# Patient Record
Sex: FEMALE | Race: BLACK OR AFRICAN AMERICAN | NOT HISPANIC OR LATINO | Employment: OTHER | URBAN - METROPOLITAN AREA
[De-identification: names, ages, dates, MRNs, and addresses within clinical notes are randomized per-mention and may not be internally consistent; named-entity substitution may affect disease eponyms.]

---

## 2017-01-18 ENCOUNTER — HOSPITAL ENCOUNTER (OUTPATIENT)
Dept: RADIOLOGY | Facility: HOSPITAL | Age: 67
Discharge: HOME | End: 2017-01-18
Attending: NURSE PRACTITIONER
Payer: MEDICARE

## 2017-01-18 ENCOUNTER — OFFICE VISIT (OUTPATIENT)
Dept: ORTHOPEDICS | Facility: CLINIC | Age: 67
End: 2017-01-18
Payer: MEDICARE

## 2017-01-18 VITALS
WEIGHT: 200 LBS | DIASTOLIC BLOOD PRESSURE: 76 MMHG | BODY MASS INDEX: 36.8 KG/M2 | HEART RATE: 58 BPM | HEIGHT: 62 IN | SYSTOLIC BLOOD PRESSURE: 141 MMHG

## 2017-01-18 DIAGNOSIS — M18.12 PRIMARY OSTEOARTHRITIS OF FIRST CARPOMETACARPAL JOINT OF LEFT HAND: ICD-10-CM

## 2017-01-18 DIAGNOSIS — Z98.890 POST-OPERATIVE STATE: Primary | ICD-10-CM

## 2017-01-18 DIAGNOSIS — Z98.890 POST-OPERATIVE STATE: ICD-10-CM

## 2017-01-18 PROCEDURE — 99999 PR PBB SHADOW E&M-EST. PATIENT-LVL III: CPT | Mod: PBBFAC,,, | Performed by: NURSE PRACTITIONER

## 2017-01-18 PROCEDURE — 73130 X-RAY EXAM OF HAND: CPT | Mod: 26,LT,, | Performed by: RADIOLOGY

## 2017-01-18 PROCEDURE — 73130 X-RAY EXAM OF HAND: CPT | Mod: TC,LT

## 2017-01-18 PROCEDURE — 99024 POSTOP FOLLOW-UP VISIT: CPT | Mod: ,,, | Performed by: NURSE PRACTITIONER

## 2017-01-18 NOTE — PROGRESS NOTES
"Ms. Sears is here today for a post-operative visit.she is 6 weeks status post Left thumb CMC arthroscopy with synovectomy and thermal shrinkage of ligaments  by Dr. Carney on 12/7/16. she reports that she is doing very well with no significant pain. she denies fever, chills, and sweats since the time of the surgery.     Physical exam:    Vitals:    01/18/17 0919   BP: (!) 141/76   Pulse: (!) 58   Weight: 90.7 kg (200 lb)   Height: 5' 2" (1.575 m)   PainSc: 0-No pain   PainLoc: Hand     Incision is clean, dry and intact. There is no erythema or exudate. Scar is soft. There is no sign of any infection. she is NVI. She has excellent  strength and normal ROM at wrist and digits. She is almost able to oppose thumb to little finger MCP.     Assessment: 14 days status post Left thumb CMC arthroscopy with synovectomy and thermal shrinkage of ligaments    Plan:  Diagnoses and all orders for this visit:    Post-operative state  -     X-Ray Hand Complete Left; Future    Primary osteoarthritis of first carpometacarpal joint of left hand  -     X-Ray Hand Complete Left; Future    - Xray taken today showing good alignment is within normal limits.  No fractures identified.  No evidence of lytic or blastic lesions. Soft tissues are unremarkable. Joint spaces are unremarkable.  - Pt is progressing very well in therapy- she [facility]  - Pt is happy with surgery, minimal pain- only when she moves "a certain way"- has not needed any medication  - Continue OT  - RTC 6 wk for 12 wk post op                "

## 2017-01-24 ENCOUNTER — HOSPITAL ENCOUNTER (OUTPATIENT)
Dept: RADIOLOGY | Facility: CLINIC | Age: 67
Discharge: HOME | End: 2017-01-24
Payer: MEDICARE

## 2017-01-24 PROCEDURE — 77080 DXA BONE DENSITY AXIAL: CPT | Mod: GA,TC

## 2017-01-24 PROCEDURE — 77080 DXA BONE DENSITY AXIAL: CPT | Mod: 26,GA,S$GLB, | Performed by: INTERNAL MEDICINE

## 2017-02-20 RX ORDER — FUROSEMIDE 40 MG/1
TABLET ORAL
Qty: 90 TABLET | Refills: 1 | Status: SHIPPED | OUTPATIENT
Start: 2017-02-20 | End: 2017-05-12

## 2017-02-27 ENCOUNTER — TELEPHONE (OUTPATIENT)
Dept: ORTHOPEDICS | Facility: CLINIC | Age: 67
End: 2017-02-27

## 2017-03-01 ENCOUNTER — OFFICE VISIT (OUTPATIENT)
Dept: ORTHOPEDICS | Facility: CLINIC | Age: 67
End: 2017-03-01
Payer: MEDICARE

## 2017-03-01 VITALS
HEART RATE: 66 BPM | WEIGHT: 199.94 LBS | DIASTOLIC BLOOD PRESSURE: 88 MMHG | SYSTOLIC BLOOD PRESSURE: 170 MMHG | HEIGHT: 62 IN | BODY MASS INDEX: 36.79 KG/M2

## 2017-03-01 DIAGNOSIS — Z47.89 UNSPECIFIED ORTHOPEDIC AFTERCARE: Primary | ICD-10-CM

## 2017-03-01 PROCEDURE — 99213 OFFICE O/P EST LOW 20 MIN: CPT | Mod: PBBFAC | Performed by: PHYSICIAN ASSISTANT

## 2017-03-01 PROCEDURE — 99999 PR PBB SHADOW E&M-EST. PATIENT-LVL III: CPT | Mod: PBBFAC,,, | Performed by: PHYSICIAN ASSISTANT

## 2017-03-01 PROCEDURE — 99024 POSTOP FOLLOW-UP VISIT: CPT | Mod: ,,, | Performed by: PHYSICIAN ASSISTANT

## 2017-03-01 NOTE — PROGRESS NOTES
"Ms. Sears is here today for a post-operative visit.she is 12 weeks status post Left thumb CMC arthroscopy with synovectomy and thermal shrinkage of ligaments  by Dr. Carney on 12/7/16. she reports that she is doing very well with no significant pain. Mild pain only, but greatly improved from preop. Having knee replacement surgery soon. she denies fever, chills, and sweats since the time of the surgery.     Physical exam:    Vitals:    03/01/17 0903   BP: (!) 170/88   Pulse: 66   Weight: 90.7 kg (199 lb 15.3 oz)   Height: 5' 2" (1.575 m)   PainSc:   2   PainLoc: Hand     Incision is clean, dry and intact. There is no erythema or exudate. Scar is firm and raised There is no sign of any infection. she is NVI. She has excellent  strength and normal ROM at wrist and digits. She is almost able to oppose thumb to little finger MCP.     Assessment: 12 weeks status post Left thumb CMC arthroscopy with synovectomy and thermal shrinkage of ligaments    Plan:  Stephanie was seen today for pain.    Diagnoses and all orders for this visit:    Unspecified orthopedic aftercare    - Pt completed [facility] at [facility]  -may need injection vs arthroplasty in future if pain returns - discussed with pt. She is unable to be casted while she has her walker  - f/u PRN              "

## 2017-05-10 ENCOUNTER — OFFICE VISIT (OUTPATIENT)
Dept: FAMILY MEDICINE | Facility: CLINIC | Age: 67
End: 2017-05-10
Payer: MEDICARE

## 2017-05-10 VITALS
HEIGHT: 62 IN | DIASTOLIC BLOOD PRESSURE: 100 MMHG | RESPIRATION RATE: 17 BRPM | WEIGHT: 191.81 LBS | BODY MASS INDEX: 35.3 KG/M2 | TEMPERATURE: 98 F | HEART RATE: 70 BPM | SYSTOLIC BLOOD PRESSURE: 184 MMHG | OXYGEN SATURATION: 98 %

## 2017-05-10 DIAGNOSIS — Z72.0 TOBACCO USE: ICD-10-CM

## 2017-05-10 DIAGNOSIS — Z00.00 ENCOUNTER FOR PREVENTIVE HEALTH EXAMINATION: Primary | ICD-10-CM

## 2017-05-10 DIAGNOSIS — Z23 NEED FOR PNEUMOCOCCAL VACCINATION: ICD-10-CM

## 2017-05-10 DIAGNOSIS — Z72.3 LACK OF PHYSICAL ACTIVITY: ICD-10-CM

## 2017-05-10 DIAGNOSIS — I10 ESSENTIAL HYPERTENSION: ICD-10-CM

## 2017-05-10 DIAGNOSIS — E66.3 OVERWEIGHT: ICD-10-CM

## 2017-05-10 DIAGNOSIS — R52 SEVERE PAIN: ICD-10-CM

## 2017-05-10 PROCEDURE — 99999 PR PBB SHADOW E&M-EST. PATIENT-LVL V: CPT | Mod: PBBFAC,,, | Performed by: PHYSICIAN ASSISTANT

## 2017-05-10 PROCEDURE — 99215 OFFICE O/P EST HI 40 MIN: CPT | Mod: PBBFAC,PO | Performed by: PHYSICIAN ASSISTANT

## 2017-05-10 PROCEDURE — G0438 PPPS, INITIAL VISIT: HCPCS | Mod: ,,, | Performed by: PHYSICIAN ASSISTANT

## 2017-05-10 PROCEDURE — 90732 PPSV23 VACC 2 YRS+ SUBQ/IM: CPT | Mod: PBBFAC,PO | Performed by: PHYSICIAN ASSISTANT

## 2017-05-10 NOTE — MR AVS SNAPSHOT
[facility]  [address]  [place] [address]  Phone: [phone]  Fax: [phone]                  Stephanie Sears   5/10/2017 9:00 AM   Office Visit    Description:  Female : 1950   Provider:  Debbie Wheeler PA-C   Department:  [facility]           Reason for Visit     Medicare AWV           Diagnoses this Visit        Comments    Encounter for preventive health examination    -  Primary     Need for pneumococcal vaccination         Tobacco use         Lack of physical activity         Severe pain         Overweight                To Do List           Future Appointments        Provider Department Dept Phone    2017 9:00 AM Luis Angel Schafer MD [facility] [phone]    2017 9:00 AM Sandi Menon MD [facility] [phone]      Goals (5 Years of Data)     None      Follow-Up and Disposition     Return in about 2 days (around 2017).      [facility] On Call     [facility] On Call Nurse Care Line -  Assistance  Unless otherwise directed by your provider, please contact [facility] On-Call, our nurse care line that is available for  assistance.     Registered nurses in the [facility] On Call Center provide: appointment scheduling, clinical advisement, health education, and other advisory services.  Call: [phone] (toll free)               Medications           Message regarding Medications     Verify the changes and/or additions to your medication regime listed below are the same as discussed with your clinician today.  If any of these changes or additions are incorrect, please notify your healthcare provider.             Verify that the below list of medications is an accurate representation of the medications you are currently taking.  If none reported, the list may be blank. If incorrect, please contact your healthcare provider. Carry this list with you in case of emergency.           Current Medications      "albuterol 90 mcg/actuation inhaler Inhale 2 puffs into the lungs every 6 (six) hours as needed. 2 Aerosol Inhalation Every 4-6 hours    amlodipine-benazepril (LOTREL) 10-40 mg per capsule Take 1 capsule by mouth once daily.    blood sugar diagnostic (ACCU-CHEK JOSE G PLUS TEST STRP) Strp Inject 1 each into the skin 2 (two) times daily.    carvedilol (COREG) 6.25 MG tablet Take 1 tablet (6.25 mg total) by mouth 2 (two) times daily with meals.    furosemide (LASIX) 40 MG tablet TAKE 1 TABLET BY MOUTH DAILY    gabapentin (NEURONTIN) 600 MG tablet Take 1 tablet (600 mg total) by mouth 3 (three) times daily.    INULIN (FIBER GUMMIES ORAL) Take by mouth every morning.     lancets (ACCU-CHEK MULTICLIX LANCET) Misc Test blood sugar twice daily    meloxicam (MOBIC) 7.5 MG tablet Take 1 tablet (7.5 mg total) by mouth once daily.    acetaminophen (TYLENOL) 325 MG tablet Take 325 mg by mouth every 6 (six) hours as needed for Pain.    CALCIUM CARBONATE (TUMS ORAL) Take by mouth as needed (acid reflux, indigestion).    diphenhydrAMINE (BENADRYL) 25 mg capsule Take 25 mg by mouth every 6 (six) hours as needed for Itching or Allergies.    fexofenadine (ALLEGRA) 180 MG tablet Take 1 tablet by mouth daily as needed.     furosemide (LASIX) 40 MG tablet Take 1 tablet (40 mg total) by mouth once daily.    tramadol (ULTRAM) 50 mg tablet Take 1 tablet (50 mg total) by mouth every 6 (six) hours as needed for Pain.           Clinical Reference Information           Your Vitals Were     BP Pulse Temp Resp Height Weight    184/100 70 98.1 °F (36.7 °C) (Oral) 17 5' 1.81" (1.57 m) 87 kg (191 lb 12.8 oz)    SpO2 BMI             98% 35.3 kg/m2         Blood Pressure          Most Recent Value    BP  (!)  184/100      Allergies as of 5/10/2017     Hydrocodone    Iodinated Contrast Media - Oral And Iv Dye    Sulfa (Sulfonamide Antibiotics)      Immunizations Administered on Date of Encounter - 5/10/2017     Name Date Dose VIS Date Route    " Pneumococcal Polysaccharide - 23 Valent  Incomplete 0.5 mL 4/24/2015 Intramuscular      Orders Placed During Today's Visit      Normal Orders This Visit    Ambulatory referral to Smoking Cessation Program     Pneumococcal Polysaccharide Vaccine (23 Valent) (SQ/IM)       Instructions      Counseling and Referral of Other Preventative  (Italic type indicates deductible and co-insurance are waived)    Patient Name: Stephanie Sears  Today's Date: 5/10/2017      SERVICE LIMITATIONS RECOMMENDATION    Vaccines    · Pneumococcal (once after 65)    · Influenza (annually)    · Hepatitis B (if medium/high risk)    · Prevnar 13      Hepatitis B medium/high risk factors:       - End-stage renal disease       - Hemophiliacs who received Factor VII or         IX concentrates       - Clients of institutions for the mentally             retarded       - Persons who live in the same house as          a HepB carrier       - Homosexual men       - Illicit injectable drug abusers     Pneumococcal: Scheduled - see appointments     Influenza: Done, repeat in one year     Hepatitis B: N/A     Prevnar 13: Done, no repeat necessary    Mammogram (biennial age 50-74)  Annually (age 40 or over)  Last done 6/10/16, recommend to repeat every 1  years    Pap (up to age 70 and after 70 if unknown history or abnormal study last 10 years)    N/A     The USPSTF recommends against screening for cervical cancer in women who have had a hysterectomy with removal of the cervix and who do not have a history of a high-grade precancerous lesion (cervical intraepithelial neoplasia [MEGHANA] grade 2 or 3) or cervical cancer.     Colorectal cancer screening (to age 75)    · Fecal occult blood test (annual)  · Flexible sigmoidoscopy (5y)  · Screening colonoscopy (10y)  · Barium enema   Last done 8/13/12, recommend to repeat every 5  years    Diabetes self-management training (no USPSTF recommendations)  Requires referral by treating physician for patient with  diabetes or renal disease. 10 hours of initial DSMT sessions of no less than 30 minutes each in a continuous 12-month period. 2 hours of follow-up DSMT in subsequent years.  N/A    Bone mass measurements (age 65 & older, biennial)  Requires diagnosis related to osteoporosis or estrogen deficiency. Biennial benefit unless patient has history of long-term glucocorticoid  Last done 1/24/17, recommend to repeat every 3  years    Glaucoma screening (no USPSTF recommendation)  Diabetes mellitus, family history   , age 50 or over    American, age 65 or over  Done this year, repeat every year    Medical nutrition therapy for diabetes or renal disease (no recommended schedule)  Requires referral by treating physician for patient with diabetes or renal disease or kidney transplant within the past 3 years.  Can be provided in same year as diabetes self-management training (DSMT), and CMS recommends medical nutrition therapy take place after DSMT. Up to 3 hours for initial year and 2 hours in subsequent years.  N/A    Cardiovascular screening blood tests (every 5 years)  · Fasting lipid panel  Order as a panel if possible  Done this year, repeat every year    Diabetes screening tests (at least every 3 years, Medicare covers annually or at 6-month intervals for prediabetic patients)  · Fasting blood sugar (FBS) or glucose tolerance test (GTT)  Patient must be diagnosed with one of the following:       - Hypertension       - Dyslipidemia       - Obesity (BMI 30kg/m2)       - Previous elevated impaired FBS or GTT       ... or any two of the following:       - Overweight (BMI 25 but <30)       - Family history of diabetes       - Age 65 or older       - History of gestational diabetes or birth of baby weighing more than 9 pounds  Done this year, repeat every year    Abdominal aortic aneurysm screening (once)  · Sonogram   Limited to patients who meet one of the following criteria:       - Men who are 65-75  years old and have smoked more than 100 cigarette in their lifetime       - Anyone with a family history of abdominal aortic aneurysm       - Anyone recommended for screening by the USPSTF  N/A    HIV screening (annually for increased risk patients)  · HIV-1 and HIV-2 by EIA, or DYLAN, rapid antibody test or oral mucosa transudate  Patients must be at increased risk for HIV infection per USPSTF guidelines or pregnant. Tests covered annually for patient at increased risk or as requested by the patient. Pregnant patients may receive up to 3 tests during pregnancy.  Risks discussed, screening is not recommended    Smoking cessation counseling (up to 8 sessions per year)  Patients must be asymptomatic of tobacco-related conditions to receive as a preventative service.  Referred to Tobacco Cessation Program.    Subsequent annual wellness visit  At least 12 months since last AWV  Return in one year     The following information is provided to all patients.  This information is to help you find resources for any of the problems found today that may be affecting your health:                Living healthy guide: www.[facility].[place].[facility]      Understanding Diabetes: www.diabetes.org      Eating healthy: www.cdc.gov/healthyweight      [facility] home safety checklist: www.cdc.gov/steadi/patient.html      Agency on Aging: www.goea.[place].[facility]          Physical Activity: www.kirill.nih.gov/gy4thzn      Tobacco use: www.quitwithusla.org          Smoking Cessation     If you would like to quit smoking:   You may be eligible for free services if you are a [place] resident and started smoking cigarettes before September 1, 1988.  Call the Smoking Cessation Trust (SCT) toll free at (599) 794-2110 or (366) 006-8114.   Call 8-800-QUIT-NOW if you do not meet the above criteria.   Contact us via email: [email or web address]   View our website for more information: [email or web address]        Language Assistance Services      ATTENTION: Language assistance services are available, free of charge. Please call [phone].      ATENCIÓN: Si habla lexi, tiene a moore disposición servicios gratuitos de asistencia lingüística. Llame al [phone].     CHÚ Ý: N?u b?n nói Ti?ng Vi?t, có các d?ch v? h? tr? ngôn ng? mi?n phí dành cho b?n. G?i s? [phone].         [facility] complies with applicable Federal civil rights laws and does not discriminate on the basis of race, color, national origin, age, disability, or sex.

## 2017-05-10 NOTE — PROGRESS NOTES
"Stephanie Sears presented for an initial Medicare AWV today. The following components were reviewed and updated:    · Medical history  · Family History  · Social history  · Allergies and Current Medications  · Health Risk Assessment  · Health Maintenance  · Care Team    **See Completed Assessments for Annual Wellness visit with in the encounter summary    The following assessments were completed:  · Depression Screening  · Cognitive function Screening  · Timed Get Up Test  · Whisper Test    Vitals:    05/10/17 0911 05/10/17 1006   BP: (!) 190/94 (!) 184/100   BP Location: Left arm    Patient Position: Sitting    BP Method: Manual    Pulse: 70    Resp: 17    Temp: 98.1 °F (36.7 °C)    TempSrc: Oral    SpO2: 98%    Weight: 87 kg (191 lb 12.8 oz)    Height: 5' 1.81" (1.57 m)      Body mass index is 35.3 kg/(m^2).  Waist Measurements: 41.75 in]        Diagnoses and health risks identified today and associated recommendations/orders:  1. Encounter for preventive health examination  Provided Stephanie with a 5-10 year written screening schedule and personal prevention plan. Recommendations were developed using the USPSTF age appropriate recommendations. Education, counseling, and referrals were provided as needed.  After Visit Summary printed and given to patient which includes a list of additional screenings\tests needed.    2. Need for pneumococcal vaccination  - Pneumococcal Polysaccharide Vaccine (23 Valent) (SQ/IM)    3. Tobacco use  - Ambulatory referral to Smoking Cessation Program    4. Lack of physical activity  -  Discussed increasing activity     5. Severe pain  Pt to have right knee replacement in June    6. Overweight  Diet and exercise     7. Hypertension  -  F/u in 2 days to est care with new PCP and reassess    Return in about 2 days (around 5/12/2017).      Debbie Wheeler PA-C  "

## 2017-05-10 NOTE — PATIENT INSTRUCTIONS
Counseling and Referral of Other Preventative  (Italic type indicates deductible and co-insurance are waived)    Patient Name: Stephanie Sears  Today's Date: 5/10/2017      SERVICE LIMITATIONS RECOMMENDATION    Vaccines    · Pneumococcal (once after 65)    · Influenza (annually)    · Hepatitis B (if medium/high risk)    · Prevnar 13      Hepatitis B medium/high risk factors:       - End-stage renal disease       - Hemophiliacs who received Factor VII or         IX concentrates       - Clients of institutions for the mentally             retarded       - Persons who live in the same house as          a HepB carrier       - Homosexual men       - Illicit injectable drug abusers     Pneumococcal: Scheduled - see appointments     Influenza: Done, repeat in one year     Hepatitis B: N/A     Prevnar 13: Done, no repeat necessary    Mammogram (biennial age 50-74)  Annually (age 40 or over)  Last done 6/10/16, recommend to repeat every 1  years    Pap (up to age 70 and after 70 if unknown history or abnormal study last 10 years)    N/A     The USPSTF recommends against screening for cervical cancer in women who have had a hysterectomy with removal of the cervix and who do not have a history of a high-grade precancerous lesion (cervical intraepithelial neoplasia [MEGHANA] grade 2 or 3) or cervical cancer.     Colorectal cancer screening (to age 75)    · Fecal occult blood test (annual)  · Flexible sigmoidoscopy (5y)  · Screening colonoscopy (10y)  · Barium enema   Last done 8/13/12, recommend to repeat every 5  years    Diabetes self-management training (no USPSTF recommendations)  Requires referral by treating physician for patient with diabetes or renal disease. 10 hours of initial DSMT sessions of no less than 30 minutes each in a continuous 12-month period. 2 hours of follow-up DSMT in subsequent years.  N/A    Bone mass measurements (age 65 & older, biennial)  Requires diagnosis related to osteoporosis or estrogen  deficiency. Biennial benefit unless patient has history of long-term glucocorticoid  Last done 1/24/17, recommend to repeat every 3  years    Glaucoma screening (no USPSTF recommendation)  Diabetes mellitus, family history   , age 50 or over    American, age 65 or over  Done this year, repeat every year    Medical nutrition therapy for diabetes or renal disease (no recommended schedule)  Requires referral by treating physician for patient with diabetes or renal disease or kidney transplant within the past 3 years.  Can be provided in same year as diabetes self-management training (DSMT), and CMS recommends medical nutrition therapy take place after DSMT. Up to 3 hours for initial year and 2 hours in subsequent years.  N/A    Cardiovascular screening blood tests (every 5 years)  · Fasting lipid panel  Order as a panel if possible  Done this year, repeat every year    Diabetes screening tests (at least every 3 years, Medicare covers annually or at 6-month intervals for prediabetic patients)  · Fasting blood sugar (FBS) or glucose tolerance test (GTT)  Patient must be diagnosed with one of the following:       - Hypertension       - Dyslipidemia       - Obesity (BMI 30kg/m2)       - Previous elevated impaired FBS or GTT       ... or any two of the following:       - Overweight (BMI 25 but <30)       - Family history of diabetes       - Age 65 or older       - History of gestational diabetes or birth of baby weighing more than 9 pounds  Done this year, repeat every year    Abdominal aortic aneurysm screening (once)  · Sonogram   Limited to patients who meet one of the following criteria:       - Men who are 65-75 years old and have smoked more than 100 cigarette in their lifetime       - Anyone with a family history of abdominal aortic aneurysm       - Anyone recommended for screening by the USPSTF  N/A    HIV screening (annually for increased risk patients)  · HIV-1 and HIV-2 by EIA, or DYLAN,  rapid antibody test or oral mucosa transudate  Patients must be at increased risk for HIV infection per USPSTF guidelines or pregnant. Tests covered annually for patient at increased risk or as requested by the patient. Pregnant patients may receive up to 3 tests during pregnancy.  Risks discussed, screening is not recommended    Smoking cessation counseling (up to 8 sessions per year)  Patients must be asymptomatic of tobacco-related conditions to receive as a preventative service.  Referred to Tobacco Cessation Program.    Subsequent annual wellness visit  At least 12 months since last AWV  Return in one year     The following information is provided to all patients.  This information is to help you find resources for any of the problems found today that may be affecting your health:                Living healthy guide: www.[facility].[place].[facility]      Understanding Diabetes: www.diabetes.org      Eating healthy: www.cdc.gov/healthyweight      CDC home safety checklist: www.cdc.gov/steadi/patient.html      Agency on Aging: www.goea.[place].[facility]          Physical Activity: www.kirill.nih.gov/wt9mmat      Tobacco use: www.quitwithusla.org

## 2017-05-10 NOTE — PROGRESS NOTES
Administered Pneumococcal 23 vaccine SC to left deltoid.  Patient tolerated injection well, no adverse reactions noted.

## 2017-05-12 ENCOUNTER — LAB VISIT (OUTPATIENT)
Dept: LAB | Facility: HOSPITAL | Age: 67
End: 2017-05-12
Attending: INTERNAL MEDICINE
Payer: MEDICARE

## 2017-05-12 ENCOUNTER — OFFICE VISIT (OUTPATIENT)
Dept: FAMILY MEDICINE | Facility: CLINIC | Age: 67
End: 2017-05-12
Payer: MEDICARE

## 2017-05-12 VITALS
DIASTOLIC BLOOD PRESSURE: 98 MMHG | SYSTOLIC BLOOD PRESSURE: 172 MMHG | TEMPERATURE: 98 F | HEART RATE: 80 BPM | RESPIRATION RATE: 17 BRPM | HEIGHT: 62 IN | WEIGHT: 192.44 LBS | OXYGEN SATURATION: 99 % | BODY MASS INDEX: 35.41 KG/M2

## 2017-05-12 DIAGNOSIS — R73.03 PRE-DIABETES: ICD-10-CM

## 2017-05-12 DIAGNOSIS — I10 ESSENTIAL HYPERTENSION: ICD-10-CM

## 2017-05-12 DIAGNOSIS — I10 HYPERTENSION, ESSENTIAL: Primary | ICD-10-CM

## 2017-05-12 DIAGNOSIS — J30.89 NON-SEASONAL ALLERGIC RHINITIS, UNSPECIFIED ALLERGIC RHINITIS TRIGGER: ICD-10-CM

## 2017-05-12 DIAGNOSIS — I10 HYPERTENSION, ESSENTIAL: ICD-10-CM

## 2017-05-12 LAB
ALBUMIN SERPL BCP-MCNC: 3.2 G/DL
ALP SERPL-CCNC: 87 U/L
ALT SERPL W/O P-5'-P-CCNC: 13 U/L
ANION GAP SERPL CALC-SCNC: 6 MMOL/L
AST SERPL-CCNC: 13 U/L
BACTERIA #/AREA URNS HPF: ABNORMAL /HPF
BASOPHILS # BLD AUTO: 0.01 K/UL
BASOPHILS NFR BLD: 0.1 %
BILIRUB SERPL-MCNC: 0.3 MG/DL
BILIRUB UR QL STRIP: NEGATIVE
BUN SERPL-MCNC: 17 MG/DL
CALCIUM SERPL-MCNC: 9.7 MG/DL
CHLORIDE SERPL-SCNC: 107 MMOL/L
CLARITY UR: ABNORMAL
CO2 SERPL-SCNC: 29 MMOL/L
COLOR UR: YELLOW
CREAT SERPL-MCNC: 0.8 MG/DL
DIFFERENTIAL METHOD: ABNORMAL
EOSINOPHIL # BLD AUTO: 0.3 K/UL
EOSINOPHIL NFR BLD: 3.8 %
ERYTHROCYTE [DISTWIDTH] IN BLOOD BY AUTOMATED COUNT: 14.7 %
EST. GFR  (AFRICAN AMERICAN): >60 ML/MIN/1.73 M^2
EST. GFR  (NON AFRICAN AMERICAN): >60 ML/MIN/1.73 M^2
GLUCOSE SERPL-MCNC: 103 MG/DL
GLUCOSE UR QL STRIP: NEGATIVE
HCT VFR BLD AUTO: 43.2 %
HGB BLD-MCNC: 14.4 G/DL
HGB UR QL STRIP: NEGATIVE
HYALINE CASTS #/AREA URNS LPF: 0 /LPF
KETONES UR QL STRIP: NEGATIVE
LEUKOCYTE ESTERASE UR QL STRIP: NEGATIVE
LYMPHOCYTES # BLD AUTO: 3.3 K/UL
LYMPHOCYTES NFR BLD: 42.1 %
MCH RBC QN AUTO: 27.7 PG
MCHC RBC AUTO-ENTMCNC: 33.3 %
MCV RBC AUTO: 83 FL
MICROSCOPIC COMMENT: ABNORMAL
MONOCYTES # BLD AUTO: 0.6 K/UL
MONOCYTES NFR BLD: 7.5 %
NEUTROPHILS # BLD AUTO: 3.7 K/UL
NEUTROPHILS NFR BLD: 46.5 %
NITRITE UR QL STRIP: NEGATIVE
PH UR STRIP: 5 [PH] (ref 5–8)
PLATELET # BLD AUTO: 263 K/UL
PMV BLD AUTO: 11.3 FL
POTASSIUM SERPL-SCNC: 4.4 MMOL/L
PROT SERPL-MCNC: 6.9 G/DL
PROT UR QL STRIP: ABNORMAL
RBC # BLD AUTO: 5.2 M/UL
RBC #/AREA URNS HPF: 1 /HPF (ref 0–4)
SODIUM SERPL-SCNC: 142 MMOL/L
SP GR UR STRIP: 1.02 (ref 1–1.03)
SQUAMOUS #/AREA URNS HPF: 3 /HPF
URN SPEC COLLECT METH UR: ABNORMAL
UROBILINOGEN UR STRIP-ACNC: NEGATIVE EU/DL
WBC # BLD AUTO: 7.87 K/UL
WBC #/AREA URNS HPF: 1 /HPF (ref 0–5)

## 2017-05-12 PROCEDURE — 99999 PR PBB SHADOW E&M-EST. PATIENT-LVL III: CPT | Mod: PBBFAC,,, | Performed by: INTERNAL MEDICINE

## 2017-05-12 PROCEDURE — 36415 COLL VENOUS BLD VENIPUNCTURE: CPT | Mod: PN

## 2017-05-12 PROCEDURE — 80053 COMPREHEN METABOLIC PANEL: CPT

## 2017-05-12 PROCEDURE — 83036 HEMOGLOBIN GLYCOSYLATED A1C: CPT

## 2017-05-12 PROCEDURE — 99214 OFFICE O/P EST MOD 30 MIN: CPT | Mod: S$PBB,,, | Performed by: INTERNAL MEDICINE

## 2017-05-12 PROCEDURE — 85025 COMPLETE CBC W/AUTO DIFF WBC: CPT

## 2017-05-12 RX ORDER — FLUTICASONE PROPIONATE 50 MCG
1 SPRAY, SUSPENSION (ML) NASAL 2 TIMES DAILY
Qty: 1 BOTTLE | Refills: 1 | Status: SHIPPED | OUTPATIENT
Start: 2017-05-12 | End: 2020-12-07

## 2017-05-12 RX ORDER — CARVEDILOL 12.5 MG/1
12.5 TABLET ORAL 2 TIMES DAILY WITH MEALS
Qty: 180 TABLET | Refills: 1 | Status: SHIPPED | OUTPATIENT
Start: 2017-05-12 | End: 2017-11-02 | Stop reason: SDUPTHER

## 2017-05-12 NOTE — PROGRESS NOTES
Subjective:       Patient ID: Stephanie Sears is a 67 y.o. female.    Chief Complaint: Establish Care; Hypertension; and Follow-up    Hypertension   This is a chronic problem. The current episode started more than 1 year ago. The problem has been gradually worsening since onset. The problem is uncontrolled. Pertinent negatives include no blurred vision, chest pain, headaches, orthopnea, palpitations, peripheral edema or shortness of breath. Agents associated with hypertension include NSAIDs. Risk factors for coronary artery disease include diabetes mellitus, obesity, post-menopausal state and sedentary lifestyle. Past treatments include ACE inhibitors, calcium channel blockers, diuretics and beta blockers. The current treatment provides mild improvement. Compliance problems include medication side effects (takes lasix intermittently due to having to urinate more frequently).  There is no history of kidney disease or CAD/MI.   osteoarthritis bilateral knees s/p left TKA daily NSAID use for this with tramadol for breakthrough  Review of Systems   Constitutional: Negative for activity change, appetite change, fatigue, fever and unexpected weight change.   HENT: Negative for ear pain, rhinorrhea and sore throat.    Eyes: Negative for blurred vision, discharge and visual disturbance.   Respiratory: Negative for chest tightness, shortness of breath and wheezing.    Cardiovascular: Negative for chest pain, palpitations, orthopnea and leg swelling.   Gastrointestinal: Negative for abdominal pain, constipation and diarrhea.   Endocrine: Negative for cold intolerance and heat intolerance.   Genitourinary: Negative for dysuria and hematuria.   Musculoskeletal: Positive for arthralgias and back pain. Negative for joint swelling and neck stiffness.   Skin: Negative for rash.   Neurological: Negative for dizziness, syncope, weakness and headaches.   Psychiatric/Behavioral: Negative for suicidal ideas.       Objective:     Vitals:  "   05/12/17 0847 05/12/17 0925 05/12/17 0926   BP: (!) 158/92 (!) 164/96 (!) 172/98   BP Location: Left arm Right arm Left arm   Patient Position: Sitting     BP Method: Manual     Pulse: 72 80 80   Resp: 17     Temp: 97.9 °F (36.6 °C)     TempSrc: Oral     SpO2: 99%     Weight: 87.3 kg (192 lb 7.4 oz)     Height: 5' 1.5" (1.562 m)            Physical Exam   Constitutional: She is oriented to person, place, and time. She appears well-developed and well-nourished.   HENT:   Head: Normocephalic and atraumatic.   Eyes: Conjunctivae are normal. Pupils are equal, round, and reactive to light.   Neck: Normal range of motion.   Cardiovascular: Normal rate and regular rhythm.  Exam reveals no gallop and no friction rub.    No murmur heard.  No LE edema   Pulmonary/Chest: Effort normal and breath sounds normal. She has no wheezes. She has no rales.   Abdominal: Soft. Bowel sounds are normal. There is no tenderness. There is no rebound and no guarding.   Musculoskeletal: Normal range of motion. She exhibits no edema or tenderness.   Neurological: She is alert and oriented to person, place, and time. No cranial nerve deficit.   Skin: Skin is warm and dry.   Psychiatric: She has a normal mood and affect.       Assessment:       1. Hypertension, essential    2. Pre-diabetes    3. Non-seasonal allergic rhinitis, unspecified allergic rhinitis trigger    4. Essential hypertension        Plan:    1/4. Above goal check urine for proteinuria blood for creatinine/bun and electrolytes cbc for blood count increase carvediolo to 12.5mg twice per day.  Return two weeks to monitor pressure to bring meds on return    2. No medication last a1c 6.1% repeat today check microalbumin on acei    3. Nasal steroid bid      "

## 2017-05-13 LAB
CREAT UR-MCNC: 119 MG/DL
MICROALBUMIN UR DL<=1MG/L-MCNC: 2054 UG/ML
MICROALBUMIN/CREATININE RATIO: 1726.1 UG/MG

## 2017-05-15 LAB
ESTIMATED AVG GLUCOSE: 131 MG/DL
HBA1C MFR BLD HPLC: 6.2 %

## 2017-05-26 ENCOUNTER — LAB VISIT (OUTPATIENT)
Dept: LAB | Facility: HOSPITAL | Age: 67
End: 2017-05-26
Attending: INTERNAL MEDICINE
Payer: MEDICARE

## 2017-05-26 ENCOUNTER — OFFICE VISIT (OUTPATIENT)
Dept: FAMILY MEDICINE | Facility: CLINIC | Age: 67
End: 2017-05-26
Payer: MEDICARE

## 2017-05-26 VITALS
BODY MASS INDEX: 35.7 KG/M2 | SYSTOLIC BLOOD PRESSURE: 160 MMHG | RESPIRATION RATE: 17 BRPM | TEMPERATURE: 98 F | DIASTOLIC BLOOD PRESSURE: 70 MMHG | HEART RATE: 64 BPM | WEIGHT: 194 LBS | HEIGHT: 62 IN | OXYGEN SATURATION: 98 %

## 2017-05-26 DIAGNOSIS — Z11.4 ENCOUNTER FOR SCREENING FOR HIV: ICD-10-CM

## 2017-05-26 DIAGNOSIS — R80.9 PROTEINURIA, UNSPECIFIED TYPE: ICD-10-CM

## 2017-05-26 DIAGNOSIS — I1A.0 RESISTANT HYPERTENSION: Primary | ICD-10-CM

## 2017-05-26 LAB
AMORPH CRY URNS QL MICRO: ABNORMAL
BACTERIA #/AREA URNS HPF: ABNORMAL /HPF
BILIRUB UR QL STRIP: NEGATIVE
CLARITY UR: ABNORMAL
COLOR UR: ABNORMAL
GLUCOSE UR QL STRIP: NEGATIVE
HGB UR QL STRIP: NEGATIVE
HYALINE CASTS #/AREA URNS LPF: 0 /LPF
KETONES UR QL STRIP: NEGATIVE
LEUKOCYTE ESTERASE UR QL STRIP: ABNORMAL
MICROSCOPIC COMMENT: ABNORMAL
NITRITE UR QL STRIP: NEGATIVE
NON-SQ EPI CELLS #/AREA URNS HPF: 1 /HPF
PH UR STRIP: 5 [PH] (ref 5–8)
PROT UR QL STRIP: ABNORMAL
RBC #/AREA URNS HPF: 3 /HPF (ref 0–4)
SP GR UR STRIP: 1.02 (ref 1–1.03)
SQUAMOUS #/AREA URNS HPF: 5 /HPF
URN SPEC COLLECT METH UR: ABNORMAL
UROBILINOGEN UR STRIP-ACNC: NEGATIVE EU/DL
WBC #/AREA URNS HPF: >100 /HPF (ref 0–5)
WBC CLUMPS URNS QL MICRO: ABNORMAL

## 2017-05-26 PROCEDURE — 99999 PR PBB SHADOW E&M-EST. PATIENT-LVL III: CPT | Mod: PBBFAC,,, | Performed by: INTERNAL MEDICINE

## 2017-05-26 PROCEDURE — 84165 PROTEIN E-PHORESIS SERUM: CPT | Mod: 26,,, | Performed by: PATHOLOGY

## 2017-05-26 PROCEDURE — 36415 COLL VENOUS BLD VENIPUNCTURE: CPT | Mod: PN

## 2017-05-26 PROCEDURE — 86703 HIV-1/HIV-2 1 RESULT ANTBDY: CPT

## 2017-05-26 PROCEDURE — 84165 PROTEIN E-PHORESIS SERUM: CPT

## 2017-05-26 PROCEDURE — 86038 ANTINUCLEAR ANTIBODIES: CPT

## 2017-05-26 PROCEDURE — 83970 ASSAY OF PARATHORMONE: CPT

## 2017-05-26 PROCEDURE — 99214 OFFICE O/P EST MOD 30 MIN: CPT | Mod: S$PBB,,, | Performed by: INTERNAL MEDICINE

## 2017-05-26 RX ORDER — SPIRONOLACTONE 25 MG/1
25 TABLET ORAL DAILY
Qty: 30 TABLET | Refills: 5 | Status: SHIPPED | OUTPATIENT
Start: 2017-05-26 | End: 2017-07-14

## 2017-05-26 NOTE — PROGRESS NOTES
"Subjective:       Patient ID: Stephanie Sears is a 67 y.o. female.    Chief Complaint: Hypertension and Follow-up    F/u tests and blood pressure    HPI: 68 y/o w/ HTN, OA (s/p left TKA) on daily NSAID presents for follow up of blood pressure. Last visit carvediolol was increased she did have some improvement in  BP but still above goal. No LE swelling no CP orthopnea or PND. Using mobic daily for chronic right knee pain. She has history of CKD III in past, creatinine on most recent testing was in normal range. Did have elevated spot protein (1.7g) on maximum dose of acei, ccb, lasix (she does admit to skipping doses) and beta blocker (limited by lower heart rate).       Review of Systems   Constitutional: Negative for activity change, appetite change, fatigue, fever and unexpected weight change.   HENT: Negative for ear pain, rhinorrhea and sore throat.    Eyes: Negative for discharge and visual disturbance.   Respiratory: Negative for chest tightness, shortness of breath and wheezing.    Cardiovascular: Negative for chest pain, palpitations and leg swelling.   Gastrointestinal: Negative for abdominal pain, constipation and diarrhea.   Endocrine: Negative for cold intolerance and heat intolerance.   Genitourinary: Negative for dysuria and hematuria.   Musculoskeletal: Negative for joint swelling and neck stiffness.   Skin: Negative for rash.   Neurological: Negative for dizziness, syncope, weakness and headaches.   Psychiatric/Behavioral: Negative for suicidal ideas.       Objective:     Vitals:    05/26/17 0919   BP: (!) 160/70   BP Location: Right arm   Patient Position: Sitting   BP Method: Manual   Pulse: 64   Resp: 17   Temp: 98.2 °F (36.8 °C)   TempSrc: Oral   SpO2: 98%   Weight: 88 kg (194 lb 0.1 oz)   Height: 5' 1.5" (1.562 m)          Physical Exam   Constitutional: She is oriented to person, place, and time. She appears well-developed and well-nourished.   HENT:   Head: Normocephalic and atraumatic. "   Eyes: Conjunctivae are normal. Pupils are equal, round, and reactive to light.   Neck: Normal range of motion.   Cardiovascular: Normal rate and regular rhythm.  Exam reveals no gallop and no friction rub.    No murmur heard.  No periorbital or lower extremity edema   Pulmonary/Chest: Effort normal and breath sounds normal. She has no wheezes. She has no rales.   Abdominal: Soft. Bowel sounds are normal. There is no tenderness. There is no rebound and no guarding.   Musculoskeletal: Normal range of motion. She exhibits no edema or tenderness.   Neurological: She is alert and oriented to person, place, and time. No cranial nerve deficit.   Skin: Skin is warm and dry.   Psychiatric: She has a normal mood and affect.       Assessment:       1. Resistant hypertension    2. Proteinuria, unspecified type    3. Encounter for screening for HIV         Plan:    1. Add aldactone     2/3. Check ben, spep, hiv (had negative hepatitis in 2013). Renal ultrasound for kidney size   referal to nephrology for any other treatment recommendations

## 2017-05-27 LAB
CREAT UR-MCNC: 161 MG/DL
MICROALBUMIN UR DL<=1MG/L-MCNC: 2250 UG/ML
MICROALBUMIN/CREATININE RATIO: 1397.5 UG/MG

## 2017-05-29 LAB
ALBUMIN SERPL ELPH-MCNC: 3.67 G/DL
ALPHA1 GLOB SERPL ELPH-MCNC: 0.36 G/DL
ALPHA2 GLOB SERPL ELPH-MCNC: 1.04 G/DL
ANA SER QL IF: NORMAL
B-GLOBULIN SERPL ELPH-MCNC: 0.91 G/DL
GAMMA GLOB SERPL ELPH-MCNC: 0.92 G/DL
HIV 1+2 AB+HIV1 P24 AG SERPL QL IA: NEGATIVE
PATHOLOGIST INTERPRETATION SPE: NORMAL
PROT SERPL-MCNC: 6.9 G/DL
PTH-INTACT SERPL-MCNC: 88.9 PG/ML

## 2017-05-31 ENCOUNTER — TELEPHONE (OUTPATIENT)
Dept: FAMILY MEDICINE | Facility: CLINIC | Age: 67
End: 2017-05-31

## 2017-06-02 ENCOUNTER — HOSPITAL ENCOUNTER (OUTPATIENT)
Dept: RADIOLOGY | Facility: HOSPITAL | Age: 67
Discharge: HOME | End: 2017-06-02
Attending: INTERNAL MEDICINE
Payer: MEDICARE

## 2017-06-02 DIAGNOSIS — R80.9 PROTEINURIA, UNSPECIFIED TYPE: ICD-10-CM

## 2017-06-02 PROCEDURE — 93975 VASCULAR STUDY: CPT | Mod: 26,GC,, | Performed by: RADIOLOGY

## 2017-06-02 PROCEDURE — 76770 US EXAM ABDO BACK WALL COMP: CPT | Mod: 26,59,GC, | Performed by: RADIOLOGY

## 2017-06-02 PROCEDURE — 76770 US EXAM ABDO BACK WALL COMP: CPT | Mod: TC

## 2017-06-05 ENCOUNTER — OFFICE VISIT (OUTPATIENT)
Dept: UROLOGY | Facility: CLINIC | Age: 67
End: 2017-06-05
Payer: MEDICARE

## 2017-06-05 VITALS
DIASTOLIC BLOOD PRESSURE: 70 MMHG | HEIGHT: 62 IN | RESPIRATION RATE: 14 BRPM | SYSTOLIC BLOOD PRESSURE: 138 MMHG | BODY MASS INDEX: 36.31 KG/M2 | HEART RATE: 68 BPM | WEIGHT: 197.31 LBS

## 2017-06-05 DIAGNOSIS — R31.0 HEMATURIA, GROSS: Primary | ICD-10-CM

## 2017-06-05 DIAGNOSIS — R80.9 ASYMPTOMATIC PROTEINURIA: ICD-10-CM

## 2017-06-05 PROCEDURE — 99213 OFFICE O/P EST LOW 20 MIN: CPT | Mod: PBBFAC | Performed by: UROLOGY

## 2017-06-05 PROCEDURE — 99999 PR PBB SHADOW E&M-EST. PATIENT-LVL III: CPT | Mod: PBBFAC,,, | Performed by: UROLOGY

## 2017-06-05 PROCEDURE — 99214 OFFICE O/P EST MOD 30 MIN: CPT | Mod: S$PBB,,, | Performed by: UROLOGY

## 2017-06-05 NOTE — PROGRESS NOTES
"  Subjective:       Stephanie Sears is a 67 y.o. female who is an established patient who was referred by Dr Diaz for evaluation of hematuria.      Hematuria  Patient complains of gross hematuria. Onset of hematuria was 1 month ago and was sudden in onset. There is a history of nephrolithiasis - though not sure. There is not a history of urologic trauma. Other urologic symptoms include urgency, occasional dysuria, bladder spasms. Denies UI. Patient admits to history of tobacco use. Patient denies history of  surgeries, occupational exposure and urolithiasis. Prior workup has been UAs. She thinks she saw a urologist several years ago. Cystoscopy with maybe urethral diverticulum in the past.     She is now s/p hematuria workup - UCx - mild positive, Cyto - negative. MRU - stable urethral diverticulum. Cystoscopy 12/16 - urethral diverticulum at 5:00 position.    She has been having issues with high BP and proteinuria. Denies gross hematuria. Seeing nephrology for proteinuria.       The following portions of the patient's history were reviewed and updated as appropriate: allergies, current medications, past family history, past medical history, past social history, past surgical history and problem list.    Review of Systems  Constitutional: no fever or chills  ENT: no nasal congestion or sore throat  Respiratory: no cough or shortness of breath  Cardiovascular: no chest pain or palpitations  Gastrointestinal: no nausea or vomiting, tolerating diet  Genitourinary: as per HPI  Hematologic/Lymphatic: no easy bruising or lymphadenopathy  Musculoskeletal: no arthralgias or myalgias  Skin: no rashes or lesions  Neurological: no seizures or tremors  Behavioral/Psych: no auditory or visual hallucinations       Objective:    Vitals:   /70   Pulse 68   Resp 14   Ht 5' 1.5" (1.562 m)   Wt 89.5 kg (197 lb 5 oz)   BMI 36.68 kg/m²     Physical Exam   General: well developed, well nourished in no acute distress  Head: " normocephalic, atraumatic  Neck: supple, trachea midline, no obvious enlargement of thyroid  HEENT: EOMI, mucus membranes moist, sclera anicteric, no hearing impairment  Lungs: symmetric expansion, non-labored breathing  Cardiovascular: regular rate and rhythm, normal pulses  Abdomen: soft, non tender, non distended, no palpable masses, no hepatosplenomegaly, no hernias, no CVA tenderness  Musculoskeletal: no peripheral edema, normal ROM in bilateral upper and lower extremities  Lymphatics: no cervical or inguinal lymphadenopathy  Skin: no rashes or lesions  Neuro: alert and oriented x 3, no gross deficits  Psych: normal judgment and insight, normal mood/affect and non-anxious  Genitourinary:   patient declined exam      Lab Review   Urine analysis today in clinic shows positive - 500 protein, 5-10 RBC    Lab Results   Component Value Date    WBC 7.87 05/12/2017    HGB 14.4 05/12/2017    HCT 43.2 05/12/2017    MCV 83 05/12/2017     05/12/2017     Lab Results   Component Value Date    CREATININE 0.8 05/12/2017    BUN 17 05/12/2017       Imaging  Images and reports were personally reviewed by me and discussed with patient   MRU reviewed       Assessment/Plan:      1. Hematuria, gross    - Discussed etiology and workup of hematuria   - UCx - mild positive   - Cytology - negative   - CT urogram - unable to do 2/2 contrast allergy   - MR urogram - urethral diverticulum   - Cystoscopy - urethral diverticulum   - +smoker      2. Proteinuria   - Seeing nephrology on Thursday      Follow up in 12 months

## 2017-06-08 ENCOUNTER — OFFICE VISIT (OUTPATIENT)
Dept: NEPHROLOGY | Facility: CLINIC | Age: 67
End: 2017-06-08
Payer: MEDICARE

## 2017-06-08 VITALS
HEART RATE: 72 BPM | WEIGHT: 196.19 LBS | BODY MASS INDEX: 37.04 KG/M2 | SYSTOLIC BLOOD PRESSURE: 150 MMHG | OXYGEN SATURATION: 98 % | DIASTOLIC BLOOD PRESSURE: 80 MMHG | HEIGHT: 61 IN

## 2017-06-08 DIAGNOSIS — I10 ESSENTIAL HYPERTENSION: ICD-10-CM

## 2017-06-08 DIAGNOSIS — M19.90 OSTEOARTHRITIS, UNSPECIFIED OSTEOARTHRITIS TYPE, UNSPECIFIED SITE: ICD-10-CM

## 2017-06-08 DIAGNOSIS — N18.1 CKD (CHRONIC KIDNEY DISEASE) STAGE 1, GFR 90 ML/MIN OR GREATER: Primary | ICD-10-CM

## 2017-06-08 DIAGNOSIS — R80.9 PROTEINURIA, UNSPECIFIED TYPE: ICD-10-CM

## 2017-06-08 DIAGNOSIS — E11.21 DIABETIC NEPHROPATHY ASSOCIATED WITH TYPE 2 DIABETES MELLITUS: ICD-10-CM

## 2017-06-08 PROCEDURE — 99499 UNLISTED E&M SERVICE: CPT | Mod: S$PBB,,, | Performed by: INTERNAL MEDICINE

## 2017-06-08 PROCEDURE — 99214 OFFICE O/P EST MOD 30 MIN: CPT | Mod: PBBFAC | Performed by: INTERNAL MEDICINE

## 2017-06-08 PROCEDURE — 99999 PR PBB SHADOW E&M-EST. PATIENT-LVL IV: CPT | Mod: PBBFAC,,, | Performed by: INTERNAL MEDICINE

## 2017-06-08 NOTE — LETTER
June 8, 2017      Luis Angel Schafer MD  [address]  [place] [address]           [facility] - Nephrology  [address]  [place] [address]  Phone: [phone]  Fax: [phone]          Patient: Stephanie Sears   MR Number: [id number]   YOB: 1950   Date of Visit: 6/8/2017       Dear Dr. Luis Angel Schafer:    Thank you for referring Stephanie Sears to me for evaluation. Attached you will find relevant portions of my assessment and plan of care.    If you have questions, please do not hesitate to call me. I look forward to following Stephanie Sears along with you.    Sincerely,    Baldomero Rachel MD    Enclosure  CC:  No Recipients    If you would like to receive this communication electronically, please contact [email or web address] or [phone] to request more information on [facility] access.    For providers and/or their staff who would like to refer a patient to [facility], please contact us through our one-stop-shop provider referral line, [facility], at [phone].    If you feel you have received this communication in error or would no longer like to receive these types of communications, please e-mail [email or web address]

## 2017-06-08 NOTE — PROGRESS NOTES
Patient is here today for follow up evaluation of CKD. Last seen in renal office 10/26/15. Baseline Cr 0.8mg/dl Her most recent lab ( 5/12/17) Cr 0.8 mg/dl; eGFR; > 60 ml/min; potassium 4.4 mmol/lL Today she has no new complaints    ROS;   Mood and Affect; Aoppropriate  Otherwise non-contributory    Exam  Pleasant woman; no acue distress; oriented x 3  HEENT; WNL  CHEST; Clear P&A  HEART; RR; S1&S2 no murmur rub gallop  ABD; Benign  EXT; (-)Edema    Impression  CKD Stable    Hypertension Borderline control; recent adjustment in meds carvedilol increased to 12.5 mg twice daily; addition of spironolactone; not toelrated well;   Monitor home BP';   Return Visit; 2 weeks for BP check      Plan  Return Visit; 3 mo; pending above

## 2017-06-20 ENCOUNTER — TELEPHONE (OUTPATIENT)
Dept: NEPHROLOGY | Facility: CLINIC | Age: 67
End: 2017-06-20

## 2017-06-20 NOTE — TELEPHONE ENCOUNTER
----- Message from Aimee Latisha sent at 6/20/2017  3:27 PM CDT -----  Contact: Stephanie  tel:   [phone]    Faxing you the BP readings.    Pls call to discuss this.     Call pt to informed her we receive the records of her blood pressure she been monitoring

## 2017-06-26 ENCOUNTER — TELEPHONE (OUTPATIENT)
Dept: NEPHROLOGY | Facility: CLINIC | Age: 67
End: 2017-06-26

## 2017-06-26 NOTE — TELEPHONE ENCOUNTER
----- Message from Cris Lowry sent at 6/26/2017 12:23 PM CDT -----  Contact: self  [phone]  luis enrique  -   Pt calling to speak with nurse in regards to medication   Thanks,    Contacted pt regarding the question about her meds, pt stated dr dudley was suppose called her back to see if he will add another BP meds due to pt pressure being high, I let pt know ill give dr dudley a buzz to see what to do next with her pressure being high

## 2017-06-27 DIAGNOSIS — I10 ESSENTIAL HYPERTENSION: Primary | ICD-10-CM

## 2017-06-27 RX ORDER — CLONIDINE 0.2 MG/24H
1 PATCH, EXTENDED RELEASE TRANSDERMAL
Qty: 4 PATCH | Refills: 11 | Status: SHIPPED | OUTPATIENT
Start: 2017-06-27 | End: 2017-07-14

## 2017-06-27 NOTE — PROGRESS NOTES
Patient has labile hypertension; rcving max dose of amlodipine; benazepril; pulse 70s on carvedilol  Attempt trial of clonidine patch  0.2 mg  Spoke with patient who voiced understanding

## 2017-07-05 ENCOUNTER — TELEPHONE (OUTPATIENT)
Dept: NEPHROLOGY | Facility: CLINIC | Age: 67
End: 2017-07-05

## 2017-07-05 NOTE — TELEPHONE ENCOUNTER
----- Message from Cj Solares sent at 7/5/2017  1:38 PM CDT -----  Contact: Patient  Patient is requesting a call back in regards to Rx cloNIDine 0.2 mg/24 hr td ptwk (CATAPRES) 0.2 mg/24 hr    Patient can not afford medication.    Please call [phone].

## 2017-07-13 ENCOUNTER — TELEPHONE (OUTPATIENT)
Dept: PHYSICAL MEDICINE AND REHAB | Facility: CLINIC | Age: 67
End: 2017-07-13

## 2017-07-13 NOTE — TELEPHONE ENCOUNTER
----- Message from Rosa Isela Robledo sent at 7/13/2017 11:00 AM CDT -----  Contact: Patient [phone]  Patient is calling to get a EP appt for knee pain, patient states that her knee hurts today. Please call

## 2017-07-14 ENCOUNTER — OFFICE VISIT (OUTPATIENT)
Dept: FAMILY MEDICINE | Facility: CLINIC | Age: 67
End: 2017-07-14
Payer: MEDICARE

## 2017-07-14 VITALS
HEIGHT: 62 IN | TEMPERATURE: 98 F | BODY MASS INDEX: 36.63 KG/M2 | RESPIRATION RATE: 17 BRPM | SYSTOLIC BLOOD PRESSURE: 164 MMHG | OXYGEN SATURATION: 98 % | HEART RATE: 68 BPM | WEIGHT: 199.06 LBS | DIASTOLIC BLOOD PRESSURE: 80 MMHG

## 2017-07-14 DIAGNOSIS — I10 HYPERTENSION, ESSENTIAL: Primary | ICD-10-CM

## 2017-07-14 DIAGNOSIS — M17.11 PRIMARY OSTEOARTHRITIS OF RIGHT KNEE: ICD-10-CM

## 2017-07-14 DIAGNOSIS — Z12.31 ENCOUNTER FOR SCREENING MAMMOGRAM FOR MALIGNANT NEOPLASM OF BREAST: ICD-10-CM

## 2017-07-14 PROCEDURE — 99214 OFFICE O/P EST MOD 30 MIN: CPT | Mod: S$PBB,,, | Performed by: INTERNAL MEDICINE

## 2017-07-14 PROCEDURE — 1159F MED LIST DOCD IN RCRD: CPT | Mod: ,,, | Performed by: INTERNAL MEDICINE

## 2017-07-14 PROCEDURE — 99213 OFFICE O/P EST LOW 20 MIN: CPT | Mod: PBBFAC,PN | Performed by: INTERNAL MEDICINE

## 2017-07-14 PROCEDURE — 1125F AMNT PAIN NOTED PAIN PRSNT: CPT | Mod: ,,, | Performed by: INTERNAL MEDICINE

## 2017-07-14 PROCEDURE — 99999 PR PBB SHADOW E&M-EST. PATIENT-LVL III: CPT | Mod: PBBFAC,,, | Performed by: INTERNAL MEDICINE

## 2017-07-14 RX ORDER — CLONIDINE HYDROCHLORIDE 0.1 MG/1
0.1 TABLET ORAL 2 TIMES DAILY
Qty: 60 TABLET | Refills: 2 | Status: SHIPPED | OUTPATIENT
Start: 2017-07-14 | End: 2017-07-24

## 2017-07-14 NOTE — PROGRESS NOTES
"Subjective:       Patient ID: Stephanie Sears is a 67 y.o. female.    Chief Complaint: Hypertension and Follow-up    F/u HTN    HPI: 66 y/o w/ HTN CKD III right knee OA presents for follow up of blood pressure. At last visit two months ago added aldactone 25mg once daily after three days patient felt very fatigued and reports blood pressure was "too low" does not recall number. So she therefore discontinued this medication. She was subsquently seen by nephrology and prescribed clonidine patch she was unable to afford this. Her only complaint today is chronic right knee pain. She denies dyspnea orthopnea or lower extremity swelling.       Review of Systems   Constitutional: Negative for activity change, appetite change, fatigue, fever and unexpected weight change.   HENT: Negative for ear pain, rhinorrhea and sore throat.    Eyes: Negative for discharge and visual disturbance.   Respiratory: Negative for chest tightness, shortness of breath and wheezing.    Cardiovascular: Negative for chest pain, palpitations and leg swelling.   Gastrointestinal: Negative for abdominal pain, constipation and diarrhea.   Endocrine: Negative for cold intolerance and heat intolerance.   Genitourinary: Negative for dysuria and hematuria.   Musculoskeletal: Positive for arthralgias. Negative for joint swelling and neck stiffness.   Skin: Negative for rash.   Neurological: Negative for dizziness, syncope, weakness and headaches.   Psychiatric/Behavioral: Negative for suicidal ideas.       Objective:     Vitals:    07/14/17 1443 07/14/17 1513   BP: (!) 160/70 (!) 164/80   BP Location: Left arm    Patient Position: Sitting    BP Method: Manual    Pulse: 70 68   Resp: 17    Temp: 98.4 °F (36.9 °C)    TempSrc: Oral    SpO2: 98%    Weight: 90.3 kg (199 lb 1.2 oz)    Height: 5' 1.5" (1.562 m)           Physical Exam   Constitutional: She is oriented to person, place, and time. She appears well-developed and well-nourished.   HENT:   Head: " Normocephalic and atraumatic.   Eyes: Conjunctivae are normal. Pupils are equal, round, and reactive to light.   Neck: Normal range of motion.   Cardiovascular: Normal rate and regular rhythm.  Exam reveals no gallop and no friction rub.    No murmur heard.  No pitting edema of lower extremity   Pulmonary/Chest: Effort normal and breath sounds normal. She has no wheezes. She has no rales.   Abdominal: Soft. Bowel sounds are normal. There is no tenderness. There is no rebound and no guarding.   Musculoskeletal: Normal range of motion. She exhibits no edema or tenderness.   Neurological: She is alert and oriented to person, place, and time. No cranial nerve deficit.   Skin: Skin is warm and dry.   Psychiatric: She has a normal mood and affect.       Assessment:       1. Hypertension, essential    2. Primary osteoarthritis of right knee    3. Encounter for screening mammogram for malignant neoplasm of breast         Plan:    1. Resistant but intoleratnt of aldactone trial oral clonidine 0.1mg bid discussed possible sedating side effect of this medicaiton. bp check in one week    2. Compound blood pressure with pain and concomittment nsaid use, encourage topcial heating cream for muscle soreness    3. mammogram

## 2017-07-18 ENCOUNTER — HOSPITAL ENCOUNTER (OUTPATIENT)
Dept: RADIOLOGY | Facility: HOSPITAL | Age: 67
Discharge: HOME | End: 2017-07-18
Attending: INTERNAL MEDICINE
Payer: MEDICARE

## 2017-07-18 VITALS — BODY MASS INDEX: 36.8 KG/M2 | WEIGHT: 200 LBS | HEIGHT: 62 IN

## 2017-07-18 DIAGNOSIS — Z12.31 ENCOUNTER FOR SCREENING MAMMOGRAM FOR MALIGNANT NEOPLASM OF BREAST: ICD-10-CM

## 2017-07-18 PROCEDURE — 77067 SCR MAMMO BI INCL CAD: CPT | Mod: TC

## 2017-07-18 PROCEDURE — 77067 SCR MAMMO BI INCL CAD: CPT | Mod: 26,,, | Performed by: RADIOLOGY

## 2017-07-21 ENCOUNTER — TELEPHONE (OUTPATIENT)
Dept: NEPHROLOGY | Facility: CLINIC | Age: 67
End: 2017-07-21

## 2017-07-21 ENCOUNTER — TELEPHONE (OUTPATIENT)
Dept: FAMILY MEDICINE | Facility: CLINIC | Age: 67
End: 2017-07-21

## 2017-07-21 ENCOUNTER — CLINICAL SUPPORT (OUTPATIENT)
Dept: FAMILY MEDICINE | Facility: CLINIC | Age: 67
End: 2017-07-21
Payer: MEDICARE

## 2017-07-21 VITALS
SYSTOLIC BLOOD PRESSURE: 154 MMHG | RESPIRATION RATE: 17 BRPM | HEIGHT: 60 IN | WEIGHT: 199.06 LBS | HEART RATE: 60 BPM | OXYGEN SATURATION: 98 % | TEMPERATURE: 98 F | DIASTOLIC BLOOD PRESSURE: 86 MMHG | BODY MASS INDEX: 39.08 KG/M2

## 2017-07-21 PROCEDURE — 99214 OFFICE O/P EST MOD 30 MIN: CPT | Mod: PBBFAC,PN

## 2017-07-21 PROCEDURE — 99999 PR PBB SHADOW E&M-EST. PATIENT-LVL IV: CPT | Mod: PBBFAC,,,

## 2017-07-21 NOTE — PROGRESS NOTES
Pt. Came in today for a nurse visit bp check. Her pressures have been very elevated despite being compliment with all medications.Pt. Complained of sob along with not wanting to take so many medications.  First bp today was 162/90, o2 sat. 98%.   Dr. Schafer came in and discussed a new medication with the pt. The pt.was resistant but after talking it over she agreed. The pt. And I also discussed to discontinue smoking. The pt. States that she is ready and Mrs. Garber will be contacted.   The pts. Last bp after sitting for 15 min was 154/86. The pt. Was discharged home with the approval of the md. The plan is to try a low dose of spironolactone along with discontinuing smoking cigarettes.

## 2017-07-21 NOTE — TELEPHONE ENCOUNTER
Pt. Came in today for bp check.  Reading elevated. 1st reading 162/90  2nd 154/86.    Pt. Agreed to start spirolactone and to discontinue smoking.

## 2017-07-21 NOTE — TELEPHONE ENCOUNTER
----- Message from Liberty Astorga sent at 7/21/2017 10:21 AM CDT -----  Contact: Pt  Pt would like to be called back regarding her pressure.        Please call pt at [phone].        Thanks!    Contacted pt she stated her pressure still high and she getting worry and that dr dudley wants to put her on a 7th pill and she dont want be on all them pressure pills like that. I schedule a BP check per dr dudley so we can see where her BP stands

## 2017-07-24 DIAGNOSIS — N18.1 CKD (CHRONIC KIDNEY DISEASE) STAGE 1, GFR 90 ML/MIN OR GREATER: Primary | ICD-10-CM

## 2017-07-24 DIAGNOSIS — I10 ESSENTIAL HYPERTENSION: ICD-10-CM

## 2017-07-24 DIAGNOSIS — E11.21 DIABETIC NEPHROPATHY ASSOCIATED WITH TYPE 2 DIABETES MELLITUS: ICD-10-CM

## 2017-07-24 RX ORDER — HYDRALAZINE HYDROCHLORIDE 50 MG/1
50 TABLET, FILM COATED ORAL 3 TIMES DAILY
Qty: 21 TABLET | Refills: 3 | Status: SHIPPED | OUTPATIENT
Start: 2017-07-24 | End: 2017-08-02

## 2017-07-25 NOTE — PROGRESS NOTES
Patient returns for BP check;   Log BP recording; -170 mmHgl DBP 80-90 mmHg  Did not tolerate clonidine patch    Impression  D/C clonidine  Will replace with losartan starting at 25 mg daily    Plan  She will call with home BP IN 72 HR  Return Viist; 1 mo; pending above

## 2017-07-31 ENCOUNTER — CLINICAL SUPPORT (OUTPATIENT)
Dept: SMOKING CESSATION | Facility: CLINIC | Age: 67
End: 2017-07-31
Payer: COMMERCIAL

## 2017-07-31 DIAGNOSIS — F17.200 TOBACCO USE DISORDER: Primary | ICD-10-CM

## 2017-07-31 PROCEDURE — 99404 PREV MED CNSL INDIV APPRX 60: CPT | Mod: S$GLB,,,

## 2017-07-31 PROCEDURE — 99999 PR PBB SHADOW E&M-EST. PATIENT-LVL I: CPT | Mod: PBBFAC,,,

## 2017-07-31 RX ORDER — IBUPROFEN 200 MG
1 TABLET ORAL DAILY
Qty: 14 PATCH | Refills: 0 | Status: SHIPPED | OUTPATIENT
Start: 2017-07-31 | End: 2017-08-15 | Stop reason: SDUPTHER

## 2017-07-31 NOTE — Clinical Note
Patient will be participating in biweekly tobacco cessation meetings and will begin the prescribed tobacco cessation medication regime of 21 mg nicotine patch QD She currently [id number] cigarettes per day.  Pt started on rate reduction and wait time of 15 min prior to smoking. Pt's exhaled carbon monoxide level was 20  ppm as per Smokerlyzer. (non- smoker = 0-5 ppm.) Will see pt back in office in 2 weeks.

## 2017-08-01 NOTE — PROGRESS NOTES
Patient will be participating in biweekly tobacco cessation meetings and will begin the prescribed tobacco cessation medication regime of 21 mg nicotine patch QD She currently [id number] cigarettes per day.  Pt started on rate reduction and wait time of 15 min prior to smoking. Pt's exhaled carbon monoxide level was 20  ppm as per Smokerlyzer. (non- smoker = 0-5 ppm.) Will see pt back in office in 2 weeks.

## 2017-08-02 ENCOUNTER — OFFICE VISIT (OUTPATIENT)
Dept: SLEEP MEDICINE | Facility: CLINIC | Age: 67
End: 2017-08-02
Payer: MEDICARE

## 2017-08-02 VITALS
SYSTOLIC BLOOD PRESSURE: 148 MMHG | BODY MASS INDEX: 39.05 KG/M2 | HEART RATE: 68 BPM | HEIGHT: 60 IN | WEIGHT: 198.88 LBS | DIASTOLIC BLOOD PRESSURE: 70 MMHG

## 2017-08-02 DIAGNOSIS — G47.30 UNSPECIFIED SLEEP APNEA: Primary | ICD-10-CM

## 2017-08-02 PROCEDURE — 99214 OFFICE O/P EST MOD 30 MIN: CPT | Mod: PBBFAC | Performed by: NURSE PRACTITIONER

## 2017-08-02 PROCEDURE — 1159F MED LIST DOCD IN RCRD: CPT | Mod: ,,, | Performed by: NURSE PRACTITIONER

## 2017-08-02 PROCEDURE — 99999 PR PBB SHADOW E&M-EST. PATIENT-LVL IV: CPT | Mod: PBBFAC,,, | Performed by: NURSE PRACTITIONER

## 2017-08-02 PROCEDURE — 99204 OFFICE O/P NEW MOD 45 MIN: CPT | Mod: S$PBB,,, | Performed by: NURSE PRACTITIONER

## 2017-08-02 PROCEDURE — 1126F AMNT PAIN NOTED NONE PRSNT: CPT | Mod: ,,, | Performed by: NURSE PRACTITIONER

## 2017-08-02 NOTE — PROGRESS NOTES
Stephanie Sears was seen as a new patient, self-referred, for the evaluation of obstructive sleep apnea.     CHIEF COMPLAINT: Air gasping    HISTORY OF PRESENT ILLNESS:Stephanie Sears a 67 y.o. female presents for the evaluation of obstructive sleep apnea. She has never had a sleep study. +air gasping. Sleeps on 3 pillows (yrs doing for reflux). Nocturia 3-6x. +snoring. Feels short of breath upon awakening. +oral drying in am. Keeps waking up all night long, not getting good sleep. Her BP is still high despite medications.  Denies am headaches. Keeps tv on all night long. Does not drive long distances anymore. Teeth chopping 20yrs ago and wore mouth guard.     Denies symptoms of restless legs or kicking during sleep.   Hx TMJ, broke jaw age 17    On todays [place] Sleepiness Scale the patient scores a 16/24.     BT:10:30-11:30p  SL: 30-60min  WT: 7a , more recently 8:30-9a  Sleep quality: unrefreshing     FAMILY HISTORY: Mom had JAM, sister and nephew +JAM    SOCIAL HISTORY: Significant other. no ETOH, + tobacco ~ 1/2 ppd (just began nicoderm)    REVIEW OF SYSTEMS:  Sleep related symptoms as per HPI; Positive overweight; occasional sinus congestion; arthritic pain/knee pain/low back pain (hx surgery) (gabapentin tid)(tramadol prn)+ dyspnea; Denies palpitations; + acid reflux; Denies polyuria; Denies headaches; + mood disturbance.  Otherwise, a balance of 10 systems reviewed is negative    Jan 2016  Normal left ventricular systolic function (EF 60-65%).     2 - Left ventricular diastolic dysfunction.     3 - Normal right ventricular systolic function .     4 - Mild left atrial enlargement.     5 - Mild tricuspid regurgitation.     6 - The estimated PA systolic pressure is 39 mmHg    PHYSICAL EXAM:   BP (!) 148/70   Pulse 68   Ht 5' (1.524 m)   Wt 90.2 kg (198 lb 13.7 oz)   BMI 38.84 kg/m²   GENERAL: Obese body habitus, well groomed   HEENT: Conjunctivae are non-erythematous; Pupils equal, round, and reactive to  light; Nose is symmetrical; Nasal mucosa is normal; Septum is midline; Inferior turbinates are normal; Nasal airflow is normal; Posterior pharynx is pink; Modified Mallampati: IV; Posterior palate is low; Tonsils not visualized due to gag; Uvula is long/thick and pink;Tongue is high, broad, coated/brown; Dentition is fair; No TMJ tenderness; Jaw opening and protrusion without click and without discomfort.   NECK: Supple. Neck circumference is 15 inches. No thyromegaly. No palpable nodes.   SKIN: On face and neck: No abrasions, no rashes, no lesions. No subcutaneous nodules are palpable.   RESPIRATORY: Chest is clear to auscultation. Normal chest expansion and non-labored breathing at rest.   CARDIOVASCULAR: Normal S1, S2. No murmurs, gallops or rubs. No carotid bruits bilaterally.   EXTREMITIES: No edema. No clubbing. No cyanosis. Station normal. Gait normal.   NEURO/PSYCH: Oriented to time, place and person. Normal attention span and concentration. Affect is full. Mood is normal.       ASSESSMENT:     Unspecified Sleep Apnea, with symptoms of snoring, apneic pauses, un-refreshing disrupted sleep and excessive daytime sleepiness, with exam findings of a crowded oral airway, with medical comorbidities of obesity, hypertension, pre-DM, tobacco abuse, pulmonary hypertension. Warrants further investigation for untreated sleep apnea.     PLAN:   1. Polysomnogram, discussed plan of care  2. Discussed etiology of JAM and potential ramifications of untreated JAM, including stroke, heart disease, HTN.  We discussed potential treatment options, which could include weight loss (10-15%), body positioning, continuous positive airway pressure (CPAP-definitive), mandibular advancement splint by dentist, or referral for surgical consideration.   3. The patient was advised to abstain from driving should she feel sleepy or drowsy. Encouraged weight loss efforts for potential improvement of JAM and overall health benefits  4. I will  see the patient back after the study has been completed to review test results and plan of care.     Thank you for allowing me the opportunity to participate in the care of your patient

## 2017-08-02 NOTE — PATIENT INSTRUCTIONS
Obstructive Sleep Apnea  Obstructive sleep apnea is a condition that causes your air passages to become narrowed or blocked during sleep. As a result, breathing stops for short periods. Your body wakes up enough for breathing to begin again, though you don't remember it. The cycle of stopped breathing and brief awakenings can repeat dozens of times a night. This prevents the body from getting to the deeper stages of sleep that are needed for good rest.  Signs of sleep apnea include loud snoring, noisy breathing, and gasping sounds during sleep. Daytime symptoms include waking up tired after a full night's sleep, waking up with headaches, feeling very sleepy or falling asleep during the day, and having problems with memory or concentration.  Risk factors for sleep apnea include:  · Being overweight  · Being a man, or a woman in menopause  · Smoking  · Using alcohol or sedating medications or herbs  · Having enlarged structures in the nose or throat  Home care  Lifestyle changes that can help treat snoring and sleep apnea include the following:  · If you are overweight, lose weight. Talk to your healthcare provider about a weight-loss plan for you.  · Avoid alcohol for 3 to 4 hours before bedtime. Avoid sedating medications. Ask your healthcare provider about the medications you take.  · If you smoke, talk to your healthcare provider about ways to quit.  · Sleep on your side. This can help prevent gravity from pulling relaxed throat tissues into your breathing passages.  · If you have allergies or sinus problems that block your nose, ask your healthcare provider for help.  Follow up  Follow up with your healthcare provider as advised. A diagnosis of sleep apnea is made with a sleep study. Your healthcare provider can tell you more about this test.  When to seek medical care  Sleep apnea can make you more likely to have certain health problems. These include high blood pressure, heart attack, stroke, and sexual  dysfunction. If you have sleep apnea, talk to your healthcare provider about the best treatments for you.  Date Last Reviewed: 5/3/2015  © 5047-6592 [employer]. [address] [place] [address], [place] [address]. All rights reserved. This information is not intended as a substitute for professional medical care. Always follow your healthcare professional's instructions.      Izabella or Juan will contact you to schedule your sleep study. Their number is [phone] (ext 2). The [facility] Lab is located on 7th floor of the [facility].    We will call you when the sleep study results are ready - if you have not heard from us by 2 weeks from the date of the study, please call [phone] (ext 1).    You are advised to abstain from driving should you feel sleepy or drowsy.

## 2017-08-03 RX ORDER — LOSARTAN POTASSIUM 25 MG/1
25 TABLET ORAL DAILY
Qty: 30 TABLET | Refills: 3 | Status: SHIPPED | OUTPATIENT
Start: 2017-08-03 | End: 2017-11-16 | Stop reason: SDUPTHER

## 2017-08-03 NOTE — TELEPHONE ENCOUNTER
----- Message from Noni Villatoro sent at 8/3/2017 10:21 AM CDT -----  Contact: self  Pt would like to get a refill on her medication.  She stated that she has been out since Monday.  Pt has put a refill request in on Monday and has not had a response.      Pt can be reached at [phone]    Pt does not know the name of the medication but stated that its the last medication that she was given for her pressure.  Please call pt to discuss.  She is very frustrated at this time.         Contacted pt to inform her that her pressure that dr dudley called in was ready, pt stated that the message was taking wrong by Noni Villatoro , she tired of taking medication and its not doing nothing to her pressure. Pt stated she very frustrated and wants dr dudley to call and do something about it and want him to stop prescripting her meds . She dont mind taking meds but she want them to work

## 2017-08-04 ENCOUNTER — TELEPHONE (OUTPATIENT)
Dept: SLEEP MEDICINE | Facility: OTHER | Age: 67
End: 2017-08-04

## 2017-08-09 ENCOUNTER — CLINICAL SUPPORT (OUTPATIENT)
Dept: NEPHROLOGY | Facility: CLINIC | Age: 67
End: 2017-08-09
Payer: MEDICARE

## 2017-08-09 NOTE — PROGRESS NOTES
Pt came for a BP check   Took pt pressure on left on sitting position  Pt pressure was 150/80  Dr dudley said pt will cont taking meds and see what the sleep dr say and they will go from there

## 2017-08-11 ENCOUNTER — HOSPITAL ENCOUNTER (OUTPATIENT)
Dept: SLEEP MEDICINE | Facility: HOSPITAL | Age: 67
Discharge: HOME | End: 2017-08-11
Attending: INTERNAL MEDICINE
Payer: MEDICARE

## 2017-08-11 DIAGNOSIS — G47.30 UNSPECIFIED SLEEP APNEA: ICD-10-CM

## 2017-08-11 PROCEDURE — 95810 POLYSOM 6/> YRS 4/> PARAM: CPT

## 2017-08-11 PROCEDURE — 95811 PR POLYSOMNOGRAPHY W/CPAP: ICD-10-PCS | Mod: 26,,, | Performed by: INTERNAL MEDICINE

## 2017-08-11 PROCEDURE — 95811 POLYSOM 6/>YRS CPAP 4/> PARM: CPT | Mod: 26,,, | Performed by: INTERNAL MEDICINE

## 2017-08-12 NOTE — PROGRESS NOTES
Procedures explained and questions answered prior to the the start of the study. Pt did not meet the criteria for cpap titration. Supine sleep was encouraged. Patient stated that her joints hurt in the supine position.      EKG appears to be NSR      Pulse ox probe changed for poor signal. Ekg electrode changed for poor signal

## 2017-08-14 ENCOUNTER — CLINICAL SUPPORT (OUTPATIENT)
Dept: SMOKING CESSATION | Facility: CLINIC | Age: 67
End: 2017-08-14
Payer: COMMERCIAL

## 2017-08-14 DIAGNOSIS — F17.200 TOBACCO USE DISORDER: Primary | ICD-10-CM

## 2017-08-14 PROCEDURE — 99407 BEHAV CHNG SMOKING > 10 MIN: CPT | Mod: S$GLB,,,

## 2017-08-15 RX ORDER — IBUPROFEN 200 MG
1 TABLET ORAL DAILY
Qty: 14 PATCH | Refills: 0 | Status: SHIPPED | OUTPATIENT
Start: 2017-08-15 | End: 2017-08-30 | Stop reason: SDUPTHER

## 2017-08-23 ENCOUNTER — TELEPHONE (OUTPATIENT)
Dept: SLEEP MEDICINE | Facility: CLINIC | Age: 67
End: 2017-08-23

## 2017-08-23 DIAGNOSIS — G47.33 OBSTRUCTIVE SLEEP APNEA: Primary | ICD-10-CM

## 2017-08-28 ENCOUNTER — CLINICAL SUPPORT (OUTPATIENT)
Dept: SMOKING CESSATION | Facility: CLINIC | Age: 67
End: 2017-08-28
Payer: COMMERCIAL

## 2017-08-28 DIAGNOSIS — G89.29 CHRONIC NECK PAIN: ICD-10-CM

## 2017-08-28 DIAGNOSIS — M54.2 CHRONIC NECK PAIN: ICD-10-CM

## 2017-08-28 DIAGNOSIS — F17.200 TOBACCO USE DISORDER: Primary | ICD-10-CM

## 2017-08-28 PROCEDURE — 99407 BEHAV CHNG SMOKING > 10 MIN: CPT | Mod: S$GLB,,,

## 2017-08-28 RX ORDER — GABAPENTIN 600 MG/1
600 TABLET ORAL 3 TIMES DAILY
Qty: 90 TABLET | Refills: 11 | Status: SHIPPED | OUTPATIENT
Start: 2017-08-28 | End: 2018-02-02 | Stop reason: SDUPTHER

## 2017-08-28 NOTE — TELEPHONE ENCOUNTER
----- Message from Jossy Courtney sent at 8/28/2017 11:09 AM CDT -----  Contact: SELF  REFILL: gabapentin (NEURONTIN) 600 MG tablet    PT REQUESTING 90 DAY SUPPLY, PLEASE SEND TO [facility]

## 2017-08-29 ENCOUNTER — TELEPHONE (OUTPATIENT)
Dept: SLEEP MEDICINE | Facility: OTHER | Age: 67
End: 2017-08-29

## 2017-08-30 ENCOUNTER — CLINICAL SUPPORT (OUTPATIENT)
Dept: SMOKING CESSATION | Facility: CLINIC | Age: 67
End: 2017-08-30
Payer: COMMERCIAL

## 2017-08-30 DIAGNOSIS — F17.200 TOBACCO USE DISORDER: Primary | ICD-10-CM

## 2017-08-30 PROCEDURE — 99999 PR PBB SHADOW E&M-EST. PATIENT-LVL I: CPT | Mod: PBBFAC,,,

## 2017-08-30 PROCEDURE — 99403 PREV MED CNSL INDIV APPRX 45: CPT | Mod: S$GLB,,,

## 2017-08-30 RX ORDER — DM/P-EPHED/ACETAMINOPH/DOXYLAM 30-7.5/3
2 LIQUID (ML) ORAL
Qty: 108 LOZENGE | Refills: 0 | OUTPATIENT
Start: 2017-08-30 | End: 2017-08-30 | Stop reason: SDUPTHER

## 2017-08-30 RX ORDER — IBUPROFEN 200 MG
1 TABLET ORAL DAILY
Qty: 14 PATCH | Refills: 0 | Status: SHIPPED | OUTPATIENT
Start: 2017-08-30 | End: 2017-09-20 | Stop reason: SDUPTHER

## 2017-08-30 RX ORDER — DM/P-EPHED/ACETAMINOPH/DOXYLAM 30-7.5/3
2 LIQUID (ML) ORAL
Qty: 108 LOZENGE | Refills: 0 | Status: SHIPPED | OUTPATIENT
Start: 2017-08-30 | End: 2019-02-03

## 2017-08-30 RX ORDER — DM/P-EPHED/ACETAMINOPH/DOXYLAM 30-7.5/3
2 LIQUID (ML) ORAL
Qty: 108 LOZENGE | Refills: 0 | Status: SHIPPED | OUTPATIENT
Start: 2017-08-30 | End: 2017-08-30 | Stop reason: CLARIF

## 2017-08-30 NOTE — PROGRESS NOTES
Individual Follow-Up Form    8/30/2017    Quit Date:     Clinical Status of Patient: Outpatient    Length of Service: 45 minutes    Continuing Medication: yes  Patches    Other Medications: none     Target Symptoms: Withdrawal and medication side effects. The following were  rated moderate (3) to severe (4) on TCRS:  · Moderate (3): crave  · Severe (4): none    Comments: Pt continues to smoke 6 cigs/day. Pt remains on tobacco cessation medication of  21 mg nicotine patch QD today 2 mg nicotine lozenge PRN (1-2 per hour in place of cigarettes) added.   No adverse effects noted at this time. Pt doing well with rate reduction and wait times prior to smoking.  Pt asked to reduce current smoking rate by 2 cigs/day. Reviewed coping strategies/habitual behavior/relapse prevention with patient. Exhaled carbon monoxide level was 12 ppm per Smokerlyzer  ( non- smoker = 0-5 ppm .)  Will see pt back in office in 2 weeks      Diagnosis: F17.200    Next Visit: 2 weeks

## 2017-08-31 ENCOUNTER — TELEPHONE (OUTPATIENT)
Dept: SLEEP MEDICINE | Facility: OTHER | Age: 67
End: 2017-08-31

## 2017-09-06 ENCOUNTER — HOSPITAL ENCOUNTER (OUTPATIENT)
Dept: SLEEP MEDICINE | Facility: HOSPITAL | Age: 67
Discharge: HOME | End: 2017-09-06
Attending: INTERNAL MEDICINE
Payer: MEDICARE

## 2017-09-06 DIAGNOSIS — G47.33 OBSTRUCTIVE SLEEP APNEA: ICD-10-CM

## 2017-09-06 PROCEDURE — 95811 POLYSOM 6/>YRS CPAP 4/> PARM: CPT

## 2017-09-06 PROCEDURE — 95811 POLYSOM 6/>YRS CPAP 4/> PARM: CPT | Mod: 26,,, | Performed by: INTERNAL MEDICINE

## 2017-09-06 PROCEDURE — 95811 PR POLYSOMNOGRAPHY W/CPAP: ICD-10-PCS | Mod: 26,,, | Performed by: INTERNAL MEDICINE

## 2017-09-07 NOTE — PROGRESS NOTES
End of The night summary    Type of Study Performed on ([facility]) CPAP    Patient education/cpap information prior to Study/Setup     EKG: Appears to be- NSR    Low Spo2  92%    Any Difficulties recording: pt reports she does not sleep supine, pt back pain, and some knee pain    Optimal pressure#  12     MASK: [employer]    Pt reaction to CPAP: pt said she needs a full face mask that will allow her to be comfortable with glasses on at night, (pt would be interested in a hybrid mask)    Tech summary comments:    pt observed titrated on CPAP,  pt reports that she cannot sleep supine due to back pain, pt observed sleeping mostly on her sides, optimal pressure observed, pt slept well with no reports of discomfort

## 2017-09-13 ENCOUNTER — CLINICAL SUPPORT (OUTPATIENT)
Dept: SMOKING CESSATION | Facility: CLINIC | Age: 67
End: 2017-09-13
Payer: COMMERCIAL

## 2017-09-13 DIAGNOSIS — F17.200 TOBACCO USE DISORDER: Primary | ICD-10-CM

## 2017-09-13 PROCEDURE — 99407 BEHAV CHNG SMOKING > 10 MIN: CPT | Mod: S$GLB,,,

## 2017-09-15 ENCOUNTER — HOSPITAL ENCOUNTER (OUTPATIENT)
Dept: RADIOLOGY | Facility: HOSPITAL | Age: 67
Discharge: HOME | End: 2017-09-15
Attending: PHYSICAL MEDICINE & REHABILITATION
Payer: MEDICARE

## 2017-09-15 ENCOUNTER — OFFICE VISIT (OUTPATIENT)
Dept: PHYSICAL MEDICINE AND REHAB | Facility: CLINIC | Age: 67
End: 2017-09-15
Payer: MEDICARE

## 2017-09-15 VITALS
BODY MASS INDEX: 36.9 KG/M2 | DIASTOLIC BLOOD PRESSURE: 74 MMHG | SYSTOLIC BLOOD PRESSURE: 163 MMHG | HEART RATE: 62 BPM | HEIGHT: 62 IN | WEIGHT: 200.5 LBS

## 2017-09-15 DIAGNOSIS — G89.29 CHRONIC MIDLINE THORACIC BACK PAIN: ICD-10-CM

## 2017-09-15 DIAGNOSIS — M79.642 HAND PAIN, LEFT: ICD-10-CM

## 2017-09-15 DIAGNOSIS — M47.22 CERVICAL SPONDYLOSIS WITH RADICULOPATHY: ICD-10-CM

## 2017-09-15 DIAGNOSIS — M19.049 CMC ARTHRITIS: ICD-10-CM

## 2017-09-15 DIAGNOSIS — M17.11 PRIMARY OSTEOARTHRITIS OF RIGHT KNEE: ICD-10-CM

## 2017-09-15 DIAGNOSIS — M54.6 CHRONIC MIDLINE THORACIC BACK PAIN: ICD-10-CM

## 2017-09-15 DIAGNOSIS — M47.22 CERVICAL SPONDYLOSIS WITH RADICULOPATHY: Primary | ICD-10-CM

## 2017-09-15 DIAGNOSIS — M47.22 OSTEOARTHRITIS OF SPINE WITH RADICULOPATHY, CERVICAL REGION: ICD-10-CM

## 2017-09-15 PROCEDURE — 3078F DIAST BP <80 MM HG: CPT | Mod: ,,, | Performed by: PHYSICAL MEDICINE & REHABILITATION

## 2017-09-15 PROCEDURE — 1125F AMNT PAIN NOTED PAIN PRSNT: CPT | Mod: ,,, | Performed by: PHYSICAL MEDICINE & REHABILITATION

## 2017-09-15 PROCEDURE — 72050 X-RAY EXAM NECK SPINE 4/5VWS: CPT | Mod: TC

## 2017-09-15 PROCEDURE — 99999 PR PBB SHADOW E&M-EST. PATIENT-LVL IV: CPT | Mod: PBBFAC,,, | Performed by: PHYSICAL MEDICINE & REHABILITATION

## 2017-09-15 PROCEDURE — 3077F SYST BP >= 140 MM HG: CPT | Mod: ,,, | Performed by: PHYSICAL MEDICINE & REHABILITATION

## 2017-09-15 PROCEDURE — 1159F MED LIST DOCD IN RCRD: CPT | Mod: ,,, | Performed by: PHYSICAL MEDICINE & REHABILITATION

## 2017-09-15 PROCEDURE — 72050 X-RAY EXAM NECK SPINE 4/5VWS: CPT | Mod: 26,,, | Performed by: RADIOLOGY

## 2017-09-15 PROCEDURE — 99215 OFFICE O/P EST HI 40 MIN: CPT | Mod: S$PBB,,, | Performed by: PHYSICAL MEDICINE & REHABILITATION

## 2017-09-15 PROCEDURE — 99214 OFFICE O/P EST MOD 30 MIN: CPT | Mod: PBBFAC,25 | Performed by: PHYSICAL MEDICINE & REHABILITATION

## 2017-09-15 RX ORDER — TIZANIDINE 2 MG/1
4 TABLET ORAL EVERY 6 HOURS PRN
Qty: 60 TABLET | Refills: 1 | Status: SHIPPED | OUTPATIENT
Start: 2017-09-15 | End: 2017-12-11

## 2017-09-15 RX ORDER — DICLOFENAC SODIUM 10 MG/G
2 GEL TOPICAL 4 TIMES DAILY
Qty: 5 TUBE | Refills: 2 | Status: ON HOLD | OUTPATIENT
Start: 2017-09-15 | End: 2018-02-26 | Stop reason: HOSPADM

## 2017-09-15 NOTE — PROGRESS NOTES
Subjective:       Patient ID: Stephanie Sears is a 67 y.o. female.    Chief Complaint: upper back pain    HPI   Mrs. Sears is 66 y/o female, who is coming first time to clinic for :  CC: upper back pain.  Referred by  PCP, dr. Schafer.     Upper back / neck Pain Description:  She reports more of upper back / thoracic spine pain, than a neck pain.   She is s/p Garcia surgery, in 2006 by Dr.Thomas Rose.  Lenght: Upper back/ thoracic  pain is an acute pain for 2 weeks,, but she has a long standing neck pain that is off/om.   Neck pain is a chronic pain. Length > 10 yrs.   No past, recent injury, falls.  Intensity:  CURRENT  7/10,  AVERAGE  Pain  3/10. at BEST 3/10 , At WORST  10/10 on the WORST day.   Location: upper back pain is localized in mid part of spine,begining of thoracic spine,  away down bellow the neck, on protruded part of spine.   Base of neck hurts, as well.  Radiation: to both  upper shoulders, and to both arms, RT >> Lt.   It radiates to half of humerus, and on left side it would radiate to left index finger.   Timing : it is a constant day/ hurts her more at night, cannot lay down, it hurts more,    QUALITY: pain is a dull ache/ soreness , radiation to arms,/ shoulders feels as soreness.   NO Sharp/shooting pain, She reports numbness in both fingers, thumb, and index in b/l hands, describes as numbness   (in RT hand fingers tips that is a new, while Lt index finger is numb a long time).   No arm weakness, just a weakness in Lt thumb after the recent hand surgery in 12/16.   Worsening factors:  Laying down.   Alleviating factors: TENS  Symptoms interfere with daily activity, sleeping and work.   Current medications: Gabapentin, bid, Tramadol prn, Mobic daily.   Failed medication: none  PT/OT:  Yes in past and helped.   Patient denies night fever/night sweats, bowel incontinence, significant weight loss and significant motor weakness ( red flags).  Patient denies any suicidal or homicidal  ideations.  She is here for evaluation and treatment.     Past Medical History:   Diagnosis Date    Bronchitis     Cataract     Cervical spondylosis with radiculopathy 7/10/2012    Chest pain     Chronic neck pain 2012    GERD (gastroesophageal reflux disease)     Glaucoma     Hyperlipidemia     Hypertension     Neck pain 7/10/2012    Primary osteoarthritis of both knees     Psoriasis     Subacromial or subdeltoid bursitis 7/10/2012    Thyroid disease     Type 2 diabetes mellitus 12/10/2013       Past Surgical History:   Procedure Laterality Date    BREAST LUMPECTOMY Left     BREAST MASS EXCISION Right     CHOLECYSTECTOMY      HYSTERECTOMY      JOINT REPLACEMENT      KNEE SURGERY Left 16    TKR    L4-L5 fusion  2006    SPINE SURGERY         Family History   Problem Relation Age of Onset    Diabetes Mother     Hypertension Mother      CHF, angina    Stroke Mother      tia, glaucoma    Hypertension Father      glaucoma, Alzhiemer's , supra pubic    Kidney disease Brother      kidney transplant, HTN,DM    Diabetes Brother     Amblyopia Neg Hx     Blindness Neg Hx        Social History     Social History    Marital status:      Spouse name: N/A    Number of children: N/A    Years of education: N/A     Social History Main Topics    Smoking status: Current Every Day Smoker     Packs/day: 1.00     Years: 40.00     Types: Cigarettes    Smokeless tobacco: Never Used      Comment: .  .  Retired from [employer].      Alcohol use No    Drug use: No    Sexual activity: Yes     Other Topics Concern    None     Social History Narrative    None       Current Outpatient Prescriptions   Medication Sig Dispense Refill    albuterol 90 mcg/actuation inhaler Inhale 2 puffs into the lungs every 6 (six) hours as needed. 2 Aerosol Inhalation Every 4-6 hours 3 Inhaler 3    amlodipine-benazepril (LOTREL) 10-40 mg per capsule Take 1 capsule by  mouth once daily. 90 capsule 3    blood sugar diagnostic (ACCU-CHEK JOSE G PLUS TEST STRP) Strp Inject 1 each into the skin 2 (two) times daily. 200 each 3    CALCIUM CARBONATE (TUMS ORAL) Take by mouth as needed (acid reflux, indigestion).      carvedilol (COREG) 12.5 MG tablet Take 1 tablet (12.5 mg total) by mouth 2 (two) times daily with meals. 180 tablet 1    diclofenac sodium (VOLTAREN) 1 % Gel Apply 2 g topically 4 (four) times daily. 5 Tube 2    diphenhydrAMINE (BENADRYL) 25 mg capsule Take 25 mg by mouth every 6 (six) hours as needed for Itching or Allergies.      fluticasone (FLONASE) 50 mcg/actuation nasal spray 1 spray by Each Nare route 2 (two) times daily. 1 Bottle 1    furosemide (LASIX) 40 MG tablet Take 1 tablet (40 mg total) by mouth once daily. 90 tablet 1    gabapentin (NEURONTIN) 600 MG tablet Take 1 tablet (600 mg total) by mouth 3 (three) times daily. 90 tablet 11    INULIN (FIBER GUMMIES ORAL) Take by mouth every morning.       lancets (ACCU-CHEK MULTICLIX LANCET) Misc Test blood sugar twice daily 300 each 3    losartan (COZAAR) 25 MG tablet Take 1 tablet (25 mg total) by mouth once daily. 30 tablet 3    meloxicam (MOBIC) 7.5 MG tablet Take 1 tablet (7.5 mg total) by mouth once daily. 90 tablet 1    nicotine (NICODERM CQ) 21 mg/24 hr Place 1 patch onto the skin once daily. Please dispense  clear patches. Thanks 14 patch 0    nicotine polacrilex 2 MG Lozg Take 1 lozenge (2 mg total) by mouth as needed. 1-2 per hour in place of cigarettes maximum of 15 per day. 108 lozenge 0    tizanidine (ZANAFLEX) 2 MG tablet Take 2 tablets (4 mg total) by mouth every 6 (six) hours as needed. 60 tablet 1    tramadol (ULTRAM) 50 mg tablet Take 1 tablet (50 mg total) by mouth every 6 (six) hours as needed for Pain. 120 tablet 1     Current Facility-Administered Medications   Medication Dose Route Frequency Provider Last Rate Last Dose    albuterol-ipratropium 0.5-3mg(2.5mg base)/3ml nebulizer  solution 3 mL  3 mL Nebulization Q4H PRN Daljit Perla MD           Review of patient's allergies indicates:   Allergen Reactions    Hydrocodone Shortness Of Breath     Pt states can take oxycodone    Iodinated contrast- oral and iv dye Shortness Of Breath     Difficulty breathing    Adhesive Itching    Sulfa (sulfonamide antibiotics) Hives and Itching           Review of Systems   Constitutional: Negative for appetite change, chills, fatigue, fever and unexpected weight change.   HENT: Negative for drooling, trouble swallowing and voice change.    Eyes: Negative for pain and visual disturbance.   Respiratory: Negative for shortness of breath and wheezing.    Cardiovascular: Negative for chest pain and palpitations.   Gastrointestinal: Negative for abdominal distention, abdominal pain, constipation and diarrhea.   Genitourinary: Negative for difficulty urinating.   Musculoskeletal: Negative for gait problem, joint swelling, myalgias and neck stiffness. Arthralgias: Rt knee pain, s/p Lt TKA,  Lt thumb pain, she is  Back pain: upper back, thoracic spine pain.               Skin: Negative for color change and rash.   Neurological: Negative for dizziness, facial asymmetry, speech difficulty, weakness, light-headedness and numbness. Headaches:     Hematological: Negative for adenopathy.   Psychiatric/Behavioral: Negative for behavioral problems, confusion and sleep disturbance. The patient is not nervous/anxious.        Objective:      Physical Exam      GENERAL: The patient is alert, oriented, pleasant.   HEENT; PERRLA  NECK: supple,  No masses  MUSCULOSKELETAL:   Gait is normal .  Cervical spine :  Minimally decreased AROM in cervical spine, flexion to 60, extension to 0,  side bending and rotation  to 30-35 degrees b/l, without pain.  +moderate paravertebral cervical musculature tenderness b/l.  + moderate  tenderness in upper trapezius muscles b/l.   + moderate tenderness in mid line at T2-3 level.   Lumbar  spine, full range of motion in all planes, flexion to 90 degrees , ext. 0.  Side bending and rotation to 35-40 degrees.   Straight leg raising Negative bilaterally.   Full range of motion in all joints x4 extremities, except in Lt thumb CMC,MCP, that is limited secondary to pain (points on lateral aspect)  Positive Finkelstein's sign.  Muscle strength 5/5 throughout x4 extremities.   No  joint laxity throughout x4 extremities.   NEUROLOGIC: Cranial nerves II through XII intact.   Deep tendon reflexes is normal, +2 in the upper and lower extremities bilaterally.   Muscle tone is normal.   Sensory is intact to light touch and pinprick throughout x4 extremities.     MRI of Cervical spine ( 2013) showed:  Slight anterior cord deviation at T1-T2, without neoplastic process identified.    Findings could relate to small underlying arachnoid cyst or idiopathic transdural herniation.  No abnormal cord signal.  Multilevel degenerative changes, contributing to moderate bilateral foraminal narrowing at C6-7 and moderate right-sided neural foraminal narrowing at C5-6, similar when compared to the prior exam.  No significant central canal stenosis.      Assessment:       1. Cervical spondylosis with radiculopathy    2. Osteoarthritis of spine with radiculopathy, cervical region    3. Chronic midline thoracic back pain    4. Primary osteoarthritis of right knee    5. CMC arthritis    6. Hand pain, left        Plan:       Cervical spondylosis with radiculopathy  -     X-Ray Cervical Spine Complete 5 view; Future  -     MRI Cervical Spine Without Contrast; Future  -     Ambulatory Referral to Physical/Occupational Therapy  -     diclofenac sodium (VOLTAREN) 1 % Gel; Apply 2 g topically 4 (four) times daily.  Dispense: 5 Tube; Refill: 2  -     tizanidine (ZANAFLEX) 2 MG tablet; Take 2 tablets (4 mg total) by mouth every 6 (six) hours as needed.  Dispense: 60 tablet; Refill: 1    Osteoarthritis of spine with radiculopathy, cervical  region  -     X-Ray Cervical Spine Complete 5 view; Future  -     MRI Cervical Spine Without Contrast; Future  -     Ambulatory Referral to Physical/Occupational Therapy  -     diclofenac sodium (VOLTAREN) 1 % Gel; Apply 2 g topically 4 (four) times daily.  Dispense: 5 Tube; Refill: 2  -     tizanidine (ZANAFLEX) 2 MG tablet; Take 2 tablets (4 mg total) by mouth every 6 (six) hours as needed.  Dispense: 60 tablet; Refill: 1    Chronic midline thoracic back pain  -     X-Ray Cervical Spine Complete 5 view; Future  -     MRI Cervical Spine Without Contrast; Future  -     Ambulatory Referral to Physical/Occupational Therapy  -     diclofenac sodium (VOLTAREN) 1 % Gel; Apply 2 g topically 4 (four) times daily.  Dispense: 5 Tube; Refill: 2  -     tizanidine (ZANAFLEX) 2 MG tablet; Take 2 tablets (4 mg total) by mouth every 6 (six) hours as needed.  Dispense: 60 tablet; Refill: 1    Primary osteoarthritis of right knee  -     X-Ray Cervical Spine Complete 5 view; Future  -     MRI Cervical Spine Without Contrast; Future  -     Ambulatory Referral to Physical/Occupational Therapy  -     diclofenac sodium (VOLTAREN) 1 % Gel; Apply 2 g topically 4 (four) times daily.  Dispense: 5 Tube; Refill: 2  -     tizanidine (ZANAFLEX) 2 MG tablet; Take 2 tablets (4 mg total) by mouth every 6 (six) hours as needed.  Dispense: 60 tablet; Refill: 1    CMC arthritis  -     X-Ray Cervical Spine Complete 5 view; Future  -     MRI Cervical Spine Without Contrast; Future  -     Ambulatory Referral to Physical/Occupational Therapy  -     diclofenac sodium (VOLTAREN) 1 % Gel; Apply 2 g topically 4 (four) times daily.  Dispense: 5 Tube; Refill: 2  -     tizanidine (ZANAFLEX) 2 MG tablet; Take 2 tablets (4 mg total) by mouth every 6 (six) hours as needed.  Dispense: 60 tablet; Refill: 1    Hand pain, left  -     X-Ray Cervical Spine Complete 5 view; Future  -     MRI Cervical Spine Without Contrast; Future  -     Ambulatory Referral to  Physical/Occupational Therapy  -     diclofenac sodium (VOLTAREN) 1 % Gel; Apply 2 g topically 4 (four) times daily.  Dispense: 5 Tube; Refill: 2  -     tizanidine (ZANAFLEX) 2 MG tablet; Take 2 tablets (4 mg total) by mouth every 6 (six) hours as needed.  Dispense: 60 tablet; Refill: 1    Patient with worsening of chronic neck and upper back pain, with radiation across upper back.   -- Old MRI of C spine results discussed in details.  will repeat MRI of C spine w/wo contrast  to follow up on cervical spondylosis, and anterior cord deviation at T1-T2, seen in 2013.  -- refer to PT for ROM/stretching and strengthening.   -- pain management   Will resume Gabapentin bid, Tramadol prn, Mobic daily.   Will add Tizanadine prn, and   Volteren gel for Left CMC/CMP joint arthritis, and possible tenosynovitis.   Recommend to place thumb in brace, thumb spica if pain increases,a d to f/u with dr. Fabio Vieira,as needed.    RTC in 2-3 months.    Total time spent face to face with patient was 45 minutes ( patient is a new pt to me, but has been seen by Dr. Mathur one year ago, came today   For a new problem).   More than 50% of that time was spent in counseling on diagnosis of her neck and upper back pain, prognosis and treatment options.   I also caunsel patient  on common and most usual side effect of prescribed medications.   I reviewed Primary care, and other specialty's notes to better coordinate patient's  care.   All questions were answered, and patient voiced understanding.

## 2017-09-18 ENCOUNTER — TELEPHONE (OUTPATIENT)
Dept: SLEEP MEDICINE | Facility: CLINIC | Age: 67
End: 2017-09-18

## 2017-09-18 DIAGNOSIS — G47.33 OBSTRUCTIVE SLEEP APNEA: Primary | ICD-10-CM

## 2017-09-19 ENCOUNTER — TELEPHONE (OUTPATIENT)
Dept: SLEEP MEDICINE | Facility: CLINIC | Age: 67
End: 2017-09-19

## 2017-09-19 NOTE — TELEPHONE ENCOUNTER
----- Message from Alicia Doherty sent at 9/19/2017 12:40 PM CDT -----  Contact: BRIANNA KOCH [[id number]]  x_  1st Request  _  2nd Request  _  3rd Request        Who: BRIANNA KOCH [[id number]]    Why: patient states that she called [facility] this morning and they informed her that they do not have a Rx for her equipment and would like a call back to discuss.     What Number to Call Back: [phone]    When to Expect a call back: (Before the end of the day)   -- if call after 3:00 call back will be tomorrow.

## 2017-09-20 ENCOUNTER — CLINICAL SUPPORT (OUTPATIENT)
Dept: SMOKING CESSATION | Facility: CLINIC | Age: 67
End: 2017-09-20
Payer: COMMERCIAL

## 2017-09-20 DIAGNOSIS — F17.200 NICOTINE DEPENDENCE: Primary | ICD-10-CM

## 2017-09-20 PROCEDURE — 99999 PR PBB SHADOW E&M-EST. PATIENT-LVL I: CPT | Mod: PBBFAC,,,

## 2017-09-20 PROCEDURE — 99403 PREV MED CNSL INDIV APPRX 45: CPT | Mod: S$GLB,,,

## 2017-09-20 RX ORDER — IBUPROFEN 200 MG
1 TABLET ORAL DAILY
Qty: 14 PATCH | Refills: 0 | Status: SHIPPED | OUTPATIENT
Start: 2017-09-20 | End: 2017-10-04 | Stop reason: SDUPTHER

## 2017-09-20 NOTE — PROGRESS NOTES
Individual Follow-Up Form    9/20/2017    Quit Date:     Clinical Status of Patient: Outpatient    Length of Service: 45 minutes    Continuing Medication: yes  Patches    Other Medications: lozenge     Target Symptoms: Withdrawal and medication side effects. The following were  rated moderate (3) to severe (4) on TCRS:  · Moderate (3): none  · Severe (4): none    Comments: Pt continues to smoke 6-7cigs/day. Pt remains on tobacco cessation medication of  21 mg nicotine patch QD and 2 mg nicotine lozenge PRN (1-2 per hour in place of cigarettes.) No adverse effects noted at this time. Pt doing well with rate reduction and wait times prior to smoking.  Pt asked to pick a quit day.  Reviewed coping strategies/habitual behavior/relapse prevention with patient. Exhaled carbon monoxide level was 12 ppm per Smokerlyzer  ( non- smoker = 0-5 ppm .)  Will see pt back in office in 2 weeks      Diagnosis: F17.200    Next Visit: 2 weeks

## 2017-09-22 ENCOUNTER — HOSPITAL ENCOUNTER (OUTPATIENT)
Dept: RADIOLOGY | Facility: HOSPITAL | Age: 67
Discharge: HOME | End: 2017-09-22
Attending: PHYSICAL MEDICINE & REHABILITATION
Payer: MEDICARE

## 2017-09-22 DIAGNOSIS — M47.22 CERVICAL SPONDYLOSIS WITH RADICULOPATHY: ICD-10-CM

## 2017-09-22 DIAGNOSIS — M54.6 CHRONIC MIDLINE THORACIC BACK PAIN: ICD-10-CM

## 2017-09-22 DIAGNOSIS — M17.11 PRIMARY OSTEOARTHRITIS OF RIGHT KNEE: ICD-10-CM

## 2017-09-22 DIAGNOSIS — M47.22 OSTEOARTHRITIS OF SPINE WITH RADICULOPATHY, CERVICAL REGION: ICD-10-CM

## 2017-09-22 DIAGNOSIS — M79.642 HAND PAIN, LEFT: ICD-10-CM

## 2017-09-22 DIAGNOSIS — M19.049 CMC ARTHRITIS: ICD-10-CM

## 2017-09-22 DIAGNOSIS — G89.29 CHRONIC MIDLINE THORACIC BACK PAIN: ICD-10-CM

## 2017-09-22 PROCEDURE — 72141 MRI NECK SPINE W/O DYE: CPT | Mod: TC

## 2017-09-22 PROCEDURE — 72141 MRI NECK SPINE W/O DYE: CPT | Mod: 26,GC,, | Performed by: RADIOLOGY

## 2017-10-04 ENCOUNTER — CLINICAL SUPPORT (OUTPATIENT)
Dept: SMOKING CESSATION | Facility: CLINIC | Age: 67
End: 2017-10-04
Payer: COMMERCIAL

## 2017-10-04 DIAGNOSIS — F17.200 NICOTINE DEPENDENCE: Primary | ICD-10-CM

## 2017-10-04 PROCEDURE — 99406 BEHAV CHNG SMOKING 3-10 MIN: CPT | Mod: S$GLB,,,

## 2017-10-04 RX ORDER — IBUPROFEN 200 MG
1 TABLET ORAL DAILY
Qty: 14 PATCH | Refills: 0 | Status: SHIPPED | OUTPATIENT
Start: 2017-10-04 | End: 2018-11-07 | Stop reason: SDUPTHER

## 2017-10-10 ENCOUNTER — TELEPHONE (OUTPATIENT)
Dept: SLEEP MEDICINE | Facility: CLINIC | Age: 67
End: 2017-10-10

## 2017-10-10 DIAGNOSIS — G47.33 OBSTRUCTIVE SLEEP APNEA SYNDROME: Primary | ICD-10-CM

## 2017-10-10 NOTE — TELEPHONE ENCOUNTER
----- Message from Kian Richardson sent at 10/10/2017 10:52 AM CDT -----  Contact: Pt   x_  1st Request  _  2nd Request  _  3rd Request      Who: BRIANNA KOCH [[id number]]    Why: Requesting a call back in regards to discussing status of CPAP mask. Pt states that she has called and was informed that it would take 2 weeks, she still has yet to receive anything 2 weeks later. Please return the call at earliest convenience to discuss further. Thanks!    What Number to Call Back:[phone] (M)    When to Expect a call back: (Within 24 hours)

## 2017-10-12 ENCOUNTER — TELEPHONE (OUTPATIENT)
Dept: SLEEP MEDICINE | Facility: CLINIC | Age: 67
End: 2017-10-12

## 2017-10-12 NOTE — TELEPHONE ENCOUNTER
----- Message from Hortencia Chan sent at 10/12/2017  1:29 PM CDT -----  Contact: pt  _ x 1st Request  _  2nd Request  _  3rd Request      Who:pt    Why: pt calling in regards to her Mask, please advise!!!    What Number to Call Back: [phone] or [phone]    When to Expect a call back: (Before the end of the day)   -- if call after 3:00 call back will be tomorrow.

## 2017-10-14 DIAGNOSIS — M54.50 CHRONIC LOW BACK PAIN: ICD-10-CM

## 2017-10-14 DIAGNOSIS — M16.12 PRIMARY OSTEOARTHRITIS OF LEFT HIP: ICD-10-CM

## 2017-10-14 DIAGNOSIS — G89.29 CHRONIC NECK PAIN: ICD-10-CM

## 2017-10-14 DIAGNOSIS — G89.29 CHRONIC LOW BACK PAIN: ICD-10-CM

## 2017-10-14 DIAGNOSIS — M54.2 CHRONIC NECK PAIN: ICD-10-CM

## 2017-10-14 RX ORDER — TRAMADOL HYDROCHLORIDE 50 MG/1
TABLET ORAL
Qty: 120 TABLET | Refills: 0 | Status: SHIPPED | OUTPATIENT
Start: 2017-10-14 | End: 2017-12-15 | Stop reason: SDUPTHER

## 2017-10-16 ENCOUNTER — TELEPHONE (OUTPATIENT)
Dept: SLEEP MEDICINE | Facility: CLINIC | Age: 67
End: 2017-10-16

## 2017-10-16 NOTE — TELEPHONE ENCOUNTER
----- Message from Balbina Lainez sent at 10/16/2017 12:32 PM CDT -----  x 1st Request  _ 2nd Request  _ 3rd Request    Who: pt     Why: pt is calling to follow up on her sleep mask. Please call and advise.     What Number to Call Back: [phone]     When to Expect a call back: (Before the end of the day)  -- if call after 3:00 call back will be tomorrow.

## 2017-10-16 NOTE — TELEPHONE ENCOUNTER
Spoke with Jose M at [facility] to check the status of patient cpap supplies. Jose M with [facility] stated they are waiting for insurance to authorized. Patient was advised.

## 2017-10-30 ENCOUNTER — OFFICE VISIT (OUTPATIENT)
Dept: OPHTHALMOLOGY | Facility: CLINIC | Age: 67
End: 2017-10-30
Payer: MEDICARE

## 2017-10-30 ENCOUNTER — CLINICAL SUPPORT (OUTPATIENT)
Dept: OPHTHALMOLOGY | Facility: CLINIC | Age: 67
End: 2017-10-30
Payer: MEDICARE

## 2017-10-30 DIAGNOSIS — H52.203 MYOPIA WITH ASTIGMATISM AND PRESBYOPIA, BILATERAL: ICD-10-CM

## 2017-10-30 DIAGNOSIS — H40.053 BORDERLINE GLAUCOMA WITH OCULAR HYPERTENSION, BILATERAL: Primary | ICD-10-CM

## 2017-10-30 DIAGNOSIS — H43.811 POSTERIOR VITREOUS DETACHMENT, RIGHT: ICD-10-CM

## 2017-10-30 DIAGNOSIS — H25.13 NUCLEAR SCLEROSIS, BILATERAL: ICD-10-CM

## 2017-10-30 DIAGNOSIS — H52.13 MYOPIA WITH ASTIGMATISM AND PRESBYOPIA, BILATERAL: ICD-10-CM

## 2017-10-30 DIAGNOSIS — H40.013 OPEN ANGLE WITH BORDERLINE FINDINGS, LOW RISK, BILATERAL: ICD-10-CM

## 2017-10-30 DIAGNOSIS — H52.4 MYOPIA WITH ASTIGMATISM AND PRESBYOPIA, BILATERAL: ICD-10-CM

## 2017-10-30 PROCEDURE — 92134 CPTRZ OPH DX IMG PST SGM RTA: CPT | Mod: PBBFAC | Performed by: OPHTHALMOLOGY

## 2017-10-30 PROCEDURE — 99999 PR PBB SHADOW E&M-EST. PATIENT-LVL II: CPT | Mod: PBBFAC,,, | Performed by: OPHTHALMOLOGY

## 2017-10-30 PROCEDURE — 99212 OFFICE O/P EST SF 10 MIN: CPT | Mod: PBBFAC | Performed by: OPHTHALMOLOGY

## 2017-10-30 PROCEDURE — 92012 INTRM OPH EXAM EST PATIENT: CPT | Mod: S$PBB,,, | Performed by: OPHTHALMOLOGY

## 2017-10-30 NOTE — PROGRESS NOTES
"HPI     DLS: 10/28/16    Pt here for HRT review;    Meds: No GTTS    1. Borderline glaucoma with ocular hypertension, bilateral  2. Open angle with borderline findings, low risk, bilateral  3. Nuclear sclerosis, bilateral  4. Posterior vitreous detachment, right   5. Myopia with astigmatism and presbyopia, bilateral     Last edited by Angelic Saravia on 10/30/2017  1:39 PM. (History)            Assessment /Plan     For exam results, see Encounter Report.    Borderline glaucoma with ocular hypertension, bilateral    Open angle with borderline findings, low risk, bilateral    Nuclear sclerosis, bilateral    Posterior vitreous detachment, right    Myopia with astigmatism and presbyopia, bilateral      1. Pre-perimetric glaucoma /OHT/ borderline glaucoma   -Followed at [facility] since 1991   -First HVF 1999   -First photos 1991   - Intolerant to all gtts - they aggravate his blepharitis-? RAGHU allergy   -IOP "OK" off gtts and s/p ALT ou and SLT od - 2008    Family history neg   Glaucoma meds none   H/O adverse rxn to glaucoma drops Intolerant to all gtts - 2/2 aggravates blepharitis- RAGHU allergy   LASERS ALT ou -? Date / SLT OD 7/17/08 - good response   GLAUCOMA SURGERIES none   OTHER EYE SURGERIES none   CDR 0.4/0.2   Tbase 19-26 / 16-21   Tmax 26/21   Ttarget ?   HVF 12 test - 1999 to 2016 - Full ou   Gonio +3 ou   /588   OCT 3 test 2005 to 2014 -  RNFL - OD:NL // OS:NL  HRT 7 test 2004 to 2017 -MR -  nl od // nl os /// CDR 0.542 od // 0.434 os  Disc photos 1991, 1996, 2003 - slides // 2012 , 2016  - OIS     - Ttoday 18/18   - Test done today HRT/GONIO/IOP    2. Nuclear Sclerosis    NVS yet - monitor    3. Posterior Vitreous Detachment OD - 11/2008   - RD PRECAUTIONS    4. Eyelid inflammation / Blepharitis / MGD    5. Allergic Conjunctivitis - RAGHU allergy     6. Myopia/Astigmatism/presbyopia     Plan   OHT-pre-perimetric glaucoma - intolerant to all gtts   IOP ok s/p ALT ou - years ago and s/p SLT OD 2008 ( no SLT " os)  Continue to monitor HVF/DFE/OCT/HRT/photos/IOP   If increase IOP or progression repeat SLT     Consider phaco/IOL in future - but only minimal vis sign at this time    F/U 6 months with  HRT / gonio // AR/MR/BAT ou - IOP up today - if goes high again -  consider repeat slt od and primary slt os

## 2017-10-31 ENCOUNTER — TELEPHONE (OUTPATIENT)
Dept: SLEEP MEDICINE | Facility: OTHER | Age: 67
End: 2017-10-31

## 2017-11-02 DIAGNOSIS — I10 ESSENTIAL HYPERTENSION: ICD-10-CM

## 2017-11-02 DIAGNOSIS — I10 HYPERTENSION, ESSENTIAL: Primary | ICD-10-CM

## 2017-11-02 DIAGNOSIS — R73.02 IMPAIRED GLUCOSE TOLERANCE IN OBESE: ICD-10-CM

## 2017-11-02 RX ORDER — CARVEDILOL 12.5 MG/1
TABLET ORAL
Qty: 180 TABLET | Refills: 0 | Status: SHIPPED | OUTPATIENT
Start: 2017-11-02 | End: 2018-02-01 | Stop reason: SDUPTHER

## 2017-11-08 ENCOUNTER — TELEPHONE (OUTPATIENT)
Dept: SLEEP MEDICINE | Facility: OTHER | Age: 67
End: 2017-11-08

## 2017-11-09 ENCOUNTER — HOSPITAL ENCOUNTER (OUTPATIENT)
Dept: SLEEP MEDICINE | Facility: OTHER | Age: 67
Discharge: HOME | End: 2017-11-09
Attending: NURSE PRACTITIONER
Payer: MEDICARE

## 2017-11-09 DIAGNOSIS — G47.33 OBSTRUCTIVE SLEEP APNEA SYNDROME: ICD-10-CM

## 2017-11-09 PROCEDURE — G0463 HOSPITAL OUTPT CLINIC VISIT: HCPCS

## 2017-11-09 NOTE — PROGRESS NOTES
Ms. Sears was seen today at the request of Florence Olson NP.    Ms. Sears's baseline sleep study was reviewed- results showed a diagnosis of JAM, which was discussed in detail.  CPAP titration was also reviewed. Patient does have comorbidities of obesity, hypertension, pre-DM,  tobacco abuse, pulmonary hypertension.  Benefits  of treating JAM were outlined as well as the ramifications of non compliance.    Reviewed  use and patient's understanding of cpap machine/mask/equipment.    Patient's concerns were that  the mask leaks around her nose and that her mouth is dry in the am. First week of cpap went well, but this week, she is waking several times during the night, does not know why. I did adjust her mask straps, seemed to fit somewhat  better around her nose and then instructed her on the humidifier setting.  This was set on 1  and increased to 3 to see if that will help with her dry mouth. She is using a medium  Kita view, may need a small.    Patient demonstrated applying the mask and equipment use.  Cpap machine is a Dream Station.  Patient  is on cpap of 10.  Using ramp( 20/4cm) and humidity.  Filter  was changed.    Machine interrogation;  Start day 10/16/17                                        Days > 4    21                                         Therapy hours per day  5.9                                        Mask fit  99%                                        AHI  2.2                                        Periodic breathing  1%                                        Total therapy hours  140.9                                        Blower hours  141.0      At the conclusion of this visit, Ms. Sears understood the importance of treating her JAM and she is using her cpap nightly. A follow-up with  KRZYSZTOF Eric is scheduled.

## 2017-11-12 RX ORDER — LOSARTAN POTASSIUM 25 MG/1
TABLET ORAL
Qty: 30 TABLET | Refills: 0 | Status: CANCELLED | OUTPATIENT
Start: 2017-11-12

## 2017-11-16 DIAGNOSIS — K21.9 GASTROESOPHAGEAL REFLUX DISEASE, ESOPHAGITIS PRESENCE NOT SPECIFIED: Primary | ICD-10-CM

## 2017-11-16 RX ORDER — LOSARTAN POTASSIUM 25 MG/1
25 TABLET ORAL DAILY
Qty: 30 TABLET | Refills: 3 | Status: SHIPPED | OUTPATIENT
Start: 2017-11-16 | End: 2017-12-11 | Stop reason: ALTCHOICE

## 2017-12-05 DIAGNOSIS — F17.200 NICOTINE DEPENDENCE: ICD-10-CM

## 2017-12-06 ENCOUNTER — OFFICE VISIT (OUTPATIENT)
Dept: SLEEP MEDICINE | Facility: CLINIC | Age: 67
End: 2017-12-06
Payer: MEDICARE

## 2017-12-06 VITALS
HEART RATE: 72 BPM | BODY MASS INDEX: 38.09 KG/M2 | HEIGHT: 62 IN | WEIGHT: 207 LBS | SYSTOLIC BLOOD PRESSURE: 110 MMHG | DIASTOLIC BLOOD PRESSURE: 64 MMHG

## 2017-12-06 DIAGNOSIS — E11.9 DIET-CONTROLLED DIABETES MELLITUS: ICD-10-CM

## 2017-12-06 DIAGNOSIS — I10 HYPERTENSION, ESSENTIAL: Primary | ICD-10-CM

## 2017-12-06 DIAGNOSIS — G47.33 OBSTRUCTIVE SLEEP APNEA: ICD-10-CM

## 2017-12-06 PROCEDURE — 99213 OFFICE O/P EST LOW 20 MIN: CPT | Mod: PBBFAC | Performed by: NURSE PRACTITIONER

## 2017-12-06 PROCEDURE — 99999 PR PBB SHADOW E&M-EST. PATIENT-LVL III: CPT | Mod: PBBFAC,,, | Performed by: NURSE PRACTITIONER

## 2017-12-06 PROCEDURE — 99214 OFFICE O/P EST MOD 30 MIN: CPT | Mod: S$PBB,,, | Performed by: NURSE PRACTITIONER

## 2017-12-06 RX ORDER — IBUPROFEN 200 MG
TABLET ORAL
Qty: 14 PATCH | Refills: 0 | Status: SHIPPED | OUTPATIENT
Start: 2017-12-06 | End: 2017-12-11 | Stop reason: SDUPTHER

## 2017-12-06 NOTE — PROGRESS NOTES
Stephanie Sears was seen as a f/u today for the mgt of obstructive sleep apnea.     She has since undergone a sleep study which we reviewed together today in detail. She has finally been setup by [facility] with her CPAP machine set 10cm./ Doing great. Finally sleeping!! She is very happy. Uses nightly. Denies pressure intolerance. Having oral drying despite high humidity setting. Denies nasal drying. Exhalation port can go in eyes, but she adjusts her mask which helps. Denies mask leaks. Daytime sleepiness improved. ESS=8. Using ramp to help sleep onset. Snoring resolved. Nocturia less, maybe once now. Wakes up refreshed.     Interrogation- new machine condition, AHI 1.8, avag 5.1-6.8h/n. Kita view. 0% periodic. 100% mask fit. Heat at 5. 23/30d>4h.    HgbA1c 6.0  FMP HTN/pulmonary HTN mgt.   Gained 9#        HISTORY:  8/2/17  CHIEF COMPLAINT: Air gasping    HISTORY OF PRESENT ILLNESS:Stephanie Sears a 67 y.o. female presents for the evaluation of obstructive sleep apnea. She has never had a sleep study. +air gasping. Sleeps on 3 pillows (yrs doing for reflux). Nocturia 3-6x. +snoring. Feels short of breath upon awakening. +oral drying in am. Keeps waking up all night long, not getting good sleep. Her BP is still high despite medications.  Denies am headaches. Keeps tv on all night long. Does not drive long distances anymore. Teeth chopping 20yrs ago and wore mouth guard.     Denies symptoms of restless legs or kicking during sleep.   Hx TMJ, broke jaw age 17    On todays [place] Sleepiness Scale the patient scores a 16/24.     BT:10:30-11:30p  SL: 30-60min  WT: 7a , more recently 8:30-9a  Sleep quality: unrefreshing     FAMILY HISTORY: Mom had JAM, sister and nephew +JAM  SOCIAL HISTORY: Significant other. no ETOH, + tobacco ~ 1/2 ppd (just began nicoderm)    REVIEW OF SYSTEMS:  Sleep related symptoms as per HPI;occasional sinus congestion; arthritic pain/knee pain/low back pain (hx surgery) (gabapentin  "tid)(tramadol prn).   Otherwise, a balance of 10 systems reviewed is negative    Jan 2016  Normal left ventricular systolic function (EF 60-65%).     2 - Left ventricular diastolic dysfunction.     3 - Normal right ventricular systolic function .     4 - Mild left atrial enlargement.     5 - Mild tricuspid regurgitation.     6 - The estimated PA systolic pressure is 39 mmHg    PSG 8/11/17: AHI was 6.0 with an oxygen aye of 86.0%. The RERA index was 4.8 and RDI was 10.8 events per hour.   Ttiration study, effective supine REM cpap 10cm      PHYSICAL EXAM:   /64   Pulse 72   Ht 5' 2" (1.575 m)   Wt 93.9 kg (207 lb)   BMI 37.86 kg/m²   GENERAL: Obese body habitus, well groomed       ASSESSMENT:   JAM, mild. Excellent adherence, symptoms improved. AHI<5  She has medical comorbidities of obesity class 2, hypertension (optimal today), diet controlled-DM, tobacco abuse, pulmonary hypertension. Warrants continued definitive treatment.     PLAN:   1 Continue CPAP 10cm. [facility]  2. Discussed etiology of JAM, effectiveness of her therapy,  and potential ramifications of untreated JAM, including stroke, heart disease, HTN.   3. The patient was advised to abstain from driving should she feel sleepy or drowsy. Encouraged continued weight loss efforts for potential improvement of JAM and overall health benefits, see FMP for HTN mgt/pre DM.   4. Climate control hose tohelp oral drying. Continue nightly use.     "

## 2017-12-06 NOTE — PROGRESS NOTES
CPAP set up Date:10/16/17  Overall CPAP use:nightly  Is CPAP helping?Yes - . If Yes, How? Blood pressure is better and feels better  Mask Style, Comfort, fit, chin strap:   Medium maykel view ffm  Pressure Tolerance: ok  Humidification:her humidity  setting is 5. She uses humidification but still has a dry mouth     CPAP Device interrogation last 30 days:   Machine type: dream station  Machine condition: Good,  Filter is dirty and she will change  Pressure setting and range: cpap 10  Ramp 20/4  Flex 2  Total usage: 280.5 hours  Average daily usage 30 days: 5.1  Days > 4 Hours: 23 days    Large leak 100 %    Monometer reading 10 cm H20

## 2017-12-06 NOTE — PROGRESS NOTES
"CPAP set up Date:10-16-17  Overall CPAP use:  nightly  Is CPAP helping?Yes - ***. If Yes, How? Her blood pdressure is better and she feels better   Mask Style, Comfort, fit, chin strap: ***  Pressure Tolerance: ***  Humidification:{Blank multiple:19196::"Oral","Nasal Drying"}     CPAP Device interrogation last 30 days:   Machine type: ***  Machine condition: {Blank single:19197::"Good","Poor","Dirty","Old"}  Pressure setting and range: ***  Total usage: *** hours  Average daily usage 30 days: ***  Days > 4 Hours: *** days  Predicted AHI: ***  Large leak *** %  90% tile pressure: ***  Periodic breathing: ***  Monometer reading *** cm H20      "

## 2017-12-08 DIAGNOSIS — J44.9 CHRONIC OBSTRUCTIVE PULMONARY DISEASE, UNSPECIFIED COPD TYPE: ICD-10-CM

## 2017-12-08 RX ORDER — ALBUTEROL SULFATE 90 UG/1
2 AEROSOL, METERED RESPIRATORY (INHALATION) EVERY 6 HOURS PRN
Qty: 3 INHALER | Refills: 3 | Status: SHIPPED | OUTPATIENT
Start: 2017-12-08 | End: 2018-02-02 | Stop reason: SDUPTHER

## 2017-12-08 NOTE — TELEPHONE ENCOUNTER
----- Message from Oneyda Villatoro sent at 12/8/2017  9:26 AM CST -----  Contact: Self   Patient  need a refill. Please call patient at [phone].    albuterol 90 mcg/actuation inhaler          [facility] [address] [address] [place] [address] AT [facility] [address] [place]

## 2017-12-11 ENCOUNTER — OFFICE VISIT (OUTPATIENT)
Dept: NEPHROLOGY | Facility: CLINIC | Age: 67
End: 2017-12-11
Payer: MEDICARE

## 2017-12-11 VITALS
WEIGHT: 206.13 LBS | HEART RATE: 76 BPM | SYSTOLIC BLOOD PRESSURE: 100 MMHG | HEIGHT: 62 IN | OXYGEN SATURATION: 98 % | DIASTOLIC BLOOD PRESSURE: 58 MMHG | BODY MASS INDEX: 37.93 KG/M2

## 2017-12-11 DIAGNOSIS — M19.90 OSTEOARTHRITIS, UNSPECIFIED OSTEOARTHRITIS TYPE, UNSPECIFIED SITE: ICD-10-CM

## 2017-12-11 DIAGNOSIS — I10 ESSENTIAL HYPERTENSION: ICD-10-CM

## 2017-12-11 DIAGNOSIS — N18.1 CKD (CHRONIC KIDNEY DISEASE) STAGE 1, GFR 90 ML/MIN OR GREATER: Primary | ICD-10-CM

## 2017-12-11 DIAGNOSIS — G47.33 OSA (OBSTRUCTIVE SLEEP APNEA): ICD-10-CM

## 2017-12-11 DIAGNOSIS — E11.21 DIABETIC NEPHROPATHY ASSOCIATED WITH TYPE 2 DIABETES MELLITUS: ICD-10-CM

## 2017-12-11 PROCEDURE — 99213 OFFICE O/P EST LOW 20 MIN: CPT | Mod: PBBFAC | Performed by: INTERNAL MEDICINE

## 2017-12-11 PROCEDURE — 99999 PR PBB SHADOW E&M-EST. PATIENT-LVL III: CPT | Mod: PBBFAC,,, | Performed by: INTERNAL MEDICINE

## 2017-12-11 PROCEDURE — 99499 UNLISTED E&M SERVICE: CPT | Mod: S$PBB,,, | Performed by: INTERNAL MEDICINE

## 2017-12-11 NOTE — PROGRESS NOTES
Patient is here today for follow up evaluation of hypertension and diabetes. Last seen in renal office 7/24/17. Baseline Cr 0.8-0.9 mg/dl Her most recent lab (9/22/17)  Cr 0.8 mg/dl; eGFR; > 60 ml/min;  Successful management JAM; bettter control of hypertension. Today she has no new complaints    Plan  RFP   Return Visit; 6 mo; pending above

## 2017-12-14 ENCOUNTER — OFFICE VISIT (OUTPATIENT)
Dept: FAMILY MEDICINE | Facility: CLINIC | Age: 67
End: 2017-12-14
Payer: MEDICARE

## 2017-12-14 ENCOUNTER — LAB VISIT (OUTPATIENT)
Dept: LAB | Facility: HOSPITAL | Age: 67
End: 2017-12-14
Attending: FAMILY MEDICINE
Payer: MEDICARE

## 2017-12-14 VITALS
HEART RATE: 73 BPM | WEIGHT: 207.69 LBS | BODY MASS INDEX: 38.22 KG/M2 | SYSTOLIC BLOOD PRESSURE: 136 MMHG | RESPIRATION RATE: 16 BRPM | HEIGHT: 62 IN | DIASTOLIC BLOOD PRESSURE: 78 MMHG | OXYGEN SATURATION: 98 % | TEMPERATURE: 98 F

## 2017-12-14 DIAGNOSIS — I35.0 AORTIC VALVE STENOSIS, ETIOLOGY OF CARDIAC VALVE DISEASE UNSPECIFIED: ICD-10-CM

## 2017-12-14 DIAGNOSIS — Z00.00 ENCOUNTER FOR ROUTINE HISTORY AND PHYSICAL EXAM IN FEMALE PATIENT: Primary | ICD-10-CM

## 2017-12-14 DIAGNOSIS — E11.9 DIET-CONTROLLED DIABETES MELLITUS: ICD-10-CM

## 2017-12-14 DIAGNOSIS — Z23 FLU VACCINE NEED: ICD-10-CM

## 2017-12-14 DIAGNOSIS — G47.33 OBSTRUCTIVE SLEEP APNEA: ICD-10-CM

## 2017-12-14 DIAGNOSIS — Z12.11 COLON CANCER SCREENING: ICD-10-CM

## 2017-12-14 DIAGNOSIS — M17.11 PRIMARY OSTEOARTHRITIS OF RIGHT KNEE: ICD-10-CM

## 2017-12-14 DIAGNOSIS — I10 ESSENTIAL HYPERTENSION: ICD-10-CM

## 2017-12-14 DIAGNOSIS — Z23 NEED FOR INFLUENZA VACCINATION: ICD-10-CM

## 2017-12-14 LAB
CHOLEST SERPL-MCNC: 183 MG/DL
CHOLEST/HDLC SERPL: 5.1 {RATIO}
HDLC SERPL-MCNC: 36 MG/DL
HDLC SERPL: 19.7 %
LDLC SERPL CALC-MCNC: 128 MG/DL
NONHDLC SERPL-MCNC: 147 MG/DL
TRIGL SERPL-MCNC: 95 MG/DL

## 2017-12-14 PROCEDURE — 99213 OFFICE O/P EST LOW 20 MIN: CPT | Mod: PBBFAC,PN,25 | Performed by: FAMILY MEDICINE

## 2017-12-14 PROCEDURE — G0008 ADMIN INFLUENZA VIRUS VAC: HCPCS | Mod: PBBFAC,PN

## 2017-12-14 PROCEDURE — 80061 LIPID PANEL: CPT

## 2017-12-14 PROCEDURE — 99999 PR PBB SHADOW E&M-EST. PATIENT-LVL III: CPT | Mod: PBBFAC,,, | Performed by: FAMILY MEDICINE

## 2017-12-14 PROCEDURE — 99214 OFFICE O/P EST MOD 30 MIN: CPT | Mod: S$PBB,,, | Performed by: FAMILY MEDICINE

## 2017-12-14 NOTE — PROGRESS NOTES
Influenza high dose was given to the pt as ordered by the MD. The patient tolerated well, I advised the patient to wait 15 minuets to observe for any vaccine reactions. The pt. Expressed an understanding.

## 2017-12-14 NOTE — PROGRESS NOTES
"Chief Complaint   Patient presents with    Annual Exam    Flu Vaccine       HPI    Stephanie Sears is 67 y.o. female. The primary encounter diagnosis was Encounter for routine history and physical exam in female patient. Diagnoses of Essential hypertension, Obstructive sleep apnea, Aortic valve stenosis, etiology of cardiac valve disease unspecified, Diet-controlled diabetes mellitus, Primary osteoarthritis of right knee, Flu vaccine need, Colon cancer screening, and Need for influenza vaccination were also pertinent to this visit.    67 year old female comes to clinic to establish care with new provider.  Patient reports control of all her medical conditions, except right knee pain.  Patient reports that she previously planned to have the knee replaced but was delayed due to elevated blood pressure.  Patient has since been evaluated and diagnosed with JAM.  Her blood pressure is well controlled.  She plans to pursue replacement with [facility]. [facility] specifically.    She denies issues regarding Aortic valve stenosis.  She sees Cardiology once per year.  She has had no syncopal episodes, palpitations, or chest pain.  Her blood pressure is controlled.  She wears her CPAP machine regularly and her sleep issues have resolved as well.    She reports checking her blood glucose levels 2-3 times per week. She cannot recall her levels but reports that they are "low."      Review of Systems   Constitutional: Negative for activity change.   Respiratory: Negative for shortness of breath.    Cardiovascular: Negative for chest pain.   Musculoskeletal: Positive for arthralgias, back pain, gait problem, joint swelling and myalgias.   Psychiatric/Behavioral: Negative for suicidal ideas.           Current Outpatient Prescriptions:     albuterol 90 mcg/actuation inhaler, Inhale 2 puffs into the lungs every 6 (six) hours as needed. 2 Aerosol Inhalation Every 4-6 hours, Disp: 3 Inhaler, Rfl: 3    amlodipine-benazepril (LOTREL) 10-40 mg " "per capsule, Take 1 capsule by mouth once daily., Disp: 90 capsule, Rfl: 3    blood sugar diagnostic (ACCU-CHEK JOSE G PLUS TEST STRP) Strp, Inject 1 each into the skin 2 (two) times daily., Disp: 200 each, Rfl: 3    CALCIUM CARBONATE (TUMS ORAL), Take by mouth as needed (acid reflux, indigestion)., Disp: , Rfl:     carvedilol (COREG) 12.5 MG tablet, TAKE 1 TABLET(12.5 MG) BY MOUTH TWICE DAILY WITH MEALS, Disp: 180 tablet, Rfl: 0    furosemide (LASIX) 40 MG tablet, Take 1 tablet (40 mg total) by mouth once daily., Disp: 90 tablet, Rfl: 1    lancets (ACCU-CHEK MULTICLIX LANCET) Misc, Test blood sugar twice daily, Disp: 300 each, Rfl: 3    diclofenac sodium (VOLTAREN) 1 % Gel, Apply 2 g topically 4 (four) times daily., Disp: 5 Tube, Rfl: 2    diphenhydrAMINE (BENADRYL) 25 mg capsule, Take 25 mg by mouth every 6 (six) hours as needed for Itching or Allergies., Disp: , Rfl:     fluticasone (FLONASE) 50 mcg/actuation nasal spray, 1 spray by Each Nare route 2 (two) times daily., Disp: 1 Bottle, Rfl: 1    gabapentin (NEURONTIN) 600 MG tablet, Take 1 tablet (600 mg total) by mouth 3 (three) times daily., Disp: 90 tablet, Rfl: 11    INULIN (FIBER GUMMIES ORAL), Take by mouth every morning. , Disp: , Rfl:     meloxicam (MOBIC) 7.5 MG tablet, Take 1 tablet (7.5 mg total) by mouth once daily., Disp: 90 tablet, Rfl: 1    nicotine (NICODERM CQ) 21 mg/24 hr, Place 1 patch onto the skin once daily. Please dispense rugby clear patches. Thanks, Disp: 14 patch, Rfl: 0    nicotine polacrilex 2 MG Lozg, Take 1 lozenge (2 mg total) by mouth as needed. 1-2 per hour in place of cigarettes maximum of 15 per day., Disp: 108 lozenge, Rfl: 0    tramadol (ULTRAM) 50 mg tablet, TAKE 1 TABLET BY MOUTH EVERY 6 HOURS AS NEEDED FOR PAIN, Disp: 120 tablet, Rfl: 0      Blood pressure 136/78, pulse 73, temperature 98.2 °F (36.8 °C), resp. rate 16, height 5' 2" (1.575 m), weight 94.2 kg (207 lb 10.8 oz), SpO2 98 %.    Physical Exam "   Constitutional: Vital signs are normal. She appears well-developed.   HENT:   Mouth/Throat: Normal dentition.   Neck: Trachea normal. No thyromegaly present.   Cardiovascular: Normal rate, regular rhythm and intact distal pulses.    Murmur heard.   Systolic murmur is present with a grade of 2/6   Pulmonary/Chest: Effort normal. She has no decreased breath sounds. She has no wheezes. She exhibits no deformity.   Musculoskeletal:        Right knee: Normal.        Left knee: She exhibits swelling. She exhibits normal range of motion, no effusion and no deformity. No tenderness found.   Normal gait. No decreased range of motion of major joints.   Neurological: She is not disoriented.   Skin: Skin is intact. Capillary refill takes less than 2 seconds.   Psychiatric: Her speech is normal and behavior is normal. Her mood appears not anxious. She does not exhibit a depressed mood.       Lab Visit on 12/11/2017   Component Date Value Ref Range Status    Hemoglobin A1C 12/11/2017 6.1* 4.0 - 5.6 % Final    Estimated Avg Glucose 12/11/2017 128  68 - 131 mg/dL Final    Glucose 12/11/2017 127* 70 - 110 mg/dL Final    Sodium 12/11/2017 142  136 - 145 mmol/L Final    Potassium 12/11/2017 3.9  3.5 - 5.1 mmol/L Final    Chloride 12/11/2017 107  95 - 110 mmol/L Final    CO2 12/11/2017 28  23 - 29 mmol/L Final    BUN, Bld 12/11/2017 22  8 - 23 mg/dL Final    Calcium 12/11/2017 9.2  8.7 - 10.5 mg/dL Final    Creatinine 12/11/2017 1.1  0.5 - 1.4 mg/dL Final    Albumin 12/11/2017 3.0* 3.5 - 5.2 g/dL Final    Phosphorus 12/11/2017 3.0  2.7 - 4.5 mg/dL Final    eGFR if African American 12/11/2017 >60.0  >60 mL/min/1.73 m^2 Final    eGFR if non  12/11/2017 52.1* >60 mL/min/1.73 m^2 Final    Anion Gap 12/11/2017 7* 8 - 16 mmol/L Final   Lab Visit on 09/22/2017   Component Date Value Ref Range Status    Creatinine 09/22/2017 0.8  0.5 - 1.4 mg/dL Final    eGFR if African American 09/22/2017 >60.0  >60  mL/min/1.73 m^2 Final    eGFR if non African American 09/22/2017 >60.0  >60 mL/min/1.73 m^2 Final   ]    Assessment:    1. Encounter for routine history and physical exam in female patient    2. Essential hypertension    3. Obstructive sleep apnea    4. Aortic valve stenosis, etiology of cardiac valve disease unspecified    5. Diet-controlled diabetes mellitus    6. Primary osteoarthritis of right knee    7. Flu vaccine need    8. Colon cancer screening    9. Need for influenza vaccination          Stephanie was seen today for annual exam and flu vaccine.    Diagnoses and all orders for this visit:    Encounter for routine history and physical exam in female patient   -Annual wellness visit completed today.  Cancer screening, vaccinations, and labs reviewed.   -Due for mammogram in 6-7 months    Essential hypertension   -Improved.  Patient blood pressure now controlled with diagnosis and treatment of JAM.   -Continue current regimen. Losartan discontinued by Renal.    Obstructive sleep apnea   -New problem.  Diagnosed since last PCP visit. Using CPAP and maintains regular follow up.   -Patient will benefit from continued use of equipment.    Aortic valve stenosis, etiology of cardiac valve disease unspecified  -     CBC auto differential; Future  - Stable. Continue follow up with Cardiology.  Signs/symptoms of worsening valve function reviewed with patient.    Diet-controlled diabetes mellitus  -     Lipid panel; Future  - Stable.  A1c 6.1%.  Continue Diabetic diet.  - Repeat at next follow up. Obtain lipids.    Primary osteoarthritis of right knee   -Unstable. Continue follow up with PM&R. Patient will request referral to [facility] for knee replacement.    Flu vaccine need  -     Influenza - High Dose (65+) (PF) (IM)    Colon cancer screening  -     Case request GI: COLONOSCOPY    Lipid, repeat A1c, BP check, pre-op          FOLLOW UP: Return in about 6 months (around 6/14/2018) for Blood pressure follow up.

## 2017-12-14 NOTE — PATIENT INSTRUCTIONS
Prevention Guidelines, Women Ages 65 and Older  Screening tests and vaccines are an important part of managing your health. Health counseling is essential, too. Below are guidelines for these, for women ages 65 and older. Talk with your healthcare provider to make sure youre up to date on what you need.  Screening Who needs it How often   Type 2 diabetes or prediabetes All adults beginning at age 45 and adults without symptoms at any age who are overweight or obese and have 1 or more additional risk factors for diabetes At least every 3 years   Alcohol misuse All women in this age group At routine exams   Blood pressure All women in this age group Every 2 years if your blood pressure is less than 120/80 mm Hg; yearly if your systolic blood pressure is 120 to 139 mm Hg, or your diastolic blood pressure reading is 80 to 89 mm Hg   Breast cancer All women in this age group Yearly mammogram and clinical breast exam1   Cervical cancer Only women who had abnormal screening results before age 65 Talk with your healthcare provider   Chlamydia Women at increased risk for infection At routine exams   Colorectal cancer All women in this age group1 Flexible sigmoidoscopy every 5 years, or colonoscopy every 10 years, or double-contrast barium enema every 5 years; yearly fecal occult blood test or fecal immunochemical test; or a stool DNA test as often as your healthcare provider advises; talk with your healthcare provider about which tests are best for you   Depression All women in this age group At routine exams   Gonorrhea Sexually active women at increased risk for infection At routine exams   Hepatitis C Anyone at increased risk; 1 time for those born between 1945 and 1965 At routine exams   High cholesterol or triglycerides All women in this age group who are at risk for coronary artery disease At least every 5 years   HIV Women at increased risk for infection - talk with your healthcare provider At routine exams   Lung  cancer Adults age 55 to 80 who have smoked Yearly screening in smokers with 30 pack-year history of smoking or who quit within 15 years   Obesity All women in this age group At routine exams   Osteoporosis All women in this age group Bone density test at age 65, then follow-up as advised by your healthcare provider   Syphilis Women at increased risk for infection - talk with your healthcare provider At routine exams   Thyroid-Stimulating Hormone (TSH) All women in this age group Every 5 years   Tuberculosis Women at increased risk for infection - talk with your healthcare provider Ask your healthcare provider   Vision All women in this age group Every 1 to 2 years; if you have a chronic health condition, ask your healthcare provider if you need exams more often   Vaccine Who needs it How often   Chickenpox (varicella) All women in this age group who have no record of this infection or vaccine 2 doses; second dose should be given at least 4 weeks after the first dose   Hepatitis A Women at increased risk for infection - talk with your healthcare provider 2 doses given 6 months apart   Hepatitis B Women at increased risk for infection - talk with your healthcare provider 3 doses over 6 months; second dose should be given 1 month after the first dose; the third dose should be given at least 2 months after the second dose and at least 4 months after the first dose   Haemophilus influenza Type B (HIB) Women at increased risk for infection - talk with your healthcare provider 1 to 3 doses   Influenza (flu) All women in this age group Once a year   Pneumococcal conjugate vaccine (PCV13) and pneumococcal polysaccharide vaccine (PPSV23) All women in this age group 1 dose of each vaccine   Tetanus/diphtheria/pertussis (Td/Tdap) booster All women in this age group Td every 10 years, or a one-time dose of Tdap instead of a Td booster after age 18, then Td every 10 years   Zoster All women in this age group 1 dose   Counseling  Who needs it How often   Diet and exercise Women who are overweight or obese When diagnosed, and then at routine exams   Fall prevention (exercise and vitamin D supplements) All women in this age group At routine exams   Sexually transmitted infection prevention Women at increased risk for infection - talk with your healthcare provider At routine exams   Use of daily aspirin Women ages 55 and up in this age group who are at risk for cardiovascular health problems such as stroke When your risk is known   Use of tobacco and the health effects it can cause All women in this age group Every exam   1American [employer]  Date Last Reviewed: 8/9/2015  © 6263-7453 [employer]. [address] [place] [address], [place] [address]. All rights reserved. This information is not intended as a substitute for professional medical care. Always follow your healthcare professional's instructions.

## 2017-12-15 ENCOUNTER — OFFICE VISIT (OUTPATIENT)
Dept: PHYSICAL MEDICINE AND REHAB | Facility: CLINIC | Age: 67
End: 2017-12-15
Payer: MEDICARE

## 2017-12-15 VITALS
WEIGHT: 207 LBS | BODY MASS INDEX: 38.09 KG/M2 | HEART RATE: 72 BPM | HEIGHT: 62 IN | SYSTOLIC BLOOD PRESSURE: 137 MMHG | DIASTOLIC BLOOD PRESSURE: 71 MMHG

## 2017-12-15 DIAGNOSIS — M54.6 CHRONIC MIDLINE THORACIC BACK PAIN: ICD-10-CM

## 2017-12-15 DIAGNOSIS — G89.29 CHRONIC NECK PAIN: ICD-10-CM

## 2017-12-15 DIAGNOSIS — M54.40 CHRONIC BILATERAL LOW BACK PAIN WITH SCIATICA, SCIATICA LATERALITY UNSPECIFIED: ICD-10-CM

## 2017-12-15 DIAGNOSIS — Z98.890 HISTORY OF BACK SURGERY: ICD-10-CM

## 2017-12-15 DIAGNOSIS — M54.2 CHRONIC NECK PAIN: ICD-10-CM

## 2017-12-15 DIAGNOSIS — G89.29 CHRONIC BILATERAL LOW BACK PAIN WITH SCIATICA, SCIATICA LATERALITY UNSPECIFIED: ICD-10-CM

## 2017-12-15 DIAGNOSIS — G89.29 CHRONIC MIDLINE THORACIC BACK PAIN: ICD-10-CM

## 2017-12-15 DIAGNOSIS — M16.12 PRIMARY OSTEOARTHRITIS OF LEFT HIP: ICD-10-CM

## 2017-12-15 DIAGNOSIS — M47.22 OSTEOARTHRITIS OF SPINE WITH RADICULOPATHY, CERVICAL REGION: Primary | ICD-10-CM

## 2017-12-15 DIAGNOSIS — M47.22 CERVICAL SPONDYLOSIS WITH RADICULOPATHY: ICD-10-CM

## 2017-12-15 PROCEDURE — 99999 PR PBB SHADOW E&M-EST. PATIENT-LVL III: CPT | Mod: PBBFAC,,, | Performed by: PHYSICAL MEDICINE & REHABILITATION

## 2017-12-15 PROCEDURE — 99213 OFFICE O/P EST LOW 20 MIN: CPT | Mod: PBBFAC | Performed by: PHYSICAL MEDICINE & REHABILITATION

## 2017-12-15 PROCEDURE — 99214 OFFICE O/P EST MOD 30 MIN: CPT | Mod: S$PBB,,, | Performed by: PHYSICAL MEDICINE & REHABILITATION

## 2017-12-15 RX ORDER — TRAMADOL HYDROCHLORIDE 50 MG/1
50 TABLET ORAL EVERY 8 HOURS PRN
Qty: 90 TABLET | Refills: 3 | Status: SHIPPED | OUTPATIENT
Start: 2017-12-15 | End: 2018-02-02 | Stop reason: SDUPTHER

## 2017-12-15 NOTE — PROGRESS NOTES
Subjective:       Patient ID: Stephanie Sears is a 67 y.o. female.    Chief Complaint: Neck Pain and Arm Pain    Neck Pain    Pertinent negatives include no chest pain, fever, numbness, trouble swallowing or weakness. Headaches:     Arm Pain    Pertinent negatives include no chest pain or numbness.      Mrs. Sears is 66 y/o female, who returns to clinic for upper back pain.  LCV 09/15/17.   Today, she reports some improvement of her upper back.   She did not go to Physical therapy.  Pain medications are helping. She takes Gabapentin 600 mg BID, and Tramadol 50 mg 3-4 x/day.  She had MRI of Cervical spine and here to discuss the results.    Upper back / neck Pain Description:  She reports more of upper back / thoracic spine pain, than a neck pain.   She is s/p Garcia surgery, in 2006 by Dr.Thomas Rose.  Lenght: Upper back/ thoracic  pain is an acute pain for 2 weeks,, but she has a long standing neck pain that is off/om.   Neck pain is a chronic pain. Length > 10 yrs.   No past, recent injury, falls.  Intensity:  CURRENT  7/10,  AVERAGE  Pain  3/10. at BEST 3/10 , At WORST  10/10 on the WORST day.   Location: upper back pain is localized in mid part of spine,begining of thoracic spine,  away down bellow the neck, on protruded part of spine.   Base of neck hurts, as well.  Radiation: to both  upper shoulders, and to both arms, RT >> Lt.   It radiates to half of humerus, and on left side it would radiate to left index finger.   Timing : it is a constant day/ hurts her more at night, cannot lay down, it hurts more,    QUALITY: pain is a dull ache/ soreness , radiation to arms,/ shoulders feels as soreness.   NO Sharp/shooting pain, She reports numbness in both fingers, thumb, and index in b/l hands, describes as numbness   (in RT hand fingers tips that is a new, while Lt index finger is numb a long time).   No arm weakness, just a weakness in Lt thumb after the recent hand surgery in 12/16.   Worsening factors:   Laying down.   Alleviating factors: TENS  Symptoms interfere with daily activity, sleeping and work.   Current medications: Gabapentin, bid, Tramadol prn, Mobic daily.   Failed medication: none  PT/OT:  Yes in past and helped.   Patient denies night fever/night sweats, bowel incontinence, significant weight loss and significant motor weakness ( red flags).  Patient denies any suicidal or homicidal ideations.  She had MRI of Cervical spine and here to discuss results.  She is here for follow up,re  evaluation and treatment.     Past Medical History:   Diagnosis Date    Bronchitis     Cataract     Cervical spondylosis with radiculopathy 7/10/2012    Chest pain     Chronic neck pain 2012    GERD (gastroesophageal reflux disease)     Glaucoma     Hyperlipidemia     Hypertension     Neck pain 7/10/2012    Primary osteoarthritis of both knees     Psoriasis     Subacromial or subdeltoid bursitis 7/10/2012    Thyroid disease     Type 2 diabetes mellitus 12/10/2013       Past Surgical History:   Procedure Laterality Date    BREAST LUMPECTOMY Left     BREAST MASS EXCISION Right     CHOLECYSTECTOMY      HYSTERECTOMY  's    JOINT REPLACEMENT      KNEE SURGERY Left 16    TKR    L4-L5 fusion      SPINE SURGERY         Family History   Problem Relation Age of Onset    Diabetes Mother     Hypertension Mother      CHF, angina    Stroke Mother      tia, glaucoma    Hypertension Father      glaucoma, Alzhiemer's , supra pubic    Kidney disease Brother      kidney transplant, HTN,DM    Diabetes Brother     Amblyopia Neg Hx     Blindness Neg Hx        Social History     Social History    Marital status:      Spouse name: N/A    Number of children: N/A    Years of education: N/A     Social History Main Topics    Smoking status: Current Every Day Smoker     Packs/day: 1.00     Years: 40.00     Types: Cigarettes    Smokeless tobacco: Never Used      Comment: .  .   Retired from [employer]      Alcohol use No    Drug use: No    Sexual activity: Yes     Other Topics Concern    Not on file     Social History Narrative    No narrative on file       Current Outpatient Prescriptions   Medication Sig Dispense Refill    albuterol 90 mcg/actuation inhaler Inhale 2 puffs into the lungs every 6 (six) hours as needed. 2 Aerosol Inhalation Every 4-6 hours 3 Inhaler 3    amlodipine-benazepril (LOTREL) 10-40 mg per capsule Take 1 capsule by mouth once daily. 90 capsule 3    blood sugar diagnostic (ACCU-CHEK JOSE G PLUS TEST STRP) Strp Inject 1 each into the skin 2 (two) times daily. 200 each 3    CALCIUM CARBONATE (TUMS ORAL) Take by mouth as needed (acid reflux, indigestion).      carvedilol (COREG) 12.5 MG tablet TAKE 1 TABLET(12.5 MG) BY MOUTH TWICE DAILY WITH MEALS 180 tablet 0    diclofenac sodium (VOLTAREN) 1 % Gel Apply 2 g topically 4 (four) times daily. 5 Tube 2    diphenhydrAMINE (BENADRYL) 25 mg capsule Take 25 mg by mouth every 6 (six) hours as needed for Itching or Allergies.      fluticasone (FLONASE) 50 mcg/actuation nasal spray 1 spray by Each Nare route 2 (two) times daily. 1 Bottle 1    furosemide (LASIX) 40 MG tablet Take 1 tablet (40 mg total) by mouth once daily. 90 tablet 1    gabapentin (NEURONTIN) 600 MG tablet Take 1 tablet (600 mg total) by mouth 3 (three) times daily. 90 tablet 11    INULIN (FIBER GUMMIES ORAL) Take by mouth every morning.       lancets (ACCU-CHEK MULTICLIX LANCET) Misc Test blood sugar twice daily 300 each 3    meloxicam (MOBIC) 7.5 MG tablet Take 1 tablet (7.5 mg total) by mouth once daily. 90 tablet 1    nicotine (NICODERM CQ) 21 mg/24 hr Place 1 patch onto the skin once daily. Please dispense rugby clear patches. Thanks 14 patch 0    nicotine polacrilex 2 MG Lozg Take 1 lozenge (2 mg total) by mouth as needed. 1-2 per hour in place of cigarettes maximum of 15 per day. 108 lozenge 0    traMADol (ULTRAM) 50 mg  tablet Take 1 tablet (50 mg total) by mouth every 8 (eight) hours as needed for Pain. 90 tablet 3     No current facility-administered medications for this visit.        Review of patient's allergies indicates:   Allergen Reactions    Hydrocodone Shortness Of Breath     Pt states can take oxycodone    Iodinated contrast- oral and iv dye Shortness Of Breath     Difficulty breathing    Adhesive Itching    Sulfa (sulfonamide antibiotics) Hives and Itching           Review of Systems   Constitutional: Negative for appetite change, chills, fatigue, fever and unexpected weight change.   HENT: Negative for drooling, trouble swallowing and voice change.    Eyes: Negative for pain and visual disturbance.   Respiratory: Negative for shortness of breath and wheezing.    Cardiovascular: Negative for chest pain and palpitations.   Gastrointestinal: Negative for abdominal distention, abdominal pain, constipation and diarrhea.   Genitourinary: Negative for difficulty urinating.   Musculoskeletal: Positive for neck pain. Negative for gait problem, joint swelling, myalgias and neck stiffness. Arthralgias: Rt knee pain, s/p Lt TKA,  Lt thumb pain, she is  Back pain: upper back, thoracic spine pain.               Skin: Negative for color change and rash.   Neurological: Negative for dizziness, facial asymmetry, speech difficulty, weakness, light-headedness and numbness. Headaches:     Hematological: Negative for adenopathy.   Psychiatric/Behavioral: Negative for behavioral problems, confusion and sleep disturbance. The patient is not nervous/anxious.        Objective:      Physical Exam      GENERAL: The patient is alert, oriented, pleasant.   HEENT; PERRLA  NECK: supple,  No masses  MUSCULOSKELETAL:   Gait is normal .  Cervical spine :  Minimally decreased AROM in cervical spine, flexion to 60, extension to 0,  side bending and rotation  to 30-35 degrees b/l, without pain.  +moderate paravertebral cervical musculature tenderness  b/l.  + moderate  tenderness in upper trapezius muscles b/l.   + moderate tenderness in mid line at T2-3 level.   Lumbar spine, full range of motion in all planes, flexion to 90 degrees , ext. 0.  Side bending and rotation to 35-40 degrees.   Straight leg raising Negative bilaterally.   Full range of motion in all joints x4 extremities, except in Lt thumb CMC,MCP, that is limited secondary to pain (points on lateral aspect)  Positive Finkelstein's sign.  Muscle strength 5/5 throughout x4 extremities.   No  joint laxity throughout x4 extremities.   NEUROLOGIC: Cranial nerves II through XII intact.   Deep tendon reflexes is normal, +2 in the upper and lower extremities bilaterally.   Muscle tone is normal.   Sensory is intact to light touch and pinprick throughout x4 extremities.      MRI of Cervical ( 09/22/17) spine    C2-3:  No spinal canal stenosis. No neural foraminal narrowing.  C3-4:  There is a small posterior disc osteophyte complex, fluid within the left facet and left greater than right facet osseous hypertrophy consistent with active degenerative changes.  No spinal canal stenosis or neuroforaminal narrowing.  C4-5: Small posterior disc osteophyte complex and facet arthropathy. No significant spinal canal stenosis.  Mild left neuroforaminal narrowing. No right neuroforaminal narrowing.  C5-6:  Small posterior disc osteophyte complex and mild facet arthropathy.   Mild spinal canal stenosis.  Moderate right neuroforaminal narrowing.  C6-7:  Small posterior disc osteophyte complex which partially effaces the anterior site of CSF. No significant spinal canal stenosis.  Severe bilateral neuroforaminal narrowing.  C7-T1:  No significant spinal canal stenosis.  No significant neural foraminal narrowing.  T1-T2: Anterior deviation of the posterior aspect of the spinal cord at the T2 vertebral body level, stable since 2013, and of uncertain significance.  Impression:  1. Severe bilateral neuroforaminal narrowing at  C6-7.  No more than mild spinal canal stenosis.  2. Active degenerative change of the left facet at C3-4.  3. Stable anterior deviation of the posterior aspect of the spinal cord at the T2 vertebral body level, unchanged since 2013.  4. The edema in the interspinous ligament at T2 with extension subcutaneous fat, a finding of uncertain significance.  5. Thyroid nodule previously characterized on thyroid ultrasound 7/8/2013.      MRI of Cervical spine ( 2013) showed:  Slight anterior cord deviation at T1-T2, without neoplastic process identified.    Findings could relate to small underlying arachnoid cyst or idiopathic transdural herniation.  No abnormal cord signal.  Multilevel degenerative changes, contributing to moderate bilateral foraminal narrowing at C6-7 and moderate right-sided neural foraminal narrowing at C5-6, similar when compared to the prior exam.    No significant central canal stenosis.      Assessment:       1. Osteoarthritis of spine with radiculopathy, cervical region    2. Chronic midline thoracic back pain    3. Chronic neck pain    4. Cervical spondylosis with radiculopathy    5. History of back surgery    6. Chronic bilateral low back pain with sciatica, sciatica laterality unspecified    7. Primary osteoarthritis of left hip        Plan:       Osteoarthritis of spine with radiculopathy, cervical region  -     traMADol (ULTRAM) 50 mg tablet; Take 1 tablet (50 mg total) by mouth every 8 (eight) hours as needed for Pain.  Dispense: 90 tablet; Refill: 3    Chronic midline thoracic back pain  -     traMADol (ULTRAM) 50 mg tablet; Take 1 tablet (50 mg total) by mouth every 8 (eight) hours as needed for Pain.  Dispense: 90 tablet; Refill: 3    Chronic neck pain  -     traMADol (ULTRAM) 50 mg tablet; Take 1 tablet (50 mg total) by mouth every 8 (eight) hours as needed for Pain.  Dispense: 90 tablet; Refill: 3    Cervical spondylosis with radiculopathy  -     traMADol (ULTRAM) 50 mg tablet; Take 1  tablet (50 mg total) by mouth every 8 (eight) hours as needed for Pain.  Dispense: 90 tablet; Refill: 3    History of back surgery  -     traMADol (ULTRAM) 50 mg tablet; Take 1 tablet (50 mg total) by mouth every 8 (eight) hours as needed for Pain.  Dispense: 90 tablet; Refill: 3    Chronic bilateral low back pain with sciatica, sciatica laterality unspecified  -     traMADol (ULTRAM) 50 mg tablet; Take 1 tablet (50 mg total) by mouth every 8 (eight) hours as needed for Pain.  Dispense: 90 tablet; Refill: 3    Primary osteoarthritis of left hip  -     traMADol (ULTRAM) 50 mg tablet; Take 1 tablet (50 mg total) by mouth every 8 (eight) hours as needed for Pain.  Dispense: 90 tablet; Refill: 3    Patient with worsening of chronic neck and upper back pain, with radiation across upper back.   --  MRI of C spine results discussed in details, on spine model.   -- pain management   Will resume Gabapentin bid, Tramadol prn, Mobic daily.   Tizanadine prn, and   Volteren gel for Left CMC/CMP joint arthritis, and possible tenosynovitis.   Recommend to place thumb in brace, thumb spica if pain increases,a d to f/u with dr. Fabio Vieira,as needed.    RTC in 3-4 months.    Total time spent face to face with patient was 25 minutes .  More than 50% of that time was spent in counseling on diagnosis of her neck and upper back pain, prognosis and treatment options.   I also caunsel patient  on common and most usual side effect of prescribed medications.   I reviewed Primary care, and other specialty's notes to better coordinate patient's  care.   All questions were answered, and patient voiced understanding.

## 2017-12-21 DIAGNOSIS — I10 ESSENTIAL HYPERTENSION: ICD-10-CM

## 2017-12-21 RX ORDER — AMLODIPINE AND BENAZEPRIL HYDROCHLORIDE 10; 40 MG/1; MG/1
CAPSULE ORAL
Qty: 90 CAPSULE | Refills: 1 | Status: SHIPPED | OUTPATIENT
Start: 2017-12-21 | End: 2018-02-02 | Stop reason: SDUPTHER

## 2018-01-04 ENCOUNTER — TELEPHONE (OUTPATIENT)
Dept: FAMILY MEDICINE | Facility: CLINIC | Age: 68
End: 2018-01-04

## 2018-01-04 RX ORDER — GUAIFENESIN/DEXTROMETHORPHAN 100-10MG/5
10 SYRUP ORAL 2 TIMES DAILY PRN
Qty: 236 ML | Refills: 0 | Status: SHIPPED | OUTPATIENT
Start: 2018-01-04 | End: 2018-01-14

## 2018-01-04 NOTE — TELEPHONE ENCOUNTER
Please contact patient and inform that medication has been sent to pharmacy.  She has a reported allergy to hydrocodone so no medications containing codeine can be prescribed.

## 2018-01-04 NOTE — TELEPHONE ENCOUNTER
----- Message from Carlotta dulceclement sent at 1/4/2018 10:20 AM CST -----  Contact: self  Pt is asking for a medication for a cough.  [facility] [address].  Pt call back  [phone].

## 2018-01-18 ENCOUNTER — TELEPHONE (OUTPATIENT)
Dept: SLEEP MEDICINE | Facility: CLINIC | Age: 68
End: 2018-01-18

## 2018-01-18 NOTE — TELEPHONE ENCOUNTER
----- Message from Paige Angelika sent at 1/18/2018 11:15 AM CST -----  _  1st Request  XXXXX_  2nd Request  _  3rd Request        Who: patient     Why: Requesting a call back in regards to her missing folder that has all her information about her cpap supplies in it. The folder was in her cpap case when she came for her visit in December. Please call pt     What Number to Call Back:[phone]    When to Expect a call back: (Within 24 hours)    Please return the call at earliest convenience. Thanks!

## 2018-01-30 ENCOUNTER — HOSPITAL ENCOUNTER (OUTPATIENT)
Dept: RADIOLOGY | Facility: HOSPITAL | Age: 68
Discharge: HOME | End: 2018-01-30
Attending: ORTHOPAEDIC SURGERY
Payer: MEDICARE

## 2018-01-30 ENCOUNTER — OFFICE VISIT (OUTPATIENT)
Dept: ORTHOPEDICS | Facility: CLINIC | Age: 68
End: 2018-01-30
Payer: MEDICARE

## 2018-01-30 VITALS — BODY MASS INDEX: 37.3 KG/M2 | HEIGHT: 62 IN | WEIGHT: 202.69 LBS

## 2018-01-30 DIAGNOSIS — G89.29 CHRONIC PAIN OF RIGHT KNEE: ICD-10-CM

## 2018-01-30 DIAGNOSIS — M17.11 PRIMARY OSTEOARTHRITIS OF RIGHT KNEE: ICD-10-CM

## 2018-01-30 DIAGNOSIS — M25.561 CHRONIC PAIN OF RIGHT KNEE: Primary | ICD-10-CM

## 2018-01-30 DIAGNOSIS — M17.9 OSTEOARTHRITIS OF KNEE, UNSPECIFIED (CODE): Primary | ICD-10-CM

## 2018-01-30 DIAGNOSIS — M25.561 CHRONIC PAIN OF RIGHT KNEE: ICD-10-CM

## 2018-01-30 DIAGNOSIS — G89.29 CHRONIC PAIN OF RIGHT KNEE: Primary | ICD-10-CM

## 2018-01-30 PROCEDURE — 99999 PR PBB SHADOW E&M-EST. PATIENT-LVL III: CPT | Mod: PBBFAC,,, | Performed by: ORTHOPAEDIC SURGERY

## 2018-01-30 PROCEDURE — 1125F AMNT PAIN NOTED PAIN PRSNT: CPT | Mod: ,,, | Performed by: ORTHOPAEDIC SURGERY

## 2018-01-30 PROCEDURE — 73560 X-RAY EXAM OF KNEE 1 OR 2: CPT | Mod: 26,59,LT, | Performed by: RADIOLOGY

## 2018-01-30 PROCEDURE — 99214 OFFICE O/P EST MOD 30 MIN: CPT | Mod: S$PBB,,, | Performed by: ORTHOPAEDIC SURGERY

## 2018-01-30 PROCEDURE — 1159F MED LIST DOCD IN RCRD: CPT | Mod: ,,, | Performed by: ORTHOPAEDIC SURGERY

## 2018-01-30 PROCEDURE — 99213 OFFICE O/P EST LOW 20 MIN: CPT | Mod: PBBFAC,25 | Performed by: ORTHOPAEDIC SURGERY

## 2018-01-30 PROCEDURE — 73560 X-RAY EXAM OF KNEE 1 OR 2: CPT | Mod: 26,59,RT, | Performed by: RADIOLOGY

## 2018-01-30 PROCEDURE — 73560 X-RAY EXAM OF KNEE 1 OR 2: CPT | Mod: TC,RT,59

## 2018-01-30 PROCEDURE — 73560 X-RAY EXAM OF KNEE 1 OR 2: CPT | Mod: TC,LT,59

## 2018-01-30 PROCEDURE — 73562 X-RAY EXAM OF KNEE 3: CPT | Mod: 26,RT,, | Performed by: RADIOLOGY

## 2018-01-30 NOTE — PROGRESS NOTES
HPI:    Stephanie Sears is a 68 y.o. female who is here today for   Chief Complaint   Patient presents with    Right Knee - Pain    left knee     TKR 1-20-16   .     Duration: 12 months  Intensity: severe  Character of pain: sharp  Location: She reports that the pain is predominately  medial  Patient's pain increases with activity.  Pain is increased with weightbearing and interferes with activities of daily living.    She has tried conservative management including NSAIDS, injections, and activity modification without relief.    She has discussed options with her family and wishes to schedule TKA.     PMSF[facility] reviewed per clinic record       Past Medical History:   Diagnosis Date    Bronchitis     Cataract     Cervical spondylosis with radiculopathy 7/10/2012    Chest pain     Chronic neck pain 7/26/2012    GERD (gastroesophageal reflux disease)     Glaucoma     Hyperlipidemia     Hypertension     Neck pain 7/10/2012    Primary osteoarthritis of both knees     Psoriasis     Subacromial or subdeltoid bursitis 7/10/2012    Thyroid disease     Type 2 diabetes mellitus 12/10/2013          Current Outpatient Prescriptions:     albuterol 90 mcg/actuation inhaler, Inhale 2 puffs into the lungs every 6 (six) hours as needed. 2 Aerosol Inhalation Every 4-6 hours, Disp: 3 Inhaler, Rfl: 3    amlodipine-benazepril (LOTREL) 10-40 mg per capsule, TAKE 1 CAPSULE BY MOUTH EVERY DAY, Disp: 90 capsule, Rfl: 1    blood sugar diagnostic (ACCU-CHEK JOSE G PLUS TEST STRP) Strp, Inject 1 each into the skin 2 (two) times daily., Disp: 200 each, Rfl: 3    CALCIUM CARBONATE (TUMS ORAL), Take by mouth as needed (acid reflux, indigestion)., Disp: , Rfl:     carvedilol (COREG) 12.5 MG tablet, TAKE 1 TABLET(12.5 MG) BY MOUTH TWICE DAILY WITH MEALS, Disp: 180 tablet, Rfl: 0    diphenhydrAMINE (BENADRYL) 25 mg capsule, Take 25 mg by mouth every 6 (six) hours as needed for Itching or Allergies., Disp: , Rfl:     fluticasone  (FLONASE) 50 mcg/actuation nasal spray, 1 spray by Each Nare route 2 (two) times daily., Disp: 1 Bottle, Rfl: 1    furosemide (LASIX) 40 MG tablet, Take 1 tablet (40 mg total) by mouth once daily., Disp: 90 tablet, Rfl: 1    gabapentin (NEURONTIN) 600 MG tablet, Take 1 tablet (600 mg total) by mouth 3 (three) times daily., Disp: 90 tablet, Rfl: 11    INULIN (FIBER GUMMIES ORAL), Take by mouth every morning. , Disp: , Rfl:     lancets (ACCU-CHEK MULTICLIX LANCET) Misc, Test blood sugar twice daily, Disp: 300 each, Rfl: 3    meloxicam (MOBIC) 7.5 MG tablet, Take 1 tablet (7.5 mg total) by mouth once daily., Disp: 90 tablet, Rfl: 1    nicotine (NICODERM CQ) 21 mg/24 hr, Place 1 patch onto the skin once daily. Please dispense rugby clear patches. Thanks, Disp: 14 patch, Rfl: 0    nicotine polacrilex 2 MG Lozg, Take 1 lozenge (2 mg total) by mouth as needed. 1-2 per hour in place of cigarettes maximum of 15 per day., Disp: 108 lozenge, Rfl: 0    traMADol (ULTRAM) 50 mg tablet, Take 1 tablet (50 mg total) by mouth every 8 (eight) hours as needed for Pain., Disp: 90 tablet, Rfl: 3    diclofenac sodium (VOLTAREN) 1 % Gel, Apply 2 g topically 4 (four) times daily., Disp: 5 Tube, Rfl: 2     Review of patient's allergies indicates:   Allergen Reactions    Hydrocodone Shortness Of Breath     Pt states can take oxycodone, pt states cannot take any of the codone's    Iodinated contrast- oral and iv dye Shortness Of Breath     Difficulty breathing    Adhesive Itching    Sulfa (sulfonamide antibiotics) Hives and Itching        ROS  Constitutional: Negative for fever, Negative for weight loss  HENT Negative for congestion  Cardiovascular: Negative chest pain  Respiratory: Negative Shortness of breath  Heme: Negative excessive bleeding  Skin:NegativeItching, Negative breakdown  Musculoskeletal:Positive for back pain, Positive for joint pain, Negative muscle pain, Negative muscle weakness  Neurological: Negative for  "numbness and paresthesias   Psychiatric/Behavioral: Negative altered mental status, Negative for depression    Physical Exam:   Ht 5' 2" (1.575 m)   Wt 92 kg (202 lb 11.4 oz)   BMI 37.08 kg/m²   General appearance: This is a well-developed, Well nourished female No obvious acute distress.  Psychiatric: normal mood and affect;  pleasant and conversant; judgment and thought content normal  Gait is coordinated. Patient has antalgic gait to the right  Cardiovascular: There are no swelling or varicosities present.   Respiratory: normal respiratory effort   Lymphatic: no adenopathy   Neurologic: alert and oriented to person, place, and time   Deep Tendon Reflexes are normal;  Coordination and Balance: Normal   Musculoskeletal   Neck    ROM shows normal flexion and extension and lateral rotation    Palpation: Non-tender    Stability is normal    Strength is normal    Skin is normal without masses and lesions    Sensation is intact to light touch   Back    ROM showsabnormal flexion, extension and     rotation    Palpation shows no masses    Stability is normal    Strength to flexion and extension well maintained    Core strength is diminished    Skin shows no rashes or cafe au lait spots;     Sensation is intact to light touch  Right hip   Range of motion normal    Left Hip  Range of motionnormal    Right Knee  Swelling Mild  TendernessMedial joint line  Range of Motion:   Motion is painfulYes  Crepitance presentYes    Right Leg  Neurologic Intact  Pulses Intact    Left Knee: Swelling None  TendernessNone  Range of Motion: 120   Motion is painful No    Left Leg   Neurologic Intact  Pulses Intact    Radiograph: Show severe degenerative arthritis with subchondral sclerosis, periarticular osteophytes and narrowing of joint space.  Angle: mild varus    Physical therapy is contraindicated due to potential bone loss on this severe arthritic joint.    Assessment:  Knee arthritis right      She will need to be cleared in " our PreOp center.         .    She  has a past medical history of Bronchitis; Cataract; Cervical spondylosis with radiculopathy (7/10/2012); Chest pain; Chronic neck pain (7/26/2012); GERD (gastroesophageal reflux disease); Glaucoma; Hyperlipidemia; Hypertension; Neck pain (7/10/2012); Primary osteoarthritis of both knees; Psoriasis; Subacromial or subdeltoid bursitis (7/10/2012); Thyroid disease; and Type 2 diabetes mellitus (12/10/2013). . We will have to take this into account. She  will be followed by the hospitalist service while in the hospital.       We have gone over the hospitalization and recovery with her as well.  This is typically around 2 weeks on a walker and transition to a cane after that.  She will have home health likely for 3-4 weeks and then transition to outpatient if necessary.  She is agreement with this plan of care and is ready to proceed.  I will see her back for clinical recheck at the 2-week postop tj.  I will see her back for clinical recheck for any other questions or problems as needed and certainly for any other issues in the interim.    We have discussed risks of total knee replacement which include but are not limited to blood clots in the legs that can travel to the lungs (pulmonary embolism). Pulmonary embolism can cause shortness of breath, chest pain, and even shock. Other risks include urinary tract infection, nausea and vomiting (usually related to pain medication), chronic knee pain and stiffness, bleeding into the knee joint, nerve damage, blood vessel injury, and infection of the knee which can require re-operation. Furthermore, the risks of anesthesia include potential heart, lung, kidney, and liver damage.

## 2018-02-01 DIAGNOSIS — M16.12 PRIMARY OSTEOARTHRITIS OF LEFT HIP: ICD-10-CM

## 2018-02-01 DIAGNOSIS — M54.40 CHRONIC BILATERAL LOW BACK PAIN WITH SCIATICA, SCIATICA LATERALITY UNSPECIFIED: ICD-10-CM

## 2018-02-01 DIAGNOSIS — Z98.890 HISTORY OF BACK SURGERY: ICD-10-CM

## 2018-02-01 DIAGNOSIS — I10 ESSENTIAL HYPERTENSION: ICD-10-CM

## 2018-02-01 DIAGNOSIS — M54.6 CHRONIC MIDLINE THORACIC BACK PAIN: ICD-10-CM

## 2018-02-01 DIAGNOSIS — G89.29 CHRONIC BILATERAL LOW BACK PAIN WITH SCIATICA, SCIATICA LATERALITY UNSPECIFIED: ICD-10-CM

## 2018-02-01 DIAGNOSIS — M47.22 CERVICAL SPONDYLOSIS WITH RADICULOPATHY: ICD-10-CM

## 2018-02-01 DIAGNOSIS — J44.9 CHRONIC OBSTRUCTIVE PULMONARY DISEASE, UNSPECIFIED COPD TYPE: ICD-10-CM

## 2018-02-01 DIAGNOSIS — M54.2 CHRONIC NECK PAIN: ICD-10-CM

## 2018-02-01 DIAGNOSIS — M47.22 OSTEOARTHRITIS OF SPINE WITH RADICULOPATHY, CERVICAL REGION: ICD-10-CM

## 2018-02-01 DIAGNOSIS — G89.29 CHRONIC MIDLINE THORACIC BACK PAIN: ICD-10-CM

## 2018-02-01 DIAGNOSIS — G89.29 CHRONIC NECK PAIN: ICD-10-CM

## 2018-02-01 RX ORDER — FUROSEMIDE 40 MG/1
40 TABLET ORAL DAILY
Qty: 90 TABLET | Refills: 1 | Status: SHIPPED | OUTPATIENT
Start: 2018-02-01 | End: 2018-02-02 | Stop reason: SDUPTHER

## 2018-02-01 RX ORDER — CARVEDILOL 12.5 MG/1
TABLET ORAL
Qty: 180 TABLET | Refills: 1 | Status: SHIPPED | OUTPATIENT
Start: 2018-02-01 | End: 2018-02-02 | Stop reason: SDUPTHER

## 2018-02-02 RX ORDER — TRAMADOL HYDROCHLORIDE 50 MG/1
50 TABLET ORAL EVERY 8 HOURS PRN
Qty: 90 TABLET | Refills: 0 | Status: ON HOLD | OUTPATIENT
Start: 2018-02-02 | End: 2018-02-27

## 2018-02-02 RX ORDER — MELOXICAM 7.5 MG/1
7.5 TABLET ORAL DAILY
Qty: 90 TABLET | Refills: 1 | Status: SHIPPED | OUTPATIENT
Start: 2018-02-02 | End: 2018-11-29 | Stop reason: SDUPTHER

## 2018-02-02 RX ORDER — FUROSEMIDE 40 MG/1
40 TABLET ORAL DAILY
Qty: 90 TABLET | Refills: 1 | Status: SHIPPED | OUTPATIENT
Start: 2018-02-02 | End: 2018-02-07 | Stop reason: SDUPTHER

## 2018-02-02 RX ORDER — ALBUTEROL SULFATE 90 UG/1
2 AEROSOL, METERED RESPIRATORY (INHALATION) EVERY 6 HOURS PRN
Qty: 3 INHALER | Refills: 1 | Status: SHIPPED | OUTPATIENT
Start: 2018-02-02 | End: 2018-02-07 | Stop reason: SDUPTHER

## 2018-02-02 RX ORDER — GABAPENTIN 600 MG/1
600 TABLET ORAL 3 TIMES DAILY
Qty: 90 TABLET | Refills: 1 | Status: SHIPPED | OUTPATIENT
Start: 2018-02-02 | End: 2018-02-07 | Stop reason: SDUPTHER

## 2018-02-02 RX ORDER — CARVEDILOL 12.5 MG/1
TABLET ORAL
Qty: 180 TABLET | Refills: 1 | Status: SHIPPED | OUTPATIENT
Start: 2018-02-02 | End: 2018-10-08 | Stop reason: SDUPTHER

## 2018-02-02 RX ORDER — AMLODIPINE AND BENAZEPRIL HYDROCHLORIDE 10; 40 MG/1; MG/1
1 CAPSULE ORAL DAILY
Qty: 90 CAPSULE | Refills: 1 | Status: SHIPPED | OUTPATIENT
Start: 2018-02-02 | End: 2018-07-22 | Stop reason: SDUPTHER

## 2018-02-02 NOTE — TELEPHONE ENCOUNTER
----- Message from Patience Hernandez sent at 2/2/2018 10:25 AM CST -----  Contact: Self/[phone]  Patient called stating that all of her prescriptions have to go through [facility]. Thank you.

## 2018-02-02 NOTE — TELEPHONE ENCOUNTER
LOV 12/14/17    Need refills on Lotrel, Coreg, Lasix, Proair, Tramadol, Gabapentin, and Meloxicam - 90 day supply to go to [facility].

## 2018-02-06 ENCOUNTER — ANESTHESIA EVENT (OUTPATIENT)
Dept: SURGERY | Facility: HOSPITAL | Age: 68
DRG: 470 | End: 2018-02-06
Payer: MEDICARE

## 2018-02-06 NOTE — PRE ADMISSION SCREENING
Anesthesia Assessment: Preoperative EQUATION    Planned Procedure: Procedure(s) (LRB):  REPLACEMENT-KNEE-TOTAL (Right)  Requested Anesthesia Type:Femoral Block  Surgeon: John L. Ochsner Jr., MD  Service: Orthopedics  Known or anticipated Date of Surgery:2/26/2018          Plan:    Testing:  A1C, PT/INR and UA   Pre-anesthesia  visit       Visit focus: possible regional anesthesia and/or nerve block      Consultation:[facility] Perioperative Hospitalist     Flor Vidales RN 02/06/2018

## 2018-02-06 NOTE — ANESTHESIA PREPROCEDURE EVALUATION
Anesthesia Assessment: Preoperative EQUATION    Planned Procedure: Procedure(s) (LRB):  REPLACEMENT-KNEE-TOTAL (Right)  Requested Anesthesia Type:Femoral Block  Surgeon: [facility] Jr., MD  Service: Orthopedics  Known or anticipated Date of Surgery:2/26/2018     Plan:    Testing:  A1C, PT/INR and UA   Pre-anesthesia  visit       Visit focus: possible regional anesthesia and/or nerve block      Consultation:IM Perioperative Hospitalist     Flor Vidales RN 02/06/2018 02/06/2018  Stephanie Sears is a 68 y.o., female.    Anesthesia Evaluation    I have reviewed the Patient Summary Reports.    I have reviewed the Nursing Notes.   I have reviewed the Medications.     Review of Systems  Anesthesia Hx:  No problems with previous Anesthesia S/p L-TKR 1/2016 History of prior surgery of interest to airway management or planning: Previous anesthesia: General, Spinal Denies Family Hx of Anesthesia complications.   Denies Personal Hx of Anesthesia complications.   Social:  Smoker Smokes < 1ppd.  Smoking cessation discussed   Hematology/Oncology:  Hematology Normal   Oncology Normal     EENT/Dental:  EENT/Dental Normal H/o scarlet fever   Cardiovascular:   Exercise tolerance: good Hypertension (patient had not taken medication b/c she did not eat and b/p was 134/64) Valvular problems/Murmurs hyperlipidemia Housework, grocery shopping, cooking. Can climb stairs slowly. Denies chestpain or sob      Hx of scarlet fever at age of 8 years old.  Patient with known murmur.  Cardiovascular Symptoms: Syncope (2011 ) Valvular Heart Disease: Aortic Stenosis (AS), mild , JOHN(cm2) = 1.46. Tricuspid Regurgitation (TR), mild   Diastolic dysfunction Carotoid Artery Disease, bilateral , Left stenosis is 20-39% , Right stenosis is  20-39%   Pulmonary:   Sleep Apnea  Possible Obstructive Sleep Apnea , (STOP/BANG) Symptoms S -  "Snoring (loud), P - Pressure being treated for high BP, A - Age > 50, B - BMI > 35 and T - Tired / daytime fatigue / somnolence  Pulmonary Hypertension Echocardiographic Estimated Pulmonary Artery Systolic Pressure >=35 - 45    Renal/:  Renal/ Normal    Patient reports mild back pain. She thought it was due strenuous house cleaning.  Current UTI   Hepatic/GI:  Hepatic/GI Normal GERD Denies Liver Disease.    Musculoskeletal:   Arthritis  Hx of jaw fracture at age of 18 due to passing out due to a hypoglycemic episode.  Musculoskeletal General/Symptoms: Functional capacity is ambulatory with walker.  Joint Disease:  Arthritis  Spine Disorders: cervical and lumbar  Cervical Spine Disorder, Cervical Disc Disease  Lumbar Spine Disorders, S/P Lumbar Fusion (L4-L5)   Neurological:  Neurology Normal  Pain , onset is chronic , location of knee , quality of aching/dull , severity is a 8 , precipitating factors are walking too much , alleviating factors are tramadol and sleeping.   Endocrine:   Diabetes (last A1c 6.5), type 2  Thyroid Disease, Goiter, Multinodular Goiter  Metabolic Disorders, Hypoalbuminemia  Dermatological:  Skin Normal  Skin Symptoms/Problems: Psoriasis   Psych:  Psychiatric Normal  Crowd phobia-"passes out"         Physical Exam  General:  Morbid Obesity, Well nourished    Airway/Jaw/Neck:  Airway Findings: Mouth Opening: Normal Tongue: Normal  General Airway Assessment: Adult  Mallampati: III  Improves to II with phonation.  TM Distance: 4 - 6 cm  Jaw/Neck Findings:  Neck ROM: Normal ROM  Neck Findings:  Girth Increased     Eyes/Ears/Nose:  EYES/EARS/NOSE FINDINGS: Normal   Dental:  Dental Findings: In tact   Chest/Lungs:  Chest/Lungs Findings: Clear to auscultation, Normal Respiratory Rate     Heart/Vascular:  Heart Findings: Rate: Normal  Rhythm: Regular Rhythm  Sounds: Normal  Heart Murmur  Vascular Findings: Normal    Abdomen:  Abdomen Findings: Normal    Musculoskeletal:  Musculoskeletal Findings: " Normal   Skin:  Skin Findings: Normal    Mental Status:  Mental Status Findings:  Cooperative, Alert and Oriented         Labs reviewed.  UA abnormal. Cx done >100,00 E.coli. Started on Cipro 2/13    Discharge disposition: home with sister Marleny [phone] and friend Young [phone]    Please refer to , Internal Medicine, perioperative risk assessment and recommendations.     Patient states she cannot take Coreg on an empty stomach and may not take it the am of surgery but patient will take her pm dose.    Flor Vidales RN 02/09/2018            Anesthesia Plan  Type of Anesthesia, risks & benefits discussed:  Anesthesia Type:  spinal  Patient's Preference:   Intra-op Monitoring Plan:   Intra-op Monitoring Plan Comments:   Post Op Pain Control Plan: peripheral nerve block  Post Op Pain Control Plan Comments:   Induction:   IV  Beta Blocker:  Patient is on a Beta-Blocker and has received one dose within the past 24 hours (No further documentation required).       Informed Consent: Patient understands risks and agrees with Anesthesia plan.  Questions answered. Anesthesia consent signed with patient.  ASA Score: 3     Day of Surgery Review of History & Physical:            Ready For Surgery From Anesthesia Perspective.

## 2018-02-07 DIAGNOSIS — G89.29 CHRONIC NECK PAIN: ICD-10-CM

## 2018-02-07 DIAGNOSIS — M54.2 CHRONIC NECK PAIN: ICD-10-CM

## 2018-02-07 DIAGNOSIS — J44.9 CHRONIC OBSTRUCTIVE PULMONARY DISEASE, UNSPECIFIED COPD TYPE: ICD-10-CM

## 2018-02-07 RX ORDER — ALBUTEROL SULFATE 90 UG/1
2 AEROSOL, METERED RESPIRATORY (INHALATION) EVERY 6 HOURS PRN
Qty: 1 INHALER | Refills: 3 | Status: SHIPPED | OUTPATIENT
Start: 2018-02-07 | End: 2019-09-06 | Stop reason: SDUPTHER

## 2018-02-07 RX ORDER — FUROSEMIDE 40 MG/1
40 TABLET ORAL DAILY
Qty: 90 TABLET | Refills: 1 | Status: SHIPPED | OUTPATIENT
Start: 2018-02-07 | End: 2018-07-23 | Stop reason: SDUPTHER

## 2018-02-07 RX ORDER — GABAPENTIN 600 MG/1
600 TABLET ORAL 3 TIMES DAILY
Qty: 90 TABLET | Refills: 1 | Status: SHIPPED | OUTPATIENT
Start: 2018-02-07 | End: 2018-08-07 | Stop reason: SDUPTHER

## 2018-02-09 ENCOUNTER — HOSPITAL ENCOUNTER (OUTPATIENT)
Dept: PREADMISSION TESTING | Facility: HOSPITAL | Age: 68
Discharge: HOME | End: 2018-02-09
Attending: ANESTHESIOLOGY
Payer: MEDICARE

## 2018-02-09 ENCOUNTER — INITIAL CONSULT (OUTPATIENT)
Dept: INTERNAL MEDICINE | Facility: CLINIC | Age: 68
End: 2018-02-09
Payer: MEDICARE

## 2018-02-09 VITALS
SYSTOLIC BLOOD PRESSURE: 134 MMHG | OXYGEN SATURATION: 98 % | RESPIRATION RATE: 20 BRPM | HEART RATE: 61 BPM | WEIGHT: 201.19 LBS | BODY MASS INDEX: 37.02 KG/M2 | TEMPERATURE: 99 F | HEIGHT: 62 IN | DIASTOLIC BLOOD PRESSURE: 64 MMHG

## 2018-02-09 DIAGNOSIS — G47.33 OBSTRUCTIVE SLEEP APNEA SYNDROME: ICD-10-CM

## 2018-02-09 DIAGNOSIS — E11.9 DIET-CONTROLLED DIABETES MELLITUS: ICD-10-CM

## 2018-02-09 DIAGNOSIS — I51.89 DIASTOLIC DYSFUNCTION: ICD-10-CM

## 2018-02-09 DIAGNOSIS — M79.606 PAIN OF LOWER EXTREMITY, UNSPECIFIED LATERALITY: ICD-10-CM

## 2018-02-09 DIAGNOSIS — Z98.890 PERSONAL HISTORY OF SPINE SURGERY: ICD-10-CM

## 2018-02-09 DIAGNOSIS — K76.0 FATTY LIVER: ICD-10-CM

## 2018-02-09 DIAGNOSIS — I27.20 PULMONARY HYPERTENSION: ICD-10-CM

## 2018-02-09 DIAGNOSIS — Z91.89 AT RISK OF UTI: Primary | ICD-10-CM

## 2018-02-09 DIAGNOSIS — Z72.0 TOBACCO USE: ICD-10-CM

## 2018-02-09 DIAGNOSIS — I10 ESSENTIAL HYPERTENSION: ICD-10-CM

## 2018-02-09 DIAGNOSIS — K21.9 GASTROESOPHAGEAL REFLUX DISEASE, ESOPHAGITIS PRESENCE NOT SPECIFIED: ICD-10-CM

## 2018-02-09 DIAGNOSIS — Z01.818 PREOP EXAMINATION: Primary | ICD-10-CM

## 2018-02-09 DIAGNOSIS — N39.0 URINARY TRACT INFECTION WITH HEMATURIA, SITE UNSPECIFIED: ICD-10-CM

## 2018-02-09 DIAGNOSIS — I35.8 AORTIC VALVE SCLEROSIS: ICD-10-CM

## 2018-02-09 DIAGNOSIS — R31.9 URINARY TRACT INFECTION WITH HEMATURIA, SITE UNSPECIFIED: ICD-10-CM

## 2018-02-09 PROCEDURE — 1159F MED LIST DOCD IN RCRD: CPT | Mod: ,,, | Performed by: HOSPITALIST

## 2018-02-09 PROCEDURE — 99211 OFF/OP EST MAY X REQ PHY/QHP: CPT | Mod: PBBFAC | Performed by: HOSPITALIST

## 2018-02-09 PROCEDURE — 99214 OFFICE O/P EST MOD 30 MIN: CPT | Mod: S$PBB,,, | Performed by: HOSPITALIST

## 2018-02-09 PROCEDURE — 99999 PR PBB SHADOW E&M-EST. PATIENT-LVL I: CPT | Mod: PBBFAC,,, | Performed by: HOSPITALIST

## 2018-02-09 NOTE — ASSESSMENT & PLAN NOTE
2016     The aortic valve is moderately sclerotic with mildly restricted leaflet mobility. The peak velocity obtained across the aortic valve is 2.7 m/s, which translates to a peak gradient of 29.0 mmHg. The mean gradient is 17.0 mmHg.

## 2018-02-09 NOTE — ASSESSMENT & PLAN NOTE
Tobacco use-I  Informed about risk of wound healing problem ,infection,lung complications,thrombosis with tobacco use    I suggested to consider stopping  smoking tobacco for its benefits in the kings operative period and in the long term

## 2018-02-09 NOTE — ASSESSMENT & PLAN NOTE
I suggest  caution with usage of medication that can cause respiratory suppression in the perioperative period  potential ramifications of untreated sleep apnea, which could include daytime sleepiness, hypertension, heart disease and stroke were discussed

## 2018-02-09 NOTE — ASSESSMENT & PLAN NOTE
Hypertension-  Blood pressure is acceptable . I suggest continuation of Coreg, Amlodipine - during the entire perioperative period. I suggest holding Benazepril, Lasix-- on the morning of the surgery and can continue that  post operatively under blood pressure, electrolyte and renal function monitoring as long as they are acceptable.I suggest addressing pain control as uncontrolled pain can increased blood pressure

## 2018-02-09 NOTE — OUTPATIENT SUBJECTIVE & OBJECTIVE
Outpatient Subjective & Objective     Chief complaint-Preoperative evaluation, Perioperative Medical management, complication reduction plan     Active cardiac conditions- none    Revised cardiac risk index predictors- none    Functional capacity -Examples of physical activity, walks with walker , does therapy ,   house work and can take a flight of stairs holding on to the railing----- She can undertake all the above activities without  chest pain,chest tightness, ,dizziness,lightheadedness making her exercise tolerance more  than 4 Mets.   Winded on exertion, not new ,stable over time     Review of Systems   Constitutional: Negative for chills and fever.   HENT:        Sleep  Apnea    Eyes:        No new visual changes   Respiratory:        No cough , phlegm  No hemoptysis    Cardiovascular:        As noted   Gastrointestinal:        No overt GI/ blood losses  Bowel movements- Regular    Endocrine:        Prednisone use > 20 mg daily for 3 weeks- NONE   Genitourinary: Negative for dysuria.        No urinary hesitancy    Musculoskeletal:        As above      Skin: Positive for rash (with Nicotine patches).   Neurological: Negative for syncope.        No unilateral weakness   Hematological:        Current use of Anticoagulants  Current use of Antiplatelet agents    None    Psychiatric/Behavioral:        Depression  Anxiety     None    No dysphagia    No vascular stenting     Past Surgical History:   Procedure Laterality Date    BREAST LUMPECTOMY Left 1988    BREAST MASS EXCISION Right     CHOLECYSTECTOMY      HYSTERECTOMY  1980's    JOINT REPLACEMENT      KNEE SURGERY Left 1-20-16    TKR    L4-L5 fusion  2006    SPINE SURGERY         No  bleeding, cardiac problems, PONV with previous surgeries/procedures.  Medications and Allergies reviewed in epic.   FH- No anesthesia, thrombosis/ bleeding  ,in family   Mother - heart disease in 40's    Help available post op     Physical Exam   HENT:   Head:  Normocephalic.     Physical Exam   HENT:   Head: Normocephalic.     Constitutional- Vitals - There is no height or weight on file to calculate BMI., There were no vitals filed for this visit.  General appearance-Conscious,Coherent  Eyes- No conjunctival icterus,pupils  round  and reactive to light   ENT-Oral cavity- moist  , Hearing grossly normal   Neck- No thyromegaly ,Trachea -central, No jugular venous distension,   No Carotid Bruit   Cardiovascular -Heart Sounds-no murmur under Left clavicle ,  Normal  and  no murmur   , No gallop rhythm   Respiratory - Normal Respiratory Effort, Normal breath sounds,  no wheeze  and  no forced expiratory wheeze    Peripheral pitting pedal edema-- none , no calf pain   Gastrointestinal -Soft abdomen, No palpable masses, Non Tender,Liver,Spleen not palpable. No-- free fluid and shifting dullness  Musculoskeletal- No finger Clubbing. Strength grossly normal   Lymphatic-No Palpable cervical, axillary,Inguinal lymphadenopathy   Psychiatric - normal effect,Orientation  Rt Dorsalis pedis pulses-palpable    Lt Dorsalis pedis pulses- palpable   Rt Posterior tibial pulses -palpable   Left posterior tibial pulses -palpable   Miscellaneous -  no asterixis,  no dupuytren's contracture,  Surgical scarLeft knee  ,  no suggestion of bacterial feet infection and  no renal bruit  There were no vitals taken for this visit.      Investigations  Lab and Imaging have been reviewed in epic.    Review of Medicine tests    EKG- I had independently reviewed the EKG from--1/21/2016   It was reported to be showing     Normal sinus rhythm  Normal ECG    When compared with ECG of 12-JAN-2016 09:53,  No significant change was found    Jan 2016     1 - Normal left ventricular systolic function (EF 60-65%).     2 - Left ventricular diastolic dysfunction.     3 - Normal right ventricular systolic function .     4 - Mild left atrial enlargement.     5 - Mild tricuspid regurgitation.     6 - The estimated PA  systolic pressure is 39 mmHg.     7 - Mild aortic stenosis, JOHN = 1.46 cm2, peak velocity = 2.7 m/s, mean gradient = 17.0 mmHg.     Review of clinical lab tests-Date---12/14/2017  Creatinine-0.8   Date-12/14/2018 -Hemoglobin--N  Platelet count--N    Oct 2011    Impression: Less than 40% internal carotid artery stenosis bilaterally.      Review of old records- Was done and information gathered regards to events leading to surgery and health conditions of significance in the perioperative period.    Outpatient Subjective & Objective

## 2018-02-09 NOTE — LETTER
February 9, 2018      Noni Mosley MD  [address] [facility] [address]           [facility]  [address] [facility] [address]  Phone: [phone]          Patient: Stephanie Sears   MR Number: [id number]   YOB: 1950   Date of Visit: 2/9/2018       Dear Dr. Noni Mosley:    Thank you for referring Stephanie Sears to me for evaluation. Attached you will find relevant portions of my assessment and plan of care.    If you have questions, please do not hesitate to call me. I look forward to following Stephanie Sears along with you.    Sincerely,    Linda Fabian MD    Enclosure  CC:  Aishwarya Ariza MD    If you would like to receive this communication electronically, please contact [email or web address] or [phone] to request more information on [employer] access.    For providers and/or their staff who would like to refer a patient to [facility], please contact us through our one-stop-shop provider referral line, [facility] [place], operated [facility], at [phone].    If you feel you have received this communication in error or would no longer like to receive these types of communications, please e-mail [email or web address]

## 2018-02-09 NOTE — ASSESSMENT & PLAN NOTE
Diabetes Mellitus-I suggest monitoring the glucose in the perioperative period ( Before meals and bed time,if the patient is on oral feeds or every 6 hourly ,if the patient is NPO )  Blood glucose target in hospitalized patients is 140-180. Oral Hypoglycemic agents are generally avoided during the hospital stay . If glucose is consistently elevated ,I suggest using basal ,prandial Insulin regimen to control the glucose , as elevated glucose can be associated with adverse surgical out comes. Please consider involving Hospital Medicine or Endocrinology ,if any help is needed with Glucose control. Patient will be instructed based on the pre op clinic guidelines  about adjustment of diabetic treatment  considering the NPO status for Surgery

## 2018-02-09 NOTE — HPI
History of present illness- I had the pleasure of meeting this pleasant 68 y.o. lady in the pre op clinic prior to her elective Orthopedic surgery. The patient is known to me.Saw me in  jan 2016     I have obtained the history by speaking to the patient and by reviewing the electronic health records.    Events leading up to surgery / History of presenting illness -    She has been troubled with severe  Rt knee  pain for 2 year . Pain  with activity and decreases with resting.    Relevant health conditions of significance for the perioperative period/ History of presenting illness -    Had elevated BP in 2017 and was found to have sleep apnea and since wearing CPAP , Ppressure is better     Sleep apnea .Uses CPAP .Suggested bringing for hospital use . Informed the risk of worsening sleep apnea in the perioperative period and suggest using CPAP use any time in 24 hrs ( day or night )for planned sleep  Avoidance of  supine sleep, weight gain and alcoholic beverages discussed since all of these can worsen JAM     Obesity   Trying to loose weight     HTN   Usual BP- 120-130's 70s  Lately BP better     Proteinuria- getting better per patient   Following Nephrologist    Meloxicam use for 5 years - renal effects, Ulcer effects explained     Type 2 Diabetes Mellitus   Not on Medication   Capillary glucose check-Yes   Cannot remember   Hemoglobin A1c- 6.2 - Dec 2017   Had cut back on sodas , soft drinks and drinking more water     Tobacco   10 cigarettes a day   Using Nicotine patches -  Under tobacco cessation   Chantix did not help and caused hallucinations     Chronic obstructive pulmonary disease-  Bronchitis   Takes albuterol as needed with weather changes , cold weather   No recent problem  No chronic phlegm       GERD   Sleeps on pillows   Taking Prilosec helps as needed with certain foods   GERD- symptoms- Acid taste to throat   No night time coughing , choking       2009 stress test - SPECT- no infarct / ischemia    2005- no evidence of stress induced myocardial ischemia         Scralet fever at age 8 years   Heart murmur since age 8 years       Pulmonary hypertension - PA pressure 39     Diastolic dysfunction   No heart failure     L4-L5 fusion

## 2018-02-09 NOTE — PROGRESS NOTES
[facility] - Pre Op Consult  Progress Note    Patient Name: Stephanie Sears  MRN: [id number]  Date of Evaluation- 02/09/2018  PCP- Aishwarya Ariza MD    Future cases for Stephanie Sears [[id number]]     Case ID Status Date Time Jey Procedure Provider Location    [id number] [facility] 2/26/2018  9:50  REPLACEMENT-KNEE-TOTAL [facility] [[id number]] [facility] OR 2ND FLR      Rt     HPI:  History of present illness- I had the pleasure of meeting this pleasant 68 y.o. lady in the pre op clinic prior to her elective Orthopedic surgery. The patient is known to me.Saw me in  jan 2016     I have obtained the history by speaking to the patient and by reviewing the electronic health records.    Events leading up to surgery / History of presenting illness -    She has been troubled with severe  Rt knee  pain for 2 year . Pain  with activity and decreases with resting.    Relevant health conditions of significance for the perioperative period/ History of presenting illness -    Had elevated BP in 2017 and was found to have sleep apnea and since wearing CPAP , Ppressure is better     Sleep apnea .Uses CPAP .Suggested bringing for hospital use . Informed the risk of worsening sleep apnea in the perioperative period and suggest using CPAP use any time in 24 hrs ( day or night )for planned sleep  Avoidance of  supine sleep, weight gain and alcoholic beverages discussed since all of these can worsen JAM     Obesity   Trying to loose weight     HTN   Usual BP- 120-130's 70s  Lately BP better     Proteinuria- getting better per patient   Following Nephrologist    Meloxicam use for 5 years - renal effects, Ulcer effects explained     Type 2 Diabetes Mellitus   Not on Medication   Capillary glucose check-Yes   Cannot remember   Hemoglobin A1c- 6.2 - Dec 2017   Had cut back on sodas , soft drinks and drinking more water     Tobacco   10 cigarettes a day   Using Nicotine patches -  Under tobacco cessation   Chantix did not help and caused  hallucinations     Chronic obstructive pulmonary disease-  Bronchitis   Takes albuterol as needed with weather changes , cold weather   No recent problem  No chronic phlegm       GERD   Sleeps on pillows   Taking Prilosec helps as needed with certain foods   GERD- symptoms- Acid taste to throat   No night time coughing , choking       2009 stress test - SPECT- no infarct / ischemia   2005- no evidence of stress induced myocardial ischemia         Scralet fever at age 8 years   Heart murmur since age 8 years       Pulmonary hypertension - PA pressure 39     Diastolic dysfunction   No heart failure     L4-L5 fusion       Subjective/ Objective:          Chief complaint-Preoperative evaluation, Perioperative Medical management, complication reduction plan     Active cardiac conditions- none    Revised cardiac risk index predictors- none    Functional capacity -Examples of physical activity, walks with walker , does therapy ,   house work and can take a flight of stairs holding on to the railing----- She can undertake all the above activities without  chest pain,chest tightness, ,dizziness,lightheadedness making her exercise tolerance more  than 4 Mets.   Winded on exertion, not new ,stable over time     Review of Systems   Constitutional: Negative for chills and fever.   HENT:        Sleep  Apnea    Eyes:        No new visual changes   Respiratory:        No cough , phlegm  No hemoptysis    Cardiovascular:        As noted   Gastrointestinal:        No overt GI/ blood losses  Bowel movements- Regular    Endocrine:        Prednisone use > 20 mg daily for 3 weeks- NONE   Genitourinary: Negative for dysuria.        No urinary hesitancy    Musculoskeletal:        As above      Skin: Positive for rash (with Nicotine patches).   Neurological: Negative for syncope.        No unilateral weakness   Hematological:        Current use of Anticoagulants  Current use of Antiplatelet agents    None    Psychiatric/Behavioral:         Depression  Anxiety     None    No dysphagia    No vascular stenting     Past Surgical History:   Procedure Laterality Date    BREAST LUMPECTOMY Left 1988    BREAST MASS EXCISION Right     CHOLECYSTECTOMY      HYSTERECTOMY  1980's    JOINT REPLACEMENT      KNEE SURGERY Left 1-20-16    TKR    L4-L5 fusion  2006    SPINE SURGERY         No  bleeding, cardiac problems, PONV with previous surgeries/procedures.  Medications and Allergies reviewed in epic.   FH- No anesthesia, thrombosis/ bleeding  ,in family   Mother - heart disease in 40's    Help available post op     Physical Exam  There were no vitals taken for this visit.      Investigations  Lab and Imaging have been reviewed in [facility].    Review of Medicine tests    EKG- I had independently reviewed the EKG from--1/21/2016   It was reported to be showing     Normal sinus rhythm  Normal ECG    When compared with ECG of 12-JAN-2016 09:53,  No significant change was found    Jan 2016     1 - Normal left ventricular systolic function (EF 60-65%).     2 - Left ventricular diastolic dysfunction.     3 - Normal right ventricular systolic function .     4 - Mild left atrial enlargement.     5 - Mild tricuspid regurgitation.     6 - The estimated PA systolic pressure is 39 mmHg.     7 - Mild aortic stenosis, JOHN = 1.46 cm2, peak velocity = 2.7 m/s, mean gradient = 17.0 mmHg.     Review of clinical lab tests-Date---12/14/2017  Creatinine-0.8   Date-12/14/2018 -Hemoglobin--N  Platelet count--N    Oct 2011    Impression: Less than 40% internal carotid artery stenosis bilaterally.      Review of old records- Was done and information gathered regards to events leading to surgery and health conditions of significance in the perioperative period.        Preoperative cardiac risk assessment-  The patient does not have any active cardiac conditions . Revised cardiac risk index predictors-0 ---.Functional capacity is more than 4 Mets. She will be undergoing a Orthopedic  procedure that carries a intermediate risk     The estimated risk of the rate of adverse cardiac outcomes  0.4%    No further cardiac work up is indicated prior to proceeding with the surgery          American Society of Anesthesiologists Physical status classification ( ASA ) class: 3     Postoperative pulmonary complication risk assessment:      ARISCAT ( [place]) risk index- risk class -  Low, if duration of surgery is under 3 hours, intermediate, if duration of surgery is over 3 hours      [place] Respiratory failure index- percentage risk of respiratory failure: 0.5 %     American college of Surgeons, NSQUIP risk calculation     Risk of serous complication --5.5 % ( Average risk -3.7  %), Risk of any Complication-6.4  % ( Average risk--4.3  %)    Assessment/Plan:     Pulmonary hypertension   Pulmonary hypertension - PA pressure 39       Diastolic dysfunction  Diastolic Dysfunction:  I  suggest watching her fluid status perioperatively and to watch out for fluid overload in view of her Diastolic Dysfunction.  I suggest avoidance of the ordering of continuous IV fluids and if IV fluids are required ,to order for a specific duration of time and consider ordering lower IV fluid rate     Essential hypertension  Hypertension-  Blood pressure is acceptable . I suggest continuation of Coreg, Amlodipine - during the entire perioperative period. I suggest holding Benazepril, Lasix-- on the morning of the surgery and can continue that  post operatively under blood pressure, electrolyte and renal function monitoring as long as they are acceptable.I suggest addressing pain control as uncontrolled pain can increased blood pressure     Diet-controlled diabetes mellitus    Diabetes Mellitus-I suggest monitoring the glucose in the perioperative period ( Before meals and bed time,if the patient is on oral feeds or every 6 hourly ,if the patient is NPO )  Blood glucose target in hospitalized patients is 140-180. Oral Hypoglycemic  agents are generally avoided during the hospital stay . If glucose is consistently elevated ,I suggest using basal ,prandial Insulin regimen to control the glucose , as elevated glucose can be associated with adverse surgical out comes. Please consider involving Hospital Medicine or Endocrinology ,if any help is needed with Glucose control. Patient will be instructed based on the pre op clinic guidelines  about adjustment of diabetic treatment  considering the NPO status for Surgery       GERD (gastroesophageal reflux disease)  GERD- I suggest aspiration precautions  Controlled on diet and avoiding late night eating     Obstructive sleep apnea syndrome     I suggest  caution with usage of medication that can cause respiratory suppression in the perioperative period  potential ramifications of untreated sleep apnea, which could include daytime sleepiness, hypertension, heart disease and stroke were discussed          Tobacco use  Tobacco use-I  Informed about risk of wound healing problem ,infection,lung complications,thrombosis with tobacco use    I suggested to consider stopping  smoking tobacco for its benefits in the kings operative period and in the long term    Personal history of spine surgery    L4-L5 fusion   I suggest that the perioperative team be aware of this      Aortic valve sclerosis  2016     The aortic valve is moderately sclerotic with mildly restricted leaflet mobility. The peak velocity obtained across the aortic valve is 2.7 m/s, which translates to a peak gradient of 29.0 mmHg. The mean gradient is 17.0 mmHg.     Fatty liver  Suggested weight loss  No suggestion of liver decompensation         Preventive perioperative care    Thromboembolic prophylaxis:  Her risk factors for thrombosis include tobacco use, obesity, surgical procedure and age.I suggest  thromboembolic prophylaxis ( mechanical/pharmacological, weighing the risk benefits of pharmacological agent use considering kings procedural  bleeding )  during the perioperative period.I suggested being active in the post operative period.The patient is a candidate for extended DVT prophylaxis      Postoperative pulmonary complication prophylaxis-Risk factors for post operative pulmonary complications include pulm HTN age over 65 years, ASA class >2 and tobacco use- I suggest tobacco smoking cessation, incentive spirometry use, early ambulation and end tidal carbon dioxide monitoring  , oral care , head end of bed elevation     Renal complication prophylaxis-Risk factors for renal complications include diabetes mellitus and hypertension . I suggest keeping her well hydrated .I suggested drinking 2 litre's of water a day      Surgical site Infection Prophylaxis-I  suggest appropriate antibiotic for Prophylaxis against Surgical site infections      In view of Regional anesthesia, joint replacement the patient  is at risk of postoperative urinary retention.  I suggest avoidance / minimizing the of  Benzodiazepines,Anticholinergic medication,antihistamines ( Benadryl) , if possible in the perioperative period. I suggest using the minimum possible use of opioids for the minimum period of time in the perioperative period. Benadryl avoidance suggested      This visit was focused on Preoperative evaluation, Perioperative Medical management, complication reduction plans. I suggest that the patient follows up with primary care or relevant sub specialists for ongoing health care.    I appreciate the opportunity to be involved in this patients care. Please feel free to contact me if there were any questions about this consultation.    Patient is optimized    Linda Fabian MD  Perioperative Medicine  [facility]   Pager [phone]  -----    2/9- 19 31     Urinalysis suggestive of UTI   A1c 6.0   ----    2/10--2218     Urine culture in process  ------    2/11- 18 28     Urine culture- gram negative nancy   Called to discuss UTI  To treat for 7 days    No urinary discomfort   No fever , feels fine  No recent UTI  Hydration   No changes to medication, health   ------    2/12- 20 39     Urine culture showed E coli  Cr cl using adjusted body weight - about 70  Ciprofloxacin 500 mg twice daily for 7 days  Called and spoke to patient   Tolerated Ciprofloxacin in the past   Feels fine, no rigors  Hydration   No changes to medication, health   ----------------------    2/14 - 9 09 CST   vitals from 2/9- /64 pulse 61, temp 98.6,sat 98 %  Correspondence from pre op nurse clinician   Patient states she cannot take Coreg on an empty stomach and may not take it the am of surgery  She perhaps can take it closer to bed  time for a few days as she gets closer to surgery , that might be acceptable  -------    2/23- 15 17    Called to follow up   Doing good ,No changes to Medication, Health    Finished Cipro   Back pain on to one side , ? Which side resolved with Cipro  No suggestion pyelonephritis when I spoke to her   No fever   Cut back on tobacco  Encouraged tobacco cessation  Suggested to call, if needed

## 2018-02-09 NOTE — DISCHARGE INSTRUCTIONS
Your surgery has been scheduled for:__________________________________________    You should report to:  ____[facility], located on the [place] side of the first floor of the           [facility] ([phone])  ____[facility], located on the [facility] side of the            Second floor of the [facility] ([phone])  ____3rd Floor [facility] located on the [facility] side of the [facility] [phone]  Please Note   - Tell your doctor if you take Aspirin, products containing Aspirin, herbal medications  or blood thinners, such as Coumadin, Ticlid, or Plavix.  (Consult your provider regarding holding or stopping before surgery).  - Arrange for someone to drive you home following surgery.  You will not be allowed to leave the surgical facility alone or drive yourself home following sedation and anesthesia.  Before Surgery  - Stop taking all herbal medications 14days prior to surgery  - No Motrin/Advil (Ibuprofen) 7 days before surgery  - No Aleve (Naproxen) 7 days before surgery  - Stop Taking Asprin, products containing Asprin _____days before surgery  - Stop taking blood thinners_______days before surgery  - Refrain from drinking alcoholic beverages for 24hours before and after surgery  - Stop or limit smoking _________days before surgery  Night before Surgery  - DO NOT EAT OR DRINK ANYTHING AFTER MIDNIGHT, INCLUDING GUM, HARD CANDY, MINTS, OR CHEWING TOBACCO.  - Take a shower or bath (shower is recommended).  Bathe with Hibiclens soap or an antibacterial soap from the neck down.  If not supplied by your surgeon, hibiclens soap will need to be purchased over the counter in pharmacy.  Rinse soap off thoroughly.  - Shampoo your hair with your regular shampoo  The Day of Surgery  - Take another bath or shower with hibiclens or any antibacterial soap, to reduce the chance of infection.  - Take heart and blood  pressure medications with a small sip of water, as advised by the perioperative team.  - Do not take fluid pills  - You may brush your teeth and rinse your mouth, but do not swall any additional water.   - Do not apply perfumes, powder, body lotions or deodorant on the day of surgery.  - Nail polish should be removed.  - Do not wear makeup or moisturizer  - Wear comfortable clothes, such as a button front shirt and loose fitting pants.  - Leave all jewelry, including body piercings, and valuables at home.    - Bring any devices you will neeed after surgery such as crutches or canes.  - If you have sleep apnea, please bring your CPAP machine  In the event that your physical condition changes including the onset of a cold or respiratory illness, or if you have to delay or cancel your surgery, please notify your surgeon.Anesthesia: Regional Anesthesia  Youre scheduled for surgery. During surgery, youll receive medication called anesthesia to keep you comfortable and pain-free. Your surgeon has decided that youll receive regional anesthesia. This sheet tells you what to expect with this type of anesthesia.  What Is Regional Anesthesia?  Regional anesthesia numbs one region of your body. The anesthesia may be given around nerves or into veins in your arms, neck, or legs (nerve block or Basilio block). Or it may be sent into the spinal fluid (spinal anesthesia) or into the space just outside the spinal fluid (epidural anesthesia). Sedatives may also be given to relax you.  Nerve Block or Basilio Block  A small area of the body, such as an arm or leg, can be numbed using a nerve block or [place] block.  · Nerve block: During a nerve block, your skin is numbed. A needle is then inserted near nerves that serve the area to be numbed. Anesthetic is sent through the needle.  · IV regional or Basilio block: For this type of block, an IV line is put into a vein. The blood flow to the area to be numbed is blocked for a short time.  Anesthetic is sent through the IV.  Spinal Anesthesia  Spinal anesthesia numbs your body from about the waist down.  · Anesthetic is injected into the spinal fluid. This is a substance that surrounds the spinal cord in your spinal column. The anesthetic blocks pain traveling from the body to the brain.  · To receive the anesthetic, your skin is numbed at the injection site.  · A needle is then inserted into the spinal fluid. Anesthetic is sent through the needle.  Anesthesia Tools and Medications  · Local anesthetic given through a needle numbs one region of your body.  · Electrocardiography leads (electrodes) record your heart rate and rhythm.  · A blood pressure cuff monitors your blood pressure.  · A pulse oximeter on the end of the finger measures your blood oxygen level.  · Sedatives may be given through an IV to relax you and keep you comfortable. You may stay awake or sleep slightly.  · Oxygen may be given to you through a facemask.  Risks and Possible Complications  Regional anesthesia carries some risks. These include:  · Nausea and vomiting  · Headache  · Backache  · Decreased blood pressure  · Allergic reaction to the anesthetic  · Ongoing numbness (rare)  · Irregular heartbeat (rare)  · Cardiac arrest (rare)   © 3074-0198 [employer], [address] [place] [address], [place] [address]. All rights reserved. This information is not intended as a substitute for professional medical care. Always follow your healthcare professional's instructions.

## 2018-02-12 RX ORDER — CIPROFLOXACIN 500 MG/1
500 TABLET ORAL 2 TIMES DAILY
Qty: 14 TABLET | Refills: 0 | Status: SHIPPED | OUTPATIENT
Start: 2018-02-12 | End: 2018-02-19

## 2018-02-19 ENCOUNTER — OFFICE VISIT (OUTPATIENT)
Dept: ORTHOPEDICS | Facility: CLINIC | Age: 68
End: 2018-02-19
Payer: MEDICARE

## 2018-02-19 DIAGNOSIS — M17.11 PRIMARY OSTEOARTHRITIS OF RIGHT KNEE: Primary | ICD-10-CM

## 2018-02-19 PROCEDURE — 99213 OFFICE O/P EST LOW 20 MIN: CPT | Mod: PBBFAC | Performed by: PHYSICIAN ASSISTANT

## 2018-02-19 PROCEDURE — 99499 UNLISTED E&M SERVICE: CPT | Mod: S$PBB,,, | Performed by: PHYSICIAN ASSISTANT

## 2018-02-19 PROCEDURE — 99999 PR PBB SHADOW E&M-EST. PATIENT-LVL III: CPT | Mod: PBBFAC,,, | Performed by: PHYSICIAN ASSISTANT

## 2018-02-20 VITALS — BODY MASS INDEX: 37.04 KG/M2 | HEIGHT: 62 IN | WEIGHT: 201.25 LBS

## 2018-02-20 RX ORDER — ROPIVACAINE HYDROCHLORIDE 2 MG/ML
8 INJECTION, SOLUTION EPIDURAL; INFILTRATION; PERINEURAL CONTINUOUS
Status: CANCELLED | OUTPATIENT
Start: 2018-02-26

## 2018-02-20 RX ORDER — ACETAMINOPHEN 500 MG
1000 TABLET ORAL EVERY 6 HOURS
Status: CANCELLED | OUTPATIENT
Start: 2018-02-20 | End: 2018-02-22

## 2018-02-20 RX ORDER — BISACODYL 10 MG
10 SUPPOSITORY, RECTAL RECTAL EVERY 12 HOURS PRN
Status: CANCELLED | OUTPATIENT
Start: 2018-02-20

## 2018-02-20 RX ORDER — PREGABALIN 25 MG/1
75 CAPSULE ORAL NIGHTLY
Status: CANCELLED | OUTPATIENT
Start: 2018-02-20

## 2018-02-20 RX ORDER — FENTANYL CITRATE 50 UG/ML
25 INJECTION, SOLUTION INTRAMUSCULAR; INTRAVENOUS EVERY 5 MIN PRN
Status: CANCELLED | OUTPATIENT
Start: 2018-02-20

## 2018-02-20 RX ORDER — ASPIRIN 325 MG
325 TABLET, DELAYED RELEASE (ENTERIC COATED) ORAL 2 TIMES DAILY
Status: CANCELLED | OUTPATIENT
Start: 2018-02-20

## 2018-02-20 RX ORDER — RAMELTEON 8 MG/1
8 TABLET ORAL NIGHTLY PRN
Status: CANCELLED | OUTPATIENT
Start: 2018-02-20

## 2018-02-20 RX ORDER — SODIUM CHLORIDE 9 MG/ML
INJECTION, SOLUTION INTRAVENOUS
Status: CANCELLED | OUTPATIENT
Start: 2018-02-20

## 2018-02-20 RX ORDER — CELECOXIB 100 MG/1
200 CAPSULE ORAL DAILY
Status: CANCELLED | OUTPATIENT
Start: 2018-02-20

## 2018-02-20 RX ORDER — AMOXICILLIN 250 MG
1 CAPSULE ORAL 2 TIMES DAILY
Status: CANCELLED | OUTPATIENT
Start: 2018-02-20

## 2018-02-20 RX ORDER — MUPIROCIN 20 MG/G
1 OINTMENT TOPICAL
Status: CANCELLED | OUTPATIENT
Start: 2018-02-20

## 2018-02-20 RX ORDER — NALOXONE HCL 0.4 MG/ML
0.02 VIAL (ML) INJECTION
Status: CANCELLED | OUTPATIENT
Start: 2018-02-20

## 2018-02-20 RX ORDER — FAMOTIDINE 20 MG/1
20 TABLET, FILM COATED ORAL 2 TIMES DAILY
Status: CANCELLED | OUTPATIENT
Start: 2018-02-20

## 2018-02-20 RX ORDER — ONDANSETRON 4 MG/1
4 TABLET, ORALLY DISINTEGRATING ORAL ONCE
Status: CANCELLED | OUTPATIENT
Start: 2018-02-20 | End: 2018-02-20

## 2018-02-20 RX ORDER — SODIUM CHLORIDE 9 MG/ML
INJECTION, SOLUTION INTRAVENOUS CONTINUOUS
Status: CANCELLED | OUTPATIENT
Start: 2018-02-20 | End: 2018-02-21

## 2018-02-20 RX ORDER — POLYETHYLENE GLYCOL 3350 17 G/17G
17 POWDER, FOR SOLUTION ORAL DAILY
Status: CANCELLED | OUTPATIENT
Start: 2018-02-20

## 2018-02-20 RX ORDER — ACETAMINOPHEN 10 MG/ML
1000 INJECTION, SOLUTION INTRAVENOUS ONCE
Status: CANCELLED | OUTPATIENT
Start: 2018-02-20 | End: 2018-02-20

## 2018-02-20 RX ORDER — MORPHINE SULFATE 10 MG/ML
2 INJECTION, SOLUTION INTRAMUSCULAR; INTRAVENOUS
Status: CANCELLED | OUTPATIENT
Start: 2018-02-20

## 2018-02-20 RX ORDER — SODIUM CHLORIDE 0.9 % (FLUSH) 0.9 %
3 SYRINGE (ML) INJECTION EVERY 8 HOURS PRN
Status: CANCELLED | OUTPATIENT
Start: 2018-02-20

## 2018-02-20 RX ORDER — PREGABALIN 25 MG/1
75 CAPSULE ORAL
Status: CANCELLED | OUTPATIENT
Start: 2018-02-20

## 2018-02-20 RX ORDER — LIDOCAINE HYDROCHLORIDE 10 MG/ML
1 INJECTION, SOLUTION EPIDURAL; INFILTRATION; INTRACAUDAL; PERINEURAL
Status: CANCELLED | OUTPATIENT
Start: 2018-02-20

## 2018-02-20 RX ORDER — CELECOXIB 100 MG/1
400 CAPSULE ORAL
Status: CANCELLED | OUTPATIENT
Start: 2018-02-20

## 2018-02-20 RX ORDER — ONDANSETRON 2 MG/ML
4 INJECTION INTRAMUSCULAR; INTRAVENOUS EVERY 12 HOURS PRN
Status: CANCELLED | OUTPATIENT
Start: 2018-02-20

## 2018-02-20 RX ORDER — MIDAZOLAM HYDROCHLORIDE 5 MG/ML
1 INJECTION INTRAMUSCULAR; INTRAVENOUS EVERY 5 MIN PRN
Status: CANCELLED | OUTPATIENT
Start: 2018-02-20

## 2018-02-20 NOTE — H&P
CC: Right knee pain    Stephanie Sears is a 68 y.o. female with 3 year history of Right knee pain. Pain is worse with activity and weight bearing.  Patient has experienced interference of activities of daily living due to decreased range of motion and an increase in joint pain and swelling.  Patient has failed non-operative treatment including NSAIDs, corticosteroid injections, viscosupplement injections, and activity modification.  Stephanie Sears currently ambulates using walker.  She had L TKA January 2016.     Past Medical History:   Diagnosis Date    Bronchitis     Cataract     Cervical spondylosis with radiculopathy 7/10/2012    Chest pain     Chronic neck pain 7/26/2012    GERD (gastroesophageal reflux disease)     Glaucoma     Hyperlipidemia     Hypertension     Neck pain 7/10/2012    Primary osteoarthritis of both knees     Psoriasis     Subacromial or subdeltoid bursitis 7/10/2012    Thyroid disease     Type 2 diabetes mellitus 12/10/2013       Past Surgical History:   Procedure Laterality Date    BREAST LUMPECTOMY Left 1988    mass in the rt breast surgery  1988    BREAST MASS EXCISION Right     CHOLECYSTECTOMY      HYSTERECTOMY  1980's    JOINT REPLACEMENT      KNEE SURGERY Left 1-20-16    TKR    L4-L5 fusion  2006    SPINE SURGERY         Family History   Problem Relation Age of Onset    Diabetes Mother     Hypertension Mother      CHF, angina    Stroke Mother      tia, glaucoma    Hypertension Father      glaucoma, Alzhiemer's , supra pubic    Kidney disease Brother      kidney transplant, HTN,DM    Diabetes Brother     Amblyopia Neg Hx     Blindness Neg Hx        Review of patient's allergies indicates:   Allergen Reactions    Hydrocodone Shortness Of Breath    Iodinated contrast- oral and iv dye Shortness Of Breath     Difficulty breathing    Adhesive Itching    Oxycodone      Hallucinations    Sulfa (sulfonamide antibiotics) Hives and Itching         Current  Outpatient Prescriptions:     albuterol (PROAIR HFA) 90 mcg/actuation inhaler, Inhale 2 puffs into the lungs every 6 (six) hours as needed for Wheezing or Shortness of Breath., Disp: 1 Inhaler, Rfl: 3    amlodipine-benazepril (LOTREL) 10-40 mg per capsule, Take 1 capsule by mouth once daily., Disp: 90 capsule, Rfl: 1    blood sugar diagnostic (ACCU-CHEK JOSE G PLUS TEST STRP) Strp, Inject 1 each into the skin 2 (two) times daily., Disp: 200 each, Rfl: 3    CALCIUM CARBONATE (TUMS ORAL), Take by mouth as needed (acid reflux, indigestion)., Disp: , Rfl:     carvedilol (COREG) 12.5 MG tablet, TAKE 1 TABLET(12.5 MG) BY MOUTH TWICE DAILY WITH MEALS, Disp: 180 tablet, Rfl: 1    diclofenac sodium (VOLTAREN) 1 % Gel, Apply 2 g topically 4 (four) times daily., Disp: 5 Tube, Rfl: 2    diphenhydrAMINE (BENADRYL) 25 mg capsule, Take 25 mg by mouth every 6 (six) hours as needed for Itching or Allergies., Disp: , Rfl:     fluticasone (FLONASE) 50 mcg/actuation nasal spray, 1 spray by Each Nare route 2 (two) times daily., Disp: 1 Bottle, Rfl: 1    furosemide (LASIX) 40 MG tablet, Take 1 tablet (40 mg total) by mouth once daily., Disp: 90 tablet, Rfl: 1    gabapentin (NEURONTIN) 600 MG tablet, Take 1 tablet (600 mg total) by mouth 3 (three) times daily., Disp: 90 tablet, Rfl: 1    INULIN (FIBER GUMMIES ORAL), Take by mouth every morning. , Disp: , Rfl:     lancets (ACCU-CHEK MULTICLIX LANCET) Misc, Test blood sugar twice daily, Disp: 300 each, Rfl: 3    meloxicam (MOBIC) 7.5 MG tablet, Take 1 tablet (7.5 mg total) by mouth once daily., Disp: 90 tablet, Rfl: 1    nicotine (NICODERM CQ) 21 mg/24 hr, Place 1 patch onto the skin once daily. Please dispense rugby clear patches. Thanks, Disp: 14 patch, Rfl: 0    nicotine polacrilex 2 MG Lozg, Take 1 lozenge (2 mg total) by mouth as needed. 1-2 per hour in place of cigarettes maximum of 15 per day., Disp: 108 lozenge, Rfl: 0    traMADol (ULTRAM) 50 mg tablet, Take 1 tablet  "(50 mg total) by mouth every 8 (eight) hours as needed for Pain., Disp: 90 tablet, Rfl: 0    Review of Systems:  Constitutional: no fever or chills  Eyes: no visual changes  ENT: no nasal congestion or sore throat  Respiratory: no cough or shortness of breath  Cardiovascular: no chest pain or palpitations  Gastrointestinal: no nausea or vomiting, tolerating diet  Genitourinary: no hematuria or dysuria  Integument/Breast: no rash or pruritis  Hematologic/Lymphatic: no easy bruising or lymphadenopathy  Musculoskeletal: positive for knee pain  Neurological: no seizures or tremors  Behavioral/Psych: no auditory or visual hallucinations  Endocrine: no heat or cold intolerance    PE:  Ht 5' 2" (1.575 m)   Wt 91.3 kg (201 lb 4.5 oz)   BMI 36.81 kg/m²   General: Pleasant, cooperative, NAD   Gait: antalgic  HEENT: NCAT, sclera nonicteric   Lungs: Respirations clear bilaterally; equal and unlabored.   CV: S1S2; 2+ bilateral upper and lower extremity pulses.   Skin: Intact throughout with no rashes, erythema, or lesions  Extremities: No LE edema,  no erythema or warmth of the skin in either lower extremity.    Right knee exam:  Knee Range of Motion: active, pain with passive range of motion  Effusion:none  Condition of skin:intact  Location of tenderness:Medial joint line   Strength:5 of 5 quadriceps strength and 5 of 5 hamstring strength  Stability: stable to testing    Hip Examination:full painless range of motion, without tenderness    Radiographs: Radiographs reveal advanced degenerative changes including subchondral cyst formation, subchondral sclerosis, osteophyte formation, joint space narrowing.     Knee Alignment:  Mild varus    Diagnosis: osteoarthritis Right knee    Plan: Right total knee arthroplasty    Due to the serious nature of total joint infection and the high prevalence of community acquired MRSA, vancomycin will be used perioperatively.            "

## 2018-02-20 NOTE — PROGRESS NOTES
Stephanie Sears is a 68 y.o. year old here today for a pre-operative visit in preparation for a Right total knee arthroplasty to be performed by  Dr. [facility] on 2/26/2018.  she was last seen and treated in the clinic on 1/30/2018. she will be medically optimized by the pre op center. There has been no significant change in medical status since last visit. No fever, chills, malaise, or unexplained weight change.      Allergies, Medications, past medical and surgical history reviewed.    Focused examination performed.    Dr. [facility] saw this patient today in clinic. All questions answered. Patient encouraged to call with questions. Contact information given.     Pre, kings, and post operative procedures and expectations discussed. Questions were answered. Stephanie Sears has been educated and is ready to proceed with surgery. Approximately 30 minutes was spent discussing surgical outcomes, plans, procedures pre, kings, and post operative expections and care.  Surgical consent signed.    Stephanie Sears will contact us if there are any questions, concerns, or changes in medical status prior to surgery.

## 2018-02-23 ENCOUNTER — TELEPHONE (OUTPATIENT)
Dept: ORTHOPEDICS | Facility: CLINIC | Age: 68
End: 2018-02-23

## 2018-02-23 NOTE — TELEPHONE ENCOUNTER
we spoke : Reminded to eat light the night before surgery, NPO after midnight, take hibclens shower the night before surgery  & again in the am , sleep on clean sheets  in clean clothes, no laxatives or stool softeners , report to the 2nd floor surgery unit for 5 am Monday   She voiced understanding

## 2018-02-26 ENCOUNTER — ANESTHESIA (OUTPATIENT)
Dept: SURGERY | Facility: HOSPITAL | Age: 68
DRG: 470 | End: 2018-02-26
Payer: MEDICARE

## 2018-02-26 ENCOUNTER — HOSPITAL ENCOUNTER (INPATIENT)
Facility: HOSPITAL | Age: 68
LOS: 1 days | Discharge: HOME HEALTH | DRG: 470 | End: 2018-02-27
Attending: ORTHOPAEDIC SURGERY | Admitting: ORTHOPAEDIC SURGERY
Payer: MEDICARE

## 2018-02-26 DIAGNOSIS — G89.29 CHRONIC MIDLINE THORACIC BACK PAIN: ICD-10-CM

## 2018-02-26 DIAGNOSIS — M47.22 OSTEOARTHRITIS OF SPINE WITH RADICULOPATHY, CERVICAL REGION: ICD-10-CM

## 2018-02-26 DIAGNOSIS — M47.22 CERVICAL SPONDYLOSIS WITH RADICULOPATHY: ICD-10-CM

## 2018-02-26 DIAGNOSIS — M54.6 CHRONIC MIDLINE THORACIC BACK PAIN: ICD-10-CM

## 2018-02-26 DIAGNOSIS — M54.40 CHRONIC BILATERAL LOW BACK PAIN WITH SCIATICA, SCIATICA LATERALITY UNSPECIFIED: ICD-10-CM

## 2018-02-26 DIAGNOSIS — M16.12 PRIMARY OSTEOARTHRITIS OF LEFT HIP: ICD-10-CM

## 2018-02-26 DIAGNOSIS — G89.29 CHRONIC NECK PAIN: ICD-10-CM

## 2018-02-26 DIAGNOSIS — M17.11 PRIMARY OSTEOARTHRITIS OF RIGHT KNEE: Primary | ICD-10-CM

## 2018-02-26 DIAGNOSIS — Z98.890 HISTORY OF BACK SURGERY: ICD-10-CM

## 2018-02-26 DIAGNOSIS — G89.29 CHRONIC BILATERAL LOW BACK PAIN WITH SCIATICA, SCIATICA LATERALITY UNSPECIFIED: ICD-10-CM

## 2018-02-26 DIAGNOSIS — M54.2 CHRONIC NECK PAIN: ICD-10-CM

## 2018-02-26 LAB
ANION GAP SERPL CALC-SCNC: 8 MMOL/L
BUN SERPL-MCNC: 11 MG/DL
CALCIUM SERPL-MCNC: 8.1 MG/DL
CHLORIDE SERPL-SCNC: 112 MMOL/L
CO2 SERPL-SCNC: 23 MMOL/L
CREAT SERPL-MCNC: 0.7 MG/DL
EST. GFR  (AFRICAN AMERICAN): >60 ML/MIN/1.73 M^2
EST. GFR  (NON AFRICAN AMERICAN): >60 ML/MIN/1.73 M^2
GLUCOSE SERPL-MCNC: 165 MG/DL
POCT GLUCOSE: 119 MG/DL (ref 70–110)
POCT GLUCOSE: 156 MG/DL (ref 70–110)
POCT GLUCOSE: 217 MG/DL (ref 70–110)
POTASSIUM SERPL-SCNC: 4.2 MMOL/L
SODIUM SERPL-SCNC: 143 MMOL/L

## 2018-02-26 PROCEDURE — G8988 SELF CARE GOAL STATUS: HCPCS | Mod: CK

## 2018-02-26 PROCEDURE — 88311 DECALCIFY TISSUE: CPT | Mod: 26,,, | Performed by: PATHOLOGY

## 2018-02-26 PROCEDURE — C1713 ANCHOR/SCREW BN/BN,TIS/BN: HCPCS | Performed by: ORTHOPAEDIC SURGERY

## 2018-02-26 PROCEDURE — 63600175 PHARM REV CODE 636 W HCPCS: Performed by: PHYSICIAN ASSISTANT

## 2018-02-26 PROCEDURE — 71000039 HC RECOVERY, EACH ADD'L HOUR: Performed by: ORTHOPAEDIC SURGERY

## 2018-02-26 PROCEDURE — 37000008 HC ANESTHESIA 1ST 15 MINUTES: Performed by: ORTHOPAEDIC SURGERY

## 2018-02-26 PROCEDURE — 71000033 HC RECOVERY, INTIAL HOUR: Performed by: ORTHOPAEDIC SURGERY

## 2018-02-26 PROCEDURE — 88305 TISSUE EXAM BY PATHOLOGIST: CPT | Performed by: PATHOLOGY

## 2018-02-26 PROCEDURE — 25000003 PHARM REV CODE 250: Performed by: PHYSICIAN ASSISTANT

## 2018-02-26 PROCEDURE — G8979 MOBILITY GOAL STATUS: HCPCS | Mod: CK

## 2018-02-26 PROCEDURE — G0378 HOSPITAL OBSERVATION PER HR: HCPCS

## 2018-02-26 PROCEDURE — 64448 NJX AA&/STRD FEM NRV NFS IMG: CPT | Mod: 59,RT,, | Performed by: ANESTHESIOLOGY

## 2018-02-26 PROCEDURE — 25000003 PHARM REV CODE 250: Performed by: ANESTHESIOLOGY

## 2018-02-26 PROCEDURE — 97165 OT EVAL LOW COMPLEX 30 MIN: CPT

## 2018-02-26 PROCEDURE — G0379 DIRECT REFER HOSPITAL OBSERV: HCPCS

## 2018-02-26 PROCEDURE — 97530 THERAPEUTIC ACTIVITIES: CPT

## 2018-02-26 PROCEDURE — S0028 INJECTION, FAMOTIDINE, 20 MG: HCPCS | Performed by: NURSE ANESTHETIST, CERTIFIED REGISTERED

## 2018-02-26 PROCEDURE — 36000711: Performed by: ORTHOPAEDIC SURGERY

## 2018-02-26 PROCEDURE — 63600175 PHARM REV CODE 636 W HCPCS: Performed by: NURSE ANESTHETIST, CERTIFIED REGISTERED

## 2018-02-26 PROCEDURE — 11000001 HC ACUTE MED/SURG PRIVATE ROOM

## 2018-02-26 PROCEDURE — 25000003 PHARM REV CODE 250: Performed by: ORTHOPAEDIC SURGERY

## 2018-02-26 PROCEDURE — G8987 SELF CARE CURRENT STATUS: HCPCS | Mod: CK

## 2018-02-26 PROCEDURE — G8978 MOBILITY CURRENT STATUS: HCPCS | Mod: CK

## 2018-02-26 PROCEDURE — 63600175 PHARM REV CODE 636 W HCPCS: Performed by: ANESTHESIOLOGY

## 2018-02-26 PROCEDURE — 27200664 HC NERVE BLOCK COMPLETE KIT: Performed by: ANESTHESIOLOGY

## 2018-02-26 PROCEDURE — 27200688 HC TRAY, SPINAL-HYPER/ ISOBARIC: Performed by: ANESTHESIOLOGY

## 2018-02-26 PROCEDURE — 76942 ECHO GUIDE FOR BIOPSY: CPT | Mod: 26,,, | Performed by: ANESTHESIOLOGY

## 2018-02-26 PROCEDURE — 63600175 PHARM REV CODE 636 W HCPCS: Performed by: ORTHOPAEDIC SURGERY

## 2018-02-26 PROCEDURE — D9220A PRA ANESTHESIA: Mod: ANES,,, | Performed by: ANESTHESIOLOGY

## 2018-02-26 PROCEDURE — 94799 UNLISTED PULMONARY SVC/PX: CPT

## 2018-02-26 PROCEDURE — 27201423 OPTIME MED/SURG SUP & DEVICES STERILE SUPPLY: Performed by: ORTHOPAEDIC SURGERY

## 2018-02-26 PROCEDURE — 36000710: Performed by: ORTHOPAEDIC SURGERY

## 2018-02-26 PROCEDURE — 63600175 PHARM REV CODE 636 W HCPCS

## 2018-02-26 PROCEDURE — 97161 PT EVAL LOW COMPLEX 20 MIN: CPT

## 2018-02-26 PROCEDURE — D9220A PRA ANESTHESIA: Mod: CRNA,,, | Performed by: NURSE ANESTHETIST, CERTIFIED REGISTERED

## 2018-02-26 PROCEDURE — 80048 BASIC METABOLIC PNL TOTAL CA: CPT

## 2018-02-26 PROCEDURE — 82962 GLUCOSE BLOOD TEST: CPT | Performed by: ORTHOPAEDIC SURGERY

## 2018-02-26 PROCEDURE — C1776 JOINT DEVICE (IMPLANTABLE): HCPCS | Performed by: ORTHOPAEDIC SURGERY

## 2018-02-26 PROCEDURE — 37000009 HC ANESTHESIA EA ADD 15 MINS: Performed by: ORTHOPAEDIC SURGERY

## 2018-02-26 PROCEDURE — 99900035 HC TECH TIME PER 15 MIN (STAT)

## 2018-02-26 PROCEDURE — 88305 TISSUE EXAM BY PATHOLOGIST: CPT | Mod: 26,,, | Performed by: PATHOLOGY

## 2018-02-26 PROCEDURE — 25000003 PHARM REV CODE 250: Performed by: STUDENT IN AN ORGANIZED HEALTH CARE EDUCATION/TRAINING PROGRAM

## 2018-02-26 PROCEDURE — 64450 NJX AA&/STRD OTHER PN/BRANCH: CPT | Performed by: ANESTHESIOLOGY

## 2018-02-26 PROCEDURE — 94761 N-INVAS EAR/PLS OXIMETRY MLT: CPT

## 2018-02-26 PROCEDURE — 97535 SELF CARE MNGMENT TRAINING: CPT

## 2018-02-26 PROCEDURE — 27447 TOTAL KNEE ARTHROPLASTY: CPT | Mod: RT,GC,, | Performed by: ORTHOPAEDIC SURGERY

## 2018-02-26 PROCEDURE — 0SRC069 REPLACEMENT OF RIGHT KNEE JOINT WITH OXIDIZED ZIRCONIUM ON POLYETHYLENE SYNTHETIC SUBSTITUTE, CEMENTED, OPEN APPROACH: ICD-10-PCS | Performed by: ORTHOPAEDIC SURGERY

## 2018-02-26 PROCEDURE — 25000003 PHARM REV CODE 250: Performed by: NURSE ANESTHETIST, CERTIFIED REGISTERED

## 2018-02-26 DEVICE — PLUG BONE #5: Type: IMPLANTABLE DEVICE | Site: KNEE | Status: FUNCTIONAL

## 2018-02-26 DEVICE — COMP FEM POST STAB CME SZ6 RT: Type: IMPLANTABLE DEVICE | Site: KNEE | Status: FUNCTIONAL

## 2018-02-26 DEVICE — CEMENT BONE SIMPLE P INDIVID: Type: IMPLANTABLE DEVICE | Site: KNEE | Status: FUNCTIONAL

## 2018-02-26 DEVICE — PSN TIB STM 5 DEG SZ D R: Type: IMPLANTABLE DEVICE | Site: KNEE | Status: FUNCTIONAL

## 2018-02-26 DEVICE — PSN ALL POLY PAT 32MM: Type: IMPLANTABLE DEVICE | Site: KNEE | Status: FUNCTIONAL

## 2018-02-26 RX ORDER — NICOTINE 7MG/24HR
1 PATCH, TRANSDERMAL 24 HOURS TRANSDERMAL DAILY
Status: DISCONTINUED | OUTPATIENT
Start: 2018-02-27 | End: 2018-02-27 | Stop reason: HOSPADM

## 2018-02-26 RX ORDER — CARVEDILOL 12.5 MG/1
12.5 TABLET ORAL 2 TIMES DAILY
Status: DISCONTINUED | OUTPATIENT
Start: 2018-02-26 | End: 2018-02-26

## 2018-02-26 RX ORDER — IBUPROFEN 200 MG
24 TABLET ORAL
Status: DISCONTINUED | OUTPATIENT
Start: 2018-02-26 | End: 2018-02-27 | Stop reason: HOSPADM

## 2018-02-26 RX ORDER — DOCUSATE SODIUM 100 MG/1
100 CAPSULE, LIQUID FILLED ORAL 2 TIMES DAILY PRN
Qty: 60 CAPSULE | Refills: 0 | Status: SHIPPED | OUTPATIENT
Start: 2018-02-26 | End: 2018-07-05

## 2018-02-26 RX ORDER — CEFAZOLIN SODIUM 1 G/3ML
2 INJECTION, POWDER, FOR SOLUTION INTRAMUSCULAR; INTRAVENOUS
Status: COMPLETED | OUTPATIENT
Start: 2018-02-26 | End: 2018-02-26

## 2018-02-26 RX ORDER — CELECOXIB 200 MG/1
400 CAPSULE ORAL
Status: COMPLETED | OUTPATIENT
Start: 2018-02-26 | End: 2018-02-26

## 2018-02-26 RX ORDER — GABAPENTIN 300 MG/1
600 CAPSULE ORAL 3 TIMES DAILY
Status: DISCONTINUED | OUTPATIENT
Start: 2018-02-26 | End: 2018-02-27 | Stop reason: HOSPADM

## 2018-02-26 RX ORDER — MORPHINE SULFATE 2 MG/ML
2 INJECTION, SOLUTION INTRAMUSCULAR; INTRAVENOUS
Status: DISCONTINUED | OUTPATIENT
Start: 2018-02-26 | End: 2018-02-26

## 2018-02-26 RX ORDER — FENTANYL CITRATE 50 UG/ML
25 INJECTION, SOLUTION INTRAMUSCULAR; INTRAVENOUS EVERY 5 MIN PRN
Status: DISCONTINUED | OUTPATIENT
Start: 2018-02-26 | End: 2018-02-26

## 2018-02-26 RX ORDER — LIDOCAINE HCL/PF 100 MG/5ML
SYRINGE (ML) INTRAVENOUS
Status: DISCONTINUED | OUTPATIENT
Start: 2018-02-26 | End: 2018-02-26

## 2018-02-26 RX ORDER — PREGABALIN 75 MG/1
75 CAPSULE ORAL
Status: COMPLETED | OUTPATIENT
Start: 2018-02-26 | End: 2018-02-26

## 2018-02-26 RX ORDER — POLYETHYLENE GLYCOL 3350 17 G/17G
17 POWDER, FOR SOLUTION ORAL DAILY
Status: DISCONTINUED | OUTPATIENT
Start: 2018-02-26 | End: 2018-02-27 | Stop reason: HOSPADM

## 2018-02-26 RX ORDER — ASPIRIN 325 MG
325 TABLET ORAL 2 TIMES DAILY
Qty: 90 TABLET | Refills: 0 | Status: SHIPPED | OUTPATIENT
Start: 2018-02-26 | End: 2018-07-05

## 2018-02-26 RX ORDER — SODIUM CHLORIDE 9 MG/ML
INJECTION, SOLUTION INTRAVENOUS CONTINUOUS PRN
Status: DISCONTINUED | OUTPATIENT
Start: 2018-02-26 | End: 2018-02-26

## 2018-02-26 RX ORDER — BISACODYL 10 MG
10 SUPPOSITORY, RECTAL RECTAL EVERY 12 HOURS PRN
Status: DISCONTINUED | OUTPATIENT
Start: 2018-02-26 | End: 2018-02-27 | Stop reason: HOSPADM

## 2018-02-26 RX ORDER — SODIUM CHLORIDE 9 MG/ML
INJECTION, SOLUTION INTRAVENOUS CONTINUOUS
Status: DISCONTINUED | OUTPATIENT
Start: 2018-02-26 | End: 2018-02-27

## 2018-02-26 RX ORDER — ASPIRIN 325 MG
325 TABLET, DELAYED RELEASE (ENTERIC COATED) ORAL 2 TIMES DAILY
Status: DISCONTINUED | OUTPATIENT
Start: 2018-02-26 | End: 2018-02-27 | Stop reason: HOSPADM

## 2018-02-26 RX ORDER — KETAMINE HCL IN 0.9 % NACL 50 MG/5 ML
SYRINGE (ML) INTRAVENOUS
Status: DISCONTINUED | OUTPATIENT
Start: 2018-02-26 | End: 2018-02-26

## 2018-02-26 RX ORDER — ALBUTEROL SULFATE 90 UG/1
2 AEROSOL, METERED RESPIRATORY (INHALATION) EVERY 6 HOURS PRN
Status: DISCONTINUED | OUTPATIENT
Start: 2018-02-26 | End: 2018-02-27 | Stop reason: HOSPADM

## 2018-02-26 RX ORDER — GLUCAGON 1 MG
1 KIT INJECTION
Status: DISCONTINUED | OUTPATIENT
Start: 2018-02-26 | End: 2018-02-27 | Stop reason: HOSPADM

## 2018-02-26 RX ORDER — SODIUM CHLORIDE 0.9 % (FLUSH) 0.9 %
3 SYRINGE (ML) INJECTION EVERY 8 HOURS PRN
Status: DISCONTINUED | OUTPATIENT
Start: 2018-02-26 | End: 2018-02-27 | Stop reason: HOSPADM

## 2018-02-26 RX ORDER — AMOXICILLIN 250 MG
1 CAPSULE ORAL 2 TIMES DAILY
Status: DISCONTINUED | OUTPATIENT
Start: 2018-02-26 | End: 2018-02-27 | Stop reason: HOSPADM

## 2018-02-26 RX ORDER — PROMETHAZINE HYDROCHLORIDE 12.5 MG/1
12.5 TABLET ORAL EVERY 6 HOURS PRN
Qty: 60 TABLET | Refills: 0 | Status: SHIPPED | OUTPATIENT
Start: 2018-02-26 | End: 2018-07-05

## 2018-02-26 RX ORDER — ONDANSETRON 2 MG/ML
INJECTION INTRAMUSCULAR; INTRAVENOUS
Status: DISCONTINUED | OUTPATIENT
Start: 2018-02-26 | End: 2018-02-26

## 2018-02-26 RX ORDER — HYDROMORPHONE HYDROCHLORIDE 2 MG/1
2 TABLET ORAL
Status: DISCONTINUED | OUTPATIENT
Start: 2018-02-26 | End: 2018-02-27 | Stop reason: HOSPADM

## 2018-02-26 RX ORDER — FAMOTIDINE 10 MG/ML
INJECTION INTRAVENOUS
Status: DISCONTINUED | OUTPATIENT
Start: 2018-02-26 | End: 2018-02-26

## 2018-02-26 RX ORDER — FAMOTIDINE 20 MG/1
20 TABLET, FILM COATED ORAL 2 TIMES DAILY
Status: DISCONTINUED | OUTPATIENT
Start: 2018-02-26 | End: 2018-02-27 | Stop reason: HOSPADM

## 2018-02-26 RX ORDER — DEXAMETHASONE SODIUM PHOSPHATE 4 MG/ML
INJECTION, SOLUTION INTRA-ARTICULAR; INTRALESIONAL; INTRAMUSCULAR; INTRAVENOUS; SOFT TISSUE
Status: DISCONTINUED | OUTPATIENT
Start: 2018-02-26 | End: 2018-02-26

## 2018-02-26 RX ORDER — ONDANSETRON 4 MG/1
4 TABLET, ORALLY DISINTEGRATING ORAL ONCE
Status: DISCONTINUED | OUTPATIENT
Start: 2018-02-26 | End: 2018-02-26 | Stop reason: HOSPADM

## 2018-02-26 RX ORDER — ONDANSETRON 2 MG/ML
4 INJECTION INTRAMUSCULAR; INTRAVENOUS EVERY 8 HOURS PRN
Status: DISCONTINUED | OUTPATIENT
Start: 2018-02-26 | End: 2018-02-27 | Stop reason: HOSPADM

## 2018-02-26 RX ORDER — OXYCODONE AND ACETAMINOPHEN 10; 325 MG/1; MG/1
1 TABLET ORAL EVERY 4 HOURS PRN
Qty: 90 TABLET | Refills: 0 | Status: SHIPPED | OUTPATIENT
Start: 2018-02-26 | End: 2018-02-27

## 2018-02-26 RX ORDER — MUPIROCIN 20 MG/G
1 OINTMENT TOPICAL
Status: COMPLETED | OUTPATIENT
Start: 2018-02-26 | End: 2018-02-26

## 2018-02-26 RX ORDER — MIDAZOLAM HYDROCHLORIDE 1 MG/ML
1 INJECTION INTRAMUSCULAR; INTRAVENOUS EVERY 5 MIN PRN
Status: DISCONTINUED | OUTPATIENT
Start: 2018-02-26 | End: 2018-02-26

## 2018-02-26 RX ORDER — RAMELTEON 8 MG/1
8 TABLET ORAL NIGHTLY PRN
Status: DISCONTINUED | OUTPATIENT
Start: 2018-02-26 | End: 2018-02-27 | Stop reason: HOSPADM

## 2018-02-26 RX ORDER — FENTANYL CITRATE 50 UG/ML
INJECTION, SOLUTION INTRAMUSCULAR; INTRAVENOUS
Status: DISCONTINUED | OUTPATIENT
Start: 2018-02-26 | End: 2018-02-26

## 2018-02-26 RX ORDER — MELOXICAM 7.5 MG/1
7.5 TABLET ORAL DAILY
Status: DISCONTINUED | OUTPATIENT
Start: 2018-02-27 | End: 2018-02-27 | Stop reason: HOSPADM

## 2018-02-26 RX ORDER — SODIUM CHLORIDE 9 MG/ML
INJECTION, SOLUTION INTRAVENOUS
Status: COMPLETED | OUTPATIENT
Start: 2018-02-26 | End: 2018-02-26

## 2018-02-26 RX ORDER — ACETAMINOPHEN 10 MG/ML
1000 INJECTION, SOLUTION INTRAVENOUS ONCE
Status: COMPLETED | OUTPATIENT
Start: 2018-02-26 | End: 2018-02-26

## 2018-02-26 RX ORDER — MORPHINE SULFATE 2 MG/ML
2 INJECTION, SOLUTION INTRAMUSCULAR; INTRAVENOUS
Status: DISCONTINUED | OUTPATIENT
Start: 2018-02-26 | End: 2018-02-27 | Stop reason: HOSPADM

## 2018-02-26 RX ORDER — INSULIN ASPART 100 [IU]/ML
0-5 INJECTION, SOLUTION INTRAVENOUS; SUBCUTANEOUS
Status: DISCONTINUED | OUTPATIENT
Start: 2018-02-26 | End: 2018-02-27 | Stop reason: HOSPADM

## 2018-02-26 RX ORDER — CARVEDILOL 12.5 MG/1
12.5 TABLET ORAL 2 TIMES DAILY
Status: DISCONTINUED | OUTPATIENT
Start: 2018-02-26 | End: 2018-02-27 | Stop reason: HOSPADM

## 2018-02-26 RX ORDER — IBUPROFEN 200 MG
16 TABLET ORAL
Status: DISCONTINUED | OUTPATIENT
Start: 2018-02-26 | End: 2018-02-27 | Stop reason: HOSPADM

## 2018-02-26 RX ORDER — ROPIVACAINE HYDROCHLORIDE 2 MG/ML
8 INJECTION, SOLUTION EPIDURAL; INFILTRATION; PERINEURAL CONTINUOUS
Status: DISCONTINUED | OUTPATIENT
Start: 2018-02-26 | End: 2018-02-27

## 2018-02-26 RX ORDER — NALOXONE HCL 0.4 MG/ML
0.02 VIAL (ML) INJECTION
Status: DISCONTINUED | OUTPATIENT
Start: 2018-02-26 | End: 2018-02-27 | Stop reason: HOSPADM

## 2018-02-26 RX ORDER — AMLODIPINE BESYLATE AND BENAZEPRIL HYDROCHLORIDE 2.5; 1 MG/1; MG/1
1 CAPSULE ORAL DAILY
Status: DISCONTINUED | OUTPATIENT
Start: 2018-02-26 | End: 2018-02-26

## 2018-02-26 RX ORDER — FENTANYL CITRATE 50 UG/ML
INJECTION, SOLUTION INTRAMUSCULAR; INTRAVENOUS
Status: COMPLETED
Start: 2018-02-26 | End: 2018-02-26

## 2018-02-26 RX ORDER — GENTAMICIN SULFATE 40 MG/ML
INJECTION, SOLUTION INTRAMUSCULAR; INTRAVENOUS
Status: DISCONTINUED | OUTPATIENT
Start: 2018-02-26 | End: 2018-02-26 | Stop reason: HOSPADM

## 2018-02-26 RX ORDER — PROPOFOL 10 MG/ML
VIAL (ML) INTRAVENOUS CONTINUOUS PRN
Status: DISCONTINUED | OUTPATIENT
Start: 2018-02-26 | End: 2018-02-26

## 2018-02-26 RX ORDER — AMLODIPINE AND BENAZEPRIL HYDROCHLORIDE 5; 20 MG/1; MG/1
2 CAPSULE ORAL DAILY
Status: DISCONTINUED | OUTPATIENT
Start: 2018-02-27 | End: 2018-02-27

## 2018-02-26 RX ORDER — ACETAMINOPHEN 500 MG
1000 TABLET ORAL EVERY 6 HOURS
Status: DISCONTINUED | OUTPATIENT
Start: 2018-02-26 | End: 2018-02-27 | Stop reason: HOSPADM

## 2018-02-26 RX ORDER — FENTANYL CITRATE 50 UG/ML
25 INJECTION, SOLUTION INTRAMUSCULAR; INTRAVENOUS EVERY 5 MIN PRN
Status: COMPLETED | OUTPATIENT
Start: 2018-02-26 | End: 2018-02-26

## 2018-02-26 RX ORDER — HYDRALAZINE HYDROCHLORIDE 25 MG/1
25 TABLET, FILM COATED ORAL EVERY 8 HOURS PRN
Status: DISCONTINUED | OUTPATIENT
Start: 2018-02-26 | End: 2018-02-27 | Stop reason: HOSPADM

## 2018-02-26 RX ORDER — CELECOXIB 200 MG/1
200 CAPSULE ORAL DAILY
Status: DISCONTINUED | OUTPATIENT
Start: 2018-02-27 | End: 2018-02-26

## 2018-02-26 RX ORDER — LIDOCAINE HYDROCHLORIDE 10 MG/ML
1 INJECTION, SOLUTION EPIDURAL; INFILTRATION; INTRACAUDAL; PERINEURAL
Status: COMPLETED | OUTPATIENT
Start: 2018-02-26 | End: 2018-02-26

## 2018-02-26 RX ORDER — VANCOMYCIN HYDROCHLORIDE 500 MG/10ML
INJECTION, POWDER, LYOPHILIZED, FOR SOLUTION INTRAVENOUS
Status: DISCONTINUED | OUTPATIENT
Start: 2018-02-26 | End: 2018-02-26 | Stop reason: HOSPADM

## 2018-02-26 RX ORDER — PHENYLEPHRINE HYDROCHLORIDE 10 MG/ML
INJECTION INTRAVENOUS
Status: DISCONTINUED | OUTPATIENT
Start: 2018-02-26 | End: 2018-02-26

## 2018-02-26 RX ORDER — HYDROMORPHONE HYDROCHLORIDE 2 MG/1
4 TABLET ORAL
Status: DISCONTINUED | OUTPATIENT
Start: 2018-02-26 | End: 2018-02-27 | Stop reason: HOSPADM

## 2018-02-26 RX ORDER — NALOXONE HCL 0.4 MG/ML
0.02 VIAL (ML) INJECTION
Status: DISCONTINUED | OUTPATIENT
Start: 2018-02-26 | End: 2018-02-26

## 2018-02-26 RX ORDER — PROPOFOL 10 MG/ML
VIAL (ML) INTRAVENOUS
Status: DISCONTINUED | OUTPATIENT
Start: 2018-02-26 | End: 2018-02-26

## 2018-02-26 RX ADMIN — FAMOTIDINE 20 MG: 20 TABLET, FILM COATED ORAL at 09:02

## 2018-02-26 RX ADMIN — MUPIROCIN 1 G: 20 OINTMENT TOPICAL at 06:02

## 2018-02-26 RX ADMIN — CARVEDILOL 12.5 MG: 12.5 TABLET, FILM COATED ORAL at 12:02

## 2018-02-26 RX ADMIN — CARVEDILOL 12.5 MG: 12.5 TABLET, FILM COATED ORAL at 09:02

## 2018-02-26 RX ADMIN — CELECOXIB 400 MG: 200 CAPSULE ORAL at 06:02

## 2018-02-26 RX ADMIN — ONDANSETRON 4 MG: 2 INJECTION INTRAMUSCULAR; INTRAVENOUS at 08:02

## 2018-02-26 RX ADMIN — PHENYLEPHRINE HYDROCHLORIDE 50 MCG: 10 INJECTION INTRAVENOUS at 07:02

## 2018-02-26 RX ADMIN — ACETAMINOPHEN 1000 MG: 500 TABLET ORAL at 05:02

## 2018-02-26 RX ADMIN — HYDROMORPHONE HYDROCHLORIDE 4 MG: 2 TABLET ORAL at 09:02

## 2018-02-26 RX ADMIN — EPHEDRINE SULFATE 10 MG: 50 INJECTION, SOLUTION INTRAMUSCULAR; INTRAVENOUS; SUBCUTANEOUS at 07:02

## 2018-02-26 RX ADMIN — FENTANYL CITRATE 25 MCG: 50 INJECTION, SOLUTION INTRAMUSCULAR; INTRAVENOUS at 09:02

## 2018-02-26 RX ADMIN — SODIUM CHLORIDE: 0.9 INJECTION, SOLUTION INTRAVENOUS at 06:02

## 2018-02-26 RX ADMIN — ASPIRIN 325 MG: 325 TABLET, DELAYED RELEASE ORAL at 04:02

## 2018-02-26 RX ADMIN — LIDOCAINE HYDROCHLORIDE 60 MG: 20 INJECTION, SOLUTION INTRAVENOUS at 07:02

## 2018-02-26 RX ADMIN — CEFAZOLIN 2 G: 330 INJECTION, POWDER, FOR SOLUTION INTRAMUSCULAR; INTRAVENOUS at 11:02

## 2018-02-26 RX ADMIN — MIDAZOLAM HYDROCHLORIDE 2 MG: 1 INJECTION, SOLUTION INTRAMUSCULAR; INTRAVENOUS at 06:02

## 2018-02-26 RX ADMIN — PROPOFOL 10 MG: 10 INJECTION, EMULSION INTRAVENOUS at 07:02

## 2018-02-26 RX ADMIN — LIDOCAINE HYDROCHLORIDE 40 MG: 20 INJECTION, SOLUTION INTRAVENOUS at 07:02

## 2018-02-26 RX ADMIN — FENTANYL CITRATE 25 MCG: 50 INJECTION, SOLUTION INTRAMUSCULAR; INTRAVENOUS at 08:02

## 2018-02-26 RX ADMIN — GABAPENTIN 600 MG: 300 CAPSULE ORAL at 10:02

## 2018-02-26 RX ADMIN — SODIUM CHLORIDE: 0.9 INJECTION, SOLUTION INTRAVENOUS at 09:02

## 2018-02-26 RX ADMIN — MIDAZOLAM HYDROCHLORIDE 1 MG: 1 INJECTION, SOLUTION INTRAMUSCULAR; INTRAVENOUS at 07:02

## 2018-02-26 RX ADMIN — FAMOTIDINE 20 MG: 10 INJECTION, SOLUTION INTRAVENOUS at 06:02

## 2018-02-26 RX ADMIN — LIDOCAINE HYDROCHLORIDE 10 MG: 10 INJECTION, SOLUTION EPIDURAL; INFILTRATION; INTRACAUDAL; PERINEURAL at 06:02

## 2018-02-26 RX ADMIN — DEXAMETHASONE SODIUM PHOSPHATE 8 MG: 4 INJECTION, SOLUTION INTRAMUSCULAR; INTRAVENOUS at 07:02

## 2018-02-26 RX ADMIN — PROPOFOL 50 MCG/KG/MIN: 10 INJECTION, EMULSION INTRAVENOUS at 07:02

## 2018-02-26 RX ADMIN — EPHEDRINE SULFATE 5 MG: 50 INJECTION, SOLUTION INTRAMUSCULAR; INTRAVENOUS; SUBCUTANEOUS at 07:02

## 2018-02-26 RX ADMIN — STANDARDIZED SENNA CONCENTRATE AND DOCUSATE SODIUM 1 TABLET: 8.6; 5 TABLET, FILM COATED ORAL at 09:02

## 2018-02-26 RX ADMIN — ROPIVACAINE HYDROCHLORIDE 8 ML/HR: 2 INJECTION, SOLUTION EPIDURAL; INFILTRATION at 09:02

## 2018-02-26 RX ADMIN — VANCOMYCIN HYDROCHLORIDE 1500 MG: 1 INJECTION, POWDER, LYOPHILIZED, FOR SOLUTION INTRAVENOUS at 06:02

## 2018-02-26 RX ADMIN — ACETAMINOPHEN 1000 MG: 10 INJECTION, SOLUTION INTRAVENOUS at 09:02

## 2018-02-26 RX ADMIN — CEFAZOLIN 2 G: 330 INJECTION, POWDER, FOR SOLUTION INTRAMUSCULAR; INTRAVENOUS at 07:02

## 2018-02-26 RX ADMIN — Medication 10 MG: at 08:02

## 2018-02-26 RX ADMIN — PHENYLEPHRINE HYDROCHLORIDE 100 MCG: 10 INJECTION INTRAVENOUS at 07:02

## 2018-02-26 RX ADMIN — FENTANYL CITRATE 25 MCG: 50 INJECTION, SOLUTION INTRAMUSCULAR; INTRAVENOUS at 06:02

## 2018-02-26 RX ADMIN — ROPIVACAINE HYDROCHLORIDE 8 ML/HR: 2 INJECTION, SOLUTION EPIDURAL; INFILTRATION at 07:02

## 2018-02-26 RX ADMIN — Medication 25 MG: at 07:02

## 2018-02-26 RX ADMIN — MIDAZOLAM HYDROCHLORIDE 1 MG: 1 INJECTION, SOLUTION INTRAMUSCULAR; INTRAVENOUS at 06:02

## 2018-02-26 RX ADMIN — PREGABALIN 75 MG: 75 CAPSULE ORAL at 06:02

## 2018-02-26 RX ADMIN — VANCOMYCIN HYDROCHLORIDE 1500 MG: 1 INJECTION, POWDER, LYOPHILIZED, FOR SOLUTION INTRAVENOUS at 07:02

## 2018-02-26 RX ADMIN — HYDROMORPHONE HYDROCHLORIDE 4 MG: 2 TABLET ORAL at 12:02

## 2018-02-26 RX ADMIN — SODIUM CHLORIDE, SODIUM GLUCONATE, SODIUM ACETATE, POTASSIUM CHLORIDE, MAGNESIUM CHLORIDE, SODIUM PHOSPHATE, DIBASIC, AND POTASSIUM PHOSPHATE: .53; .5; .37; .037; .03; .012; .00082 INJECTION, SOLUTION INTRAVENOUS at 07:02

## 2018-02-26 RX ADMIN — GABAPENTIN 600 MG: 300 CAPSULE ORAL at 01:02

## 2018-02-26 RX ADMIN — SODIUM CHLORIDE, SODIUM GLUCONATE, SODIUM ACETATE, POTASSIUM CHLORIDE, MAGNESIUM CHLORIDE, SODIUM PHOSPHATE, DIBASIC, AND POTASSIUM PHOSPHATE: .53; .5; .37; .037; .03; .012; .00082 INJECTION, SOLUTION INTRAVENOUS at 08:02

## 2018-02-26 RX ADMIN — CEFAZOLIN 2 G: 330 INJECTION, POWDER, FOR SOLUTION INTRAMUSCULAR; INTRAVENOUS at 03:02

## 2018-02-26 NOTE — PLAN OF CARE
Problem: Patient Care Overview  Goal: Plan of Care Review  Outcome: Ongoing (interventions implemented as appropriate)  Pt AAOx3, VSS, no complaints at this time, family at bedside, blood glucose monitoring ordered and carried, no s/s of skin breakdown noted, no falls noted as precautions remain. Will resume with plan of care.

## 2018-02-26 NOTE — PT/OT/SLP EVAL
"Occupational Therapy   Evaluation    Name: Stephanie Sears  MRN: [id number]  Admitting Diagnosis:  Primary osteoarthritis of right knee Day of Surgery  S/p R TKA  Recommendations:     Discharge recommendations: Skilled Nursing Facility    Barriers to discharge: None    Equipment recommendations: rolling walker and transfer tub bench    History:     Occupational Profile:  Living Environment: Pt lives with her friend, Dayron Merritt, in a H with one "high threshold" to enter. Pt uses a tub/shower combo.   Previous level of function: Pt reports being Mod I for functional mobility (rollator) and Independent for Adls   Equipment Owned: standard walker, bedside commode and and rollator    Assistance Upon Discharge: Pt reports her friend, Mr. Merritt, and her granddaughter will be able to provide assistance upon d/c from hospital.   Past Medical History:   Diagnosis Date    Bronchitis     Cataract     Cervical spondylosis with radiculopathy 7/10/2012    Chest pain     Chronic neck pain 7/26/2012    GERD (gastroesophageal reflux disease)     Glaucoma     Hyperlipidemia     Hypertension     Neck pain 7/10/2012    Primary osteoarthritis of both knees     Psoriasis     Subacromial or subdeltoid bursitis 7/10/2012    Thyroid disease     Type 2 diabetes mellitus 12/10/2013       Past Surgical History:   Procedure Laterality Date    BREAST LUMPECTOMY Left 1988    mass in the rt breast surgery  1988    BREAST MASS EXCISION Right     CHOLECYSTECTOMY      HYSTERECTOMY  1980's    JOINT REPLACEMENT      KNEE SURGERY Left 1-20-16    TKR    L4-L5 fusion  2006    SPINE SURGERY         Subjective     Communicated with: RN prior to session.    Pt agreeable to Evaluation.    Pain/Comfort:  Pain level: 8/10 in R knee    Objective:     Pt found supine in bed with blood pressure cuff, bradshaw catheter, telemetry, PNC, PCEA, pulse ox, Peripheral IV and FCD.    Orthopedic Precautions: RLE weight bearing as " tolerated    Occupational Performance:    Bed Mobility:    Supine to sit: Min A  Sit to supine: Min A    Transfers:    Sit<>Stand: Min A    Ambulation:    Pt ambulated 3 steps forward, backward, and side stepped to HOB with Min A and SW - no signs of LOB noted but pt observed to become SOB and slightly dizzy.     Activities of Daily Living:  UE dressing: Maximum Assistance to daphney gown around back  LE dressing: Total Assistance to daphney socks  Pt educated on LE dressing techniques and need for AD with LE dressing      Patient left supine in bed with all lines intact and RN notified.    [facility] 6 Click: Self-care  [facility] Total Score: 16    Education:    Assessment:     Stephanie Sears is a 68 y.o. female with a medical diagnosis of Primary osteoarthritis of right knee.  She presents with decreased function of R LE impeding her ability to perform ADLs and functional mobility and would benefit from OT services to maximize functional (I) and safety.    Performance deficits affecting function are weakness, impaired endurance, impaired self care skills, impaired functional mobilty, gait instability, impaired balance, decreased lower extremity function, decreased safety awareness, pain, impaired skin, decreased ROM, edema, orthopedic precautions.    Rehab Prognosis:  Good    Plan:     Patient to be seen QD to address the above listed problems via self-care/home management, therapeutic activities, therapeutic exercises    Plan of Care Reviewed with: patient    This Plan of care has been discussed with the patient who was involved in its development and understands and is in agreement with the identified goals and treatment plan    GOALS:    Occupational Therapy Goals        Problem: Occupational Therapy Goal    Goal Priority Disciplines Outcome Interventions   Occupational Therapy Goal     OT, PT/OT Ongoing (interventions implemented as appropriate)    Description:  Goals to be met by: 7 days    Patient will increase  functional independence with ADLs by performing:    UE Dressing with Supervision.  LE Dressing with Supervision with AD as needed.  Grooming while standing with Supervision.  Toileting from bedside commode with Supervision for hygiene and clothing management.   Stand pivot transfers with Supervision.  Toilet transfer to bedside commode with Supervision.                         Time Tracking:     OT Received On: 2/26/2018  OT Start Time: 1040  OT Stop Time: 1106   OT Total Time: 26 minutes     Billable Minutes:  Evaluation 14 minutes  Self Care/Home Management 8 minutes    Selene Power, OT  2/26/2018

## 2018-02-26 NOTE — PLAN OF CARE
Problem: Physical Therapy Goal  Goal: Physical Therapy Goal  Goals to be met by: 7 days     Patient will increase functional independence with mobility by performin. Supine to sit with Stand-by Assistance  2. Sit to supine with Stand-by Assistance  3. Sit to stand transfer with Contact Guard Assistance  4. Gait  x 150 feet with Stand-by Assistance using Rolling Walker.   5. Lower extremity exercise program x15-20 reps per handout, with assistance as needed    Outcome: Ongoing (interventions implemented as appropriate)  PT eval complete. Initiate POC.

## 2018-02-26 NOTE — PROGRESS NOTES
Pt refuses to receive any insulin to treat hyperglycemia per MD orders, states she will handle it herself.

## 2018-02-26 NOTE — ANESTHESIA PROCEDURE NOTES
Spinal    Diagnosis: Right knee osteoarthritis  Patient location during procedure: OR  Start time: 2/26/2018 6:58 AM  Timeout: 2/26/2018 6:58 AM  End time: 2/26/2018 7:05 AM  Staffing  Anesthesiologist: ENE GALLOWAY  Preanesthetic Checklist  Completed: patient identified, site marked, surgical consent, pre-op evaluation, timeout performed, IV checked, risks and benefits discussed and monitors and equipment checked  Spinal Block  Patient position: sitting  Prep: ChloraPrep  Patient monitoring: heart rate, cardiac monitor and continuous pulse ox  Approach: midline  Location: L3-4  Injection technique: single shot  CSF Fluid: clear free-flowing CSF  Needle  Needle type: Dewayne   Needle gauge: 25 G  Needle length: 5 in  Additional Documentation: no paresthesia on injection  Needle localization: anatomical landmarks  Assessment  Sensory level: T6   Dermatomal levels determined by alcohol wipe  Ease of block: easy  Patient's tolerance of the procedure: comfortable throughout block  Medications:  Bolus administered: 2.2 mL of 2.0 mepivacaine

## 2018-02-26 NOTE — PLAN OF CARE
Problem: Occupational Therapy Goal  Goal: Occupational Therapy Goal  Goals to be met by: 7 days    Patient will increase functional independence with ADLs by performing:    UE Dressing with Supervision.  LE Dressing with Supervision with AD as needed.  Grooming while standing with Supervision.  Toileting from bedside commode with Supervision for hygiene and clothing management.   Stand pivot transfers with Supervision.  Toilet transfer to bedside commode with Supervision.       Outcome: Ongoing (interventions implemented as appropriate)  OT eval complete. Pt's functional outcomes established today based on her presenting functional level    Comments: Cont OT POC

## 2018-02-26 NOTE — TRANSFER OF CARE
"Anesthesia Transfer of Care Note    Patient: Stephanie Sears    Procedure(s) Performed: Procedure(s) (LRB):  REPLACEMENT-KNEE-TOTAL (Right)    Patient location: PACU    Anesthesia Type: spinal and MAC    Transport from OR: Transported from OR on room air with adequate spontaneous ventilation    Post pain: adequate analgesia    Post assessment: no apparent anesthetic complications and tolerated procedure well    Post vital signs: stable    Level of consciousness: awake and alert    Nausea/Vomiting: no nausea/vomiting    Complications: none    Transfer of care protocol was followed      Last vitals:   Visit Vitals  /65 (BP Location: Right arm, Patient Position: Lying)   Pulse 68   Temp 36.4 °C (97.6 °F) (Axillary)   Resp 18   Ht 5' 2" (1.575 m)   Wt 90.7 kg (200 lb)   SpO2 100%   Breastfeeding? No   BMI 36.58 kg/m²     "

## 2018-02-26 NOTE — PLAN OF CARE
[facility]    HOME HEALTH ORDERS  FACE TO FACE ENCOUNTER    Patient Name: Stephanie Sears  YOB: 1950    PCP: Aishwarya Ariza MD   PCP Address: [address] / [place] [address]  PCP Phone Number: [phone]  PCP Fax: [phone]    Encounter Date: 02/26/2018    Admit to Home Health    Diagnoses:  Active Hospital Problems    Diagnosis  POA    Primary osteoarthritis of right knee [M17.11]  Yes      Resolved Hospital Problems    Diagnosis Date Resolved POA   No resolved problems to display.       Future Appointments  Date Time Provider Department Center   3/13/2018 1:00 PM Anushka Saenz PA-C [facility] ORTHO [facility]   3/16/2018 8:40 AM Sarah Oliver MD [facility] PHYSMED [facility]           I have seen and examined this patient face to face today. My clinical findings that support the need for the home health skilled services and home bound status are the following:  Weakness/numbness causing balance and gait disturbance due to Joint Replacement making it taxing to leave home.    Allergies:  Review of patient's allergies indicates:   Allergen Reactions    Hydrocodone Shortness Of Breath    Iodinated contrast- oral and iv dye Shortness Of Breath     Difficulty breathing    Adhesive Itching    Oxycodone      Hallucinations    Sulfa (sulfonamide antibiotics) Hives and Itching       Diet: regular diet    Activities: activity as tolerated    Home Health Admitting Clinician:   SN/PT to complete comprehensive assessment including routine vital signs. Instruct on disease process and s/s of complications to report to MD. Follow specific home health arthoplasty protocol. Review/verify medication list sent home with the patient at time of discharge  and instruct patient/caregiver as needed. If coumadin ordered, coumadin clinic to manage INR with INR draws 2x per week with a goal to maintain INR between 1.8 and 2.2. Frequency may be adjusted depending on start of care date.    Notify MD  if SBP > 160 or < 90; DBP > 90 or < 50; HR > 120 or < 50; Temp > 101    Home Medical Equipment:  Walker, 3-1 bedside commode, transfer tub bench    CONSULTS:    Physical Therapy may admit if patient not on coumadin, PT to perform comprehensive assessment if performing admit visit and generate therapy plan of care. Evaluate for home safety and equipment needs; Establish/upgrade home exercise program. Perform/instruct on therapeutic exercises, gait training, transfer training, and Range of Motion.      OTHER: (only select if patient needs other therapy disciplines)  Occupational Therapy to evaluate and treat. Evaluate home environment for safety and equipment needs. Perform/Instruct on transfers, ADL training, ROM, and therapeutic exercises.   to evaluate for community resources/long-range planning.  Aide to provide assistance with personal care, ADLs, and vital signs.    MISCELLANEOUS CARE:  Routine Skin for Bedridden Patients: Instruct patient/caregiver to apply moisture barrier cream to all skin folds and wet areas in perineal area daily and after baths and all bowel movements.    WOUND CARE ORDERS:  Assess Surgical Incision/DSRG each TX  Aquacel AG drsg applied post-op leave on 14 days post op. Call MD if any drainage reaches border to border of drsg horizontally, s/s of infection, temp >101, induration, swelling or redness.  If dressing is removed per MD order, then apply island dressing, change/teach caregiver to perform daily dressing change if island dressing present.    Medications: Review discharge medications with patient and family and provide education.      Current Discharge Medication List      CONTINUE these medications which have NOT CHANGED    Details   amlodipine-benazepril (LOTREL) 10-40 mg per capsule Take 1 capsule by mouth once daily.  Qty: 90 capsule, Refills: 1    Associated Diagnoses: Essential hypertension      blood sugar diagnostic (ACCU-CHEK JOSE G PLUS TEST STRP) Strp Inject  1 each into the skin 2 (two) times daily.  Qty: 200 each, Refills: 3    Associated Diagnoses: Type 2 diabetes mellitus without complication, without long-term current use of insulin      carvedilol (COREG) 12.5 MG tablet TAKE 1 TABLET(12.5 MG) BY MOUTH TWICE DAILY WITH MEALS  Qty: 180 tablet, Refills: 1    Associated Diagnoses: Essential hypertension      gabapentin (NEURONTIN) 600 MG tablet Take 1 tablet (600 mg total) by mouth 3 (three) times daily.  Qty: 90 tablet, Refills: 1    Associated Diagnoses: Chronic neck pain      lancets (ACCU-CHEK MULTICLIX LANCET) Misc Test blood sugar twice daily  Qty: 300 each, Refills: 3    Associated Diagnoses: Type 2 diabetes mellitus without complication, without long-term current use of insulin      nicotine (NICODERM CQ) 21 mg/24 hr Place 1 patch onto the skin once daily. Please dispense rugby clear patches. Thanks  Qty: 14 patch, Refills: 0    Comments: Pt is sct member please put on [employer] card  Associated Diagnoses: Nicotine dependence      albuterol (PROAIR HFA) 90 mcg/actuation inhaler Inhale 2 puffs into the lungs every 6 (six) hours as needed for Wheezing or Shortness of Breath.  Qty: 1 Inhaler, Refills: 3      CALCIUM CARBONATE (TUMS ORAL) Take by mouth as needed (acid reflux, indigestion).      diclofenac sodium (VOLTAREN) 1 % Gel Apply 2 g topically 4 (four) times daily.  Qty: 5 Tube, Refills: 2    Associated Diagnoses: Cervical spondylosis with radiculopathy; Osteoarthritis of spine with radiculopathy, cervical region; Chronic midline thoracic back pain; Primary osteoarthritis of right knee; CMC arthritis; Hand pain, left      diphenhydrAMINE (BENADRYL) 25 mg capsule Take 25 mg by mouth every 6 (six) hours as needed for Itching or Allergies.      fluticasone (FLONASE) 50 mcg/actuation nasal spray 1 spray by Each Nare route 2 (two) times daily.  Qty: 1 Bottle, Refills: 1    Associated Diagnoses: Non-seasonal allergic rhinitis, unspecified allergic rhinitis trigger       furosemide (LASIX) 40 MG tablet Take 1 tablet (40 mg total) by mouth once daily.  Qty: 90 tablet, Refills: 1    Associated Diagnoses: Chronic obstructive pulmonary disease, unspecified COPD type      INULIN (FIBER GUMMIES ORAL) Take by mouth every morning.       meloxicam (MOBIC) 7.5 MG tablet Take 1 tablet (7.5 mg total) by mouth once daily.  Qty: 90 tablet, Refills: 1    Associated Diagnoses: Chronic neck pain; Primary osteoarthritis of left hip      nicotine polacrilex 2 MG Lozg Take 1 lozenge (2 mg total) by mouth as needed. 1-2 per hour in place of cigarettes maximum of 15 per day.  Qty: 108 lozenge, Refills: 0    Comments: Please dispense cinnamon flavor  Associated Diagnoses: Tobacco use disorder      traMADol (ULTRAM) 50 mg tablet Take 1 tablet (50 mg total) by mouth every 8 (eight) hours as needed for Pain.  Qty: 90 tablet, Refills: 0    Associated Diagnoses: Chronic bilateral low back pain with sciatica, sciatica laterality unspecified; Chronic neck pain; Primary osteoarthritis of left hip; Osteoarthritis of spine with radiculopathy, cervical region; Chronic midline thoracic back pain; Cervical spondylosis with radiculopathy; History of back surgery             I certify that this patient is confined to her home and needs intermittent skilled nursing care, physical therapy and occupational therapy.

## 2018-02-26 NOTE — ANESTHESIA PROCEDURE NOTES
IPACK Single Injection Block    Patient location during procedure: pre-op   Block not for primary anesthetic.  Reason for block: at surgeon's request and post-op pain management   Post-op Pain Location: R knee pain  Start time: 2/26/2018 6:35 AM  Timeout: 2/26/2018 6:27 AM   End time: 2/26/2018 6:50 AM  Staffing  Anesthesiologist: LISA NARAYAN  Resident/CRNA: MALLORY TOLBERT  Performed: resident/CRNA   Preanesthetic Checklist  Completed: patient identified, site marked, surgical consent, pre-op evaluation, timeout performed, IV checked, risks and benefits discussed and monitors and equipment checked  Peripheral Block  Patient position: supine  Prep: ChloraPrep  Patient monitoring: heart rate, cardiac monitor, continuous pulse ox, continuous capnometry and frequent blood pressure checks  Block type: I PACK  Laterality: right  Injection technique: single shot  Needle  Needle type: Stimuplex   Needle gauge: 21 G  Needle length: 4 in  Needle localization: anatomical landmarks and ultrasound guidance   -ultrasound image captured on disc.  Assessment  Injection assessment: negative aspiration, negative parasthesia and local visualized surrounding nerve  Paresthesia pain: none  Heart rate change: no  Slow fractionated injection: yes  Medications:  Bolus administered: 20 mL of 0.25 ropivacaine  Epinephrine added: 3.75 mcg/mL (1/300,000)  Additional Notes  VSS.  [facility] RN monitoring vitals throughout procedure.  Patient tolerated procedure well.

## 2018-02-26 NOTE — NURSING TRANSFER
Nursing Transfer Note      2/26/2018     Transfer To: 543A    Transfer via stretcher    Transfer with cardiac monitoring    Transported by pct    Medicines sent: yes    Chart send with patient: Yes    Notified: friend

## 2018-02-26 NOTE — BRIEF OP NOTE
[facility]  Surgery Department  Operative Note    SUMMARY     Date of Procedure: 2/26/2018     Procedure: Procedure(s) (LRB):  REPLACEMENT-KNEE-TOTAL (Right)     Surgeon(s) and Role:     * [facility] Jr., MD - Primary    Assisting Surgeon: None    Pre-Operative Diagnosis: Osteoarthritis of knee, unspecified (CODE) [M17.9]    Post-Operative Diagnosis: Post-Op Diagnosis Codes:     * Osteoarthritis of knee, unspecified (CODE) [M17.9]    Anesthesia: Spinal    Technical Procedures Used:  PS       Description of the Findings of the Procedure: Varus    Significant Surgical Tasks Conducted by the Assistant(s), if Applicable: closure    Complications: No    Estimated Blood Loss (EBL): 100cc             Implants:   Implant Name Type Inv. Item Serial No.  Lot No. LRB No. Used   PLUG BONE #5 - [id number]  PLUG BONE #5  C$ cMoney. [id number] Right 1   CEMENT BONE SIMPLE P INDIVID - [id number]  CEMENT BONE SIMPLE P INDIVID  [address] JANET. [id number] Right 2   SCREW HEX HEAD - [id number]  SCREW HEX HEAD  VANE,INC  Right 1   DRILL PIN TROCAR 3.2MM - [id number]  DRILL PIN TROCAR 3.2MM  VANE,INC  Right 2   SCREW HEX HEAD 3.5X38MM - [id number]  SCREW HEX HEAD 3.5X38MM  VANE,INC  Right 2   COMP FEM POST STAB CME SZ6 RT - [id number]  COMP FEM POST STAB CME SZ6 RT  VANE,INC [id number] Right 1   PSN TIB STM 5 DEG SZ D R - [id number]  PSN TIB STM 5 DEG SZ D R  VANE,INC [id number] Right 1   PSN ALL POLY PAT 32MM - [id number]  PSN ALL POLY PAT 32MM  VANE,INC [id number] Right 1   Persona Vivacit E highly crosslinked polyethylene Articular Surface Fixed Bearing PS right 12mm height use with tibia sizes c-d/ps femur sizes 6-9         [id number] Right 1       Specimens:   Specimen (12h ago through future)    Start     Ordered    02/26/18 0801  Specimen to Pathology - Surgery  Once     Comments:  1.  Bone and soft tissue right knee  - permanent      02/26/18 0801                  Condition:  Good    Disposition: PACU - hemodynamically stable.    Attestation: I was present and scrubbed for the entire procedure.

## 2018-02-26 NOTE — OP NOTE
DATE OF PROCEDURE:  02/26/2018    SURGEON:  [facility], Jr., M.D.    ASSISTANT:  Yonatan King.    OPERATION PERFORMED:  Right total knee arthroplasty.    PREOPERATIVE DIAGNOSIS:  Osteoarthritis, right knee.    POSTOPERATIVE DIAGNOSES:  Osteoarthritis, right knee.    COMPONENTS USED:  Sara Persona knee system.  We used a size 6 femur, narrow   right posterior stabilized, a size D tibia, right 5-degree stem; a 12 mm   articular insert, posterior stabilized, vitamin E, highly cross-linked poly, 32   mm patella.    ESTIMATED BLOOD LOSS:  100 mL.    SPECIMEN:  Resected bone and tissue were sent to Pathology for routine   examination.    OPERATIVE TECHNIQUE:  The patient was placed supine on the operating table.    Spinal anesthesia had been introduced.  Right leg was prepped and draped in   sterile fashion.  The room was laminar airflow.  The patient was given   preoperative antibiotics and the OR team wore the sterile exhaust suits in order   to minimize risk for infection.  A foot pump was placed on the left foot to   help prevent DVT.  Right leg was exsanguinated and the tourniquet was inflated   to 300 pounds of pressure.  A straight anterior incision was made.  Sharp   dissection was carried down to the extensor mechanism.  The extensor mechanism   was opened in a straight Insall approach.  Partial release of the medial   collateral ligament was performed to correct some slight varus deformity.  The   intramedullary guide was placed in the femur.  Distal cut was made at 5 degrees   of valgus on a +1 setting.  The proximal tibial cut was performed removing about   4 to 5 from the medial side and about 6 to 7 from the lateral side.  The femur   measured for a 6 and cut it posteriorly for the 6.  The extension cut was a   little tight, so we took another 2 off the extension cut that balanced things   nicely.  I went ahead and did the notch and chamfer-plasty on the femur, sized   the tibia to a D and drilled  and prepared it with the same rotation as the   femur.  We then cut the patella.  The patella was 22 and we cut it at 15.  I   then Pulsavac'd and dried the surfaces, cemented the tibial baseplate, then the   femoral component, removed the excess cement, put the 12 mm trial in, put the   knee out in extension and cemented the patella.  We then Pulsavac'd and cleared   the wound, did not use the iodine solution.  THE PATIENT STATES SHE WAS ALLERGIC   TO IODINE; then once the cement was hard, we put the actual 12 mm insert in and   then we closed the extensor mechanism with #1 Vicryl over tranexamic acid and   vancomycin powder, closed the subcutaneous tissues with #0 and 3-0 Vicryl,   closed the skin with a running 3-0 Monocryl and skin adhesive.  Sterile surgical   dressing was applied.  There were no complications to the procedure.      PAMELLA  dd: 02/26/2018 09:21:38 (CST)  td: 02/26/2018 10:04:20 (CST)  Doc ID   #[id number]  Job ID #[id number]    CC:

## 2018-02-26 NOTE — ANESTHESIA PROCEDURE NOTES
Adductor Canal Catheter    Patient location during procedure: pre-op   Block not for primary anesthetic.  Reason for block: at surgeon's request and post-op pain management   Post-op Pain Location: R knee pain  Start time: 2/26/2018 6:35 AM  Timeout: 2/26/2018 6:27 AM   End time: 2/26/2018 6:50 AM  Staffing  Anesthesiologist: LISA NARAYAN  Resident/CRNA: MALLORY TOLBERT  Performed: resident/CRNA   Preanesthetic Checklist  Completed: patient identified, site marked, surgical consent, pre-op evaluation, timeout performed, IV checked, risks and benefits discussed and monitors and equipment checked  Peripheral Block  Patient position: supine  Prep: ChloraPrep and site prepped and draped  Patient monitoring: heart rate, cardiac monitor, continuous pulse ox, continuous capnometry and frequent blood pressure checks  Block type: adductor canal  Laterality: right  Injection technique: continuous  Needle  Needle type: Tuohy   Needle gauge: 17 G  Needle length: 3.5 in  Needle localization: anatomical landmarks and ultrasound guidance  Catheter type: spring wound  Catheter size: 19 G  Test dose: lidocaine 1.5% with Epi 1-to-200,000 and negative   -ultrasound image captured on disc.  Assessment  Injection assessment: negative aspiration, negative parasthesia and local visualized surrounding nerve  Paresthesia pain: none  Heart rate change: no  Slow fractionated injection: yes  Medications:  Bolus administered: 20 mL of 0.25 ropivacaine  Epinephrine added: 3.75 mcg/mL (1/300,000)  Additional Notes  VSS.  [facility] RN monitoring vitals throughout procedure.  Patient tolerated procedure well.

## 2018-02-27 ENCOUNTER — ANESTHESIA EVENT (OUTPATIENT)
Dept: SURGERY | Facility: HOSPITAL | Age: 68
End: 2018-02-27

## 2018-02-27 ENCOUNTER — ANESTHESIA (OUTPATIENT)
Dept: SURGERY | Facility: HOSPITAL | Age: 68
End: 2018-02-27

## 2018-02-27 VITALS
HEIGHT: 62 IN | WEIGHT: 200 LBS | BODY MASS INDEX: 36.8 KG/M2 | DIASTOLIC BLOOD PRESSURE: 54 MMHG | HEART RATE: 77 BPM | TEMPERATURE: 98 F | OXYGEN SATURATION: 100 % | SYSTOLIC BLOOD PRESSURE: 153 MMHG | RESPIRATION RATE: 16 BRPM

## 2018-02-27 DIAGNOSIS — M16.12 PRIMARY OSTEOARTHRITIS OF LEFT HIP: ICD-10-CM

## 2018-02-27 DIAGNOSIS — Z98.890 POST-OPERATIVE STATE: Primary | ICD-10-CM

## 2018-02-27 DIAGNOSIS — M54.40 CHRONIC BILATERAL LOW BACK PAIN WITH SCIATICA, SCIATICA LATERALITY UNSPECIFIED: ICD-10-CM

## 2018-02-27 DIAGNOSIS — M47.22 OSTEOARTHRITIS OF SPINE WITH RADICULOPATHY, CERVICAL REGION: ICD-10-CM

## 2018-02-27 DIAGNOSIS — Z98.890 HISTORY OF BACK SURGERY: ICD-10-CM

## 2018-02-27 DIAGNOSIS — G89.29 CHRONIC MIDLINE THORACIC BACK PAIN: ICD-10-CM

## 2018-02-27 DIAGNOSIS — M47.22 CERVICAL SPONDYLOSIS WITH RADICULOPATHY: ICD-10-CM

## 2018-02-27 DIAGNOSIS — G89.29 CHRONIC NECK PAIN: ICD-10-CM

## 2018-02-27 DIAGNOSIS — M54.2 CHRONIC NECK PAIN: ICD-10-CM

## 2018-02-27 DIAGNOSIS — G89.29 CHRONIC BILATERAL LOW BACK PAIN WITH SCIATICA, SCIATICA LATERALITY UNSPECIFIED: ICD-10-CM

## 2018-02-27 DIAGNOSIS — M54.6 CHRONIC MIDLINE THORACIC BACK PAIN: ICD-10-CM

## 2018-02-27 LAB — POCT GLUCOSE: 120 MG/DL (ref 70–110)

## 2018-02-27 PROCEDURE — 25000003 PHARM REV CODE 250: Performed by: ANESTHESIOLOGY

## 2018-02-27 PROCEDURE — G8988 SELF CARE GOAL STATUS: HCPCS | Mod: CK

## 2018-02-27 PROCEDURE — 25000003 PHARM REV CODE 250: Performed by: STUDENT IN AN ORGANIZED HEALTH CARE EDUCATION/TRAINING PROGRAM

## 2018-02-27 PROCEDURE — 76942 ECHO GUIDE FOR BIOPSY: CPT | Performed by: ANESTHESIOLOGY

## 2018-02-27 PROCEDURE — 97110 THERAPEUTIC EXERCISES: CPT

## 2018-02-27 PROCEDURE — G8989 SELF CARE D/C STATUS: HCPCS | Mod: CH

## 2018-02-27 PROCEDURE — S4991 NICOTINE PATCH NONLEGEND: HCPCS | Performed by: STUDENT IN AN ORGANIZED HEALTH CARE EDUCATION/TRAINING PROGRAM

## 2018-02-27 PROCEDURE — 97535 SELF CARE MNGMENT TRAINING: CPT | Mod: 59

## 2018-02-27 PROCEDURE — G8979 MOBILITY GOAL STATUS: HCPCS | Mod: CK

## 2018-02-27 PROCEDURE — 63600175 PHARM REV CODE 636 W HCPCS: Performed by: PHYSICIAN ASSISTANT

## 2018-02-27 PROCEDURE — 99231 SBSQ HOSP IP/OBS SF/LOW 25: CPT | Mod: GC,,, | Performed by: ANESTHESIOLOGY

## 2018-02-27 PROCEDURE — 25000003 PHARM REV CODE 250: Performed by: PHYSICIAN ASSISTANT

## 2018-02-27 PROCEDURE — G8980 MOBILITY D/C STATUS: HCPCS | Mod: CI

## 2018-02-27 PROCEDURE — 97530 THERAPEUTIC ACTIVITIES: CPT

## 2018-02-27 PROCEDURE — 11000001 HC ACUTE MED/SURG PRIVATE ROOM

## 2018-02-27 PROCEDURE — G0378 HOSPITAL OBSERVATION PER HR: HCPCS

## 2018-02-27 PROCEDURE — 97116 GAIT TRAINING THERAPY: CPT

## 2018-02-27 PROCEDURE — 99900035 HC TECH TIME PER 15 MIN (STAT)

## 2018-02-27 RX ORDER — TRAMADOL HYDROCHLORIDE 50 MG/1
50 TABLET ORAL EVERY 4 HOURS PRN
Qty: 90 TABLET | Refills: 0 | Status: SHIPPED | OUTPATIENT
Start: 2018-02-27 | End: 2018-02-27 | Stop reason: SDUPTHER

## 2018-02-27 RX ORDER — AMLODIPINE AND BENAZEPRIL HYDROCHLORIDE 5; 20 MG/1; MG/1
2 CAPSULE ORAL DAILY
Status: DISCONTINUED | OUTPATIENT
Start: 2018-02-27 | End: 2018-02-27 | Stop reason: HOSPADM

## 2018-02-27 RX ORDER — HYDROCODONE BITARTRATE AND ACETAMINOPHEN 10; 325 MG/1; MG/1
1 TABLET ORAL EVERY 4 HOURS PRN
Qty: 90 TABLET | Refills: 0 | Status: SHIPPED | OUTPATIENT
Start: 2018-02-27 | End: 2018-02-27

## 2018-02-27 RX ORDER — TRAMADOL HYDROCHLORIDE 50 MG/1
50 TABLET ORAL EVERY 6 HOURS PRN
Qty: 90 TABLET | Refills: 0 | Status: SHIPPED | OUTPATIENT
Start: 2018-02-27 | End: 2018-03-13 | Stop reason: SDUPTHER

## 2018-02-27 RX ADMIN — MELOXICAM 7.5 MG: 7.5 TABLET ORAL at 10:02

## 2018-02-27 RX ADMIN — NICOTINE 1 PATCH: 7 PATCH, EXTENDED RELEASE TRANSDERMAL at 09:02

## 2018-02-27 RX ADMIN — FAMOTIDINE 20 MG: 20 TABLET, FILM COATED ORAL at 10:02

## 2018-02-27 RX ADMIN — STANDARDIZED SENNA CONCENTRATE AND DOCUSATE SODIUM 1 TABLET: 8.6; 5 TABLET, FILM COATED ORAL at 10:02

## 2018-02-27 RX ADMIN — AMLODIPINE AND BENAZEPRIL HYDROCHLORIDE 2 CAPSULE: 5; 20 CAPSULE ORAL at 09:02

## 2018-02-27 RX ADMIN — CARVEDILOL 12.5 MG: 12.5 TABLET, FILM COATED ORAL at 09:02

## 2018-02-27 RX ADMIN — ASPIRIN 325 MG: 325 TABLET, DELAYED RELEASE ORAL at 10:02

## 2018-02-27 RX ADMIN — GABAPENTIN 600 MG: 300 CAPSULE ORAL at 03:02

## 2018-02-27 RX ADMIN — GABAPENTIN 600 MG: 300 CAPSULE ORAL at 06:02

## 2018-02-27 RX ADMIN — ACETAMINOPHEN 1000 MG: 500 TABLET ORAL at 12:02

## 2018-02-27 RX ADMIN — HYDROMORPHONE HYDROCHLORIDE 4 MG: 2 TABLET ORAL at 06:02

## 2018-02-27 RX ADMIN — HYDROMORPHONE HYDROCHLORIDE 4 MG: 2 TABLET ORAL at 07:02

## 2018-02-27 RX ADMIN — ACETAMINOPHEN 1000 MG: 500 TABLET ORAL at 06:02

## 2018-02-27 RX ADMIN — ROPIVACAINE HYDROCHLORIDE: 2 INJECTION, SOLUTION EPIDURAL; INFILTRATION at 01:02

## 2018-02-27 NOTE — PROGRESS NOTES
Pt and family present, consent to converting adductor PNC to On Q.  Site CDI.  Educated regarding continued pain management, fall risk, signs of complications, continued monitoring, as well as discontinuing catheter on 3/1.  Two numbers obtained for APS to follow up.  Understanding verbalized.

## 2018-02-27 NOTE — ANESTHESIA PROCEDURE NOTES
Peripheral    Patient location during procedure: pre-op   Block not for primary anesthetic.  Reason for block: at surgeon's request and post-op pain management   Post-op Pain Location: right knee pain  Start time: 2/27/2018 12:49 PM  Timeout: 2/27/2018 12:49 PM   End time: 2/27/2018 1:04 PM  Staffing  Anesthesiologist: RANI MONROE  Other anesthesia staff: RACHNA CADENA  Performed: other anesthesia staff   Preanesthetic Checklist  Completed: patient identified, site marked, surgical consent, pre-op evaluation, timeout performed, IV checked, risks and benefits discussed and monitors and equipment checked  Peripheral Block  Patient position: supine  Prep: ChloraPrep and site prepped and draped  Patient monitoring: heart rate, cardiac monitor, continuous pulse ox, continuous capnometry and frequent blood pressure checks  Block type: adductor canal  Laterality: right  Injection technique: continuous  Needle  Needle type: Tuohy   Needle gauge: 17 G  Needle length: 3.5 in  Needle localization: anatomical landmarks and ultrasound guidance  Catheter type: spring wound  Catheter size: 19 G  Test dose: lidocaine 1.5% with Epi 1-to-200,000 and negative   -ultrasound image captured on disc.  Assessment  Injection assessment: negative aspiration, negative parasthesia and local visualized surrounding nerve  Paresthesia pain: none  Heart rate change: no  Slow fractionated injection: yes  Medications:  Bolus administered: 20 mL of 0.25 ropivacaine  Epinephrine added: 3.75 mcg/mL (1/300,000)  Additional Notes  New adductor canal catheter was placed due to removal of the first catheter as it was disconnected.   VSS.  [facility] RN monitoring vitals throughout procedure.  Patient tolerated procedure well.

## 2018-02-27 NOTE — PT/OT/SLP DISCHARGE
Physical Therapy Discharge Summary    Name: Stephanie Sears  MRN: [id number]   Principal Problem: Primary osteoarthritis of right knee     Patient Discharged from acute Physical Therapy on 18.  Please refer to prior PT noted date on 18 for functional status.     Assessment:     Patient has met all goals and is not appropriate for therapy.    Objective:     GOALS:    Physical Therapy Goals     Not on file          Multidisciplinary Problems (Resolved)        Problem: Physical Therapy Goal    Goal Priority Disciplines Outcome Goal Variances Interventions   Physical Therapy Goal   (Resolved)     PT/OT, PT Outcome(s) achieved     Description:  Goals to be met by: 7 days     Patient will increase functional independence with mobility by performin. Supine to sit with Stand-by Assistance- Met 18  1a. Supine<> sit with Supervision- Not Met  2. Sit to supine with Stand-by Assistance- Met 18  3. Sit to stand transfer with Contact Guard Assistance- Met 18  3a. Sit to stand transfer with Supervision- Met 18  4. Gait  x 150 feet with Stand-by Assistance using Rolling Walker.-  Mostly Met 18  5. Lower extremity exercise program x15-20 reps per handout, with assistance as needed- Met 18                        Reasons for Discontinuation of Therapy Services  Satisfactory goal achievement.      Plan:     Patient Discharged to: Home with Home Health Service.    Tony Prabhakar, PT  2018

## 2018-02-27 NOTE — PROGRESS NOTES
Report called and given to LISANDRA Garcia to assume care of patient once transferred back to floor from [facility].

## 2018-02-27 NOTE — ADDENDUM NOTE
Addendum  created 02/27/18 1255 by Caitlyn Stein RN    Sign clinical note, Visit Navigator SmartForm Flowsheet section accepted

## 2018-02-27 NOTE — PLAN OF CARE
POD 1 s/p right TKA. PT/OT recommended HH. Plans to discharge patient home today with family support and [facility]. Patient's sister present at the bedside. Patient and family verbalized understanding of today's discharge plan. All questions and concerns addressed. SW and CM will continue to follow for any additional needs. Discharge and follow-up instructions to be completed by the bedside nurse.    Future Appointments  Date Time Provider Department Center   3/13/2018 1:00 PM Anushka Saenz PA-C [facility] ORTHO [facility]   3/16/2018 8:40 AM Sarah Oliver MD [facility] PHYSMED [facility]      02/27/18 1424   Final Note   Assessment Type Final Discharge Note   Discharge Disposition Home-Health  ([facility] )   Hospital Follow Up  Appt(s) scheduled? Yes   Discharge plans and expectations educations in teach back method with documentation complete? Yes

## 2018-02-27 NOTE — PROGRESS NOTES
Paging MD in regards to patient refusing Norco prescription for discharge, pt states she cannot take medication.

## 2018-02-27 NOTE — PLAN OF CARE
10:06 AM  SW received home health orders. Pt's preference is [facility]. Faxed orders to [facility]. Will f/u as needed.    Patito Khan LMSW   [facility]  Ext [phone]

## 2018-02-27 NOTE — PLAN OF CARE
Problem: Physical Therapy Goal  Goal: Physical Therapy Goal  Goals to be met by: 7 days     Patient will increase functional independence with mobility by performin. Supine to sit with Stand-by Assistance- Met 18  1a. Supine<> sit with Supervision- Not Met  2. Sit to supine with Stand-by Assistance- Met 18  3. Sit to stand transfer with Contact Guard Assistance- Met 18  3a. Sit to stand transfer with Supervision- Met 18  4. Gait  x 150 feet with Stand-by Assistance using Rolling Walker.-  Mostly Met 18  5. Lower extremity exercise program x15-20 reps per handout, with assistance as needed- Met 18      Outcome: Outcome(s) achieved Date Met: 18  Pt has met 2/3 remaining goals and is appropriate for d/c.

## 2018-02-27 NOTE — PT/OT/SLP PROGRESS
"Physical Therapy Treatment    Patient Name:  Stephanie Sears   MRN:  [id number]    Recommendations:     Discharge Recommendations:  home with home health   Discharge Equipment Recommendations: bath bench   Barriers to discharge: None    Assessment:     Stephanie Sears is a 68 y.o. female admitted with a medical diagnosis of Primary osteoarthritis of right knee.  She presents with the following impairments/functional limitations:  weakness, impaired endurance, decreased lower extremity function, decreased ROM, pain, decreased safety awareness, impaired functional mobilty.    Pt with very good tolerance to session, with no increased pain or complications. Used 3 point gait pattern with RW with no LOB or instability. Pt preferred to use SW, as she has used this AD for previous L TKA and feels more comfortable with it. Some difficulty with sequencing during ambulation, with slight improvement noted after VC's. Able to ascend/descend 7" curb step with good technique and safety precautions. No physical assistance required for bed mobility. Based on current functional status and improvement from evaluation, pt no longer needs SNF placement upon d/c. Pt has clarified that she will have enough assistance at home and demonstrates good safety awareness in the home environment. Pt was d/c'd home after previous L TKA with no complications. Pt would continue to benefit from PT services for increased independence with functional mobility and is appropriate for HHPT upon d/c.     Rehab Prognosis:  Good; patient would benefit from acute skilled PT services to address these deficits and reach maximum level of function.      Recent Surgery: Procedure(s) (LRB):  REPLACEMENT-KNEE-TOTAL (Right) 1 Day Post-Op    Plan:     During this hospitalization, patient to be seen BID to address the above listed problems via gait training, therapeutic activities, therapeutic exercises, neuromuscular re-education  · Plan of Care Expires:  " "03/26/18   Plan of Care Reviewed with: patient    Subjective     Communicated with nsg prior to session.  Patient found supine upon PT entry to room, agreeable to treatment.      Chief Complaint: impaired functional mobility  Patient comments/goals: return home to PLOF  Pain/Comfort:  · Pain Rating 1: 4/10  · Location - Side 1: Right  · Location - Orientation 1: generalized  · Location 1: knee  · Pain Addressed 1: Pre-medicate for activity, Reposition, Distraction    Patients cultural, spiritual, Anglican conflicts given the current situation:      Objective:     Patient found with: cryotherapy, FCD     General Precautions: Standard, fall   Orthopedic Precautions:RLE weight bearing as tolerated   Braces: N/A     Functional Mobility:  · Bed Mobility:     · Scooting: stand by assistance  · 1x Supine to Sit: stand by assistance  · 1x Sit to Supine: stand by assistance  · Transfers:     · 2x Sit to Stand:  contact guard assistance with rolling walker x1 and standard walker x1  · VC's for hand placement and safety precautions  · Gait: 1x70 ft with RW and SBA, 1x100 ft with SW and SBA  · Difficulty with sequencing while using RW; pt preferred SW as she has previous experience with usage  · 3 point gait pattern with no LOB or instability  · No notable difference in stability with RW or SW, so went with pt preference  · Stairs:  Pt ascended/descended 7" curb step with Rolling Walker x1 and Standard Walker x1 with no handrails with Contact Guard Assistance.   · VC's for sequencing, AD placement, and safety precautions  · No LOB or instability with good technique noted      AM-PAC 6 CLICK MOBILITY  Turning over in bed (including adjusting bedclothes, sheets and blankets)?: 4  Sitting down on and standing up from a chair with arms (e.g., wheelchair, bedside commode, etc.): 4  Moving from lying on back to sitting on the side of the bed?: 4  Moving to and from a bed to a chair (including a wheelchair)?: 4  Need to walk in " hospital room?: 4  Climbing 3-5 steps with a railing?: 3  Total Score: 23       Therapeutic Activities and Exercises:    Pt performed 15-30 reps of TKR protocol:  1. AP  2. QS  3. GS  4. SAQ AAROM  5. Heel slides   6. Hip abd   7. SLR AAROM  8. LAQ     Pt educated on:  1. Safety with AD during ambulation and transfers  2. Safety with ascending/descending stairs  3. Length of stay and d/c plans  4. Role of PT and POC  5. Car t/f    Patient left up in chair with call button in reach and granddaughter present.    GOALS:    Physical Therapy Goals        Problem: Physical Therapy Goal    Goal Priority Disciplines Outcome Goal Variances Interventions   Physical Therapy Goal     PT/OT, PT Ongoing (interventions implemented as appropriate)     Description:  Goals to be met by: 7 days     Patient will increase functional independence with mobility by performin. Supine to sit with Stand-by Assistance- Met 18  1a. Supine<> sit with Supervision- Not Met  2. Sit to supine with Stand-by Assistance- Met 18  3. Sit to stand transfer with Contact Guard Assistance- Met 18  3a. Sit to stand transfer with Supervision- Not Met  4. Gait  x 150 feet with Stand-by Assistance using Rolling Walker.- Not Met  5. Lower extremity exercise program x15-20 reps per handout, with assistance as needed- Met                       Time Tracking:     PT Received On: 18  PT Start Time: 836     PT Stop Time: 917  PT Total Time (min): 41 min     Billable Minutes: Gait Training 15, Therapeutic Activity 11 and Therapeutic Exercise 15    Treatment Type: Treatment  PT/PTA: PT           Marium Rivera, SPT  2018

## 2018-02-27 NOTE — PLAN OF CARE
Medicare discharge appeals right discussed with patient. IMM signed and placed in patient's chart. Patient verbalized understanding of IMM rights.     02/27/18 0938   Medicare Message   Important Message from Medicare regarding Discharge Appeal Rights Given to patient/caregiver;Explained to patient/caregiver;Signed/date by patient/caregiver   Date IMM was signed 02/27/18   Time IMM was signed 0938

## 2018-02-27 NOTE — PLAN OF CARE
Problem: Physical Therapy Goal  Goal: Physical Therapy Goal  Goals to be met by: 7 days     Patient will increase functional independence with mobility by performin. Supine to sit with Stand-by Assistance- Met 18  1a. Supine<> sit with Supervision- Not Met  2. Sit to supine with Stand-by Assistance- Met 18  3. Sit to stand transfer with Contact Guard Assistance- Met 18  3a. Sit to stand transfer with Supervision- Not Met  4. Gait  x 150 feet with Stand-by Assistance using Rolling Walker.- Not Met  5. Lower extremity exercise program x15-20 reps per handout, with assistance as needed- Met     Outcome: Ongoing (interventions implemented as appropriate)  Pt met 4/5 goals. Goals updated to reflect current functional status. Cont POC.

## 2018-02-27 NOTE — PROGRESS NOTES
Nerve block with catheter placement to right knee completed in [facility] per regional anesthesia team. Patient tolerated procedure well..Nerve catheter connected to ON-Q pump at 8cc/hour per orders. ON-Q teaching to be done with patient prior to discharge. Patient to remain in [facility] for monitoring for 20 minutes post procedure per Dr. Mosley and then be transferred back to room. Patient denies any pain or discomfort at this time, VSS. Will continue to monitor.

## 2018-02-27 NOTE — NURSING TRANSFER
Nursing Transfer Note      2/27/2018     Transfer To: 543 From [facility] 21    Transfer via bed    Transfer with cardiac monitoring    Transported by Kian RN and Lennox RN    Medicines sent: Ropivicaine 0.2% ON Q pump at 8cc/hour    Chart send with patient: Yes    Notified: LISANDRA Khoury    Patient reassessed at: 2/27/18 at 1438    Upon arrival to floor: cardiac monitor applied, patient oriented to room, call bell in reach and bed in lowest position

## 2018-02-27 NOTE — PROGRESS NOTES
Acute Pain Service and Perioperative Surgical Home Progress Note    HPI  Stephanie Sears is a 68 y.o., female,     Surgery:  Procedure(s) (LRB):  REPLACEMENT-KNEE-TOTAL (Right)    Post Op Day #: 1    Catheter type: Perineural Adductor Canal    Infusion type: Ropivacaine 0.2%  8 ml/hr basal    Interval history  Hyperglycemic overnight to 217, patient refused insulin. Pain controlled. PNC unwitnessed disconnectthis AM, removed with tip intact. BP elevated overnight, did not receive prn hydralazine.    Problem List:    Active Hospital Problems    Diagnosis  POA    *Primary osteoarthritis of right knee [M17.11]  Yes      Resolved Hospital Problems    Diagnosis Date Resolved POA   No resolved problems to display.       Subjective:       General appearance of alert, oriented, no complaints   Pain with rest: 2    Numbers   Pain with movement: 6    Numbers   Side Effects    1. Pruritis No    2. Nausea No    3. Motor Blockade No, 0=Ability to raise lower extremities off bed    4. Sedation No, 1=awake and alert    Schedule Medications:    acetaminophen  1,000 mg Oral Q6H    amlodipine-benazepril 5-20 mg  2 capsule Oral Daily    aspirin  325 mg Oral BID    carvedilol  12.5 mg Oral BID    famotidine  20 mg Oral BID    gabapentin  600 mg Oral TID    meloxicam  7.5 mg Oral Daily    nicotine  1 patch Transdermal Daily    polyethylene glycol  17 g Oral Daily    senna-docusate 8.6-50 mg  1 tablet Oral BID        Continuous Infusions:   sodium chloride 0.9% 150 mL/hr at 02/26/18 0925    ropivacaine (PF) 2 mg/ml (0.2%) 8 mL/hr (02/26/18 1912)    ropivacaine          PRN Medications:  albuterol, bisacodyl, dextrose 50%, dextrose 50%, glucagon (human recombinant), glucose, glucose, hydrALAZINE, HYDROmorphone, HYDROmorphone, insulin aspart U-100, morphine, morphine, naloxone, ondansetron, ramelteon, ropivacaine, sodium chloride 0.9%       Antibiotics:  Antibiotics     None             Objective:     Catheter site clean,  dry, intact          Vital Signs (Most Recent):  Temp: 36.1 °C (96.9 °F) (02/27/18 0400)  Pulse: 70 (02/27/18 0400)  Resp: 18 (02/27/18 0400)  BP: (!) 160/70 (02/27/18 0400)  SpO2: 98 % (02/27/18 0400) Vital Signs Range (Last 24H):  Temp:  [35.5 °C (95.9 °F)-36.4 °C (97.6 °F)]   Pulse:  [57-96]   Resp:  [16-26]   BP: (118-172)/(58-79)   SpO2:  [96 %-100 %]          I & O (Last 24H):  Intake/Output Summary (Last 24 hours) at 02/27/18 0658  Last data filed at 02/26/18 2245   Gross per 24 hour   Intake             4444 ml   Output             1400 ml   Net             3044 ml       Physical Exam:    GA: Alert, comfortable, no acute distress.   Pulmonary: Clear to auscultation A/P/L. No wheezing, crackles, or rhonchi.  Cardiac: RRR S1 & S2 w/o rubs/murmurs/gallops.   Abdominal:Bowel sounds present. No tenderness to palpation or distension. No appreciable hepatosplenomegaly.   Skin: No jaundice, rashes, or visible lesions.         Laboratory:  CBC: No results for input(s): WBC, RBC, HGB, HCT, PLT, MCV, MCH, MCHC in the last 72 hours.    BMP: Recent Labs      02/26/18   0923   NA  143   K  4.2   CO2  23   CL  112*   BUN  11   CREATININE  0.7   GLU  165*   CALCIUM  8.1*       No results for input(s): PT, INR, PROTIME, APTT in the last 72 hours.      Anticoagulant dose Aspirin 325 BID    Assessment:     Pain control adequate    Plan:     1) Pain:    Adductor canal perineural catheter disconnected this AM, removed with tip intact. Multimodal pain regimen with acetaminophen, Mobic, gabapentin, and prn dilaudid given, patient with prior reaction to oxycodone.  Will continue to monitor.    2)HTN: Elevated overnight, improved this AM. Continue home medications, amlodipine-benazepril, coreg.   3)Hyperglycemia- Elevated to 217 overnight, improved since. Continue diabetic diet. Patient refusing any insulin.   4)JAM: cpap nightly, JAM order set.   4) FEN/GI: Tolerating advanced diet   5) Dispo: Pt working well with PT/OT. Case  management and SW for placement. Plan for D/C today if patient works well with PT and meets goals.      Evaluator Yonatan Palacios MD        I have seen the patient, reviewed the Resident's assessment and plan. I have personally interviewed and examined the patient at bedside and: agree with the findings.     Pain well controlled.  PNC removed this AM 2/2 catheter disconnect.  Will replace catheter.    HTN overnight - improved now    Hyperglycemia last night but patient refused SSI.  Discussed risks of post-op hyperglycemia.    Likely d/c home today after PT and replacing nerve catheter    AMALIA Rush MD  Staff Anesthesiologist  Perioperative Surgical Home and Acute Pain Service

## 2018-02-27 NOTE — PLAN OF CARE
POD 1 s/p right TKA. PT/OT ordered to eval and treat. PT/OT recommending HH. Patient currently lives alone but has a sister that will assist her at home after discharge. Patient's sister is present at the bedside. Patient has good family support. CM completed discharge assessment and planning with patient. Patient and family verbalized understanding. All questions and concerns addressed. SW and CM will continue to follow for any additional needs. Plan A to discharge home with home health as soon as medically stable. Plan B to discharge to SNF.    Patient requested [facility].    PCP: Aishwarya Ariza MD    Pharmacy:  [facility] [address] [address]  Phone: [phone] Fax: [phone]    [facility] [address] [place] [address] [facility] [address] [address]  Phone: [phone] Fax: [phone]    Payor: MEDICARE / Plan: MEDICARE PART A & B / Product Type: [facility] /      02/27/18 0930   Discharge Assessment   Assessment Type Discharge Planning Assessment   Confirmed/corrected address and phone number on facesheet? Yes   Assessment information obtained from? Patient;Caregiver;Medical Record   Expected Length of Stay (days) 2   Communicated expected length of stay with patient/caregiver yes   Prior to hospitilization cognitive status: Alert/Oriented   Prior to hospitalization functional status: Assistive Equipment   Current cognitive status: Alert/Oriented   Current Functional Status: Assistive Equipment   Lives With alone   Able to Return to Prior Arrangements yes   Is patient able to care for self after discharge? Yes   Who are your caregiver(s) and their phone number(s)? sister- Marleny Prabhakar [phone]; son- Vic Sears [phone]   Patient's perception of discharge disposition home health   Readmission Within The Last 30 Days no previous admission in last 30  days   Patient currently being followed by outpatient case management? No   Patient currently receives any other outside agency services? No   Equipment Currently Used at Home walker, standard;rollator;bedside commode   Do you have any problems affording any of your prescribed medications? No   Is the patient taking medications as prescribed? yes   Does the patient have transportation home? Yes   Transportation Available family or friend will provide   Does the patient receive services at the [facility]? No   Discharge Plan A Home Health;Home with family   Discharge Plan B Skilled Nursing Facility   Patient/Family In Agreement With Plan yes

## 2018-02-27 NOTE — ANESTHESIA POSTPROCEDURE EVALUATION
"Anesthesia Post Evaluation    Patient: Stephanie Sears    Procedure(s) Performed: Procedure(s) (LRB):  REPLACEMENT-KNEE-TOTAL (Right)    Final Anesthesia Type: spinal  Patient location during evaluation: PACU  Patient participation: Yes- Able to Participate  Level of consciousness: awake and alert  Post-procedure vital signs: reviewed and stable  Pain management: adequate  Airway patency: patent  PONV status at discharge: No PONV  Anesthetic complications: no      Cardiovascular status: blood pressure returned to baseline  Respiratory status: unassisted, spontaneous ventilation and room air  Hydration status: euvolemic  Follow-up not needed.        Visit Vitals  BP (!) 169/81 (Patient Position: Lying)   Pulse 81   Temp 36.6 °C (97.8 °F) (Oral)   Resp 18   Ht 5' 2" (1.575 m)   Wt 90.7 kg (200 lb)   SpO2 98%   Breastfeeding? No   BMI 36.58 kg/m²       Pain/Som Score: Pain Assessment Performed: Yes (2/27/2018 12:21 PM)  Presence of Pain: complains of pain/discomfort (2/27/2018 12:21 PM)  Pain Rating Prior to Med Admin: 6 (2/27/2018 12:21 PM)  Som Score: 10 (2/26/2018 11:15 AM)      "

## 2018-02-27 NOTE — PT/OT/SLP PROGRESS
Physical Therapy Treatment    Patient Name:  Stephanie Sears   MRN:  [id number]    Recommendations:     Discharge Recommendations:  home with home health   Discharge Equipment Recommendations: bath bench   Barriers to discharge: None    Assessment:     Stephanie Sears is a 68 y.o. female admitted with a medical diagnosis of Primary osteoarthritis of right knee.  She presents with the following impairments/functional limitations:  weakness, impaired endurance, decreased lower extremity function, decreased ROM, decreased safety awareness, impaired functional mobilty.    Pt able to amb household distances and perform all t/f with supervision. Fall risk has been minimized and pt has maximized outcomes in the acute care environment. Pt continues to demonstrate decreased safety awareness but has been educated on importance of AD for all mobility and t/f. Pt has been instructed to keep AD within reach for any OOB mobility and has verbalized understanding. Pt has been provided with HEP and is appropriate for d/c with HHPT.     Rehab Prognosis:  Good; patient would benefit from acute skilled PT services to address these deficits and reach maximum level of function.      Recent Surgery: Procedure(s) (LRB):  BLOCK-NERVE (Right) Day of Surgery    Plan:     During this hospitalization, patient to be seen daily to address the above listed problems via gait training, therapeutic activities, therapeutic exercises, neuromuscular re-education  · Plan of Care Expires:  03/26/18   Plan of Care Reviewed with: patient    Subjective     Communicated with nsg prior to session.  Patient found standing by bedside commode without AD with her daughter supervising upon PT entry to room, agreeable to treatment.      Chief Complaint: impaired functional mobility  Patient comments/goals: return home to OF  Pain/Comfort:  · Pain Rating 1: 3/10  · Location - Side 1: Right  · Location - Orientation 1: generalized  · Location 1: knee  · Pain  "Addressed 1: Pre-medicate for activity, Reposition, Distraction    Patients cultural, spiritual, Presybeterian conflicts given the current situation: none    Objective:     Patient found with: FCD, cryotherapy     General Precautions: Standard, fall   Orthopedic Precautions:RLE weight bearing as tolerated   Braces: N/A     Functional Mobility:  · Transfers:     · Sit to Stand:  supervision with rolling walker  · Gait: 1x 100 ft with SW and Supervision  · Continues to require VC's for sequencing to lead with surgery leg first  · No LOB but instability when leading with non-surgical leg  · Stairs:  Pt ascended/descended 7" curb step with Standard Walker  with Stand-by Assistance.   · Min to no VC's needed to technique and safety precautions      AM-PAC 6 CLICK MOBILITY  Turning over in bed (including adjusting bedclothes, sheets and blankets)?: 4  Sitting down on and standing up from a chair with arms (e.g., wheelchair, bedside commode, etc.): 4  Moving from lying on back to sitting on the side of the bed?: 4  Moving to and from a bed to a chair (including a wheelchair)?: 4  Need to walk in hospital room?: 4  Climbing 3-5 steps with a railing?: 3  Total Score: 23       Therapeutic Activities and Exercises:   Pt educated on:  1. Safety with AD during ambulation and transfers  2. Safety with ascending/descending step into house  3. Length of stay and d/c plans  4. Safety in the home environment  5. Continuation of OOB mobility to promote healing      Patient left EOB with all lines intact, call button in reach and nsg notified..    GOALS:    Physical Therapy Goals     Not on file          Multidisciplinary Problems (Resolved)        Problem: Physical Therapy Goal    Goal Priority Disciplines Outcome Goal Variances Interventions   Physical Therapy Goal   (Resolved)     PT/OT, PT Outcome(s) achieved     Description:  Goals to be met by: 7 days     Patient will increase functional independence with mobility by " performin. Supine to sit with Stand-by Assistance- Met 18  1a. Supine<> sit with Supervision- Not Met  2. Sit to supine with Stand-by Assistance- Met 18  3. Sit to stand transfer with Contact Guard Assistance- Met 18  3a. Sit to stand transfer with Supervision- Met 18  4. Gait  x 150 feet with Stand-by Assistance using Rolling Walker.-  Mostly Met 18  5. Lower extremity exercise program x15-20 reps per handout, with assistance as needed- Met 18                        Time Tracking:     PT Received On: 18  PT Start Time: 1405     PT Stop Time: 1415  PT Total Time (min): 10 min     Billable Minutes: Gait Training 10    Treatment Type: Treatment  PT/PTA: PT           Marium Rivera, SPT  2018

## 2018-02-27 NOTE — PROGRESS NOTES
Spoke to pharmacy about needing a new ropivacaine bag as it is almost empty, still waiting on medication.

## 2018-02-27 NOTE — ASSESSMENT & PLAN NOTE
Stephanie Sears is a 68 y.o. female POD 1 s/p right tka doiugn well      - Weight bearing status: wbat  - Pain control: Per APS  - Antibiotics: kings op  - DVT Prophylaxis: asa , SCD's at all times when not ambulating.  - PT/OT  - Lines/Drains: none   - Dispo: per PT may require SNF but patient wishes for home with

## 2018-02-27 NOTE — SUBJECTIVE & OBJECTIVE
Principal Problem:Primary osteoarthritis of right knee    Principal Orthopedic Problem: right tka    Interval History: Patient seen and examined at bedside. Day 1 s/p right tka doing well.   No acute events overnight.  Pain controlled.        Review of patient's allergies indicates:   Allergen Reactions    Hydrocodone Shortness Of Breath    Iodinated contrast- oral and iv dye Shortness Of Breath     Difficulty breathing    Adhesive Itching    Oxycodone      Hallucinations    Sulfa (sulfonamide antibiotics) Hives and Itching       Current Facility-Administered Medications   Medication    0.9%  NaCl infusion    acetaminophen tablet 1,000 mg    albuterol inhaler 2 puff    amlodipine-benazepril 5-20 mg per capsule 2 capsule    aspirin EC tablet 325 mg    bisacodyl suppository 10 mg    carvedilol tablet 12.5 mg    dextrose 50% injection 12.5 g    dextrose 50% injection 25 g    famotidine tablet 20 mg    gabapentin capsule 600 mg    glucagon (human recombinant) injection 1 mg    glucose chewable tablet 16 g    glucose chewable tablet 24 g    hydrALAZINE tablet 25 mg    HYDROmorphone tablet 2 mg    HYDROmorphone tablet 4 mg    insulin aspart U-100 pen 0-5 Units    meloxicam tablet 7.5 mg    morphine injection 2 mg    morphine injection 2 mg    naloxone 0.4 mg/mL injection 0.02 mg    nicotine 7 mg/24 hr 1 patch    ondansetron injection 4 mg    polyethylene glycol packet 17 g    ramelteon tablet 8 mg    ropivacaine (PF) 2 mg/ml (0.2%) infusion    ropivacaine 0.2% ON-Q C-BLOC 400 ML (SELECT A FLOW)    senna-docusate 8.6-50 mg per tablet 1 tablet    sodium chloride 0.9% flush 3 mL    vancomycin (VANCOCIN) 1,500 mg in sodium chloride 0.9% 250 mL IVPB     Objective:     Vital Signs (Most Recent):  Temp: 97.6 °F (36.4 °C) (02/27/18 0015)  Pulse: 66 (02/27/18 0300)  Resp: 18 (02/26/18 1930)  BP: (!) 172/74 (02/27/18 0015)  SpO2: 97 % (02/27/18 0015) Vital Signs (24h Range):  Temp:  [95.9 °F  "(35.5 °C)-97.9 °F (36.6 °C)] 97.6 °F (36.4 °C)  Pulse:  [57-96] 66  Resp:  [16-26] 18  SpO2:  [96 %-100 %] 97 %  BP: (118-172)/(58-83) 172/74     Weight: 90.7 kg (200 lb)  Height: 5' 2" (157.5 cm)  Body mass index is 36.58 kg/m².      Intake/Output Summary (Last 24 hours) at 02/27/18 0548  Last data filed at 02/26/18 2245   Gross per 24 hour   Intake             4444 ml   Output             1400 ml   Net             3044 ml       Ortho/SPM Exam  Physical Exam:  NAD, A/O x 3.  Wound c/d/i with clean dressing.  No focal motor or sensory deficits noted.          Significant Labs: All pertinent labs within the past 24 hours have been reviewed.    Significant Imaging: I have reviewed and interpreted all pertinent imaging results/findings.  "

## 2018-02-27 NOTE — PT/OT/SLP PROGRESS
Occupational Therapy   Treatment    Name: Stephanie Sears  MRN: [id number]  Admitting Diagnosis:  Primary osteoarthritis of right knee  Day of Surgery    Recommendations:     Discharge Recommendations: home health OT  Discharge Equipment Recommendations:  bath bench  Barriers to discharge:  Inaccessible home environment    Subjective     Communicated with: RN prior to session.  Pain/Comfort:  · Pain Rating 1: 0/10    Patients cultural, spiritual, Confucianist conflicts given the current situation: None    Objective:     Patient found with: FCD (Polar ice)    General Precautions: Standard, fall   Orthopedic Precautions:RLE weight bearing as tolerated   Braces: N/A     Occupational Performance:    Bed Mobility:    · Patient completed Supine to Sit with stand by assistance     Functional Mobility/Transfers:  · Patient completed Sit <> Stand Transfer with stand by assistance  with  rolling walker   · Patient completed Bed <> Chair Transfer using Stand Pivot technique with stand by assistance with rolling walker  · Patient completed Toilet Transfer Stand Pivot technique with stand by assistance with  rolling walker  · Functional Mobility: Pt was able to walk to bathroom c SBA and RW.    Activities of Daily Living:  · UB Dressing: modified independence to don gown.  · LB Dressing: modified independence to don slippers.  · Toileting: modified independence for toilet hygiene.    Patient left up in chair with all lines intact, call button in reach, RN notified and family present    [facility] 6 Click:  [facility] Total Score: 24    Treatment & Education:  Edcuated pt on bathing and car T/F's.  Education:    Assessment:     Stephanie Sears is a 68 y.o. female with a medical diagnosis of Primary osteoarthritis of right knee.  She was able to perform supine/sit, sit/stand, and walk to bathroom c SBA and RW.  Able to perform UB/LB dressing c mod I.  Edcuated pt on bathing and car T/F's.  Performance deficits affecting function are impaired  self care skills, impaired functional mobilty, orthopedic precautions.      Rehab Prognosis:  Good; patient would benefit from acute skilled OT services to address these deficits and reach maximum level of function.       Plan:     Patient to be seen daily to address the above listed problems via self-care/home management, therapeutic activities, therapeutic exercises  · Plan of Care Expires:    · Plan of Care Reviewed with: patient    This Plan of care has been discussed with the patient who was involved in its development and understands and is in agreement with the identified goals and treatment plan    GOALS:    Occupational Therapy Goals        Problem: Occupational Therapy Goal    Goal Priority Disciplines Outcome Interventions   Occupational Therapy Goal     OT, PT/OT Ongoing (interventions implemented as appropriate)    Description:  Goals to be met by: 7 days    Patient will increase functional independence with ADLs by performing:    UE Dressing with Supervision.  LE Dressing with Supervision with AD as needed.  Grooming while standing with Supervision.  Toileting from bedside commode with Supervision for hygiene and clothing management.   Stand pivot transfers with Supervision.  Toilet transfer to bedside commode with Supervision.                         Time Tracking:     OT Date of Treatment: 02/27/18  OT Start Time: 1045  OT Stop Time: 1100  OT Total Time (min): 15 min    Billable Minutes:Self Care/Home Management KARLO Yun  2/27/2018

## 2018-02-27 NOTE — PROGRESS NOTES
[facility]  Orthopedics  Progress Note    Patient Name: Stephanie Sears  MRN: [id number]  Admission Date: 2/26/2018  Hospital Length of Stay: 1 days  Attending Provider: [facility]., MD  Primary Care Provider: Aishwarya Ariza MD  Follow-up For: Procedure(s) (LRB):  REPLACEMENT-KNEE-TOTAL (Right)    Post-Operative Day: 1 Day Post-Op  Subjective:     Principal Problem:Primary osteoarthritis of right knee    Principal Orthopedic Problem: right tka    Interval History: Patient seen and examined at bedside. Day 1 s/p right tka doing well.   No acute events overnight.  Pain controlled.        Review of patient's allergies indicates:   Allergen Reactions    Hydrocodone Shortness Of Breath    Iodinated contrast- oral and iv dye Shortness Of Breath     Difficulty breathing    Adhesive Itching    Oxycodone      Hallucinations    Sulfa (sulfonamide antibiotics) Hives and Itching       Current Facility-Administered Medications   Medication    0.9%  NaCl infusion    acetaminophen tablet 1,000 mg    albuterol inhaler 2 puff    amlodipine-benazepril 5-20 mg per capsule 2 capsule    aspirin EC tablet 325 mg    bisacodyl suppository 10 mg    carvedilol tablet 12.5 mg    dextrose 50% injection 12.5 g    dextrose 50% injection 25 g    famotidine tablet 20 mg    gabapentin capsule 600 mg    glucagon (human recombinant) injection 1 mg    glucose chewable tablet 16 g    glucose chewable tablet 24 g    hydrALAZINE tablet 25 mg    HYDROmorphone tablet 2 mg    HYDROmorphone tablet 4 mg    insulin aspart U-100 pen 0-5 Units    meloxicam tablet 7.5 mg    morphine injection 2 mg    morphine injection 2 mg    naloxone 0.4 mg/mL injection 0.02 mg    nicotine 7 mg/24 hr 1 patch    ondansetron injection 4 mg    polyethylene glycol packet 17 g    ramelteon tablet 8 mg    ropivacaine (PF) 2 mg/ml (0.2%) infusion    ropivacaine 0.2% ON-Q C-BLOC 400 ML (SELECT A FLOW)    senna-docusate  "8.6-50 mg per tablet 1 tablet    sodium chloride 0.9% flush 3 mL    vancomycin (VANCOCIN) 1,500 mg in sodium chloride 0.9% 250 mL IVPB     Objective:     Vital Signs (Most Recent):  Temp: 97.6 °F (36.4 °C) (02/27/18 0015)  Pulse: 66 (02/27/18 0300)  Resp: 18 (02/26/18 1930)  BP: (!) 172/74 (02/27/18 0015)  SpO2: 97 % (02/27/18 0015) Vital Signs (24h Range):  Temp:  [95.9 °F (35.5 °C)-97.9 °F (36.6 °C)] 97.6 °F (36.4 °C)  Pulse:  [57-96] 66  Resp:  [16-26] 18  SpO2:  [96 %-100 %] 97 %  BP: (118-172)/(58-83) 172/74     Weight: 90.7 kg (200 lb)  Height: 5' 2" (157.5 cm)  Body mass index is 36.58 kg/m².      Intake/Output Summary (Last 24 hours) at 02/27/18 0548  Last data filed at 02/26/18 2245   Gross per 24 hour   Intake             4444 ml   Output             1400 ml   Net             3044 ml       Ortho/SPM Exam  Physical Exam:  NAD, A/O x 3.  Wound c/d/i with clean dressing.  No focal motor or sensory deficits noted.          Significant Labs: All pertinent labs within the past 24 hours have been reviewed.    Significant Imaging: I have reviewed and interpreted all pertinent imaging results/findings.    Assessment/Plan:     * Primary osteoarthritis of right knee    Stephanie Sears is a 68 y.o. female POD 1 s/p right tka doiugn well      - Weight bearing status: wbat  - Pain control: Per APS  - Antibiotics: kings op  - DVT Prophylaxis: asa , SCD's at all times when not ambulating.  - PT/OT  - Lines/Drains: none   - Dispo: per PT may require SNF but patient wishes for home with                     Yonatan King MD  Orthopedics  [facility]  "

## 2018-02-28 NOTE — DISCHARGE SUMMARY
"[facility]  Orthopedics  Discharge Summary      Patient Name: Stephanie Sears  MRN: [id number]  Admission Date: 2/26/2018  Hospital Length of Stay: 1 days  Discharge Date and Time: 2/27/2018  6:24 PM  Attending Physician: No att. providers found   Discharging Provider: Yonatan King MD  Primary Care Provider: Aishwarya Ariza MD    HPI: see hpi    Procedure(s) (LRB):  BLOCK-NERVE (Right)      Hospital Course: Patient presented for above procedure.  Tolerated it well and was discharged home POD 1 after voiding, tolerating diet, ambulating, pain controlled.  Discharge instructions, follow-up appointment, and med rec are below.      Consults         Status Ordering Provider     Inpatient consult to [facility]  Once     Provider:  (Not yet assigned)    YONATAN Floyd          Significant Diagnostic Studies: No pertinent studies.    Pending Diagnostic Studies:     None        Final Active Diagnoses:    Diagnosis Date Noted POA    PRINCIPAL PROBLEM:  Primary osteoarthritis of right knee [M17.11] 09/15/2017 Yes      Problems Resolved During this Admission:    Diagnosis Date Noted Date Resolved POA      Discharged Condition: stable    Disposition: Admitted as an Inpatient    Follow Up:  Follow-up Information     Follow up In 2 weeks.           Anushka Saenz PA-C On 3/13/2018.    Specialty:  Orthopedic Surgery  Contact information:  [address] [place] [address] [id number] [phone]                 Patient Instructions:     3 IN 1 COMMODE FOR HOME USE   Order Specific Question Answer Comments   Type: Standard    Height: 5' 2" (1.575 m)    Weight: 91.3 kg (201 lb 4.5 oz)    Does patient have medical equipment at home? rollator    Does patient have medical equipment at home? walker, standard    Does patient have medical equipment at home? bedside commode    Length of need (1-99 months): 99    Vendor: Other (use comments) Pt recieved equipement 2016    Expected Date of Delivery: " "2/27/2018      TRANSFER TUB BENCH FOR HOME USE   Order Specific Question Answer Comments   Type of Transfer Tub Bench: Unpadded    Height: 5' 2" (1.575 m)    Weight: 91.3 kg (201 lb 4.5 oz)    Does patient have medical equipment at home? rollator    Does patient have medical equipment at home? walker, standard    Does patient have medical equipment at home? bedside commode    Length of need (1-99 months): 99    Vendor: Other (use comments) Pt recieved equipement 2016    Expected Date of Delivery: 2/27/2018      WALKER FOR HOME USE   Order Specific Question Answer Comments   Type of Walker: Adult (5'4"-6'6")    With wheels? No    Height: 5' 2" (1.575 m)    Weight: 91.3 kg (201 lb 4.5 oz)    Length of need (1-99 months): 99    Does patient have medical equipment at home? rollator    Does patient have medical equipment at home? walker, standard    Does patient have medical equipment at home? bedside commode    Please check all that apply: Walker will be used for gait training.    Vendor: Other (use comments) Pt recieved equipement 2016    Expected Date of Delivery: 2/27/2018      Call MD for:  temperature >100.4     Call MD for:  persistent nausea and vomiting or diarrhea     Call MD for:  severe uncontrolled pain     Call MD for:  redness, tenderness, or signs of infection (pain, swelling, redness, odor or green/yellow discharge around incision site)     Call MD for:  difficulty breathing or increased cough     Call MD for:  severe persistent headache     Call MD for:  worsening rash     Call MD for:  persistent dizziness, light-headedness, or visual disturbances     Call MD for:  increased confusion or weakness     Activity as tolerated     Call MD for:  difficulty breathing, headache or visual disturbances     Call MD for:  extreme fatigue     Call MD for:  hives     Call MD for:  persistent dizziness or light-headedness     Call MD for:  persistent nausea and vomiting     Call MD for:  redness, tenderness, or signs " of infection (pain, swelling, redness, odor or green/yellow discharge around incision site)     Call MD for:  severe uncontrolled pain     Call MD for:  temperature >100.4     Keep surgical extremity elevated     Leave dressing on - Keep it clean, dry, and intact until clinic visit   Order Comments: Do not remove surgical dressing for 2 weeks post-op. This will be done only by MD at initial post-op visit. If dressing is completely saturated, replace with identical dressing - silver-impregnated hydrocolloid dressing.     Do not get dressings wet. Do not shower.     If dressing continues to be saturated or there are signs of infection, please call [facility] [phone] for further instructions and to make appt to be seen.     Lifting restrictions   Order Comments: No strenuous exercise or lifting of > 10 lbs     No driving, operating heavy equipment or signing legal documents while taking pain medication     Sponge bath only until clinic visit     Weight bearing as tolerated     Call MD for:  temperature >100.4     Call MD for:  persistent nausea and vomiting or diarrhea     Call MD for:  severe uncontrolled pain     Call MD for:  redness, tenderness, or signs of infection (pain, swelling, redness, odor or green/yellow discharge around incision site)     Call MD for:  difficulty breathing or increased cough     Call MD for:  severe persistent headache     Call MD for:  worsening rash     Call MD for:  persistent dizziness, light-headedness, or visual disturbances     Call MD for:  increased confusion or weakness       Medications:  Reconciled Home Medications:   Discharge Medication List as of 2/27/2018  4:29 PM      START taking these medications    Details   aspirin 325 MG tablet Take 1 tablet (325 mg total) by mouth 2 (two) times daily., Starting Mon 2/26/2018, Until Tue 2/26/2019, Print      docusate sodium (COLACE) 100 MG capsule Take 1 capsule (100 mg total) by mouth 2 (two) times daily as needed., Starting  Mon 2/26/2018, Print      promethazine (PHENERGAN) 12.5 MG Tab Take 1 tablet (12.5 mg total) by mouth every 6 (six) hours as needed., Starting Mon 2/26/2018, Print      hydrocodone-acetaminophen 10-325mg (NORCO)  mg Tab Take 1 tablet by mouth every 4 (four) hours as needed for Pain., Starting Tue 2/27/2018, Print      oxyCODONE-acetaminophen (PERCOCET)  mg per tablet Take 1 tablet by mouth every 4 (four) hours as needed for Pain., Starting Mon 2/26/2018, Print         CONTINUE these medications which have CHANGED    Details   traMADol (ULTRAM) 50 mg tablet Take 1 tablet (50 mg total) by mouth every 4 (four) hours as needed for Pain., Starting Tue 2/27/2018, Until Thu 3/29/2018, Print         CONTINUE these medications which have NOT CHANGED    Details   albuterol (PROAIR HFA) 90 mcg/actuation inhaler Inhale 2 puffs into the lungs every 6 (six) hours as needed for Wheezing or Shortness of Breath., Starting Wed 2/7/2018, Normal      amlodipine-benazepril (LOTREL) 10-40 mg per capsule Take 1 capsule by mouth once daily., Starting Fri 2/2/2018, Normal      blood sugar diagnostic (ACCU-CHEK JOSE G PLUS TEST STRP) Strp Inject 1 each into the skin 2 (two) times daily., Starting 12/28/2016, Until Discontinued, Normal      CALCIUM CARBONATE (TUMS ORAL) Take by mouth as needed (acid reflux, indigestion)., Until Discontinued, Historical Med      carvedilol (COREG) 12.5 MG tablet TAKE 1 TABLET(12.5 MG) BY MOUTH TWICE DAILY WITH MEALS, Normal      diphenhydrAMINE (BENADRYL) 25 mg capsule Take 25 mg by mouth every 6 (six) hours as needed for Itching or Allergies., Until Discontinued, Historical Med      fluticasone (FLONASE) 50 mcg/actuation nasal spray 1 spray by Each Nare route 2 (two) times daily., Starting 5/12/2017, Until Discontinued, Normal      furosemide (LASIX) 40 MG tablet Take 1 tablet (40 mg total) by mouth once daily., Starting Wed 2/7/2018, Normal      gabapentin (NEURONTIN) 600 MG tablet Take 1 tablet  (600 mg total) by mouth 3 (three) times daily., Starting Wed 2/7/2018, Until Thu 2/7/2019, Normal      INULIN (FIBER GUMMIES ORAL) Take by mouth every morning. , Until Discontinued, Historical Med      lancets (ACCU-CHEK MULTICLIX LANCET) Misc Test blood sugar twice daily, Normal      meloxicam (MOBIC) 7.5 MG tablet Take 1 tablet (7.5 mg total) by mouth once daily., Starting Fri 2/2/2018, Until Thu 5/3/2018, Normal      nicotine (NICODERM CQ) 21 mg/24 hr Place 1 patch onto the skin once daily. Please dispense rugby clear patches. Thanks, Starting Wed 10/4/2017, Normal      nicotine polacrilex 2 MG Lozg Take 1 lozenge (2 mg total) by mouth as needed. 1-2 per hour in place of cigarettes maximum of 15 per day., Starting Wed 8/30/2017, Normal         STOP taking these medications       diclofenac sodium (VOLTAREN) 1 % Gel Comments:   Reason for Stopping:               Yonatan King MD  Orthopedics  [facility]

## 2018-02-28 NOTE — ANESTHESIA POST-OP PAIN MANAGEMENT
Acute Pain Service Progress Note  Pt called regarding PNC. No issues currently. Pt denies weakness, blurry vision, ringing in ears. Pain is currently well controlled. All questions answered. Will call patient tomorrow to ensure PNC is removed.    Troy Ibanez MD CA-1  Anesthesiology

## 2018-03-01 NOTE — ADDENDUM NOTE
Addendum  created 03/01/18 1435 by Yonatan Swanson MD    Anesthesia Event edited, Sign clinical note

## 2018-03-01 NOTE — PROGRESS NOTES
ON-Q PROGRESS NOTE:    Called and spoke with Ms. Sears who removed her PNC earlier today with blue tip intact. No issues. All questions and concerns addressed.     GABY Swanson

## 2018-03-02 DIAGNOSIS — E11.9 TYPE 2 DIABETES MELLITUS WITHOUT COMPLICATION, WITHOUT LONG-TERM CURRENT USE OF INSULIN: ICD-10-CM

## 2018-03-02 NOTE — TELEPHONE ENCOUNTER
----- Message from Nikki Swift sent at 3/2/2018 10:51 AM CST -----  Contact: Fartun with [employer]  Refill on Accu Check test strips. Fartun can be reached at [phone].      Thanks,

## 2018-03-13 ENCOUNTER — OFFICE VISIT (OUTPATIENT)
Dept: ORTHOPEDICS | Facility: CLINIC | Age: 68
End: 2018-03-13
Payer: MEDICARE

## 2018-03-13 VITALS — BODY MASS INDEX: 36.52 KG/M2 | HEIGHT: 62 IN | WEIGHT: 198.44 LBS

## 2018-03-13 DIAGNOSIS — M47.22 CERVICAL SPONDYLOSIS WITH RADICULOPATHY: ICD-10-CM

## 2018-03-13 DIAGNOSIS — M54.2 CHRONIC NECK PAIN: ICD-10-CM

## 2018-03-13 DIAGNOSIS — M54.6 CHRONIC MIDLINE THORACIC BACK PAIN: ICD-10-CM

## 2018-03-13 DIAGNOSIS — G89.29 CHRONIC BILATERAL LOW BACK PAIN WITH SCIATICA, SCIATICA LATERALITY UNSPECIFIED: ICD-10-CM

## 2018-03-13 DIAGNOSIS — M47.22 OSTEOARTHRITIS OF SPINE WITH RADICULOPATHY, CERVICAL REGION: ICD-10-CM

## 2018-03-13 DIAGNOSIS — Z98.890 HISTORY OF BACK SURGERY: ICD-10-CM

## 2018-03-13 DIAGNOSIS — G89.29 CHRONIC NECK PAIN: ICD-10-CM

## 2018-03-13 DIAGNOSIS — Z96.651 STATUS POST TOTAL RIGHT KNEE REPLACEMENT: Primary | ICD-10-CM

## 2018-03-13 DIAGNOSIS — M54.40 CHRONIC BILATERAL LOW BACK PAIN WITH SCIATICA, SCIATICA LATERALITY UNSPECIFIED: ICD-10-CM

## 2018-03-13 DIAGNOSIS — M16.12 PRIMARY OSTEOARTHRITIS OF LEFT HIP: ICD-10-CM

## 2018-03-13 DIAGNOSIS — G89.29 CHRONIC MIDLINE THORACIC BACK PAIN: ICD-10-CM

## 2018-03-13 PROCEDURE — 99214 OFFICE O/P EST MOD 30 MIN: CPT | Mod: PBBFAC | Performed by: PHYSICIAN ASSISTANT

## 2018-03-13 PROCEDURE — 99999 PR PBB SHADOW E&M-EST. PATIENT-LVL IV: CPT | Mod: PBBFAC,,, | Performed by: PHYSICIAN ASSISTANT

## 2018-03-13 PROCEDURE — 99024 POSTOP FOLLOW-UP VISIT: CPT | Mod: POP,,, | Performed by: PHYSICIAN ASSISTANT

## 2018-03-13 RX ORDER — TRAMADOL HYDROCHLORIDE 50 MG/1
50 TABLET ORAL EVERY 6 HOURS PRN
Qty: 90 TABLET | Refills: 0 | Status: SHIPPED | OUTPATIENT
Start: 2018-03-13 | End: 2018-05-24 | Stop reason: SDUPTHER

## 2018-03-13 NOTE — PROGRESS NOTES
"Stephanie Sears presents for initial post-operative visit following a right total knee arthroplasty performed by Dr. [facility] on 2/26/2018. Tolerating pain medication well; on tramadol.     Exam:   Height 5' 2" (1.575 m), weight 90 kg (198 lb 6.6 oz).   Ambulating well with assistive device.  Incision is clean and dry without drainage or erythema. ROM:0-90    Initial post-operative radiographs reviewed today revealing a well fixed and aligned prosthesis.    A/P:  2 weeks s/p right total knee replacement  Dr. [facility] interviewed and examined patient today and agrees with plan.   - The patient was advised to keep the incision clean and dry for the next 24 hours after which she may wash the area with antibacterial soap in the shower. Will not submerge until the incision is completely healed.   - Outpatient PT: [facility]  - Continue ASA for 1 month from surgery.  - Pain medication refilled: tramadol   - Follow up in 4 weeks with Dr. [facility]. Pt will call clinic with problems/concerns.     "

## 2018-03-28 RX ORDER — LANCETS 33 GAUGE
1 EACH MISCELLANEOUS
Qty: 200 EACH | Refills: 11 | Status: SHIPPED | OUTPATIENT
Start: 2018-03-28 | End: 2022-03-23

## 2018-03-28 NOTE — TELEPHONE ENCOUNTER
----- Message from Jossy Courtney sent at 3/28/2018  9:04 AM CDT -----  Contact: self  Patient need Lancets and Strips for One Touch Ultra 2    Please send to [facility]

## 2018-04-12 ENCOUNTER — OFFICE VISIT (OUTPATIENT)
Dept: ORTHOPEDICS | Facility: CLINIC | Age: 68
End: 2018-04-12
Payer: MEDICARE

## 2018-04-12 VITALS — HEIGHT: 62 IN | BODY MASS INDEX: 35.91 KG/M2 | WEIGHT: 195.13 LBS

## 2018-04-12 DIAGNOSIS — Z96.651 STATUS POST TOTAL RIGHT KNEE REPLACEMENT: Primary | ICD-10-CM

## 2018-04-12 PROCEDURE — 99999 PR PBB SHADOW E&M-EST. PATIENT-LVL II: CPT | Mod: PBBFAC,,, | Performed by: ORTHOPAEDIC SURGERY

## 2018-04-12 PROCEDURE — 99024 POSTOP FOLLOW-UP VISIT: CPT | Mod: POP,,, | Performed by: ORTHOPAEDIC SURGERY

## 2018-04-12 PROCEDURE — 99212 OFFICE O/P EST SF 10 MIN: CPT | Mod: PBBFAC | Performed by: ORTHOPAEDIC SURGERY

## 2018-05-03 ENCOUNTER — PES CALL (OUTPATIENT)
Dept: ADMINISTRATIVE | Facility: CLINIC | Age: 68
End: 2018-05-03

## 2018-05-04 ENCOUNTER — CLINICAL SUPPORT (OUTPATIENT)
Dept: OPHTHALMOLOGY | Facility: CLINIC | Age: 68
End: 2018-05-04
Payer: MEDICARE

## 2018-05-04 ENCOUNTER — PES CALL (OUTPATIENT)
Dept: ADMINISTRATIVE | Facility: CLINIC | Age: 68
End: 2018-05-04

## 2018-05-04 ENCOUNTER — OFFICE VISIT (OUTPATIENT)
Dept: OPHTHALMOLOGY | Facility: CLINIC | Age: 68
End: 2018-05-04
Payer: MEDICARE

## 2018-05-04 DIAGNOSIS — H40.053 BORDERLINE GLAUCOMA WITH OCULAR HYPERTENSION, BILATERAL: ICD-10-CM

## 2018-05-04 DIAGNOSIS — H52.13 MYOPIA WITH ASTIGMATISM AND PRESBYOPIA, BILATERAL: ICD-10-CM

## 2018-05-04 DIAGNOSIS — H40.013 OPEN ANGLE WITH BORDERLINE FINDINGS, LOW RISK, BILATERAL: ICD-10-CM

## 2018-05-04 DIAGNOSIS — H25.13 NUCLEAR SCLEROSIS, BILATERAL: ICD-10-CM

## 2018-05-04 DIAGNOSIS — H40.1131 PRIMARY OPEN-ANGLE GLAUCOMA, BILATERAL, MILD STAGE: Primary | ICD-10-CM

## 2018-05-04 DIAGNOSIS — H52.203 MYOPIA WITH ASTIGMATISM AND PRESBYOPIA, BILATERAL: ICD-10-CM

## 2018-05-04 DIAGNOSIS — H52.4 MYOPIA WITH ASTIGMATISM AND PRESBYOPIA, BILATERAL: ICD-10-CM

## 2018-05-04 DIAGNOSIS — H43.811 POSTERIOR VITREOUS DETACHMENT, RIGHT: ICD-10-CM

## 2018-05-04 PROCEDURE — 92083 EXTENDED VISUAL FIELD XM: CPT | Mod: PBBFAC

## 2018-05-04 PROCEDURE — 99212 OFFICE O/P EST SF 10 MIN: CPT | Mod: PBBFAC | Performed by: OPHTHALMOLOGY

## 2018-05-04 PROCEDURE — 92014 COMPRE OPH EXAM EST PT 1/>: CPT | Mod: S$PBB,,, | Performed by: OPHTHALMOLOGY

## 2018-05-04 PROCEDURE — 92083 EXTENDED VISUAL FIELD XM: CPT | Mod: 26,S$PBB,, | Performed by: OPHTHALMOLOGY

## 2018-05-04 PROCEDURE — 92133 CPTRZD OPH DX IMG PST SGM ON: CPT | Mod: PBBFAC | Performed by: OPHTHALMOLOGY

## 2018-05-04 PROCEDURE — 99999 PR PBB SHADOW E&M-EST. PATIENT-LVL II: CPT | Mod: PBBFAC,,, | Performed by: OPHTHALMOLOGY

## 2018-05-04 NOTE — PROGRESS NOTES
"HPI     DLS: 10/30/17    Pt here for HVF review;    Meds: No GTTS    1. Borderline glaucoma with ocular hypertension, bilateral   2. Open angle with borderline findings, low risk, bilateral   3. Nuclear sclerosis, bilateral   4. Posterior vitreous detachment, right   5. Myopia with astigmatism and presbyopia, bilateral         Last edited by Angelic Saravia on 5/4/2018 11:00 AM. (History)            Assessment /Plan     For exam results, see Encounter Report.    Primary open-angle glaucoma, bilateral, mild stage    Borderline glaucoma with ocular hypertension, bilateral    Nuclear sclerosis, bilateral    Posterior vitreous detachment, right    Myopia with astigmatism and presbyopia, bilateral        1. Pre-perimetric glaucoma /OHT/ borderline glaucoma   -Followed at [facility] since 1991   -First HVF 1999   -First photos 1991   - Intolerant to all gtts - they aggravate his blepharitis-? RAGHU allergy   -IOP "OK" off gtts and s/p ALT ou and SLT od - 2008    Family history neg   Glaucoma meds none (( off gtts post SLT ou -))   H/O adverse rxn to glaucoma drops Intolerant to all gtts - 2/2 aggravates blepharitis- RAGHU allergy   LASERS ALT ou -? Date / SLT OD 7/17/08 - good response   GLAUCOMA SURGERIES none   OTHER EYE SURGERIES none   CDR 0.6/0.3   Tbase 19-26 / 16-21   Tmax 26/21   Ttarget ?   HVF 13 test - 1999 to 2018 - Full ou   Gonio +3 ou   /588   OCT 4 test 2005 to 2018 -  RNFL - OD:NL // OS:NL  HRT 7 test 2004 to 2017 -MR -  nl od // nl os /// CDR 0.542 od // 0.434 os  Disc photos 1991, 1996, 2003 - slides // 2012 , 2016  - OIS     - Ttoday 18/18   - Test done today HVF / OCT / DFE     2. Nuclear Sclerosis    NVS yet - monitor    3. Posterior Vitreous Detachment OD - 11/2008   - RD PRECAUTIONS    4. Eyelid inflammation / Blepharitis / MGD    5. Allergic Conjunctivitis - RAGHU allergy     6. Myopia/Astigmatism/presbyopia     Plan   OHT-pre-perimetric glaucoma - intolerant to all gtts   IOP ok s/p ALT ou - years ago " and s/p SLT OD 2008 ( no SLT os)  Continue to monitor HVF/DFE/OCT/HRT/photos/IOP   If increase IOP or progression repeat SLT     Consider phaco/IOL in future - but only minimal vis sign at this time    F/U 6 months with  HRT / gonio // AR/MR/BAT ou - - if IOP goes high again -  consider repeat slt od and primary slt os

## 2018-05-14 ENCOUNTER — CLINICAL SUPPORT (OUTPATIENT)
Dept: SMOKING CESSATION | Facility: CLINIC | Age: 68
End: 2018-05-14
Payer: COMMERCIAL

## 2018-05-14 DIAGNOSIS — F17.200 NICOTINE DEPENDENCE: Primary | ICD-10-CM

## 2018-05-14 PROCEDURE — 99407 BEHAV CHNG SMOKING > 10 MIN: CPT | Mod: S$GLB,,,

## 2018-05-14 NOTE — PROGRESS NOTES
Successful contact with patient regarding quit #1. Pt states, she recently had knee replacement surgery and have not been discharged; therefore, she's unable to return to the program at this time. Pt currently smokes 10-12 cigs/day; down from 1.5 packs/day. Pt commended for her accomplishment, thus far. Pt provided with her benefit status and contact information to schedule an appointment when she's ready. Will follow up with her in 3 months.

## 2018-05-24 ENCOUNTER — OFFICE VISIT (OUTPATIENT)
Dept: ORTHOPEDICS | Facility: CLINIC | Age: 68
End: 2018-05-24
Payer: MEDICARE

## 2018-05-24 VITALS
SYSTOLIC BLOOD PRESSURE: 148 MMHG | BODY MASS INDEX: 35.32 KG/M2 | HEIGHT: 62 IN | HEART RATE: 66 BPM | DIASTOLIC BLOOD PRESSURE: 79 MMHG | WEIGHT: 191.94 LBS

## 2018-05-24 DIAGNOSIS — M47.22 CERVICAL SPONDYLOSIS WITH RADICULOPATHY: ICD-10-CM

## 2018-05-24 DIAGNOSIS — M16.12 PRIMARY OSTEOARTHRITIS OF LEFT HIP: ICD-10-CM

## 2018-05-24 DIAGNOSIS — M47.22 OSTEOARTHRITIS OF SPINE WITH RADICULOPATHY, CERVICAL REGION: ICD-10-CM

## 2018-05-24 DIAGNOSIS — G89.29 CHRONIC MIDLINE THORACIC BACK PAIN: ICD-10-CM

## 2018-05-24 DIAGNOSIS — Z96.651 STATUS POST TOTAL RIGHT KNEE REPLACEMENT: Primary | ICD-10-CM

## 2018-05-24 DIAGNOSIS — G89.29 CHRONIC NECK PAIN: ICD-10-CM

## 2018-05-24 DIAGNOSIS — M54.6 CHRONIC MIDLINE THORACIC BACK PAIN: ICD-10-CM

## 2018-05-24 DIAGNOSIS — M54.40 CHRONIC BILATERAL LOW BACK PAIN WITH SCIATICA, SCIATICA LATERALITY UNSPECIFIED: ICD-10-CM

## 2018-05-24 DIAGNOSIS — M54.2 CHRONIC NECK PAIN: ICD-10-CM

## 2018-05-24 DIAGNOSIS — G89.29 CHRONIC BILATERAL LOW BACK PAIN WITH SCIATICA, SCIATICA LATERALITY UNSPECIFIED: ICD-10-CM

## 2018-05-24 DIAGNOSIS — Z98.890 HISTORY OF BACK SURGERY: ICD-10-CM

## 2018-05-24 PROCEDURE — 99024 POSTOP FOLLOW-UP VISIT: CPT | Mod: POP,,, | Performed by: ORTHOPAEDIC SURGERY

## 2018-05-24 PROCEDURE — 99999 PR PBB SHADOW E&M-EST. PATIENT-LVL III: CPT | Mod: PBBFAC,,, | Performed by: ORTHOPAEDIC SURGERY

## 2018-05-24 PROCEDURE — 99213 OFFICE O/P EST LOW 20 MIN: CPT | Mod: PBBFAC | Performed by: ORTHOPAEDIC SURGERY

## 2018-05-24 RX ORDER — TRAMADOL HYDROCHLORIDE 50 MG/1
50 TABLET ORAL EVERY 6 HOURS PRN
Qty: 60 TABLET | Refills: 0 | Status: SHIPPED | OUTPATIENT
Start: 2018-05-24 | End: 2018-10-18

## 2018-05-24 NOTE — PROGRESS NOTES
Patient is here today for 12 week PO FU of her TKA on 2/26/18. She is progressing well.      We will allow her to return as needed for repeat radiographs and certainly for any other questions.    xrays are  reviewed today with good alignment.    We will check repeat radiographs in 1 year.

## 2018-05-28 DIAGNOSIS — M54.2 NECK PAIN: Primary | ICD-10-CM

## 2018-06-11 ENCOUNTER — HOSPITAL ENCOUNTER (OUTPATIENT)
Dept: RADIOLOGY | Facility: HOSPITAL | Age: 68
Discharge: HOME | End: 2018-06-11
Attending: PHYSICIAN ASSISTANT
Payer: MEDICARE

## 2018-06-11 ENCOUNTER — OFFICE VISIT (OUTPATIENT)
Dept: ORTHOPEDICS | Facility: CLINIC | Age: 68
End: 2018-06-11
Payer: MEDICARE

## 2018-06-11 VITALS — BODY MASS INDEX: 35.51 KG/M2 | HEIGHT: 62 IN | WEIGHT: 193 LBS

## 2018-06-11 DIAGNOSIS — M54.2 NECK PAIN: ICD-10-CM

## 2018-06-11 DIAGNOSIS — Z98.1 HISTORY OF LUMBAR FUSION: Primary | ICD-10-CM

## 2018-06-11 DIAGNOSIS — M50.30 DDD (DEGENERATIVE DISC DISEASE), CERVICAL: ICD-10-CM

## 2018-06-11 PROCEDURE — 99214 OFFICE O/P EST MOD 30 MIN: CPT | Mod: S$PBB,,, | Performed by: PHYSICIAN ASSISTANT

## 2018-06-11 PROCEDURE — 72050 X-RAY EXAM NECK SPINE 4/5VWS: CPT | Mod: 26,,, | Performed by: RADIOLOGY

## 2018-06-11 PROCEDURE — 99214 OFFICE O/P EST MOD 30 MIN: CPT | Mod: PBBFAC,25 | Performed by: PHYSICIAN ASSISTANT

## 2018-06-11 PROCEDURE — 72050 X-RAY EXAM NECK SPINE 4/5VWS: CPT | Mod: TC

## 2018-06-11 PROCEDURE — 99999 PR PBB SHADOW E&M-EST. PATIENT-LVL IV: CPT | Mod: PBBFAC,,, | Performed by: PHYSICIAN ASSISTANT

## 2018-06-11 NOTE — PROGRESS NOTES
DATE: 6/11/2018  PATIENT: Stephanie Sears    Supervising Physician: Bean Mendoza M.D.    CHIEF COMPLAINT: upper back pain    HISTORY:  Stephanie Sears is a 68 y.o. female s/p right TKA by Dr. [facility] in February 2018 and with PMH of L4/5 minimally invasive TLIF by Dr. Lowry in 2007 here for initial evaluation of neck pain (Neck - 7, Arm - 0). The pain has been present since her knee surgery in February. The patient describes the pain as aching. The pain is worse with walking and improved by a TENS unit. There is no associated numbness and tingling. There is subjective weakness. Prior treatments have included medications and physical therapy, but no ESIs or surgery.  She also reports bilateral leg pain since her knee surgery that is worse with standing up straight and relieved by leaning forward.  The pain in her neck is what bothers her the most.     The patient reports myelopathic symptoms such as handwriting changes.  She denies difficulty with buttons/coins/keys. Denies perineal paresthesias, bowel/bladder dysfunction.    PAST MEDICAL/SURGICAL HISTORY:  Past Medical History:   Diagnosis Date    Bronchitis     Cataract     Cervical spondylosis with radiculopathy 7/10/2012    Chest pain     Chronic neck pain 7/26/2012    GERD (gastroesophageal reflux disease)     Glaucoma     Hyperlipidemia     Hypertension     Neck pain 7/10/2012    Primary osteoarthritis of both knees     Psoriasis     Subacromial or subdeltoid bursitis 7/10/2012    Thyroid disease     Type 2 diabetes mellitus 12/10/2013     Past Surgical History:   Procedure Laterality Date    BREAST LUMPECTOMY Left 1988    mass in the rt breast surgery  1988    BREAST MASS EXCISION Right     CHOLECYSTECTOMY      HYSTERECTOMY  1980's    JOINT REPLACEMENT      KNEE SURGERY Left 1-20-16    TKR    KNEE SURGERY Right 02/26/2018    TKR    L4-L5 fusion  2006    SPINE SURGERY         Medications:  Current Outpatient Prescriptions on  File Prior to Visit   Medication Sig Dispense Refill    albuterol (PROAIR HFA) 90 mcg/actuation inhaler Inhale 2 puffs into the lungs every 6 (six) hours as needed for Wheezing or Shortness of Breath. 1 Inhaler 3    amlodipine-benazepril (LOTREL) 10-40 mg per capsule Take 1 capsule by mouth once daily. 90 capsule 1    aspirin 325 MG tablet Take 1 tablet (325 mg total) by mouth 2 (two) times daily. 90 tablet 0    blood sugar diagnostic (ACCU-CHEK JOSE G PLUS TEST STRP) Strp Inject 1 each into the skin 2 (two) times daily. 200 each 11    blood sugar diagnostic Strp Use to test blood glucose levels 2 times per day as instructed. 200 strip 11    CALCIUM CARBONATE (TUMS ORAL) Take by mouth as needed (acid reflux, indigestion).      carvedilol (COREG) 12.5 MG tablet TAKE 1 TABLET(12.5 MG) BY MOUTH TWICE DAILY WITH MEALS 180 tablet 1    diphenhydrAMINE (BENADRYL) 25 mg capsule Take 25 mg by mouth every 6 (six) hours as needed for Itching or Allergies.      docusate sodium (COLACE) 100 MG capsule Take 1 capsule (100 mg total) by mouth 2 (two) times daily as needed. 60 capsule 0    fluticasone (FLONASE) 50 mcg/actuation nasal spray 1 spray by Each Nare route 2 (two) times daily. 1 Bottle 1    furosemide (LASIX) 40 MG tablet Take 1 tablet (40 mg total) by mouth once daily. 90 tablet 1    gabapentin (NEURONTIN) 600 MG tablet Take 1 tablet (600 mg total) by mouth 3 (three) times daily. 90 tablet 1    INULIN (FIBER GUMMIES ORAL) Take by mouth every morning.       lancets (ACCU-CHEK MULTICLIX LANCET) Misc Test blood sugar twice daily 300 each 3    lancets (ONETOUCH DELICA LANCETS) 33 gauge Misc Inject 1 lancet into the skin 2 (two) times daily before meals. 200 each 11    nicotine (NICODERM CQ) 21 mg/24 hr Place 1 patch onto the skin once daily. Please dispense rugby clear patches. Thanks 14 patch 0    nicotine polacrilex 2 MG Lozg Take 1 lozenge (2 mg total) by mouth as needed. 1-2 per hour in place of cigarettes  "maximum of 15 per day. 108 lozenge 0    promethazine (PHENERGAN) 12.5 MG Tab Take 1 tablet (12.5 mg total) by mouth every 6 (six) hours as needed. 60 tablet 0    traMADol (ULTRAM) 50 mg tablet Take 1 tablet (50 mg total) by mouth every 6 (six) hours as needed for Pain. 60 tablet 0    meloxicam (MOBIC) 7.5 MG tablet Take 1 tablet (7.5 mg total) by mouth once daily. 90 tablet 1     No current facility-administered medications on file prior to visit.        Social History:   Social History     Social History    Marital status:      Spouse name: N/A    Number of children: N/A    Years of education: N/A     Occupational History    Not on file.     Social History Main Topics    Smoking status: Current Every Day Smoker     Packs/day: 1.00     Years: 40.00     Types: Cigarettes    Smokeless tobacco: Never Used      Comment: .  .  Retired from [employer].      Alcohol use No    Drug use: No    Sexual activity: Yes     Other Topics Concern    Not on file     Social History Narrative    No narrative on file       REVIEW OF SYSTEMS:  Constitution: Negative. Negative for chills, fever and night sweats.   Cardiovascular: Negative for chest pain and syncope.   Respiratory: Negative for cough and shortness of breath.   Gastrointestinal: See HPI. Negative for nausea/vomiting. Negative for abdominal pain.  Genitourinary: See HPI. Negative for discoloration or dysuria.  Skin: Negative for dry skin, itching and rash.   Hematologic/Lymphatic: Negative for bleeding problem. Does not bruise/bleed easily.   Musculoskeletal: Negative for falls and muscle weakness.   Neurological: See HPI. No seizures.   Endocrine: Negative for polydipsia, polyphagia and polyuria.   Allergic/Immunologic: Negative for hives and persistent infections.  Psychiatric/Behavioral: Negative for depression and insomnia.         EXAM:  Ht 5' 2" (1.575 m)   Wt 87.5 kg (193 lb 0.2 oz)   BMI 35.30 kg/m²     General: The " patient is a very pleasant 68 y.o. female in no apparent distress, the patient is oriented to person, place and time.  Psych: Normal mood and affect  HEENT: Vision grossly intact, hearing intact to the spoken word.  Lungs: Respirations unlabored.  Gait: Normal station and gait, no difficulty with toe or heel walk.   Skin: Cervical skin negative for rashes, lesions, hairy patches and surgical scars.  Range of motion: Cervical range of motion is acceptable. There is mild trapezial tenderness to palpation.  Spinal Balance: Global saggital and coronal spinal balance acceptable, no significant for scoliosis and kyphosis.  Musculoskeletal: No pain with the range of motion of the bilateral shoulders and elbows. Normal bulk and contour of the bilateral hands.  Vascular: Bilateral hands warm and well perfused, radial pulses 2+ bilaterally.  Neurological: Normal strength and tone in all major motor groups in the bilateral upper and lower extremities. Normal sensation to light touch in the C5-T1 and L2-S1 dermatomes bilaterally.  Deep tendon reflexes symmetric 2+ in the bilateral upper and lower extremities.  Negative Inverted Radial Reflex and Randall's bilaterally. Negative Babinski bilaterally.     IMAGING:   Today I personally reviewed AP, Lat and Flex/Ex  upright C-spine films that demonstrate mild to moderate degenerative changes.     MRI cervical spine from 9/22/2017 shows moderate neural foraminal narrowing at C5/6 and C6/7.  No significant spinal canal stenosis.       ASSESSMENT/PLAN:    Diagnoses and all orders for this visit:    History of lumbar fusion      DDD (degenerative disc disease), cervical      Neck pain      The patient is not interested in injections or surgery.  She would like to go to physical therapy.  Orders placed today.  Follow up after therapy if symptoms persist.  Will consider a new MRI cervical spine at that time.  Will consider MRI lumbar spine if pain in the legs persists.       Follow-up if  symptoms worsen or fail to improve.

## 2018-06-18 ENCOUNTER — OFFICE VISIT (OUTPATIENT)
Dept: FAMILY MEDICINE | Facility: CLINIC | Age: 68
End: 2018-06-18
Payer: MEDICARE

## 2018-06-18 ENCOUNTER — LAB VISIT (OUTPATIENT)
Dept: LAB | Facility: HOSPITAL | Age: 68
End: 2018-06-18
Attending: FAMILY MEDICINE
Payer: MEDICARE

## 2018-06-18 VITALS
BODY MASS INDEX: 35.91 KG/M2 | OXYGEN SATURATION: 99 % | RESPIRATION RATE: 12 BRPM | HEART RATE: 56 BPM | DIASTOLIC BLOOD PRESSURE: 86 MMHG | TEMPERATURE: 98 F | WEIGHT: 195.13 LBS | HEIGHT: 62 IN | SYSTOLIC BLOOD PRESSURE: 176 MMHG

## 2018-06-18 DIAGNOSIS — R10.13 EPIGASTRIC PAIN: Primary | ICD-10-CM

## 2018-06-18 DIAGNOSIS — R10.13 EPIGASTRIC PAIN: ICD-10-CM

## 2018-06-18 DIAGNOSIS — Z12.11 COLON CANCER SCREENING: ICD-10-CM

## 2018-06-18 LAB
ALBUMIN SERPL BCP-MCNC: 3.3 G/DL
ALP SERPL-CCNC: 84 U/L
ALT SERPL W/O P-5'-P-CCNC: 13 U/L
ANION GAP SERPL CALC-SCNC: 11 MMOL/L
AST SERPL-CCNC: 12 U/L
BASOPHILS # BLD AUTO: 0.02 K/UL
BASOPHILS NFR BLD: 0.3 %
BILIRUB SERPL-MCNC: 0.3 MG/DL
BUN SERPL-MCNC: 14 MG/DL
CALCIUM SERPL-MCNC: 9.9 MG/DL
CHLORIDE SERPL-SCNC: 107 MMOL/L
CO2 SERPL-SCNC: 23 MMOL/L
CREAT SERPL-MCNC: 0.8 MG/DL
CRP SERPL-MCNC: 13.1 MG/L
DIFFERENTIAL METHOD: ABNORMAL
EOSINOPHIL # BLD AUTO: 0.3 K/UL
EOSINOPHIL NFR BLD: 4.1 %
ERYTHROCYTE [DISTWIDTH] IN BLOOD BY AUTOMATED COUNT: 16.9 %
EST. GFR  (AFRICAN AMERICAN): >60 ML/MIN/1.73 M^2
EST. GFR  (NON AFRICAN AMERICAN): >60 ML/MIN/1.73 M^2
GLUCOSE SERPL-MCNC: 100 MG/DL
HCT VFR BLD AUTO: 38.8 %
HGB BLD-MCNC: 12.6 G/DL
LIPASE SERPL-CCNC: 24 U/L
LYMPHOCYTES # BLD AUTO: 3.1 K/UL
LYMPHOCYTES NFR BLD: 40.3 %
MCH RBC QN AUTO: 24.4 PG
MCHC RBC AUTO-ENTMCNC: 32.5 G/DL
MCV RBC AUTO: 75 FL
MONOCYTES # BLD AUTO: 0.7 K/UL
MONOCYTES NFR BLD: 9.5 %
NEUTROPHILS # BLD AUTO: 3.4 K/UL
NEUTROPHILS NFR BLD: 45.5 %
PLATELET # BLD AUTO: 305 K/UL
PMV BLD AUTO: 11.3 FL
POTASSIUM SERPL-SCNC: 4.4 MMOL/L
PROT SERPL-MCNC: 7.1 G/DL
RBC # BLD AUTO: 5.17 M/UL
SODIUM SERPL-SCNC: 141 MMOL/L
WBC # BLD AUTO: 7.56 K/UL

## 2018-06-18 PROCEDURE — 86140 C-REACTIVE PROTEIN: CPT

## 2018-06-18 PROCEDURE — 85025 COMPLETE CBC W/AUTO DIFF WBC: CPT

## 2018-06-18 PROCEDURE — 36415 COLL VENOUS BLD VENIPUNCTURE: CPT | Mod: PN

## 2018-06-18 PROCEDURE — 80053 COMPREHEN METABOLIC PANEL: CPT

## 2018-06-18 PROCEDURE — 99999 PR PBB SHADOW E&M-EST. PATIENT-LVL V: CPT | Mod: PBBFAC,,, | Performed by: FAMILY MEDICINE

## 2018-06-18 PROCEDURE — 99214 OFFICE O/P EST MOD 30 MIN: CPT | Mod: S$PBB,,, | Performed by: FAMILY MEDICINE

## 2018-06-18 PROCEDURE — 99215 OFFICE O/P EST HI 40 MIN: CPT | Mod: PBBFAC,PN | Performed by: FAMILY MEDICINE

## 2018-06-18 PROCEDURE — 83690 ASSAY OF LIPASE: CPT

## 2018-06-18 RX ORDER — LIDOCAINE HYDROCHLORIDE 20 MG/ML
10 SOLUTION OROPHARYNGEAL
Status: COMPLETED | OUTPATIENT
Start: 2018-06-18 | End: 2018-06-18

## 2018-06-18 RX ORDER — MAG HYDROX/ALUMINUM HYD/SIMETH 200-200-20
30 SUSPENSION, ORAL (FINAL DOSE FORM) ORAL
Status: COMPLETED | OUTPATIENT
Start: 2018-06-18 | End: 2018-06-18

## 2018-06-18 RX ORDER — DICYCLOMINE HYDROCHLORIDE 10 MG/5ML
20 SOLUTION ORAL
Status: DISCONTINUED | OUTPATIENT
Start: 2018-06-18 | End: 2019-02-03

## 2018-06-18 RX ADMIN — DICYCLOMINE HYDROCHLORIDE 20 MG: 10 SOLUTION ORAL at 10:06

## 2018-06-18 RX ADMIN — LIDOCAINE HYDROCHLORIDE 10 ML: 20 SOLUTION OROPHARYNGEAL at 10:06

## 2018-06-18 RX ADMIN — Medication 30 ML: at 10:06

## 2018-06-18 NOTE — PROGRESS NOTES
"Routine Office Visit    Patient Name: Stephanie Sears    : 1950  MRN: [id number]    Subjective:  Stephanie is a 68 y.o. female who presents today for:    1. Abdominal pain  Patient presenting today with upper abdominal pain for 3 days.  She states that she took gas x without relief.  She states that it was exacerbated by drinking coffee this morning.  She has been having "funny colored brown" stools.  She states that the stool has been loose.  There has been no fevers, chills, or sweats.  She states that she is having BM's almost every time she eats.  There has been no vomiting.  No changes in eye or skin color.  She does have a history of GERD, but states that resolved with use of CPAP.      Past Medical History  Past Medical History:   Diagnosis Date    Bronchitis     Cataract     Cervical spondylosis with radiculopathy 7/10/2012    Chest pain     Chronic neck pain 2012    GERD (gastroesophageal reflux disease)     Glaucoma     Hyperlipidemia     Hypertension     Neck pain 7/10/2012    Primary osteoarthritis of both knees     Psoriasis     Subacromial or subdeltoid bursitis 7/10/2012    Thyroid disease     Type 2 diabetes mellitus 12/10/2013       Past Surgical History  Past Surgical History:   Procedure Laterality Date    BREAST LUMPECTOMY Left     mass in the rt breast surgery  1988    BREAST MASS EXCISION Right     CHOLECYSTECTOMY      HYSTERECTOMY      JOINT REPLACEMENT      KNEE SURGERY Left 16    TKR    KNEE SURGERY Right 2018    TKR    L4-L5 fusion  2006    SPINE SURGERY         Family History  Family History   Problem Relation Age of Onset    Diabetes Mother     Hypertension Mother         CHF, angina    Stroke Mother         tia, glaucoma    Hypertension Father         glaucoma, Alzhiemer's , supra pubic    Kidney disease Brother         kidney transplant, HTN,DM    Diabetes Brother     Amblyopia Neg Hx     Blindness Neg Hx        Social " History  Social History     Social History    Marital status:      Spouse name: N/A    Number of children: N/A    Years of education: N/A     Occupational History    Not on file.     Social History Main Topics    Smoking status: Current Every Day Smoker     Packs/day: 1.00     Years: 40.00     Types: Cigarettes    Smokeless tobacco: Never Used      Comment: .  .  Retired from [employer].      Alcohol use No    Drug use: No    Sexual activity: Yes     Other Topics Concern    Not on file     Social History Narrative    No narrative on file       Current Medications  Current Outpatient Prescriptions on File Prior to Visit   Medication Sig Dispense Refill    albuterol (PROAIR HFA) 90 mcg/actuation inhaler Inhale 2 puffs into the lungs every 6 (six) hours as needed for Wheezing or Shortness of Breath. 1 Inhaler 3    amlodipine-benazepril (LOTREL) 10-40 mg per capsule Take 1 capsule by mouth once daily. 90 capsule 1    CALCIUM CARBONATE (TUMS ORAL) Take by mouth as needed (acid reflux, indigestion).      carvedilol (COREG) 12.5 MG tablet TAKE 1 TABLET(12.5 MG) BY MOUTH TWICE DAILY WITH MEALS 180 tablet 1    fluticasone (FLONASE) 50 mcg/actuation nasal spray 1 spray by Each Nare route 2 (two) times daily. 1 Bottle 1    furosemide (LASIX) 40 MG tablet Take 1 tablet (40 mg total) by mouth once daily. 90 tablet 1    gabapentin (NEURONTIN) 600 MG tablet Take 1 tablet (600 mg total) by mouth 3 (three) times daily. 90 tablet 1    INULIN (FIBER GUMMIES ORAL) Take by mouth every morning.       traMADol (ULTRAM) 50 mg tablet Take 1 tablet (50 mg total) by mouth every 6 (six) hours as needed for Pain. 60 tablet 0    aspirin 325 MG tablet Take 1 tablet (325 mg total) by mouth 2 (two) times daily. 90 tablet 0    blood sugar diagnostic (ACCU-CHEK JOSE G PLUS TEST STRP) Strp Inject 1 each into the skin 2 (two) times daily. 200 each 11    blood sugar diagnostic Strp Use to test  blood glucose levels 2 times per day as instructed. 200 strip 11    diphenhydrAMINE (BENADRYL) 25 mg capsule Take 25 mg by mouth every 6 (six) hours as needed for Itching or Allergies.      docusate sodium (COLACE) 100 MG capsule Take 1 capsule (100 mg total) by mouth 2 (two) times daily as needed. 60 capsule 0    lancets (ACCU-CHEK MULTICLIX LANCET) Misc Test blood sugar twice daily 300 each 3    lancets (ONETOUCH DELICA LANCETS) 33 gauge Misc Inject 1 lancet into the skin 2 (two) times daily before meals. 200 each 11    meloxicam (MOBIC) 7.5 MG tablet Take 1 tablet (7.5 mg total) by mouth once daily. 90 tablet 1    nicotine (NICODERM CQ) 21 mg/24 hr Place 1 patch onto the skin once daily. Please dispense rugby clear patches. Thanks 14 patch 0    nicotine polacrilex 2 MG Lozg Take 1 lozenge (2 mg total) by mouth as needed. 1-2 per hour in place of cigarettes maximum of 15 per day. 108 lozenge 0    promethazine (PHENERGAN) 12.5 MG Tab Take 1 tablet (12.5 mg total) by mouth every 6 (six) hours as needed. 60 tablet 0     No current facility-administered medications on file prior to visit.        Allergies   Review of patient's allergies indicates:   Allergen Reactions    Hydrocodone Shortness Of Breath    Iodinated contrast- oral and iv dye Shortness Of Breath     Difficulty breathing    Adhesive Itching    Oxycodone      Hallucinations    Sulfa (sulfonamide antibiotics) Hives and Itching       Review of Systems (Pertinent positives)  Review of Systems   Constitutional: Negative.    HENT: Negative.    Eyes: Negative.    Respiratory: Negative.    Cardiovascular: Negative.    Gastrointestinal: Positive for abdominal pain and diarrhea. Negative for blood in stool, constipation, heartburn, melena, nausea and vomiting.   Musculoskeletal: Negative.    Skin: Negative.          BP (!) 176/86 (BP Location: Right arm, Patient Position: Sitting, BP Method: Medium (Manual))   Pulse (!) 56   Temp 98 °F (36.7 °C)  "(Oral)   Resp 12   Ht 5' 2" (1.575 m)   Wt 88.5 kg (195 lb 1.7 oz)   SpO2 99%   BMI 35.69 kg/m²     GENERAL APPEARANCE: in no apparent distress and well developed and well nourished  HEENT: PERRL, EOMI, Sclera clear, anicteric, Oropharynx clear, no lesions, Neck supple with midline trachea  NECK: normal, supple, no adenopathy, thyroid normal in size  RESPIRATORY: appears well, vitals normal, no respiratory distress, acyanotic, normal RR, chest clear, no wheezing, crepitations, rhonchi, normal symmetric air entry  HEART: regular rate and rhythm, S1, S2 normal, no murmur, click, rub or gallop.    ABDOMEN: abdomen is soft with mild epigastric tenderness, no masses, no hernias, no organomegaly, no rebound, no guarding. Suprapubic tenderness absent. No CVA tenderness.  NEUROLOGIC: normal without focal findings, CN II-XII are intact.  SKIN: no rashes, no wounds, no other lesions  PSYCH: Alert, oriented x 3, thought content appropriate, speech normal, pleasant and cooperative, good eye contact, well groomed, recall good, concentration/judgement good and apparently average intelligence.    Assessment/Plan:  Stephanie Sears is a 68 y.o. female who presents today for :    Stephanie was seen today for abdominal pain.    Diagnoses and all orders for this visit:    Epigastric pain  -     Comprehensive metabolic panel; Future  -     Lipase; Future  -     C-reactive protein; Future  -     CBC auto differential; Future  -     aluminum-magnesium hydroxide-simethicone 200-200-20 mg/5 mL suspension 30 mL; Take 30 mLs by mouth one time.  -     dicyclomine 10 mg/5 mL syrup 20 mg; Take 10 mLs (20 mg total) by mouth 4 (four) times daily before meals and nightly.  -     lidocaine HCl 2% oral solution 10 mL; 10 mLs by Mucous Membrane route one time.    Colon cancer screening  -     Case request GI: COLONOSCOPY    Other orders  -     Cancel: Foot Exam Performed  -     Cancel: (In Office Administered) Tdap Vaccine  -     Cancel: Case " request GI: COLONOSCOPY      1.  Labs to be done today  2.  GI cocktail given  3.  Will need to r/o pancreatitis  4.  Follow up as needed    Indra Solorio, MD

## 2018-06-22 ENCOUNTER — TELEPHONE (OUTPATIENT)
Dept: ENDOSCOPY | Facility: HOSPITAL | Age: 68
End: 2018-06-22

## 2018-06-22 ENCOUNTER — TELEPHONE (OUTPATIENT)
Dept: FAMILY MEDICINE | Facility: CLINIC | Age: 68
End: 2018-06-22

## 2018-06-22 NOTE — TELEPHONE ENCOUNTER
Pt to call endoscopy  at [phone] to schedule colonoscopy after seen by cardiology, Pt transferred to internal medicine to assist Pt with making cardiology appointment per pt request.

## 2018-06-22 NOTE — TELEPHONE ENCOUNTER
----- Message from Jenn Bains MA sent at 6/22/2018  8:17 AM CDT -----  Contact: self   [phone]  Pt. Is requesting a colonoscopy.  LOV 6/18/18     ----- Message -----  From: Citlalli Ivy  Sent: 6/22/2018   8:09 AM  To: Annita Neff Staff    Pt is requesting to a colonoscopy at [facility].Pt said that is the only place she can have it done.  Pls call pt [phone]. Thanks.....Radha

## 2018-06-22 NOTE — TELEPHONE ENCOUNTER
----- Message from Patience Hernandez sent at 6/22/2018 10:31 AM CDT -----  Contact: Self/[phone]  Patient called stating that she won't be able to schedule her Colonoscopy until she schedule with a Cardiologist. She would like to speak to the staff regarding this matter. Thank you.

## 2018-07-05 ENCOUNTER — TELEPHONE (OUTPATIENT)
Dept: CARDIOLOGY | Facility: CLINIC | Age: 68
End: 2018-07-05

## 2018-07-05 ENCOUNTER — HOSPITAL ENCOUNTER (OUTPATIENT)
Dept: CARDIOLOGY | Facility: CLINIC | Age: 68
Discharge: HOME | End: 2018-07-05
Payer: MEDICARE

## 2018-07-05 ENCOUNTER — OFFICE VISIT (OUTPATIENT)
Dept: CARDIOLOGY | Facility: CLINIC | Age: 68
End: 2018-07-05
Payer: MEDICARE

## 2018-07-05 VITALS
HEIGHT: 62 IN | HEART RATE: 63 BPM | SYSTOLIC BLOOD PRESSURE: 168 MMHG | BODY MASS INDEX: 35.57 KG/M2 | WEIGHT: 193.31 LBS | DIASTOLIC BLOOD PRESSURE: 77 MMHG

## 2018-07-05 DIAGNOSIS — I35.8 AORTIC VALVE SCLEROSIS: ICD-10-CM

## 2018-07-05 DIAGNOSIS — I10 ESSENTIAL HYPERTENSION: ICD-10-CM

## 2018-07-05 DIAGNOSIS — R09.89 BILATERAL CAROTID BRUITS: ICD-10-CM

## 2018-07-05 DIAGNOSIS — I51.89 DIASTOLIC DYSFUNCTION: ICD-10-CM

## 2018-07-05 DIAGNOSIS — Z96.651 STATUS POST TOTAL RIGHT KNEE REPLACEMENT: ICD-10-CM

## 2018-07-05 DIAGNOSIS — Z01.810 PREOPERATIVE CARDIOVASCULAR EXAMINATION: Primary | ICD-10-CM

## 2018-07-05 DIAGNOSIS — E78.5 DYSLIPIDEMIA: ICD-10-CM

## 2018-07-05 DIAGNOSIS — I10 HYPERTENSION, UNSPECIFIED TYPE: ICD-10-CM

## 2018-07-05 DIAGNOSIS — I10 HYPERTENSION, UNSPECIFIED TYPE: Primary | ICD-10-CM

## 2018-07-05 DIAGNOSIS — Z72.0 TOBACCO USE: ICD-10-CM

## 2018-07-05 DIAGNOSIS — G47.33 OBSTRUCTIVE SLEEP APNEA SYNDROME: ICD-10-CM

## 2018-07-05 DIAGNOSIS — K21.9 GASTROESOPHAGEAL REFLUX DISEASE, ESOPHAGITIS PRESENCE NOT SPECIFIED: ICD-10-CM

## 2018-07-05 DIAGNOSIS — E66.09 CLASS 2 OBESITY DUE TO EXCESS CALORIES WITHOUT SERIOUS COMORBIDITY WITH BODY MASS INDEX (BMI) OF 35.0 TO 35.9 IN ADULT: ICD-10-CM

## 2018-07-05 DIAGNOSIS — Z96.652 STATUS POST TOTAL KNEE REPLACEMENT, LEFT: ICD-10-CM

## 2018-07-05 PROCEDURE — 93010 ELECTROCARDIOGRAM REPORT: CPT | Mod: S$PBB,,, | Performed by: INTERNAL MEDICINE

## 2018-07-05 PROCEDURE — 99999 PR PBB SHADOW E&M-EST. PATIENT-LVL III: CPT | Mod: PBBFAC,,, | Performed by: INTERNAL MEDICINE

## 2018-07-05 PROCEDURE — 99204 OFFICE O/P NEW MOD 45 MIN: CPT | Mod: S$PBB,,, | Performed by: INTERNAL MEDICINE

## 2018-07-05 PROCEDURE — 99213 OFFICE O/P EST LOW 20 MIN: CPT | Mod: PBBFAC,25 | Performed by: INTERNAL MEDICINE

## 2018-07-05 PROCEDURE — 93005 ELECTROCARDIOGRAM TRACING: CPT | Mod: PBBFAC | Performed by: INTERNAL MEDICINE

## 2018-07-05 NOTE — Clinical Note
Thank you for referring Stephanie Sears for preoperative cardiovascular risk evaluation. Please see my note for details of this encounter. If you have any questions, please contact me.  Thank you again for the referral.

## 2018-07-05 NOTE — PATIENT INSTRUCTIONS
Take blood pressure and puls every morning and evening for the next week and write them down.  Bring in the readings after a week.

## 2018-07-05 NOTE — PROGRESS NOTES
"Chart has been dictated using voice recognition software.  It is not been reviewed carefully for any transcriptional errors due to this technology.   Subjective:   Patient ID:  Stephanie Sears is a 68 y.o. female who presents for evaluation of Pre-op Exam (colonoscopy )      HPI:  Patient with history of GERD, GI distress, hypertension, dyslipidemia, type 2 diabetes, s/p bilateral knee replacements, and thyroid disease.  Patient presents for preoperative evaluation.    Prior cardiac workup showed diastolic dysfunction and mild AS.  Carotid ultrasound showed mild disease bilaterally.     Patient denies any chest discomfort on exertion or at rest.  Gets short of breath by "just moving".  Has been having exertional dyspnea since knee surgery.  No orthopnea, PND, or edema. Patient denies any palpitations, lightheadedness, or syncope, but has occasional mild orthostatic symptoms. Patient denies lower extremity claudication. Had no problems with knee surgeries.     Notes BP at home has been high lately.    Cardiac risk factors: diet controlled diabetes, dyslipidemia, hypertension, obesity, positive family history, sedentary lifestyle, tobacco use    Past Medical History:   Diagnosis Date    Bronchitis     Cataract     Cervical spondylosis with radiculopathy 7/10/2012    Chest pain     Chronic neck pain 7/26/2012    GERD (gastroesophageal reflux disease)     Glaucoma     Hyperlipidemia     Hypertension     Neck pain 7/10/2012    Primary osteoarthritis of both knees     Psoriasis     Subacromial or subdeltoid bursitis 7/10/2012    Thyroid disease     Type 2 diabetes mellitus 12/10/2013       Past Surgical History:   Procedure Laterality Date    BREAST LUMPECTOMY Left 1988    mass in the rt breast surgery  1988    BREAST MASS EXCISION Right     CHOLECYSTECTOMY      HYSTERECTOMY  1980's    JOINT REPLACEMENT      KNEE SURGERY Left 1-20-16    TKR    KNEE SURGERY Right 02/26/2018    TKR    L4-L5 fusion  " 2006    SPINE SURGERY         Social History   Substance Use Topics    Smoking status: Current Every Day Smoker     Packs/day: 1.00     Years: 40.00     Types: Cigarettes    Smokeless tobacco: Never Used      Comment: .  .  Retired from [employer].      Alcohol use No       Outpatient Medications Prior to Visit   Medication Sig Dispense Refill    albuterol (PROAIR HFA) 90 mcg/actuation inhaler Inhale 2 puffs into the lungs every 6 (six) hours as needed for Wheezing or Shortness of Breath. 1 Inhaler 3    amlodipine-benazepril (LOTREL) 10-40 mg per capsule Take 1 capsule by mouth once daily. 90 capsule 1    blood sugar diagnostic (ACCU-CHEK JOSE G PLUS TEST STRP) Strp Inject 1 each into the skin 2 (two) times daily. 200 each 11    blood sugar diagnostic Strp Use to test blood glucose levels 2 times per day as instructed. 200 strip 11    CALCIUM CARBONATE (TUMS ORAL) Take by mouth as needed (acid reflux, indigestion).      carvedilol (COREG) 12.5 MG tablet TAKE 1 TABLET(12.5 MG) BY MOUTH TWICE DAILY WITH MEALS 180 tablet 1    diphenhydrAMINE (BENADRYL) 25 mg capsule Take 25 mg by mouth every 6 (six) hours as needed for Itching or Allergies.      fluticasone (FLONASE) 50 mcg/actuation nasal spray 1 spray by Each Nare route 2 (two) times daily. 1 Bottle 1    furosemide (LASIX) 40 MG tablet Take 1 tablet (40 mg total) by mouth once daily. 90 tablet 1    gabapentin (NEURONTIN) 600 MG tablet Take 1 tablet (600 mg total) by mouth 3 (three) times daily. 90 tablet 1    INULIN (FIBER GUMMIES ORAL) Take by mouth every morning.       lancets (ACCU-CHEK MULTICLIX LANCET) Misc Test blood sugar twice daily 300 each 3    lancets (ONETOUCH DELICA LANCETS) 33 gauge Misc Inject 1 lancet into the skin 2 (two) times daily before meals. 200 each 11    nicotine (NICODERM CQ) 21 mg/24 hr Place 1 patch onto the skin once daily. Please dispense rugby clear patches. Thanks 14 patch 0    nicotine  polacrilex 2 MG Lozg Take 1 lozenge (2 mg total) by mouth as needed. 1-2 per hour in place of cigarettes maximum of 15 per day. 108 lozenge 0    traMADol (ULTRAM) 50 mg tablet Take 1 tablet (50 mg total) by mouth every 6 (six) hours as needed for Pain. 60 tablet 0    meloxicam (MOBIC) 7.5 MG tablet Take 1 tablet (7.5 mg total) by mouth once daily. 90 tablet 1    aspirin 325 MG tablet Take 1 tablet (325 mg total) by mouth 2 (two) times daily. 90 tablet 0    docusate sodium (COLACE) 100 MG capsule Take 1 capsule (100 mg total) by mouth 2 (two) times daily as needed. 60 capsule 0    promethazine (PHENERGAN) 12.5 MG Tab Take 1 tablet (12.5 mg total) by mouth every 6 (six) hours as needed. 60 tablet 0     Facility-Administered Medications Prior to Visit   Medication Dose Route Frequency Provider Last Rate Last Dose    dicyclomine 10 mg/5 mL syrup 20 mg  20 mg Oral QID (AC & HS) Indra Solorio MD   20 mg at 06/18/18 1006       Review of patient's allergies indicates:   Allergen Reactions    Hydrocodone Shortness Of Breath    Iodinated contrast- oral and iv dye Shortness Of Breath     Difficulty breathing    Adhesive Itching    Oxycodone      Hallucinations    Sulfa (sulfonamide antibiotics) Hives and Itching       Review of Systems   Constitution: Positive for weight loss (10 poinds post knee surgery). Negative for weight gain.   HENT: Negative for nosebleeds.    Eyes: Negative for vision loss in left eye and vision loss in right eye.   Cardiovascular: Negative for claudication.        As above   Respiratory: Positive for sleep disturbances due to breathing (has sleep apnea, using cpap). Negative for hemoptysis, shortness of breath, sputum production and wheezing.    Endocrine: Negative for polydipsia and polyuria.   Hematologic/Lymphatic: Does not bruise/bleed easily.   Musculoskeletal: Negative for myalgias.   Gastrointestinal: Negative for change in bowel habit, hematemesis, hematochezia, melena, nausea  "and vomiting.   Genitourinary: Negative for hematuria.   Neurological: Negative for focal weakness and numbness.        Has significant spinal arthritis affecting peripheral nerves     Objective:   Physical Exam   Constitutional: She is oriented to person, place, and time. She appears well-developed and well-nourished.   BP (!) 168/77 (BP Location: Left arm, Patient Position: Sitting, BP Method: Large (Automatic))   Pulse 63   Ht 5' 2" (1.575 m)   Wt 87.7 kg (193 lb 5.5 oz)   BMI 35.36 kg/m²    Neck: Neck supple. No JVD present. Carotid bruit is present (bilateral). No thyromegaly present.   Cardiovascular: Normal rate, regular rhythm, S1 normal, S2 normal and intact distal pulses.  Exam reveals S4. Exam reveals no friction rub.    No murmur heard.  Pulmonary/Chest: Breath sounds normal. She has no wheezes. She has no rales.   Abdominal: Soft. Bowel sounds are normal. There is no hepatosplenomegaly. There is no tenderness.   Musculoskeletal: She exhibits no edema.   Neurological: She is alert and oriented to person, place, and time. She has normal strength.   Skin: No cyanosis. Nails show no clubbing.       Lab Results   Component Value Date    WBC 7.56 06/18/2018    HGB 12.6 06/18/2018    HCT 38.8 06/18/2018    MCV 75 (L) 06/18/2018     06/18/2018       Lab Results   Component Value Date     06/18/2018    K 4.4 06/18/2018    BUN 14 06/18/2018    CREATININE 0.8 06/18/2018     06/18/2018    HGBA1C 6.0 (H) 02/09/2018    CHOL 183 12/14/2017    HDL 36 (L) 12/14/2017    LDLCALC 128.0 12/14/2017    TRIG 95 12/14/2017    CHOLHDL 19.7 (L) 12/14/2017    HGB 12.6 06/18/2018    HCT 38.8 06/18/2018     06/18/2018    INR 0.9 02/09/2018     ECG showed sinus rhythm and was a normal ECG.   Assessment:     1. Preoperative cardiovascular examination    2. Dyslipidemia    3. Essential hypertension    4. Diastolic dysfunction    5. Aortic valve sclerosis    6. Gastroesophageal reflux disease, esophagitis " presence not specified    7. Obstructive sleep apnea syndrome    8. Tobacco use    9. Class 2 obesity due to excess calories without serious comorbidity with body mass index (BMI) of 35.0 to 35.9 in adult    10. Status post total knee replacement, left 1/20/2016    11. Status post total right knee replacement 2/26/2018    12. Bilateral carotid bruits      Patient has no chest discomfort or palpitations.  She does get exertional dyspnea on minimal exertion.  However, she has no rest dyspnea.  Her physical exam does not show significant signs of congestive heart failure.  It may be that her exertional dyspnea is related to general physical deconditioning.  Her echocardiogram from a couple years ago shows diastolic dysfunction the patient has not had a significant cardiac events since then.  She does note that the onset of her dyspnea is related to her last knee replacement in the need to use a walker.  At this time, her dyspnea will be treated as if it is general deconditioning.  However, the patient has bilateral bruits. She had minimal carotid disease in the past.  Therefore, her carotid arteries will be reimaged to determine if there is been significant change in her carotid artery disease.  The patient should return in 2 months for repeat cardiovascular evaluation.    Her estimated risk for an adverse cardiac outcome (myocardial infarction, pulmonary edema, ventricular fibrillation, cardiac arrest, or complete heart block) with the proposed surgery is very low  (Revised Cardiac Risk Index [RCRI] - Palmer Criteria - 0.4 %).  The patient is able to do more than 4 mets of activity so further evaluation is not needed this time. I am not convinced that her exertional dyspnea is heart failure and therefore, will not give her a point for her risk calculation.    Plan:     Stephanie was seen today for pre-op exam.    Diagnoses and all orders for this visit:    Preoperative cardiovascular examination    Dyslipidemia  -      Cardiology Lab Carotid US Bilateral; Future    Essential hypertension  -     Cardiology Lab Carotid US Bilateral; Future    Diastolic dysfunction    Aortic valve sclerosis    Gastroesophageal reflux disease, esophagitis presence not specified    Obstructive sleep apnea syndrome    Tobacco use    Class 2 obesity due to excess calories without serious comorbidity with body mass index (BMI) of 35.0 to 35.9 in adult  -     Cardiology Lab Carotid US Bilateral; Future    Status post total knee replacement, left 1/20/2016    Status post total right knee replacement 2/26/2018    Bilateral carotid bruits  -     Cardiology Lab Carotid US Bilateral; Future          Dean Robins MD  Consultative Cardiology

## 2018-07-11 ENCOUNTER — TELEPHONE (OUTPATIENT)
Dept: SLEEP MEDICINE | Facility: CLINIC | Age: 68
End: 2018-07-11

## 2018-07-11 NOTE — TELEPHONE ENCOUNTER
----- Message from Jocelyn Queen sent at 7/11/2018  1:35 PM CDT -----  Contact: pt            Name of Who is Calling: BRIANNA KOCH [[id number]]      What is the request in detail: pt calling in regards to cpap affecting her blood pressure.. Please advise    Can the clinic reply by [facility] no      What Number to Call Back if not in [facility]: [phone]

## 2018-07-16 ENCOUNTER — TELEPHONE (OUTPATIENT)
Dept: FAMILY MEDICINE | Facility: CLINIC | Age: 68
End: 2018-07-16

## 2018-07-16 ENCOUNTER — CLINICAL SUPPORT (OUTPATIENT)
Dept: SMOKING CESSATION | Facility: CLINIC | Age: 68
End: 2018-07-16
Payer: COMMERCIAL

## 2018-07-16 DIAGNOSIS — F17.200 NICOTINE DEPENDENCE: Primary | ICD-10-CM

## 2018-07-16 PROCEDURE — 99407 BEHAV CHNG SMOKING > 10 MIN: CPT | Mod: S$GLB,,,

## 2018-07-16 NOTE — TELEPHONE ENCOUNTER
----- Message from Kaitlyn Mcallister sent at 7/16/2018 10:12 AM CDT -----  Contact: Self/ [phone]  Pt requesting mammogram orders. Thank you.

## 2018-07-16 NOTE — PROGRESS NOTES
Successful contact with patient regarding tobacco cessation quit #1. Pt states, she continue to smoke 16 cigarettes per day and she is ready to return to the program. Pt is scheduled for quit #2 on 8/1/2018. Pt provided with her current and next available benefit status. Will complete the tobacco cessation smart form for quit #1 and resolve the episode. Will follow up with patient in 3-4 months regarding quit #2.

## 2018-07-17 ENCOUNTER — OFFICE VISIT (OUTPATIENT)
Dept: FAMILY MEDICINE | Facility: CLINIC | Age: 68
End: 2018-07-17
Payer: MEDICARE

## 2018-07-17 VITALS
WEIGHT: 193.13 LBS | RESPIRATION RATE: 14 BRPM | HEIGHT: 62 IN | HEART RATE: 76 BPM | BODY MASS INDEX: 35.54 KG/M2 | TEMPERATURE: 98 F | SYSTOLIC BLOOD PRESSURE: 136 MMHG | OXYGEN SATURATION: 98 % | DIASTOLIC BLOOD PRESSURE: 73 MMHG

## 2018-07-17 DIAGNOSIS — E11.9 DIET-CONTROLLED DIABETES MELLITUS: ICD-10-CM

## 2018-07-17 DIAGNOSIS — G47.33 OBSTRUCTIVE SLEEP APNEA SYNDROME: ICD-10-CM

## 2018-07-17 DIAGNOSIS — L43.9 LICHEN PLANUS: ICD-10-CM

## 2018-07-17 DIAGNOSIS — Z72.0 TOBACCO USE: ICD-10-CM

## 2018-07-17 DIAGNOSIS — I10 HYPERTENSION, ESSENTIAL: ICD-10-CM

## 2018-07-17 DIAGNOSIS — R09.89 BILATERAL CAROTID BRUITS: Primary | ICD-10-CM

## 2018-07-17 PROBLEM — R31.9 URINARY TRACT INFECTION WITH HEMATURIA: Status: RESOLVED | Noted: 2018-02-09 | Resolved: 2018-07-17

## 2018-07-17 PROBLEM — N39.0 URINARY TRACT INFECTION WITH HEMATURIA: Status: RESOLVED | Noted: 2018-02-09 | Resolved: 2018-07-17

## 2018-07-17 PROCEDURE — 99214 OFFICE O/P EST MOD 30 MIN: CPT | Mod: PBBFAC,PN | Performed by: FAMILY MEDICINE

## 2018-07-17 PROCEDURE — 99999 PR PBB SHADOW E&M-EST. PATIENT-LVL IV: CPT | Mod: PBBFAC,,, | Performed by: FAMILY MEDICINE

## 2018-07-17 PROCEDURE — 99215 OFFICE O/P EST HI 40 MIN: CPT | Mod: S$PBB,,, | Performed by: FAMILY MEDICINE

## 2018-07-17 NOTE — PATIENT INSTRUCTIONS
Understanding Lichen Planus  Lichen planus is a long-term (chronic) skin disease. Its a rash that can develop anywhere on the body. But it most often erupts on the wrists, arms, legs, scalp, and genitals. It may also appear on the nails and in the mouth. Peoples ages 30 to 60 are more likely to get it.  How to say it  LY-jason play-nuhs   What causes lichen planus?  The cause of lichen planus is often not known. But metals such as gold, and certain medicines may trigger it. These include non-steroidal anti-inflammatory medicines and penicillin. Some research suggests the disease may also be linked to hepatitis C.  Symptoms of lichen planus  Lichen planus causes flat-topped bumps or patches to form on the skin. These bumps may hurt and be very itchy. They are usually shiny and violet in color. But they may be dark red or purple. They may also have fine white lines on them. In the mouth, the condition may look like patches of white lace.  Treatment for lichen planus  Lichen planus often goes away within a few years. It may do so without treatment. But to speed up healing, treatment options include:  · Steroids. These medicines can be put directly onto the skin or injected into the affected area. They can also be taken by mouth.  · Other medicines. Your healthcare provider may give you other medicines, such as an antihistamine or retinoid, to ease itching and pain. Anti-itch creams or ointments may also work. Your provider might also prescribe other medicines that suppress the immune system.  · Phototherapy. This treatment directs ultraviolet light on the skin to help clear it.  Self-care tips for lichen planus  · Dont scratch any affected areas.  · Use mild soap and moisturizing lotion after bathing.  When to call your healthcare provider  Call your healthcare provider right away if you have any of these:  · Fever of 100.4°F (38°C) or higher, or as directed  · New symptoms  · Pain that gets worse  · Symptoms that  dont get better, or get worse  · Ulcers in the mouth   Date Last Reviewed: 5/1/2016  © 4843-7713 [employer]. [address] [place] [address], [place] [address]. All rights reserved. This information is not intended as a substitute for professional medical care. Always follow your healthcare professional's instructions.

## 2018-07-17 NOTE — PROGRESS NOTES
Chief Complaint   Patient presents with    Mole     on leg    Follow-up     on BP       HPI    Stephanie Sears is 68 y.o. female. The primary encounter diagnosis was Bilateral carotid bruits. Diagnoses of Hypertension, essential, Obstructive sleep apnea syndrome, Tobacco use, Diet-controlled diabetes mellitus, and Lichen planus were also pertinent to this visit.    68-year-old female with obstructive sleep apnea and hypertension comes to clinic for general follow-up visit.  She also reports a rash on her bilateral legs a request for evaluation of this issue as  Patient reports that she requested a Colonoscopy at [facility] was required clearance to have this done.  Patient reports that she has seen Cardiology and will have follow up.    Patient will see Nephrology for blood pressure follow up.  She reports now that her CPAP machine has been titrated she notes her blood pressure has improved and her daytime    JAM - she reports sleep improved but recently began to wake up during the night.  She reports this began about 4 weeks ago.  She reports last titration was October 2017.  She has follow up scheduled for further titration.    Tobacco use - patient reports that she has returned to smoking cessation classes. Quit date set for 8/1/18.  She denies cravings but reports cigarette use even while using the bathroom.    Bilateral lower extremity rash.  Lesions are hyperpigmented and slightly raised.  Some measure less than 1 cm and others measure approximately 2 cm diameter.  She denies injury.  The lesions are mildly pruritic but have left disfiguring scars.  She believes that the onset of contact with latex containing medical supplies.      Review of Systems   Constitutional: Negative for activity change, chills, diaphoresis, fatigue and fever.   Respiratory: Negative for shortness of breath.    Cardiovascular: Negative for chest pain.   Musculoskeletal: Positive for arthralgias, back pain and myalgias. Negative  for gait problem.   Skin: Positive for rash.   Psychiatric/Behavioral: Negative for suicidal ideas.           Current Outpatient Prescriptions:     albuterol (PROAIR HFA) 90 mcg/actuation inhaler, Inhale 2 puffs into the lungs every 6 (six) hours as needed for Wheezing or Shortness of Breath., Disp: 1 Inhaler, Rfl: 3    amlodipine-benazepril (LOTREL) 10-40 mg per capsule, TAKE 1 CAPSULE DAILY, Disp: 90 capsule, Rfl: 1    blood sugar diagnostic (ACCU-CHEK JOSE G PLUS TEST STRP) Strp, Inject 1 each into the skin 2 (two) times daily., Disp: 200 each, Rfl: 11    blood sugar diagnostic Strp, Use to test blood glucose levels 2 times per day as instructed., Disp: 200 strip, Rfl: 11    CALCIUM CARBONATE (TUMS ORAL), Take by mouth as needed (acid reflux, indigestion)., Disp: , Rfl:     carvedilol (COREG) 12.5 MG tablet, TAKE 1 TABLET(12.5 MG) BY MOUTH TWICE DAILY WITH MEALS, Disp: 180 tablet, Rfl: 1    diphenhydrAMINE (BENADRYL) 25 mg capsule, Take 25 mg by mouth every 6 (six) hours as needed for Itching or Allergies., Disp: , Rfl:     fluticasone (FLONASE) 50 mcg/actuation nasal spray, 1 spray by Each Nare route 2 (two) times daily., Disp: 1 Bottle, Rfl: 1    furosemide (LASIX) 40 MG tablet, TAKE 1 TABLET DAILY, Disp: 90 tablet, Rfl: 1    gabapentin (NEURONTIN) 600 MG tablet, Take 1 tablet (600 mg total) by mouth 3 (three) times daily., Disp: 90 tablet, Rfl: 1    INULIN (FIBER GUMMIES ORAL), Take by mouth every morning. , Disp: , Rfl:     lancets (ACCU-CHEK MULTICLIX LANCET) Misc, Test blood sugar twice daily, Disp: 300 each, Rfl: 3    lancets (ONETOUCH DELICA LANCETS) 33 gauge Misc, Inject 1 lancet into the skin 2 (two) times daily before meals., Disp: 200 each, Rfl: 11    meloxicam (MOBIC) 7.5 MG tablet, Take 1 tablet (7.5 mg total) by mouth once daily., Disp: 90 tablet, Rfl: 1    nicotine (NICODERM CQ) 21 mg/24 hr, Place 1 patch onto the skin once daily. Please dispense rugby clear patches. Thanks, Disp: 14  "patch, Rfl: 0    nicotine polacrilex 2 MG Lozg, Take 1 lozenge (2 mg total) by mouth as needed. 1-2 per hour in place of cigarettes maximum of 15 per day., Disp: 108 lozenge, Rfl: 0    traMADol (ULTRAM) 50 mg tablet, Take 1 tablet (50 mg total) by mouth every 6 (six) hours as needed for Pain., Disp: 60 tablet, Rfl: 0    Current Facility-Administered Medications:     dicyclomine 10 mg/5 mL syrup 20 mg, 20 mg, Oral, QID (AC & HS), Indra Solorio MD, 20 mg at 06/18/18 1006      Blood pressure 136/73, pulse 76, temperature 97.9 °F (36.6 °C), temperature source Oral, resp. rate 14, height 5' 2" (1.575 m), weight 87.6 kg (193 lb 2 oz), SpO2 98 %.    Physical Exam   Constitutional: Vital signs are normal. She appears well-developed and well-nourished. She does not appear ill. No distress.   HENT:   Mouth/Throat: Normal dentition.   Neck: Trachea normal. No thyromegaly present.   Cardiovascular: Normal rate, regular rhythm and intact distal pulses.    No murmur heard.  Pulmonary/Chest: Effort normal. She has no decreased breath sounds. She has no wheezes. She exhibits no deformity.   Musculoskeletal:   Normal gait. No decreased range of motion of major joints.   Neurological: She is not disoriented.   Skin: Skin is intact. Capillary refill takes less than 2 seconds. Rash noted. Rash is macular.        Psychiatric: Her speech is normal and behavior is normal. Her mood appears not anxious. She does not exhibit a depressed mood.       Lab Visit on 06/18/2018   Component Date Value Ref Range Status    Sodium 06/18/2018 141  136 - 145 mmol/L Final    Potassium 06/18/2018 4.4  3.5 - 5.1 mmol/L Final    Chloride 06/18/2018 107  95 - 110 mmol/L Final    CO2 06/18/2018 23  23 - 29 mmol/L Final    Glucose 06/18/2018 100  70 - 110 mg/dL Final    BUN, Bld 06/18/2018 14  8 - 23 mg/dL Final    Creatinine 06/18/2018 0.8  0.5 - 1.4 mg/dL Final    Calcium 06/18/2018 9.9  8.7 - 10.5 mg/dL Final    Total Protein 06/18/2018 7.1  " 6.0 - 8.4 g/dL Final    Albumin 06/18/2018 3.3* 3.5 - 5.2 g/dL Final    Total Bilirubin 06/18/2018 0.3  0.1 - 1.0 mg/dL Final    Alkaline Phosphatase 06/18/2018 84  55 - 135 U/L Final    AST 06/18/2018 12  10 - 40 U/L Final    ALT 06/18/2018 13  10 - 44 U/L Final    Anion Gap 06/18/2018 11  8 - 16 mmol/L Final    eGFR if African American 06/18/2018 >60  >60 mL/min/1.73 m^2 Final    eGFR if non African American 06/18/2018 >60  >60 mL/min/1.73 m^2 Final    Lipase 06/18/2018 24  4 - 60 U/L Final    CRP 06/18/2018 13.1* 0.0 - 8.2 mg/L Final    WBC 06/18/2018 7.56  3.90 - 12.70 K/uL Final    RBC 06/18/2018 5.17  4.00 - 5.40 M/uL Final    Hemoglobin 06/18/2018 12.6  12.0 - 16.0 g/dL Final    Hematocrit 06/18/2018 38.8  37.0 - 48.5 % Final    MCV 06/18/2018 75* 82 - 98 fL Final    MCH 06/18/2018 24.4* 27.0 - 31.0 pg Final    MCHC 06/18/2018 32.5  32.0 - 36.0 g/dL Final    RDW 06/18/2018 16.9* 11.5 - 14.5 % Final    Platelets 06/18/2018 305  150 - 350 K/uL Final    MPV 06/18/2018 11.3  9.2 - 12.9 fL Final    Gran # (ANC) 06/18/2018 3.4  1.8 - 7.7 K/uL Final    Lymph # 06/18/2018 3.1  1.0 - 4.8 K/uL Final    Mono # 06/18/2018 0.7  0.3 - 1.0 K/uL Final    Eos # 06/18/2018 0.3  0.0 - 0.5 K/uL Final    Baso # 06/18/2018 0.02  0.00 - 0.20 K/uL Final    Gran% 06/18/2018 45.5  38.0 - 73.0 % Final    Lymph% 06/18/2018 40.3  18.0 - 48.0 % Final    Mono% 06/18/2018 9.5  4.0 - 15.0 % Final    Eosinophil% 06/18/2018 4.1  0.0 - 8.0 % Final    Basophil% 06/18/2018 0.3  0.0 - 1.9 % Final    Differential Method 06/18/2018 Automated   Final   ]    Assessment:    1. Bilateral carotid bruits    2. Hypertension, essential    3. Obstructive sleep apnea syndrome    4. Tobacco use    5. Diet-controlled diabetes mellitus    6. Lichen planus          Stephanie was seen today for mole and follow-up.    Diagnoses and all orders for this visit:    Bilateral carotid bruits   -stable.  Patient has carotid ultrasound ordered  by cardiologist.  Continue follow-up with Cardiology and for scheduled exam.    Hypertension, essential   -improved.  Blood pressure improved since previous visit.  Patient encouraged to continues use of CPAP machine and current blood pressure regimen.    Obstructive sleep apnea syndrome   -stable.  Patient reports increased shortness of breath in attributes this to her sleep apnea.  Proceed with re-evaluation and repeat sleep study for titration.   -if no improvement or abnormalities noted consider further evaluation of pulmonary hypertension noted on echo findings.    Tobacco use   -improved.  Patient has moved from pre contemplative stage II planning stage.  Quit date is set at all this 1st.  Continue follow-up with smoking cessation.    Diet-controlled diabetes mellitus   -stable.  Previous hemoglobin A1c reviewed with patient.  Obtain A1c at 6 month intervals.  Continue to monitor diet.    Lichen planus  -     Ambulatory referral to Dermatology  - new problem.  Lower extremity rash most consistent with lichen planus.  Patient refer to Dermatology for biopsy and further evaluation.    A total of 40 minutes was spent with the patient during this encounter. More than 50% of the encounter was spent counseling the patient regarding treatment options, expected outcomes, and coordination of care.      FOLLOW UP: Follow-up in about 3 months (around 10/17/2018) for Follow up.

## 2018-07-18 ENCOUNTER — TELEPHONE (OUTPATIENT)
Dept: FAMILY MEDICINE | Facility: CLINIC | Age: 68
End: 2018-07-18

## 2018-07-18 NOTE — TELEPHONE ENCOUNTER
----- Message from Juan Farris sent at 7/18/2018  3:59 PM CDT -----  Contact: [phone]  Patient would like staff to give her a call. She refuse to give a reason for the reason she would like to speak to staff.    Thank you

## 2018-07-19 ENCOUNTER — PATIENT MESSAGE (OUTPATIENT)
Dept: CARDIOLOGY | Facility: HOSPITAL | Age: 68
End: 2018-07-19

## 2018-07-19 ENCOUNTER — CLINICAL SUPPORT (OUTPATIENT)
Dept: CARDIOLOGY | Facility: CLINIC | Age: 68
End: 2018-07-19
Attending: INTERNAL MEDICINE
Payer: MEDICARE

## 2018-07-19 ENCOUNTER — OFFICE VISIT (OUTPATIENT)
Dept: NEPHROLOGY | Facility: CLINIC | Age: 68
End: 2018-07-19
Payer: MEDICARE

## 2018-07-19 VITALS
WEIGHT: 194.44 LBS | BODY MASS INDEX: 35.78 KG/M2 | HEART RATE: 67 BPM | OXYGEN SATURATION: 99 % | SYSTOLIC BLOOD PRESSURE: 140 MMHG | DIASTOLIC BLOOD PRESSURE: 70 MMHG | HEIGHT: 62 IN

## 2018-07-19 DIAGNOSIS — I10 ESSENTIAL HYPERTENSION: ICD-10-CM

## 2018-07-19 DIAGNOSIS — N18.1 CKD (CHRONIC KIDNEY DISEASE) STAGE 1, GFR 90 ML/MIN OR GREATER: Primary | ICD-10-CM

## 2018-07-19 DIAGNOSIS — E11.21 DIABETIC NEPHROPATHY ASSOCIATED WITH TYPE 2 DIABETES MELLITUS: ICD-10-CM

## 2018-07-19 DIAGNOSIS — E66.09 CLASS 2 OBESITY DUE TO EXCESS CALORIES WITHOUT SERIOUS COMORBIDITY WITH BODY MASS INDEX (BMI) OF 35.0 TO 35.9 IN ADULT: ICD-10-CM

## 2018-07-19 DIAGNOSIS — G47.33 OSA (OBSTRUCTIVE SLEEP APNEA): ICD-10-CM

## 2018-07-19 DIAGNOSIS — R09.89 BILATERAL CAROTID BRUITS: ICD-10-CM

## 2018-07-19 DIAGNOSIS — E78.5 DYSLIPIDEMIA: ICD-10-CM

## 2018-07-19 LAB — INTERNAL CAROTID STENOSIS: NORMAL

## 2018-07-19 PROCEDURE — 99214 OFFICE O/P EST MOD 30 MIN: CPT | Mod: PBBFAC,25 | Performed by: INTERNAL MEDICINE

## 2018-07-19 PROCEDURE — 99999 PR PBB SHADOW E&M-EST. PATIENT-LVL IV: CPT | Mod: PBBFAC,,, | Performed by: INTERNAL MEDICINE

## 2018-07-19 PROCEDURE — 99213 OFFICE O/P EST LOW 20 MIN: CPT | Mod: S$PBB,,, | Performed by: INTERNAL MEDICINE

## 2018-07-19 PROCEDURE — 93880 EXTRACRANIAL BILAT STUDY: CPT | Mod: PBBFAC | Performed by: INTERNAL MEDICINE

## 2018-07-20 NOTE — PROGRESS NOTES
Patient is here today for follow up evaluation of CKD. Last seen in renal office 12/11/17. Baseline Cr; 0.8-0.9 mg/dl Her most recent lab is shown below.  She has hx of hypertension and diabetes (last A1c; 6.0%)Today she has no new complaints.  Lab Results   Component Value Date    K 4.4 06/18/2018    CREATININE 0.8 06/18/2018    ESTGFRAFRICA >60 06/18/2018    EGFRNONAA >60 06/18/2018       Physical Exam   Obese woman; no acute distress; oriented x 3  BP;  140/70    HEENT; Grossly Intact  CHEST; Clear P&A; no rales or rhonchi  HEART; RR; S1&S2 no murmur rub gallop  ABD; BS(+) non-tender; (-)CVAT  EXT; (-) Edema    Impression  CKD Stable  Hypertension Borderline control by new guidelines; cautioned re; sodium intake      Plan  Return Visit; 6 mo

## 2018-07-22 DIAGNOSIS — J44.9 CHRONIC OBSTRUCTIVE PULMONARY DISEASE, UNSPECIFIED COPD TYPE: ICD-10-CM

## 2018-07-22 DIAGNOSIS — I10 ESSENTIAL HYPERTENSION: ICD-10-CM

## 2018-07-23 RX ORDER — FUROSEMIDE 40 MG/1
TABLET ORAL
Qty: 90 TABLET | Refills: 1 | Status: SHIPPED | OUTPATIENT
Start: 2018-07-23 | End: 2019-02-08 | Stop reason: SDUPTHER

## 2018-07-23 RX ORDER — AMLODIPINE AND BENAZEPRIL HYDROCHLORIDE 10; 40 MG/1; MG/1
CAPSULE ORAL
Qty: 90 CAPSULE | Refills: 1 | Status: SHIPPED | OUTPATIENT
Start: 2018-07-23 | End: 2019-02-08 | Stop reason: SDUPTHER

## 2018-07-24 ENCOUNTER — OFFICE VISIT (OUTPATIENT)
Dept: SLEEP MEDICINE | Facility: CLINIC | Age: 68
End: 2018-07-24
Payer: MEDICARE

## 2018-07-24 VITALS
DIASTOLIC BLOOD PRESSURE: 64 MMHG | HEIGHT: 62 IN | BODY MASS INDEX: 35.7 KG/M2 | SYSTOLIC BLOOD PRESSURE: 108 MMHG | HEART RATE: 68 BPM | WEIGHT: 194 LBS

## 2018-07-24 DIAGNOSIS — E66.01 SEVERE OBESITY (BMI 35.0-35.9 WITH COMORBIDITY): ICD-10-CM

## 2018-07-24 DIAGNOSIS — G47.33 OBSTRUCTIVE SLEEP APNEA: Primary | ICD-10-CM

## 2018-07-24 DIAGNOSIS — I10 ESSENTIAL HYPERTENSION: ICD-10-CM

## 2018-07-24 PROCEDURE — 99999 PR PBB SHADOW E&M-EST. PATIENT-LVL IV: CPT | Mod: PBBFAC,,, | Performed by: NURSE PRACTITIONER

## 2018-07-24 PROCEDURE — 99214 OFFICE O/P EST MOD 30 MIN: CPT | Mod: S$PBB,,, | Performed by: NURSE PRACTITIONER

## 2018-07-24 PROCEDURE — 99214 OFFICE O/P EST MOD 30 MIN: CPT | Mod: PBBFAC | Performed by: NURSE PRACTITIONER

## 2018-07-24 NOTE — PROGRESS NOTES
Stephanie Sears was seen as a f/u today for the mgt of obstructive sleep apnea.     She has continued to use cpap nightly. The past 5 wks not sleeping as well anymore, disrupted sleep. Using Kita view, got new mask this weekend. ++oral drying a problem. Using climate control hose which she got and it helped but not anymore.   6.6h/n. 29/30d>4h. AHI 1.8, 100% mask fit. 29/30d>4h.     HISTORY:  8/2/17  CHIEF COMPLAINT: Air gasping    HISTORY OF PRESENT ILLNESS:Stephanie Sears a 68 y.o. female presents for the evaluation of obstructive sleep apnea. She has never had a sleep study. +air gasping. Sleeps on 3 pillows (yrs doing for reflux). Nocturia 3-6x. +snoring. Feels short of breath upon awakening. +oral drying in am. Keeps waking up all night long, not getting good sleep. Her BP is still high despite medications.  Denies am headaches. Keeps tv on all night long. Does not drive long distances anymore. Teeth chopping 20yrs ago and wore mouth guard.     Denies symptoms of restless legs or kicking during sleep.   Hx TMJ, broke jaw age 17    On todays [place] Sleepiness Scale the patient scores a 16/24.     BT:10:30-11:30p  SL: 30-60min  WT: 7a , more recently 8:30-9a  Sleep quality: unrefreshing     12/6/17:  She has since undergone a sleep study which we reviewed together today in detail. She has finally been setup by [employer] [employer] with her CPAP machine set 10cm./ Doing great. Finally sleeping!! She is very happy. Uses nightly. Denies pressure intolerance. Having oral drying despite high humidity setting. Denies nasal drying. Exhalation port can go in eyes, but she adjusts her mask which helps. Denies mask leaks. Daytime sleepiness improved. ESS=8. Using ramp to help sleep onset. Snoring resolved. Nocturia less, maybe once now. Wakes up refreshed.     Interrogation- new machine condition, AHI 1.8, avag 5.1-6.8h/n. Kita view. 0% periodic. 100% mask fit. Heat at 5. 23/30d>4h.    HgbA1c 6.0  FMP HTN/pulmonary HTN mgt.  "  Gained 9#        FAMILY HISTORY: Mom had JAM, sister and nephew +JAM  SOCIAL HISTORY: Significant other. no ETOH, + tobacco ~ 1/2 ppd (just began nicoderm)    REVIEW OF SYSTEMS:  Sleep related symptoms as per HPI; occasional sinus congestion; arthritic pain/knee pain better since surgery Feb '18/low back pain (hx surgery) (gabapentin bid)(tramadol prn).   Otherwise, a balance of 10 systems reviewed is negative    Jan 2016  Normal left ventricular systolic function (EF 60-65%).     2 - Left ventricular diastolic dysfunction.     3 - Normal right ventricular systolic function .     4 - Mild left atrial enlargement.     5 - Mild tricuspid regurgitation.     6 - The estimated PA systolic pressure is 39 mmHg    PSG 8/11/17: AHI was 6.0 with an oxygen aye of 86.0%. The RERA index was 4.8 and RDI was 10.8 events per hour.   Ttiration study, effective supine REM cpap 10cm      PHYSICAL EXAM:   /64   Pulse 68   Ht 5' 2" (1.575 m)   Wt 88 kg (194 lb)   BMI 35.48 kg/m²   GENERAL: Obese body habitus, well groomed       ASSESSMENT:   JAM, mild. Excellent adherence, symptoms improved. AHI<5 7/24/18: Oral drying a problematic, remains adherent ahi<5. Already has climate control hose  She has medical comorbidities of severe obesity, hypertension (optimal today), diet controlled-DM, tobacco abuse, pulmonary hypertension. Warrants continued definitive treatment.     PLAN:   1 Continue CPAP 10cm. [facility]. Continue climate control hose and consider biotene and/or chin strap with FFM and ensure optimal nasal patency  2. Discussed effectiveness of her therapy,  and potential ramifications of untreated JAM, including stroke, heart disease, HTN.   3.  Encouraged continued weight loss efforts for potential improvement of JAM and overall health benefits, see FMP for HTN mgt   4. RTC annually, sooner if needed. Also consider OA with her DDS as alternative tx option  Trial taking 1/2-1 tab gabapentin qhs to help disrupted " sleep also

## 2018-08-01 ENCOUNTER — TELEPHONE (OUTPATIENT)
Dept: SMOKING CESSATION | Facility: CLINIC | Age: 68
End: 2018-08-01

## 2018-08-01 NOTE — TELEPHONE ENCOUNTER
[facility]- called to check on patient, no show for intake appointment. No answer, no voicemail available .

## 2018-08-06 ENCOUNTER — TELEPHONE (OUTPATIENT)
Dept: FAMILY MEDICINE | Facility: CLINIC | Age: 68
End: 2018-08-06

## 2018-08-06 DIAGNOSIS — Z12.31 ENCOUNTER FOR SCREENING MAMMOGRAM FOR BREAST CANCER: Primary | ICD-10-CM

## 2018-08-06 NOTE — TELEPHONE ENCOUNTER
----- Message from Oneyda Villatoro sent at 8/6/2018  9:43 AM CDT -----  Contact: Self   Patient says she need Mammogram orders. Please call at [phone].

## 2018-08-07 ENCOUNTER — HOSPITAL ENCOUNTER (OUTPATIENT)
Dept: RADIOLOGY | Facility: HOSPITAL | Age: 68
Discharge: HOME | End: 2018-08-07
Attending: FAMILY MEDICINE
Payer: MEDICARE

## 2018-08-07 ENCOUNTER — OFFICE VISIT (OUTPATIENT)
Dept: SLEEP MEDICINE | Facility: CLINIC | Age: 68
End: 2018-08-07
Payer: MEDICARE

## 2018-08-07 VITALS
HEART RATE: 70 BPM | OXYGEN SATURATION: 98 % | DIASTOLIC BLOOD PRESSURE: 60 MMHG | SYSTOLIC BLOOD PRESSURE: 134 MMHG | HEIGHT: 62 IN | BODY MASS INDEX: 35.94 KG/M2 | WEIGHT: 195.31 LBS

## 2018-08-07 DIAGNOSIS — G47.33 OSA (OBSTRUCTIVE SLEEP APNEA): Primary | ICD-10-CM

## 2018-08-07 DIAGNOSIS — Z12.31 ENCOUNTER FOR SCREENING MAMMOGRAM FOR BREAST CANCER: ICD-10-CM

## 2018-08-07 DIAGNOSIS — G89.29 CHRONIC NECK PAIN: ICD-10-CM

## 2018-08-07 DIAGNOSIS — M54.2 CHRONIC NECK PAIN: ICD-10-CM

## 2018-08-07 PROCEDURE — 77067 SCR MAMMO BI INCL CAD: CPT | Mod: TC

## 2018-08-07 PROCEDURE — 99213 OFFICE O/P EST LOW 20 MIN: CPT | Mod: PBBFAC,PO | Performed by: INTERNAL MEDICINE

## 2018-08-07 PROCEDURE — 99215 OFFICE O/P EST HI 40 MIN: CPT | Mod: S$PBB,,, | Performed by: INTERNAL MEDICINE

## 2018-08-07 PROCEDURE — 77067 SCR MAMMO BI INCL CAD: CPT | Mod: 26,,, | Performed by: RADIOLOGY

## 2018-08-07 PROCEDURE — 99999 PR PBB SHADOW E&M-EST. PATIENT-LVL III: CPT | Mod: PBBFAC,,, | Performed by: INTERNAL MEDICINE

## 2018-08-07 RX ORDER — GABAPENTIN 600 MG/1
600 TABLET ORAL 3 TIMES DAILY
Qty: 270 TABLET | Refills: 1 | Status: SHIPPED | OUTPATIENT
Start: 2018-08-07 | End: 2019-02-08 | Stop reason: SDUPTHER

## 2018-08-07 NOTE — PROGRESS NOTES
Stephanie Sears  was seen as a new patient for the evaluation of  Prasad.      CHIEF COMPLAINT:    Chief Complaint   Patient presents with    Sleep Apnea    Snoring    Insomnia       HISTORY OF PRESENT ILLNESS: Stephanie Sears is a 68 y.o. female is here for sleep evaluation.   Patient was diagnosed with prasad in 8/17.  Patient was seen by KRZYSZTOF Olson.  Patient was started on cpap therapy 10 cm H20 with maykel view.  Patient was doing well with cpap for the first 7-8 months.  Since 6/18, patient with 1-2 awakenings per night.  Can take up to 1 hour to go back to sleep.  Feeling rested upon awake.  +dry mouth upon awake.  Recently, her boyfriend x 40 years moved back to his home in [place].      Patient sleeps alone.  She is not sure if she snores with cpap.  Overall, sleep is better when compared to sleeping without cpap.  No parasomnia.  No cataplexy.      Chronic knee pain s/p knee replacement.  Better since knee replacement.  Chronic knee pain.      Nice Sleepiness Scale score during initial sleep evaluation was 8 (with cpap).  Used to be 16 without cpap.    SLEEP ROUTINE:  Activity the hour prior to sleep: watch tv     Bed partner:  Alone   Time to bed:  10-11  PM   Lights off:  Gonzales light is on, TV is on   Sleep onset latency:  Watch tv until fall asleep        Disruptions or awakenings:    2 times (can take up to 1 hour to go back to sleep)    Wakeup time:      7 am   Perceived sleep quality:  rested       Daytime naps:      Lay down 90 minutes but does not sleep  Weekend sleep routine:      same  Caffeine use: 2 cups of coffee in morning  exercise habit:   none      PAST MEDICAL HISTORY:    Active Ambulatory Problems     Diagnosis Date Noted    GERD (gastroesophageal reflux disease)     Primary osteoarthritis of both knees     Cervical spondylosis with radiculopathy 07/10/2012    Open angle with borderline findings, low risk - Both Eyes 10/18/2012    Nontoxic uninodular goiter 12/13/2010     Hypertension, essential 12/13/2010    Myopia with astigmatism and presbyopia - Both Eyes 08/12/2013    Diet-controlled diabetes mellitus 12/10/2013    Asymptomatic proteinuria 12/10/2013    DJD (degenerative joint disease) of lumbar spine, mild 04/17/2014    Osteoarthritis of left hip, mild 04/17/2014    DJD (degenerative joint disease) of cervical spine 08/21/2014    Essential hypertension 01/07/2016    Class 2 obesity due to excess calories without serious comorbidity with body mass index (BMI) of 35.0 to 35.9 in adult 01/12/2016    Tobacco use 01/12/2016    H/O scarlet fever 01/12/2016    Aortic valve sclerosis 01/12/2016    Pulmonary hypertension 01/12/2016    Diastolic dysfunction 01/14/2016    Status post total knee replacement, left 1/20/2016 02/02/2016    CMC arthritis 12/07/2016    Chronic midline thoracic back pain 09/15/2017    Primary osteoarthritis of right knee 09/15/2017    Hand pain, left 09/15/2017    Obstructive sleep apnea syndrome 12/06/2017    Personal history of spine surgery 02/09/2018    Fatty liver 02/09/2018    Status post total right knee replacement 2/26/2018 03/13/2018    Dyslipidemia 07/05/2018    Preoperative cardiovascular examination 07/05/2018    Bilateral carotid bruits 07/05/2018     Resolved Ambulatory Problems     Diagnosis Date Noted    Posterior vitreous detachment - Right Eye 10/18/2012    Nuclear sclerosis - Both Eyes 08/12/2013    Carpal tunnel syndrome, bilateral 08/13/2013    Chronic LBP 06/17/2014    Borderline glaucoma with ocular hypertension 07/21/2014    Obesity 02/23/2015    Dermatitis 01/07/2016    Cardiac murmur 01/12/2016    At risk for aspiration 01/12/2016    Mild aortic stenosis 01/14/2016    Primary osteoarthritis of left knee 01/20/2016    Annual physical exam 10/18/2016    Encounter for gynecological examination without abnormal finding 10/18/2016    Status post hysterectomy 10/18/2016    Menopausal state 10/18/2016     Hematuria, unspecified 10/18/2016    DM (diabetes mellitus screen) 10/18/2016    Hematuria, gross 10/31/2016    Urinary tract infection with hematuria 2018     Past Medical History:   Diagnosis Date    Bronchitis     Cataract     Cervical spondylosis with radiculopathy 7/10/2012    Chest pain     Chronic neck pain 2012    Fibrocystic breast     GERD (gastroesophageal reflux disease)     Glaucoma     Hyperlipidemia     Hypertension     Neck pain 7/10/2012    JAM (obstructive sleep apnea)     Primary osteoarthritis of both knees     Psoriasis     Subacromial or subdeltoid bursitis 7/10/2012    Thyroid disease     Type 2 diabetes mellitus 12/10/2013                PAST SURGICAL HISTORY:    Past Surgical History:   Procedure Laterality Date    BREAST LUMPECTOMY Left     mass in the rt breast surgery      BREAST MASS EXCISION Right     benign    CHOLECYSTECTOMY      HYSTERECTOMY      KNEE SURGERY Left 16    TKR    KNEE SURGERY Right 2018    TKR    L4-L5 fusion  2006    SPINE SURGERY           FAMILY HISTORY:                Family History   Problem Relation Age of Onset    Diabetes Mother     Hypertension Mother         CHF, angina    Stroke Mother         tia, glaucoma    Hypertension Father         glaucoma, Alzhiemer's , supra pubic    Kidney disease Brother         kidney transplant, HTN,DM    Diabetes Brother     Amblyopia Neg Hx     Blindness Neg Hx        SOCIAL HISTORY:          Tobacco:   History   Smoking Status    Current Every Day Smoker    Packs/day: 1.00    Years: 40.00    Types: Cigarettes   Smokeless Tobacco    Never Used     Comment: .  .  Retired from [employer].         alcohol use:    History   Alcohol Use No                 Occupation: former     ALLERGIES:    Review of patient's allergies indicates:   Allergen Reactions    Hydrocodone Shortness Of Breath     Iodinated contrast- oral and iv dye Shortness Of Breath     Difficulty breathing    Adhesive Itching    Oxycodone      Hallucinations    Sulfa (sulfonamide antibiotics) Hives and Itching       CURRENT MEDICATIONS:    Current Outpatient Prescriptions   Medication Sig Dispense Refill    albuterol (PROAIR HFA) 90 mcg/actuation inhaler Inhale 2 puffs into the lungs every 6 (six) hours as needed for Wheezing or Shortness of Breath. 1 Inhaler 3    amlodipine-benazepril (LOTREL) 10-40 mg per capsule TAKE 1 CAPSULE DAILY 90 capsule 1    blood sugar diagnostic (ACCU-CHEK JOSE G PLUS TEST STRP) Strp Inject 1 each into the skin 2 (two) times daily. 200 each 11    blood sugar diagnostic Strp Use to test blood glucose levels 2 times per day as instructed. 200 strip 11    CALCIUM CARBONATE (TUMS ORAL) Take by mouth as needed (acid reflux, indigestion).      carvedilol (COREG) 12.5 MG tablet TAKE 1 TABLET(12.5 MG) BY MOUTH TWICE DAILY WITH MEALS 180 tablet 1    diphenhydrAMINE (BENADRYL) 25 mg capsule Take 25 mg by mouth every 6 (six) hours as needed for Itching or Allergies.      fluticasone (FLONASE) 50 mcg/actuation nasal spray 1 spray by Each Nare route 2 (two) times daily. 1 Bottle 1    furosemide (LASIX) 40 MG tablet TAKE 1 TABLET DAILY 90 tablet 1    INULIN (FIBER GUMMIES ORAL) Take by mouth every morning.       lancets (ACCU-CHEK MULTICLIX LANCET) Misc Test blood sugar twice daily 300 each 3    lancets (ONETOUCH DELICA LANCETS) 33 gauge Misc Inject 1 lancet into the skin 2 (two) times daily before meals. 200 each 11    nicotine (NICODERM CQ) 21 mg/24 hr Place 1 patch onto the skin once daily. Please dispense rugby clear patches. Thanks 14 patch 0    nicotine polacrilex 2 MG Lozg Take 1 lozenge (2 mg total) by mouth as needed. 1-2 per hour in place of cigarettes maximum of 15 per day. 108 lozenge 0    gabapentin (NEURONTIN) 600 MG tablet Take 1 tablet (600 mg total) by mouth 3 (three) times daily. 270  "tablet 1    meloxicam (MOBIC) 7.5 MG tablet Take 1 tablet (7.5 mg total) by mouth once daily. 90 tablet 1    traMADol (ULTRAM) 50 mg tablet Take 1 tablet (50 mg total) by mouth every 6 (six) hours as needed for Pain. 60 tablet 0     Current Facility-Administered Medications   Medication Dose Route Frequency Provider Last Rate Last Dose    dicyclomine 10 mg/5 mL syrup 20 mg  20 mg Oral QID (AC & HS) Indra Solorio MD   20 mg at 06/18/18 1006                  REVIEW OF SYSTEMS:     Sleep related symptoms as per HPI.  CONST:Denies weight gain; loosing weight    HEENT: + sinus congestion  PULM: + dyspnea x 2 block  CARD:  Denies palpitations   GI:  +occasional acid reflux  : Denies polyuria  NEURO: Denies headaches  PSYCH: Denies mood disturbance  HEME: Denies anemia   Otherwise, a balance of systems reviewed is negative.          PHYSICAL EXAM:  Vitals:    08/07/18 0833   BP: 134/60   Pulse: 70   SpO2: 98%   Weight: 88.6 kg (195 lb 5.2 oz)   Height: 5' 2" (1.575 m)   PainSc: 0-No pain     Body mass index is 35.73 kg/m².     GENERAL: Normal development, well groomed  HEENT:  Conjunctivae are non-erythematous; Pupils equal, round, and reactive to light; Nose is symmetrical; Nasal mucosa is pink and moist; Septum is midline; Inferior turbinates are normal; Nasal airflow is normal; Posterior pharynx is pink; Modified Mallampati: 4; Posterior palate is normal; Tonsils +1; Uvula is normal and pink;Tongue is normal; Dentition is fair; No TMJ tenderness; Jaw opening and protrusion without click and without discomfort.  NECK: Supple. Neck circumference is 13 inches. No thyromegaly. No palpable nodes.     SKIN: On face and neck: No abrasions, no rashes, no lesions.  No subcutaneous nodules are palpable.  RESPIRATORY: Chest is clear to auscultation.  Normal chest expansion and non-labored breathing at rest.  CARDIOVASCULAR: Normal S1, S2.  No murmurs, gallops or rubs. No carotid bruits bilaterally.  EXTREMITIES: No edema. " No clubbing. No cyanosis. Station normal. Gait normal.        NEURO/PSYCH: Oriented to time, place and person. Normal attention span and concentration. Affect is full. Mood is normal.                                              DATA   PSG 8/11/17: AHI was 6.0 with an oxygen aye of 86.0%. The RERA index was 4.8 and RDI was 10.8 events per hour.   9/6/17 Ttiration study, effective supine REM cpap 10cm    Echo 1/13/16  CONCLUSIONS     1 - Normal left ventricular systolic function (EF 60-65%).     2 - Left ventricular diastolic dysfunction.     3 - Normal right ventricular systolic function .     4 - Mild left atrial enlargement.     5 - Mild tricuspid regurgitation.     6 - The estimated PA systolic pressure is 39 mmHg.     7 - Mild aortic stenosis, JOHN = 1.46 cm2, peak velocity = 2.7 m/s, mean gradient = 17.0 mmHg.    Lab Results   Component Value Date    TSH 0.914 12/10/2013     ASSESSMENT  No diagnosis found.    PLAN:    Sleep Apnea NEC. - interrogation of cpap confirmed pressure of 10 cm H20 with good compliance.  27/30> 4 hours.  Residual ahi of 1.5.  Patient is benefiting from cpap    Tobacco abuse - encourage tobacco cessation.  Patient is enrolled in smoking cessation.    Insomnia - difficulty with sleep initiation and maintenance.  Poor sleep sleep hygiene + nocturia + psychophysiologic in etiology.  Stimulus control and sleep restriction d/w patient.  Fluid restriction after dinner.  Avoid tv in bed.  Patient is having difficulty accepting that suggestion.  Melatonin prn.  Sleep diary requested.        Education: During our discussion today, we talked about the etiology of obstructive sleep apnea as well as the potential ramifications of untreated sleep apnea, which could include daytime sleepiness, hypertension, heart disease and/or stroke.     Precautions: The patient was advised to abstain from driving should they feel sleepy or drowsy.       Patient will Follow-up in about 6 weeks (around 9/18/2018).  with md/np.

## 2018-08-07 NOTE — TELEPHONE ENCOUNTER
Patient is requesting a 90 day supply of her [facility] She said its cheaper for her to get a 90 day supply then a 30 day supply

## 2018-08-07 NOTE — PATIENT INSTRUCTIONS
Stimulus control  1. Go to bed only when sleepy AND after 10:30 pm.  Ok to take melatonin 3-5 mg around 9:00 pm.  2. Do not watch television, read, eat, or worry while in bed. Use bed only for sleep and sex.   3. Get out of bed if unable to fall asleep within twenty minutes and go to another room.  Do something different like reading a book.  Dont engage in stimulating activity.  Return to bed only when you are sleepy.  Repeat this step as many times as necessary throughout the night.   4. Set an alarm clock to wake up at a fixed time each morning including weekends.  Wake up at 70 AM.  5. Do not take a nap during the day.   6.  Avoid liquid after 5 pm.

## 2018-08-09 ENCOUNTER — TELEPHONE (OUTPATIENT)
Dept: RADIOLOGY | Facility: HOSPITAL | Age: 68
End: 2018-08-09

## 2018-08-10 ENCOUNTER — TELEPHONE (OUTPATIENT)
Dept: RADIOLOGY | Facility: HOSPITAL | Age: 68
End: 2018-08-10

## 2018-08-10 NOTE — TELEPHONE ENCOUNTER
Spoke with patient and explained mammogram findings.Patient expressed understanding of results. Patient is scheduled for a abnormal mammogram follow up appointment at [facility] on 8/13/18

## 2018-08-13 ENCOUNTER — TELEPHONE (OUTPATIENT)
Dept: FAMILY MEDICINE | Facility: CLINIC | Age: 68
End: 2018-08-13

## 2018-08-13 ENCOUNTER — HOSPITAL ENCOUNTER (OUTPATIENT)
Dept: RADIOLOGY | Facility: HOSPITAL | Age: 68
Discharge: HOME | End: 2018-08-13
Attending: FAMILY MEDICINE
Payer: MEDICARE

## 2018-08-13 DIAGNOSIS — R92.8 ABNORMAL MAMMOGRAM: ICD-10-CM

## 2018-08-13 PROCEDURE — 77061 BREAST TOMOSYNTHESIS UNI: CPT | Mod: 26,,, | Performed by: RADIOLOGY

## 2018-08-13 PROCEDURE — 77065 DX MAMMO INCL CAD UNI: CPT | Mod: 26,,, | Performed by: RADIOLOGY

## 2018-08-13 PROCEDURE — 77061 BREAST TOMOSYNTHESIS UNI: CPT | Mod: TC,PO

## 2018-08-13 PROCEDURE — 77065 DX MAMMO INCL CAD UNI: CPT | Mod: TC,PO

## 2018-08-13 NOTE — TELEPHONE ENCOUNTER
----- Message from Aishwarya Ariza MD sent at 8/13/2018 10:11 AM CDT -----  Please contact patient and inform her follow up mammogram was negative for malignancy.  She will be due for routine screening mammogram annually.

## 2018-08-23 ENCOUNTER — OFFICE VISIT (OUTPATIENT)
Dept: PODIATRY | Facility: CLINIC | Age: 68
End: 2018-08-23
Payer: MEDICARE

## 2018-08-23 VITALS
HEIGHT: 62 IN | BODY MASS INDEX: 35.54 KG/M2 | WEIGHT: 193.13 LBS | SYSTOLIC BLOOD PRESSURE: 150 MMHG | DIASTOLIC BLOOD PRESSURE: 70 MMHG

## 2018-08-23 DIAGNOSIS — M20.41 HAMMER TOES OF BOTH FEET: ICD-10-CM

## 2018-08-23 DIAGNOSIS — B35.1 ONYCHOMYCOSIS DUE TO DERMATOPHYTE: ICD-10-CM

## 2018-08-23 DIAGNOSIS — L84 CORN OR CALLUS: ICD-10-CM

## 2018-08-23 DIAGNOSIS — M20.5X1 HALLUX LIMITUS, ACQUIRED, RIGHT: ICD-10-CM

## 2018-08-23 DIAGNOSIS — L90.9 PLANTAR FAT PAD ATROPHY: ICD-10-CM

## 2018-08-23 DIAGNOSIS — M20.42 HAMMER TOES OF BOTH FEET: ICD-10-CM

## 2018-08-23 DIAGNOSIS — M20.5X2 HALLUX LIMITUS, ACQUIRED, LEFT: ICD-10-CM

## 2018-08-23 DIAGNOSIS — Z72.0 TOBACCO USE: ICD-10-CM

## 2018-08-23 DIAGNOSIS — E11.51 TYPE II DIABETES MELLITUS WITH PERIPHERAL CIRCULATORY DISORDER: Primary | ICD-10-CM

## 2018-08-23 PROCEDURE — 11721 DEBRIDE NAIL 6 OR MORE: CPT | Performed by: PODIATRIST

## 2018-08-23 PROCEDURE — 11056 PARNG/CUTG B9 HYPRKR LES 2-4: CPT | Mod: Q9,S$PBB,, | Performed by: PODIATRIST

## 2018-08-23 PROCEDURE — 99213 OFFICE O/P EST LOW 20 MIN: CPT | Mod: PBBFAC,PO | Performed by: PODIATRIST

## 2018-08-23 PROCEDURE — 99499 UNLISTED E&M SERVICE: CPT | Mod: S$PBB,,, | Performed by: PODIATRIST

## 2018-08-23 PROCEDURE — 99999 PR PBB SHADOW E&M-EST. PATIENT-LVL III: CPT | Mod: PBBFAC,,, | Performed by: PODIATRIST

## 2018-08-23 PROCEDURE — 11721 DEBRIDE NAIL 6 OR MORE: CPT | Mod: 59,Q9,S$PBB, | Performed by: PODIATRIST

## 2018-08-23 PROCEDURE — 11056 PARNG/CUTG B9 HYPRKR LES 2-4: CPT | Mod: Q9,PBBFAC,PO | Performed by: PODIATRIST

## 2018-08-23 RX ORDER — TRIAMCINOLONE ACETONIDE 1 MG/G
OINTMENT TOPICAL
Refills: 0 | COMMUNITY
Start: 2018-08-07 | End: 2020-12-07

## 2018-08-23 NOTE — PROGRESS NOTES
Subjective:      Patient ID: Stephanie Sears is a 68 y.o. female.    Chief Complaint: Diabetes Mellitus (pcp aishwarya thomas); Diabetic Foot Exam; and Nail Care    Stephanie is a 68 y.o. female who presents to the clinic for evaluation and treatment of high risk feet. Stephanie has a past medical history of Bronchitis, Cataract, Cervical spondylosis with radiculopathy (7/10/2012), Chest pain, Chronic neck pain (7/26/2012), Fibrocystic breast, GERD (gastroesophageal reflux disease), Glaucoma, Hyperlipidemia, Hypertension, Neck pain (7/10/2012), JAM (obstructive sleep apnea), Primary osteoarthritis of both knees, Psoriasis, Subacromial or subdeltoid bursitis (7/10/2012), Thyroid disease, and Type 2 diabetes mellitus (12/10/2013). The patient's chief complaint is long, thick toenails. This patient has documented high risk feet requiring routine maintenance secondary to diabetes mellitis and those secondary complications of diabetes, as mentioned..    PCP: Aishwarya Thomas MD    Date Last Seen by PCP:   Chief Complaint   Patient presents with    Diabetes Mellitus     pcp aishwarya thomas    Diabetic Foot Exam    Nail Care       Current shoe gear:  Slip on shoes     Hemoglobin A1C   Date Value Ref Range Status   02/09/2018 6.0 (H) 4.0 - 5.6 % Final     Comment:     According to ADA guidelines, hemoglobin A1c <7.0% represents  optimal control in non-pregnant diabetic patients. Different  metrics may apply to specific patient populations.   Standards of Medical Care in Diabetes-2016.  For the purpose of screening for the presence of diabetes:  <5.7%     Consistent with the absence of diabetes  5.7-6.4%  Consistent with increasing risk for diabetes   (prediabetes)  >or=6.5%  Consistent with diabetes  Currently, no consensus exists for use of hemoglobin A1c  for diagnosis of diabetes for children.  This Hemoglobin A1c assay has significant interference with fetal   hemoglobin   (HbF). The results are invalid for patients with  abnormal amounts of   HbF,   including those with known Hereditary Persistence   of Fetal Hemoglobin. Heterozygous hemoglobin variants (HbAS, HbAC,   HbAD, HbAE, HbA2) do not significantly interfere with this assay;   however, presence of multiple variants in a sample may impact the %   interference.     12/14/2017 6.2 (H) 4.0 - 5.6 % Final     Comment:     According to ADA guidelines, hemoglobin A1c <7.0% represents  optimal control in non-pregnant diabetic patients. Different  metrics may apply to specific patient populations.   Standards of Medical Care in Diabetes-2016.  For the purpose of screening for the presence of diabetes:  <5.7%     Consistent with the absence of diabetes  5.7-6.4%  Consistent with increasing risk for diabetes   (prediabetes)  >or=6.5%  Consistent with diabetes  Currently, no consensus exists for use of hemoglobin A1c  for diagnosis of diabetes for children.  This Hemoglobin A1c assay has significant interference with fetal   hemoglobin   (HbF). The results are invalid for patients with abnormal amounts of   HbF,   including those with known Hereditary Persistence   of Fetal Hemoglobin. Heterozygous hemoglobin variants (HbAS, HbAC,   HbAD, HbAE, HbA2) do not significantly interfere with this assay;   however, presence of multiple variants in a sample may impact the %   interference.     12/11/2017 6.1 (H) 4.0 - 5.6 % Final     Comment:     According to ADA guidelines, hemoglobin A1c <7.0% represents  optimal control in non-pregnant diabetic patients. Different  metrics may apply to specific patient populations.   Standards of Medical Care in Diabetes-2016.  For the purpose of screening for the presence of diabetes:  <5.7%     Consistent with the absence of diabetes  5.7-6.4%  Consistent with increasing risk for diabetes   (prediabetes)  >or=6.5%  Consistent with diabetes  Currently, no consensus exists for use of hemoglobin A1c  for diagnosis of diabetes for children.  This Hemoglobin A1c  assay has significant interference with fetal   hemoglobin   (HbF). The results are invalid for patients with abnormal amounts of   HbF,   including those with known Hereditary Persistence   of Fetal Hemoglobin. Heterozygous hemoglobin variants (HbAS, HbAC,   HbAD, HbAE, HbA2) do not significantly interfere with this assay;   however, presence of multiple variants in a sample may impact the %   interference.       Patient Active Problem List   Diagnosis    GERD (gastroesophageal reflux disease)    Primary osteoarthritis of both knees    Cervical spondylosis with radiculopathy    Open angle with borderline findings, low risk - Both Eyes    Nontoxic uninodular goiter    Hypertension, essential    Myopia with astigmatism and presbyopia - Both Eyes    Diet-controlled diabetes mellitus    Asymptomatic proteinuria    DJD (degenerative joint disease) of lumbar spine, mild    Osteoarthritis of left hip, mild    DJD (degenerative joint disease) of cervical spine    Essential hypertension    Class 2 obesity due to excess calories without serious comorbidity with body mass index (BMI) of 35.0 to 35.9 in adult    Tobacco use    H/O scarlet fever    Aortic valve sclerosis    Pulmonary hypertension    Diastolic dysfunction    Status post total knee replacement, left 1/20/2016    CMC arthritis    Chronic midline thoracic back pain    Primary osteoarthritis of right knee    Hand pain, left    Obstructive sleep apnea syndrome    Personal history of spine surgery    Fatty liver    Status post total right knee replacement 2/26/2018    Dyslipidemia    Preoperative cardiovascular examination    Bilateral carotid bruits     Current Outpatient Medications on File Prior to Visit   Medication Sig Dispense Refill    triamcinolone acetonide 0.1% (KENALOG) 0.1 % ointment KENDRA AA ON THE SKIN BID ON RASH ON LEGS  0    albuterol (PROAIR HFA) 90 mcg/actuation inhaler Inhale 2 puffs into the lungs every 6 (six) hours  as needed for Wheezing or Shortness of Breath. 1 Inhaler 3    amlodipine-benazepril (LOTREL) 10-40 mg per capsule TAKE 1 CAPSULE DAILY 90 capsule 1    blood sugar diagnostic (ACCU-CHEK JOSE G PLUS TEST STRP) Strp Inject 1 each into the skin 2 (two) times daily. 200 each 11    blood sugar diagnostic Strp Use to test blood glucose levels 2 times per day as instructed. 200 strip 11    CALCIUM CARBONATE (TUMS ORAL) Take by mouth as needed (acid reflux, indigestion).      carvedilol (COREG) 12.5 MG tablet TAKE 1 TABLET(12.5 MG) BY MOUTH TWICE DAILY WITH MEALS 180 tablet 1    diphenhydrAMINE (BENADRYL) 25 mg capsule Take 25 mg by mouth every 6 (six) hours as needed for Itching or Allergies.      fluticasone (FLONASE) 50 mcg/actuation nasal spray 1 spray by Each Nare route 2 (two) times daily. 1 Bottle 1    furosemide (LASIX) 40 MG tablet TAKE 1 TABLET DAILY 90 tablet 1    gabapentin (NEURONTIN) 600 MG tablet Take 1 tablet (600 mg total) by mouth 3 (three) times daily. 270 tablet 1    INULIN (FIBER GUMMIES ORAL) Take by mouth every morning.       lancets (ACCU-CHEK MULTICLIX LANCET) Misc Test blood sugar twice daily 300 each 3    lancets (ONETOUCH DELICA LANCETS) 33 gauge Misc Inject 1 lancet into the skin 2 (two) times daily before meals. 200 each 11    meloxicam (MOBIC) 7.5 MG tablet Take 1 tablet (7.5 mg total) by mouth once daily. 90 tablet 1    nicotine (NICODERM CQ) 21 mg/24 hr Place 1 patch onto the skin once daily. Please dispense rugby clear patches. Thanks 14 patch 0    nicotine polacrilex 2 MG Lozg Take 1 lozenge (2 mg total) by mouth as needed. 1-2 per hour in place of cigarettes maximum of 15 per day. 108 lozenge 0    traMADol (ULTRAM) 50 mg tablet Take 1 tablet (50 mg total) by mouth every 6 (six) hours as needed for Pain. 60 tablet 0     Current Facility-Administered Medications on File Prior to Visit   Medication Dose Route Frequency Provider Last Rate Last Dose    dicyclomine 10 mg/5 mL  syrup 20 mg  20 mg Oral QID (AC & HS) Indra Solorio MD   20 mg at 18 1006     Review of patient's allergies indicates:   Allergen Reactions    Hydrocodone Shortness Of Breath    Iodinated contrast- oral and iv dye Shortness Of Breath     Difficulty breathing    Adhesive Itching    Oxycodone      Hallucinations    Sulfa (sulfonamide antibiotics) Hives and Itching     Past Surgical History:   Procedure Laterality Date    BREAST LUMPECTOMY Left     mass in the rt breast surgery  1988    BREAST MASS EXCISION Right     benign    CHOLECYSTECTOMY      HYSTERECTOMY      KNEE SURGERY Left 16    TKR    KNEE SURGERY Right 2018    TKR    L4-L5 fusion      SPINE SURGERY       Family History   Problem Relation Age of Onset    Diabetes Mother     Hypertension Mother         CHF, angina    Stroke Mother         tia, glaucoma    Hypertension Father         glaucoma, Alzhiemer's , supra pubic    Kidney disease Brother         kidney transplant, HTN,DM    Diabetes Brother     Amblyopia Neg Hx     Blindness Neg Hx      Social History     Socioeconomic History    Marital status:      Spouse name: Not on file    Number of children: Not on file    Years of education: Not on file    Highest education level: Not on file   Social Needs    Financial resource strain: Not on file    Food insecurity - worry: Not on file    Food insecurity - inability: Not on file    Transportation needs - medical: Not on file    Transportation needs - non-medical: Not on file   Occupational History    Not on file   Tobacco Use    Smoking status: Current Every Day Smoker     Packs/day: 1.00     Years: 40.00     Pack years: 40.00     Types: Cigarettes    Smokeless tobacco: Never Used    Tobacco comment: .  .  Retired from [employer].     Substance and Sexual Activity    Alcohol use: No     Alcohol/week: 0.0 oz    Drug use: No    Sexual activity: Not Currently  "    Partners: Male   Other Topics Concern    Not on file   Social History Narrative    Not on file       Review of Systems   Constitution: Negative for chills, fever and weakness.   Cardiovascular: Positive for leg swelling. Negative for chest pain and claudication.   Respiratory: Negative for cough and shortness of breath.    Skin: Positive for dry skin and nail changes. Negative for itching and rash.   Musculoskeletal: Positive for arthritis, back pain, joint pain and muscle cramps. Negative for falls, joint swelling and muscle weakness.   Gastrointestinal: Negative for diarrhea, nausea and vomiting.   Neurological: Positive for paresthesias. Negative for numbness and tremors.   Psychiatric/Behavioral: Negative for altered mental status and hallucinations.         Objective:       Vitals:    08/23/18 1459   BP: (!) 150/70   Weight: 87.6 kg (193 lb 2 oz)   Height: 5' 2" (1.575 m)   PainSc: 0-No pain       Physical Exam   Constitutional:   General: Pt. is well-developed, well-nourished, appears stated age, in no acute distress, alert and oriented x 3. No evidence of depression, anxiety, or agitation. Calm, cooperative, and communicative. Appropriate interactions and affect.       Cardiovascular:   Pulses:       Dorsalis pedis pulses are 1+ on the right side, and 1+ on the left side.        Posterior tibial pulses are 1+ on the right side, and 1+ on the left side.   There is decreased digital hair. Skin is atrophic.   Musculoskeletal:        Right ankle: She exhibits normal range of motion and no swelling. No tenderness. Achilles tendon exhibits no pain and no defect.        Left ankle: She exhibits normal range of motion and no swelling. No tenderness. Achilles tendon exhibits no pain and no defect.        Right foot: There is normal range of motion and no tenderness.        Left foot: There is normal range of motion and no tenderness.   Muscle strength is 5/5 in all groups bilaterally.    Decreased stride, " station of gait.  apropulsive toe off.  Increased angle and base of gait.    Patient has hammertoes of digits 2-5 bilateral partially reducible without symptom today.    Decreased first MPJ range of motion both weightbearing and nonweightbearing, no crepitus observed the first MP joint, +  dorsal flag sign.Mild  bunion deformity is observed .    Fat pad atrophy to heels and met heads bilateral   Neurological: A sensory deficit is present.   Paresthesias, and hyperesthesia bilateral feet at toes with no clearly identified trigger or source.    Decreased/absent vibratory sensation bilateral feet to 128Hz tuning fork.   Skin: Skin is warm, dry and intact. No abrasion, no ecchymosis, no lesion and no rash noted. She is not diaphoretic. No cyanosis or erythema. No pallor. Nails show no clubbing.   Toenails 1-5 bilaterally are elongated by 2-3 mm, thickened by 2-3 mm, discolored/yellowed, dystrophic, brittle with subungual debris.    Focal hyperkeratotic lesion consisting entirely of hyperkeratotic tissue without open skin, drainage, pus, fluctuance, malodor, or signs of infection: sub right hallux IPJ; sub 5th MTPJ rita     Interdigital Spaces clean, dry and without evidence of break in skin integrity   Psychiatric: She has a normal mood and affect. Her speech is normal.   Nursing note and vitals reviewed.            Assessment:       Encounter Diagnoses   Name Primary?    Type II diabetes mellitus with peripheral circulatory disorder Yes    Tobacco use     Hallux limitus, acquired, left     Hallux limitus, acquired, right     Hammer toes of both feet     Plantar fat pad atrophy     Corn or callus     Onychomycosis due to dermatophyte          Plan:       Stephanie was seen today for diabetes mellitus, diabetic foot exam and nail care.    Diagnoses and all orders for this visit:    Type II diabetes mellitus with peripheral circulatory disorder  -     DIABETIC SHOES FOR HOME USE    Tobacco use    Hallux limitus,  acquired, left  -     DIABETIC SHOES FOR HOME USE    Hallux limitus, acquired, right  -     DIABETIC SHOES FOR HOME USE    Hammer toes of both feet  -     DIABETIC SHOES FOR HOME USE    Plantar fat pad atrophy  -     DIABETIC SHOES FOR HOME USE    Corn or callus    Onychomycosis due to dermatophyte      I counseled the patient on her conditions, their implications and medical management.      Greater than 50% of this visit spent on counseling and coordination of care.    Education about the diabetic foot, neuropathy, and prevention of limb loss.    Shoe inspection. Diabetic Foot Education. Patient reminded of the importance of good nutrition/healthy diet/weight management and blood sugar control to help prevent podiatric complications of diabetes. Patient instructed on proper foot hygeine. Wear comfortable, proper fitting shoes. Wash feet daily. Dry well. After drying, apply moisturizer to feet (no lotion to webspaces). Inspect feet daily for skin breaks, blisters, swelling, or redness. Wear cotton socks (preferably white)  Change socks every day. Do NOT walk barefoot. Do NOT use heating pads or hot water soaks. We discussed wearing proper shoe gear, daily foot inspections, never walking without protective shoe gear.     Counseled patient on the effects of smoking on healing. She verbalizes understanding that smoking and/or history of smoking has an adverse effect on circulation an can increase the chances of delayed healing and this prolonged exposure leads to infection or progression of infection.    Discussed edema control and the importance of daily moisturizer to the feet such as Gold bonds diabetic foot cream    Rx diabetic shoes for protection and support    With patient's permission, nails were aggressively reduced and debrided x 10 to their soft tissue attachment mechanically and with electric , removing all offending nail and debris. After cleansing the  area w/ alcohol prep pad the above mentioned  hyperkeratosis was trimmed utilizing No 15 scapel, to a smooth base with out incident. Patient tolerated this  well and reported comfort to the area of:  sub right hallux IPJ; sub 5th MTPJ rita.  Patient relates relief following the procedure. She will continue to monitor the areas daily, inspect her feet, wear protective shoe gear when ambulatory, moisturizer to maintain skin integrity and follow in this office in approximately 3-4 months, sooner p.r.n.

## 2018-08-23 NOTE — PATIENT INSTRUCTIONS
Recommend lotions: eucerin, eucerin for diabetics, aquaphor, A&D ointment, gold bond for diabetics, sween, [place]'s Bees all purpose baby ointment,  urea 40 with aloe (found on amazon.com)    Shoe recommendations: (try 6pm.com, zappos.com , nordstromrack.PlayWith, or shoes.PlayWith for discounted prices) you can visit [employer] shoes in [place]  or [employer] [facility] in the [facility] (there are also several shoe brand outlets in the [facility])    Asics (GT 2000 or gel foundations), new balance stability type shoes, saucony (stabil c3),  Alamo (GTS or Beast or transcend), vionic, propet (tennis shoe)    sofft brand, clarks, crocs, aerosoles, naturalizers, SAS, ecco, born, meghana hernandez, rockports (dress shoes)    Vionic, burkenstocks, fitflops, propet (sandals)  Nike comfort thong sandals, crocs, propet (house shoes)    Nail Home remedy:  Vicks Vapor rub to nails for easier managability    Occasional soaks for 15-20 mins in luke warm water with 1 cup of listerine and 1 cup of apple cider vinegar are ok You may add several drops of oil of oregano or tea tree oil as well        Diabetes: Inspecting Your Feet  Diabetes increases your chances of developing foot problems. So inspect your feet every day. This helps you find small skin irritations before they become serious infections. If you have trouble seeing the bottoms of your feet, use a mirror or ask a family member or friend to help.     Pressure spots on the bottom of the foot are common areas where problems develop.   How to check your feet  Below are tips to help you look for foot problems. Try to check your feet at the same time each day, such as when you get out of bed in the morning:  · Check the top of each foot. The tops of toes, back of the heel, and outer edge of the foot can get a lot of rubbing from poor-fitting shoes.  · Check the bottom of each foot. Daily wear and tear often leads to problems at pressure spots.  · Check the toes and nails. Fungal infections often occur  between toes. Toenail problems can also be a sign of fungal infections or lead to breaks in the skin.  · Check your shoes, too. Loose objects inside a shoe can injure the foot. Use your hand to feel inside your shoes for things like néstor, loose stitching, or rough areas that could irritate your skin.  Warning signs  Look for any color changes in the foot. Redness with streaks can signal a severe infection, which needs immediate medical attention. Tell your doctor right away if you have any of these problems:  · Swelling, sometimes with color changes, may be a sign of poor blood flow or infection. Symptoms include tenderness and an increase in the size of your foot.  · Warm or hot areas on your feet may be signs of infection. A foot that is cold may not be getting enough blood.  · Sensations such as burning, tingling, or pins and needles can be signs of a problem. Also check for areas that may be numb.  · Hot spots are caused by friction or pressure. Look for hot spots in areas that get a lot of rubbing. Hot spots can turn into blisters, calluses, or sores.  · Cracks and sores are caused by dry or irritated skin. They are a sign that the skin is breaking down, which can lead to infection.  · Toenail problems to watch for include nails growing into the skin (ingrown toenail) and causing redness or pain. Thick, yellow, or discolored nails can signal a fungal infection.  · Drainage and odor can develop from untreated sores and ulcers. Call your doctor right away if you notice white or yellow drainage, bleeding, or unpleasant odor.   © 7374-5195 [employer]. [address] [place] [address]. All rights reserved. This information is not intended as a substitute for professional medical care. Always follow your healthcare professional's instructions.        Step-by-Step:  Inspecting Your Feet (Diabetes)    Date Last Reviewed: 10/1/2016  © 7403-4194 [employer]. [address] [place] [address]  Road, [place] [address]. All rights reserved. This information is not intended as a substitute for professional medical care. Always follow your healthcare professional's instructions.

## 2018-09-20 ENCOUNTER — TELEPHONE (OUTPATIENT)
Dept: CARDIOLOGY | Facility: CLINIC | Age: 68
End: 2018-09-20

## 2018-09-20 DIAGNOSIS — E78.5 DYSLIPIDEMIA: Primary | ICD-10-CM

## 2018-09-20 RX ORDER — ATORVASTATIN CALCIUM 10 MG/1
10 TABLET, FILM COATED ORAL DAILY
Qty: 30 TABLET | Refills: 2 | Status: SHIPPED | OUTPATIENT
Start: 2018-09-20 | End: 2018-11-07

## 2018-09-20 NOTE — TELEPHONE ENCOUNTER
Spoke with patient about carotid Doppler results, which shows nonobstructive atherosclerotic disease.  Patient did not see my note in [facility]. Will try to control her risk factors.  As last lipid profile showed  and HDL 36, will start patient on a statin.  She thought she had been tolerating atorvastatin, so will restart at 10 mg/d and then titrate to effective or tolerated dose.  Patient acknowledged understanding of information and agreement with plan.

## 2018-09-28 ENCOUNTER — TELEPHONE (OUTPATIENT)
Dept: CARDIOLOGY | Facility: CLINIC | Age: 68
End: 2018-09-28

## 2018-10-08 DIAGNOSIS — I10 ESSENTIAL HYPERTENSION: ICD-10-CM

## 2018-10-08 RX ORDER — CARVEDILOL 12.5 MG/1
TABLET ORAL
Qty: 180 TABLET | Refills: 1 | Status: SHIPPED | OUTPATIENT
Start: 2018-10-08 | End: 2019-02-07 | Stop reason: SDUPTHER

## 2018-10-18 ENCOUNTER — LAB VISIT (OUTPATIENT)
Dept: LAB | Facility: HOSPITAL | Age: 68
End: 2018-10-18
Attending: FAMILY MEDICINE
Payer: MEDICARE

## 2018-10-18 ENCOUNTER — OFFICE VISIT (OUTPATIENT)
Dept: FAMILY MEDICINE | Facility: CLINIC | Age: 68
End: 2018-10-18
Payer: MEDICARE

## 2018-10-18 VITALS
OXYGEN SATURATION: 100 % | TEMPERATURE: 98 F | HEART RATE: 62 BPM | HEIGHT: 62 IN | BODY MASS INDEX: 35.78 KG/M2 | DIASTOLIC BLOOD PRESSURE: 77 MMHG | WEIGHT: 194.44 LBS | RESPIRATION RATE: 12 BRPM | SYSTOLIC BLOOD PRESSURE: 148 MMHG

## 2018-10-18 DIAGNOSIS — I10 ESSENTIAL HYPERTENSION: ICD-10-CM

## 2018-10-18 DIAGNOSIS — Z72.0 TOBACCO USE: ICD-10-CM

## 2018-10-18 DIAGNOSIS — G47.33 OBSTRUCTIVE SLEEP APNEA SYNDROME: Primary | ICD-10-CM

## 2018-10-18 DIAGNOSIS — E11.9 DIET-CONTROLLED DIABETES MELLITUS: ICD-10-CM

## 2018-10-18 LAB
ESTIMATED AVG GLUCOSE: 120 MG/DL
HBA1C MFR BLD HPLC: 5.8 %

## 2018-10-18 PROCEDURE — 99214 OFFICE O/P EST MOD 30 MIN: CPT | Mod: S$PBB,,, | Performed by: FAMILY MEDICINE

## 2018-10-18 PROCEDURE — 99215 OFFICE O/P EST HI 40 MIN: CPT | Mod: PBBFAC,PN | Performed by: FAMILY MEDICINE

## 2018-10-18 PROCEDURE — 36415 COLL VENOUS BLD VENIPUNCTURE: CPT | Mod: PN

## 2018-10-18 PROCEDURE — 99999 PR PBB SHADOW E&M-EST. PATIENT-LVL V: CPT | Mod: PBBFAC,,, | Performed by: FAMILY MEDICINE

## 2018-10-18 PROCEDURE — 83036 HEMOGLOBIN GLYCOSYLATED A1C: CPT

## 2018-10-18 NOTE — PROGRESS NOTES
Chief Complaint   Patient presents with    Follow-up       HPI    tSephanie Sears is 68 y.o. female. The primary encounter diagnosis was Obstructive sleep apnea syndrome. Diagnoses of Essential hypertension, Diet-controlled diabetes mellitus, and Tobacco use were also pertinent to this visit.     68 year old female returns to clinic for 3 month follow up.  HTN - patient reports that her home blood pressures have improved slightly since being on CPAP machine.  JAM - patient reports that her breathing and sleep have improved since starting CPAP machine.  However, she reports that her sleep remains somewhat interrupted due to multiple ongoing home improvement projects.  She admits to waking very early and sometimes waking in the middle of the night to complete these projects.  Tobacco use - patient reports that her current stress level has not allowed her decrease her cigarette use any further though she has not increased.  Her quit date remains set for Jan 1st.       Review of Systems   Constitutional: Negative for activity change.   Respiratory: Negative for shortness of breath.    Cardiovascular: Negative for chest pain.   Musculoskeletal: Negative for gait problem.   Psychiatric/Behavioral: Negative for suicidal ideas.           Current Outpatient Medications:     albuterol (PROAIR HFA) 90 mcg/actuation inhaler, Inhale 2 puffs into the lungs every 6 (six) hours as needed for Wheezing or Shortness of Breath., Disp: 1 Inhaler, Rfl: 3    amlodipine-benazepril (LOTREL) 10-40 mg per capsule, TAKE 1 CAPSULE DAILY, Disp: 90 capsule, Rfl: 1    atorvastatin (LIPITOR) 10 MG tablet, Take 1 tablet (10 mg total) by mouth once daily., Disp: 30 tablet, Rfl: 2    blood sugar diagnostic (ACCU-CHEK JOSE G PLUS TEST STRP) Strp, Inject 1 each into the skin 2 (two) times daily., Disp: 200 each, Rfl: 11    blood sugar diagnostic Strp, Use to test blood glucose levels 2 times per day as instructed., Disp: 200 strip, Rfl: 11     CALCIUM CARBONATE (TUMS ORAL), Take by mouth as needed (acid reflux, indigestion)., Disp: , Rfl:     carvedilol (COREG) 12.5 MG tablet, TAKE 1 TABLET TWICE A DAY WITH MEALS, Disp: 180 tablet, Rfl: 1    diphenhydrAMINE (BENADRYL) 25 mg capsule, Take 25 mg by mouth every 6 (six) hours as needed for Itching or Allergies., Disp: , Rfl:     fluticasone (FLONASE) 50 mcg/actuation nasal spray, 1 spray by Each Nare route 2 (two) times daily., Disp: 1 Bottle, Rfl: 1    furosemide (LASIX) 40 MG tablet, TAKE 1 TABLET DAILY, Disp: 90 tablet, Rfl: 1    gabapentin (NEURONTIN) 600 MG tablet, Take 1 tablet (600 mg total) by mouth 3 (three) times daily., Disp: 270 tablet, Rfl: 1    INULIN (FIBER GUMMIES ORAL), Take by mouth every morning. , Disp: , Rfl:     lancets (ACCU-CHEK MULTICLIX LANCET) Misc, Test blood sugar twice daily, Disp: 300 each, Rfl: 3    lancets (ONETOUCH DELICA LANCETS) 33 gauge Misc, Inject 1 lancet into the skin 2 (two) times daily before meals., Disp: 200 each, Rfl: 11    traMADol (ULTRAM) 50 mg tablet, Take 1 tablet (50 mg total) by mouth every 6 (six) hours as needed for Pain., Disp: 60 tablet, Rfl: 0    triamcinolone acetonide 0.1% (KENALOG) 0.1 % ointment, KENDRA AA ON THE SKIN BID ON RASH ON LEGS, Disp: , Rfl: 0    meloxicam (MOBIC) 7.5 MG tablet, Take 1 tablet (7.5 mg total) by mouth once daily., Disp: 90 tablet, Rfl: 1    nicotine (NICODERM CQ) 21 mg/24 hr, Place 1 patch onto the skin once daily. Please dispense rugby clear patches. Thanks, Disp: 14 patch, Rfl: 0    nicotine polacrilex 2 MG Lozg, Take 1 lozenge (2 mg total) by mouth as needed. 1-2 per hour in place of cigarettes maximum of 15 per day., Disp: 108 lozenge, Rfl: 0    Current Facility-Administered Medications:     dicyclomine 10 mg/5 mL syrup 20 mg, 20 mg, Oral, QID (AC & HS), Indra Solorio MD, 20 mg at 06/18/18 1006      Blood pressure (!) 148/77, pulse 62, temperature 98.3 °F (36.8 °C), temperature source Oral, resp. rate 12,  "height 5' 2" (1.575 m), weight 88.2 kg (194 lb 7.1 oz), SpO2 100 %.    Physical Exam   Constitutional: Vital signs are normal. She appears well-developed.   Cardiovascular: Normal heart sounds.   No murmur heard.  Pulmonary/Chest: Effort normal and breath sounds normal.   Psychiatric: She has a normal mood and affect. Her behavior is normal.       Lab Visit on 10/18/2018   Component Date Value Ref Range Status    Hemoglobin A1C 10/18/2018 5.8* 4.0 - 5.6 % Final    Estimated Avg Glucose 10/18/2018 120  68 - 131 mg/dL Final   Clinical Support on 07/19/2018   Component Date Value Ref Range Status    Internal Carotid Stenosis 07/19/2018 0-19%   Final   ]    Assessment:    1. Obstructive sleep apnea syndrome    2. Essential hypertension    3. Diet-controlled diabetes mellitus    4. Tobacco use          Stephanie was seen today for follow-up.    Diagnoses and all orders for this visit:    Obstructive sleep apnea syndrome   -Improved. Continue CPAP use and follow up with Sleep Medicine.      Essential hypertension   -Improved. Blood pressure control has improved with CPAP use. However, interrupted sleep cycle remains an issue.   -Patient counseled that interrupting her sleep cycle will give aberrant CPAP measurements and elevated BP.    Diet-controlled diabetes mellitus  -     Hemoglobin A1c; Future  - Stable. Repeat A1c. Adjust medication regimen as indicated.    Tobacco use   -Stable. Use remains unchanged. Quit date remains set for Jan. 1st.  Continue to monitor.    Other orders  -     Cancel: Influenza - Quadrivalent (3 years & older) (PF)    A total of 25 minutes was spent with the patient during this encounter. More than 50% of the encounter was spent counseling the patient regarding treatment options, expected outcomes, and coordination of care.          FOLLOW UP: Follow-up in about 3 months (around 1/18/2019) for Follow up.    "

## 2018-10-19 ENCOUNTER — TELEPHONE (OUTPATIENT)
Dept: FAMILY MEDICINE | Facility: CLINIC | Age: 68
End: 2018-10-19

## 2018-10-29 ENCOUNTER — OFFICE VISIT (OUTPATIENT)
Dept: CARDIOLOGY | Facility: CLINIC | Age: 68
End: 2018-10-29
Payer: MEDICARE

## 2018-10-29 VITALS
BODY MASS INDEX: 35.46 KG/M2 | SYSTOLIC BLOOD PRESSURE: 150 MMHG | WEIGHT: 192.69 LBS | HEART RATE: 65 BPM | DIASTOLIC BLOOD PRESSURE: 70 MMHG | HEIGHT: 62 IN | OXYGEN SATURATION: 98 %

## 2018-10-29 DIAGNOSIS — E78.5 DYSLIPIDEMIA: ICD-10-CM

## 2018-10-29 DIAGNOSIS — E66.09 CLASS 2 OBESITY DUE TO EXCESS CALORIES WITHOUT SERIOUS COMORBIDITY WITH BODY MASS INDEX (BMI) OF 35.0 TO 35.9 IN ADULT: ICD-10-CM

## 2018-10-29 DIAGNOSIS — G47.33 OBSTRUCTIVE SLEEP APNEA SYNDROME: ICD-10-CM

## 2018-10-29 DIAGNOSIS — R09.89 BILATERAL CAROTID BRUITS: ICD-10-CM

## 2018-10-29 DIAGNOSIS — Z96.651 STATUS POST TOTAL RIGHT KNEE REPLACEMENT: ICD-10-CM

## 2018-10-29 DIAGNOSIS — Z96.652 STATUS POST TOTAL KNEE REPLACEMENT, LEFT: ICD-10-CM

## 2018-10-29 DIAGNOSIS — K21.9 GASTROESOPHAGEAL REFLUX DISEASE, ESOPHAGITIS PRESENCE NOT SPECIFIED: ICD-10-CM

## 2018-10-29 DIAGNOSIS — I51.89 DIASTOLIC DYSFUNCTION: ICD-10-CM

## 2018-10-29 DIAGNOSIS — Z72.0 TOBACCO USE: ICD-10-CM

## 2018-10-29 DIAGNOSIS — I35.8 AORTIC VALVE SCLEROSIS: ICD-10-CM

## 2018-10-29 DIAGNOSIS — I10 ESSENTIAL HYPERTENSION: Primary | ICD-10-CM

## 2018-10-29 PROCEDURE — 99214 OFFICE O/P EST MOD 30 MIN: CPT | Mod: PBBFAC | Performed by: INTERNAL MEDICINE

## 2018-10-29 PROCEDURE — 99999 PR PBB SHADOW E&M-EST. PATIENT-LVL IV: CPT | Mod: PBBFAC,,, | Performed by: INTERNAL MEDICINE

## 2018-10-29 PROCEDURE — 99213 OFFICE O/P EST LOW 20 MIN: CPT | Mod: S$PBB,,, | Performed by: INTERNAL MEDICINE

## 2018-10-29 NOTE — PROGRESS NOTES
Chart has been dictated using voice recognition software.  It is not been reviewed carefully for any transcriptional errors due to this technology.   Subjective:   Patient ID:  Stephanie Sears is a 68 y.o. female who presents for follow-up of Hypertension      HPI: Patient with history of diastolic dysfunction, mild aortic stenosis, GERD, GI distress, hypertension, dyslipidemia, type 2 diabetes, s/p bilateral knee replacements, and thyroid disease.    Patient gets short of breath walking from parking lot due to difficulty walking. No orthopnea or PND. No edema.  Patient denies any palpitations, lightheadedness, or syncope. Has left upper chest pain exacerbated by touch.  Otherwise, patient denies any chest discomfort on exertion or at rest.     Past Medical History:   Diagnosis Date    Bronchitis     Cataract     Cervical spondylosis with radiculopathy 7/10/2012    Chest pain     Chronic neck pain 7/26/2012    Fibrocystic breast     GERD (gastroesophageal reflux disease)     Glaucoma     Hyperlipidemia     Hypertension     Neck pain 7/10/2012    JAM (obstructive sleep apnea)     Primary osteoarthritis of both knees     Psoriasis     Subacromial or subdeltoid bursitis 7/10/2012    Thyroid disease     Type 2 diabetes mellitus 12/10/2013       Outpatient Medications Prior to Visit   Medication Sig Dispense Refill    albuterol (PROAIR HFA) 90 mcg/actuation inhaler Inhale 2 puffs into the lungs every 6 (six) hours as needed for Wheezing or Shortness of Breath. 1 Inhaler 3    amlodipine-benazepril (LOTREL) 10-40 mg per capsule TAKE 1 CAPSULE DAILY 90 capsule 1    atorvastatin (LIPITOR) 10 MG tablet Take 1 tablet (10 mg total) by mouth once daily. 30 tablet 2    blood sugar diagnostic (ACCU-CHEK JOSE G PLUS TEST STRP) Strp Inject 1 each into the skin 2 (two) times daily. 200 each 11    blood sugar diagnostic Strp Use to test blood glucose levels 2 times per day as instructed. 200 strip 11    CALCIUM  CARBONATE (TUMS ORAL) Take by mouth as needed (acid reflux, indigestion).      carvedilol (COREG) 12.5 MG tablet TAKE 1 TABLET TWICE A DAY WITH MEALS 180 tablet 1    diphenhydrAMINE (BENADRYL) 25 mg capsule Take 25 mg by mouth every 6 (six) hours as needed for Itching or Allergies.      fluticasone (FLONASE) 50 mcg/actuation nasal spray 1 spray by Each Nare route 2 (two) times daily. 1 Bottle 1    furosemide (LASIX) 40 MG tablet TAKE 1 TABLET DAILY 90 tablet 1    gabapentin (NEURONTIN) 600 MG tablet Take 1 tablet (600 mg total) by mouth 3 (three) times daily. 270 tablet 1    INULIN (FIBER GUMMIES ORAL) Take by mouth every morning.       lancets (ACCU-CHEK MULTICLIX LANCET) Misc Test blood sugar twice daily 300 each 3    lancets (ONETOUCH DELICA LANCETS) 33 gauge Misc Inject 1 lancet into the skin 2 (two) times daily before meals. 200 each 11    triamcinolone acetonide 0.1% (KENALOG) 0.1 % ointment KENDRA AA ON THE SKIN BID ON RASH ON LEGS  0    meloxicam (MOBIC) 7.5 MG tablet Take 1 tablet (7.5 mg total) by mouth once daily. 90 tablet 1    nicotine (NICODERM CQ) 21 mg/24 hr Place 1 patch onto the skin once daily. Please dispense rugby clear patches. Thanks 14 patch 0    nicotine polacrilex 2 MG Lozg Take 1 lozenge (2 mg total) by mouth as needed. 1-2 per hour in place of cigarettes maximum of 15 per day. 108 lozenge 0    traMADol (ULTRAM) 50 mg tablet Take 1 tablet (50 mg total) by mouth every 6 (six) hours as needed for Pain. 60 tablet 0     Facility-Administered Medications Prior to Visit   Medication Dose Route Frequency Provider Last Rate Last Dose    dicyclomine 10 mg/5 mL syrup 20 mg  20 mg Oral QID (AC & HS) Indra Solorio MD   20 mg at 06/18/18 1006       Review of Systems   Constitution: Negative for weight gain and weight loss.   HENT: Negative for nosebleeds.    Eyes: Negative for vision loss in left eye and vision loss in right eye.   Cardiovascular: Negative for claudication.        As  "above   Respiratory: Negative for hemoptysis, shortness of breath, snoring, sputum production and wheezing.    Endocrine: Negative for polydipsia and polyuria.   Hematologic/Lymphatic: Does not bruise/bleed easily.   Musculoskeletal: Positive for arthritis (hands, wrists). Negative for myalgias.   Gastrointestinal: Negative for change in bowel habit, hematemesis, hematochezia, melena, nausea and vomiting.   Genitourinary: Negative for hematuria.   Neurological: Negative for focal weakness and numbness.     Objective:   Physical Exam   Constitutional:   Physical examination limited to vital signs:  Obese  BP (!) 150/70 (BP Location: Left arm, Patient Position: Sitting, BP Method: Large (Manual))   Pulse 65   Ht 5' 2" (1.575 m)   Wt 87.4 kg (192 lb 10.9 oz)   SpO2 98%   BMI 35.24 kg/m²  if         Lab Results   Component Value Date     06/18/2018    K 4.4 06/18/2018    BUN 14 06/18/2018    CREATININE 0.8 06/18/2018     06/18/2018    HGBA1C 5.8 (H) 10/18/2018    CHOL 183 12/14/2017    HDL 36 (L) 12/14/2017    LDLCALC 128.0 12/14/2017    TRIG 95 12/14/2017    CHOLHDL 19.7 (L) 12/14/2017    HGB 12.6 06/18/2018    HCT 38.8 06/18/2018     06/18/2018    INR 0.9 02/09/2018       Assessment:     1. Essential hypertension    2. Diastolic dysfunction    3. Aortic valve sclerosis    4. Gastroesophageal reflux disease, esophagitis presence not specified    5. Obstructive sleep apnea syndrome    6. Tobacco use    7. Dyslipidemia    8. Class 2 obesity due to excess calories without serious comorbidity with body mass index (BMI) of 35.0 to 35.9 in adult    9. Bilateral carotid bruits    10. Status post total knee replacement, left 1/20/2016    11. Status post total right knee replacement 2/26/2018      Patient has continued hypertension.  Should be referred to the digital hypertension program for better control of her blood pressure.  Her exertional dyspnea is most likely related to significant " deconditioning.  The patient has been given a program which she was been instructed to walk for half an hour 5 times a week.  Additionally she has been referred to the smoking cessation program.  Finally the patient was reinstructed on obtaining the a fasting lipid profile.  Further decisions will be made following review of her lipid profile.  The patient should return in 3 months for re-evaluation.  40 min was spent with the patient of which 30 minutes was spent in counseling about diet, exercise, and smoking cessation.  Plan:     Stephanie was seen today for hypertension.    Diagnoses and all orders for this visit:    Essential hypertension    Diastolic dysfunction    Aortic valve sclerosis    Gastroesophageal reflux disease, esophagitis presence not specified    Obstructive sleep apnea syndrome    Tobacco use  -     Ambulatory referral to Smoking Cessation Program    Dyslipidemia    Class 2 obesity due to excess calories without serious comorbidity with body mass index (BMI) of 35.0 to 35.9 in adult    Bilateral carotid bruits    Status post total knee replacement, left 1/20/2016    Status post total right knee replacement 2/26/2018    Other orders  -     Hypertension Digital Medicine (HDMP) Enrollment Order          Dean Robins MD  Consultative Cardiology

## 2018-10-29 NOTE — Clinical Note
Thank you for referring Stephanie Sears for follow-up of hypertension and hyperlipidemia. Please see my note for details of this encounter. If you have any questions, please contact me.  Thank you again for the referral.

## 2018-11-07 ENCOUNTER — PATIENT MESSAGE (OUTPATIENT)
Dept: CARDIOLOGY | Facility: CLINIC | Age: 68
End: 2018-11-07

## 2018-11-07 ENCOUNTER — CLINICAL SUPPORT (OUTPATIENT)
Dept: SMOKING CESSATION | Facility: CLINIC | Age: 68
End: 2018-11-07
Payer: COMMERCIAL

## 2018-11-07 ENCOUNTER — LAB VISIT (OUTPATIENT)
Dept: LAB | Facility: HOSPITAL | Age: 68
End: 2018-11-07
Payer: MEDICARE

## 2018-11-07 DIAGNOSIS — E78.5 DYSLIPIDEMIA: ICD-10-CM

## 2018-11-07 DIAGNOSIS — E78.5 DYSLIPIDEMIA: Primary | ICD-10-CM

## 2018-11-07 DIAGNOSIS — F17.200 NICOTINE DEPENDENCE: ICD-10-CM

## 2018-11-07 LAB
CHOLEST SERPL-MCNC: 159 MG/DL
CHOLEST/HDLC SERPL: 4.1 {RATIO}
HDLC SERPL-MCNC: 39 MG/DL
HDLC SERPL: 24.5 %
LDLC SERPL CALC-MCNC: 99.8 MG/DL
NONHDLC SERPL-MCNC: 120 MG/DL
TRIGL SERPL-MCNC: 101 MG/DL

## 2018-11-07 PROCEDURE — 99404 PREV MED CNSL INDIV APPRX 60: CPT | Mod: S$GLB,,,

## 2018-11-07 PROCEDURE — 36415 COLL VENOUS BLD VENIPUNCTURE: CPT | Mod: PN

## 2018-11-07 PROCEDURE — 99999 PR PBB SHADOW E&M-EST. PATIENT-LVL I: CPT | Mod: PBBFAC,,,

## 2018-11-07 PROCEDURE — 80061 LIPID PANEL: CPT

## 2018-11-07 RX ORDER — ATORVASTATIN CALCIUM 20 MG/1
20 TABLET, FILM COATED ORAL DAILY
Qty: 30 TABLET | Refills: 3 | Status: SHIPPED | OUTPATIENT
Start: 2018-11-07 | End: 2019-02-20 | Stop reason: SDUPTHER

## 2018-11-07 NOTE — Clinical Note
Patient will be participating in biweekly tobacco cessation meetings and will begin the prescribed tobacco cessation medication regime of  21 mg nicotine patch QD .  She currently smokes 20 cigarettes per day.  Pt started on rate reduction and wait time of 15 min prior to smoking. Pt's exhaled carbon monoxide level was  15* ppm as per Smoker[facility]. (non- smoker = 0-5 ppm.) Will see pt back in office in 2 weeks.

## 2018-11-08 RX ORDER — IBUPROFEN 200 MG
1 TABLET ORAL DAILY
Qty: 14 PATCH | Refills: 0 | Status: SHIPPED | OUTPATIENT
Start: 2018-11-08 | End: 2018-11-22

## 2018-11-29 DIAGNOSIS — G89.29 CHRONIC NECK PAIN: ICD-10-CM

## 2018-11-29 DIAGNOSIS — M54.2 CHRONIC NECK PAIN: ICD-10-CM

## 2018-11-29 DIAGNOSIS — M16.12 PRIMARY OSTEOARTHRITIS OF LEFT HIP: ICD-10-CM

## 2018-11-29 RX ORDER — MELOXICAM 7.5 MG/1
TABLET ORAL
Qty: 90 TABLET | Refills: 1 | Status: SHIPPED | OUTPATIENT
Start: 2018-11-29 | End: 2019-06-18 | Stop reason: SDUPTHER

## 2018-12-03 ENCOUNTER — CLINICAL SUPPORT (OUTPATIENT)
Dept: SMOKING CESSATION | Facility: CLINIC | Age: 68
End: 2018-12-03
Payer: COMMERCIAL

## 2018-12-03 DIAGNOSIS — F17.200 NICOTINE DEPENDENCE: Primary | ICD-10-CM

## 2018-12-03 PROCEDURE — 99407 BEHAV CHNG SMOKING > 10 MIN: CPT | Mod: S$GLB,,,

## 2018-12-05 RX ORDER — IBUPROFEN 200 MG
1 TABLET ORAL DAILY
Qty: 14 PATCH | Refills: 0 | Status: SHIPPED | OUTPATIENT
Start: 2018-12-05 | End: 2019-01-03 | Stop reason: SDUPTHER

## 2018-12-06 ENCOUNTER — CLINICAL SUPPORT (OUTPATIENT)
Dept: SMOKING CESSATION | Facility: CLINIC | Age: 68
End: 2018-12-06
Payer: COMMERCIAL

## 2018-12-06 DIAGNOSIS — F17.200 NICOTINE DEPENDENCE: Primary | ICD-10-CM

## 2018-12-06 PROCEDURE — 99404 PREV MED CNSL INDIV APPRX 60: CPT | Mod: S$GLB,,,

## 2018-12-06 PROCEDURE — 99999 PR PBB SHADOW E&M-EST. PATIENT-LVL I: CPT | Mod: PBBFAC,,,

## 2018-12-06 NOTE — PROGRESS NOTES
Individual Follow-Up Form    12/6/2018    Quit Date: 12/31/18    Clinical Status of Patient: Outpatient    Length of Service: 60 minutes    Continuing Medication: yes  Patches or Nicotine Lozenges       Target Symptoms: Withdrawal and medication side effects. The following were  rated moderate (3) to severe (4) on TCRS:  · Moderate (3): none   · Severe (4): none    Comments: Pt continues to smoke 5 cigs/day. Pt remains on tobacco cessation medication of  21 mg nicotine patch QD and 2 mg nicotine lozenge  PRN (1-2 per hour in place of cigarettes.) No adverse effects noted at this time. Pt doing well with rate reduction and wait times prior to smoking.  Pt encouraged to pick a quit day.  Reviewed coping strategies/habitual behavior/relapse prevention with patient. Exhaled carbon monoxide level was 6 ppm per [facility]  ( non- smoker = 0-5 ppm .)  Will see pt back in office in 2 weeks      Diagnosis: F17.200    Next Visit: 2 weeks

## 2018-12-19 ENCOUNTER — TELEPHONE (OUTPATIENT)
Dept: SMOKING CESSATION | Facility: CLINIC | Age: 68
End: 2018-12-19

## 2018-12-19 NOTE — TELEPHONE ENCOUNTER
[facility]- called to check on patient cancelled  last appointment. Left message to call back to reschedule [phone] .

## 2018-12-21 ENCOUNTER — PES CALL (OUTPATIENT)
Dept: ADMINISTRATIVE | Facility: CLINIC | Age: 68
End: 2018-12-21

## 2018-12-27 ENCOUNTER — OFFICE VISIT (OUTPATIENT)
Dept: PODIATRY | Facility: CLINIC | Age: 68
End: 2018-12-27
Payer: MEDICARE

## 2018-12-27 VITALS
HEIGHT: 62 IN | SYSTOLIC BLOOD PRESSURE: 160 MMHG | BODY MASS INDEX: 35.33 KG/M2 | DIASTOLIC BLOOD PRESSURE: 78 MMHG | WEIGHT: 192 LBS

## 2018-12-27 DIAGNOSIS — M20.42 HAMMER TOES OF BOTH FEET: ICD-10-CM

## 2018-12-27 DIAGNOSIS — B35.1 ONYCHOMYCOSIS DUE TO DERMATOPHYTE: ICD-10-CM

## 2018-12-27 DIAGNOSIS — E11.51 TYPE II DIABETES MELLITUS WITH PERIPHERAL CIRCULATORY DISORDER: Primary | ICD-10-CM

## 2018-12-27 DIAGNOSIS — M20.5X1 HALLUX LIMITUS, ACQUIRED, RIGHT: ICD-10-CM

## 2018-12-27 DIAGNOSIS — L84 CORN OR CALLUS: ICD-10-CM

## 2018-12-27 DIAGNOSIS — M20.5X2 HALLUX LIMITUS, ACQUIRED, LEFT: ICD-10-CM

## 2018-12-27 DIAGNOSIS — L90.9 PLANTAR FAT PAD ATROPHY: ICD-10-CM

## 2018-12-27 DIAGNOSIS — M20.41 HAMMER TOES OF BOTH FEET: ICD-10-CM

## 2018-12-27 PROCEDURE — 11056 PARNG/CUTG B9 HYPRKR LES 2-4: CPT | Mod: Q9,PBBFAC,PO | Performed by: PODIATRIST

## 2018-12-27 PROCEDURE — 99499 UNLISTED E&M SERVICE: CPT | Mod: S$PBB,,, | Performed by: PODIATRIST

## 2018-12-27 PROCEDURE — 11056 PARNG/CUTG B9 HYPRKR LES 2-4: CPT | Mod: Q9,S$PBB,, | Performed by: PODIATRIST

## 2018-12-27 PROCEDURE — 11721 DEBRIDE NAIL 6 OR MORE: CPT | Mod: Q9,PBBFAC,PO,59 | Performed by: PODIATRIST

## 2018-12-27 PROCEDURE — 11721 DEBRIDE NAIL 6 OR MORE: CPT | Mod: 59,Q9,S$PBB, | Performed by: PODIATRIST

## 2018-12-27 PROCEDURE — 99213 OFFICE O/P EST LOW 20 MIN: CPT | Mod: PBBFAC,PO,25 | Performed by: PODIATRIST

## 2018-12-27 PROCEDURE — 99999 PR PBB SHADOW E&M-EST. PATIENT-LVL III: CPT | Mod: PBBFAC,,, | Performed by: PODIATRIST

## 2018-12-27 NOTE — PROGRESS NOTES
Subjective:      Patient ID: Stephanie Sears is a 68 y.o. female.    Chief Complaint: Diabetes Mellitus (10/18/18 Annita); Diabetic Foot Exam; and Nail Care    Stephanie is a 68 y.o. female who presents to the clinic for evaluation and treatment of high risk feet. Stephanie has a past medical history of Bronchitis, Cataract, Cervical spondylosis with radiculopathy (7/10/2012), Chest pain, Chronic neck pain (7/26/2012), Fibrocystic breast, GERD (gastroesophageal reflux disease), Glaucoma, Hyperlipidemia, Hypertension, Neck pain (7/10/2012), JAM (obstructive sleep apnea), Primary osteoarthritis of both knees, Psoriasis, Subacromial or subdeltoid bursitis (7/10/2012), Thyroid disease, and Type 2 diabetes mellitus (12/10/2013). The patient's chief complaint is long, thick toenails. This patient has documented high risk feet requiring routine maintenance secondary to diabetes mellitis and those secondary complications of diabetes, as mentioned..    PCP: Aishwarya Ariza MD    Date Last Seen by PCP:   Chief Complaint   Patient presents with    Diabetes Mellitus     10/18/18 Annita    Diabetic Foot Exam    Nail Care       Current shoe gear:  Casual shoes     Hemoglobin A1C   Date Value Ref Range Status   10/18/2018 5.8 (H) 4.0 - 5.6 % Final     Comment:     ADA Screening Guidelines:  5.7-6.4%  Consistent with prediabetes  >or=6.5%  Consistent with diabetes  High levels of fetal hemoglobin interfere with the HbA1C  assay. Heterozygous hemoglobin variants (HbS, HgC, etc)do  not significantly interfere with this assay.   However, presence of multiple variants may affect accuracy.     02/09/2018 6.0 (H) 4.0 - 5.6 % Final     Comment:     According to ADA guidelines, hemoglobin A1c <7.0% represents  optimal control in non-pregnant diabetic patients. Different  metrics may apply to specific patient populations.   Standards of Medical Care in Diabetes-2016.  For the purpose of screening for the presence of diabetes:  <5.7%      Consistent with the absence of diabetes  5.7-6.4%  Consistent with increasing risk for diabetes   (prediabetes)  >or=6.5%  Consistent with diabetes  Currently, no consensus exists for use of hemoglobin A1c  for diagnosis of diabetes for children.  This Hemoglobin A1c assay has significant interference with fetal   hemoglobin   (HbF). The results are invalid for patients with abnormal amounts of   HbF,   including those with known Hereditary Persistence   of Fetal Hemoglobin. Heterozygous hemoglobin variants (HbAS, HbAC,   HbAD, HbAE, HbA2) do not significantly interfere with this assay;   however, presence of multiple variants in a sample may impact the %   interference.     12/14/2017 6.2 (H) 4.0 - 5.6 % Final     Comment:     According to ADA guidelines, hemoglobin A1c <7.0% represents  optimal control in non-pregnant diabetic patients. Different  metrics may apply to specific patient populations.   Standards of Medical Care in Diabetes-2016.  For the purpose of screening for the presence of diabetes:  <5.7%     Consistent with the absence of diabetes  5.7-6.4%  Consistent with increasing risk for diabetes   (prediabetes)  >or=6.5%  Consistent with diabetes  Currently, no consensus exists for use of hemoglobin A1c  for diagnosis of diabetes for children.  This Hemoglobin A1c assay has significant interference with fetal   hemoglobin   (HbF). The results are invalid for patients with abnormal amounts of   HbF,   including those with known Hereditary Persistence   of Fetal Hemoglobin. Heterozygous hemoglobin variants (HbAS, HbAC,   HbAD, HbAE, HbA2) do not significantly interfere with this assay;   however, presence of multiple variants in a sample may impact the %   interference.       Patient Active Problem List   Diagnosis    GERD (gastroesophageal reflux disease)    Primary osteoarthritis of both knees    Cervical spondylosis with radiculopathy    Open angle with borderline findings, low risk - Both Eyes     Nontoxic uninodular goiter    Myopia with astigmatism and presbyopia - Both Eyes    Diet-controlled diabetes mellitus    Asymptomatic proteinuria    DJD (degenerative joint disease) of lumbar spine, mild    Osteoarthritis of left hip, mild    DJD (degenerative joint disease) of cervical spine    Essential hypertension    Class 2 obesity due to excess calories without serious comorbidity with body mass index (BMI) of 35.0 to 35.9 in adult    Tobacco use    H/O scarlet fever    Aortic valve sclerosis    Pulmonary hypertension    Diastolic dysfunction    Status post total knee replacement, left 1/20/2016    CMC arthritis    Chronic midline thoracic back pain    Primary osteoarthritis of right knee    Hand pain, left    Obstructive sleep apnea syndrome    Personal history of spine surgery    Fatty liver    Status post total right knee replacement 2/26/2018    Dyslipidemia    Preoperative cardiovascular examination    Bilateral carotid bruits     Current Outpatient Medications on File Prior to Visit   Medication Sig Dispense Refill    albuterol (PROAIR HFA) 90 mcg/actuation inhaler Inhale 2 puffs into the lungs every 6 (six) hours as needed for Wheezing or Shortness of Breath. 1 Inhaler 3    amlodipine-benazepril (LOTREL) 10-40 mg per capsule TAKE 1 CAPSULE DAILY 90 capsule 1    atorvastatin (LIPITOR) 20 MG tablet Take 1 tablet (20 mg total) by mouth once daily. 30 tablet 3    blood sugar diagnostic (ACCU-CHEK JOSE G PLUS TEST STRP) Strp Inject 1 each into the skin 2 (two) times daily. 200 each 11    blood sugar diagnostic Strp Use to test blood glucose levels 2 times per day as instructed. 200 strip 11    CALCIUM CARBONATE (TUMS ORAL) Take by mouth as needed (acid reflux, indigestion).      carvedilol (COREG) 12.5 MG tablet TAKE 1 TABLET TWICE A DAY WITH MEALS 180 tablet 1    diphenhydrAMINE (BENADRYL) 25 mg capsule Take 25 mg by mouth every 6 (six) hours as needed for Itching or  Allergies.      fluticasone (FLONASE) 50 mcg/actuation nasal spray 1 spray by Each Nare route 2 (two) times daily. 1 Bottle 1    furosemide (LASIX) 40 MG tablet TAKE 1 TABLET DAILY 90 tablet 1    gabapentin (NEURONTIN) 600 MG tablet Take 1 tablet (600 mg total) by mouth 3 (three) times daily. 270 tablet 1    INULIN (FIBER GUMMIES ORAL) Take by mouth every morning.       lancets (ACCU-CHEK MULTICLIX LANCET) Misc Test blood sugar twice daily 300 each 3    lancets (ONETOUCH DELICA LANCETS) 33 gauge Misc Inject 1 lancet into the skin 2 (two) times daily before meals. 200 each 11    meloxicam (MOBIC) 7.5 MG tablet TAKE 1 TABLET DAILY 90 tablet 1    nicotine polacrilex 2 MG Lozg Take 1 lozenge (2 mg total) by mouth as needed. 1-2 per hour in place of cigarettes maximum of 15 per day. 108 lozenge 0    triamcinolone acetonide 0.1% (KENALOG) 0.1 % ointment KENDRA AA ON THE SKIN BID ON RASH ON LEGS  0    traMADol (ULTRAM) 50 mg tablet Take 1 tablet (50 mg total) by mouth every 6 (six) hours as needed for Pain. 60 tablet 0     Current Facility-Administered Medications on File Prior to Visit   Medication Dose Route Frequency Provider Last Rate Last Dose    dicyclomine 10 mg/5 mL syrup 20 mg  20 mg Oral QID (AC & HS) Indra Solorio MD   20 mg at 06/18/18 1006     Review of patient's allergies indicates:   Allergen Reactions    Hydrocodone Shortness Of Breath    Iodinated contrast- oral and iv dye Shortness Of Breath     Difficulty breathing    Adhesive Itching    Oxycodone      Hallucinations    Sulfa (sulfonamide antibiotics) Hives and Itching     Past Surgical History:   Procedure Laterality Date    ARTHROSCOPY - LEFT THUMB CMCJ - LINVATEC - CMC Left 12/7/2016    Performed by [facility] at [facility] OR 1ST FLR    BLOCK-NERVE Right 2/27/2018    Performed by Teresa Surgeon at [facility] TERESA    BREAST LUMPECTOMY Left 1988    mass in the rt breast surgery  1988    BREAST MASS EXCISION Right     benign     CHOLECYSTECTOMY      CHOLECYSTECTOMY, LAPAROSCOPIC N/A 2013    Performed by Shay Cordova MD at [facility] OR 2ND FLR    HYSTERECTOMY      KNEE SURGERY Left 16    TKR    KNEE SURGERY Right 2018    TKR    L4-L5 fusion      REPLACEMENT-KNEE-TOTAL Right 2018    Performed by [facility] Jr[facility] at [facility] OR 2ND FLR    REPLACEMENT-KNEE-TOTAL Left 2016    Performed by [facility] at [facility] OR 2ND FLR    SPINE SURGERY       Family History   Problem Relation Age of Onset    Diabetes Mother     Hypertension Mother         CHF, angina    Stroke Mother         tia, glaucoma    Hypertension Father         glaucoma, Alzhiemer's , supra pubic    Kidney disease Brother         kidney transplant, HTN,DM    Diabetes Brother     Amblyopia Neg Hx     Blindness Neg Hx      Social History     Socioeconomic History    Marital status:      Spouse name: Not on file    Number of children: Not on file    Years of education: Not on file    Highest education level: Not on file   Social Needs    Financial resource strain: Not on file    Food insecurity - worry: Not on file    Food insecurity - inability: Not on file    Transportation needs - medical: Not on file    Transportation needs - non-medical: Not on file   Occupational History    Not on file   Tobacco Use    Smoking status: Current Every Day Smoker     Packs/day: 1.00     Years: 40.00     Pack years: 40.00     Types: Cigarettes    Smokeless tobacco: Never Used    Tobacco comment: .  .  Retired from [employer].     Substance and Sexual Activity    Alcohol use: No     Alcohol/week: 0.0 oz    Drug use: No    Sexual activity: Not Currently     Partners: Male   Other Topics Concern    Not on file   Social History Narrative    Not on file       Review of Systems   Constitution: Negative for chills, fever and weakness.   Cardiovascular: Positive for leg swelling. Negative for  "chest pain and claudication.   Respiratory: Negative for cough and shortness of breath.    Skin: Positive for dry skin and nail changes. Negative for itching and rash.   Musculoskeletal: Positive for arthritis, back pain, joint pain and muscle cramps. Negative for falls, joint swelling and muscle weakness.   Gastrointestinal: Negative for diarrhea, nausea and vomiting.   Neurological: Positive for paresthesias. Negative for numbness and tremors.   Psychiatric/Behavioral: Negative for altered mental status and hallucinations.         Objective:       Vitals:    12/27/18 1444   BP: (!) 160/78   Weight: 87.1 kg (192 lb)   Height: 5' 2" (1.575 m)   PainSc: 0-No pain       Physical Exam   Constitutional:   General: Pt. is well-developed, well-nourished, appears stated age, in no acute distress, alert and oriented x 3. No evidence of depression, anxiety, or agitation. Calm, cooperative, and communicative. Appropriate interactions and affect.       Cardiovascular:   Pulses:       Dorsalis pedis pulses are 1+ on the right side, and 1+ on the left side.        Posterior tibial pulses are 1+ on the right side, and 1+ on the left side.   There is decreased digital hair. Skin is atrophic.   Musculoskeletal:        Right ankle: She exhibits normal range of motion and no swelling. No tenderness. Achilles tendon exhibits no pain and no defect.        Left ankle: She exhibits normal range of motion and no swelling. No tenderness. Achilles tendon exhibits no pain and no defect.        Right foot: There is normal range of motion and no tenderness.        Left foot: There is normal range of motion and no tenderness.   Muscle strength is 5/5 in all groups bilaterally.    Decreased stride, station of gait.  apropulsive toe off.  Increased angle and base of gait.    Patient has hammertoes of digits 2-5 bilateral partially reducible without symptom today.    Decreased first MPJ range of motion both weightbearing and nonweightbearing, no " crepitus observed the first MP joint, +  dorsal flag sign.Mild  bunion deformity is observed .    Fat pad atrophy to heels and met heads bilateral   Neurological: A sensory deficit is present.   Paresthesias, and hyperesthesia bilateral feet at toes with no clearly identified trigger or source.    Decreased/absent vibratory sensation bilateral feet to 128Hz tuning fork.   Skin: Skin is warm, dry and intact. No abrasion, no ecchymosis, no lesion and no rash noted. She is not diaphoretic. No cyanosis or erythema. No pallor. Nails show no clubbing.   Toenails 1-5 bilaterally are elongated by 2-3 mm, thickened by 2-3 mm, discolored/yellowed, dystrophic, brittle with subungual debris.    Focal hyperkeratotic lesion consisting entirely of hyperkeratotic tissue without open skin, drainage, pus, fluctuance, malodor, or signs of infection: sub right hallux IPJ; sub 5th MTPJ rita     Interdigital Spaces clean, dry and without evidence of break in skin integrity   Psychiatric: She has a normal mood and affect. Her speech is normal.   Nursing note and vitals reviewed.            Assessment:       Encounter Diagnoses   Name Primary?    Type II diabetes mellitus with peripheral circulatory disorder Yes    Hallux limitus, acquired, left     Hallux limitus, acquired, right     Hammer toes of both feet     Plantar fat pad atrophy     Corn or callus     Onychomycosis due to dermatophyte          Plan:       Stephanie was seen today for diabetes mellitus, diabetic foot exam and nail care.    Diagnoses and all orders for this visit:    Type II diabetes mellitus with peripheral circulatory disorder    Hallux limitus, acquired, left    Hallux limitus, acquired, right    Hammer toes of both feet    Plantar fat pad atrophy    Corn or callus    Onychomycosis due to dermatophyte      I counseled the patient on her conditions, their implications and medical management.      Greater than 50% of this visit spent on counseling and  coordination of care.    Education about the diabetic foot, neuropathy, and prevention of limb loss.    Shoe inspection. Diabetic Foot Education. Patient reminded of the importance of good nutrition/healthy diet/weight management and blood sugar control to help prevent podiatric complications of diabetes. Patient instructed on proper foot hygeine. Wear comfortable, proper fitting shoes. Wash feet daily. Dry well. After drying, apply moisturizer to feet (no lotion to webspaces). Inspect feet daily for skin breaks, blisters, swelling, or redness. Wear cotton socks (preferably white)  Change socks every day. Do NOT walk barefoot. Do NOT use heating pads or hot water soaks. We discussed wearing proper shoe gear, daily foot inspections, never walking without protective shoe gear.     Counseled patient on the effects of smoking on healing. She verbalizes understanding that smoking and/or history of smoking has an adverse effect on circulation an can increase the chances of delayed healing and this prolonged exposure leads to infection or progression of infection.    Discussed edema control and the importance of daily moisturizer to the feet such as Gold bonds diabetic foot cream    With patient's permission, nails were aggressively reduced and debrided x 10 to their soft tissue attachment mechanically and with electric , removing all offending nail and debris. After cleansing the  area w/ alcohol prep pad the above mentioned hyperkeratosis was trimmed utilizing No 15 scapel, to a smooth base with out incident. Patient tolerated this  well and reported comfort to the area of:  sub right hallux IPJ; sub 5th MTPJ rita.  Patient relates relief following the procedure. She will continue to monitor the areas daily, inspect her feet, wear protective shoe gear when ambulatory, moisturizer to maintain skin integrity and follow in this office in approximately 3-4 months, sooner p.r.n.

## 2019-01-02 ENCOUNTER — CLINICAL SUPPORT (OUTPATIENT)
Dept: SMOKING CESSATION | Facility: CLINIC | Age: 69
End: 2019-01-02
Payer: COMMERCIAL

## 2019-01-02 DIAGNOSIS — F17.200 NICOTINE DEPENDENCE: Primary | ICD-10-CM

## 2019-01-02 PROCEDURE — 99407 PR TOBACCO USE CESSATION INTENSIVE >10 MINUTES: ICD-10-PCS | Mod: S$GLB,,,

## 2019-01-02 PROCEDURE — 99407 BEHAV CHNG SMOKING > 10 MIN: CPT | Mod: S$GLB,,,

## 2019-01-02 NOTE — PROGRESS NOTES
Successful contact with patient regarding tobacco cessation quit #2. Pt states, she currently smoke two packs of cigarettes per week and she remain on prescribed tobacco cessation medication regimen of 21 mg nicotine patches without any negative side effects at this time. Scheduled patient to follow up with CTTS on 1/3/2019.  Pt informed of her benefit status, future telephone follow ups, and contact information. Will follow up with her progress in one month.

## 2019-01-03 ENCOUNTER — CLINICAL SUPPORT (OUTPATIENT)
Dept: SMOKING CESSATION | Facility: CLINIC | Age: 69
End: 2019-01-03
Payer: COMMERCIAL

## 2019-01-03 DIAGNOSIS — F17.200 NICOTINE DEPENDENCE: ICD-10-CM

## 2019-01-03 PROCEDURE — 99404 PREV MED CNSL INDIV APPRX 60: CPT | Mod: S$GLB,,,

## 2019-01-03 PROCEDURE — 99404 PR PREVENT COUNSEL,INDIV,60 MIN: ICD-10-PCS | Mod: S$GLB,,,

## 2019-01-03 PROCEDURE — 99999 PR PBB SHADOW E&M-EST. PATIENT-LVL I: ICD-10-PCS | Mod: PBBFAC,,,

## 2019-01-03 PROCEDURE — 99999 PR PBB SHADOW E&M-EST. PATIENT-LVL I: CPT | Mod: PBBFAC,,,

## 2019-01-03 RX ORDER — IBUPROFEN 200 MG
1 TABLET ORAL DAILY
Qty: 28 PATCH | Refills: 0 | Status: SHIPPED | OUTPATIENT
Start: 2019-01-03 | End: 2019-07-12 | Stop reason: SDUPTHER

## 2019-01-03 NOTE — PROGRESS NOTES
Individual Follow-Up Form    1/3/2019    Quit Date:     Clinical Status of Patient: Outpatient    Length of Service: 60 minutes    Continuing Medication: yes  Patches    Other Medications: none     Target Symptoms: Withdrawal and medication side effects. The following were  rated moderate (3) to severe (4) on TCRS:  · Moderate (3): none  · Severe (4): none    Comments: Patient presents for follow up smoking 4 cigarettes per day. Pt remains on tobacco cessation medication of  21 mg nicotine patch QD  No adverse effects noted at this time. Pt not doing well with rate reduction and wait times prior to smoking.  Pt encouraged to pick a quit day.  Reviewed coping strategies/habitual behavior/relapse prevention with patient. Exhaled carbon monoxide level was 18 ppm per Smokerlyzer  ( non- smoker = 0-5 ppm .)  Will see pt back in office in 2 weeks    Diagnosis: F17.210    Next Visit: 2 weeks

## 2019-01-04 DIAGNOSIS — Z12.11 SPECIAL SCREENING FOR MALIGNANT NEOPLASMS, COLON: Primary | ICD-10-CM

## 2019-01-04 RX ORDER — SODIUM, POTASSIUM,MAG SULFATES 17.5-3.13G
1 SOLUTION, RECONSTITUTED, ORAL ORAL DAILY
Qty: 1 KIT | Refills: 0 | Status: SHIPPED | OUTPATIENT
Start: 2019-01-04 | End: 2019-01-09

## 2019-01-07 ENCOUNTER — TELEPHONE (OUTPATIENT)
Dept: ENDOSCOPY | Facility: HOSPITAL | Age: 69
End: 2019-01-07

## 2019-01-07 NOTE — TELEPHONE ENCOUNTER
Called Pt to notify Pt of change in arrival time for colonoscopy scheduled on 2/26/19. There was no answer, message left for Pt to call endoscopy schedulers for new arrival time, [phone].

## 2019-01-14 ENCOUNTER — TELEPHONE (OUTPATIENT)
Dept: FAMILY MEDICINE | Facility: CLINIC | Age: 69
End: 2019-01-14

## 2019-01-14 ENCOUNTER — CLINICAL SUPPORT (OUTPATIENT)
Dept: SMOKING CESSATION | Facility: CLINIC | Age: 69
End: 2019-01-14
Payer: COMMERCIAL

## 2019-01-14 DIAGNOSIS — F17.200 NICOTINE DEPENDENCE: Primary | ICD-10-CM

## 2019-01-14 PROCEDURE — 99999 PR PBB SHADOW E&M-EST. PATIENT-LVL I: CPT | Mod: PBBFAC,,,

## 2019-01-14 PROCEDURE — 99402 PR PREVENT COUNSEL,INDIV,30 MIN: ICD-10-PCS | Mod: S$GLB,,,

## 2019-01-14 PROCEDURE — 99402 PREV MED CNSL INDIV APPRX 30: CPT | Mod: S$GLB,,,

## 2019-01-14 PROCEDURE — 99999 PR PBB SHADOW E&M-EST. PATIENT-LVL I: ICD-10-PCS | Mod: PBBFAC,,,

## 2019-01-14 NOTE — PROGRESS NOTES
Individual Follow-Up Form    1/14/2019    Quit Date: tbd    Clinical Status of Patient: Outpatient    Length of Service: 30 minutes    Continuing Medication: yes  Patches or Nicotine Lozenges    Other Medications:      Target Symptoms: Withdrawal and medication side effects. The following were  rated moderate (3) to severe (4) on TCRS:  · Moderate (3): none  · Severe (4): none    Comments: Patient presents for follow up smoking (3 cigarettes per day. Pt remains on tobacco cessation medication of  21 mg nicotine patch QD and 2 mg nicotine lozenge PRN (1-2 per hour in place of cigarettes.) No adverse effects noted at this time. Pt admits she has not been smoking much because she has a cold.  Pt encouraged to pick a quit day.  Reviewed coping strategies/habitual behavior/relapse prevention with patient. Exhaled carbon monoxide level was 5  ppm per Smokerlyzer  ( non- smoker = 0-5 ppm .)  Will see pt back in office in 2 weeks    Diagnosis: F17.200    Next Visit: 2 weeks

## 2019-01-14 NOTE — TELEPHONE ENCOUNTER
----- Message from Sandrine Larry sent at 1/14/2019  8:41 AM CST -----  Contact: Pt in person  Pt would like something called in to the pharmacy for congestion.

## 2019-01-15 ENCOUNTER — TELEPHONE (OUTPATIENT)
Dept: FAMILY MEDICINE | Facility: CLINIC | Age: 69
End: 2019-01-15

## 2019-01-15 DIAGNOSIS — J06.9 UPPER RESPIRATORY INFECTION WITH COUGH AND CONGESTION: Primary | ICD-10-CM

## 2019-01-15 RX ORDER — CODEINE PHOSPHATE AND GUAIFENESIN 10; 100 MG/5ML; MG/5ML
10 SOLUTION ORAL 3 TIMES DAILY PRN
Qty: 236 ML | Refills: 0 | Status: SHIPPED | OUTPATIENT
Start: 2019-01-15 | End: 2019-01-15

## 2019-01-15 NOTE — TELEPHONE ENCOUNTER
----- Message from Citlalli Ivy sent at 1/15/2019  7:58 AM CST -----  Contact: [phone]  Pt is requesting to speak to you regarding a message she sent on 1-14-19 regarding a script for congestion. Pls call pt [phone]. Thanks.......Radha

## 2019-01-15 NOTE — TELEPHONE ENCOUNTER
Please contact patient and inform only alternative would be over the counter.  She can use Robitussin or Mucinex-DM for cough and congestion.

## 2019-01-16 RX ORDER — BENZONATATE 100 MG/1
200 CAPSULE ORAL 3 TIMES DAILY PRN
Qty: 30 CAPSULE | Refills: 0 | Status: SHIPPED | OUTPATIENT
Start: 2019-01-16 | End: 2019-01-26

## 2019-01-16 NOTE — TELEPHONE ENCOUNTER
Please contact patient and inform Mucinex is treatment of choice for congestion.  She can take Dextromethorphan to reduce cough, this is not contraindicated in patients with hypertension or kidney disease.    I have sent in tessalon perles also for cough reduction.  If she has only cough then it is likely due to URI and the cough will resolve.  If she has any worsening symptoms such as fever, blood tinged sputum then she should be seen for evaluation.

## 2019-01-21 ENCOUNTER — CLINICAL SUPPORT (OUTPATIENT)
Dept: SMOKING CESSATION | Facility: CLINIC | Age: 69
End: 2019-01-21
Payer: COMMERCIAL

## 2019-01-21 DIAGNOSIS — F17.200 NICOTINE DEPENDENCE: Primary | ICD-10-CM

## 2019-01-21 PROCEDURE — 99406 PR TOBACCO USE CESSATION INTERMEDIATE 3-10 MINUTES: ICD-10-PCS | Mod: S$GLB,,,

## 2019-01-21 PROCEDURE — 99406 BEHAV CHNG SMOKING 3-10 MIN: CPT | Mod: S$GLB,,,

## 2019-01-22 ENCOUNTER — OFFICE VISIT (OUTPATIENT)
Dept: SLEEP MEDICINE | Facility: CLINIC | Age: 69
End: 2019-01-22
Payer: MEDICARE

## 2019-01-22 ENCOUNTER — OFFICE VISIT (OUTPATIENT)
Dept: INTERNAL MEDICINE | Facility: CLINIC | Age: 69
End: 2019-01-22
Payer: MEDICARE

## 2019-01-22 VITALS
SYSTOLIC BLOOD PRESSURE: 150 MMHG | HEART RATE: 56 BPM | HEIGHT: 61 IN | DIASTOLIC BLOOD PRESSURE: 70 MMHG | WEIGHT: 193.81 LBS | BODY MASS INDEX: 36.59 KG/M2

## 2019-01-22 VITALS
SYSTOLIC BLOOD PRESSURE: 150 MMHG | OXYGEN SATURATION: 97 % | HEART RATE: 56 BPM | DIASTOLIC BLOOD PRESSURE: 70 MMHG | HEIGHT: 61 IN | WEIGHT: 193.81 LBS | BODY MASS INDEX: 36.59 KG/M2

## 2019-01-22 DIAGNOSIS — M47.22 CERVICAL SPONDYLOSIS WITH RADICULOPATHY: ICD-10-CM

## 2019-01-22 DIAGNOSIS — H40.019 OPEN ANGLE WITH BORDERLINE FINDINGS, LOW RISK: ICD-10-CM

## 2019-01-22 DIAGNOSIS — M47.22 OSTEOARTHRITIS OF SPINE WITH RADICULOPATHY, CERVICAL REGION: ICD-10-CM

## 2019-01-22 DIAGNOSIS — M17.0 PRIMARY OSTEOARTHRITIS OF BOTH KNEES: ICD-10-CM

## 2019-01-22 DIAGNOSIS — I10 ESSENTIAL HYPERTENSION: ICD-10-CM

## 2019-01-22 DIAGNOSIS — I51.89 DIASTOLIC DYSFUNCTION: ICD-10-CM

## 2019-01-22 DIAGNOSIS — H52.13 MYOPIA OF BOTH EYES WITH ASTIGMATISM AND PRESBYOPIA: ICD-10-CM

## 2019-01-22 DIAGNOSIS — Z86.19 H/O SCARLET FEVER: ICD-10-CM

## 2019-01-22 DIAGNOSIS — Z96.651 STATUS POST TOTAL RIGHT KNEE REPLACEMENT: ICD-10-CM

## 2019-01-22 DIAGNOSIS — R80.9 ASYMPTOMATIC PROTEINURIA: ICD-10-CM

## 2019-01-22 DIAGNOSIS — M54.6 CHRONIC MIDLINE THORACIC BACK PAIN: ICD-10-CM

## 2019-01-22 DIAGNOSIS — I35.8 AORTIC VALVE SCLEROSIS: ICD-10-CM

## 2019-01-22 DIAGNOSIS — I27.20 PULMONARY HYPERTENSION: ICD-10-CM

## 2019-01-22 DIAGNOSIS — E04.1 NONTOXIC UNINODULAR GOITER: ICD-10-CM

## 2019-01-22 DIAGNOSIS — G89.29 CHRONIC MIDLINE THORACIC BACK PAIN: ICD-10-CM

## 2019-01-22 DIAGNOSIS — G47.33 OBSTRUCTIVE SLEEP APNEA: Primary | ICD-10-CM

## 2019-01-22 DIAGNOSIS — M16.12 OSTEOARTHRITIS OF LEFT HIP, UNSPECIFIED OSTEOARTHRITIS TYPE: ICD-10-CM

## 2019-01-22 DIAGNOSIS — E66.09 CLASS 2 OBESITY DUE TO EXCESS CALORIES WITHOUT SERIOUS COMORBIDITY WITH BODY MASS INDEX (BMI) OF 35.0 TO 35.9 IN ADULT: ICD-10-CM

## 2019-01-22 DIAGNOSIS — Z72.0 TOBACCO USE: ICD-10-CM

## 2019-01-22 DIAGNOSIS — H52.203 MYOPIA OF BOTH EYES WITH ASTIGMATISM AND PRESBYOPIA: ICD-10-CM

## 2019-01-22 DIAGNOSIS — K21.9 GASTROESOPHAGEAL REFLUX DISEASE, ESOPHAGITIS PRESENCE NOT SPECIFIED: ICD-10-CM

## 2019-01-22 DIAGNOSIS — R09.89 BILATERAL CAROTID BRUITS: ICD-10-CM

## 2019-01-22 DIAGNOSIS — K76.0 FATTY LIVER: ICD-10-CM

## 2019-01-22 DIAGNOSIS — H52.4 MYOPIA OF BOTH EYES WITH ASTIGMATISM AND PRESBYOPIA: ICD-10-CM

## 2019-01-22 DIAGNOSIS — M17.11 PRIMARY OSTEOARTHRITIS OF RIGHT KNEE: ICD-10-CM

## 2019-01-22 DIAGNOSIS — M19.049 CMC ARTHRITIS: ICD-10-CM

## 2019-01-22 DIAGNOSIS — E78.5 DYSLIPIDEMIA: ICD-10-CM

## 2019-01-22 DIAGNOSIS — Z96.652 STATUS POST TOTAL KNEE REPLACEMENT, LEFT: ICD-10-CM

## 2019-01-22 DIAGNOSIS — M79.642 HAND PAIN, LEFT: ICD-10-CM

## 2019-01-22 DIAGNOSIS — Z98.890 PERSONAL HISTORY OF SPINE SURGERY: ICD-10-CM

## 2019-01-22 DIAGNOSIS — I70.0 ATHEROSCLEROSIS OF AORTIC ARCH: ICD-10-CM

## 2019-01-22 DIAGNOSIS — G47.33 OBSTRUCTIVE SLEEP APNEA SYNDROME: ICD-10-CM

## 2019-01-22 DIAGNOSIS — E11.9 DIET-CONTROLLED DIABETES MELLITUS: ICD-10-CM

## 2019-01-22 DIAGNOSIS — Z00.00 ENCOUNTER FOR PREVENTIVE HEALTH EXAMINATION: Primary | ICD-10-CM

## 2019-01-22 PROCEDURE — G0439 PR MEDICARE ANNUAL WELLNESS SUBSEQUENT VISIT: ICD-10-PCS | Mod: ,,, | Performed by: NURSE PRACTITIONER

## 2019-01-22 PROCEDURE — 99214 OFFICE O/P EST MOD 30 MIN: CPT | Mod: PBBFAC | Performed by: NURSE PRACTITIONER

## 2019-01-22 PROCEDURE — 99999 PR PBB SHADOW E&M-EST. PATIENT-LVL IV: ICD-10-PCS | Mod: PBBFAC,,, | Performed by: NURSE PRACTITIONER

## 2019-01-22 PROCEDURE — 99213 OFFICE O/P EST LOW 20 MIN: CPT | Mod: S$PBB,,, | Performed by: NURSE PRACTITIONER

## 2019-01-22 PROCEDURE — 99213 PR OFFICE/OUTPT VISIT, EST, LEVL III, 20-29 MIN: ICD-10-PCS | Mod: S$PBB,,, | Performed by: NURSE PRACTITIONER

## 2019-01-22 PROCEDURE — G0439 PPPS, SUBSEQ VISIT: HCPCS | Mod: ,,, | Performed by: NURSE PRACTITIONER

## 2019-01-22 PROCEDURE — 99214 OFFICE O/P EST MOD 30 MIN: CPT | Mod: PBBFAC,27 | Performed by: NURSE PRACTITIONER

## 2019-01-22 PROCEDURE — 99999 PR PBB SHADOW E&M-EST. PATIENT-LVL IV: CPT | Mod: PBBFAC,,, | Performed by: NURSE PRACTITIONER

## 2019-01-22 NOTE — PATIENT INSTRUCTIONS
Counseling and Referral of Other Preventative  (Italic type indicates deductible and co-insurance are waived)    Patient Name: Stephanie Sears  Today's Date: 1/22/2019    Health Maintenance       Date Due Completion Date    LDCT Lung Screen 02/27/2019 (Originally 1/10/2005) ---    Influenza Vaccine 03/12/2019 (Originally 8/1/2018) 12/14/2017    Override on 12/28/2016: Done    Override on 12/11/2014: Done    Override on 12/10/2013: Done    Override on 12/18/2012: Done    TETANUS VACCINE 01/22/2020 (Originally 1/10/1968) ---    Colonoscopy 02/26/2020 (Originally 8/13/2017) 8/13/2012    Hemoglobin A1c 04/18/2019 10/18/2018    Eye Exam 05/04/2019 5/4/2018    Lipid Panel 11/07/2019 11/7/2018    Low Dose Statin 12/27/2019 12/27/2018    Foot Exam 12/27/2019 12/27/2018 (Done)    Override on 12/27/2018: Done    Override on 12/11/2015: Done    DEXA SCAN 01/24/2020 1/24/2017    Mammogram 08/13/2020 8/13/2018        No orders of the defined types were placed in this encounter.    The following information is provided to all patients.  This information is to help you find resources for any of the problems found today that may be affecting your health:                Living healthy guide: www.[facility].[place].gov      Understanding Diabetes: www.diabetes.org      Eating healthy: www.cdc.gov/healthyweight      CDC home safety checklist: www.cdc.gov/steadi/patient.html      Agency on Aging: www.goea.[place].[facility]      Alcoholics anonymous (AA): www.aa.org      Physical Activity: www.kirill.nih.gov/bb2eqna      Tobacco use: www.quitwithusla.org

## 2019-01-22 NOTE — PROGRESS NOTES
"Stephanie Sears presented for a  Medicare AWV and comprehensive Health Risk Assessment today. The following components were reviewed and updated:    · Medical history  · Family History  · Social history  · Allergies and Current Medications  · Health Risk Assessment  · Health Maintenance  · Care Team     ** See Completed Assessments for Annual Wellness Visit within the encounter summary.**       The following assessments were completed:  · Living Situation  · CAGE  · Depression Screening  · Timed Get Up and Go  · Whisper Test  · Cognitive Function Screening  · Nutrition Screening  · ADL Screening  · PAQ Screening          Vitals:    01/22/19 0846   BP: (!) 150/70   BP Location: Left arm   Patient Position: Sitting   Pulse: (!) 56   SpO2: 97%   Weight: 87.9 kg (193 lb 12.6 oz)   Height: 5' 1" (1.549 m)     Body mass index is 36.62 kg/m².     Physical Exam   Constitutional: She is oriented to person, place, and time. She appears well-developed and well-nourished.   HENT:   Head: Normocephalic and atraumatic. Not macrocephalic and not microcephalic. Head is without raccoon's eyes, without Teixeira's sign, without abrasion, without contusion, without laceration, without right periorbital erythema and without left periorbital erythema. Hair is normal.   Right Ear: No lacerations. No drainage, swelling or tenderness. No foreign bodies. No mastoid tenderness. Tympanic membrane is not injected, not scarred, not perforated, not erythematous, not retracted and not bulging. Tympanic membrane mobility is normal. No middle ear effusion. No hemotympanum. No decreased hearing is noted.   Left Ear: No lacerations. No drainage, swelling or tenderness. No foreign bodies. No mastoid tenderness. Tympanic membrane is not injected, not scarred, not perforated, not erythematous, not retracted and not bulging. Tympanic membrane mobility is normal.  No middle ear effusion. No hemotympanum. No decreased hearing is noted.   Nose: Nose normal. No " mucosal edema, rhinorrhea, nose lacerations, sinus tenderness or nasal deformity.   Mouth/Throat: Uvula is midline.   Eyes: Conjunctivae and lids are normal. No scleral icterus.   Neck: Trachea normal. Neck supple. No spinous process tenderness and no muscular tenderness present. No neck rigidity. No edema, no erythema and normal range of motion present. No thyroid mass and no thyromegaly present.   Cardiovascular: Normal rate, regular rhythm, normal heart sounds and intact distal pulses. Exam reveals no gallop and no friction rub.   No murmur heard.  Pulmonary/Chest: Effort normal and breath sounds normal. No stridor. No respiratory distress. She has no wheezes. She has no rales. She exhibits no tenderness.   Abdominal: Soft. Bowel sounds are normal. She exhibits no distension.   Musculoskeletal: Normal range of motion.   Uses a rolling walker   Lymphadenopathy:        Head (right side): No submental, no submandibular, no tonsillar, no preauricular and no posterior auricular adenopathy present.        Head (left side): No submental, no submandibular, no tonsillar, no preauricular, no posterior auricular and no occipital adenopathy present.   Neurological: She is alert and oriented to person, place, and time.   Skin: Skin is warm and dry.   Psychiatric: She has a normal mood and affect. Her behavior is normal. Judgment and thought content normal.   Vitals reviewed.      Diagnoses and health risks identified today and associated recommendations/orders:    1. Encounter for preventive health examination  Annual Health Risk Assessment (HRA) visit today.  Counseling and referral of health maintenance and preventative health measures performed.  Patient given annual wellness paperwork to take home.  Encouraged to return in 1 year for subsequent HRA visit.     2. Atherosclerosis of aortic arch  Chronic. Stable. Noted CXR from 1/21/16. Continue current treatment plan as previously prescribed by Cardiology.    3. Class 2  obesity due to excess calories without serious comorbidity with body mass index (BMI) of 35.0 to 35.9 in adult  Chronic. Stable. Encouraged to increase exercise as tolerated and improve diet to heart healthy, low sodium diet. Continue current treatment plan as previously prescribed by PCP.    4. Aortic valve sclerosis  Chronic. Stable. Continue current treatment plan as previously prescribed by Cardiology.    5. Diastolic dysfunction  Chronic. Stable. Continue current treatment plan as previously prescribed by Cardiology.    6. Pulmonary hypertension  Chronic. Stable. Continue current treatment plan as previously prescribed by PCP.    7. Essential hypertension  Chronic. Stable. Uncontrolled. Encouraged to increase exercise as tolerated (moderate-intensity aerobic activity and muscle-strengthening activities) improve diet to heart healthy, low sodium diet. Continue current treatment plan as previously prescribed by PCP.    8. Dyslipidemia  Chronic. Stable. Continue current treatment plan as previously prescribed by PCP.    9. Diet-controlled diabetes mellitus  Chronic. Stable. Last Hgb A1c=5.8 from 10/18/18. Continue current treatment plan as previously prescribed by PCP.    10. Bilateral carotid bruits  Chronic. Stable. Continue current treatment plan as previously prescribed by Cardiology.    11. Asymptomatic proteinuria  Chronic. Stable. Continue current treatment plan as previously prescribed by Nephrology.    12. H/O scarlet fever  Chronic. Stable. Continue current treatment plan as previously prescribed by PCP.    13. Nontoxic uninodular goiter  Chronic. Stable. Continue current treatment plan as previously prescribed by PCP.    14. Gastroesophageal reflux disease, esophagitis presence not specified  Chronic. Stable. Continue current treatment plan as previously prescribed by PCP.    15. Fatty liver  Chronic. Stable. Continue current treatment plan as previously prescribed by PCP.    16. Obstructive sleep apnea  syndrome  Chronic. Stable. Continue current treatment plan as previously prescribed by Sleep Medicine.    17. Tobacco use  Chronic. Stable. Continue current treatment plan as previously prescribed by PCP.    18. Osteoarthritis of left hip, unspecified osteoarthritis type  Chronic. Stable. Continue current treatment plan as previously prescribed by PCP.    19. Primary osteoarthritis of both knees  Chronic. Stable. Continue current treatment plan as previously prescribed by Ortho.    20. Status post total right knee replacement 2/26/2018  Chronic. Stable. Continue current treatment plan as previously prescribed by Ortho.    21. Personal history of spine surgery  Chronic. Stable. Continue current treatment plan as previously prescribed by PCP.    22. Hand pain, left  Chronic. Stable. Continue current treatment plan as previously prescribed by PCP.    23. Primary osteoarthritis of right knee  Chronic. Stable. Continue current treatment plan as previously prescribed by PCP.    24. Chronic midline thoracic back pain  Chronic. Stable. Continue current treatment plan as previously prescribed by PCP.    25. CMC arthritis  Chronic. Stable. Continue current treatment plan as previously prescribed by PCP.    26. Status post total knee replacement, left 1/20/2016  Chronic. Stable. Continue current treatment plan as previously prescribed by PCP.    27. Open angle with borderline findings, low risk - Both Eyes  Chronic. Stable. Continue current treatment plan as previously prescribed by Ophthalmology.    28. Myopia of both eyes with astigmatism and presbyopia  Chronic. Stable. Continue current treatment plan as previously prescribed by Ophthalmology.    29. Osteoarthritis of spine with radiculopathy, cervical region  Chronic. Stable. Continue current treatment plan as previously prescribed by PCP.    30. Cervical spondylosis with radiculopathy  Chronic. Stable. Continue current treatment plan as previously prescribed by  PCP.        Provided Stephanie with a 5-10 year written screening schedule and personal prevention plan. Recommendations were developed using the USPSTF age appropriate recommendations. Education, counseling, and referrals were provided as needed. After Visit Summary printed and given to patient which includes a list of additional screenings\tests needed.    Follow-up in about 1 year (around 1/22/2020).    Mitul Hawley NP

## 2019-01-22 NOTE — PROGRESS NOTES
"Stephanie Sears  was seen as a new patient for the mgt of JAM    JAM mgt. Continues to use cpap 10cm. Needs new mask headstrap, hers has never been replaced. ESS=9. Continued nightlyuse.  Feeling rested upon awake.     REVIEW OF SYSTEMS: Sleep related symptoms as per HPI. Sinus congestion. Mobility imparied,rolling walker assist. Otherwise a balance review of 10-systems is negative.     PHYSICAL EXAM:  Vitals:    01/22/19 0938   BP: (!) 150/70   Pulse: (!) 56   Weight: 87.9 kg (193 lb 12.6 oz)   Height: 5' 1" (1.549 m)   PainSc: 0-No pain     Body mass index is 36.62 kg/m².   GENERAL: Normal development, well groomed, obese                  DATA   PSG 8/11/17: AHI was 6.0 with an oxygen aye of 86.0%. The RERA index was 4.8 and RDI was 10.8 events per hour.   9/6/17 Ttiration study, effective supine REM cpap 10cm    ASSESSMENT    ICD-10-CM ICD-9-CM    1. Obstructive sleep apnea G47.33 327.23 CPAP/BIPAP SUPPLIES       PLAN:    JAM, using cpap 10cm, good compliance.  27/30> 4 hours.  AHI <5.  Patient is benefiting from cpap    Education: During our discussion today, we talked about the effectiveness of her therapy as well as the potential ramifications of untreated sleep apnea, which could include daytime sleepiness, hypertension, heart disease and/or stroke.     RTC 12-mos, alicia preferred   "

## 2019-01-23 ENCOUNTER — OFFICE VISIT (OUTPATIENT)
Dept: PHYSICAL MEDICINE AND REHAB | Facility: CLINIC | Age: 69
End: 2019-01-23
Payer: MEDICARE

## 2019-01-23 ENCOUNTER — HOSPITAL ENCOUNTER (OUTPATIENT)
Dept: RADIOLOGY | Facility: HOSPITAL | Age: 69
Discharge: HOME | End: 2019-01-23
Attending: PHYSICAL MEDICINE & REHABILITATION
Payer: MEDICARE

## 2019-01-23 VITALS
HEART RATE: 58 BPM | SYSTOLIC BLOOD PRESSURE: 168 MMHG | HEIGHT: 62 IN | WEIGHT: 198 LBS | BODY MASS INDEX: 36.44 KG/M2 | DIASTOLIC BLOOD PRESSURE: 80 MMHG

## 2019-01-23 DIAGNOSIS — Z98.890 STATUS POST LUMBAR LAMINECTOMY: ICD-10-CM

## 2019-01-23 DIAGNOSIS — G89.29 CHRONIC BILATERAL LOW BACK PAIN WITH BILATERAL SCIATICA: Primary | ICD-10-CM

## 2019-01-23 DIAGNOSIS — M54.16 LUMBAR RADICULOPATHY: ICD-10-CM

## 2019-01-23 DIAGNOSIS — M54.41 CHRONIC BILATERAL LOW BACK PAIN WITH BILATERAL SCIATICA: ICD-10-CM

## 2019-01-23 DIAGNOSIS — M54.41 CHRONIC BILATERAL LOW BACK PAIN WITH BILATERAL SCIATICA: Primary | ICD-10-CM

## 2019-01-23 DIAGNOSIS — G89.29 CHRONIC BILATERAL LOW BACK PAIN WITH BILATERAL SCIATICA: ICD-10-CM

## 2019-01-23 DIAGNOSIS — M54.42 CHRONIC BILATERAL LOW BACK PAIN WITH BILATERAL SCIATICA: ICD-10-CM

## 2019-01-23 DIAGNOSIS — M54.42 CHRONIC BILATERAL LOW BACK PAIN WITH BILATERAL SCIATICA: Primary | ICD-10-CM

## 2019-01-23 PROCEDURE — 72110 X-RAY EXAM L-2 SPINE 4/>VWS: CPT | Mod: 26,,, | Performed by: RADIOLOGY

## 2019-01-23 PROCEDURE — 72110 XR LUMBAR SPINE 5 VIEW WITH FLEX AND EXT: ICD-10-PCS | Mod: 26,,, | Performed by: RADIOLOGY

## 2019-01-23 PROCEDURE — 99213 OFFICE O/P EST LOW 20 MIN: CPT | Mod: PBBFAC,25 | Performed by: PHYSICAL MEDICINE & REHABILITATION

## 2019-01-23 PROCEDURE — 99999 PR PBB SHADOW E&M-EST. PATIENT-LVL III: ICD-10-PCS | Mod: PBBFAC,,, | Performed by: PHYSICAL MEDICINE & REHABILITATION

## 2019-01-23 PROCEDURE — 99214 OFFICE O/P EST MOD 30 MIN: CPT | Mod: S$PBB,,, | Performed by: PHYSICAL MEDICINE & REHABILITATION

## 2019-01-23 PROCEDURE — 72110 X-RAY EXAM L-2 SPINE 4/>VWS: CPT | Mod: TC

## 2019-01-23 PROCEDURE — 99999 PR PBB SHADOW E&M-EST. PATIENT-LVL III: CPT | Mod: PBBFAC,,, | Performed by: PHYSICAL MEDICINE & REHABILITATION

## 2019-01-23 PROCEDURE — 99214 PR OFFICE/OUTPT VISIT, EST, LEVL IV, 30-39 MIN: ICD-10-PCS | Mod: S$PBB,,, | Performed by: PHYSICAL MEDICINE & REHABILITATION

## 2019-01-23 RX ORDER — TRAMADOL HYDROCHLORIDE 50 MG/1
50 TABLET ORAL EVERY 8 HOURS PRN
Qty: 90 TABLET | Refills: 1 | Status: SHIPPED | OUTPATIENT
Start: 2019-01-23 | End: 2019-03-25 | Stop reason: SDUPTHER

## 2019-01-23 NOTE — PROGRESS NOTES
Subjective:       Patient ID: Stephanie Sears is a 69 y.o. female.    Chief Complaint: Back Pain    Neck Pain    Pertinent negatives include no chest pain, fever, numbness, trouble swallowing or weakness. Headaches:     Arm Pain    Pertinent negatives include no chest pain or numbness.   Back Pain   Pertinent negatives include no abdominal pain, chest pain, fever, numbness or weakness. Headaches:        Mrs. Sears is 70 y/o female, who returns to clinic for upper back pain.  LCV 12/15/17.   Today, she c/o low back pain,   She is s/p Lumbar spinal L4-5 fusion in 2006, by dr. Alen Rose.     Back Pain Description:  Length: pain is chronic pain. Length > 6 month(s)  No past, recent injury, falls.  Intensity:  CURRENT  5/10,  AVERAGE  Pain   4-5/10. at BEST   3-4/10 , At WORST   7/10 on the WORST day.   Location: pain is localized across lower back, in both buttocks, and running down the both legs.   It is more Back >> leg pain.  Radiation: Positive to buttocks. Positive to legs, back of  Legs, to the knee level.   Timing : intermittent  morning pain , than eases later in day time, get better  with activity, in evening  QUALITY:  Dull/ toothache like pain in back,a nd in legs is more shooting pain.   No neuropathic : burning, tingling, numbness  Negative leg weakness. CAn walk 2 blocks,a nd than gets tired.  Worsening factors: prolong sitting  Alleviating factors: laying down  Symptoms interfere with daily activity, sleeping and work.   Current medications: Gabapentin 600 mg tid, Mobic,  Tramadol 50 mg 3-4 x/day.  Failed medications:  None   Prior procedures. Back surgery   PT/OT: none  Patient denies night fever/night sweats, bowel incontinence, significant weight loss and significant motor weakness ( red flags).  Patient denies any suicidal or homicidal ideations.      Pain medications are helping. She takes Gabapentin 600 mg BID, and Tramadol 50 mg 3-4 x/day.  She is here for follow up,re  evaluation and  treatment.     Past Medical History:   Diagnosis Date    Angina pectoris     Anxiety     Bronchitis     Cataract     Cervical spondylosis with radiculopathy 7/10/2012    Chest pain     Chronic neck pain 7/26/2012    Depression     Fibrocystic breast     GERD (gastroesophageal reflux disease)     Glaucoma     Hyperlipidemia     Hypertension     Neck pain 7/10/2012    JAM (obstructive sleep apnea)     Primary osteoarthritis of both knees     Psoriasis     Subacromial or subdeltoid bursitis 7/10/2012    Thyroid disease     Type 2 diabetes mellitus 12/10/2013       Past Surgical History:   Procedure Laterality Date    ARTHROSCOPY - LEFT THUMB CMCJ - LINVATEC - CMC Left 12/7/2016    [facility]    BLOCK-NERVE Right 2/27/2018    [facility]    BREAST LUMPECTOMY Left 1988    mass in the rt breast surgery  1988    BREAST MASS EXCISION Right     benign    CHOLECYSTECTOMY      CHOLECYSTECTOMY, LAPAROSCOPIC N/A 1/28/2013    [facility]    HYSTERECTOMY  1980's    KNEE SURGERY Left 1-20-16    T[facility]    KNEE SURGERY Right 02/26/2018    T[facility]    L4-L5 fusion  2006    REPLACEMENT-KNEE-TOTAL Right 2/26/2018    [facility]    REPLACEMENT-KNEE-TOTAL Left 1/20/2016    [facility]    SPINE SURGERY         Family History   Problem Relation Age of Onset    Diabetes Mother     Hypertension Mother         CHF, angina    Angina Mother     Kidney disease Mother     Heart disease Mother         CHF    Hypertension Father         glaucoma, Alzhiemer's , supra pubic    Alzheimer's disease Father     Glaucoma Father     Kidney disease Brother         kidney transplant, HTN,DM    Diabetes Brother     Glaucoma Sister     Hypertension Sister     Hyperlipidemia Sister     Gout Son     Hypertension Son      Diabetes Son     Sleep apnea Sister     Hypertension Sister     Thyroid disease Sister     No Known Problems Sister     No Known Problems Sister     Hepatitis Brother     Amblyopia Neg Hx     Blindness Neg Hx        Social History     Socioeconomic History    Marital status:      Spouse name: None    Number of children: None    Years of education: None    Highest education level: None   Social Needs    Financial resource strain: None    Food insecurity - worry: None    Food insecurity - inability: None    Transportation needs - medical: None    Transportation needs - non-medical: None   Occupational History    None   Tobacco Use    Smoking status: Current Every Day Smoker     Packs/day: 1.00     Years: 40.00     Pack years: 40.00     Types: Cigarettes    Smokeless tobacco: Never Used    Tobacco comment: smokes a pack a week   Substance and Sexual Activity    Alcohol use: No     Alcohol/week: 0.0 oz    Drug use: No    Sexual activity: Yes     Partners: Male   Other Topics Concern    None   Social History Narrative    None       Current Outpatient Medications   Medication Sig Dispense Refill    albuterol (PROAIR HFA) 90 mcg/actuation inhaler Inhale 2 puffs into the lungs every 6 (six) hours as needed for Wheezing or Shortness of Breath. 1 Inhaler 3    amlodipine-benazepril (LOTREL) 10-40 mg per capsule TAKE 1 CAPSULE DAILY 90 capsule 1    atorvastatin (LIPITOR) 20 MG tablet Take 1 tablet (20 mg total) by mouth once daily. 30 tablet 3    blood sugar diagnostic (ACCU-CHEK JOSE G PLUS TEST STRP) Strp Inject 1 each into the skin 2 (two) times daily. 200 each 11    blood sugar diagnostic Strp Use to test blood glucose levels 2 times per day as instructed. 200 strip 11    CALCIUM CARBONATE (TUMS ORAL) Take by mouth as needed (acid reflux, indigestion).      carvedilol (COREG) 12.5 MG tablet TAKE 1 TABLET TWICE A DAY WITH MEALS 180 tablet 1    diphenhydrAMINE (BENADRYL) 25 mg capsule  Take 25 mg by mouth every 6 (six) hours as needed for Itching or Allergies.      fluticasone (FLONASE) 50 mcg/actuation nasal spray 1 spray by Each Nare route 2 (two) times daily. 1 Bottle 1    furosemide (LASIX) 40 MG tablet TAKE 1 TABLET DAILY 90 tablet 1    gabapentin (NEURONTIN) 600 MG tablet Take 1 tablet (600 mg total) by mouth 3 (three) times daily. 270 tablet 1    INULIN (FIBER GUMMIES ORAL) Take by mouth every morning.       lancets (ACCU-CHEK MULTICLIX LANCET) Misc Test blood sugar twice daily 300 each 3    lancets (ONETOUCH DELICA LANCETS) 33 gauge Misc Inject 1 lancet into the skin 2 (two) times daily before meals. 200 each 11    meloxicam (MOBIC) 7.5 MG tablet TAKE 1 TABLET DAILY 90 tablet 1    nicotine (NICODERM CQ) 21 mg/24 hr Place 1 patch onto the skin once daily. 28 patch 0    nicotine polacrilex 2 MG Lozg Take 1 lozenge (2 mg total) by mouth as needed. 1-2 per hour in place of cigarettes maximum of 15 per day. 108 lozenge 0    traMADol (ULTRAM) 50 mg tablet Take 1 tablet (50 mg total) by mouth every 8 (eight) hours as needed. 90 tablet 1    triamcinolone acetonide 0.1% (KENALOG) 0.1 % ointment KENDRA AA ON THE SKIN BID ON RASH ON LEGS  0     Current Facility-Administered Medications   Medication Dose Route Frequency Provider Last Rate Last Dose    dicyclomine 10 mg/5 mL syrup 20 mg  20 mg Oral QID (AC & HS) Indra Solorio MD   20 mg at 06/18/18 1006       Review of patient's allergies indicates:   Allergen Reactions    Hydrocodone Shortness Of Breath     Pt states can take oxycodone    Iodinated contrast- oral and iv dye Shortness Of Breath     Difficulty breathing    Adhesive Itching    Sulfa (sulfonamide antibiotics) Hives and Itching           Review of Systems   Constitutional: Negative for appetite change, chills, fatigue, fever and unexpected weight change.   HENT: Negative for drooling, trouble swallowing and voice change.    Eyes: Negative for pain and visual disturbance.    Respiratory: Negative for shortness of breath and wheezing.    Cardiovascular: Negative for chest pain and palpitations.   Gastrointestinal: Negative for abdominal distention, abdominal pain, constipation and diarrhea.   Genitourinary: Negative for difficulty urinating.   Musculoskeletal: Positive for back pain and neck pain. Negative for gait problem, joint swelling, myalgias and neck stiffness. Arthralgias: Rt knee pain, s/p Lt TKA,  Lt thumb pain, she is                Skin: Negative for color change and rash.   Neurological: Negative for dizziness, facial asymmetry, speech difficulty, weakness, light-headedness and numbness. Headaches:     Hematological: Negative for adenopathy.   Psychiatric/Behavioral: Negative for behavioral problems, confusion and sleep disturbance. The patient is not nervous/anxious.        Objective:      Physical Exam      GENERAL: The patient is alert, oriented, pleasant.   HEENT; PERRLA  NECK: supple,  No masses  MUSCULOSKELETAL:   Gait is normal .  Cervical spine :  Minimally decreased AROM in cervical spine.  Lumbar spine, decreased active range of motion in all planes, flexion to 90 degrees , ext. 0.  Side bending and rotation to 35-40 degrees.   Positive facet loading b/l.  Positive tenderness in lumbar paravertebral musculature, b/l.  Straight leg raising Negative bilaterally.   Full range of motion in all joints x4 extremities,    Muscle strength 5/5 throughout x4 extremities.   No  joint laxity throughout x4 extremities.   NEUROLOGIC: Cranial nerves II through XII intact.   Deep tendon reflexes is normal, +2 in the upper and lower extremities bilaterally.   Muscle tone is normal.   Sensory is intact to light touch and pinprick throughout x4 extremities.    Xray of Lumbar spine ( 2014) showed:  Postoperative changes of spinal fusion with associated pedicle screws identified at L4 and L5 levels.  The position and alignment is satisfactory.    Mild DJD.  No fracture or bone  destruction identified.  No spondylolisthesis evident.         Assessment:       1. Chronic bilateral low back pain with bilateral sciatica    2. Status post lumbar laminectomy    3. Lumbar radiculopathy        Plan:       Chronic bilateral low back pain with bilateral sciatica  -     Cancel: X-Ray Lumbar 4-5 View with Bending Views; Future; Expected date: 01/23/2019  -     Ambulatory Referral to Physical/Occupational Therapy  -     traMADol (ULTRAM) 50 mg tablet; Take 1 tablet (50 mg total) by mouth every 8 (eight) hours as needed.  Dispense: 90 tablet; Refill: 1    Status post lumbar laminectomy  -     Cancel: X-Ray Lumbar 4-5 View with Bending Views; Future; Expected date: 01/23/2019  -     Ambulatory Referral to Physical/Occupational Therapy  -     traMADol (ULTRAM) 50 mg tablet; Take 1 tablet (50 mg total) by mouth every 8 (eight) hours as needed.  Dispense: 90 tablet; Refill: 1    Lumbar radiculopathy  -     Cancel: X-Ray Lumbar 4-5 View with Bending Views; Future; Expected date: 01/23/2019  -     Ambulatory Referral to Physical/Occupational Therapy  -     traMADol (ULTRAM) 50 mg tablet; Take 1 tablet (50 mg total) by mouth every 8 (eight) hours as needed.  Dispense: 90 tablet; Refill: 1      Patient with worsening of chronic back pain.    --  Xray of Lumbar spine , ordered.    -- Refer to PT for back pain.    -- Pain management   Will resume Gabapentin bid, Tramadol prn, Mobic daily.      RTC in 3-4 months.    Total time spent face to face with patient was 25 minutes .  More than 50% of that time was spent in counseling on diagnosis of her back back pain, prognosis and treatment options.   I also caunsel patient  on common and most usual side effect of prescribed medications.   I reviewed Primary care, and other specialty's notes to better coordinate patient's  care.   All questions were answered, and patient voiced understanding.

## 2019-01-28 ENCOUNTER — OFFICE VISIT (OUTPATIENT)
Dept: FAMILY MEDICINE | Facility: CLINIC | Age: 69
End: 2019-01-28
Payer: MEDICARE

## 2019-01-28 VITALS
HEART RATE: 87 BPM | TEMPERATURE: 98 F | OXYGEN SATURATION: 94 % | RESPIRATION RATE: 16 BRPM | SYSTOLIC BLOOD PRESSURE: 150 MMHG | DIASTOLIC BLOOD PRESSURE: 72 MMHG | BODY MASS INDEX: 36.21 KG/M2 | HEIGHT: 62 IN

## 2019-01-28 DIAGNOSIS — J18.9 COMMUNITY ACQUIRED PNEUMONIA OF RIGHT LOWER LOBE OF LUNG: Primary | ICD-10-CM

## 2019-01-28 PROCEDURE — 99214 OFFICE O/P EST MOD 30 MIN: CPT | Mod: S$PBB,,, | Performed by: FAMILY MEDICINE

## 2019-01-28 PROCEDURE — 99999 PR PBB SHADOW E&M-EST. PATIENT-LVL III: CPT | Mod: PBBFAC,,, | Performed by: FAMILY MEDICINE

## 2019-01-28 PROCEDURE — 99999 PR PBB SHADOW E&M-EST. PATIENT-LVL III: ICD-10-PCS | Mod: PBBFAC,,, | Performed by: FAMILY MEDICINE

## 2019-01-28 PROCEDURE — 99214 PR OFFICE/OUTPT VISIT, EST, LEVL IV, 30-39 MIN: ICD-10-PCS | Mod: S$PBB,,, | Performed by: FAMILY MEDICINE

## 2019-01-28 PROCEDURE — 99213 OFFICE O/P EST LOW 20 MIN: CPT | Mod: PBBFAC,25,PN | Performed by: FAMILY MEDICINE

## 2019-01-28 RX ORDER — AMOXICILLIN AND CLAVULANATE POTASSIUM 875; 125 MG/1; MG/1
1 TABLET, FILM COATED ORAL 2 TIMES DAILY
Qty: 20 TABLET | Refills: 0 | Status: SHIPPED | OUTPATIENT
Start: 2019-01-28 | End: 2019-02-07

## 2019-01-28 RX ORDER — GUAIFENESIN 1200 MG/1
1 TABLET, EXTENDED RELEASE ORAL 2 TIMES DAILY
Qty: 28 TABLET | Refills: 0 | Status: SHIPPED | OUTPATIENT
Start: 2019-01-28 | End: 2019-01-28 | Stop reason: SDUPTHER

## 2019-01-28 RX ORDER — BENZONATATE 200 MG/1
200 CAPSULE ORAL 3 TIMES DAILY PRN
Qty: 60 CAPSULE | Refills: 1 | Status: SHIPPED | OUTPATIENT
Start: 2019-01-28 | End: 2019-10-17

## 2019-01-28 RX ORDER — DOXYCYCLINE 100 MG/1
100 CAPSULE ORAL EVERY 12 HOURS
Qty: 20 CAPSULE | Refills: 0 | Status: SHIPPED | OUTPATIENT
Start: 2019-01-28 | End: 2019-02-03

## 2019-01-28 RX ORDER — GUAIFENESIN 1200 MG/1
1 TABLET, EXTENDED RELEASE ORAL 2 TIMES DAILY
Qty: 28 TABLET | Refills: 0 | Status: SHIPPED | OUTPATIENT
Start: 2019-01-28 | End: 2019-02-11

## 2019-01-28 RX ORDER — BENZONATATE 200 MG/1
200 CAPSULE ORAL 3 TIMES DAILY PRN
Qty: 60 CAPSULE | Refills: 1 | Status: SHIPPED | OUTPATIENT
Start: 2019-01-28 | End: 2019-01-28 | Stop reason: SDUPTHER

## 2019-01-28 RX ORDER — DOXYCYCLINE 100 MG/1
100 CAPSULE ORAL EVERY 12 HOURS
Qty: 20 CAPSULE | Refills: 0 | Status: SHIPPED | OUTPATIENT
Start: 2019-01-28 | End: 2019-01-28 | Stop reason: SDUPTHER

## 2019-01-28 NOTE — PROGRESS NOTES
Chief Complaint   Patient presents with    Cough    Back Pain    Chest Congestion       HPI    Stephanie Sears is 69 y.o. female. The encounter diagnosis was Community acquired pneumonia of right lower lobe of lung.    69 year old female with JAM and Pulmonary HTN comes to clinic with complaint of persistent cough.    Patient admits symptoms have been present for several weeks.  Patient reports reduction of cough with Tessalon perles.  She denies nasal congestion and sinus congestion.  She notes sore throat and shortness of breath associated with the cough.  She admits to using using Albuterol regularly to reduce her shortness of breath.  She notes subjective fever and chills.    Patient reports cigarette use and history of pre-existing lung disease.      Review of Systems   Constitutional: Positive for chills, fatigue and fever. Negative for activity change and diaphoresis.   HENT: Positive for sore throat. Negative for congestion, ear discharge, ear pain, postnasal drip, rhinorrhea, sinus pressure and sinus pain.    Respiratory: Positive for cough and shortness of breath. Negative for chest tightness and wheezing.    Cardiovascular: Negative for chest pain.   Musculoskeletal: Negative for gait problem.   Psychiatric/Behavioral: Negative for suicidal ideas.           Current Outpatient Medications:     albuterol (PROAIR HFA) 90 mcg/actuation inhaler, Inhale 2 puffs into the lungs every 6 (six) hours as needed for Wheezing or Shortness of Breath., Disp: 1 Inhaler, Rfl: 3    amlodipine-benazepril (LOTREL) 10-40 mg per capsule, TAKE 1 CAPSULE DAILY, Disp: 90 capsule, Rfl: 1    amoxicillin-clavulanate 875-125mg (AUGMENTIN) 875-125 mg per tablet, Take 1 tablet by mouth 2 (two) times daily. for 10 days, Disp: 20 tablet, Rfl: 0    atorvastatin (LIPITOR) 20 MG tablet, Take 1 tablet (20 mg total) by mouth once daily., Disp: 30 tablet, Rfl: 3    benzonatate (TESSALON) 200 MG capsule, Take 1 capsule (200 mg total)  by mouth 3 (three) times daily as needed for Cough., Disp: 60 capsule, Rfl: 1    blood sugar diagnostic (ACCU-CHEK JOSE G PLUS TEST STRP) Strp, Inject 1 each into the skin 2 (two) times daily., Disp: 200 each, Rfl: 11    blood sugar diagnostic Strp, Use to test blood glucose levels 2 times per day as instructed., Disp: 200 strip, Rfl: 11    CALCIUM CARBONATE (TUMS ORAL), Take by mouth as needed (acid reflux, indigestion)., Disp: , Rfl:     carvedilol (COREG) 12.5 MG tablet, TAKE 1 TABLET TWICE A DAY WITH MEALS, Disp: 180 tablet, Rfl: 1    diphenhydrAMINE (BENADRYL) 25 mg capsule, Take 25 mg by mouth every 6 (six) hours as needed for Itching or Allergies., Disp: , Rfl:     doxycycline (VIBRAMYCIN) 100 MG Cap, Take 1 capsule (100 mg total) by mouth every 12 (twelve) hours. for 10 days, Disp: 20 capsule, Rfl: 0    fluticasone (FLONASE) 50 mcg/actuation nasal spray, 1 spray by Each Nare route 2 (two) times daily., Disp: 1 Bottle, Rfl: 1    furosemide (LASIX) 40 MG tablet, TAKE 1 TABLET DAILY, Disp: 90 tablet, Rfl: 1    gabapentin (NEURONTIN) 600 MG tablet, Take 1 tablet (600 mg total) by mouth 3 (three) times daily., Disp: 270 tablet, Rfl: 1    guaiFENesin 1,200 mg Ta12, Take 1 tablet by mouth 2 (two) times daily. for 14 days, Disp: 28 tablet, Rfl: 0    INULIN (FIBER GUMMIES ORAL), Take by mouth every morning. , Disp: , Rfl:     lancets (ACCU-CHEK MULTICLIX LANCET) Misc, Test blood sugar twice daily, Disp: 300 each, Rfl: 3    lancets (ONETOUCH DELICA LANCETS) 33 gauge Misc, Inject 1 lancet into the skin 2 (two) times daily before meals., Disp: 200 each, Rfl: 11    meloxicam (MOBIC) 7.5 MG tablet, TAKE 1 TABLET DAILY, Disp: 90 tablet, Rfl: 1    nicotine (NICODERM CQ) 21 mg/24 hr, Place 1 patch onto the skin once daily., Disp: 28 patch, Rfl: 0    nicotine polacrilex 2 MG Lozg, Take 1 lozenge (2 mg total) by mouth as needed. 1-2 per hour in place of cigarettes maximum of 15 per day., Disp: 108 lozenge, Rfl:  "0    traMADol (ULTRAM) 50 mg tablet, Take 1 tablet (50 mg total) by mouth every 8 (eight) hours as needed., Disp: 90 tablet, Rfl: 1    triamcinolone acetonide 0.1% (KENALOG) 0.1 % ointment, KENDRA AA ON THE SKIN BID ON RASH ON LEGS, Disp: , Rfl: 0    Current Facility-Administered Medications:     dicyclomine 10 mg/5 mL syrup 20 mg, 20 mg, Oral, QID (AC & HS), Indra Solorio MD, 20 mg at 06/18/18 1006      Blood pressure (!) 150/72, pulse 87, temperature 98.3 °F (36.8 °C), temperature source Oral, resp. rate 16, height 5' 2" (1.575 m), SpO2 (!) 94 %.    Physical Exam   Constitutional: Vital signs are normal. She appears well-developed and well-nourished. She appears ill. No distress.   Cardiovascular: Normal heart sounds.   No murmur heard.  Pulmonary/Chest: Effort normal. She has decreased breath sounds in the right lower field and the left lower field. She has no wheezes. She has no rhonchi. She has no rales.   Psychiatric: She has a normal mood and affect. Her behavior is normal.       Lab Visit on 11/07/2018   Component Date Value Ref Range Status    Cholesterol 11/07/2018 159  120 - 199 mg/dL Final    Triglycerides 11/07/2018 101  30 - 150 mg/dL Final    HDL 11/07/2018 39* 40 - 75 mg/dL Final    LDL Cholesterol 11/07/2018 99.8  63.0 - 159.0 mg/dL Final    HDL/Chol Ratio 11/07/2018 24.5  20.0 - 50.0 % Final    Total Cholesterol/HDL Ratio 11/07/2018 4.1  2.0 - 5.0 Final    Non-HDL Cholesterol 11/07/2018 120  mg/dL Final   ]    Assessment:    1. Community acquired pneumonia of right lower lobe of lung          Stephanie was seen today for cough, back pain and chest congestion.    Diagnoses and all orders for this visit:    Community acquired pneumonia of right lower lobe of lung  -     X-Ray Chest PA And Lateral; Future  -     guaiFENesin 1,200 mg Ta12; Take 1 tablet by mouth 2 (two) times daily. for 14 days  -     benzonatate (TESSALON) 200 MG capsule; Take 1 capsule (200 mg total) by mouth 3 (three) times " daily as needed for Cough.  -     doxycycline (VIBRAMYCIN) 100 MG Cap; Take 1 capsule (100 mg total) by mouth every 12 (twelve) hours. for 10 days  -     amoxicillin-clavulanate 875-125mg (AUGMENTIN) 875-125 mg per tablet; Take 1 tablet by mouth 2 (two) times daily. for 10 days  - New problem. Medications as documented above sent to local pharmacy. Mucinex for congestion. Tessalon for cough as needed.  - Xray significant for right lower lobe pneumonia. If no improvement in the next 24 hours patient to report to ED for further evaluation.          FOLLOW UP: Follow-up in about 1 week (around 2/4/2019), or if symptoms worsen or fail to improve.

## 2019-01-28 NOTE — PATIENT INSTRUCTIONS
Bronchitis, Viral (Adult)    You have a viral bronchitis. Bronchitis is inflammation and swelling of the lining of the lungs. This is often caused by an infection. Symptoms include a dry, hacking cough that is worse at night. The cough may bring up yellow-green mucus. You may also feel short of breath or wheeze. Other symptoms may include tiredness, chest discomfort, and chills.  Bronchitis that is caused by a virus is not treated with antibiotics. Instead, medicines may be given to help relieve symptoms. Symptoms can last up to 2 weeks, although the cough may last much longer.  This illness is contagious during the first few days and is spread through the air by coughing and sneezing, or by direct contact (touching the sick person and then touching your own eyes, nose, or mouth).  Most viral illnesses resolve within 10 to 14 days with rest and simple home remedies, although they may sometimes last for several weeks.  Home care  · If symptoms are severe, rest at home for the first 2 to 3 days. When you go back to your usual activities, don't let yourself get too tired.  · Do not smoke. Also avoid being exposed to secondhand smoke.  · You may use over-the-counter medicine to control fever or pain, unless another pain medicine was prescribed. (Note: If you have chronic liver or kidney disease or have ever had a stomach ulcer or gastrointestinal bleeding, talk with your healthcare provider before using these medicines. Also talk to your provider if you are taking medicine to prevent blood clots.) Aspirin should never be given to anyone younger than 18 years of age who is ill with a viral infection or fever. It may cause severe liver or brain damage.  · Your appetite may be poor, so a light diet is fine. Avoid dehydration by drinking 6 to 8 glasses of fluids per day (such as water, soft drinks, sports drinks, juices, tea, or soup). Extra fluids will help loosen secretions in the nose and lungs.  · Over-the-counter  cough, cold, and sore-throat medicines will not shorten the length of the illness, but they may help to reduce symptoms. (Note: Do not use decongestants if you have high blood pressure.)  Follow-up care  Follow up with your healthcare provider, or as advised. If you had an X-ray or ECG (electrocardiogram), a specialist will review it. You will be notified of any new findings that may affect your care.  Note: If you are age 65 or older, or if you have a chronic lung disease or condition that affects your immune system, or you smoke, talk to your healthcare provider about having pneumococcal vaccinations and a yearly influenza vaccination (flu shot).  When to seek medical advice  Call your healthcare provider right away if any of these occur:  · Fever of 100.4°F (38°C) or higher  · Coughing up increased amounts of colored sputum  · Weakness, drowsiness, headache, facial pain, ear pain, or a stiff neck  Call 911, or get immediate medical care  Contact emergency services right away if any of these occur:  · Coughing up blood  · Worsening weakness, drowsiness, headache, or stiff neck  · Trouble breathing, wheezing, or pain with breathing  Date Last Reviewed: 9/13/2015 © 2000-2017 [employer]. [address] [place] [address], [place] [address]. All rights reserved. This information is not intended as a substitute for professional medical care. Always follow your healthcare professional's instructions.        Bronchitis, Antibiotic Treatment (Adult)    Bronchitis is an infection of the air passages (bronchial tubes) in your lungs. It often occurs when you have a cold. This illness is contagious during the first few days and is spread through the air by coughing and sneezing, or by direct contact (touching the sick person and then touching your own eyes, nose, or mouth).  Symptoms of bronchitis include cough with mucus (phlegm) and low-grade fever. Bronchitis usually lasts 7 to 14 days. Mild cases can be treated with  simple home remedies. More severe infection is treated with an antibiotic.  Home care  Follow these guidelines when caring for yourself at home:  · If your symptoms are severe, rest at home for the first 2 to 3 days. When you go back to your usual activities, don't let yourself get too tired.  · Do not smoke. Also avoid being exposed to secondhand smoke.  · You may use over-the-counter medicines to control fever or pain, unless another medicine was prescribed. (Note: If you have chronic liver or kidney disease or have ever had a stomach ulcer or gastrointestinal bleeding, talk with your healthcare provider before using these medicines. Also talk to your provider if you are taking medicine to prevent blood clots.) Aspirin should never be given to anyone younger than 18 years of age who is ill with a viral infection or fever. It may cause severe liver or brain damage.  · Your appetite may be poor, so a light diet is fine. Avoid dehydration by drinking 6 to 8 glasses of fluids per day (such as water, soft drinks, sports drinks, juices, tea, or soup). Extra fluids will help loosen secretions in the nose and lungs.  · Over-the-counter cough, cold, and sore-throat medicines will not shorten the length of the illness, but they may be helpful to reduce symptoms. (Note: Do not use decongestants if you have high blood pressure.)  · Finish all antibiotic medicine. Do this even if you are feeling better after only a few days.  Follow-up care  Follow up with your healthcare provider, or as advised. If you had an X-ray or ECG (electrocardiogram), a specialist will review it. You will be notified of any new findings that may affect your care.  Note: If you are age 65 or older, or if you have a chronic lung disease or condition that affects your immune system, or you smoke, talk to your healthcare provider about having pneumococcal vaccinations and a yearly influenza vaccination (flu shot).  When to seek medical advice  Call your  healthcare provider right away if any of these occur:  · Fever of 100.4°F (38°C) or higher  · Coughing up increased amounts of colored sputum  · Weakness, drowsiness, headache, facial pain, ear pain, or a stiff neck  Call 911, or get immediate medical care  Contact emergency services right away if any of these occur.  · Coughing up blood  · Worsening weakness, drowsiness, headache, or stiff neck  · Trouble breathing, wheezing, or pain with breathing  Date Last Reviewed: 9/13/2015 © 2000-2017 [employer]. [address] [place] [id number]. All rights reserved. This information is not intended as a substitute for professional medical care. Always follow your healthcare professional's instructions.

## 2019-02-03 ENCOUNTER — HOSPITAL ENCOUNTER (EMERGENCY)
Facility: HOSPITAL | Age: 69
Discharge: HOME | End: 2019-02-03
Attending: EMERGENCY MEDICINE
Payer: MEDICARE

## 2019-02-03 VITALS
SYSTOLIC BLOOD PRESSURE: 193 MMHG | BODY MASS INDEX: 35.51 KG/M2 | TEMPERATURE: 98 F | DIASTOLIC BLOOD PRESSURE: 90 MMHG | HEART RATE: 87 BPM | RESPIRATION RATE: 20 BRPM | WEIGHT: 193 LBS | OXYGEN SATURATION: 97 % | HEIGHT: 62 IN

## 2019-02-03 DIAGNOSIS — R10.13 EPIGASTRIC PAIN: Primary | ICD-10-CM

## 2019-02-03 PROCEDURE — 99284 EMERGENCY DEPT VISIT MOD MDM: CPT

## 2019-02-03 RX ORDER — DICYCLOMINE HYDROCHLORIDE 20 MG/1
20 TABLET ORAL 2 TIMES DAILY
Qty: 30 TABLET | Refills: 0 | Status: SHIPPED | OUTPATIENT
Start: 2019-02-03 | End: 2019-03-05

## 2019-02-03 RX ORDER — FAMOTIDINE 20 MG/1
20 TABLET, FILM COATED ORAL 2 TIMES DAILY
Qty: 60 TABLET | Refills: 0 | Status: SHIPPED | OUTPATIENT
Start: 2019-02-03 | End: 2019-10-17

## 2019-02-03 RX ORDER — CYCLOBENZAPRINE HCL 10 MG
10 TABLET ORAL 3 TIMES DAILY PRN
Qty: 30 TABLET | Refills: 0 | Status: SHIPPED | OUTPATIENT
Start: 2019-02-03 | End: 2019-02-13

## 2019-02-04 NOTE — DISCHARGE INSTRUCTIONS

## 2019-02-04 NOTE — ED TRIAGE NOTES
Patient reports having high blood pressure and pneumonia. Patient reports bp of 201/104. Patient states she is compliant with her medications, but was late taking them today.

## 2019-02-04 NOTE — ED PROVIDER NOTES
Encounter Date: 2/3/2019       History     Chief Complaint   Patient presents with    Hypertension     Pt reports high blood pressure today. She is being treated for pneumonia and taking abx. Pt states she got to feeling worse today.     69 y.o. female Past Medical History:  No date: Angina pectoris  No date: Anxiety  No date: Bronchitis  No date: Cataract  7/10/2012: Cervical spondylosis with radiculopathy  No date: Chest pain  7/26/2012: Chronic neck pain  No date: Depression  No date: Fibrocystic breast  No date: GERD (gastroesophageal reflux disease)  No date: Glaucoma  No date: Hyperlipidemia  No date: Hypertension  7/10/2012: Neck pain  No date: JAM (obstructive sleep apnea)  No date: Primary osteoarthritis of both knees  No date: Psoriasis  7/10/2012: Subacromial or subdeltoid bursitis  No date: Thyroid disease  12/10/2013: Type 2 diabetes mellitus     Notes that she is on abx, continues to cough, has epigastric pain only when coughing, tessalon has not helped. Notes taht she has htn, on meds, was cocnerned bp was higher. BP currently wnl.           Review of patient's allergies indicates:   Allergen Reactions    Hydrocodone Shortness Of Breath    Iodinated contrast- oral and iv dye Shortness Of Breath     Difficulty breathing    Adhesive Itching    Oxycodone      Hallucinations    Sulfa (sulfonamide antibiotics) Hives and Itching     Past Medical History:   Diagnosis Date    Angina pectoris     Anxiety     Bronchitis     Cataract     Cervical spondylosis with radiculopathy 7/10/2012    Chest pain     Chronic neck pain 7/26/2012    Depression     Fibrocystic breast     GERD (gastroesophageal reflux disease)     Glaucoma     Hyperlipidemia     Hypertension     Neck pain 7/10/2012    JAM (obstructive sleep apnea)     Primary osteoarthritis of both knees     Psoriasis     Subacromial or subdeltoid bursitis 7/10/2012    Thyroid disease     Type 2 diabetes mellitus 12/10/2013     Past  Surgical History:   Procedure Laterality Date    ARTHROSCOPY - LEFT THUMB CMCJ - LINVATEC - CMC Left 12/7/2016    [facility]formed [facility] at [facility] OR 1ST [facility]    BLOCK-NERVE Right 2/27/2018    Performed by Teresa Surgeon at [facility] TERESA    BREAST LUMPECTOMY Left 1988    [facility]  1988    BREAST MASS EXCISION Right     benign    CHOLECYSTECTOMY      CHOLECYSTECTOMY, LAPAROSCOPIC N/A 1/28/2013    Performed by Shay Cordova MD at [facility] OR 2ND FLR    HYSTERECTOMY  1980's    KNEE SURGERY Left 1-20-16    [facility]    KNEE SURGERY Right 02/26/2018    [facility]    L4-L5 fusion  2006    REPLACEMENT-KNEE-TOTAL Right 2/26/2018    Performed by [facility] at [facility] OR 2ND FLR    REPLACEMENT-KNEE-TOTAL Left 1/20/2016    [facility] at [facility] OR 2ND FLR    SPINE SURGERY       Family History   Problem Relation Age of Onset    Diabetes Mother     Hypertension Mother         CHF, angina    Angina Mother     Kidney disease Mother     Heart disease Mother         CHF    Hypertension Father         glaucoma, Alzhiemer's , supra pubic    Alzheimer's disease Father     Glaucoma Father     Kidney disease Brother         kidney transplant, HTN,DM    Diabetes Brother     Glaucoma Sister     Hypertension Sister     Hyperlipidemia Sister     Gout Son     Hypertension Son     Diabetes Son     Sleep apnea Sister     Hypertension Sister     Thyroid disease Sister     No Known Problems Sister     No Known Problems Sister     Hepatitis Brother     Amblyopia Neg Hx     Blindness Neg Hx      Social History     Tobacco Use    Smoking status: Current Every Day Smoker     Packs/day: 1.00     Years: 40.00     Pack years: 40.00     Types: Cigarettes    Smokeless tobacco: Never Used    Tobacco comment: smokes a pack a week   Substance Use Topics    Alcohol use: No     Alcohol/week: 0.0 oz    Drug use: No     Review of Systems   Constitutional: Negative for  fever.   HENT: Negative for sore throat.    Respiratory: Negative for shortness of breath.    Cardiovascular: Negative for chest pain.   Gastrointestinal: Negative for nausea.   Genitourinary: Negative for dysuria.   Musculoskeletal: Negative for back pain.   Skin: Negative for rash.   Neurological: Negative for weakness.   Hematological: Does not bruise/bleed easily.   All other systems reviewed and are negative.      Physical Exam     Initial Vitals [02/03/19 1844]   BP Pulse Resp Temp SpO2   (!) 193/90 87 20 98 °F (36.7 °C) 97 %      MAP       --         Physical Exam    Nursing note and vitals reviewed.  Constitutional: She appears well-developed and well-nourished.   HENT:   Head: Normocephalic and atraumatic.   Eyes: Conjunctivae and EOM are normal. Pupils are equal, round, and reactive to light.   Neck: Normal range of motion.   Cardiovascular: Normal rate and regular rhythm.   Pulmonary/Chest: Breath sounds normal. No respiratory distress. She has no wheezes. She has no rales.   Abdominal: She exhibits no distension.   Musculoskeletal: Normal range of motion.   Neurological: She is alert. No cranial nerve deficit. GCS score is 15. GCS eye subscore is 4. GCS verbal subscore is 5. GCS motor subscore is 6.   Skin: Skin is warm and dry.   Psychiatric: She has a normal mood and affect. Thought content normal.       +midepigastric ttp, reproducible  ED Course   Procedures  Labs Reviewed - No data to display       Imaging Results    None                          will discharge on bentyl, pepcid, flexeril     Clinical Impression:   The encounter diagnosis was Epigastric pain.                             Violetta Curry MD  02/1950

## 2019-02-07 ENCOUNTER — OFFICE VISIT (OUTPATIENT)
Dept: FAMILY MEDICINE | Facility: CLINIC | Age: 69
End: 2019-02-07
Payer: MEDICARE

## 2019-02-07 VITALS
TEMPERATURE: 98 F | BODY MASS INDEX: 35.54 KG/M2 | HEART RATE: 67 BPM | WEIGHT: 193.13 LBS | HEIGHT: 62 IN | DIASTOLIC BLOOD PRESSURE: 84 MMHG | SYSTOLIC BLOOD PRESSURE: 174 MMHG | OXYGEN SATURATION: 99 %

## 2019-02-07 DIAGNOSIS — I10 ESSENTIAL HYPERTENSION: ICD-10-CM

## 2019-02-07 DIAGNOSIS — J18.9 COMMUNITY ACQUIRED PNEUMONIA, UNSPECIFIED LATERALITY: Primary | ICD-10-CM

## 2019-02-07 PROCEDURE — 99999 PR PBB SHADOW E&M-EST. PATIENT-LVL III: ICD-10-PCS | Mod: PBBFAC,,, | Performed by: FAMILY MEDICINE

## 2019-02-07 PROCEDURE — 99213 OFFICE O/P EST LOW 20 MIN: CPT | Mod: PBBFAC,PN | Performed by: FAMILY MEDICINE

## 2019-02-07 PROCEDURE — 99214 PR OFFICE/OUTPT VISIT, EST, LEVL IV, 30-39 MIN: ICD-10-PCS | Mod: S$PBB,,, | Performed by: FAMILY MEDICINE

## 2019-02-07 PROCEDURE — 99214 OFFICE O/P EST MOD 30 MIN: CPT | Mod: S$PBB,,, | Performed by: FAMILY MEDICINE

## 2019-02-07 PROCEDURE — 99999 PR PBB SHADOW E&M-EST. PATIENT-LVL III: CPT | Mod: PBBFAC,,, | Performed by: FAMILY MEDICINE

## 2019-02-07 RX ORDER — CARVEDILOL 12.5 MG/1
TABLET ORAL
Qty: 180 TABLET | Refills: 1 | Status: SHIPPED | OUTPATIENT
Start: 2019-02-07 | End: 2019-02-15

## 2019-02-07 NOTE — PROGRESS NOTES
Chief Complaint   Patient presents with    Follow-up       HPI    Stephanie Sears is 69 y.o. female. The primary encounter diagnosis was Community acquired pneumonia, unspecified laterality. A diagnosis of Essential hypertension was also pertinent to this visit.    69 year old female recently diagnosed with PNA comes to clinic for 1 week follow up after diagnosis.  Patient reports that she began to start feeling better about 4 days ago.  She reports no shortness of breath.  She reports that cough is still present but has improved significantly.    She does note an unrelated visit to ED about 3 days ago.  Patient reports that she was seen in the ED due to elevated blood pressure.  Patient reports that while treating her pneumonia she forgot to take her blood pressure medication.  She admits to not taking her blood pressure medication today because she has not eaten breakfast yet.    Review of Systems   Constitutional: Negative for activity change.   Respiratory: Positive for cough. Negative for choking, chest tightness, shortness of breath and wheezing.    Cardiovascular: Negative for chest pain.   Musculoskeletal: Negative for gait problem.   Psychiatric/Behavioral: Negative for suicidal ideas.           Current Outpatient Medications:     albuterol (PROAIR HFA) 90 mcg/actuation inhaler, Inhale 2 puffs into the lungs every 6 (six) hours as needed for Wheezing or Shortness of Breath., Disp: 1 Inhaler, Rfl: 3    amlodipine-benazepril (LOTREL) 10-40 mg per capsule, TAKE 1 CAPSULE DAILY, Disp: 90 capsule, Rfl: 1    amoxicillin-clavulanate 875-125mg (AUGMENTIN) 875-125 mg per tablet, Take 1 tablet by mouth 2 (two) times daily. for 10 days, Disp: 20 tablet, Rfl: 0    atorvastatin (LIPITOR) 20 MG tablet, Take 1 tablet (20 mg total) by mouth once daily., Disp: 30 tablet, Rfl: 3    benzonatate (TESSALON) 200 MG capsule, Take 1 capsule (200 mg total) by mouth 3 (three) times daily as needed for Cough., Disp: 60  capsule, Rfl: 1    blood sugar diagnostic (ACCU-CHEK JOSE G PLUS TEST STRP) Strp, Inject 1 each into the skin 2 (two) times daily., Disp: 200 each, Rfl: 11    blood sugar diagnostic Strp, Use to test blood glucose levels 2 times per day as instructed., Disp: 200 strip, Rfl: 11    CALCIUM CARBONATE (TUMS ORAL), Take by mouth as needed (acid reflux, indigestion)., Disp: , Rfl:     carvedilol (COREG) 12.5 MG tablet, TAKE 1 TABLET TWICE A DAY WITH MEALS, Disp: 180 tablet, Rfl: 1    cyclobenzaprine (FLEXERIL) 10 MG tablet, Take 1 tablet (10 mg total) by mouth 3 (three) times daily as needed for Muscle spasms., Disp: 30 tablet, Rfl: 0    dicyclomine (BENTYL) 20 mg tablet, Take 1 tablet (20 mg total) by mouth 2 (two) times daily., Disp: 30 tablet, Rfl: 0    diphenhydrAMINE (BENADRYL) 25 mg capsule, Take 25 mg by mouth every 6 (six) hours as needed for Itching or Allergies., Disp: , Rfl:     famotidine (PEPCID) 20 MG tablet, Take 1 tablet (20 mg total) by mouth 2 (two) times daily., Disp: 60 tablet, Rfl: 0    fluticasone (FLONASE) 50 mcg/actuation nasal spray, 1 spray by Each Nare route 2 (two) times daily., Disp: 1 Bottle, Rfl: 1    furosemide (LASIX) 40 MG tablet, TAKE 1 TABLET DAILY, Disp: 90 tablet, Rfl: 1    gabapentin (NEURONTIN) 600 MG tablet, Take 1 tablet (600 mg total) by mouth 3 (three) times daily., Disp: 270 tablet, Rfl: 1    guaiFENesin 1,200 mg Ta12, Take 1 tablet by mouth 2 (two) times daily. for 14 days, Disp: 28 tablet, Rfl: 0    INULIN (FIBER GUMMIES ORAL), Take by mouth every morning. , Disp: , Rfl:     lancets (ACCU-CHEK MULTICLIX LANCET) Misc, Test blood sugar twice daily, Disp: 300 each, Rfl: 3    lancets (ONETOUCH DELICA LANCETS) 33 gauge Misc, Inject 1 lancet into the skin 2 (two) times daily before meals., Disp: 200 each, Rfl: 11    meloxicam (MOBIC) 7.5 MG tablet, TAKE 1 TABLET DAILY, Disp: 90 tablet, Rfl: 1    traMADol (ULTRAM) 50 mg tablet, Take 1 tablet (50 mg total) by mouth  "every 8 (eight) hours as needed., Disp: 90 tablet, Rfl: 1    triamcinolone acetonide 0.1% (KENALOG) 0.1 % ointment, KENDRA AA ON THE SKIN BID ON RASH ON LEGS, Disp: , Rfl: 0      Blood pressure (!) 174/84, pulse 67, temperature 97.9 °F (36.6 °C), temperature source Oral, height 5' 2" (1.575 m), weight 87.6 kg (193 lb 2 oz), SpO2 99 %.    Physical Exam   Constitutional: Vital signs are normal. She appears well-developed and well-nourished. She does not appear ill. No distress.       No visits with results within 3 Month(s) from this visit.   Latest known visit with results is:   Lab Visit on 11/07/2018   Component Date Value Ref Range Status    Cholesterol 11/07/2018 159  120 - 199 mg/dL Final    Triglycerides 11/07/2018 101  30 - 150 mg/dL Final    HDL 11/07/2018 39* 40 - 75 mg/dL Final    LDL Cholesterol 11/07/2018 99.8  63.0 - 159.0 mg/dL Final    HDL/Chol Ratio 11/07/2018 24.5  20.0 - 50.0 % Final    Total Cholesterol/HDL Ratio 11/07/2018 4.1  2.0 - 5.0 Final    Non-HDL Cholesterol 11/07/2018 120  mg/dL Final   ]    Assessment:    1. Community acquired pneumonia, unspecified laterality    2. Essential hypertension          Stephanie was seen today for follow-up.    Diagnoses and all orders for this visit:    Community acquired pneumonia, unspecified laterality  -     X-Ray Chest PA And Lateral; Future  - Improved. Complete antibiotics as prescribed. Repeat chest xray in 6 weeks.    Essential hypertension  -     carvedilol (COREG) 12.5 MG tablet; TAKE 1 TABLET TWICE A DAY WITH MEALS  - Unstable. Patient counseled to take medications prior to office visits.  - Return call clinic in 2 days with blood pressure readings.          FOLLOW UP: Follow-up in about 5 weeks (around 3/11/2019) for Repeat chest xray.    "

## 2019-02-07 NOTE — PATIENT INSTRUCTIONS
Pneumonia (Adult)  Pneumonia is an infection deep within the lungs. It is in the small air sacs (alveoli). Pneumonia may be caused by a virus or bacteria. Pneumonia caused by bacteria is usually treated with an antibiotic. Severe cases may need to be treated in the hospital. Milder cases can be treated at home. Symptoms usually start to get better during the first 2 days of treatment.    Home care  Follow these guidelines when caring for yourself at home:  · Rest at home for the first 2 to 3 days, or until you feel stronger. Dont let yourself get overly tired when you go back to your activities.  · Stay away from cigarette smoke - yours or other peoples.  · You may use acetaminophen or ibuprofen to control fever or pain, unless another medicine was prescribed. If you have chronic liver or kidney disease, talk with your healthcare provider before using these medicines. Also talk with your provider if youve had a stomach ulcer or gastrointestinal bleeding. Dont give aspirin to anyone younger than 18 years of age who is ill with a fever. It may cause severe liver damage.  · Your appetite may be poor, so a light diet is fine.  · Drink 6 to 8 glasses of fluids every day to make sure you are getting enough fluids. Beverages can include water, sport drinks, sodas without caffeine, juices, tea, or soup. Fluids will help loosen secretions in the lung. This will make it easier for you to cough up the phlegm (sputum). If you also have heart or kidney disease, check with your healthcare provider before you drink extra fluids.  · Take antibiotic medicine prescribed until it is all gone, even if you are feeling better after a few days.  Follow-up care  Follow up with your healthcare provider in the next 2 to 3 days, or as advised. This is to be sure the medicine is helping you get better.  If you are 65 or older, you should get a pneumococcal vaccine and a yearly flu (influenza) shot. You should also get these vaccines if  you have chronic lung disease like asthma, emphysema, or COPD. Recently, a second type of pneumonia vaccine has become available for everyone over 65 years old. This is in addition to the previous vaccine. Ask your provider about this.  When to seek medical advice  Call your healthcare provider right away if any of these occur:  · You dont get better within the first 48 hours of treatment  · Shortness of breath gets worse  · Rapid breathing (more than 25 breaths per minute)  · Coughing up blood  · Chest pain gets worse with breathing  · Fever of 100.4°F (38°C) or higher that doesnt get better with fever medicine  · Weakness, dizziness, or fainting that gets worse  · Thirst or dry mouth that gets worse  · Sinus pain, headache, or a stiff neck  · Chest pain not caused by coughing  Date Last Reviewed: 1/1/2017  © 9448-0209 [employer], [employer]. [address] [place] [id number]. All rights reserved. This information is not intended as a substitute for professional medical care. Always follow your healthcare professional's instructions.

## 2019-02-08 DIAGNOSIS — G89.29 CHRONIC NECK PAIN: ICD-10-CM

## 2019-02-08 DIAGNOSIS — I10 ESSENTIAL HYPERTENSION: ICD-10-CM

## 2019-02-08 DIAGNOSIS — M54.2 CHRONIC NECK PAIN: ICD-10-CM

## 2019-02-08 DIAGNOSIS — J44.9 CHRONIC OBSTRUCTIVE PULMONARY DISEASE, UNSPECIFIED COPD TYPE: ICD-10-CM

## 2019-02-08 RX ORDER — FUROSEMIDE 40 MG/1
TABLET ORAL
Qty: 90 TABLET | Refills: 1 | Status: SHIPPED | OUTPATIENT
Start: 2019-02-08 | End: 2019-06-18 | Stop reason: SDUPTHER

## 2019-02-08 RX ORDER — GABAPENTIN 600 MG/1
TABLET ORAL
Qty: 270 TABLET | Refills: 1 | Status: SHIPPED | OUTPATIENT
Start: 2019-02-08 | End: 2019-07-24 | Stop reason: SDUPTHER

## 2019-02-09 RX ORDER — AMLODIPINE AND BENAZEPRIL HYDROCHLORIDE 10; 40 MG/1; MG/1
CAPSULE ORAL
Qty: 90 CAPSULE | Refills: 1 | Status: SHIPPED | OUTPATIENT
Start: 2019-02-09 | End: 2019-06-18 | Stop reason: SDUPTHER

## 2019-02-11 ENCOUNTER — TELEPHONE (OUTPATIENT)
Dept: FAMILY MEDICINE | Facility: CLINIC | Age: 69
End: 2019-02-11

## 2019-02-11 NOTE — TELEPHONE ENCOUNTER
----- Message from Yamileth Eli MA sent at 2/11/2019 10:41 AM CST -----  Contact: [facility]  [facility]  ----- Message -----  From: Fartun Montanez  Sent: 2/11/2019   9:58 AM  To: Annita Neff Staff    Michelle called to clarify that Dr. Ariza knows that patient is being prescribed Tramadol from a different provider. Please call to advise at [phone] ref #[id number]        gabapentin (NEURONTIN) 600 MG tablet 270 tablet             [facility] [place]  [address] [place] [address]

## 2019-02-12 DIAGNOSIS — G47.33 OBSTRUCTIVE SLEEP APNEA: Primary | ICD-10-CM

## 2019-02-15 ENCOUNTER — OFFICE VISIT (OUTPATIENT)
Dept: NEPHROLOGY | Facility: CLINIC | Age: 69
End: 2019-02-15
Payer: MEDICARE

## 2019-02-15 VITALS
HEART RATE: 77 BPM | HEIGHT: 62 IN | WEIGHT: 192.88 LBS | OXYGEN SATURATION: 97 % | BODY MASS INDEX: 35.49 KG/M2 | SYSTOLIC BLOOD PRESSURE: 140 MMHG | DIASTOLIC BLOOD PRESSURE: 70 MMHG

## 2019-02-15 DIAGNOSIS — N18.2 CHRONIC KIDNEY DISEASE, STAGE II (MILD): Primary | ICD-10-CM

## 2019-02-15 DIAGNOSIS — I10 ESSENTIAL HYPERTENSION: ICD-10-CM

## 2019-02-15 PROCEDURE — 99213 PR OFFICE/OUTPT VISIT, EST, LEVL III, 20-29 MIN: ICD-10-PCS | Mod: S$PBB,,, | Performed by: INTERNAL MEDICINE

## 2019-02-15 PROCEDURE — 99999 PR PBB SHADOW E&M-EST. PATIENT-LVL III: ICD-10-PCS | Mod: PBBFAC,,, | Performed by: INTERNAL MEDICINE

## 2019-02-15 PROCEDURE — 99213 OFFICE O/P EST LOW 20 MIN: CPT | Mod: S$PBB,,, | Performed by: INTERNAL MEDICINE

## 2019-02-15 PROCEDURE — 99213 OFFICE O/P EST LOW 20 MIN: CPT | Mod: PBBFAC | Performed by: INTERNAL MEDICINE

## 2019-02-15 PROCEDURE — 99999 PR PBB SHADOW E&M-EST. PATIENT-LVL III: CPT | Mod: PBBFAC,,, | Performed by: INTERNAL MEDICINE

## 2019-02-15 RX ORDER — CARVEDILOL 25 MG/1
TABLET ORAL
Qty: 60 TABLET | Refills: 5 | Status: SHIPPED | OUTPATIENT
Start: 2019-02-15 | End: 2019-03-27

## 2019-02-18 ENCOUNTER — TELEPHONE (OUTPATIENT)
Dept: ENDOSCOPY | Facility: HOSPITAL | Age: 69
End: 2019-02-18

## 2019-02-21 RX ORDER — ATORVASTATIN CALCIUM 20 MG/1
TABLET, FILM COATED ORAL
Qty: 30 TABLET | Refills: 3 | Status: SHIPPED | OUTPATIENT
Start: 2019-02-21 | End: 2019-06-12 | Stop reason: SDUPTHER

## 2019-02-27 NOTE — PROGRESS NOTES
Subjective:       Patient ID: Stephanie Sears is a 69 y.o. Black or  female who presents for re-evaluation of progression of Chronic Kidney Disease    HPI  Ms. Sears is a 69 year old woman with medical history of hypertension presenting for follow up of proteinuria.  Patient last seen in Nephrology clinic by Dr. Rachel July 2018, this is her first visit with me.  She reports blood pressure usually 130-140's systolic.  She reports adequate fluid intake, takes meloxicam occasionally for joint pains.  She otherwise denies any fever, chest pain, shortness of breath, abdominal pain, diarrhea, dysuria/hematuria.     Review of Systems   Constitutional: Negative for appetite change, fatigue and fever.   Respiratory: Negative for chest tightness and shortness of breath.    Cardiovascular: Negative for chest pain and leg swelling.   Gastrointestinal: Negative for abdominal pain, constipation, diarrhea, nausea and vomiting.   Genitourinary: Negative for difficulty urinating, dysuria, flank pain, frequency, hematuria and urgency.   Musculoskeletal: Negative for arthralgias, joint swelling and myalgias.   Skin: Negative for rash and wound.   Neurological: Negative for dizziness, weakness and light-headedness.   All other systems reviewed and are negative.      Objective:      Physical Exam   Constitutional: She appears well-developed and well-nourished.   Cardiovascular: Normal rate, regular rhythm and normal heart sounds. Exam reveals no gallop and no friction rub.   No murmur heard.  Pulmonary/Chest: Effort normal and breath sounds normal. No respiratory distress. She has no wheezes. She has no rales.   Abdominal: Soft. Bowel sounds are normal. There is no tenderness.   Musculoskeletal: She exhibits no edema.   Neurological: She is alert.   Skin: Skin is warm and dry. No rash noted. No erythema.   Psychiatric: She has a normal mood and affect.       Assessment:       1. Chronic kidney disease, stage II  (mild)    2. Essential hypertension        Plan:      Ms. Sears is a 69 year old woman with medical history of hypertension presenting for follow up of proteinuria.  Patient with well-preserved renal function (CKD stage II), with persistent subnephrotic proteinuria (~2gm) on ACEinh.  Will continue to trend creatinine, proteinuria, advised to avoid any NSAID's.  Stressed importance of blood pressure/glycemic control, along with weight loss, to prevent any further progression of kidney disease, patient voiced understanding.     Return to clinic in 6 months with renal/heme panel, iron/TIBC/ferritin, urinalysis/culture, urine protein/creatinine ratio prior to next visit

## 2019-03-07 ENCOUNTER — OFFICE VISIT (OUTPATIENT)
Dept: ORTHOPEDICS | Facility: CLINIC | Age: 69
End: 2019-03-07
Payer: MEDICARE

## 2019-03-07 ENCOUNTER — HOSPITAL ENCOUNTER (OUTPATIENT)
Dept: RADIOLOGY | Facility: HOSPITAL | Age: 69
Discharge: HOME | End: 2019-03-07
Attending: ORTHOPAEDIC SURGERY
Payer: MEDICARE

## 2019-03-07 VITALS — HEIGHT: 62 IN | BODY MASS INDEX: 35.57 KG/M2 | WEIGHT: 193.31 LBS

## 2019-03-07 DIAGNOSIS — Z96.653 TOTAL KNEE REPLACEMENT STATUS, BILATERAL: Primary | ICD-10-CM

## 2019-03-07 DIAGNOSIS — Z96.653 TOTAL KNEE REPLACEMENT STATUS, BILATERAL: ICD-10-CM

## 2019-03-07 PROCEDURE — 99214 OFFICE O/P EST MOD 30 MIN: CPT | Mod: S$PBB,,, | Performed by: ORTHOPAEDIC SURGERY

## 2019-03-07 PROCEDURE — 99999 PR PBB SHADOW E&M-EST. PATIENT-LVL III: ICD-10-PCS | Mod: PBBFAC,,, | Performed by: ORTHOPAEDIC SURGERY

## 2019-03-07 PROCEDURE — 73562 X-RAY EXAM OF KNEE 3: CPT | Mod: 26,RT,, | Performed by: RADIOLOGY

## 2019-03-07 PROCEDURE — 73560 X-RAY EXAM OF KNEE 1 OR 2: CPT | Mod: 26,XS,RT, | Performed by: RADIOLOGY

## 2019-03-07 PROCEDURE — 99999 PR PBB SHADOW E&M-EST. PATIENT-LVL III: CPT | Mod: PBBFAC,,, | Performed by: ORTHOPAEDIC SURGERY

## 2019-03-07 PROCEDURE — 99214 PR OFFICE/OUTPT VISIT, EST, LEVL IV, 30-39 MIN: ICD-10-PCS | Mod: S$PBB,,, | Performed by: ORTHOPAEDIC SURGERY

## 2019-03-07 PROCEDURE — 73560 X-RAY EXAM OF KNEE 1 OR 2: CPT | Mod: 59,TC,LT

## 2019-03-07 PROCEDURE — 73562 XR KNEE ORTHO RIGHT: ICD-10-PCS | Mod: 26,RT,, | Performed by: RADIOLOGY

## 2019-03-07 PROCEDURE — 73560 XR KNEE ORTHO RIGHT: ICD-10-PCS | Mod: 26,XS,RT, | Performed by: RADIOLOGY

## 2019-03-07 PROCEDURE — 99213 OFFICE O/P EST LOW 20 MIN: CPT | Mod: PBBFAC,25 | Performed by: ORTHOPAEDIC SURGERY

## 2019-03-07 NOTE — PROGRESS NOTES
HPI:    Stephanie Sears is a 69 y.o. female who is here today for   Chief Complaint   Patient presents with    Right Knee - Pain   .   Status post right total knee done 02/26/2018 with no major complication.  But not doing as well as her left knee which was done 01/20/2016.  There some swelling and tenderness still about the right knee and some skin changes.    Duration: 12 months  Intensity: mild  Character of pain: achy  Location: She reports that the pain is predominately  intermediate    Past Medical History:   Diagnosis Date    Angina pectoris     Anxiety     Bronchitis     Cataract     Cervical spondylosis with radiculopathy 7/10/2012    Chest pain     Chronic neck pain 7/26/2012    Depression     Fibrocystic breast     GERD (gastroesophageal reflux disease)     Glaucoma     Hyperlipidemia     Hypertension     Neck pain 7/10/2012    JAM (obstructive sleep apnea)     Primary osteoarthritis of both knees     Psoriasis     Subacromial or subdeltoid bursitis 7/10/2012    Thyroid disease     Type 2 diabetes mellitus 12/10/2013          Current Outpatient Medications:     albuterol (PROAIR HFA) 90 mcg/actuation inhaler, Inhale 2 puffs into the lungs every 6 (six) hours as needed for Wheezing or Shortness of Breath., Disp: 1 Inhaler, Rfl: 3    amlodipine-benazepril (LOTREL) 10-40 mg per capsule, TAKE 1 CAPSULE DAILY, Disp: 90 capsule, Rfl: 1    atorvastatin (LIPITOR) 20 MG tablet, TAKE 1 TABLET(20 MG) BY MOUTH EVERY DAY, Disp: 30 tablet, Rfl: 3    benzonatate (TESSALON) 200 MG capsule, Take 1 capsule (200 mg total) by mouth 3 (three) times daily as needed for Cough., Disp: 60 capsule, Rfl: 1    blood sugar diagnostic (ACCU-CHEK JOSE G PLUS TEST STRP) Strp, Inject 1 each into the skin 2 (two) times daily., Disp: 200 each, Rfl: 11    blood sugar diagnostic Strp, Use to test blood glucose levels 2 times per day as instructed., Disp: 200 strip, Rfl: 11    CALCIUM CARBONATE (TUMS ORAL), Take  by mouth as needed (acid reflux, indigestion)., Disp: , Rfl:     carvedilol (COREG) 25 MG tablet, TAKE 1 TABLET TWICE A DAY WITH MEALS, Disp: 60 tablet, Rfl: 5    diphenhydrAMINE (BENADRYL) 25 mg capsule, Take 25 mg by mouth every 6 (six) hours as needed for Itching or Allergies., Disp: , Rfl:     famotidine (PEPCID) 20 MG tablet, Take 1 tablet (20 mg total) by mouth 2 (two) times daily., Disp: 60 tablet, Rfl: 0    fluticasone (FLONASE) 50 mcg/actuation nasal spray, 1 spray by Each Nare route 2 (two) times daily., Disp: 1 Bottle, Rfl: 1    furosemide (LASIX) 40 MG tablet, TAKE 1 TABLET DAILY, Disp: 90 tablet, Rfl: 1    gabapentin (NEURONTIN) 600 MG tablet, TAKE 1 TABLET THREE TIMES A DAY, Disp: 270 tablet, Rfl: 1    INULIN (FIBER GUMMIES ORAL), Take by mouth every morning. , Disp: , Rfl:     lancets (ACCU-CHEK MULTICLIX LANCET) Misc, Test blood sugar twice daily, Disp: 300 each, Rfl: 3    lancets (ONETOUCH DELICA LANCETS) 33 gauge Misc, Inject 1 lancet into the skin 2 (two) times daily before meals., Disp: 200 each, Rfl: 11    meloxicam (MOBIC) 7.5 MG tablet, TAKE 1 TABLET DAILY, Disp: 90 tablet, Rfl: 1    triamcinolone acetonide 0.1% (KENALOG) 0.1 % ointment, KENDRA AA ON THE SKIN BID ON RASH ON LEGS, Disp: , Rfl: 0    traMADol (ULTRAM) 50 mg tablet, Take 1 tablet (50 mg total) by mouth every 8 (eight) hours as needed., Disp: 90 tablet, Rfl: 1     Review of patient's allergies indicates:   Allergen Reactions    Hydrocodone Shortness Of Breath    Iodinated contrast- oral and iv dye Shortness Of Breath     Difficulty breathing    Adhesive Itching    Oxycodone      Hallucinations    Sulfa (sulfonamide antibiotics) Hives and Itching        ROS  Constitutional: Negative for fever, Negative for weight loss  HENT Negative for congestion  Cardiovascular: Negative chest pain  Respiratory: Negative Shortness of breath  Heme: Negative excessive bleeding  Skin:NegativeItching, Negative  "breakdown  Musculoskeletal:Positive for back pain, Positive for joint pain, Positive muscle pain, Negative muscle weakness  Neurological: Negative for numbness and paresthesias   Psychiatric/Behavioral: Negative altered mental status, Negative for depression    Physical Exam:   Ht 5' 2" (1.575 m)   Wt 87.7 kg (193 lb 5.5 oz)   BMI 35.36 kg/m²   General appearance: This is a well-developed, Well nourished female No obvious acute distress.  Psychiatric: normal mood and affect;  pleasant and conversant; judgment and thought content normal  Gait is coordinated.   Cardiovascular:  Mild varicosities in the right leg.  Respiratory: normal respiratory effort   Lymphatic: no adenopathy   Neurologic: alert and oriented to person, place, and time   Deep Tendon Reflexes are normal;  Coordination and Balance: Normal   Musculoskeletal   Neck    ROM shows normal flexion and extension and lateral rotation    Palpation: Non-tender    Stability is normal    Strength is normal    Skin is normal without masses and lesions    Sensation is intact to light touch   Back    ROM showsnormal flexion, extension    and  rotation    Palpation shows no masses    Stability is normal    Strength to flexion and extension well maintained    Core strength is diminished    Skin shows no rashes or cafe au lait spots;     Sensation is intact to light touch    Right Knee  Swelling Mild  TendernessMedial joint line and Lateral joint line  Range of Motion: 120    Left Knee: Swelling None  TendernessNone  Range of Motion: 120  There are some several small areas of skin changes.  No signs of infection.    Radiograph   Implants in excellent position no signs of loosening or failure.    Assessment:  Stable right total knee.    Plan:  Resume activities as tolerated.  "

## 2019-03-11 ENCOUNTER — APPOINTMENT (OUTPATIENT)
Dept: RADIOLOGY | Facility: HOSPITAL | Age: 69
End: 2019-03-11
Attending: FAMILY MEDICINE
Payer: MEDICARE

## 2019-03-11 ENCOUNTER — OFFICE VISIT (OUTPATIENT)
Dept: PULMONOLOGY | Facility: CLINIC | Age: 69
End: 2019-03-11
Attending: FAMILY MEDICINE
Payer: MEDICARE

## 2019-03-11 VITALS
WEIGHT: 193.31 LBS | SYSTOLIC BLOOD PRESSURE: 155 MMHG | OXYGEN SATURATION: 99 % | BODY MASS INDEX: 35.57 KG/M2 | HEART RATE: 59 BPM | DIASTOLIC BLOOD PRESSURE: 73 MMHG | HEIGHT: 62 IN

## 2019-03-11 DIAGNOSIS — G47.09 OTHER INSOMNIA: ICD-10-CM

## 2019-03-11 DIAGNOSIS — G47.33 OSA (OBSTRUCTIVE SLEEP APNEA): ICD-10-CM

## 2019-03-11 DIAGNOSIS — J18.9 COMMUNITY ACQUIRED PNEUMONIA, UNSPECIFIED LATERALITY: ICD-10-CM

## 2019-03-11 DIAGNOSIS — Z72.0 TOBACCO ABUSE: ICD-10-CM

## 2019-03-11 PROBLEM — G47.00 INSOMNIA: Status: ACTIVE | Noted: 2019-03-11

## 2019-03-11 PROCEDURE — 71046 X-RAY EXAM CHEST 2 VIEWS: CPT | Mod: 26,,, | Performed by: RADIOLOGY

## 2019-03-11 PROCEDURE — 99214 PR OFFICE/OUTPT VISIT, EST, LEVL IV, 30-39 MIN: ICD-10-PCS | Mod: S$PBB,,, | Performed by: INTERNAL MEDICINE

## 2019-03-11 PROCEDURE — 99213 OFFICE O/P EST LOW 20 MIN: CPT | Mod: PBBFAC,25 | Performed by: INTERNAL MEDICINE

## 2019-03-11 PROCEDURE — 99214 OFFICE O/P EST MOD 30 MIN: CPT | Mod: S$PBB,,, | Performed by: INTERNAL MEDICINE

## 2019-03-11 PROCEDURE — 99999 PR PBB SHADOW E&M-EST. PATIENT-LVL III: CPT | Mod: PBBFAC,,, | Performed by: INTERNAL MEDICINE

## 2019-03-11 PROCEDURE — 71046 X-RAY EXAM CHEST 2 VIEWS: CPT | Mod: TC,FY,PN

## 2019-03-11 PROCEDURE — 99999 PR PBB SHADOW E&M-EST. PATIENT-LVL III: ICD-10-PCS | Mod: PBBFAC,,, | Performed by: INTERNAL MEDICINE

## 2019-03-11 PROCEDURE — 71046 XR CHEST PA AND LATERAL: ICD-10-PCS | Mod: 26,,, | Performed by: RADIOLOGY

## 2019-03-11 NOTE — PROGRESS NOTES
Stephanie Sears  was seen as a follow up.      CHIEF COMPLAINT:    Chief Complaint   Patient presents with    Sleep Apnea       HISTORY OF PRESENT ILLNESS: Stephanie Sears is a 69 y.o. female is here for sleep evaluation.   Patient was diagnosed with jarrett in 8/17.  Patient was seen by KRZYSZTOF Olson.  Patient was started on cpap therapy 10 cm H20 with maykel salazar.  Patient was doing well with cpap for the first 7-8 months.  Our first encounter was 8/18.  Since 6/18, patient with 1-2 awakenings per night.  Can take up to 1 hour to go back to sleep.  Feeling rested upon awake.  +dry mouth upon awake.  Recently, her boyfriend x 40 years moved back to his home in [place].      Patient sleeps alone.  She is not sure if she snores with cpap.  Overall, sleep is better when compared to sleeping without cpap.  No parasomnia.  No cataplexy.      Chronic knee pain s/p knee replacement.  Better since knee replacement.  Chronic knee pain.      Paola Sleepiness Scale score during initial sleep evaluation was 8 (with cpap).  Used to be 16 without cpap.    Since our last encounter, patient has been doing well with cpap.  Patient was sleeping well until 1/19.  Patient was diagnosed with rll pneumonia.  Patient was treated with doxycycline and amoxicillin.  Cough and dyspnea improve with antibiotic.  Patient has difficulty with sleep maintenance since.  Wake up 1-2 times per night.  20 minutes to go back to sleep.      SLEEP ROUTINE:  Activity the hour prior to sleep: watch tv     Bed partner:  Alone   Time to bed:  9-10  PM   Lights off:  Gonzales light is on, TV is on   Sleep onset latency:  Watch tv until fall asleep; 20 minutes after putting cpap mask on.          Disruptions or awakenings:    1-2 times (can take up to 1 hour to go back to sleep)    Wakeup time:      7 am   Perceived sleep quality:  rested       Daytime naps:      Lay down 90 minutes but does not sleep  Weekend sleep routine:      same  Caffeine use: 2 cups of coffee  in morning  exercise habit:   none      PAST MEDICAL HISTORY:    Active Ambulatory Problems     Diagnosis Date Noted    GERD (gastroesophageal reflux disease)     Primary osteoarthritis of both knees     Cervical spondylosis with radiculopathy 07/10/2012    Open angle with borderline findings, low risk - Both Eyes 10/18/2012    Nontoxic uninodular goiter 12/13/2010    Myopia with astigmatism and presbyopia - Both Eyes 08/12/2013    Diet-controlled diabetes mellitus 12/10/2013    Asymptomatic proteinuria 12/10/2013    DJD (degenerative joint disease) of lumbar spine, mild 04/17/2014    Osteoarthritis of left hip, mild 04/17/2014    DJD (degenerative joint disease) of cervical spine 08/21/2014    Essential hypertension 01/07/2016    Class 2 obesity due to excess calories without serious comorbidity with body mass index (BMI) of 35.0 to 35.9 in adult 01/12/2016    Tobacco abuse 01/12/2016    H/O scarlet fever 01/12/2016    Aortic valve sclerosis 01/12/2016    Pulmonary hypertension 01/12/2016    Diastolic dysfunction 01/14/2016    Status post total knee replacement, left 1/20/2016 02/02/2016    CMC arthritis 12/07/2016    Chronic midline thoracic back pain 09/15/2017    Primary osteoarthritis of right knee 09/15/2017    Hand pain, left 09/15/2017    JAM (obstructive sleep apnea) 12/06/2017    Personal history of spine surgery 02/09/2018    Fatty liver 02/09/2018    Status post total right knee replacement 2/26/2018 03/13/2018    Dyslipidemia 07/05/2018    Preoperative cardiovascular examination 07/05/2018    Bilateral carotid bruits 07/05/2018    Atherosclerosis of aortic arch 01/22/2019    Insomnia 03/11/2019     Resolved Ambulatory Problems     Diagnosis Date Noted    Posterior vitreous detachment - Right Eye 10/18/2012    Nuclear sclerosis - Both Eyes 08/12/2013    Carpal tunnel syndrome, bilateral 08/13/2013    Chronic LBP 06/17/2014    Borderline glaucoma with ocular  hypertension 07/21/2014    Obesity 02/23/2015    Dermatitis 01/07/2016    Cardiac murmur 01/12/2016    At risk for aspiration 01/12/2016    Mild aortic stenosis 01/14/2016    Primary osteoarthritis of left knee 01/20/2016    Annual physical exam 10/18/2016    Encounter for gynecological examination without abnormal finding 10/18/2016    Status post hysterectomy 10/18/2016    Menopausal state 10/18/2016    Hematuria, unspecified 10/18/2016    DM (diabetes mellitus screen) 10/18/2016    Hematuria, gross 10/31/2016    Urinary tract infection with hematuria 02/09/2018     Past Medical History:   Diagnosis Date    Angina pectoris     Anxiety     Bronchitis     Cataract     Cervical spondylosis with radiculopathy 7/10/2012    Chest pain     Chronic neck pain 7/26/2012    Depression     Fibrocystic breast     GERD (gastroesophageal reflux disease)     Glaucoma     Hyperlipidemia     Hypertension     Neck pain 7/10/2012    JAM (obstructive sleep apnea)     Primary osteoarthritis of both knees     Psoriasis     Subacromial or subdeltoid bursitis 7/10/2012    Thyroid disease     Type 2 diabetes mellitus 12/10/2013                PAST SURGICAL HISTORY:    Past Surgical History:   Procedure Laterality Date    ARTHROSCOPY - LEFT THUMB CMCJ - LINVATEC - CMC Left 12/7/2016    Performed by [facility]line Carney [facility] at [facility] OR 1ST FLR    BLOCK-NERVE Right 2/27/2018    Performed by Teresa Surgeon at [facility] TERESA    BREAST LUMPECTOMY Left 1988    [facility] breast surgery  1988    BREAST MASS EXCISION Right     benign    CHOLECYSTECTOMY      CHOLECYSTECTOMY, LAPAROSCOPIC N/A 1/28/2013    Performed by Shay Cordova MD at [facility] OR 2ND FLR    HYSTERECTOMY  1980's    KNEE SURGERY Left 1-20-16    T[facility]    KNEE SURGERY Right 02/26/2018    TKR    L4-L5 fusion  2006    REPLACEMENT-KNEE-TOTAL Right 2/26/2018    [facility] OR 2ND FLR    REPLACEMENT-KNEE-TOTAL  Left 1/20/2016    [facility] OR [address] [place] [address]    SPINE SURGERY           FAMILY HISTORY:                Family History   Problem Relation Age of Onset    Diabetes Mother     Hypertension Mother         CHF, angina    Angina Mother     Kidney disease Mother     Heart disease Mother         CHF    Hypertension Father         glaucoma, Alzhiemer's , supra pubic    Alzheimer's disease Father     Glaucoma Father     Kidney disease Brother         kidney transplant, HTN,DM    Diabetes Brother     Glaucoma Sister     Hypertension Sister     Hyperlipidemia Sister     Gout Son     Hypertension Son     Diabetes Son     Sleep apnea Sister     Hypertension Sister     Thyroid disease Sister     No Known Problems Sister     No Known Problems Sister     Hepatitis Brother     Amblyopia Neg Hx     Blindness Neg Hx        SOCIAL HISTORY:          Tobacco:   Social History     Tobacco Use   Smoking Status Current Every Day Smoker    Packs/day: 1.00    Years: 40.00    Pack years: 40.00    Types: Cigarettes   Smokeless Tobacco Never Used   Tobacco Comment    smokes a pack a week       alcohol use:    Social History     Substance and Sexual Activity   Alcohol Use No    Alcohol/week: 0.0 oz                 Occupation: former     ALLERGIES:    Review of patient's allergies indicates:   Allergen Reactions    Hydrocodone Shortness Of Breath    Iodinated contrast- oral and iv dye Shortness Of Breath     Difficulty breathing    Adhesive Itching    Oxycodone      Hallucinations    Sulfa (sulfonamide antibiotics) Hives and Itching       CURRENT MEDICATIONS:    Current Outpatient Medications   Medication Sig Dispense Refill    albuterol (PROAIR HFA) 90 mcg/actuation inhaler Inhale 2 puffs into the lungs every 6 (six) hours as needed for Wheezing or Shortness of Breath. 1 Inhaler 3    amlodipine-benazepril (LOTREL) 10-40 mg per capsule TAKE 1 CAPSULE  DAILY 90 capsule 1    atorvastatin (LIPITOR) 20 MG tablet TAKE 1 TABLET(20 MG) BY MOUTH EVERY DAY 30 tablet 3    benzonatate (TESSALON) 200 MG capsule Take 1 capsule (200 mg total) by mouth 3 (three) times daily as needed for Cough. 60 capsule 1    blood sugar diagnostic (ACCU-CHEK JOSE G PLUS TEST STRP) Strp Inject 1 each into the skin 2 (two) times daily. 200 each 11    blood sugar diagnostic Strp Use to test blood glucose levels 2 times per day as instructed. 200 strip 11    CALCIUM CARBONATE (TUMS ORAL) Take by mouth as needed (acid reflux, indigestion).      carvedilol (COREG) 25 MG tablet TAKE 1 TABLET TWICE A DAY WITH MEALS 60 tablet 5    diphenhydrAMINE (BENADRYL) 25 mg capsule Take 25 mg by mouth every 6 (six) hours as needed for Itching or Allergies.      famotidine (PEPCID) 20 MG tablet Take 1 tablet (20 mg total) by mouth 2 (two) times daily. 60 tablet 0    fluticasone (FLONASE) 50 mcg/actuation nasal spray 1 spray by Each Nare route 2 (two) times daily. 1 Bottle 1    furosemide (LASIX) 40 MG tablet TAKE 1 TABLET DAILY 90 tablet 1    gabapentin (NEURONTIN) 600 MG tablet TAKE 1 TABLET THREE TIMES A  tablet 1    INULIN (FIBER GUMMIES ORAL) Take by mouth every morning.       lancets (ACCU-CHEK MULTICLIX LANCET) Misc Test blood sugar twice daily 300 each 3    lancets (ONETOUCH DELICA LANCETS) 33 gauge Misc Inject 1 lancet into the skin 2 (two) times daily before meals. 200 each 11    meloxicam (MOBIC) 7.5 MG tablet TAKE 1 TABLET DAILY 90 tablet 1    triamcinolone acetonide 0.1% (KENALOG) 0.1 % ointment KENDRA AA ON THE SKIN BID ON RASH ON LEGS  0    traMADol (ULTRAM) 50 mg tablet Take 1 tablet (50 mg total) by mouth every 8 (eight) hours as needed. 90 tablet 1     No current facility-administered medications for this visit.                   REVIEW OF SYSTEMS:     Sleep related symptoms as per HPI.  CONST:Denies weight gain; loosing weight    HEENT: + sinus congestion  PULM: + dyspnea x 2  "block  CARD:  Denies palpitations   GI:  +occasional acid reflux  : Denies polyuria  NEURO: Denies headaches  PSYCH: Denies mood disturbance  HEME: Denies anemia   Otherwise, a balance of systems reviewed is negative.          PHYSICAL EXAM:  Vitals:    03/11/19 1024   BP: (!) 155/73   Pulse: (!) 59   SpO2: 99%   Weight: 87.7 kg (193 lb 4.8 oz)   Height: 5' 2" (1.575 m)   PainSc: 0-No pain     Body mass index is 35.36 kg/m².     GENERAL: Normal development, well groomed  HEENT:  Conjunctivae are non-erythematous; Pupils equal, round, and reactive to light; Nose is symmetrical; Nasal mucosa is pink and moist; Septum is midline; Inferior turbinates are normal; Nasal airflow is normal; Posterior pharynx is pink; Modified Mallampati: 4; Posterior palate is normal; Tonsils +1; Uvula is normal and pink;Tongue is normal; Dentition is fair; No TMJ tenderness; Jaw opening and protrusion without click and without discomfort.  NECK: Supple. Neck circumference is 13 inches. No thyromegaly. No palpable nodes.     SKIN: On face and neck: No abrasions, no rashes, no lesions.  No subcutaneous nodules are palpable.  RESPIRATORY: Chest is clear to auscultation.  Normal chest expansion and non-labored breathing at rest.  CARDIOVASCULAR: Normal S1, S2.  No murmurs, gallops or rubs. No carotid bruits bilaterally.  EXTREMITIES: No edema. No clubbing. No cyanosis. Station normal. Gait normal.        NEURO/PSYCH: Oriented to time, place and person. Normal attention span and concentration. Affect is full. Mood is normal.                                              DATA   PSG 8/11/17: AHI was 6.0 with an oxygen aye of 86.0%. The RERA index was 4.8 and RDI was 10.8 events per hour.   9/6/17 Ttiration study, effective supine REM cpap 10cm    Echo 1/13/16  CONCLUSIONS     1 - Normal left ventricular systolic function (EF 60-65%).     2 - Left ventricular diastolic dysfunction.     3 - Normal right ventricular systolic function .     4 - " Mild left atrial enlargement.     5 - Mild tricuspid regurgitation.     6 - The estimated PA systolic pressure is 39 mmHg.     7 - Mild aortic stenosis, JOHN = 1.46 cm2, peak velocity = 2.7 m/s, mean gradient = 17.0 mmHg.    Lab Results   Component Value Date    TSH 0.914 12/10/2013     ASSESSMENT  Problem List Items Addressed This Visit     Insomnia    Overview     difficulty with sleep maintenance.  Poor sleep sleep hygiene + nocturia + psychophysiologic in etiology.  Stimulus control and sleep restriction d/w patient.  Fluid restriction after dinner.  Avoid tv in bed.  Patient is having difficulty accepting that suggestion.           JAM (obstructive sleep apnea)    Overview     interrogation of cpap confirmed pressure of 10 cm H20 with good compliance. 90% 4 hours.  Residual ahi of 1.5.  Patient is benefiting from cpap         Relevant Orders    CPAP/BIPAP SUPPLIES    Tobacco abuse    Overview     encourage tobacco cessation.  Patient is enrolled in smoking cessation.                Education: During our discussion today, we talked about the etiology of obstructive sleep apnea as well as the potential ramifications of untreated sleep apnea, which could include daytime sleepiness, hypertension, heart disease and/or stroke.     Precautions: The patient was advised to abstain from driving should they feel sleepy or drowsy.       Patient will Follow-up in about 1 year (around 3/11/2020). with md/np.    This is 25 minutes visit, over 50% of time spent in direct consultation with patient.

## 2019-03-11 NOTE — LETTER
March 12, 2019      Aishwarya Ariza MD  [address] [facility] [place] [place] [address]           [facility]  [address] [facility] [address]  [place] [address]  Phone: [phone]  Fax: [phone]          Patient: Stephanie Sears   MR Number: [id number]   YOB: 1950   Date of Visit: 3/11/2019       Dear Dr. Aishwarya Ariza:    Thank you for referring Stephanie Sears to me for evaluation. Attached you will find relevant portions of my assessment and plan of care.    If you have questions, please do not hesitate to call me. I look forward to following Stephanie Sears along with you.    Sincerely,    Justin Nicholson MD    Enclosure  CC:  No Recipients    If you would like to receive this communication electronically, please contact [email or web address] or [phone] to request more information on [facility] Link access.    For providers and/or their staff who would like to refer a patient to [facility], please contact us through our one-stop-shop provider referral line, [facility], at [phone].    If you feel you have received this communication in error or would no longer like to receive these types of communications, please e-mail [email or web address]

## 2019-03-25 ENCOUNTER — OFFICE VISIT (OUTPATIENT)
Dept: PHYSICAL MEDICINE AND REHAB | Facility: CLINIC | Age: 69
End: 2019-03-25
Payer: MEDICARE

## 2019-03-25 ENCOUNTER — TELEPHONE (OUTPATIENT)
Dept: FAMILY MEDICINE | Facility: CLINIC | Age: 69
End: 2019-03-25

## 2019-03-25 VITALS
BODY MASS INDEX: 35.8 KG/M2 | HEART RATE: 67 BPM | WEIGHT: 194.56 LBS | DIASTOLIC BLOOD PRESSURE: 82 MMHG | SYSTOLIC BLOOD PRESSURE: 160 MMHG | HEIGHT: 62 IN

## 2019-03-25 DIAGNOSIS — G89.29 CHRONIC BILATERAL LOW BACK PAIN WITH BILATERAL SCIATICA: Primary | ICD-10-CM

## 2019-03-25 DIAGNOSIS — M54.42 CHRONIC BILATERAL LOW BACK PAIN WITH BILATERAL SCIATICA: Primary | ICD-10-CM

## 2019-03-25 DIAGNOSIS — M54.16 LUMBAR RADICULOPATHY: ICD-10-CM

## 2019-03-25 DIAGNOSIS — Z98.890 STATUS POST LUMBAR LAMINECTOMY: ICD-10-CM

## 2019-03-25 DIAGNOSIS — M54.40 CHRONIC BILATERAL LOW BACK PAIN WITH SCIATICA, SCIATICA LATERALITY UNSPECIFIED: ICD-10-CM

## 2019-03-25 DIAGNOSIS — M70.62 TROCHANTERIC BURSITIS OF LEFT HIP: ICD-10-CM

## 2019-03-25 DIAGNOSIS — M54.41 CHRONIC BILATERAL LOW BACK PAIN WITH BILATERAL SCIATICA: Primary | ICD-10-CM

## 2019-03-25 DIAGNOSIS — G89.29 CHRONIC BILATERAL LOW BACK PAIN WITH SCIATICA, SCIATICA LATERALITY UNSPECIFIED: ICD-10-CM

## 2019-03-25 PROCEDURE — 99214 OFFICE O/P EST MOD 30 MIN: CPT | Mod: S$PBB,,, | Performed by: PHYSICAL MEDICINE & REHABILITATION

## 2019-03-25 PROCEDURE — 99999 PR PBB SHADOW E&M-EST. PATIENT-LVL III: CPT | Mod: PBBFAC,,, | Performed by: PHYSICAL MEDICINE & REHABILITATION

## 2019-03-25 PROCEDURE — 99214 PR OFFICE/OUTPT VISIT, EST, LEVL IV, 30-39 MIN: ICD-10-PCS | Mod: S$PBB,,, | Performed by: PHYSICAL MEDICINE & REHABILITATION

## 2019-03-25 PROCEDURE — 99213 OFFICE O/P EST LOW 20 MIN: CPT | Mod: PBBFAC | Performed by: PHYSICAL MEDICINE & REHABILITATION

## 2019-03-25 PROCEDURE — 99999 PR PBB SHADOW E&M-EST. PATIENT-LVL III: ICD-10-PCS | Mod: PBBFAC,,, | Performed by: PHYSICAL MEDICINE & REHABILITATION

## 2019-03-25 RX ORDER — TRAMADOL HYDROCHLORIDE 50 MG/1
50 TABLET ORAL EVERY 8 HOURS PRN
Qty: 90 TABLET | Refills: 3 | Status: SHIPPED | OUTPATIENT
Start: 2019-03-25 | End: 2019-07-24 | Stop reason: SDUPTHER

## 2019-03-25 NOTE — TELEPHONE ENCOUNTER
Spoke with [facility]. States that they received a prescription for patient on 2/07/2019 for Coreg 12.5 mg from  though patient has a prescription that was sent to her local pharmacy on 2/15/2019 by Dr. Romero. Would like to know which dosage patient is supposed to be taking. Please advise.Would like a return call to [phone] with reference# [id number]

## 2019-03-26 RX ORDER — CARVEDILOL 12.5 MG/1
TABLET ORAL
COMMUNITY
Start: 2019-03-14 | End: 2019-04-08

## 2019-03-27 ENCOUNTER — OFFICE VISIT (OUTPATIENT)
Dept: CARDIOLOGY | Facility: CLINIC | Age: 69
End: 2019-03-27
Payer: MEDICARE

## 2019-03-27 VITALS
SYSTOLIC BLOOD PRESSURE: 155 MMHG | WEIGHT: 194.69 LBS | HEIGHT: 62 IN | HEART RATE: 71 BPM | BODY MASS INDEX: 35.83 KG/M2 | DIASTOLIC BLOOD PRESSURE: 70 MMHG

## 2019-03-27 DIAGNOSIS — I51.89 DIASTOLIC DYSFUNCTION: ICD-10-CM

## 2019-03-27 DIAGNOSIS — I10 ESSENTIAL HYPERTENSION: Primary | ICD-10-CM

## 2019-03-27 DIAGNOSIS — Z96.652 STATUS POST TOTAL KNEE REPLACEMENT, LEFT: ICD-10-CM

## 2019-03-27 DIAGNOSIS — G47.33 OSA (OBSTRUCTIVE SLEEP APNEA): ICD-10-CM

## 2019-03-27 DIAGNOSIS — K21.9 GASTROESOPHAGEAL REFLUX DISEASE, ESOPHAGITIS PRESENCE NOT SPECIFIED: ICD-10-CM

## 2019-03-27 DIAGNOSIS — E66.09 CLASS 2 OBESITY DUE TO EXCESS CALORIES WITHOUT SERIOUS COMORBIDITY WITH BODY MASS INDEX (BMI) OF 35.0 TO 35.9 IN ADULT: ICD-10-CM

## 2019-03-27 DIAGNOSIS — I35.8 AORTIC VALVE SCLEROSIS: ICD-10-CM

## 2019-03-27 DIAGNOSIS — Z72.0 TOBACCO ABUSE: ICD-10-CM

## 2019-03-27 DIAGNOSIS — R09.89 BILATERAL CAROTID BRUITS: ICD-10-CM

## 2019-03-27 DIAGNOSIS — Z96.651 STATUS POST TOTAL RIGHT KNEE REPLACEMENT: ICD-10-CM

## 2019-03-27 DIAGNOSIS — E78.5 DYSLIPIDEMIA: ICD-10-CM

## 2019-03-27 DIAGNOSIS — L43.9 LICHEN PLANUS: ICD-10-CM

## 2019-03-27 PROCEDURE — 99214 OFFICE O/P EST MOD 30 MIN: CPT | Mod: S$PBB,,, | Performed by: INTERNAL MEDICINE

## 2019-03-27 PROCEDURE — 99214 PR OFFICE/OUTPT VISIT, EST, LEVL IV, 30-39 MIN: ICD-10-PCS | Mod: S$PBB,,, | Performed by: INTERNAL MEDICINE

## 2019-03-27 PROCEDURE — 99214 OFFICE O/P EST MOD 30 MIN: CPT | Mod: PBBFAC | Performed by: INTERNAL MEDICINE

## 2019-03-27 PROCEDURE — 99999 PR PBB SHADOW E&M-EST. PATIENT-LVL IV: ICD-10-PCS | Mod: PBBFAC,,, | Performed by: INTERNAL MEDICINE

## 2019-03-27 PROCEDURE — 99999 PR PBB SHADOW E&M-EST. PATIENT-LVL IV: CPT | Mod: PBBFAC,,, | Performed by: INTERNAL MEDICINE

## 2019-03-27 NOTE — PROGRESS NOTES
Chart has been dictated using voice recognition software.  It is not been reviewed carefully for any transcriptional errors due to this technology.   Subjective:   Patient ID:  Stephanie Sears is a 69 y.o. female who presents for follow-up of Hypertension (4 month f/u ) and Leg Problem (right leg )      HPI:  Patient with history of diastolic dysfunction, mild aortic stenosis, GERD, GI distress, hypertension, dyslipidemia, type 2 diabetes, s/p bilateral knee replacements, and thyroid disease.    Patient denies any chest discomfort on exertion or at rest.  Patient denies any dyspnea at rest, orthopnea, PND, or edema.  Does need to rest after 2-3 blocks but abole to walk across the lobby without stopping. Patient denies any palpitations, lightheadedness, or syncope.       Past Medical History:   Diagnosis Date    Angina pectoris     Anxiety     Bronchitis     Cataract     Cervical spondylosis with radiculopathy 7/10/2012    Chest pain     Chronic neck pain 7/26/2012    Depression     Fibrocystic breast     GERD (gastroesophageal reflux disease)     Glaucoma     Hyperlipidemia     Hypertension     Neck pain 7/10/2012    JAM (obstructive sleep apnea)     Primary osteoarthritis of both knees     Psoriasis     Subacromial or subdeltoid bursitis 7/10/2012    Thyroid disease     Type 2 diabetes mellitus 12/10/2013       Outpatient Medications Prior to Visit   Medication Sig Dispense Refill    albuterol (PROAIR HFA) 90 mcg/actuation inhaler Inhale 2 puffs into the lungs every 6 (six) hours as needed for Wheezing or Shortness of Breath. 1 Inhaler 3    amlodipine-benazepril (LOTREL) 10-40 mg per capsule TAKE 1 CAPSULE DAILY 90 capsule 1    atorvastatin (LIPITOR) 20 MG tablet TAKE 1 TABLET(20 MG) BY MOUTH EVERY DAY 30 tablet 3    benzonatate (TESSALON) 200 MG capsule Take 1 capsule (200 mg total) by mouth 3 (three) times daily as needed for Cough. 60 capsule 1    blood sugar diagnostic (ACCU-CHEK  JOSE G PLUS TEST STRP) Strp Inject 1 each into the skin 2 (two) times daily. 200 each 11    blood sugar diagnostic Strp Use to test blood glucose levels 2 times per day as instructed. 200 strip 11    CALCIUM CARBONATE (TUMS ORAL) Take by mouth as needed (acid reflux, indigestion).      carvedilol (COREG) 12.5 MG tablet       diphenhydrAMINE (BENADRYL) 25 mg capsule Take 25 mg by mouth every 6 (six) hours as needed for Itching or Allergies.      famotidine (PEPCID) 20 MG tablet Take 1 tablet (20 mg total) by mouth 2 (two) times daily. 60 tablet 0    fluticasone (FLONASE) 50 mcg/actuation nasal spray 1 spray by Each Nare route 2 (two) times daily. 1 Bottle 1    furosemide (LASIX) 40 MG tablet TAKE 1 TABLET DAILY 90 tablet 1    gabapentin (NEURONTIN) 600 MG tablet TAKE 1 TABLET THREE TIMES A  tablet 1    INULIN (FIBER GUMMIES ORAL) Take by mouth every morning.       lancets (ACCU-CHEK MULTICLIX LANCET) Misc Test blood sugar twice daily 300 each 3    lancets (ONETOUCH DELICA LANCETS) 33 gauge Misc Inject 1 lancet into the skin 2 (two) times daily before meals. 200 each 11    meloxicam (MOBIC) 7.5 MG tablet TAKE 1 TABLET DAILY 90 tablet 1    traMADol (ULTRAM) 50 mg tablet Take 1 tablet (50 mg total) by mouth every 8 (eight) hours as needed. 90 tablet 3    triamcinolone acetonide 0.1% (KENALOG) 0.1 % ointment KENDRA AA ON THE SKIN BID ON RASH ON LEGS  0    carvedilol (COREG) 25 MG tablet TAKE 1 TABLET TWICE A DAY WITH MEALS 60 tablet 5     No facility-administered medications prior to visit.        Review of Systems   Constitution: Negative for weight gain and weight loss.   HENT: Negative for nosebleeds.    Eyes: Negative for vision loss in left eye and vision loss in right eye.   Cardiovascular: Negative for claudication.        As above   Respiratory: Negative for hemoptysis, shortness of breath, snoring, sputum production and wheezing.    Endocrine: Negative for polydipsia and polyuria.  "  Hematologic/Lymphatic: Does not bruise/bleed easily.   Skin:        Sores tracking up leg along saphenous veins of right leg   Musculoskeletal: Negative for myalgias.   Gastrointestinal: Negative for change in bowel habit, hematemesis, hematochezia, melena, nausea and vomiting.   Genitourinary: Negative for hematuria.   Neurological: Negative for focal weakness and numbness.     Objective:   Physical Exam   Constitutional: She is oriented to person, place, and time. She appears well-developed and well-nourished.   BP (!) 155/70 (BP Location: Left arm, Patient Position: Sitting, BP Method: Large (Automatic))   Pulse 71   Ht 5' 2" (1.575 m)   Wt 88.3 kg (194 lb 10.7 oz)   BMI 35.60 kg/m²    Neck: Neck supple. No JVD present. Carotid bruit is not present. No thyromegaly present.   Cardiovascular: Normal rate, regular rhythm, S1 normal, S2 normal and intact distal pulses. Exam reveals S4. Exam reveals no friction rub.   No murmur heard.  Pulses:       Carotid pulses are 2+ on the right side with bruit, and 2+ on the left side with bruit.       Radial pulses are 2+ on the right side, and 2+ on the left side.        Femoral pulses are 2+ on the right side with bruit, and 1+ on the left side with bruit.       Popliteal pulses are 1+ on the right side, and 1+ on the left side.        Dorsalis pedis pulses are 1+ on the right side, and 2+ on the left side.        Posterior tibial pulses are 1+ on the right side, and 2+ on the left side.   Pulmonary/Chest: Breath sounds normal. She has no wheezes. She has no rales.   Abdominal: Soft. Bowel sounds are normal. There is no hepatosplenomegaly. There is no tenderness.   Musculoskeletal: She exhibits no edema.   Neurological: She is alert and oriented to person, place, and time. She has normal strength.   Skin: No cyanosis. Nails show no clubbing.              Lab Results   Component Value Date     02/15/2019    K 3.9 02/15/2019    BUN 18 02/15/2019    CREATININE 0.9 " 02/15/2019     (H) 02/15/2019    HGBA1C 5.8 (H) 10/18/2018    CHOL 159 11/07/2018    HDL 39 (L) 11/07/2018    LDLCALC 99.8 11/07/2018    TRIG 101 11/07/2018    CHOLHDL 24.5 11/07/2018    HGB 12.6 06/18/2018    HCT 38.8 06/18/2018     06/18/2018    INR 0.9 02/09/2018       Assessment:     1. Essential hypertension    2. Diastolic dysfunction    3. Aortic valve sclerosis    4. Gastroesophageal reflux disease, esophagitis presence not specified    5. JAM (obstructive sleep apnea)    6. Tobacco abuse    7. Dyslipidemia    8. Class 2 obesity due to excess calories without serious comorbidity with body mass index (BMI) of 35.0 to 35.9 in adult    9. Status post total knee replacement, left 1/20/2016    10. Bilateral carotid bruits    11. Status post total right knee replacement 2/26/2018    12. Lichen planus      From a cardiac point of view, the patient is doing well without symptoms of ischemia significant heart failure or symptomatic arrhythmias.  She does have continued exercise limitation of 2-3 blocks which may be due to weight, mild heart failure, or deconditioning.  It is not clear why this skin condition is tracking along the saphenous vein in her right leg.  The patient will be referred to dermatology for further evaluation.  Additionally, the patient will be referred to the digital hypertens with on program, but is not clear that she will actually participate.  Unless there are intervening problems, patient should return for re-evaluation in 6 months.   Plan:     Stephanie was seen today for hypertension and leg problem.    Diagnoses and all orders for this visit:    Essential hypertension  -     CV Resting BRYAN; Future; Expected date: 03/27/2019    Diastolic dysfunction    Aortic valve sclerosis    Gastroesophageal reflux disease, esophagitis presence not specified    JAM (obstructive sleep apnea)    Tobacco abuse    Dyslipidemia  -     CV Resting BRYAN; Future; Expected date: 03/27/2019    Class 2  obesity due to excess calories without serious comorbidity with body mass index (BMI) of 35.0 to 35.9 in adult    Status post total knee replacement, left 1/20/2016    Bilateral carotid bruits    Status post total right knee replacement 2/26/2018    Lichen planus          Dean Robins MD  Consultative Cardiology

## 2019-03-27 NOTE — Clinical Note
Thank you for referring Stephanie Sears for follow-up of hypertension and heart failure with preserved ejectionH fraction (HFpEF). Please see my note for details of this encounter. If you have any questions, please contact me.  Thank you again for the referral.

## 2019-04-05 ENCOUNTER — CLINICAL SUPPORT (OUTPATIENT)
Dept: CARDIOLOGY | Facility: CLINIC | Age: 69
End: 2019-04-05
Attending: INTERNAL MEDICINE
Payer: MEDICARE

## 2019-04-05 DIAGNOSIS — I10 ESSENTIAL HYPERTENSION: ICD-10-CM

## 2019-04-05 DIAGNOSIS — E78.5 DYSLIPIDEMIA: ICD-10-CM

## 2019-04-05 PROCEDURE — 93922 CV US ANKLE BRACHIAL INDICES RESTING (CUPID ONLY): ICD-10-PCS | Mod: 26,S$PBB,, | Performed by: INTERNAL MEDICINE

## 2019-04-05 PROCEDURE — 93922 UPR/L XTREMITY ART 2 LEVELS: CPT | Mod: PBBFAC | Performed by: INTERNAL MEDICINE

## 2019-04-06 LAB
LEFT ABI: 0.86
LEFT ARM BP: 148 MMHG
LEFT DORSALIS PEDIS: 116 MMHG
LEFT POSTERIOR TIBIAL: 127 MMHG
RIGHT ABI: 0.99
RIGHT ARM BP: 143 MMHG
RIGHT DORSALIS PEDIS: 146 MMHG
RIGHT POSTERIOR TIBIAL: 137 MMHG

## 2019-04-08 ENCOUNTER — PATIENT MESSAGE (OUTPATIENT)
Dept: CARDIOLOGY | Facility: CLINIC | Age: 69
End: 2019-04-08

## 2019-04-08 DIAGNOSIS — I10 ESSENTIAL HYPERTENSION: Primary | ICD-10-CM

## 2019-04-08 RX ORDER — CARVEDILOL 12.5 MG/1
12.5 TABLET ORAL 2 TIMES DAILY WITH MEALS
Qty: 60 TABLET | Refills: 0 | Status: SHIPPED | OUTPATIENT
Start: 2019-04-08 | End: 2019-06-12 | Stop reason: SDUPTHER

## 2019-04-22 ENCOUNTER — OFFICE VISIT (OUTPATIENT)
Dept: PODIATRY | Facility: CLINIC | Age: 69
End: 2019-04-22
Payer: MEDICARE

## 2019-04-22 VITALS
DIASTOLIC BLOOD PRESSURE: 60 MMHG | WEIGHT: 194.69 LBS | BODY MASS INDEX: 35.83 KG/M2 | SYSTOLIC BLOOD PRESSURE: 130 MMHG | HEIGHT: 62 IN

## 2019-04-22 DIAGNOSIS — M20.5X2 HALLUX LIMITUS, ACQUIRED, LEFT: ICD-10-CM

## 2019-04-22 DIAGNOSIS — M20.42 HAMMER TOES OF BOTH FEET: ICD-10-CM

## 2019-04-22 DIAGNOSIS — B35.1 ONYCHOMYCOSIS DUE TO DERMATOPHYTE: ICD-10-CM

## 2019-04-22 DIAGNOSIS — M20.41 HAMMER TOES OF BOTH FEET: ICD-10-CM

## 2019-04-22 DIAGNOSIS — E11.51 TYPE II DIABETES MELLITUS WITH PERIPHERAL CIRCULATORY DISORDER: Primary | ICD-10-CM

## 2019-04-22 DIAGNOSIS — L84 CORN OR CALLUS: ICD-10-CM

## 2019-04-22 DIAGNOSIS — M20.5X1 HALLUX LIMITUS, ACQUIRED, RIGHT: ICD-10-CM

## 2019-04-22 DIAGNOSIS — L90.9 PLANTAR FAT PAD ATROPHY: ICD-10-CM

## 2019-04-22 PROCEDURE — 11056 PARNG/CUTG B9 HYPRKR LES 2-4: CPT | Mod: Q9,S$PBB,, | Performed by: PODIATRIST

## 2019-04-22 PROCEDURE — 99999 PR PBB SHADOW E&M-EST. PATIENT-LVL III: ICD-10-PCS | Mod: PBBFAC,,, | Performed by: PODIATRIST

## 2019-04-22 PROCEDURE — 11056 PARNG/CUTG B9 HYPRKR LES 2-4: CPT | Mod: Q9,PBBFAC,PO | Performed by: PODIATRIST

## 2019-04-22 PROCEDURE — 11721 PR DEBRIDEMENT OF NAILS, 6 OR MORE: ICD-10-PCS | Mod: 59,Q9,S$PBB, | Performed by: PODIATRIST

## 2019-04-22 PROCEDURE — 11056 PR TRIM BENIGN HYPERKERATOTIC SKIN LESION,2-4: ICD-10-PCS | Mod: Q9,S$PBB,, | Performed by: PODIATRIST

## 2019-04-22 PROCEDURE — 11721 DEBRIDE NAIL 6 OR MORE: CPT | Mod: 59,Q9,S$PBB, | Performed by: PODIATRIST

## 2019-04-22 PROCEDURE — 11721 DEBRIDE NAIL 6 OR MORE: CPT | Mod: Q9,PBBFAC,PO | Performed by: PODIATRIST

## 2019-04-22 PROCEDURE — 99499 NO LOS: ICD-10-PCS | Mod: S$PBB,,, | Performed by: PODIATRIST

## 2019-04-22 PROCEDURE — 99213 OFFICE O/P EST LOW 20 MIN: CPT | Mod: PBBFAC,PO | Performed by: PODIATRIST

## 2019-04-22 PROCEDURE — 99499 UNLISTED E&M SERVICE: CPT | Mod: S$PBB,,, | Performed by: PODIATRIST

## 2019-04-22 PROCEDURE — 99999 PR PBB SHADOW E&M-EST. PATIENT-LVL III: CPT | Mod: PBBFAC,,, | Performed by: PODIATRIST

## 2019-04-22 NOTE — PROGRESS NOTES
Subjective:      Patient ID: Stephanie Sears is a 69 y.o. female.    Chief Complaint: Diabetes Mellitus (pcp Annita 2-7-19); Diabetic Foot Exam; and Nail Care    Stephanie is a 69 y.o. female who presents to the clinic for evaluation and treatment of high risk feet. Stephanie has a past medical history of Angina pectoris, Anxiety, Bronchitis, Cataract, Cervical spondylosis with radiculopathy (7/10/2012), Chest pain, Chronic neck pain (7/26/2012), Depression, Fibrocystic breast, GERD (gastroesophageal reflux disease), Glaucoma, Hyperlipidemia, Hypertension, Neck pain (7/10/2012), JAM (obstructive sleep apnea), Primary osteoarthritis of both knees, Psoriasis, Subacromial or subdeltoid bursitis (7/10/2012), Thyroid disease, and Type 2 diabetes mellitus (12/10/2013). The patient's chief complaint is long, thick toenails. This patient has documented high risk feet requiring routine maintenance secondary to diabetes mellitis and those secondary complications of diabetes, as mentioned..    PCP: Williams Rossi Jr, MD    Date Last Seen by PCP:   Chief Complaint   Patient presents with    Diabetes Mellitus     pcp Annita 2-7-19    Diabetic Foot Exam    Nail Care       Current shoe gear:  Casual shoes     Hemoglobin A1C   Date Value Ref Range Status   10/18/2018 5.8 (H) 4.0 - 5.6 % Final     Comment:     ADA Screening Guidelines:  5.7-6.4%  Consistent with prediabetes  >or=6.5%  Consistent with diabetes  High levels of fetal hemoglobin interfere with the HbA1C  assay. Heterozygous hemoglobin variants (HbS, HgC, etc)do  not significantly interfere with this assay.   However, presence of multiple variants may affect accuracy.     02/09/2018 6.0 (H) 4.0 - 5.6 % Final     Comment:     According to ADA guidelines, hemoglobin A1c <7.0% represents  optimal control in non-pregnant diabetic patients. Different  metrics may apply to specific patient populations.   Standards of Medical Care in Diabetes-2016.  For the purpose of screening  for the presence of diabetes:  <5.7%     Consistent with the absence of diabetes  5.7-6.4%  Consistent with increasing risk for diabetes   (prediabetes)  >or=6.5%  Consistent with diabetes  Currently, no consensus exists for use of hemoglobin A1c  for diagnosis of diabetes for children.  This Hemoglobin A1c assay has significant interference with fetal   hemoglobin   (HbF). The results are invalid for patients with abnormal amounts of   HbF,   including those with known Hereditary Persistence   of Fetal Hemoglobin. Heterozygous hemoglobin variants (HbAS, HbAC,   HbAD, HbAE, HbA2) do not significantly interfere with this assay;   however, presence of multiple variants in a sample may impact the %   interference.     12/14/2017 6.2 (H) 4.0 - 5.6 % Final     Comment:     According to ADA guidelines, hemoglobin A1c <7.0% represents  optimal control in non-pregnant diabetic patients. Different  metrics may apply to specific patient populations.   Standards of Medical Care in Diabetes-2016.  For the purpose of screening for the presence of diabetes:  <5.7%     Consistent with the absence of diabetes  5.7-6.4%  Consistent with increasing risk for diabetes   (prediabetes)  >or=6.5%  Consistent with diabetes  Currently, no consensus exists for use of hemoglobin A1c  for diagnosis of diabetes for children.  This Hemoglobin A1c assay has significant interference with fetal   hemoglobin   (HbF). The results are invalid for patients with abnormal amounts of   HbF,   including those with known Hereditary Persistence   of Fetal Hemoglobin. Heterozygous hemoglobin variants (HbAS, HbAC,   HbAD, HbAE, HbA2) do not significantly interfere with this assay;   however, presence of multiple variants in a sample may impact the %   interference.       Patient Active Problem List   Diagnosis    GERD (gastroesophageal reflux disease)    Primary osteoarthritis of both knees    Cervical spondylosis with radiculopathy    Open angle with  borderline findings, low risk - Both Eyes    Nontoxic uninodular goiter    Myopia with astigmatism and presbyopia - Both Eyes    Diet-controlled diabetes mellitus    Asymptomatic proteinuria    DJD (degenerative joint disease) of lumbar spine, mild    Osteoarthritis of left hip, mild    DJD (degenerative joint disease) of cervical spine    Essential hypertension    Class 2 obesity due to excess calories without serious comorbidity with body mass index (BMI) of 35.0 to 35.9 in adult    Tobacco abuse    H/O scarlet fever    Aortic valve sclerosis    Pulmonary hypertension    Diastolic dysfunction    Status post total knee replacement, left 1/20/2016    CMC arthritis    Chronic midline thoracic back pain    Primary osteoarthritis of right knee    Hand pain, left    JAM (obstructive sleep apnea)    Personal history of spine surgery    Fatty liver    Status post total right knee replacement 2/26/2018    Dyslipidemia    Preoperative cardiovascular examination    Bilateral carotid bruits    Atherosclerosis of aortic arch    Insomnia    Lichen planus     Current Outpatient Medications on File Prior to Visit   Medication Sig Dispense Refill    albuterol (PROAIR HFA) 90 mcg/actuation inhaler Inhale 2 puffs into the lungs every 6 (six) hours as needed for Wheezing or Shortness of Breath. 1 Inhaler 3    amlodipine-benazepril (LOTREL) 10-40 mg per capsule TAKE 1 CAPSULE DAILY 90 capsule 1    atorvastatin (LIPITOR) 20 MG tablet TAKE 1 TABLET(20 MG) BY MOUTH EVERY DAY 30 tablet 3    benzonatate (TESSALON) 200 MG capsule Take 1 capsule (200 mg total) by mouth 3 (three) times daily as needed for Cough. 60 capsule 1    blood sugar diagnostic (ACCU-CHEK JOSE G PLUS TEST STRP) Strp Inject 1 each into the skin 2 (two) times daily. 200 each 11    blood sugar diagnostic Strp Use to test blood glucose levels 2 times per day as instructed. 200 strip 11    CALCIUM CARBONATE (TUMS ORAL) Take by mouth as  needed (acid reflux, indigestion).      carvedilol (COREG) 12.5 MG tablet Take 1 tablet (12.5 mg total) by mouth 2 (two) times daily with meals. 60 tablet 0    diphenhydrAMINE (BENADRYL) 25 mg capsule Take 25 mg by mouth every 6 (six) hours as needed for Itching or Allergies.      famotidine (PEPCID) 20 MG tablet Take 1 tablet (20 mg total) by mouth 2 (two) times daily. 60 tablet 0    fluticasone (FLONASE) 50 mcg/actuation nasal spray 1 spray by Each Nare route 2 (two) times daily. 1 Bottle 1    furosemide (LASIX) 40 MG tablet TAKE 1 TABLET DAILY 90 tablet 1    gabapentin (NEURONTIN) 600 MG tablet TAKE 1 TABLET THREE TIMES A  tablet 1    INULIN (FIBER GUMMIES ORAL) Take by mouth every morning.       lancets (ACCU-CHEK MULTICLIX LANCET) Misc Test blood sugar twice daily 300 each 3    lancets (ONETOUCH DELICA LANCETS) 33 gauge Misc Inject 1 lancet into the skin 2 (two) times daily before meals. 200 each 11    meloxicam (MOBIC) 7.5 MG tablet TAKE 1 TABLET DAILY 90 tablet 1    traMADol (ULTRAM) 50 mg tablet Take 1 tablet (50 mg total) by mouth every 8 (eight) hours as needed. 90 tablet 3    triamcinolone acetonide 0.1% (KENALOG) 0.1 % ointment KENDRA AA ON THE SKIN BID ON RASH ON LEGS  0     No current facility-administered medications on file prior to visit.      Review of patient's allergies indicates:   Allergen Reactions    Hydrocodone Shortness Of Breath    Iodinated contrast- oral and iv dye Shortness Of Breath     Difficulty breathing    Adhesive Itching    Oxycodone      Hallucinations    Sulfa (sulfonamide antibiotics) Hives and Itching     Past Surgical History:   Procedure Laterality Date    ARTHROSCOPY - LEFT THUMB CMCJ - LINVATEC - CMC Left 12/7/2016    Performed by Marline Carney MD at [facility] OR 1ST FLR    BLOCK-NERVE Right 2/27/2018    Performed by Teresa Surgeon at [facility] TERESA    BREAST LUMPECTOMY Left 1988    [facility] breast surgery  1988    BREAST MASS EXCISION Right      benign    CHOLECYSTECTOMY      CHOLECYSTECTOMY, LAPAROSCOPIC N/A 1/28/2013    Performed by [facility] at [facility] OR 2ND FLR    HYSTERECTOMY  1980's    KNEE SURGERY Left 1-20-16    T[facility]    KNEE SURGERY Right 02/26/2018    T[facility]    L4-L5 fusion  2006    REPLACEMENT-KNEE-TOTAL Right 2/26/2018    [facility] OR 2ND FLR    REPLACEMENT-KNEE-TOTAL Left 1/20/2016    [facility] OR 2ND FLR    SPINE SURGERY       Family History   Problem Relation Age of Onset    Diabetes Mother     Hypertension Mother         CHF, angina    Angina Mother     Kidney disease Mother     Heart disease Mother         CHF    Hypertension Father         glaucoma, Alzhiemer's , supra pubic    Alzheimer's disease Father     Glaucoma Father     Kidney disease Brother         kidney transplant, HTN,DM    Diabetes Brother     Glaucoma Sister     Hypertension Sister     Hyperlipidemia Sister     Gout Son     Hypertension Son     Diabetes Son     Sleep apnea Sister     Hypertension Sister     Thyroid disease Sister     No Known Problems Sister     No Known Problems Sister     Hepatitis Brother     Amblyopia Neg Hx     Blindness Neg Hx      Social History     Socioeconomic History    Marital status:      Spouse name: Not on file    Number of children: Not on file    Years of education: Not on file    Highest education level: Not on file   Occupational History    Not on file   Social Needs    Financial resource strain: Not on file    Food insecurity:     Worry: Not on file     Inability: Not on file    Transportation needs:     Medical: Not on file     Non-medical: Not on file   Tobacco Use    Smoking status: Current Every Day Smoker     Packs/day: 1.00     Years: 40.00     Pack years: 40.00     Types: Cigarettes    Smokeless tobacco: Never Used    Tobacco comment: smokes a pack a week   Substance and Sexual Activity    Alcohol use:  "No     Alcohol/week: 0.0 oz    Drug use: No    Sexual activity: Yes     Partners: Male   Lifestyle    Physical activity:     Days per week: Not on file     Minutes per session: Not on file    Stress: Not on file   Relationships    Social connections:     Talks on phone: Not on file     Gets together: Not on file     Attends Caodaism service: Not on file     Active member of club or organization: Not on file     Attends meetings of clubs or organizations: Not on file     Relationship status: Not on file   Other Topics Concern    Not on file   Social History Narrative    Not on file       Review of Systems   Constitution: Negative for chills and fever.   Cardiovascular: Positive for leg swelling. Negative for chest pain and claudication.   Respiratory: Negative for cough and shortness of breath.    Skin: Positive for dry skin and nail changes. Negative for itching and rash.   Musculoskeletal: Positive for arthritis, back pain, joint pain and muscle cramps. Negative for falls, joint swelling and muscle weakness.   Gastrointestinal: Negative for diarrhea, nausea and vomiting.   Neurological: Positive for paresthesias. Negative for numbness, tremors and weakness.   Psychiatric/Behavioral: Negative for altered mental status and hallucinations.         Objective:       Vitals:    04/22/19 1416   BP: 130/60   Weight: 88.3 kg (194 lb 10.7 oz)   Height: 5' 2" (1.575 m)   PainSc: 0-No pain       Physical Exam   Constitutional:   General: Pt. is well-developed, well-nourished, appears stated age, in no acute distress, alert and oriented x 3. No evidence of depression, anxiety, or agitation. Calm, cooperative, and communicative. Appropriate interactions and affect.       Cardiovascular:   Pulses:       Dorsalis pedis pulses are 1+ on the right side, and 1+ on the left side.        Posterior tibial pulses are 1+ on the right side, and 1+ on the left side.   There is decreased digital hair. Skin is atrophic. "   Musculoskeletal:        Right ankle: She exhibits normal range of motion and no swelling. No tenderness. Achilles tendon exhibits no pain and no defect.        Left ankle: She exhibits normal range of motion and no swelling. No tenderness. Achilles tendon exhibits no pain and no defect.        Right foot: There is normal range of motion and no tenderness.        Left foot: There is normal range of motion and no tenderness.   Muscle strength is 5/5 in all groups bilaterally.    Decreased stride, station of gait.  apropulsive toe off.  Increased angle and base of gait.    Patient has hammertoes of digits 2-5 bilateral partially reducible without symptom today.    Decreased first MPJ range of motion both weightbearing and nonweightbearing, no crepitus observed the first MP joint, +  dorsal flag sign.Mild  bunion deformity is observed .    Fat pad atrophy to heels and met heads bilateral   Neurological: A sensory deficit is present.   Paresthesias, and hyperesthesia bilateral feet at toes with no clearly identified trigger or source.    Decreased/absent vibratory sensation bilateral feet to 128Hz tuning fork.   Skin: Skin is warm, dry and intact. No abrasion, no ecchymosis, no lesion and no rash noted. She is not diaphoretic. No cyanosis or erythema. No pallor. Nails show no clubbing.   Toenails 1-5 bilaterally are elongated by 2-3 mm, thickened by 2-3 mm, discolored/yellowed, dystrophic, brittle with subungual debris.    Focal hyperkeratotic lesion consisting entirely of hyperkeratotic tissue without open skin, drainage, pus, fluctuance, malodor, or signs of infection: sub right hallux IPJ; sub 5th MTPJ rita     Interdigital Spaces clean, dry and without evidence of break in skin integrity   Psychiatric: She has a normal mood and affect. Her speech is normal.   Nursing note and vitals reviewed.            Assessment:       Encounter Diagnoses   Name Primary?    Type II diabetes mellitus with peripheral circulatory  disorder Yes    Plantar fat pad atrophy     Hammer toes of both feet     Hallux limitus, acquired, right     Hallux limitus, acquired, left     Corn or callus     Onychomycosis due to dermatophyte          Plan:       Stephanie was seen today for diabetes mellitus, diabetic foot exam and nail care.    Diagnoses and all orders for this visit:    Type II diabetes mellitus with peripheral circulatory disorder    Plantar fat pad atrophy    Hammer toes of both feet    Hallux limitus, acquired, right    Hallux limitus, acquired, left    Corn or callus    Onychomycosis due to dermatophyte      I counseled the patient on her conditions, their implications and medical management.      Greater than 50% of this visit spent on counseling and coordination of care.    Education about the diabetic foot, neuropathy, and prevention of limb loss.    Shoe inspection. Diabetic Foot Education. Patient reminded of the importance of good nutrition/healthy diet/weight management and blood sugar control to help prevent podiatric complications of diabetes. Patient instructed on proper foot hygeine. Wear comfortable, proper fitting shoes. Wash feet daily. Dry well. After drying, apply moisturizer to feet (no lotion to webspaces). Inspect feet daily for skin breaks, blisters, swelling, or redness. Wear cotton socks (preferably white)  Change socks every day. Do NOT walk barefoot. Do NOT use heating pads or hot water soaks. We discussed wearing proper shoe gear, daily foot inspections, never walking without protective shoe gear.     Counseled patient on the effects of smoking on healing. She verbalizes understanding that smoking and/or history of smoking has an adverse effect on circulation an can increase the chances of delayed healing and this prolonged exposure leads to infection or progression of infection.    Discussed edema control and the importance of daily moisturizer to the feet such as Gold bonds diabetic foot cream    With  patient's permission, nails were aggressively reduced and debrided x 10 to their soft tissue attachment mechanically and with electric , removing all offending nail and debris. After cleansing the  area w/ alcohol prep pad the above mentioned hyperkeratosis was trimmed utilizing No 15 scapel, to a smooth base with out incident. Patient tolerated this  well and reported comfort to the area of:  sub right hallux IPJ; sub 5th MTPJ rita.  Patient relates relief following the procedure. She will continue to monitor the areas daily, inspect her feet, wear protective shoe gear when ambulatory, moisturizer to maintain skin integrity and follow in this office in approximately 3-4 months, sooner p.r.n.

## 2019-04-22 NOTE — PATIENT INSTRUCTIONS
Recommend lotions: eucerin, eucerin for diabetics, aquaphor, A&D ointment, gold bond for diabetics, sween, [place] Blanc's Bees all purpose baby ointment,  urea 40 with aloe (found on amazon.com)    Shoe recommendations: (try 6pm.com, zappos.com , nordstromrack.CellCentric, or [email or web address] for discounted prices) you can visit [employer]SW shoes in [place]  or [facility] in the [facility] (there are also several shoe brand outlets in the [facility])    Asics (GT 2000 or gel foundations), new balance stability type shoes, saucony (stabil c3),  Alamo (GTS or Beast or transcend), propet (tennis shoe)    Sofporter Eric (women) Cherry&Moise (men), clarks, crocs, aerosoles, naturalizers, SAS, ecco, born, meghana hernandez, rockports (dress shoes)    Vionic, burkenstocks, fitflops, propet (sandals)  Nike comfort thong sandals, crocs, propet (house shoes)    Nail Home remedy:  Vicks Vapor rub to nails for easier manageability    Diabetes: Inspecting Your Feet  Diabetes increases your chances of developing foot problems. So inspect your feet every day. This helps you find small skin irritations before they become serious infections. If you have trouble seeing the bottoms of your feet, use a mirror or ask a family member or friend to help.     Pressure spots on the bottom of the foot are common areas where problems develop.   How to check your feet  Below are tips to help you look for foot problems. Try to check your feet at the same time each day, such as when you get out of bed in the morning:  · Check the top of each foot. The tops of toes, back of the heel, and outer edge of the foot can get a lot of rubbing from poor-fitting shoes.  · Check the bottom of each foot. Daily wear and tear often leads to problems at pressure spots.  · Check the toes and nails. Fungal infections often occur between toes. Toenail problems can also be a sign of fungal infections or lead to breaks in the skin.  · Check your shoes, too. Loose objects inside a shoe can injure the  foot. Use your hand to feel inside your shoes for things like néstor, loose stitching, or rough areas that could irritate your skin.  Warning signs  Look for any color changes in the foot. Redness with streaks can signal a severe infection, which needs immediate medical attention. Tell your doctor right away if you have any of these problems:  · Swelling, sometimes with color changes, may be a sign of poor blood flow or infection. Symptoms include tenderness and an increase in the size of your foot.  · Warm or hot areas on your feet may be signs of infection. A foot that is cold may not be getting enough blood.  · Sensations such as burning, tingling, or pins and needles can be signs of a problem. Also check for areas that may be numb.  · Hot spots are caused by friction or pressure. Look for hot spots in areas that get a lot of rubbing. Hot spots can turn into blisters, calluses, or sores.  · Cracks and sores are caused by dry or irritated skin. They are a sign that the skin is breaking down, which can lead to infection.  · Toenail problems to watch for include nails growing into the skin (ingrown toenail) and causing redness or pain. Thick, yellow, or discolored nails can signal a fungal infection.  · Drainage and odor can develop from untreated sores and ulcers. Call your doctor right away if you notice white or yellow drainage, bleeding, or unpleasant odor.   © 7209-1847 [employer]. [address] [place] [address]. All rights reserved. This information is not intended as a substitute for professional medical care. Always follow your healthcare professional's instructions.        Step-by-Step:  Inspecting Your Feet (Diabetes)    Date Last Reviewed: 10/1/2016  © 1728-5300 [employer]. [address] [place] [address]. All rights reserved. This information is not intended as a substitute for professional medical care. Always follow your healthcare professional's  instructions.

## 2019-04-24 ENCOUNTER — TELEPHONE (OUTPATIENT)
Dept: PODIATRY | Facility: CLINIC | Age: 69
End: 2019-04-24

## 2019-04-24 NOTE — TELEPHONE ENCOUNTER
Spoke with patient     States that she was given L/XL metatarsal pads    She wears a narrow shoe and pads are too big    She would like smaller pads    Advised ok to come into clinic to get SM/ MED pads

## 2019-04-24 NOTE — TELEPHONE ENCOUNTER
----- Message from Chitra Merryjavadmarcelino sent at 4/24/2019 11:00 AM CDT -----  Contact: Self   Type: Patient Call Back    Who called:Self     What is the request in detail: Patient is asking to speak with the office in ref to a strap she has on her foot.   Can the clinic reply by [facility]? Call     Would the patient rather a call back or a response via [facility]? Call     Best call back number: [phone]    Additional Information:

## 2019-05-06 ENCOUNTER — OFFICE VISIT (OUTPATIENT)
Dept: OPHTHALMOLOGY | Facility: CLINIC | Age: 69
End: 2019-05-06
Payer: MEDICARE

## 2019-05-06 DIAGNOSIS — H25.13 NUCLEAR SCLEROSIS, BILATERAL: ICD-10-CM

## 2019-05-06 DIAGNOSIS — H52.4 MYOPIA WITH ASTIGMATISM AND PRESBYOPIA, BILATERAL: ICD-10-CM

## 2019-05-06 DIAGNOSIS — H40.1131 PRIMARY OPEN-ANGLE GLAUCOMA, BILATERAL, MILD STAGE: Primary | ICD-10-CM

## 2019-05-06 DIAGNOSIS — H43.811 POSTERIOR VITREOUS DETACHMENT, RIGHT: ICD-10-CM

## 2019-05-06 DIAGNOSIS — H52.13 MYOPIA WITH ASTIGMATISM AND PRESBYOPIA, BILATERAL: ICD-10-CM

## 2019-05-06 DIAGNOSIS — H52.203 MYOPIA WITH ASTIGMATISM AND PRESBYOPIA, BILATERAL: ICD-10-CM

## 2019-05-06 PROCEDURE — 92133 CPTRZD OPH DX IMG PST SGM ON: CPT | Mod: PBBFAC | Performed by: OPHTHALMOLOGY

## 2019-05-06 PROCEDURE — 92020 GONIOSCOPY: CPT | Mod: S$PBB,,, | Performed by: OPHTHALMOLOGY

## 2019-05-06 PROCEDURE — 92020 PR SPECIAL EYE EVAL,GONIOSCOPY: ICD-10-PCS | Mod: S$PBB,,, | Performed by: OPHTHALMOLOGY

## 2019-05-06 PROCEDURE — 92133 HEIDELBERG RETINA TOMOGRAPHY (HRT) - OU - BOTH EYES: ICD-10-PCS | Mod: 26,S$PBB,, | Performed by: OPHTHALMOLOGY

## 2019-05-06 PROCEDURE — 99999 PR PBB SHADOW E&M-EST. PATIENT-LVL II: CPT | Mod: PBBFAC,,, | Performed by: OPHTHALMOLOGY

## 2019-05-06 PROCEDURE — 92012 INTRM OPH EXAM EST PATIENT: CPT | Mod: S$PBB,,, | Performed by: OPHTHALMOLOGY

## 2019-05-06 PROCEDURE — 92012 PR EYE EXAM, EST PATIENT,INTERMED: ICD-10-PCS | Mod: S$PBB,,, | Performed by: OPHTHALMOLOGY

## 2019-05-06 PROCEDURE — 99999 PR PBB SHADOW E&M-EST. PATIENT-LVL II: ICD-10-PCS | Mod: PBBFAC,,, | Performed by: OPHTHALMOLOGY

## 2019-05-06 PROCEDURE — 99212 OFFICE O/P EST SF 10 MIN: CPT | Mod: PBBFAC,25 | Performed by: OPHTHALMOLOGY

## 2019-05-06 PROCEDURE — 92020 GONIOSCOPY: CPT | Mod: PBBFAC | Performed by: OPHTHALMOLOGY

## 2019-05-06 NOTE — PROGRESS NOTES
"        Assessment /Plan     For exam results, see Encounter Report.    Primary open-angle glaucoma, bilateral, mild stage    Nuclear sclerosis, bilateral    Posterior vitreous detachment, right    Myopia with astigmatism and presbyopia, bilateral          1. Pre-perimetric glaucoma /OHT/ borderline glaucoma   -Followed at [facility] since 1991   -First HVF 1999   -First photos 1991   - Intolerant to all gtts - they aggravate his blepharitis-? RAGHU allergy   -IOP "OK" off gtts and s/p ALT ou and SLT od - 2008    Family history neg   Glaucoma meds none (( off gtts post SLT ou -))   H/O adverse rxn to glaucoma drops Intolerant to all gtts - 2/2 aggravates blepharitis- RAGHU allergy   LASERS ALT ou -? Date / SLT OD 7/17/08 - good response   GLAUCOMA SURGERIES none   OTHER EYE SURGERIES none   CDR 0.6/0.3   Tbase 19-26 / 16-21   Tmax 26/21   Ttarget ?   HVF 13 test - 1999 to 2018 - Full ou   Gonio +3 ou   /588   OCT 4 test 2005 to 2018 -  RNFL - OD:NL // OS:NL  HRT 8 test 2004 to 2019 -MR -  nl od // nl os  Disc photos 1991, 1996, 2003 - slides // 2012 , 2016  - OIS     - Ttoday 20/19  - Test done today HRT / gonio     2. Nuclear Sclerosis    NVS yet - monitor    3. Posterior Vitreous Detachment OD - 11/2008   - RD PRECAUTIONS    4. Eyelid inflammation / Blepharitis / MGD    5. Allergic Conjunctivitis - RAGHU allergy     6. Myopia/Astigmatism/presbyopia     Plan   OHT-pre-perimetric glaucoma - intolerant to all gtts   IOP ok s/p ALT ou - years ago and s/p SLT OD 2008 ( no SLT os)  Continue to monitor HVF/DFE/OCT/HRT/photos/IOP   If increase IOP or progression repeat SLT     Consider phaco/IOL in future - but only minimal vis sign at this time    F/U 6 months with  HVF / DFE/photos - - if IOP goes high again -  consider repeat slt od and primary slt os                  "

## 2019-05-14 ENCOUNTER — CLINICAL SUPPORT (OUTPATIENT)
Dept: SMOKING CESSATION | Facility: CLINIC | Age: 69
End: 2019-05-14
Payer: COMMERCIAL

## 2019-05-14 DIAGNOSIS — F17.200 NICOTINE DEPENDENCE: Primary | ICD-10-CM

## 2019-05-14 PROCEDURE — 99407 BEHAV CHNG SMOKING > 10 MIN: CPT | Mod: S$GLB,,, | Performed by: INTERNAL MEDICINE

## 2019-05-14 PROCEDURE — 99407 PR TOBACCO USE CESSATION INTENSIVE >10 MINUTES: ICD-10-PCS | Mod: S$GLB,,, | Performed by: INTERNAL MEDICINE

## 2019-05-14 NOTE — PROGRESS NOTES
Spoke with patient today in regard to smoking cessation progress for 3/6 month telephone follow up, she states not tobacco free. Patient states smoking 12-14 cigs/day. She states using nicotine patches for her previous quit attempt. Patient has scheduled an appointment to return to the program for Quit attempt #3 on 5/20/2019 @ 8:00 am. Informed patient of benefit period, future follow ups, and contact information if any further help or support is needed. Will resolve episode and complete smart form for Quit attempt #2.

## 2019-05-20 ENCOUNTER — CLINICAL SUPPORT (OUTPATIENT)
Dept: SMOKING CESSATION | Facility: CLINIC | Age: 69
End: 2019-05-20
Payer: COMMERCIAL

## 2019-05-20 DIAGNOSIS — F17.200 NICOTINE DEPENDENCE: Primary | ICD-10-CM

## 2019-05-20 PROCEDURE — 99999 PR PBB SHADOW E&M-EST. PATIENT-LVL I: CPT | Mod: PBBFAC,,,

## 2019-05-20 PROCEDURE — 99404 PR PREVENT COUNSEL,INDIV,60 MIN: ICD-10-PCS | Mod: S$GLB,,,

## 2019-05-20 PROCEDURE — 99404 PREV MED CNSL INDIV APPRX 60: CPT | Mod: S$GLB,,,

## 2019-05-20 PROCEDURE — 99999 PR PBB SHADOW E&M-EST. PATIENT-LVL I: ICD-10-PCS | Mod: PBBFAC,,,

## 2019-05-20 NOTE — PROGRESS NOTES
Patient will be participating in biweekly tobacco cessation meetings and will begin the prescribed tobacco cessation medication regime of  21 mg nicotine patch QD , she states she already has 21 mg patches available to her.   She currently smokes 10 cigarettes per day.  Pt started on rate reduction and wait time of 15 min prior to smoking. Pt's exhaled carbon monoxide level was   9 ppm as per Smokerlyzer. (non- smoker = 0-5 ppm.) Will see pt back in office next week.

## 2019-05-22 ENCOUNTER — HOSPITAL ENCOUNTER (OUTPATIENT)
Facility: HOSPITAL | Age: 69
Discharge: HOME | End: 2019-05-22
Attending: INTERNAL MEDICINE | Admitting: INTERNAL MEDICINE
Payer: MEDICARE

## 2019-05-22 ENCOUNTER — ANESTHESIA EVENT (OUTPATIENT)
Dept: ENDOSCOPY | Facility: HOSPITAL | Age: 69
End: 2019-05-22
Payer: MEDICARE

## 2019-05-22 ENCOUNTER — ANESTHESIA (OUTPATIENT)
Dept: ENDOSCOPY | Facility: HOSPITAL | Age: 69
End: 2019-05-22
Payer: MEDICARE

## 2019-05-22 VITALS
WEIGHT: 194 LBS | SYSTOLIC BLOOD PRESSURE: 177 MMHG | HEIGHT: 62 IN | HEART RATE: 61 BPM | BODY MASS INDEX: 35.7 KG/M2 | RESPIRATION RATE: 25 BRPM | DIASTOLIC BLOOD PRESSURE: 79 MMHG | OXYGEN SATURATION: 100 % | TEMPERATURE: 97 F

## 2019-05-22 DIAGNOSIS — Z86.010 HISTORY OF COLON POLYPS: Primary | ICD-10-CM

## 2019-05-22 PROBLEM — Z86.0100 HISTORY OF COLON POLYPS: Status: ACTIVE | Noted: 2019-05-22

## 2019-05-22 PROCEDURE — D9220A PRA ANESTHESIA: ICD-10-PCS | Mod: CRNA,,, | Performed by: NURSE ANESTHETIST, CERTIFIED REGISTERED

## 2019-05-22 PROCEDURE — 37000008 HC ANESTHESIA 1ST 15 MINUTES: Performed by: INTERNAL MEDICINE

## 2019-05-22 PROCEDURE — G0105 COLORECTAL SCRN; HI RISK IND: HCPCS | Performed by: INTERNAL MEDICINE

## 2019-05-22 PROCEDURE — D9220A PRA ANESTHESIA: ICD-10-PCS | Mod: ANES,,, | Performed by: ANESTHESIOLOGY

## 2019-05-22 PROCEDURE — 82962 GLUCOSE BLOOD TEST: CPT | Performed by: INTERNAL MEDICINE

## 2019-05-22 PROCEDURE — 25000003 PHARM REV CODE 250: Performed by: INTERNAL MEDICINE

## 2019-05-22 PROCEDURE — 37000009 HC ANESTHESIA EA ADD 15 MINS: Performed by: INTERNAL MEDICINE

## 2019-05-22 PROCEDURE — G0105 COLORECTAL SCRN; HI RISK IND: HCPCS | Mod: 53,,, | Performed by: INTERNAL MEDICINE

## 2019-05-22 PROCEDURE — 63600175 PHARM REV CODE 636 W HCPCS: Performed by: NURSE ANESTHETIST, CERTIFIED REGISTERED

## 2019-05-22 PROCEDURE — D9220A PRA ANESTHESIA: Mod: CRNA,,, | Performed by: NURSE ANESTHETIST, CERTIFIED REGISTERED

## 2019-05-22 PROCEDURE — G0105 COLORECTAL SCRN; HI RISK IND: ICD-10-PCS | Mod: 53,,, | Performed by: INTERNAL MEDICINE

## 2019-05-22 PROCEDURE — D9220A PRA ANESTHESIA: Mod: ANES,,, | Performed by: ANESTHESIOLOGY

## 2019-05-22 RX ORDER — LIDOCAINE HCL/PF 100 MG/5ML
SYRINGE (ML) INTRAVENOUS
Status: DISCONTINUED | OUTPATIENT
Start: 2019-05-22 | End: 2019-05-22

## 2019-05-22 RX ORDER — FENTANYL CITRATE 50 UG/ML
INJECTION, SOLUTION INTRAMUSCULAR; INTRAVENOUS
Status: DISCONTINUED | OUTPATIENT
Start: 2019-05-22 | End: 2019-05-22

## 2019-05-22 RX ORDER — MIDAZOLAM HYDROCHLORIDE 1 MG/ML
INJECTION, SOLUTION INTRAMUSCULAR; INTRAVENOUS
Status: DISCONTINUED | OUTPATIENT
Start: 2019-05-22 | End: 2019-05-22

## 2019-05-22 RX ORDER — PROPOFOL 10 MG/ML
VIAL (ML) INTRAVENOUS
Status: DISCONTINUED | OUTPATIENT
Start: 2019-05-22 | End: 2019-05-22

## 2019-05-22 RX ORDER — SODIUM CHLORIDE 0.9 % (FLUSH) 0.9 %
3 SYRINGE (ML) INJECTION
Status: CANCELLED | OUTPATIENT
Start: 2019-05-22

## 2019-05-22 RX ORDER — SODIUM CHLORIDE 9 MG/ML
INJECTION, SOLUTION INTRAVENOUS CONTINUOUS
Status: DISCONTINUED | OUTPATIENT
Start: 2019-05-22 | End: 2019-05-22 | Stop reason: HOSPADM

## 2019-05-22 RX ORDER — PROPOFOL 10 MG/ML
VIAL (ML) INTRAVENOUS CONTINUOUS PRN
Status: DISCONTINUED | OUTPATIENT
Start: 2019-05-22 | End: 2019-05-22

## 2019-05-22 RX ADMIN — FENTANYL CITRATE 50 MCG: 50 INJECTION, SOLUTION INTRAMUSCULAR; INTRAVENOUS at 10:05

## 2019-05-22 RX ADMIN — MIDAZOLAM HYDROCHLORIDE 1 MG: 1 INJECTION, SOLUTION INTRAMUSCULAR; INTRAVENOUS at 10:05

## 2019-05-22 RX ADMIN — FENTANYL CITRATE 25 MCG: 50 INJECTION, SOLUTION INTRAMUSCULAR; INTRAVENOUS at 10:05

## 2019-05-22 RX ADMIN — PROPOFOL 20 MG: 10 INJECTION, EMULSION INTRAVENOUS at 10:05

## 2019-05-22 RX ADMIN — LIDOCAINE HYDROCHLORIDE 20 MG: 20 INJECTION, SOLUTION INTRAVENOUS at 10:05

## 2019-05-22 RX ADMIN — SODIUM CHLORIDE: 0.9 INJECTION, SOLUTION INTRAVENOUS at 10:05

## 2019-05-22 RX ADMIN — SODIUM CHLORIDE: 0.9 INJECTION, SOLUTION INTRAVENOUS at 11:05

## 2019-05-22 RX ADMIN — PROPOFOL 150 MCG/KG/MIN: 10 INJECTION, EMULSION INTRAVENOUS at 10:05

## 2019-05-22 NOTE — PROVATION PATIENT INSTRUCTIONS
Discharge Summary/Instructions after an Endoscopic Procedure  Patient Name: Stephanie Sears  Patient MRN: [id number]  Patient YOB: 1950  Wednesday, May 22, 2019  Darrin Calvin MD  RESTRICTIONS:  During your procedure today, you received medications for sedation.  These   medications may affect your judgment, balance and coordination.  Therefore,   for 24 hours, you have the following restrictions:   - DO NOT drive a car, operate machinery, make legal/financial decisions,   sign important papers or drink alcohol.    ACTIVITY:  Today: no heavy lifting, straining or running due to procedural   sedation/anesthesia.  The following day: return to full activity including work.  DIET:  Eat and drink normally unless instructed otherwise.     TREATMENT FOR COMMON SIDE EFFECTS:  - Mild abdominal pain, nausea, belching, bloating or excessive gas:  rest,   eat lightly and use a heating pad.  - Sore Throat: treat with throat lozenges and/or gargle with warm salt   water.  - Because air was used during the procedure, expelling large amounts of air   from your rectum or belching is normal.  - If a bowel prep was taken, you may not have a bowel movement for 1-3 days.    This is normal.  SYMPTOMS TO WATCH FOR AND REPORT TO YOUR PHYSICIAN:  1. Abdominal pain or bloating, other than gas cramps.  2. Chest pain.  3. Back pain.  4. Signs of infection such as: chills or fever occurring within 24 hours   after the procedure.  5. Rectal bleeding, which would show as bright red, maroon, or black stools.   (A tablespoon of blood from the rectum is not serious, especially if   hemorrhoids are present.)  6. Vomiting.  7. Weakness or dizziness.  GO DIRECTLY TO THE NEAREST EMERGENCY ROOM IF YOU HAVE ANY OF THE FOLLOWING:      Difficulty breathing              Chills and/or fever over 101 F   Persistent vomiting and/or vomiting blood   Severe abdominal pain   Severe chest pain   Black, tarry stools   Bleeding- more than one  tablespoon   Any other symptom or condition that you feel may need urgent attention  Your doctor recommends these additional instructions:  If any biopsies were taken, your doctors clinic will contact you in 1 to 2   weeks with any results.  - Discharge patient to home.   - Patient has a contact number available for emergencies.  The signs and   symptoms of potential delayed complications were discussed with the   patient.  Return to normal activities tomorrow.  Written discharge   instructions were provided to the patient.   - Resume previous diet.   - Continue present medications.   - Repeat colonoscopy at appointment to be scheduled for surveillance. Will   schedule as a balloon-overtube assisted colonoscopy.  For questions, problems or results please call your physician - Darrin Calvin MD at Work:  [phone].  [facility], EMERGENCY ROOM PHONE NUMBER: [phone]  IF A COMPLICATION OR EMERGENCY SITUATION ARISES AND YOU ARE UNABLE TO REACH   YOUR PHYSICIAN - GO DIRECTLY TO THE EMERGENCY ROOM.  Darrin Calvin MD  5/22/2019 11:29:25 AM  This report has been verified and signed electronically.  [employer]

## 2019-05-22 NOTE — TRANSFER OF CARE
"Anesthesia Transfer of Care Note    Patient: Stephanie Sears    Procedure(s) Performed: Procedure(s) (LRB):  COLONOSCOPY (N/A)    Patient location: [facility]    Anesthesia Type: general    Transport from OR: Transported from OR on 6-10 L/min O2 by face mask with adequate spontaneous ventilation    Post pain: adequate analgesia    Post assessment: no apparent anesthetic complications and tolerated procedure well    Post vital signs: stable    Level of consciousness: awake and sedated    Nausea/Vomiting: no nausea/vomiting    Complications: other (see comments), vomiting during colonoscopy    Transfer of care protocol was followed      Last vitals:   Visit Vitals  BP (!) 184/79 (BP Location: Left arm, Patient Position: Lying)   Pulse (!) 55   Temp 36.8 °C (98.2 °F) (Temporal)   Resp 18   Ht 5' 2" (1.575 m)   Wt 88 kg (194 lb)   SpO2 100%   Breastfeeding? No   BMI 35.48 kg/m²     "

## 2019-05-22 NOTE — H&P
Short Stay Endoscopy History and Physical      Procedure - Colonoscopy  ASA - per anesthesia  Mallampati - per anesthesia  History of Anesthesia problems - no  Family history Anesthesia problems - no   Plan of anesthesia - MAC    HPI:  This is a 69 y.o. female here for surveillance of colon polyps.      ROS:  Constitutional: No fevers, chills, No weight loss  CV: No chest pain  Pulm: No cough, No shortness of breath  GI: see HPI    Medical History:  has a past medical history of Angina pectoris, Anxiety, Bronchitis, Cataract, Cervical spondylosis with radiculopathy (7/10/2012), Chest pain, Chronic neck pain (7/26/2012), Depression, Fibrocystic breast, GERD (gastroesophageal reflux disease), Glaucoma, Hyperlipidemia, Hypertension, Neck pain (7/10/2012), JAM (obstructive sleep apnea), Primary osteoarthritis of both knees, Psoriasis, Subacromial or subdeltoid bursitis (7/10/2012), Thyroid disease, and Type 2 diabetes mellitus (12/10/2013).    Surgical History:  has a past surgical history that includes L4-L5 fusion (2006); Hysterectomy (1980's); Cholecystectomy; Spine surgery; Breast mass excision (Right); Breast lumpectomy (Left, 1988); Knee surgery (Left, 1-20-16); and Knee surgery (Right, 02/26/2018).    Family History: family history includes Alzheimer's disease in her father; Angina in her mother; Diabetes in her brother, mother, and son; Glaucoma in her father and sister; Gout in her son; Heart disease in her mother; Hepatitis in her brother; Hyperlipidemia in her sister; Hypertension in her father, mother, sister, sister, and son; Kidney disease in her brother and mother; No Known Problems in her sister and sister; Sleep apnea in her sister; Thyroid disease in her sister.    Social History:  reports that she has been smoking cigarettes.  She has a 40.00 pack-year smoking history. She has never used smokeless tobacco. She reports that she does not drink alcohol or use drugs.    Review of patient's allergies  indicates:   Allergen Reactions    Hydrocodone Shortness Of Breath    Iodinated contrast- oral and iv dye Shortness Of Breath     Difficulty breathing    Adhesive Itching    Oxycodone      Hallucinations    Sulfa (sulfonamide antibiotics) Hives and Itching       Medications:   Medications Prior to Admission   Medication Sig Dispense Refill Last Dose    albuterol (PROAIR HFA) 90 mcg/actuation inhaler Inhale 2 puffs into the lungs every 6 (six) hours as needed for Wheezing or Shortness of Breath. 1 Inhaler 3 5/21/2019 at Unknown time    amlodipine-benazepril (LOTREL) 10-40 mg per capsule TAKE 1 CAPSULE DAILY 90 capsule 1 5/21/2019 at Unknown time    atorvastatin (LIPITOR) 20 MG tablet TAKE 1 TABLET(20 MG) BY MOUTH EVERY DAY 30 tablet 3 5/21/2019 at Unknown time    benzonatate (TESSALON) 200 MG capsule Take 1 capsule (200 mg total) by mouth 3 (three) times daily as needed for Cough. 60 capsule 1 5/21/2019 at Unknown time    blood sugar diagnostic (ACCU-CHEK JOSE G PLUS TEST STRP) Strp Inject 1 each into the skin 2 (two) times daily. 200 each 11 5/21/2019 at Unknown time    blood sugar diagnostic Strp Use to test blood glucose levels 2 times per day as instructed. 200 strip 11 Past Week at Unknown time    CALCIUM CARBONATE (TUMS ORAL) Take by mouth as needed (acid reflux, indigestion).   Past Week at Unknown time    diphenhydrAMINE (BENADRYL) 25 mg capsule Take 25 mg by mouth every 6 (six) hours as needed for Itching or Allergies.   Past Week at Unknown time    famotidine (PEPCID) 20 MG tablet Take 1 tablet (20 mg total) by mouth 2 (two) times daily. 60 tablet 0 Past Week at Unknown time    fluticasone (FLONASE) 50 mcg/actuation nasal spray 1 spray by Each Nare route 2 (two) times daily. 1 Bottle 1 Past Week at Unknown time    furosemide (LASIX) 40 MG tablet TAKE 1 TABLET DAILY 90 tablet 1 Past Week at Unknown time    gabapentin (NEURONTIN) 600 MG tablet TAKE 1 TABLET THREE TIMES A  tablet 1  5/21/2019 at Unknown time    INULIN (FIBER GUMMIES ORAL) Take by mouth every morning.    Past Week at Unknown time    lancets (ACCU-CHEK MULTICLIX LANCET) Misc Test blood sugar twice daily 300 each 3 Past Week at Unknown time    lancets (ONETOUCH DELICA LANCETS) 33 gauge Misc Inject 1 lancet into the skin 2 (two) times daily before meals. 200 each 11 Past Week at Unknown time    meloxicam (MOBIC) 7.5 MG tablet TAKE 1 TABLET DAILY 90 tablet 1 5/21/2019 at Unknown time    triamcinolone acetonide 0.1% (KENALOG) 0.1 % ointment KENDRA AA ON THE SKIN BID ON RASH ON LEGS  0 Past Week at Unknown time    carvedilol (COREG) 12.5 MG tablet Take 1 tablet (12.5 mg total) by mouth 2 (two) times daily with meals. 60 tablet 0 Taking    traMADol (ULTRAM) 50 mg tablet Take 1 tablet (50 mg total) by mouth every 8 (eight) hours as needed. 90 tablet 3 Taking         Physical Exam:    Vital Signs:   Vitals:    05/22/19 0838   BP:    Pulse: (!) 55   Resp: 18   Temp:        General Appearance: Well appearing in no acute distress  Eyes:    No scleral icterus  ENT: Neck supple, Lips, mucosa, and tongue normal; teeth and gums normal  Lungs: CTA bilaterally  Heart:  S1, S2 normal, no murmurs heard  Abdomen: Soft, non tender, non distended with positive bowel sounds. No hepatosplenomegaly, ascites, or mass.      Labs:  Lab Results   Component Value Date    WBC 7.56 06/18/2018    HGB 12.6 06/18/2018    HCT 38.8 06/18/2018     06/18/2018    CHOL 159 11/07/2018    TRIG 101 11/07/2018    HDL 39 (L) 11/07/2018    ALT 13 06/18/2018    AST 12 06/18/2018     02/15/2019    K 3.9 02/15/2019     02/15/2019    CREATININE 0.9 02/15/2019    BUN 18 02/15/2019    CO2 26 02/15/2019    TSH 0.914 12/10/2013    INR 0.9 02/09/2018    HGBA1C 5.8 (H) 10/18/2018         Assessment:  69 y.o. female with history of colon polyps.    Plan:  Proceed with colonoscopy today.  I have explained the risks and benefits of endoscopy procedures to the  patient including but not limited to bleeding, perforation, infection, and death.  All questions and answered.        Darrin Calvin MD

## 2019-05-22 NOTE — ANESTHESIA PREPROCEDURE EVALUATION
05/22/2019  Stephanie Sears is a 69 y.o., female with a pre-operative diagnosis of CMC arthritis [M19.90] who is scheduled for Procedure(s) (LRB):  COLONOSCOPY (N/A).     Requested anesthesia type: Regional  Surgeon: Marline Carney MD  Allergies:   Review of patient's allergies indicates:   Allergen Reactions    Hydrocodone Shortness Of Breath    Iodinated contrast- oral and iv dye Shortness Of Breath     Difficulty breathing    Adhesive Itching    Oxycodone      Hallucinations    Sulfa (sulfonamide antibiotics) Hives and Itching     Vital Sign Range:    Chronic Medications:     (Not in a hospital admission)  Current Medications:   No current outpatient medications on file.     No current facility-administered medications for this visit.      Medical History:   Past Medical History:   Diagnosis Date    Angina pectoris     Anxiety     Bronchitis     Cataract     Cervical spondylosis with radiculopathy 7/10/2012    Chest pain     Chronic neck pain 7/26/2012    Depression     Fibrocystic breast     GERD (gastroesophageal reflux disease)     Glaucoma     Hyperlipidemia     Hypertension     Neck pain 7/10/2012    JAM (obstructive sleep apnea)     Primary osteoarthritis of both knees     Psoriasis     Subacromial or subdeltoid bursitis 7/10/2012    Thyroid disease     Type 2 diabetes mellitus 12/10/2013     .    Pre-op Assessment    I have reviewed the Patient Summary Reports.     I have reviewed the Nursing Notes.   I have reviewed the Medications.     Review of Systems  Anesthesia Hx:  No problems with previous Anesthesia  History of prior surgery of interest to airway management or planning: Denies Family Hx of Anesthesia complications.   Denies Personal Hx of Anesthesia complications.   Hematology/Oncology:  Hematology Normal   Oncology Normal     EENT/Dental:EENT/Dental Normal    Cardiovascular:   Exercise tolerance: good Hypertension Valvular problems/Murmurs, AS ECG has been reviewed.    Pulmonary:   Sleep Apnea    Renal/:  Renal/ Normal     Hepatic/GI:   GERD Liver Disease,    Musculoskeletal:  Musculoskeletal Normal Arthritis      Neurological:   Neuromuscular Disease,    Endocrine:   Diabetes, type 2    Dermatological:  Skin Normal    Psych:   depression          Physical Exam  General:  Morbid Obesity    Airway/Jaw/Neck:  Airway Findings: Mouth Opening: Normal Tongue: Normal  General Airway Assessment: Adult  Mallampati: II  TM Distance: Normal, at least 6 cm  Jaw/Neck Findings:  Neck ROM: Normal ROM  Neck Findings:  Girth Increased     Eyes/Ears/Nose:  EYES/EARS/NOSE FINDINGS: Normal   Dental:  Dental Findings: In tact   Chest/Lungs:  Chest/Lungs Findings: Normal Respiratory Rate, Clear to auscultation     Heart/Vascular:  Heart Findings: Rate: Normal  Rhythm: Regular Rhythm  Sounds: Normal  Heart Murmur  Vascular Findings:  Carotid Bruit  Carotid Location: Left        Mental Status:  Mental Status Findings:  Cooperative, Alert and Oriented         Anesthesia Plan  Type of Anesthesia, risks & benefits discussed:  Anesthesia Type:  general  Patient's Preference:   Intra-op Monitoring Plan: standard ASA monitors  Intra-op Monitoring Plan Comments:   Post Op Pain Control Plan:   Post Op Pain Control Plan Comments:   Induction:   IV  Beta Blocker:  Patient is on a Beta-Blocker and has received one dose within the past 24 hours (No further documentation required).       Informed Consent: Patient understands risks and agrees with Anesthesia plan.  Questions answered. Anesthesia consent signed with patient.  ASA Score: 3     Day of Surgery Review of History & Physical:    H&P update referred to the provider.     Anesthesia Plan Notes: Perioperative PONV plan of care developed. Risks of dental damage discussed with patient and family present, agrees to proceed.        Ready For Surgery  From Anesthesia Perspective.

## 2019-05-22 NOTE — PROGRESS NOTES
Patient and grand daughter state they are ready to be discharged. Instructions and report given to patient and family. Both verbalize understanding. Patient tolerating po liquids with no difficulty. Patient states pain is at a tolerable level for them. Anesthesia consent and surgical consent in chart upon patient's discharge from [facility].

## 2019-05-22 NOTE — DISCHARGE INSTRUCTIONS
Colonoscopy     A camera attached to a flexible tube with a viewing lens is used to take video pictures.     Colonoscopy is a test to view the inside of your lower digestive tract (colon and rectum). Sometimes it can show the last part of the small intestine (ileum). During the test, small pieces of tissue may be removed for testing. This is called a biopsy. Small growths, such as polyps, may also be removed.   Why is colonoscopy done?  The test is done to help look for colon cancer. And it can help find the source of abdominal pain, bleeding, and changes in bowel habits. It may be needed once a year, depending on factors such as your:  · Age  · Health history  · Family health history  · Symptoms  · Results from any prior colonoscopy  Risks and possible complications  These include:  · Bleeding               · A puncture or tear in the colon   · Risks of anesthesia  · A cancer lesion not being seen  Getting ready   To prepare for the test:  · Talk with your healthcare provider about the risks of the test (see below). Also ask your healthcare provider about alternatives to the test.  · Tell your healthcare provider about any medicines you take. Also tell him or her about any health conditions you may have.  · Make sure your rectum and colon are empty for the test. Follow the diet and bowel prep instructions exactly. If you dont, the test may need to be rescheduled.  · Plan for a friend or family member to drive you home after the test.     Colonoscopy provides an inside view of the entire colon.     You may discuss the results with your doctor right away or at a future visit.  During the test   The test is usually done in the hospital on an outpatient basis. This means you go home the same day. The procedure takes about 30 minutes. During that time:  · You are given relaxing (sedating) medicine through an IV line. You may be drowsy, or fully asleep.  · The healthcare provider will first give you a physical exam to  check for anal and rectal problems.  · Then the anus is lubricated and the scope inserted.  · If you are awake, you may have a feeling similar to needing to have a bowel movement. You may also feel pressure as air is pumped into the colon. Its OK to pass gas during the procedure.  · Biopsy, polyp removal, or other treatments may be done during the test.  After the test   You may have gas right after the test. It can help to try to pass it to help prevent later bloating. Your healthcare provider may discuss the results with you right away. Or you may need to schedule a follow-up visit to talk about the results. After the test, you can go back to your normal eating and other activities. You may be tired from the sedation and need to rest for a few hours.  Date Last Reviewed: 11/1/2016 © 2000-2017 [employer]. [address] [place] [address]. All rights reserved. This information is not intended as a substitute for professional medical care. Always follow your healthcare professional's instructions.      Recovery After Procedural Sedation (Adult)  You have been given medicine by vein to make you sleep during your surgery. This may have included both a pain medicine and sleeping medicine. Most of the effects have worn off. But you may still have some drowsiness for the next 6 to 8 hours.  Home care  Follow these guidelines when you get home:  · For the next 8 hours, you should be watched by a responsible adult. This person should make sure your condition is not getting worse.  · Don't drink any alcohol for the next 24 hours.  · Don't drive, operate dangerous machinery, or make important business or personal decisions during the next 24 hours.  Note: Your healthcare provider may tell you not to take any medicine by mouth for pain or sleep in the next 4 hours. These medicines may react with the medicines you were given in the hospital. This could cause a much stronger response than  usual.  Follow-up care  Follow up with your healthcare provider if you are not alert and back to your usual level of activity within 12 hours.  When to seek medical advice  Call your healthcare provider right away if any of these occur:  · Drowsiness gets worse  · Weakness or dizziness gets worse  · Repeated vomiting  · You can't be awakened   Date Last Reviewed: 10/18/2016  © 8721-5238 [employer]. [address] [place] [address], [place] [address]. All rights reserved. This information is not intended as a substitute for professional medical care. Always follow your healthcare professional's instructions.    PATIENT INSTRUCTIONS  POST-ANESTHESIA    IMMEDIATELY FOLLOWING SURGERY:  Do not drive or operate machinery for the first twenty four hours after surgery.  Do not make any important decisions for twenty four hours after surgery or while taking narcotic pain medications or sedatives.  If you develop intractable nausea and vomiting or a severe headache please notify your doctor immediately.    FOLLOW-UP:  Please make an appointment with your surgeon as instructed. You do not need to follow up with anesthesia unless specifically instructed to do so.    WOUND CARE INSTRUCTIONS (if applicable):  Keep a dry clean dressing on the anesthesia/puncture wound site if there is drainage.  Once the wound has quit draining you may leave it open to air.  Generally you should leave the bandage intact for twenty four hours unless there is drainage.  If the epidural site drains for more than 36-48 hours please call the anesthesia department.    QUESTIONS?:  Please feel free to call your physician or the hospital  if you have any questions, and they will be happy to assist you.       [facility] Anesthesia Department  [address] [facility]  [phone]

## 2019-05-22 NOTE — ANESTHESIA POSTPROCEDURE EVALUATION
Anesthesia Post Evaluation    Patient: Stephanie Sears    Procedure(s) Performed: Procedure(s) (LRB):  COLONOSCOPY (N/A)    Final Anesthesia Type: general  Patient location during evaluation: PACU  Patient participation: Yes- Able to Participate  Level of consciousness: awake and alert and oriented  Post-procedure vital signs: reviewed and stable  Pain management: adequate  Airway patency: patent  PONV status at discharge: No PONV  Anesthetic complications: yes  Perioperative Events: other periop events (see comments)  Carolyn-operative Events Comments: Regurgitation without apparent aspiration. Patient was left lateral with thin emesis flowing out of mouth. Patient coughing and clearly protecting her airway. No change in oxygen saturations and breathing remains unlabored and unassisted.   Cardiovascular status: blood pressure returned to baseline  Respiratory status: unassisted, room air and spontaneous ventilation  Hydration status: euvolemic  Follow-up not needed.          Vitals Value Taken Time   /67 5/22/2019 11:47 AM   Temp 36.1 °C (97 °F) 5/22/2019 11:37 AM   Pulse 66 5/22/2019 11:49 AM   Resp 28 5/22/2019 11:49 AM   SpO2 97 % 5/22/2019 11:49 AM   Vitals shown include unvalidated device data.      No case tracking events are documented in the log.      Pain/Som Score: No data recorded

## 2019-05-23 ENCOUNTER — TELEPHONE (OUTPATIENT)
Dept: FAMILY MEDICINE | Facility: CLINIC | Age: 69
End: 2019-05-23

## 2019-05-23 NOTE — TELEPHONE ENCOUNTER
It says they weren't able to complete the study.  I don't know this patient as I have never seen her.  She was Dr. Hernandez patient.    Thanks  Dr. Solorio

## 2019-05-23 NOTE — TELEPHONE ENCOUNTER
----- Message from Kaitlyn Blanton sent at 5/23/2019  4:20 PM CDT -----  Contact: pt  Type: Patient Call Back    Who called:pt    What is the request in detail:pt is calling for colonoscopy results. Call pt    Can the clinic reply by [facility]?    Would the patient rather a call back or a response via [facility]? Call back    Best call back number:[phone]    Additional Information:

## 2019-05-24 ENCOUNTER — TELEPHONE (OUTPATIENT)
Dept: FAMILY MEDICINE | Facility: CLINIC | Age: 69
End: 2019-05-24

## 2019-05-29 ENCOUNTER — LAB VISIT (OUTPATIENT)
Dept: LAB | Facility: HOSPITAL | Age: 69
End: 2019-05-29
Attending: FAMILY MEDICINE
Payer: MEDICARE

## 2019-05-29 ENCOUNTER — PATIENT MESSAGE (OUTPATIENT)
Dept: CARDIOLOGY | Facility: CLINIC | Age: 69
End: 2019-05-29

## 2019-05-29 ENCOUNTER — OFFICE VISIT (OUTPATIENT)
Dept: FAMILY MEDICINE | Facility: CLINIC | Age: 69
End: 2019-05-29
Payer: MEDICARE

## 2019-05-29 ENCOUNTER — CLINICAL SUPPORT (OUTPATIENT)
Dept: SMOKING CESSATION | Facility: CLINIC | Age: 69
End: 2019-05-29
Payer: COMMERCIAL

## 2019-05-29 VITALS
BODY MASS INDEX: 36.35 KG/M2 | SYSTOLIC BLOOD PRESSURE: 182 MMHG | TEMPERATURE: 98 F | WEIGHT: 197.56 LBS | DIASTOLIC BLOOD PRESSURE: 85 MMHG | RESPIRATION RATE: 18 BRPM | OXYGEN SATURATION: 98 % | HEART RATE: 61 BPM | HEIGHT: 62 IN

## 2019-05-29 DIAGNOSIS — F17.200 NICOTINE DEPENDENCE: Primary | ICD-10-CM

## 2019-05-29 DIAGNOSIS — I10 ESSENTIAL HYPERTENSION: ICD-10-CM

## 2019-05-29 DIAGNOSIS — E11.9 DIET-CONTROLLED DIABETES MELLITUS: ICD-10-CM

## 2019-05-29 DIAGNOSIS — Z72.0 TOBACCO USE: ICD-10-CM

## 2019-05-29 DIAGNOSIS — E78.5 DYSLIPIDEMIA: ICD-10-CM

## 2019-05-29 DIAGNOSIS — R93.3 ABNORMAL COLONOSCOPY: Primary | ICD-10-CM

## 2019-05-29 LAB
ALBUMIN SERPL BCP-MCNC: 3.4 G/DL (ref 3.5–5.2)
ALP SERPL-CCNC: 112 U/L (ref 55–135)
ALT SERPL W/O P-5'-P-CCNC: 27 U/L (ref 10–44)
ANION GAP SERPL CALC-SCNC: 8 MMOL/L (ref 8–16)
AST SERPL-CCNC: 18 U/L (ref 10–40)
BILIRUB SERPL-MCNC: 0.3 MG/DL (ref 0.1–1)
BUN SERPL-MCNC: 21 MG/DL (ref 8–23)
CALCIUM SERPL-MCNC: 10.3 MG/DL (ref 8.7–10.5)
CHLORIDE SERPL-SCNC: 108 MMOL/L (ref 95–110)
CHOLEST SERPL-MCNC: 163 MG/DL (ref 120–199)
CHOLEST SERPL-MCNC: 163 MG/DL (ref 120–199)
CHOLEST/HDLC SERPL: 3.1 {RATIO} (ref 2–5)
CHOLEST/HDLC SERPL: 3.1 {RATIO} (ref 2–5)
CO2 SERPL-SCNC: 26 MMOL/L (ref 23–29)
CREAT SERPL-MCNC: 0.8 MG/DL (ref 0.5–1.4)
EST. GFR  (AFRICAN AMERICAN): >60 ML/MIN/1.73 M^2
EST. GFR  (NON AFRICAN AMERICAN): >60 ML/MIN/1.73 M^2
GLUCOSE SERPL-MCNC: 105 MG/DL (ref 70–110)
HDLC SERPL-MCNC: 53 MG/DL (ref 40–75)
HDLC SERPL-MCNC: 53 MG/DL (ref 40–75)
HDLC SERPL: 32.5 % (ref 20–50)
HDLC SERPL: 32.5 % (ref 20–50)
LDLC SERPL CALC-MCNC: 90.4 MG/DL (ref 63–159)
LDLC SERPL CALC-MCNC: 90.4 MG/DL (ref 63–159)
NONHDLC SERPL-MCNC: 110 MG/DL
NONHDLC SERPL-MCNC: 110 MG/DL
POTASSIUM SERPL-SCNC: 4.3 MMOL/L (ref 3.5–5.1)
PROT SERPL-MCNC: 7.5 G/DL (ref 6–8.4)
SODIUM SERPL-SCNC: 142 MMOL/L (ref 136–145)
TRIGL SERPL-MCNC: 98 MG/DL (ref 30–150)
TRIGL SERPL-MCNC: 98 MG/DL (ref 30–150)
TSH SERPL DL<=0.005 MIU/L-ACNC: 0.71 UIU/ML (ref 0.4–4)

## 2019-05-29 PROCEDURE — 36415 COLL VENOUS BLD VENIPUNCTURE: CPT | Mod: PN

## 2019-05-29 PROCEDURE — 99402 PREV MED CNSL INDIV APPRX 30: CPT | Mod: S$GLB,,,

## 2019-05-29 PROCEDURE — 80053 COMPREHEN METABOLIC PANEL: CPT

## 2019-05-29 PROCEDURE — 99999 PR PBB SHADOW E&M-EST. PATIENT-LVL I: CPT | Mod: PBBFAC,,,

## 2019-05-29 PROCEDURE — 83036 HEMOGLOBIN GLYCOSYLATED A1C: CPT

## 2019-05-29 PROCEDURE — 80061 LIPID PANEL: CPT

## 2019-05-29 PROCEDURE — 99402 PR PREVENT COUNSEL,INDIV,30 MIN: ICD-10-PCS | Mod: S$GLB,,,

## 2019-05-29 PROCEDURE — 99213 OFFICE O/P EST LOW 20 MIN: CPT | Mod: PBBFAC,PN | Performed by: FAMILY MEDICINE

## 2019-05-29 PROCEDURE — 84443 ASSAY THYROID STIM HORMONE: CPT

## 2019-05-29 PROCEDURE — 99214 OFFICE O/P EST MOD 30 MIN: CPT | Mod: S$PBB,,, | Performed by: FAMILY MEDICINE

## 2019-05-29 PROCEDURE — 99999 PR PBB SHADOW E&M-EST. PATIENT-LVL III: ICD-10-PCS | Mod: PBBFAC,,, | Performed by: FAMILY MEDICINE

## 2019-05-29 PROCEDURE — 99999 PR PBB SHADOW E&M-EST. PATIENT-LVL I: ICD-10-PCS | Mod: PBBFAC,,,

## 2019-05-29 PROCEDURE — 99999 PR PBB SHADOW E&M-EST. PATIENT-LVL III: CPT | Mod: PBBFAC,,, | Performed by: FAMILY MEDICINE

## 2019-05-29 PROCEDURE — 99214 PR OFFICE/OUTPT VISIT, EST, LEVL IV, 30-39 MIN: ICD-10-PCS | Mod: S$PBB,,, | Performed by: FAMILY MEDICINE

## 2019-05-29 NOTE — PROGRESS NOTES
Individual Follow-Up Form    5/30/2019    Quit Date: tbd    Clinical Status of Patient: Outpatient    Length of Service: 30 minutes    Continuing Medication: yes  Patches         Target Symptoms: Withdrawal and medication side effects. The following were  rated moderate (3) to severe (4) on TCRS:  · Moderate (3): none  · Severe (4): none    Comments:Patient presents for follow up smoking 3 cigarettes per day. Pt remains on tobacco cessation medication of  21 mg nicotine patch QD  No adverse effects noted at this time. Pt doing well with rate reduction and wait times prior to smoking.  Pt encouraged to pick a quit day.  Reviewed coping strategies/habitual behavior/relapse prevention with patient. Exhaled carbon monoxide level was 7 ppm per Smokerlyzer  ( non- smoker = 0-5 ppm .)  Will see pt back in office in 2 weeks      Diagnosis: F17.200    Next Visit: 2 weeks/7

## 2019-05-30 LAB
ESTIMATED AVG GLUCOSE: 131 MG/DL (ref 68–131)
HBA1C MFR BLD HPLC: 6.2 % (ref 4–5.6)

## 2019-06-03 NOTE — PROGRESS NOTES
Routine Office Visit    Patient Name: Stephanie Sears    : 1950  MRN: [id number]    Subjective:  Stephanie is a 69 y.o. female who presents today for:   Chief Complaint   Patient presents with    Results     69-year-old female scheduled to review abnormal colonoscopy.  He was ordered by her previous PCP however she is no longer here.  At today's visit the patient became very angry when called the back stating that she had been waiting for over an hour although her appointment time had not even started yet.  Prior to me seeing her, she was raising her voice at the medical assistant and the nurse in the office.  The the  had to get involved again prior to me even seeing the patient.  The patient states that she was not here to review her colonoscopy and was here for a wellness check.  She was informed that she was not scheduled for that and was reminded when the phone call was made that she was being seen for the colonoscopy.  She will be scheduled for the wellness check at another time.    The patient's other medical problems include hypertension, diet-controlled diabetes, and dyslipidemia.  She sees a cardiologist.  She reports taking all her medications as prescribed.       Past Medical History  Past Medical History:   Diagnosis Date    Angina pectoris     Anxiety     Bronchitis     Cataract     Cervical spondylosis with radiculopathy 7/10/2012    Chest pain     Chronic neck pain 2012    Depression     Fibrocystic breast     GERD (gastroesophageal reflux disease)     Glaucoma     Hyperlipidemia     Hypertension     Neck pain 7/10/2012    JAM (obstructive sleep apnea)     Primary osteoarthritis of both knees     Psoriasis     Subacromial or subdeltoid bursitis 7/10/2012    Thyroid disease     Type 2 diabetes mellitus 12/10/2013       Past Surgical History  Past Surgical History:   Procedure Laterality Date    ARTHROSCOPY - LEFT THUMB [facility]MCJ - LINVATEC - [facility] Left 2016     [facility]    BLOCK-NERVE Right 2/27/2018    [facility]    BREAST LUMPECTOMY Left 1988    mass in the rt breast surgery  1988    BREAST MASS EXCISION Right     benign    CHOLECYSTECTOMY      CHOLECYSTECTOMY, LAPAROSCOPIC N/A 1/28/2013    [facility] 2[facility]    COLONOSCOPY N/A 5/22/2019    [facility]in Calvin [facility] ENDO (2ND FLR)    HYSTERECTOMY  1980's    KNEE SURGERY Left 1-20-16    T[facility]    KNEE SURGERY Right 02/26/2018    TKR    L4-L5 fusion  2006    REPLACEMENT-KNEE-TOTAL Right 2/26/2018    [facility]    REPLACEMENT-KNEE-TOTAL Left 1/20/2016    [facility] 2[facility] F[facility]    SPINE SURGERY          Family History  Family History   Problem Relation Age of Onset    Diabetes Mother     Hypertension Mother         CHF, angina    Angina Mother     Kidney disease Mother     Heart disease Mother         CHF    Hypertension Father         glaucoma, Alzhiemer's , supra pubic    Alzheimer's disease Father     Glaucoma Father     Kidney disease Brother         kidney transplant, HTN,DM    Diabetes Brother     Glaucoma Sister     Hypertension Sister     Hyperlipidemia Sister     Gout Son     Hypertension Son     Diabetes Son     Sleep apnea Sister     Hypertension Sister     Thyroid disease Sister     No Known Problems Sister     No Known Problems Sister     Hepatitis Brother     Amblyopia Neg Hx     Blindness Neg Hx        Social History  Social History     Socioeconomic History    Marital status:      Spouse name: Not on file    Number of children: Not on file    Years of education: Not on file    Highest education level: Not on file   Occupational History    Not on file   Social Needs    Financial resource strain: Not on file    Food insecurity:     Worry: Not on file      Inability: Not on file    Transportation needs:     Medical: Not on file     Non-medical: Not on file   Tobacco Use    Smoking status: Current Every Day Smoker     Packs/day: 1.00     Years: 40.00     Pack years: 40.00     Types: Cigarettes    Smokeless tobacco: Never Used    Tobacco comment: smokes a pack a week   Substance and Sexual Activity    Alcohol use: No     Alcohol/week: 0.0 oz    Drug use: No    Sexual activity: Yes     Partners: Male   Lifestyle    Physical activity:     Days per week: Not on file     Minutes per session: Not on file    Stress: Not on file   Relationships    Social connections:     Talks on phone: Not on file     Gets together: Not on file     Attends Congregational service: Not on file     Active member of club or organization: Not on file     Attends meetings of clubs or organizations: Not on file     Relationship status: Not on file   Other Topics Concern    Not on file   Social History Narrative    Not on file       Current Medications  Current Outpatient Medications on File Prior to Visit   Medication Sig Dispense Refill    albuterol (PROAIR HFA) 90 mcg/actuation inhaler Inhale 2 puffs into the lungs every 6 (six) hours as needed for Wheezing or Shortness of Breath. 1 Inhaler 3    amlodipine-benazepril (LOTREL) 10-40 mg per capsule TAKE 1 CAPSULE DAILY 90 capsule 1    atorvastatin (LIPITOR) 20 MG tablet TAKE 1 TABLET(20 MG) BY MOUTH EVERY DAY 30 tablet 3    benzonatate (TESSALON) 200 MG capsule Take 1 capsule (200 mg total) by mouth 3 (three) times daily as needed for Cough. 60 capsule 1    blood sugar diagnostic (ACCU-CHEK JOSE G PLUS TEST STRP) Strp Inject 1 each into the skin 2 (two) times daily. 200 each 11    blood sugar diagnostic Strp Use to test blood glucose levels 2 times per day as instructed. 200 strip 11    CALCIUM CARBONATE (TUMS ORAL) Take by mouth as needed (acid reflux, indigestion).      diphenhydrAMINE (BENADRYL) 25 mg capsule Take 25 mg by mouth  every 6 (six) hours as needed for Itching or Allergies.      famotidine (PEPCID) 20 MG tablet Take 1 tablet (20 mg total) by mouth 2 (two) times daily. 60 tablet 0    fluticasone (FLONASE) 50 mcg/actuation nasal spray 1 spray by Each Nare route 2 (two) times daily. 1 Bottle 1    furosemide (LASIX) 40 MG tablet TAKE 1 TABLET DAILY 90 tablet 1    gabapentin (NEURONTIN) 600 MG tablet TAKE 1 TABLET THREE TIMES A  tablet 1    INULIN (FIBER GUMMIES ORAL) Take by mouth every morning.       lancets (ACCU-CHEK MULTICLIX LANCET) Misc Test blood sugar twice daily 300 each 3    lancets (ONETOUCH DELICA LANCETS) 33 gauge Misc Inject 1 lancet into the skin 2 (two) times daily before meals. 200 each 11    meloxicam (MOBIC) 7.5 MG tablet TAKE 1 TABLET DAILY 90 tablet 1    triamcinolone acetonide 0.1% (KENALOG) 0.1 % ointment KENDRA AA ON THE SKIN BID ON RASH ON LEGS  0    carvedilol (COREG) 12.5 MG tablet Take 1 tablet (12.5 mg total) by mouth 2 (two) times daily with meals. 60 tablet 0    traMADol (ULTRAM) 50 mg tablet Take 1 tablet (50 mg total) by mouth every 8 (eight) hours as needed. 90 tablet 3     No current facility-administered medications on file prior to visit.        Allergies   Review of patient's allergies indicates:   Allergen Reactions    Hydrocodone Shortness Of Breath    Iodinated contrast- oral and iv dye Shortness Of Breath     Difficulty breathing    Adhesive Itching    Oxycodone      Hallucinations    Sulfa (sulfonamide antibiotics) Hives and Itching       Review of Systems   Constitutional: Negative for chills, fever and unexpected weight change.   Eyes: Negative for visual disturbance (no new visual problems).   Respiratory: Negative for shortness of breath and wheezing.    Cardiovascular: Negative for chest pain and palpitations.   Gastrointestinal: Negative for abdominal pain.   Genitourinary: Negative for dysuria.   Neurological: Negative for dizziness and headaches.       BP (!)  "182/85 (BP Location: Left arm, Patient Position: Sitting, BP Method: Medium (Manual))   Pulse 61   Temp 97.7 °F (36.5 °C) (Oral)   Resp 18   Ht 5' 2" (1.575 m)   Wt 89.6 kg (197 lb 8.5 oz)   SpO2 98%   BMI 36.13 kg/m²     Physical Exam   Constitutional: She is oriented to person, place, and time. She appears well-developed and well-nourished.   HENT:   Head: Normocephalic and atraumatic.   Right Ear: External ear normal.   Left Ear: External ear normal.   Nose: Nose normal.   Mouth/Throat: Oropharynx is clear and moist. No oropharyngeal exudate.   Eyes: Pupils are equal, round, and reactive to light. Conjunctivae and EOM are normal. Right eye exhibits no discharge. Left eye exhibits no discharge.   Neck: Normal range of motion. Neck supple. No tracheal deviation present.   Cardiovascular: Normal rate, regular rhythm, normal heart sounds and intact distal pulses.   No murmur heard.  Pulmonary/Chest: Effort normal and breath sounds normal. She has no wheezes. She has no rales.   Abdominal: Soft. Bowel sounds are normal. She exhibits no mass. There is no tenderness. There is no rigidity, no guarding and no CVA tenderness.   Lymphadenopathy:     She has no cervical adenopathy.   Neurological: She is oriented to person, place, and time. She has normal strength. She displays no atrophy. No sensory deficit. Gait normal.   Reflex Scores:       Patellar reflexes are 2+ on the right side and 2+ on the left side.  Psychiatric: She has a normal mood and affect.   Vitals reviewed.    Narrative   Performed by: [facility]   Patient Name: Stephanie Sears  Procedure Date: 5/22/2019 10:23 AM  MRN: [id number]  Account Number: [id number]  YOB: 1950  Age: 69  Room[facility]  Gender: Female  Attending MD: Darrin Calvin MD  Procedure:            Colonoscopy  Indications:          High risk colon cancer surveillance: Personal                         history of colonic polyps  Providers:            Darrin Calvin, " MD, Anushka Horton RN, Marielena Grove CRNA, Kaitlin Hawley LPN  Patient Profile:      This is a 69 year old female. Refer to note in                         patient chart for documentation of history and                         physical.  Referring MD:         Indra Solorio MD  Complications:        No immediate complications.  Medicines:            Monitored Anesthesia Care  Procedure:            Pre-Anesthesia Assessment:                        - Prior to the procedure, a History and Physical                         was performed, and patient medications and                         allergies were reviewed. The patient is competent.                         The risks and benefits of the procedure and the                         sedation options and risks were discussed with the                         patient. All questions were answered and informed                         consent was obtained. Patient identification and                         proposed procedure were verified by the physician,                         the nurse, the anesthesiologist, the anesthetist                         and the technician in the pre-procedure area in the                         endoscopy suite. Mental Status Examination: alert                         and oriented. Airway Examination: normal                         oropharyngeal airway and neck mobility. Respiratory                         Examination: clear to auscultation. CV Examination:                         normal. Prophylactic Antibiotics: The patient does                         not require prophylactic antibiotics. Prior                         Anticoagulants: The patient has taken no previous                         anticoagulant or antiplatelet agents. ASA Grade                         Assessment: III - A patient with severe systemic                         disease. After reviewing the risks and benefits,                         the  patient was deemed in satisfactory condition to                         undergo the procedure. The anesthesia plan was to                         use monitored anesthesia care (MAC). Immediately                         prior to administration of medications, the patient                         was re-assessed for adequacy to receive sedatives.                         The heart rate, respiratory rate, oxygen                         saturations, blood pressure, adequacy of pulmonary                         ventilation, and response to care were monitored                         throughout the procedure. The physical status of                         the patient was re-assessed after the procedure.                        After I obtained informed consent, the scope was                         passed under direct vision. Throughout the                         procedure, the patient's blood pressure, pulse, and                         oxygen saturations were monitored continuously.                        The Olympus scope [id number]-[id number] ([id number]) was                         introduced through the anus with the intention of                         advancing to the cecum. The scope was advanced to                         the sigmoid colon before the procedure was aborted.                         Medications were given. The Olympus scope GIF-                         ([id number]) was introduced through the anus with the                         intention of advancing to the cecum. The scope was                         advanced to the transverse colon before the                         procedure was aborted. Medications were given. The                         colonoscopy was extremely difficult due to                         restricted mobility of the colon. The patient                         tolerated the procedure. The quality of the bowel                         preparation was adequate. No anatomical landmarks                          were photographed.  Findings:       The perianal and digital rectal examinations were normal.       Scattered small-mouthed diverticula were found in the sigmoid colon        and descending colon.  Impression:           - Diverticulosis in the sigmoid colon and in the                         descending colon.                        - No specimens collected.                        - Unable to complete the procedure today using                         either the pediatric colonoscope or the gastroscope                         due to restricted mobility of the sigmoid colon.  Recommendation:       - Discharge patient to home.                        - Patient has a contact number available for                         emergencies. The signs and symptoms of potential                         delayed complications were discussed with the                         patient. Return to normal activities tomorrow.                         Written discharge instructions were provided to the                         patient.                        - Resume previous diet.                        - Continue present medications.                        - Repeat colonoscopy at appointment to be scheduled                         for surveillance. Will schedule as a                         balloon-overtube assisted colonoscopy.  Attending Participation:       I personally performed the entire procedure.  Darrin Calvin MD  5/22/2019 11:29:25 AM  This report has been verified and signed electronically.  Number of Addenda: 0  Note Initiated On: 5/22/2019 10:23 AM  Estimated Blood Loss: Estimated blood loss: none.       This report has been verified and signed electronically.      Specimen Collected: 05/22/19 10:23   Last Resulted: 05/22/19 11:30              Assessment/Plan:  Stephanie was seen today for  Colonoscopy review-she is an established patient but new to me    Diagnoses and all orders for this visit:    Abnormal  colonoscopy  -     Ambulatory referral to Gastroenterology  Colonoscopy was reviewed with patient.  I encouraged the patient to follow up with Gastroenterology as she will need another colonoscopy.  She states that a similar episode happen on a prior colonoscopy.  She reports that she liked the gastroenterologist she saw at [facility] several years ago and would like to see him again.  The review of chart note revealed that she saw Dr. Axel MD, and I placed a referral to him.    Essential hypertension  -     Comprehensive metabolic panel; Future  -     Microalbumin/creatinine urine ratio; Future  Uncontrolled.   Patient's blood pressure is elevated and she states that it is only because she was aggravated the moment she arrived.  She does not want medication changes at present.  Patient to follow up within 2 to 4 weeks to recheck blood pressure and make any adjustments necessary.  Labs ordered to follow-up on hypertension.    Diet-controlled diabetes mellitus  -     Comprehensive metabolic panel; Future  -     Lipid panel; Future  -     TSH; Future  -     Hemoglobin A1c; Future  -     Microalbumin/creatinine urine ratio; Future  Will check diabetic labs.    Tobacco use  Importance smoking cessation discussed with patient.  She declines referral to smoking cessation program.  The dangers of continued smoking discussed with patient.              -Williams Rossi Jr., MD, AAHIVS          This office note has been dictated.  This dictation has been generated using M-Modal Fluency Direct dictation; some phonetic errors may occur.

## 2019-06-04 ENCOUNTER — TELEPHONE (OUTPATIENT)
Dept: GASTROENTEROLOGY | Facility: CLINIC | Age: 69
End: 2019-06-04

## 2019-06-04 ENCOUNTER — TELEPHONE (OUTPATIENT)
Dept: ENDOSCOPY | Facility: HOSPITAL | Age: 69
End: 2019-06-04

## 2019-06-04 NOTE — TELEPHONE ENCOUNTER
Spoke with patient. Balloon colonoscopy scheduled for 7/16 at 8a. Endo Scheduling Nurse will contact patient with prep.

## 2019-06-04 NOTE — TELEPHONE ENCOUNTER
Attempt to call patient regarding upcoming appointment and possibility of direct balloon assisted colonoscopy. No voice mail set up.

## 2019-06-05 ENCOUNTER — OFFICE VISIT (OUTPATIENT)
Dept: DERMATOLOGY | Facility: CLINIC | Age: 69
End: 2019-06-05
Payer: MEDICARE

## 2019-06-05 DIAGNOSIS — L43.9 LICHEN PLANUS: Primary | ICD-10-CM

## 2019-06-05 DIAGNOSIS — D23.9 DERMATOFIBROMA: ICD-10-CM

## 2019-06-05 DIAGNOSIS — D22.9 MULTIPLE BENIGN NEVI: ICD-10-CM

## 2019-06-05 PROCEDURE — 99999 PR PBB SHADOW E&M-EST. PATIENT-LVL II: CPT | Mod: PBBFAC,,, | Performed by: DERMATOLOGY

## 2019-06-05 PROCEDURE — 99202 OFFICE O/P NEW SF 15 MIN: CPT | Mod: S$PBB,,, | Performed by: DERMATOLOGY

## 2019-06-05 PROCEDURE — 99999 PR PBB SHADOW E&M-EST. PATIENT-LVL II: ICD-10-PCS | Mod: PBBFAC,,, | Performed by: DERMATOLOGY

## 2019-06-05 PROCEDURE — 99212 OFFICE O/P EST SF 10 MIN: CPT | Mod: PBBFAC | Performed by: DERMATOLOGY

## 2019-06-05 PROCEDURE — 99202 PR OFFICE/OUTPT VISIT, NEW, LEVL II, 15-29 MIN: ICD-10-PCS | Mod: S$PBB,,, | Performed by: DERMATOLOGY

## 2019-06-05 RX ORDER — CLOBETASOL PROPIONATE 0.5 MG/G
CREAM TOPICAL 2 TIMES DAILY
Qty: 60 G | Refills: 2 | Status: SHIPPED | OUTPATIENT
Start: 2019-06-05 | End: 2019-08-01 | Stop reason: SDUPTHER

## 2019-06-05 NOTE — PROGRESS NOTES
Subjective:       Patient ID:  Stephanie Sears is a 69 y.o. female who presents for   Chief Complaint   Patient presents with    Rash     right leg     HPI: 69F here for evaluation and management of biopsy proven lichen planus. It started shortly after her right knee replacement 3/2018. She developed some thickened, itchy areas over her lower leg. Biopsy was consistent with LP. She has been using TAC cream daily to the area. The lesions are no longer itching but they are spreading up her calf and up her thigh in a straight line. She has a few scattered older lesions on her lower anterior leg. She denies any vaginal or oral symptoms. No rash elsewhere on her body.    She also has a history of gold fillings and is currently experiencing some tooth pain. She is on multiple medications for management of her comorbidities (HTN, HLD, DM), including carvedilol. No other prosthetic joints other than her right knee.    Review of Systems   Constitutional: Negative.    HENT: Negative for sore throat and mouth sores.    Eyes: Negative for eye irritation.   Respiratory: Negative for cough and shortness of breath.    Genitourinary: Negative for genital sores and genital itching.   Skin: Positive for itching and rash. Negative for daily sunscreen use and recent sunburn.   Hematologic/Lymphatic: Does not bruise/bleed easily.        Objective:    Physical Exam   Constitutional: She appears well-developed and well-nourished.   Neurological: She is alert and oriented to person, place, and time.   Psychiatric: She has a normal mood and affect.   Skin:                          Diagram Legend     Erythematous scaling macule/papule c/w actinic keratosis       Vascular papule c/w angioma      Pigmented verrucoid papule/plaque c/w seborrheic keratosis      Yellow umbilicated papule c/w sebaceous hyperplasia      Irregularly shaped tan macule c/w lentigo     1-2 mm smooth white papules consistent with Milia      Movable subcutaneous  cyst with punctum c/w epidermal inclusion cyst      Subcutaneous movable cyst c/w pilar cyst      Firm pink to brown papule c/w dermatofibroma      Pedunculated fleshy papule(s) c/w skin tag(s)      Evenly pigmented macule c/w junctional nevus     Mildly variegated pigmented, slightly irregular-bordered macule c/w mildly atypical nevus      Flesh colored to evenly pigmented papule c/w intradermal nevus       Pink pearly papule/plaque c/w basal cell carcinoma      Erythematous hyperkeratotic cursted plaque c/w SCC      Surgical scar with no sign of skin cancer recurrence      Open and closed comedones      Inflammatory papules and pustules      Verrucoid papule consistent consistent with wart     Erythematous eczematous patches and plaques     Dystrophic onycholytic nail with subungual debris c/w onychomycosis     Umbilicated papule    Erythematous-base heme-crusted tan verrucoid plaque consistent with inflamed seborrheic keratosis     Erythematous Silvery Scaling Plaque c/w Psoriasis     See annotation    Outside path reviewed and scanned -   Biopsy shows lichen planus 8/2018    Assessment / Plan:        Lichen planus linear right medial leg  -     clobetasol (TEMOVATE) 0.05 % cream; Apply topically 2 (two) times daily. To rash on left lower leg  Dispense: 60 g; Refill: 2  Apply under saran wrap occlusion  rv in 4 wks  If not significantly improved, will start plaquenil    Dermatofibroma  This is a benign scar-like lesion secondary to minor trauma. No treatment required.     Multiple benign nevi             Follow up in about 1 month (around 7/3/2019).

## 2019-06-06 ENCOUNTER — CLINICAL SUPPORT (OUTPATIENT)
Dept: SMOKING CESSATION | Facility: CLINIC | Age: 69
End: 2019-06-06
Payer: COMMERCIAL

## 2019-06-06 ENCOUNTER — OFFICE VISIT (OUTPATIENT)
Dept: FAMILY MEDICINE | Facility: CLINIC | Age: 69
End: 2019-06-06
Payer: MEDICARE

## 2019-06-06 VITALS
BODY MASS INDEX: 36.13 KG/M2 | HEIGHT: 62 IN | SYSTOLIC BLOOD PRESSURE: 122 MMHG | RESPIRATION RATE: 18 BRPM | TEMPERATURE: 97 F | DIASTOLIC BLOOD PRESSURE: 66 MMHG | HEART RATE: 60 BPM | OXYGEN SATURATION: 98 %

## 2019-06-06 DIAGNOSIS — F17.200 NICOTINE DEPENDENCE: Primary | ICD-10-CM

## 2019-06-06 DIAGNOSIS — E11.9 DIET-CONTROLLED DIABETES MELLITUS: ICD-10-CM

## 2019-06-06 DIAGNOSIS — Z87.891 PERSONAL HISTORY OF NICOTINE DEPENDENCE: ICD-10-CM

## 2019-06-06 DIAGNOSIS — Z12.9 SCREENING FOR CANCER: ICD-10-CM

## 2019-06-06 DIAGNOSIS — Z72.0 TOBACCO USE: ICD-10-CM

## 2019-06-06 DIAGNOSIS — I10 ESSENTIAL HYPERTENSION: Primary | ICD-10-CM

## 2019-06-06 PROCEDURE — 99999 PR PBB SHADOW E&M-EST. PATIENT-LVL I: CPT | Mod: PBBFAC,,,

## 2019-06-06 PROCEDURE — 99214 OFFICE O/P EST MOD 30 MIN: CPT | Mod: PBBFAC,PN | Performed by: FAMILY MEDICINE

## 2019-06-06 PROCEDURE — 99999 PR PBB SHADOW E&M-EST. PATIENT-LVL IV: CPT | Mod: PBBFAC,,, | Performed by: FAMILY MEDICINE

## 2019-06-06 PROCEDURE — 99999 PR PBB SHADOW E&M-EST. PATIENT-LVL I: ICD-10-PCS | Mod: PBBFAC,,,

## 2019-06-06 PROCEDURE — 99406 BEHAV CHNG SMOKING 3-10 MIN: CPT | Mod: S$PBB,ICN,, | Performed by: FAMILY MEDICINE

## 2019-06-06 PROCEDURE — 99403 PR PREVENT COUNSEL,INDIV,45 MIN: ICD-10-PCS | Mod: S$GLB,,,

## 2019-06-06 PROCEDURE — 99214 PR OFFICE/OUTPT VISIT, EST, LEVL IV, 30-39 MIN: ICD-10-PCS | Mod: S$PBB,ICN,, | Performed by: FAMILY MEDICINE

## 2019-06-06 PROCEDURE — 99214 OFFICE O/P EST MOD 30 MIN: CPT | Mod: S$PBB,ICN,, | Performed by: FAMILY MEDICINE

## 2019-06-06 PROCEDURE — 99406 PR TOBACCO USE CESSATION INTERMEDIATE 3-10 MINUTES: ICD-10-PCS | Mod: S$PBB,ICN,, | Performed by: FAMILY MEDICINE

## 2019-06-06 PROCEDURE — 99999 PR PBB SHADOW E&M-EST. PATIENT-LVL IV: ICD-10-PCS | Mod: PBBFAC,,, | Performed by: FAMILY MEDICINE

## 2019-06-06 PROCEDURE — 99403 PREV MED CNSL INDIV APPRX 45: CPT | Mod: S$GLB,,,

## 2019-06-06 RX ORDER — IBUPROFEN 200 MG
1 TABLET ORAL DAILY
Qty: 28 PATCH | Refills: 0 | COMMUNITY
Start: 2019-06-06 | End: 2019-07-06

## 2019-06-06 RX ORDER — DIPHENHYDRAMINE HCL 25 MG
CAPSULE ORAL
Qty: 168 EACH | Refills: 0 | Status: SHIPPED | OUTPATIENT
Start: 2019-06-06 | End: 2019-08-13

## 2019-06-06 NOTE — PROGRESS NOTES
Individual Follow-Up Form    6/6/2019    Quit Date: tbd    Clinical Status of Patient: Outpatient    Length of Service: 45 minutes    Continuing Medication: yes  Patches    Other Medications: nrt gum     Target Symptoms: Withdrawal and medication side effects. The following were  rated moderate (3) to severe (4) on TCRS:  · Moderate (3): crave  · Severe (4): none    Comments: Patient presents for follow up smoking 5 cigarettes per day. Pt remains on tobacco cessation medication of  21 mg nicotine patch QD and today added 4 mg nicotine  gum PRN (1-2 per hour in place of cigarettes.) because she had bought some herself and has been using.   No adverse effects noted at this time. Pt doing fair with rate reduction and wait times prior to smoking.  Pt encouraged to pick a quit day.  Reviewed coping strategies/habitual behavior/relapse prevention with patient. Exhaled carbon monoxide level was 8* ppm per [employer]  ( non- smoker = 0-5 ppm .)  Will see pt back in office in 2 weeks      Diagnosis: F17.200    Next Visit: 2 weeks

## 2019-06-07 ENCOUNTER — HOSPITAL ENCOUNTER (OUTPATIENT)
Dept: RADIOLOGY | Facility: HOSPITAL | Age: 69
Discharge: HOME | End: 2019-06-07
Attending: FAMILY MEDICINE
Payer: MEDICARE

## 2019-06-07 DIAGNOSIS — Z87.891 PERSONAL HISTORY OF NICOTINE DEPENDENCE: ICD-10-CM

## 2019-06-07 DIAGNOSIS — Z72.0 TOBACCO USE: ICD-10-CM

## 2019-06-07 DIAGNOSIS — Z12.9 SCREENING FOR CANCER: ICD-10-CM

## 2019-06-07 PROCEDURE — 71250 CT THORAX DX C-: CPT | Mod: 26,,, | Performed by: RADIOLOGY

## 2019-06-07 PROCEDURE — 71250 CT CHEST WITHOUT CONTRAST: ICD-10-PCS | Mod: 26,,, | Performed by: RADIOLOGY

## 2019-06-07 PROCEDURE — 71250 CT THORAX DX C-: CPT | Mod: TC

## 2019-06-09 ENCOUNTER — TELEPHONE (OUTPATIENT)
Dept: FAMILY MEDICINE | Facility: CLINIC | Age: 69
End: 2019-06-09

## 2019-06-09 DIAGNOSIS — R91.1 LUNG NODULE: ICD-10-CM

## 2019-06-09 DIAGNOSIS — E04.1 THYROID NODULE: Primary | ICD-10-CM

## 2019-06-10 ENCOUNTER — TELEPHONE (OUTPATIENT)
Dept: GASTROENTEROLOGY | Facility: CLINIC | Age: 69
End: 2019-06-10

## 2019-06-10 NOTE — TELEPHONE ENCOUNTER
Please let patient know that her CT scan showed some nodules in the lungs.  Given her smoking history, the radiologist recommended that we repeat a CT scan in three months (early Sept).  In order for this was placed.  Also the were noted nodules in her thyroid and there is a recommendation to do an ultrasound of the thyroid for further evaluation.  An order for this was placed and she can be scheduled for this as well, for this month.      Do not send message via patient portal.  Please call the patient.  Patient stated that she does not want to receive message is via the portal.

## 2019-06-10 NOTE — PROGRESS NOTES
Routine Office Visit    Patient Name: Stephanie Sears    : 1950  MRN: [id number]    Subjective:  Stephanie is a 69 y.o. female who presents today for:   Chief Complaint   Patient presents with    Establish Care     pt refused weight said she is the same weight     69-year-old female with diabetes, hypertension, and dyslipidemia, comes in for routine follow-up on these.  She did her lab test a week ago.  Patient reports that she has been controlling her diabetes with her diet.  She has not been on medications for quite some time.  She reports that she tolerates her blood pressure and cholesterol medications well without any concerns.  She denies any new concerns today.  The patient is in a smoker, and has been smoking for over 40 years.  She has never had lung cancer screening done.  She denies any coughing or shortness of breath.  She denies coughing up blood.    Past Medical History  Past Medical History:   Diagnosis Date    Angina pectoris     Anxiety     Bronchitis     Cataract     Cervical spondylosis with radiculopathy 7/10/2012    Chest pain     Chronic neck pain 2012    Depression     Fibrocystic breast     GERD (gastroesophageal reflux disease)     Glaucoma     Hyperlipidemia     Hypertension     Neck pain 7/10/2012    JAM (obstructive sleep apnea)     Primary osteoarthritis of both knees     Psoriasis     Subacromial or subdeltoid bursitis 7/10/2012    Thyroid disease     Type 2 diabetes mellitus 12/10/2013       Past Surgical History  Past Surgical History:   Procedure Laterality Date    ARTHROSCOPY - LEFT THUMB CMCJ - LINVATEC - CMC Left 2016    Performed by Marline Carney MD at [facility] OR 1ST FLR    BLOCK-NERVE Right 2018    Performed by Teresa Surgeon at [facility] TERESA    BREAST LUMPECTOMY Left     mass in the rt breast surgery  1988    BREAST MASS EXCISION Right     benign    CHOLECYSTECTOMY      CHOLECYSTECTOMY, LAPAROSCOPIC N/A 2013    Performed by  Shay Cordova MD at [facility] OR 2ND FLR    COLONOSCOPY N/A 5/22/2019    Performed by Darrin Calvin MD at [facility] ENDO (2ND FLR)    HYSTERECTOMY  1980's    KNEE SURGERY Left 1-20-16    TKR    KNEE SURGERY Right 02/26/2018    TKR    L4-L5 fusion  2006    REPLACEMENT-KNEE-TOTAL Right 2/26/2018    Performed by [facility] Jr., MD at [facility] OR 2ND FLR    REPLACEMENT-KNEE-TOTAL Left 1/20/2016    Performed by [facility] MD at [facility] OR 2ND FLR    SPINE SURGERY          Family History  Family History   Problem Relation Age of Onset    Diabetes Mother     Hypertension Mother         CHF, angina    Angina Mother     Kidney disease Mother     Heart disease Mother         CHF    Hypertension Father         glaucoma, Alzhiemer's , supra pubic    Alzheimer's disease Father     Glaucoma Father     Kidney disease Brother         kidney transplant, HTN,DM    Diabetes Brother     Glaucoma Sister     Hypertension Sister     Hyperlipidemia Sister     Gout Son     Hypertension Son     Diabetes Son     Sleep apnea Sister     Hypertension Sister     Thyroid disease Sister     No Known Problems Sister     No Known Problems Sister     Hepatitis Brother     Amblyopia Neg Hx     Blindness Neg Hx     Melanoma Neg Hx        Social History  Social History     Socioeconomic History    Marital status:      Spouse name: Not on file    Number of children: Not on file    Years of education: Not on file    Highest education level: Not on file   Occupational History    Not on file   Social Needs    Financial resource strain: Not on file    Food insecurity:     Worry: Not on file     Inability: Not on file    Transportation needs:     Medical: Not on file     Non-medical: Not on file   Tobacco Use    Smoking status: Current Every Day Smoker     Packs/day: 1.00     Years: 40.00     Pack years: 40.00     Types: Cigarettes    Smokeless tobacco: Never Used    Tobacco comment: smokes a pack a  week   Substance and Sexual Activity    Alcohol use: No     Alcohol/week: 0.0 oz    Drug use: No    Sexual activity: Yes     Partners: Male   Lifestyle    Physical activity:     Days per week: Not on file     Minutes per session: Not on file    Stress: Not on file   Relationships    Social connections:     Talks on phone: Not on file     Gets together: Not on file     Attends Jain service: Not on file     Active member of club or organization: Not on file     Attends meetings of clubs or organizations: Not on file     Relationship status: Not on file   Other Topics Concern    Are you pregnant or think you may be? Not Asked    Breast-feeding Not Asked   Social History Narrative    Not on file       Current Medications  Current Outpatient Medications on File Prior to Visit   Medication Sig Dispense Refill    albuterol (PROAIR HFA) 90 mcg/actuation inhaler Inhale 2 puffs into the lungs every 6 (six) hours as needed for Wheezing or Shortness of Breath. 1 Inhaler 3    amlodipine-benazepril (LOTREL) 10-40 mg per capsule TAKE 1 CAPSULE DAILY 90 capsule 1    atorvastatin (LIPITOR) 20 MG tablet TAKE 1 TABLET(20 MG) BY MOUTH EVERY DAY 30 tablet 3    benzonatate (TESSALON) 200 MG capsule Take 1 capsule (200 mg total) by mouth 3 (three) times daily as needed for Cough. 60 capsule 1    blood sugar diagnostic (ACCU-CHEK JOSE G PLUS TEST STRP) Strp Inject 1 each into the skin 2 (two) times daily. 200 each 11    blood sugar diagnostic Strp Use to test blood glucose levels 2 times per day as instructed. 200 strip 11    CALCIUM CARBONATE (TUMS ORAL) Take by mouth as needed (acid reflux, indigestion).      clobetasol (TEMOVATE) 0.05 % cream Apply topically 2 (two) times daily. To rash on left lower leg 60 g 2    diphenhydrAMINE (BENADRYL) 25 mg capsule Take 25 mg by mouth every 6 (six) hours as needed for Itching or Allergies.      famotidine (PEPCID) 20 MG tablet Take 1 tablet (20 mg total) by mouth 2 (two)  times daily. 60 tablet 0    fluticasone (FLONASE) 50 mcg/actuation nasal spray 1 spray by Each Nare route 2 (two) times daily. 1 Bottle 1    furosemide (LASIX) 40 MG tablet TAKE 1 TABLET DAILY 90 tablet 1    gabapentin (NEURONTIN) 600 MG tablet TAKE 1 TABLET THREE TIMES A  tablet 1    INULIN (FIBER GUMMIES ORAL) Take by mouth every morning.       lancets (ACCU-CHEK MULTICLIX LANCET) Misc Test blood sugar twice daily 300 each 3    lancets (ONETOUCH DELICA LANCETS) 33 gauge Misc Inject 1 lancet into the skin 2 (two) times daily before meals. 200 each 11    meloxicam (MOBIC) 7.5 MG tablet TAKE 1 TABLET DAILY 90 tablet 1    nicotine (NICODERM CQ) 21 mg/24 hr Place 1 patch onto the skin once daily. 28 patch 0    nicotine polacrilex (NICORETTE) 4 MG Gum 1-2 per hour in place of a cigarette. Limit to 15 a day - oral.please dispense cool mint flavor, thanks 168 each 0    triamcinolone acetonide 0.1% (KENALOG) 0.1 % ointment KENDRA AA ON THE SKIN BID ON RASH ON LEGS  0    carvedilol (COREG) 12.5 MG tablet Take 1 tablet (12.5 mg total) by mouth 2 (two) times daily with meals. 60 tablet 0    traMADol (ULTRAM) 50 mg tablet Take 1 tablet (50 mg total) by mouth every 8 (eight) hours as needed. 90 tablet 3     No current facility-administered medications on file prior to visit.        Allergies   Review of patient's allergies indicates:   Allergen Reactions    Hydrocodone Shortness Of Breath    Iodinated contrast- oral and iv dye Shortness Of Breath     Difficulty breathing    Adhesive Itching    Oxycodone      Hallucinations    Sulfa (sulfonamide antibiotics) Hives and Itching       Review of Systems   Constitutional: Negative for chills, fever and unexpected weight change.   Eyes: Negative for visual disturbance (no new visual problems).   Respiratory: Negative for shortness of breath and wheezing.    Cardiovascular: Negative for chest pain and palpitations.   Gastrointestinal: Negative for abdominal pain.  "  Genitourinary: Negative for dysuria.   Neurological: Negative for dizziness and headaches.     /66 (BP Location: Left arm, Patient Position: Sitting, BP Method: Medium (Manual))   Pulse 60   Temp 97.4 °F (36.3 °C) (Oral)   Resp 18   Ht 5' 2" (1.575 m)   SpO2 98%   BMI 36.13 kg/m²     Physical Exam   Constitutional: She is oriented to person, place, and time. She appears well-developed and well-nourished.   HENT:   Head: Normocephalic and atraumatic.   Right Ear: External ear normal.   Left Ear: External ear normal.   Nose: Nose normal.   Mouth/Throat: Oropharynx is clear and moist. No oropharyngeal exudate.   Eyes: Pupils are equal, round, and reactive to light. Conjunctivae and EOM are normal. Right eye exhibits no discharge. Left eye exhibits no discharge.   Neck: Normal range of motion. Neck supple. No tracheal deviation present.   Cardiovascular: Normal rate, regular rhythm, normal heart sounds and intact distal pulses.   No murmur heard.  Pulmonary/Chest: Effort normal and breath sounds normal. She has no wheezes. She has no rales.   Abdominal: Soft. Bowel sounds are normal. She exhibits no mass. There is no tenderness. There is no rigidity, no guarding and no CVA tenderness.   Lymphadenopathy:     She has no cervical adenopathy.   Neurological: She is oriented to person, place, and time. She has normal strength. She displays no atrophy. No sensory deficit. Gait normal.   Reflex Scores:       Patellar reflexes are 2+ on the right side and 2+ on the left side.  Psychiatric: She has a normal mood and affect.   Vitals reviewed.    Lab Visit on 05/29/2019   Component Date Value Ref Range Status    Cholesterol 05/29/2019 163  120 - 199 mg/dL Final    Comment: The National Cholesterol Education Program (NCEP) has set the  following guidelines (reference ranges) for Cholesterol:  Optimal.....................<200 mg/dL  Borderline High.............200-239 mg/dL  High........................> or = 240 " mg/dL      Triglycerides 05/29/2019 98  30 - 150 mg/dL Final    Comment: The National Cholesterol Education Program (NCEP) has set the  following guidelines (reference values) for triglycerides:  Normal......................<150 mg/dL  Borderline High.............150-199 mg/dL  High........................200-499 mg/dL      HDL 05/29/2019 53  40 - 75 mg/dL Final    Comment: The National Cholesterol Education Program (NCEP) has set the  following guidelines (reference values) for HDL Cholesterol:  Low...............<40 mg/dL  Optimal...........>60 mg/dL      LDL Cholesterol 05/29/2019 90.4  63.0 - 159.0 mg/dL Final    Comment: The National Cholesterol Education Program (NCEP) has set the  following guidelines (reference values) for LDL Cholesterol:  Optimal.......................<130 mg/dL  Borderline High...............130-159 mg/dL  High..........................160-189 mg/dL  Very High.....................>190 mg/dL      Hdl/Cholesterol Ratio 05/29/2019 32.5  20.0 - 50.0 % Final    Total Cholesterol/HDL Ratio 05/29/2019 3.1  2.0 - 5.0 Final    Non-HDL Cholesterol 05/29/2019 110  mg/dL Final    Comment: Risk category and Non-HDL cholesterol goals:  Coronary heart disease (CHD)or equivalent (10-year risk of CHD >20%):  Non-HDL cholesterol goal     <130 mg/dL  Two or more CHD risk factors and 10-year risk of CHD <= 20%:  Non-HDL cholesterol goal     <160 mg/dL  0 to 1 CHD risk factor:  Non-HDL cholesterol goal     <190 mg/dL      Sodium 05/29/2019 142  136 - 145 mmol/L Final    Potassium 05/29/2019 4.3  3.5 - 5.1 mmol/L Final    Chloride 05/29/2019 108  95 - 110 mmol/L Final    CO2 05/29/2019 26  23 - 29 mmol/L Final    Glucose 05/29/2019 105  70 - 110 mg/dL Final    BUN, Bld 05/29/2019 21  8 - 23 mg/dL Final    Creatinine 05/29/2019 0.8  0.5 - 1.4 mg/dL Final    Calcium 05/29/2019 10.3  8.7 - 10.5 mg/dL Final    Total Protein 05/29/2019 7.5  6.0 - 8.4 g/dL Final    Albumin 05/29/2019 3.4* 3.5 - 5.2  g/dL Final    Total Bilirubin 05/29/2019 0.3  0.1 - 1.0 mg/dL Final    Comment: For infants and newborns, interpretation of results should be based  on gestational age, weight and in agreement with clinical  observations.  Premature Infant recommended reference ranges:  Up to 24 hours.............<8.0 mg/dL  Up to 48 hours............<12.0 mg/dL  3-5 days..................<15.0 mg/dL  6-29 days.................<15.0 mg/dL      Alkaline Phosphatase 05/29/2019 112  55 - 135 U/L Final    AST 05/29/2019 18  10 - 40 U/L Final    ALT 05/29/2019 27  10 - 44 U/L Final    Anion Gap 05/29/2019 8  8 - 16 mmol/L Final    eGFR if African American 05/29/2019 >60  >60 mL/min/1.73 m^2 Final    eGFR if non African American 05/29/2019 >60  >60 mL/min/1.73 m^2 Final    Comment: Calculation used to obtain the estimated glomerular filtration  rate (eGFR) is the CKD-EPI equation.       Cholesterol 05/29/2019 163  120 - 199 mg/dL Final    Comment: The National Cholesterol Education Program (NCEP) has set the  following guidelines (reference ranges) for Cholesterol:  Optimal.....................<200 mg/dL  Borderline High.............200-239 mg/dL  High........................> or = 240 mg/dL      Triglycerides 05/29/2019 98  30 - 150 mg/dL Final    Comment: The National Cholesterol Education Program (NCEP) has set the  following guidelines (reference values) for triglycerides:  Normal......................<150 mg/dL  Borderline High.............150-199 mg/dL  High........................200-499 mg/dL      HDL 05/29/2019 53  40 - 75 mg/dL Final    Comment: The National Cholesterol Education Program (NCEP) has set the  following guidelines (reference values) for HDL Cholesterol:  Low...............<40 mg/dL  Optimal...........>60 mg/dL      LDL Cholesterol 05/29/2019 90.4  63.0 - 159.0 mg/dL Final    Comment: The National Cholesterol Education Program (NCEP) has set the  following guidelines (reference values) for LDL  Cholesterol:  Optimal.......................<130 mg/dL  Borderline High...............130-159 mg/dL  High..........................160-189 mg/dL  Very High.....................>190 mg/dL      Hdl/Cholesterol Ratio 05/29/2019 32.5  20.0 - 50.0 % Final    Total Cholesterol/HDL Ratio 05/29/2019 3.1  2.0 - 5.0 Final    Non-HDL Cholesterol 05/29/2019 110  mg/dL Final    Comment: Risk category and Non-HDL cholesterol goals:  Coronary heart disease (CHD)or equivalent (10-year risk of CHD >20%):  Non-HDL cholesterol goal     <130 mg/dL  Two or more CHD risk factors and 10-year risk of CHD <= 20%:  Non-HDL cholesterol goal     <160 mg/dL  0 to 1 CHD risk factor:  Non-HDL cholesterol goal     <190 mg/dL      TSH 05/29/2019 0.708  0.400 - 4.000 uIU/mL Final    Hemoglobin A1C 05/29/2019 6.2* 4.0 - 5.6 % Final    Comment: ADA Screening Guidelines:  5.7-6.4%  Consistent with prediabetes  >or=6.5%  Consistent with diabetes  High levels of fetal hemoglobin interfere with the HbA1C  assay. Heterozygous hemoglobin variants (HbS, HgC, etc)do  not significantly interfere with this assay.   However, presence of multiple variants may affect accuracy.      Estimated Avg Glucose 05/29/2019 131  68 - 131 mg/dL Final         Assessment/Plan:  Stephanie was seen today for establish care.    Diagnoses and all orders for this visit:    Essential hypertension  -     Comprehensive metabolic panel; Future  -     Lipid panel; Future  -     Microalbumin/creatinine urine ratio; Future  Blood pressure control.  Continue current regimen.  Follow-up in six months.    Diet-controlled diabetes mellitus  -     Comprehensive metabolic panel; Future  -     Lipid panel; Future  -     Hemoglobin A1c; Future  -     Microalbumin/creatinine urine ratio; Future  A1c still controlled on diet.  Continue current regimen.  Follow-up in six months.    Screening for cancer  -     CT Chest Without Contrast; Future  Given smoking history, patient needs screening for  lung cancer based on USPSTF guideline.  Given contrast allergy, CT scan was ordered without contrast.    Personal history of nicotine dependence  -     CT Chest Without Contrast; Future    Tobacco use  -     CT Chest Without Contrast; Future  Patient declines referral to smoking cessation counseling.  Importance of smoking cessation discussed with patient. 5 minutes spent counseling patient on risks of smoking, benefits of stopping and ways of stopping. Patient not ready to quit.                -Williams Rossi Jr., MD, AAHIVS          This office note has been dictated.  This dictation has been generated using M-Modal Fluency Direct dictation; some phonetic errors may occur.

## 2019-06-10 NOTE — TELEPHONE ENCOUNTER
Spoke with patient. She states she has already scheduled her double balloon endoscopy. Appointment with KRZYSZTOF Andrews cancelled.

## 2019-06-10 NOTE — TELEPHONE ENCOUNTER
----- Message from Rachelle Weston sent at 6/10/2019  8:42 AM CDT -----  Contact: pt: [phone]  Needs Advice    Reason for call: pt was schedule to be seen by Melanie Andrews for a Abnormal colonoscopy pt states she would rather be seen by Dr. Calvin who preformed her colonoscopy        Communication Preference: pt: [phone]

## 2019-06-10 NOTE — TELEPHONE ENCOUNTER
----- Message from Adriana Dowell MA sent at 6/10/2019 10:26 AM CDT -----  Contact: pt: [phone]  I see that you tried to contact this patient for a possible direct BDE.  ----- Message -----  From: Rachelle Weston  Sent: 6/10/2019   8:42 AM  To: Nikunj Clifford Staff    Needs Advice    Reason for call: pt was schedule to be seen by Melanie Andrews for a Abnormal colonoscopy pt states she would rather be seen by Dr. Calvin who preformed her colonoscopy        Communication Preference: pt: [phone]

## 2019-06-11 ENCOUNTER — TELEPHONE (OUTPATIENT)
Dept: FAMILY MEDICINE | Facility: CLINIC | Age: 69
End: 2019-06-11

## 2019-06-12 ENCOUNTER — TELEPHONE (OUTPATIENT)
Dept: ENDOSCOPY | Facility: HOSPITAL | Age: 69
End: 2019-06-12

## 2019-06-12 DIAGNOSIS — I10 ESSENTIAL HYPERTENSION: ICD-10-CM

## 2019-06-12 RX ORDER — CARVEDILOL 12.5 MG/1
12.5 TABLET ORAL 2 TIMES DAILY WITH MEALS
Qty: 60 TABLET | Refills: 2 | Status: SHIPPED | OUTPATIENT
Start: 2019-06-12 | End: 2019-06-18 | Stop reason: SDUPTHER

## 2019-06-12 RX ORDER — ATORVASTATIN CALCIUM 20 MG/1
TABLET, FILM COATED ORAL
Qty: 30 TABLET | Refills: 0 | Status: SHIPPED | OUTPATIENT
Start: 2019-06-12 | End: 2019-06-18 | Stop reason: SDUPTHER

## 2019-06-12 NOTE — TELEPHONE ENCOUNTER
----- Message from Letty Hampton sent at 6/12/2019  3:57 PM CDT -----  Contact: Self - [phone] or [phone]  Nikunj- pt called to confirm when DBE will be scheduled- states it is not showing on [facility]- please contact pt at [phone] or [phone]

## 2019-06-13 ENCOUNTER — CLINICAL SUPPORT (OUTPATIENT)
Dept: SMOKING CESSATION | Facility: CLINIC | Age: 69
End: 2019-06-13
Payer: COMMERCIAL

## 2019-06-13 DIAGNOSIS — F17.200 NICOTINE DEPENDENCE: Primary | ICD-10-CM

## 2019-06-13 PROCEDURE — 99407 BEHAV CHNG SMOKING > 10 MIN: CPT | Mod: S$GLB,,,

## 2019-06-13 PROCEDURE — 99407 PR TOBACCO USE CESSATION INTENSIVE >10 MINUTES: ICD-10-PCS | Mod: S$GLB,,,

## 2019-06-17 ENCOUNTER — CLINICAL SUPPORT (OUTPATIENT)
Dept: SMOKING CESSATION | Facility: CLINIC | Age: 69
End: 2019-06-17
Payer: COMMERCIAL

## 2019-06-17 DIAGNOSIS — F17.200 NICOTINE DEPENDENCE: Primary | ICD-10-CM

## 2019-06-17 PROCEDURE — 99407 BEHAV CHNG SMOKING > 10 MIN: CPT | Mod: S$GLB,,,

## 2019-06-17 PROCEDURE — 99407 PR TOBACCO USE CESSATION INTENSIVE >10 MINUTES: ICD-10-PCS | Mod: S$GLB,,,

## 2019-06-18 DIAGNOSIS — J44.9 CHRONIC OBSTRUCTIVE PULMONARY DISEASE, UNSPECIFIED COPD TYPE: ICD-10-CM

## 2019-06-18 DIAGNOSIS — G89.29 CHRONIC NECK PAIN: ICD-10-CM

## 2019-06-18 DIAGNOSIS — M54.2 CHRONIC NECK PAIN: ICD-10-CM

## 2019-06-18 DIAGNOSIS — M16.12 PRIMARY OSTEOARTHRITIS OF LEFT HIP: ICD-10-CM

## 2019-06-18 DIAGNOSIS — I10 ESSENTIAL HYPERTENSION: ICD-10-CM

## 2019-06-18 RX ORDER — MELOXICAM 7.5 MG/1
7.5 TABLET ORAL DAILY
Qty: 90 TABLET | Refills: 1 | Status: SHIPPED | OUTPATIENT
Start: 2019-06-18 | End: 2020-09-27

## 2019-06-18 RX ORDER — FUROSEMIDE 40 MG/1
40 TABLET ORAL DAILY
Qty: 90 TABLET | Refills: 1 | Status: SHIPPED | OUTPATIENT
Start: 2019-06-18 | End: 2019-10-17 | Stop reason: ALTCHOICE

## 2019-06-18 RX ORDER — ATORVASTATIN CALCIUM 20 MG/1
20 TABLET, FILM COATED ORAL DAILY
Qty: 90 TABLET | Refills: 1 | Status: SHIPPED | OUTPATIENT
Start: 2019-06-18 | End: 2019-11-27 | Stop reason: SDUPTHER

## 2019-06-18 RX ORDER — CARVEDILOL 12.5 MG/1
12.5 TABLET ORAL 2 TIMES DAILY WITH MEALS
Qty: 180 TABLET | Refills: 1 | Status: SHIPPED | OUTPATIENT
Start: 2019-06-18 | End: 2019-12-16 | Stop reason: SDUPTHER

## 2019-06-18 RX ORDER — CARVEDILOL 12.5 MG/1
12.5 TABLET ORAL 2 TIMES DAILY WITH MEALS
Qty: 60 TABLET | Refills: 2 | Status: SHIPPED | OUTPATIENT
Start: 2019-06-18 | End: 2019-06-18 | Stop reason: SDUPTHER

## 2019-06-18 RX ORDER — AMLODIPINE AND BENAZEPRIL HYDROCHLORIDE 10; 40 MG/1; MG/1
1 CAPSULE ORAL DAILY
Qty: 90 CAPSULE | Refills: 1 | Status: SHIPPED | OUTPATIENT
Start: 2019-06-18 | End: 2020-01-12

## 2019-06-18 NOTE — TELEPHONE ENCOUNTER
----- Message from Berta Gonzalez sent at 6/18/2019 12:02 PM CDT -----  Contact: Self            Name of Who is Calling: BRIANNA KOCH [[id number]]      What is the request in detail: Pt states she needs a new prescription for a 90 day supply for  the carvedilol (COREG) 12.5 MG tablet and all of her medications sent to [facility]. Pt states [facility] will not deliver medication that is not for 90 days.       Can the clinic reply by [facility]: N      What Number to Call Back if not in [facility]: [phone]

## 2019-06-18 NOTE — TELEPHONE ENCOUNTER
Patient requesting 90 day supply with 4 refills?  for all maintenance prescriptions be sent to [facility] b/c she received only a 1 month supply today in mail.

## 2019-06-20 ENCOUNTER — TELEPHONE (OUTPATIENT)
Dept: FAMILY MEDICINE | Facility: CLINIC | Age: 69
End: 2019-06-20

## 2019-06-20 NOTE — TELEPHONE ENCOUNTER
Received call from pt who was very upset that she was not told during her 6/6/19 office visit that she needed a CT scan and thyroid testing - was unaware until the CT dept called to schedule her - states she did not receive any calls from the office letting her know prior - states she has both contact #s on file and both should have been used and/or left a message - on 6/11, attempt was made to call pt, but states no answer/busy - no other record of repeat attempts.

## 2019-06-20 NOTE — TELEPHONE ENCOUNTER
Please see note from 6/9/19. I sent a message for this. Also patient was scheduled for 6/27/19.  The US of the thyroid is fine as scheduled  For the CT of chest- this is too soon. My order was for 3 months for the CT scan.  That needs to be rescheduled, for September. Also please inform patient that she should schedule a follow up visit after her tests to review them so there is no miscommunication in the future.

## 2019-06-24 ENCOUNTER — HOSPITAL ENCOUNTER (EMERGENCY)
Facility: HOSPITAL | Age: 69
Discharge: HOME | End: 2019-06-24
Attending: EMERGENCY MEDICINE
Payer: MEDICARE

## 2019-06-24 ENCOUNTER — CLINICAL SUPPORT (OUTPATIENT)
Dept: SMOKING CESSATION | Facility: CLINIC | Age: 69
End: 2019-06-24
Payer: COMMERCIAL

## 2019-06-24 VITALS
BODY MASS INDEX: 34.04 KG/M2 | HEART RATE: 75 BPM | RESPIRATION RATE: 18 BRPM | DIASTOLIC BLOOD PRESSURE: 78 MMHG | OXYGEN SATURATION: 97 % | TEMPERATURE: 99 F | HEIGHT: 62 IN | WEIGHT: 185 LBS | SYSTOLIC BLOOD PRESSURE: 171 MMHG

## 2019-06-24 DIAGNOSIS — R10.9 ABDOMINAL PAIN, UNSPECIFIED ABDOMINAL LOCATION: Primary | ICD-10-CM

## 2019-06-24 DIAGNOSIS — R19.7 DIARRHEA, UNSPECIFIED TYPE: ICD-10-CM

## 2019-06-24 DIAGNOSIS — F17.200 NICOTINE DEPENDENCE: Primary | ICD-10-CM

## 2019-06-24 LAB
ALBUMIN SERPL BCP-MCNC: 3.2 G/DL (ref 3.5–5.2)
ALP SERPL-CCNC: 114 U/L (ref 55–135)
ALT SERPL W/O P-5'-P-CCNC: 19 U/L (ref 10–44)
ANION GAP SERPL CALC-SCNC: 9 MMOL/L (ref 8–16)
AST SERPL-CCNC: 18 U/L (ref 10–40)
BACTERIA #/AREA URNS AUTO: NORMAL /HPF
BASOPHILS # BLD AUTO: 0.03 K/UL (ref 0–0.2)
BASOPHILS NFR BLD: 0.4 % (ref 0–1.9)
BILIRUB SERPL-MCNC: 0.2 MG/DL (ref 0.1–1)
BILIRUB UR QL STRIP: NEGATIVE
BUN SERPL-MCNC: 17 MG/DL (ref 8–23)
CALCIUM SERPL-MCNC: 9.4 MG/DL (ref 8.7–10.5)
CHLORIDE SERPL-SCNC: 109 MMOL/L (ref 95–110)
CLARITY UR REFRACT.AUTO: CLEAR
CO2 SERPL-SCNC: 23 MMOL/L (ref 23–29)
COLOR UR AUTO: YELLOW
CREAT SERPL-MCNC: 0.8 MG/DL (ref 0.5–1.4)
DIFFERENTIAL METHOD: ABNORMAL
EOSINOPHIL # BLD AUTO: 0.4 K/UL (ref 0–0.5)
EOSINOPHIL NFR BLD: 5.7 % (ref 0–8)
ERYTHROCYTE [DISTWIDTH] IN BLOOD BY AUTOMATED COUNT: 15 % (ref 11.5–14.5)
EST. GFR  (AFRICAN AMERICAN): >60 ML/MIN/1.73 M^2
EST. GFR  (NON AFRICAN AMERICAN): >60 ML/MIN/1.73 M^2
GLUCOSE SERPL-MCNC: 96 MG/DL (ref 70–110)
GLUCOSE UR QL STRIP: NEGATIVE
HCT VFR BLD AUTO: 41 % (ref 37–48.5)
HGB BLD-MCNC: 13 G/DL (ref 12–16)
HGB UR QL STRIP: NEGATIVE
HYALINE CASTS UR QL AUTO: 0 /LPF
IMM GRANULOCYTES # BLD AUTO: 0.02 K/UL (ref 0–0.04)
IMM GRANULOCYTES NFR BLD AUTO: 0.3 % (ref 0–0.5)
KETONES UR QL STRIP: NEGATIVE
LEUKOCYTE ESTERASE UR QL STRIP: NEGATIVE
LIPASE SERPL-CCNC: 39 U/L (ref 4–60)
LYMPHOCYTES # BLD AUTO: 2.9 K/UL (ref 1–4.8)
LYMPHOCYTES NFR BLD: 37.8 % (ref 18–48)
MCH RBC QN AUTO: 25.9 PG (ref 27–31)
MCHC RBC AUTO-ENTMCNC: 31.7 G/DL (ref 32–36)
MCV RBC AUTO: 82 FL (ref 82–98)
MICROSCOPIC COMMENT: NORMAL
MONOCYTES # BLD AUTO: 0.7 K/UL (ref 0.3–1)
MONOCYTES NFR BLD: 9 % (ref 4–15)
NEUTROPHILS # BLD AUTO: 3.5 K/UL (ref 1.8–7.7)
NEUTROPHILS NFR BLD: 46.8 % (ref 38–73)
NITRITE UR QL STRIP: NEGATIVE
NRBC BLD-RTO: 0 /100 WBC
PH UR STRIP: 5 [PH] (ref 5–8)
PLATELET # BLD AUTO: 238 K/UL (ref 150–350)
PMV BLD AUTO: 11.2 FL (ref 9.2–12.9)
POTASSIUM SERPL-SCNC: 4.1 MMOL/L (ref 3.5–5.1)
PROT SERPL-MCNC: 6.8 G/DL (ref 6–8.4)
PROT UR QL STRIP: ABNORMAL
RBC # BLD AUTO: 5.01 M/UL (ref 4–5.4)
RBC #/AREA URNS AUTO: 1 /HPF (ref 0–4)
SODIUM SERPL-SCNC: 141 MMOL/L (ref 136–145)
SP GR UR STRIP: 1.01 (ref 1–1.03)
SQUAMOUS #/AREA URNS AUTO: 3 /HPF
URN SPEC COLLECT METH UR: ABNORMAL
WBC # BLD AUTO: 7.56 K/UL (ref 3.9–12.7)
WBC #/AREA URNS AUTO: 4 /HPF (ref 0–5)

## 2019-06-24 PROCEDURE — 99404 PR PREVENT COUNSEL,INDIV,60 MIN: ICD-10-PCS | Mod: S$GLB,,,

## 2019-06-24 PROCEDURE — 96374 THER/PROPH/DIAG INJ IV PUSH: CPT

## 2019-06-24 PROCEDURE — 99999 PR PBB SHADOW E&M-EST. PATIENT-LVL I: ICD-10-PCS | Mod: PBBFAC,,,

## 2019-06-24 PROCEDURE — 99284 EMERGENCY DEPT VISIT MOD MDM: CPT | Mod: ,,, | Performed by: PHYSICIAN ASSISTANT

## 2019-06-24 PROCEDURE — 99284 EMERGENCY DEPT VISIT MOD MDM: CPT | Mod: 25

## 2019-06-24 PROCEDURE — 99404 PREV MED CNSL INDIV APPRX 60: CPT | Mod: S$GLB,,,

## 2019-06-24 PROCEDURE — 80053 COMPREHEN METABOLIC PANEL: CPT

## 2019-06-24 PROCEDURE — 81001 URINALYSIS AUTO W/SCOPE: CPT

## 2019-06-24 PROCEDURE — 85025 COMPLETE CBC W/AUTO DIFF WBC: CPT

## 2019-06-24 PROCEDURE — 99999 PR PBB SHADOW E&M-EST. PATIENT-LVL I: CPT | Mod: PBBFAC,,,

## 2019-06-24 PROCEDURE — 83690 ASSAY OF LIPASE: CPT

## 2019-06-24 PROCEDURE — 63600175 PHARM REV CODE 636 W HCPCS: Performed by: PHYSICIAN ASSISTANT

## 2019-06-24 PROCEDURE — 99284 PR EMERGENCY DEPT VISIT,LEVEL IV: ICD-10-PCS | Mod: ,,, | Performed by: PHYSICIAN ASSISTANT

## 2019-06-24 RX ORDER — KETOROLAC TROMETHAMINE 30 MG/ML
10 INJECTION, SOLUTION INTRAMUSCULAR; INTRAVENOUS
Status: COMPLETED | OUTPATIENT
Start: 2019-06-24 | End: 2019-06-24

## 2019-06-24 RX ORDER — LOPERAMIDE HYDROCHLORIDE 2 MG/1
2 CAPSULE ORAL ONCE AS NEEDED
Qty: 12 CAPSULE | Refills: 0 | Status: SHIPPED | OUTPATIENT
Start: 2019-06-24 | End: 2019-06-24

## 2019-06-24 RX ADMIN — KETOROLAC TROMETHAMINE 10 MG: 30 INJECTION, SOLUTION INTRAMUSCULAR at 07:06

## 2019-06-24 NOTE — PROGRESS NOTES
Individual Follow-Up Form    6/24/2019    Quit Date: 7/1/19    Clinical Status of Patient: Outpatient    Length of Service: 60 minutes    Continuing Medication: yes  Patches    Other Medications: lozenge     Target Symptoms: Withdrawal and medication side effects. The following were rated moderate (3) to severe (4) on TCRS:  · Moderate (3): none   · Severe (4): none    Comments:  Patient presents for follow up smoking 6 cigarettes per day. Pt remains on tobacco cessation medication of  21 mg nicotine patch QD and 2 mg nicotine lozenge  PRN (1-2 per hour in place of cigarettes.) No adverse effects noted at this time.  Pt encouraged to pick a quit day.  Reviewed coping strategies/habitual behavior/relapse prevention with patient. Exhaled carbon monoxide level was 6 ppm per [employer]  ( non- smoker = 0-5 ppm .)  Discussed keeping her cigarettes in the car , so it will help her when she is at home. Will see pt back in office in 2 weeks        Diagnosis: F17.200    Next Visit: 2 weeks

## 2019-06-25 NOTE — ED NOTES
Pt presents to ED with c/o of RUQ abdominal pain that started around three days ago. Pt states that when she stands the pain shifts to her RLQ. Pt states that she has been unable to have a regular BM since she had a colonoscopy in May. Pt reports that her BM since May have been mostly liquid and that no formed stool has been coming out. Pt also states that she has decreased appetite.

## 2019-06-25 NOTE — DISCHARGE INSTRUCTIONS
Your lab work was normal today. You should take Imodium for her diarrhea.  You can take acetaminophen for pain relief.  Please follow up with your primary care physician to discuss your visit today.  Please return to the ER if he develops any worsening of her symptoms, blood in stool, black stool, nausea/vomiting, or any other concerning symptoms.

## 2019-06-25 NOTE — ED PROVIDER NOTES
Encounter Date: 6/24/2019       History     Chief Complaint   Patient presents with    Abdominal Pain     started 3d ago,  feels like blockage, just water coming out, had colonoscopy in may     69-year-old female with PMHx of anxiety, HLP, depression, fibrocystic breast, GERD, JAM, thyroid disease, T2DM, and s/p cholecystectomy and total hysterectomy who presents to the ED with c/o abdominal pain. She states that 3-4 days ago she developed watery diarrhea and mid-abdominal pain. She states that she is not digesting her food and is able to identify what she ate in her diarrhea.  She describes her abdominal pain as an intermittent soreness for the 3 days, which became constant today, rated 8/10 in severity. She has not tried anything over-the-counter for symptomatic relief.  Patient had colonoscopy on 05/22/2019 which revealed diverticulosis sigmoid and descending colon.  However, the unable to complete the procedure.  Patient has rescheduled for 07/16/2019. Denies blood in stool, melena, nausea/vomiting, dysuria, urinary frequency, flank pain chest pain, shortness of breath, fever, chills, weakness, numbness, vaginal discharge/pain/bleeding, or any other medical complaints.     The history is provided by the patient.     Review of patient's allergies indicates:   Allergen Reactions    Hydrocodone Shortness Of Breath    Iodinated contrast- oral and iv dye Shortness Of Breath     Difficulty breathing    Adhesive Itching    Oxycodone      Hallucinations    Sulfa (sulfonamide antibiotics) Hives and Itching     Past Medical History:   Diagnosis Date    Angina pectoris     Anxiety     Bronchitis     Cataract     Cervical spondylosis with radiculopathy 7/10/2012    Chest pain     Chronic neck pain 7/26/2012    Depression     Fibrocystic breast     GERD (gastroesophageal reflux disease)     Glaucoma     Hyperlipidemia     Hypertension     Neck pain 7/10/2012    JAM (obstructive sleep apnea)     Primary  osteoarthritis of both knees     Psoriasis     Subacromial or subdeltoid bursitis 7/10/2012    Thyroid disease     Type 2 diabetes mellitus 12/10/2013     Past Surgical History:   Procedure Laterality Date    ARTHROSCOPY - LEFT THUMB CMCJ - LINVATEC - CMC Left 12/7/2016    Performed by [facility]line [facility] at [facility] OR 1ST FLR    BLOCK-NERVE Right 2/27/2018    Performed by Teresa Surgeon at [facility] TERESA    BREAST LUMPECTOMY Left 1988    mass in the rt breast surgery  1988    BREAST MASS EXCISION Right     benign    CHOLECYSTECTOMY      CHOLECYSTECTOMY, LAPAROSCOPIC N/A 1/28/2013    Performed by Shay Cordova MD at [facility] OR 2ND FLR    COLONOSCOPY N/A 5/22/2019    Performed by Darrin Calvin MD at [facility] ENDO (2ND FLR)    HYSTERECTOMY  1980's    KNEE SURGERY Left 1-20-16    [facility]    KNEE SURGERY Right 02/26/2018    [facility]    L4-L5 fusion  2006    REPLACEMENT-KNEE-TOTAL Right 2/26/2018    Performed by [facility] Jr[facility] MD at [facility] OR 2ND FLR    REPLACEMENT-KNEE-TOTAL Left 1/20/2016    Performed by [facility] at [facility] OR 2ND FLR    SPINE SURGERY       Family History   Problem Relation Age of Onset    Diabetes Mother     Hypertension Mother         CHF, angina    Angina Mother     Kidney disease Mother     Heart disease Mother         CHF    Hypertension Father         glaucoma, Alzhiemer's , supra pubic    Alzheimer's disease Father     Glaucoma Father     Kidney disease Brother         kidney transplant, HTN,DM    Diabetes Brother     Glaucoma Sister     Hypertension Sister     Hyperlipidemia Sister     Gout Son     Hypertension Son     Diabetes Son     Sleep apnea Sister     Hypertension Sister     Thyroid disease Sister     No Known Problems Sister     No Known Problems Sister     Hepatitis Brother     Amblyopia Neg Hx     Blindness Neg Hx     Melanoma Neg Hx      Social History     Tobacco Use    Smoking status: Current Every Day Smoker     Packs/day: 1.00      Years: 40.00     Pack years: 40.00     Types: Cigarettes    Smokeless tobacco: Never Used    Tobacco comment: smokes a pack a week   Substance Use Topics    Alcohol use: No     Alcohol/week: 0.0 oz    Drug use: No     Review of Systems   Constitutional: Negative for chills, fatigue and fever.   HENT: Negative for congestion.    Eyes: Negative for visual disturbance.   Respiratory: Negative for shortness of breath.    Cardiovascular: Negative for chest pain.   Gastrointestinal: Positive for abdominal pain and diarrhea. Negative for blood in stool, constipation, nausea and vomiting.   Genitourinary: Negative for dysuria, flank pain, frequency, hematuria, vaginal bleeding, vaginal discharge and vaginal pain.   Musculoskeletal: Negative for back pain and neck pain.   Skin: Negative for color change.   Neurological: Negative for dizziness, weakness, numbness and headaches.   Psychiatric/Behavioral: Negative for confusion.       Physical Exam     Initial Vitals [06/24/19 1829]   BP Pulse Resp Temp SpO2   (!) 173/83 79 18 98 °F (36.7 °C) 98 %      MAP       --         Physical Exam    Nursing note and vitals reviewed.  Constitutional: She appears well-developed and well-nourished.   Eyes: Conjunctivae and EOM are normal. Pupils are equal, round, and reactive to light.   Neck: Normal range of motion. Neck supple.   Cardiovascular: Normal rate, regular rhythm and normal heart sounds.   Abdominal: Soft. There is tenderness. There is no rebound and no guarding.   Tenderness to palpation over right upper quadrant and mid epigastric region.  No CVA tenderness   Musculoskeletal: Normal range of motion. She exhibits no edema or tenderness.   Neurological: She is alert and oriented to person, place, and time. She has normal strength.   Skin: Skin is warm. Capillary refill takes less than 2 seconds.   Psychiatric: She has a normal mood and affect.         ED Course   Procedures  Labs Reviewed   CBC W/ AUTO DIFFERENTIAL -  Abnormal; Notable for the following components:       Result Value    Mean Corpuscular Hemoglobin 25.9 (*)     Mean Corpuscular Hemoglobin Conc 31.7 (*)     RDW 15.0 (*)     All other components within normal limits   COMPREHENSIVE METABOLIC PANEL - Abnormal; Notable for the following components:    Albumin 3.2 (*)     All other components within normal limits   URINALYSIS, REFLEX TO URINE CULTURE - Abnormal; Notable for the following components:    Protein, UA 3+ (*)     All other components within normal limits    Narrative:     Preferred Collection Type->Urine, Clean Catch   LIPASE   URINALYSIS MICROSCOPIC    Narrative:     Preferred Collection Type->Urine, Clean Catch          Imaging Results    None          Medical Decision Making:   Initial Assessment:   69-year-old female with PMHx of anxiety, HLP, depression, fibrocystic breast, GERD, JAM, thyroid disease, T2DM, and s/p cholecystectomy and total hysterectomy who presents to the ED with c/o abdominal pain and diarrhea x 3 days.Colonoscopy on 05/22/2019 which revealed diverticulosis sigmoid and descending colon.  However, the unable to complete the procedure.  Patient has rescheduled for 07/16/2019.  Vital signs stable. Afebrile.  RRR.  Lungs CTA bilaterally. Tenderness to palpation over right upper quadrant and mid epigastric region.  No CVA tenderness. Neurovascularly intact.  Differential Diagnosis:   DDX includes but is not limited to infectious diarrhea, retained biliary stone, pancreatitis, PUD, GERD, UTI, hiatal hernia.   Clinical Tests:   Lab Tests: Reviewed and Ordered  ED Management:  Will obtain basic labs, lipase, and UA.  Will reassess need for RUQ ultrasound if lab abnormalities. Patient given IV Toradol as analgesics.    UA with 3+ protein, negative nitrites, negative leukocytes, and unremarkable microscopy. CBC without leukocytosis or anemia.  CMP with BUN 17, creatinine 0.8.  LFTs within normal limits. Lipase 39.  I do not suspect  pancreatobiliary etiology of symptoms.     Upon reassessment, patient reports resolution of pain and would like to be discharged.  Patient has not had a bowel movement while in the ED.  No indication for further testing or imaging at this time. Patient is stable for discharge with instructions to follow up with PCP within a week or earlier if symptoms continue.  Prescription given for Imodium.  Patient can take acetaminophen for pain relief.  Strict ER return precautions given.  All questions answered.  Patient expressed understanding and is agreeable with plan.    I have discussed the treatment and management of this patient with my supervising physician, and we agree on the plan of care.      Galina Menard PA-C                Attending Attestation:     Physician Attestation Statement for NP/PA:   I discussed this assessment and plan of this patient with the NP/PA, but I did not personally examine the patient. The face to face encounter was performed by the NP/PA.                     Clinical Impression:       ICD-10-CM ICD-9-CM   1. Abdominal pain, unspecified abdominal location R10.9 789.00   2. Diarrhea, unspecified type R19.7 787.91         Disposition:   Disposition: Discharged  Condition: Stable                        Galina Menard PA-C  06/24/19 2220       Nikhil Pardo III, MD  06/25/19 0048

## 2019-06-27 ENCOUNTER — HOSPITAL ENCOUNTER (OUTPATIENT)
Dept: RADIOLOGY | Facility: HOSPITAL | Age: 69
Discharge: HOME | End: 2019-06-27
Attending: FAMILY MEDICINE
Payer: MEDICARE

## 2019-06-27 DIAGNOSIS — E04.1 THYROID NODULE: ICD-10-CM

## 2019-06-27 PROCEDURE — 76536 US EXAM OF HEAD AND NECK: CPT | Mod: 26,,, | Performed by: RADIOLOGY

## 2019-06-27 PROCEDURE — 76536 US SOFT TISSUE HEAD NECK THYROID: ICD-10-PCS | Mod: 26,,, | Performed by: RADIOLOGY

## 2019-06-27 PROCEDURE — 76536 US EXAM OF HEAD AND NECK: CPT | Mod: TC

## 2019-06-28 ENCOUNTER — TELEPHONE (OUTPATIENT)
Dept: ENDOSCOPY | Facility: HOSPITAL | Age: 69
End: 2019-06-28

## 2019-06-28 DIAGNOSIS — Z12.11 SPECIAL SCREENING FOR MALIGNANT NEOPLASMS, COLON: Primary | ICD-10-CM

## 2019-06-28 RX ORDER — POLYETHYLENE GLYCOL 3350, SODIUM CHLORIDE, SODIUM BICARBONATE, POTASSIUM CHLORIDE 420; 11.2; 5.72; 1.48 G/4L; G/4L; G/4L; G/4L
1 POWDER, FOR SOLUTION ORAL ONCE
Qty: 4000 ML | Refills: 0 | Status: SHIPPED | OUTPATIENT
Start: 2019-06-28 | End: 2019-07-02

## 2019-06-28 NOTE — TELEPHONE ENCOUNTER
Contaced patient to confirm appointment for Colonoscopy on 7/16/2019 at 8 am. Reviewed medical history, medications and instructed on procedure and prep. She voiced an understanding. Sent a copy of instructions to address listed in chart.

## 2019-07-03 NOTE — PROGRESS NOTES
Please let patient know that the ultrasound of the thyroid showed two nodules, however they are about the same size as seen on images from 2013, and as such do not need biopsy.

## 2019-07-10 ENCOUNTER — CLINICAL SUPPORT (OUTPATIENT)
Dept: SMOKING CESSATION | Facility: CLINIC | Age: 69
End: 2019-07-10
Payer: COMMERCIAL

## 2019-07-10 DIAGNOSIS — F17.200 NICOTINE DEPENDENCE: ICD-10-CM

## 2019-07-10 PROCEDURE — 99406 PR TOBACCO USE CESSATION INTERMEDIATE 3-10 MINUTES: ICD-10-PCS | Mod: S$GLB,,,

## 2019-07-10 PROCEDURE — 99406 BEHAV CHNG SMOKING 3-10 MIN: CPT | Mod: S$GLB,,,

## 2019-07-12 RX ORDER — IBUPROFEN 200 MG
1 TABLET ORAL DAILY
Qty: 28 PATCH | Refills: 0 | Status: CANCELLED | COMMUNITY
Start: 2019-07-12 | End: 2019-08-11

## 2019-07-12 RX ORDER — IBUPROFEN 200 MG
1 TABLET ORAL DAILY
Qty: 28 PATCH | Refills: 0 | Status: SHIPPED | OUTPATIENT
Start: 2019-07-12 | End: 2019-08-09

## 2019-07-15 ENCOUNTER — TELEPHONE (OUTPATIENT)
Dept: DERMATOLOGY | Facility: CLINIC | Age: 69
End: 2019-07-15

## 2019-07-16 ENCOUNTER — HOSPITAL ENCOUNTER (OUTPATIENT)
Facility: HOSPITAL | Age: 69
Discharge: HOME | End: 2019-07-16
Attending: INTERNAL MEDICINE | Admitting: INTERNAL MEDICINE
Payer: MEDICARE

## 2019-07-16 ENCOUNTER — ANESTHESIA EVENT (OUTPATIENT)
Dept: ENDOSCOPY | Facility: HOSPITAL | Age: 69
End: 2019-07-16
Payer: MEDICARE

## 2019-07-16 ENCOUNTER — ANESTHESIA (OUTPATIENT)
Dept: ENDOSCOPY | Facility: HOSPITAL | Age: 69
End: 2019-07-16
Payer: MEDICARE

## 2019-07-16 VITALS
HEART RATE: 67 BPM | SYSTOLIC BLOOD PRESSURE: 156 MMHG | BODY MASS INDEX: 34.96 KG/M2 | WEIGHT: 190 LBS | OXYGEN SATURATION: 97 % | DIASTOLIC BLOOD PRESSURE: 77 MMHG | HEIGHT: 62 IN | TEMPERATURE: 98 F | RESPIRATION RATE: 56 BRPM

## 2019-07-16 DIAGNOSIS — Z86.010 HISTORY OF COLON POLYPS: Primary | ICD-10-CM

## 2019-07-16 LAB — POCT GLUCOSE: 118 MG/DL (ref 70–110)

## 2019-07-16 PROCEDURE — D9220A PRA ANESTHESIA: Mod: CRNA,,, | Performed by: NURSE ANESTHETIST, CERTIFIED REGISTERED

## 2019-07-16 PROCEDURE — 88305 TISSUE SPECIMEN TO PATHOLOGY - SURGERY: ICD-10-PCS | Mod: 26,,, | Performed by: PATHOLOGY

## 2019-07-16 PROCEDURE — 63600175 PHARM REV CODE 636 W HCPCS: Performed by: NURSE ANESTHETIST, CERTIFIED REGISTERED

## 2019-07-16 PROCEDURE — 45385 PR COLONOSCOPY,REMV LESN,SNARE: ICD-10-PCS | Mod: 22,PT,, | Performed by: INTERNAL MEDICINE

## 2019-07-16 PROCEDURE — 88305 TISSUE EXAM BY PATHOLOGIST: CPT | Performed by: PATHOLOGY

## 2019-07-16 PROCEDURE — 45380 COLONOSCOPY AND BIOPSY: CPT | Mod: 59,,, | Performed by: INTERNAL MEDICINE

## 2019-07-16 PROCEDURE — 27201012 HC FORCEPS, HOT/COLD, DISP: Performed by: INTERNAL MEDICINE

## 2019-07-16 PROCEDURE — 27201089 HC SNARE, DISP (ANY): Performed by: INTERNAL MEDICINE

## 2019-07-16 PROCEDURE — 45385 COLONOSCOPY W/LESION REMOVAL: CPT | Mod: 22,PT,, | Performed by: INTERNAL MEDICINE

## 2019-07-16 PROCEDURE — 37000009 HC ANESTHESIA EA ADD 15 MINS: Performed by: INTERNAL MEDICINE

## 2019-07-16 PROCEDURE — 45380 COLONOSCOPY AND BIOPSY: CPT | Performed by: INTERNAL MEDICINE

## 2019-07-16 PROCEDURE — D9220A PRA ANESTHESIA: ICD-10-PCS | Mod: CRNA,,, | Performed by: NURSE ANESTHETIST, CERTIFIED REGISTERED

## 2019-07-16 PROCEDURE — 25000003 PHARM REV CODE 250: Performed by: NURSE ANESTHETIST, CERTIFIED REGISTERED

## 2019-07-16 PROCEDURE — D9220A PRA ANESTHESIA: ICD-10-PCS | Mod: ANES,,, | Performed by: ANESTHESIOLOGY

## 2019-07-16 PROCEDURE — 45385 COLONOSCOPY W/LESION REMOVAL: CPT | Performed by: INTERNAL MEDICINE

## 2019-07-16 PROCEDURE — 27201030 HC OVERTUBE: Performed by: INTERNAL MEDICINE

## 2019-07-16 PROCEDURE — 45380 PR COLONOSCOPY,BIOPSY: ICD-10-PCS | Mod: 59,,, | Performed by: INTERNAL MEDICINE

## 2019-07-16 PROCEDURE — 37000008 HC ANESTHESIA 1ST 15 MINUTES: Performed by: INTERNAL MEDICINE

## 2019-07-16 PROCEDURE — 88305 TISSUE EXAM BY PATHOLOGIST: CPT | Mod: 26,,, | Performed by: PATHOLOGY

## 2019-07-16 PROCEDURE — D9220A PRA ANESTHESIA: Mod: ANES,,, | Performed by: ANESTHESIOLOGY

## 2019-07-16 RX ORDER — PROPOFOL 10 MG/ML
VIAL (ML) INTRAVENOUS CONTINUOUS PRN
Status: DISCONTINUED | OUTPATIENT
Start: 2019-07-16 | End: 2019-07-16

## 2019-07-16 RX ORDER — SODIUM CHLORIDE 0.9 % (FLUSH) 0.9 %
10 SYRINGE (ML) INJECTION
Status: CANCELLED | OUTPATIENT
Start: 2019-07-16

## 2019-07-16 RX ORDER — LIDOCAINE HCL/PF 100 MG/5ML
SYRINGE (ML) INTRAVENOUS
Status: DISCONTINUED | OUTPATIENT
Start: 2019-07-16 | End: 2019-07-16

## 2019-07-16 RX ORDER — GLYCOPYRROLATE 0.2 MG/ML
INJECTION INTRAMUSCULAR; INTRAVENOUS
Status: DISCONTINUED | OUTPATIENT
Start: 2019-07-16 | End: 2019-07-16

## 2019-07-16 RX ORDER — PROPOFOL 10 MG/ML
VIAL (ML) INTRAVENOUS
Status: DISCONTINUED | OUTPATIENT
Start: 2019-07-16 | End: 2019-07-16

## 2019-07-16 RX ORDER — SODIUM CHLORIDE 9 MG/ML
INJECTION, SOLUTION INTRAVENOUS CONTINUOUS PRN
Status: DISCONTINUED | OUTPATIENT
Start: 2019-07-16 | End: 2019-07-16

## 2019-07-16 RX ORDER — ONDANSETRON 2 MG/ML
INJECTION INTRAMUSCULAR; INTRAVENOUS
Status: DISCONTINUED | OUTPATIENT
Start: 2019-07-16 | End: 2019-07-16

## 2019-07-16 RX ORDER — SODIUM CHLORIDE 9 MG/ML
INJECTION, SOLUTION INTRAVENOUS CONTINUOUS
Status: CANCELLED | OUTPATIENT
Start: 2019-07-16

## 2019-07-16 RX ORDER — SODIUM CHLORIDE 0.9 % (FLUSH) 0.9 %
10 SYRINGE (ML) INJECTION
Status: DISCONTINUED | OUTPATIENT
Start: 2019-07-16 | End: 2019-07-16 | Stop reason: HOSPADM

## 2019-07-16 RX ORDER — PHENYLEPHRINE HYDROCHLORIDE 10 MG/ML
INJECTION INTRAVENOUS
Status: DISCONTINUED | OUTPATIENT
Start: 2019-07-16 | End: 2019-07-16

## 2019-07-16 RX ADMIN — PROPOFOL 50 MG: 10 INJECTION, EMULSION INTRAVENOUS at 08:07

## 2019-07-16 RX ADMIN — LIDOCAINE HYDROCHLORIDE 100 MG: 20 INJECTION, SOLUTION INTRAVENOUS at 08:07

## 2019-07-16 RX ADMIN — PROPOFOL 20 MG: 10 INJECTION, EMULSION INTRAVENOUS at 08:07

## 2019-07-16 RX ADMIN — SODIUM CHLORIDE: 0.9 INJECTION, SOLUTION INTRAVENOUS at 08:07

## 2019-07-16 RX ADMIN — PROPOFOL 150 MCG/KG/MIN: 10 INJECTION, EMULSION INTRAVENOUS at 08:07

## 2019-07-16 RX ADMIN — PHENYLEPHRINE HYDROCHLORIDE 100 MCG: 10 INJECTION INTRAVENOUS at 09:07

## 2019-07-16 RX ADMIN — PROPOFOL 30 MG: 10 INJECTION, EMULSION INTRAVENOUS at 08:07

## 2019-07-16 RX ADMIN — ONDANSETRON 8 MG: 2 INJECTION INTRAMUSCULAR; INTRAVENOUS at 09:07

## 2019-07-16 RX ADMIN — GLYCOPYRROLATE 0.2 MG: 0.2 INJECTION, SOLUTION INTRAMUSCULAR; INTRAVENOUS at 09:07

## 2019-07-16 NOTE — DISCHARGE INSTRUCTIONS
Colonoscopy     A camera attached to a flexible tube with a viewing lens is used to take video pictures.     Colonoscopy is a test to view the inside of your lower digestive tract (colon and rectum). Sometimes it can show the last part of the small intestine (ileum). During the test, small pieces of tissue may be removed for testing. This is called a biopsy. Small growths, such as polyps, may also be removed.   Why is colonoscopy done?  The test is done to help look for colon cancer. And it can help find the source of abdominal pain, bleeding, and changes in bowel habits. It may be needed once a year, depending on factors such as your:  · Age  · Health history  · Family health history  · Symptoms  · Results from any prior colonoscopy  Risks and possible complications  These include:  · Bleeding               · A puncture or tear in the colon   · Risks of anesthesia  · A cancer lesion not being seen  Getting ready   To prepare for the test:  · Talk with your healthcare provider about the risks of the test (see below). Also ask your healthcare provider about alternatives to the test.  · Tell your healthcare provider about any medicines you take. Also tell him or her about any health conditions you may have.  · Make sure your rectum and colon are empty for the test. Follow the diet and bowel prep instructions exactly. If you dont, the test may need to be rescheduled.  · Plan for a friend or family member to drive you home after the test.     Colonoscopy provides an inside view of the entire colon.     You may discuss the results with your doctor right away or at a future visit.  During the test   The test is usually done in the hospital on an outpatient basis. This means you go home the same day. The procedure takes about 30 minutes. During that time:  · You are given relaxing (sedating) medicine through an IV line. You may be drowsy, or fully asleep.  · The healthcare provider will first give you a physical exam to  check for anal and rectal problems.  · Then the anus is lubricated and the scope inserted.  · If you are awake, you may have a feeling similar to needing to have a bowel movement. You may also feel pressure as air is pumped into the colon. Its OK to pass gas during the procedure.  · Biopsy, polyp removal, or other treatments may be done during the test.  After the test   You may have gas right after the test. It can help to try to pass it to help prevent later bloating. Your healthcare provider may discuss the results with you right away. Or you may need to schedule a follow-up visit to talk about the results. After the test, you can go back to your normal eating and other activities. You may be tired from the sedation and need to rest for a few hours.          Recovery After Procedural Sedation (Adult)  You have been given medicine by vein to make you sleep during your surgery. This may have included both a pain medicine and sleeping medicine. Most of the effects have worn off. But you may still have some drowsiness for the next 6 to 8 hours.  Home care  Follow these guidelines when you get home:  · For the next 8 hours, you should be watched by a responsible adult. This person should make sure your condition is not getting worse.  · Don't drink any alcohol for the next 24 hours.  · Don't drive, operate dangerous machinery, or make important business or personal decisions during the next 24 hours.  Note: Your healthcare provider may tell you not to take any medicine by mouth for pain or sleep in the next 4 hours. These medicines may react with the medicines you were given in the hospital. This could cause a much stronger response than usual.  Follow-up care  Follow up with your healthcare provider if you are not alert and back to your usual level of activity within 12 hours.  When to seek medical advice  Call your healthcare provider right away if any of these occur:  · Drowsiness gets worse  · Weakness or  dizziness gets worse  · Repeated vomiting  · You can't be awakened

## 2019-07-16 NOTE — [employer] PATIENT INSTRUCTIONS
Discharge Summary/Instructions after an Endoscopic Procedure  Patient Name: Stephanie Sears  Patient MRN: [id number]  Patient YOB: 1950  Tuesday, July 16, 2019  Darrin Calvin MD  RESTRICTIONS:  During your procedure today, you received medications for sedation.  These   medications may affect your judgment, balance and coordination.  Therefore,   for 24 hours, you have the following restrictions:   - DO NOT drive a car, operate machinery, make legal/financial decisions,   sign important papers or drink alcohol.    ACTIVITY:  Today: no heavy lifting, straining or running due to procedural   sedation/anesthesia.  The following day: return to full activity including work.  DIET:  Eat and drink normally unless instructed otherwise.     TREATMENT FOR COMMON SIDE EFFECTS:  - Mild abdominal pain, nausea, belching, bloating or excessive gas:  rest,   eat lightly and use a heating pad.  - Sore Throat: treat with throat lozenges and/or gargle with warm salt   water.  - Because air was used during the procedure, expelling large amounts of air   from your rectum or belching is normal.  - If a bowel prep was taken, you may not have a bowel movement for 1-3 days.    This is normal.  SYMPTOMS TO WATCH FOR AND REPORT TO YOUR PHYSICIAN:  1. Abdominal pain or bloating, other than gas cramps.  2. Chest pain.  3. Back pain.  4. Signs of infection such as: chills or fever occurring within 24 hours   after the procedure.  5. Rectal bleeding, which would show as bright red, maroon, or black stools.   (A tablespoon of blood from the rectum is not serious, especially if   hemorrhoids are present.)  6. Vomiting.  7. Weakness or dizziness.  GO DIRECTLY TO THE NEAREST EMERGENCY ROOM IF YOU HAVE ANY OF THE FOLLOWING:      Difficulty breathing              Chills and/or fever over 101 F   Persistent vomiting and/or vomiting blood   Severe abdominal pain   Severe chest pain   Black, tarry stools   Bleeding- more than one  tablespoon   Any other symptom or condition that you feel may need urgent attention  Your doctor recommends these additional instructions:  If any biopsies were taken, your doctors clinic will contact you in 1 to 2   weeks with any results.  - Discharge patient to home.   - Patient has a contact number available for emergencies.  The signs and   symptoms of potential delayed complications were discussed with the   patient.  Return to normal activities tomorrow.  Written discharge   instructions were provided to the patient.   - Resume previous diet.   - Continue present medications.   - Await pathology results.   - Telephone GI clinic for pathology results in 1 week.   - Repeat colonoscopy in 3 years for surveillance of multiple polyps.  For questions, problems or results please call your physician - Darrin Calvin MD at Work:  [phone].  [facility], EMERGENCY ROOM PHONE NUMBER: [phone]  IF A COMPLICATION OR EMERGENCY SITUATION ARISES AND YOU ARE UNABLE TO REACH   YOUR PHYSICIAN - GO DIRECTLY TO THE EMERGENCY ROOM.  Darrin Calvin MD  7/16/2019 9:32:14 AM  This report has been verified and signed electronically.  [employer]

## 2019-07-16 NOTE — TRANSFER OF CARE
"Anesthesia Transfer of Care Note    Patient: Stephanie Sears    Procedure(s) Performed: Procedure(s) (LRB):  COLONOSCOPY, SINGLE OR DOUBLE BALLOON (N/A)    Patient location: [facility]    Anesthesia Type: general    Transport from OR: Transported from OR on room air with adequate spontaneous ventilation    Post pain: adequate analgesia    Post assessment: no apparent anesthetic complications and tolerated procedure well    Post vital signs: stable    Level of consciousness: sedated and responds to stimulation    Nausea/Vomiting: no nausea/vomiting    Complications: none    Transfer of care protocol was followed      Last vitals:   Visit Vitals  Temp 36.7 °C (98.1 °F) (Temporal)   Resp 16   Ht 5' 2" (1.575 m)   Wt 86.2 kg (190 lb)   Breastfeeding? No   BMI 34.75 kg/m²     "

## 2019-07-16 NOTE — H&P
Short Stay Endoscopy History and Physical      Procedure - Colonoscopy, balloon overtube assisted  ASA - per anesthesia  Mallampati - per anesthesia  History of Anesthesia problems - no  Family history Anesthesia problems - no   Plan of anesthesia - MAC    HPI:  This is a 69 y.o. female here for evaluation of a history of colon polyps.  Last colonoscopy was incomplete.      ROS:  Constitutional: No fevers, chills, No weight loss  CV: No chest pain  Pulm: No cough, No shortness of breath  GI: see HPI    Medical History:  has a past medical history of Angina pectoris, Anxiety, Bronchitis, Cataract, Cervical spondylosis with radiculopathy (7/10/2012), Chest pain, Chronic neck pain (7/26/2012), Depression, Fibrocystic breast, GERD (gastroesophageal reflux disease), Glaucoma, Hyperlipidemia, Hypertension, Neck pain (7/10/2012), JAM (obstructive sleep apnea), Primary osteoarthritis of both knees, Psoriasis, Subacromial or subdeltoid bursitis (7/10/2012), Thyroid disease, and Type 2 diabetes mellitus (12/10/2013).    Surgical History:  has a past surgical history that includes L4-L5 fusion (2006); Hysterectomy (1980's); Cholecystectomy; Breast mass excision (Right); Breast lumpectomy (Left, 1988); Knee surgery (Left, 1-20-16); Knee surgery (Right, 02/26/2018); and Colonoscopy (N/A, 5/22/2019).    Family History: family history includes Alzheimer's disease in her father; Angina in her mother; Diabetes in her brother, mother, and son; Glaucoma in her father and sister; Gout in her son; Heart disease in her mother; Hepatitis in her brother; Hyperlipidemia in her sister; Hypertension in her father, mother, sister, sister, and son; Kidney disease in her brother and mother; No Known Problems in her sister and sister; Sleep apnea in her sister; Thyroid disease in her sister.    Social History:  reports that she has been smoking cigarettes.  She has a 40.00 pack-year smoking history. She has never used smokeless tobacco. She  reports that she does not drink alcohol or use drugs.    Review of patient's allergies indicates:   Allergen Reactions    Hydrocodone Shortness Of Breath    Iodinated contrast- oral and iv dye Shortness Of Breath     Difficulty breathing    Adhesive Itching     Skin peeling    Oxycodone      Hallucinations    Sulfa (sulfonamide antibiotics) Hives and Itching       Medications:   Medications Prior to Admission   Medication Sig Dispense Refill Last Dose    albuterol (PROAIR HFA) 90 mcg/actuation inhaler Inhale 2 puffs into the lungs every 6 (six) hours as needed for Wheezing or Shortness of Breath. 1 Inhaler 3 Past Week at Unknown time    amlodipine-benazepril (LOTREL) 10-40 mg per capsule Take 1 capsule by mouth once daily. 90 capsule 1 Past Week at Unknown time    atorvastatin (LIPITOR) 20 MG tablet Take 1 tablet (20 mg total) by mouth once daily. 90 tablet 1 7/15/2019 at Unknown time    benzonatate (TESSALON) 200 MG capsule Take 1 capsule (200 mg total) by mouth 3 (three) times daily as needed for Cough. 60 capsule 1 Past Month at Unknown time    blood sugar diagnostic (ACCU-CHEK JOSE G PLUS TEST STRP) Strp Inject 1 each into the skin 2 (two) times daily. 200 each 11 Past Week at Unknown time    blood sugar diagnostic Strp Use to test blood glucose levels 2 times per day as instructed. 200 strip 11 7/15/2019 at Unknown time    CALCIUM CARBONATE (TUMS ORAL) Take by mouth as needed (acid reflux, indigestion).   7/15/2019 at Unknown time    carvedilol (COREG) 12.5 MG tablet Take 1 tablet (12.5 mg total) by mouth 2 (two) times daily with meals. 180 tablet 1 7/15/2019 at Unknown time    clobetasol (TEMOVATE) 0.05 % cream Apply topically 2 (two) times daily. To rash on left lower leg 60 g 2 Past Week at Unknown time    diphenhydrAMINE (BENADRYL) 25 mg capsule Take 25 mg by mouth every 6 (six) hours as needed for Itching or Allergies.   Past Month at Unknown time    famotidine (PEPCID) 20 MG tablet Take 1  tablet (20 mg total) by mouth 2 (two) times daily. 60 tablet 0 Past Week at Unknown time    fluticasone (FLONASE) 50 mcg/actuation nasal spray 1 spray by Each Nare route 2 (two) times daily. 1 Bottle 1 Past Week at Unknown time    furosemide (LASIX) 40 MG tablet Take 1 tablet (40 mg total) by mouth once daily. 90 tablet 1 Past Week at Unknown time    gabapentin (NEURONTIN) 600 MG tablet TAKE 1 TABLET THREE TIMES A  tablet 1 7/15/2019 at Unknown time    INULIN (FIBER GUMMIES ORAL) Take by mouth every morning.    Past Week at Unknown time    lancets (ACCU-CHEK MULTICLIX LANCET) Misc Test blood sugar twice daily 300 each 3 Past Week at Unknown time    lancets (ONETOUCH DELICA LANCETS) 33 gauge Misc Inject 1 lancet into the skin 2 (two) times daily before meals. 200 each 11 Past Week at Unknown time    meloxicam (MOBIC) 7.5 MG tablet Take 1 tablet (7.5 mg total) by mouth once daily. 90 tablet 1 7/15/2019 at Unknown time    nicotine (NICODERM CQ) 21 mg/24 hr Place 1 patch onto the skin once daily. 28 patch 0 Past Week at Unknown time    nicotine polacrilex (NICORETTE) 4 MG Gum 1-2 per hour in place of a cigarette. Limit to 15 a day - oral.please dispense cool mint flavor, thanks 168 each 0 Past Week at Unknown time    triamcinolone acetonide 0.1% (KENALOG) 0.1 % ointment KENDRA AA ON THE SKIN BID ON RASH ON LEGS  0 Past Week at Unknown time    traMADol (ULTRAM) 50 mg tablet Take 1 tablet (50 mg total) by mouth every 8 (eight) hours as needed. 90 tablet 3 Taking         Physical Exam:    Vital Signs:   Vitals:    07/16/19 0746   Resp: 16   Temp: 98.1 °F (36.7 °C)       General Appearance: Well appearing in no acute distress  Eyes:    No scleral icterus  ENT: Neck supple, Lips, mucosa, and tongue normal; teeth and gums normal  Lungs: CTA bilaterally  Heart:  S1, S2 normal, no murmurs heard  Abdomen: Soft, non tender, non distended with positive bowel sounds. No hepatosplenomegaly, ascites, or  mass.      Labs:  Lab Results   Component Value Date    WBC 7.56 06/24/2019    HGB 13.0 06/24/2019    HCT 41.0 06/24/2019     06/24/2019    CHOL 163 05/29/2019    CHOL 163 05/29/2019    TRIG 98 05/29/2019    TRIG 98 05/29/2019    HDL 53 05/29/2019    HDL 53 05/29/2019    ALT 19 06/24/2019    AST 18 06/24/2019     06/24/2019    K 4.1 06/24/2019     06/24/2019    CREATININE 0.8 06/24/2019    BUN 17 06/24/2019    CO2 23 06/24/2019    TSH 0.708 05/29/2019    INR 0.9 02/09/2018    HGBA1C 6.2 (H) 05/29/2019         Assessment:  69 y.o. female with history of colon polyps.  Prior incomplete colonoscopy.    Plan:  Proceed with colonoscopy today, balloon overtube assisted.  I have explained the risks and benefits of endoscopy procedures to the patient including but not limited to bleeding, perforation, infection, and death.  All questions and answered.        Darrin Calvin MD

## 2019-07-16 NOTE — ANESTHESIA PREPROCEDURE EVALUATION
07/16/2019  Stephanie Sears is a 69 y.o., female with a pre-operative diagnosis of cervical arthritis, JAM, DMII, mild aortic stenosis, who is scheduled for balloon colonoscopy.     Allergies:   Review of patient's allergies indicates:   Allergen Reactions    Hydrocodone Shortness Of Breath    Iodinated contrast- oral and iv dye Shortness Of Breath     Difficulty breathing    Adhesive Itching     Skin peeling    Oxycodone      Hallucinations    Sulfa (sulfonamide antibiotics) Hives and Itching     Vital Sign Range:    Chronic Medications:     (Not in a hospital admission)  Current Medications:   No current outpatient medications on file.     No current facility-administered medications for this visit.      Medical History:   Past Medical History:   Diagnosis Date    Angina pectoris     Anxiety     Bronchitis     Cataract     Cervical spondylosis with radiculopathy 7/10/2012    Chest pain     Chronic neck pain 7/26/2012    Depression     Fibrocystic breast     GERD (gastroesophageal reflux disease)     Glaucoma     Hyperlipidemia     Hypertension     Neck pain 7/10/2012    JAM (obstructive sleep apnea)     Primary osteoarthritis of both knees     Psoriasis     Subacromial or subdeltoid bursitis 7/10/2012    Thyroid disease     Type 2 diabetes mellitus 12/10/2013     .    Pre-op Assessment    I have reviewed the Patient Summary Reports.     I have reviewed the Nursing Notes.   I have reviewed the Medications.     Review of Systems  Anesthesia Hx:  No problems with previous Anesthesia  History of prior surgery of interest to airway management or planning: Denies Family Hx of Anesthesia complications.   Denies Personal Hx of Anesthesia complications.   Social:  Smoker Pt in smoking cessation program    Hematology/Oncology:  Hematology Normal   Oncology Normal     EENT/Dental:EENT/Dental  Normal   Cardiovascular:   Exercise tolerance: good Hypertension Valvular problems/Murmurs, AS ECG has been reviewed.    Pulmonary:   Sleep Apnea    Renal/:  Renal/ Normal     Hepatic/GI:   GERD Liver Disease,    Musculoskeletal:   Arthritis     Neurological:   Neuromuscular Disease,    Endocrine:   Diabetes, type 2    Dermatological:  Skin Normal    Psych:   depression          Physical Exam  General:  Obesity    Airway/Jaw/Neck:  Airway Findings: Mouth Opening: Normal Tongue: Normal  General Airway Assessment: Adult  Mallampati: II  TM Distance: Normal, at least 6 cm  Jaw/Neck Findings:  Neck ROM: Normal ROM  Neck Findings:  Girth Increased     Eyes/Ears/Nose:  EYES/EARS/NOSE FINDINGS: Normal   Dental:  Dental Findings: In tact   Chest/Lungs:  Chest/Lungs Findings: Normal Respiratory Rate, Clear to auscultation     Heart/Vascular:  Heart Findings: Rate: Normal  Rhythm: Regular Rhythm  Sounds: Normal  Heart Murmur  Vascular Findings:  Carotid Bruit  Carotid Location: Left        Mental Status:  Mental Status Findings:  Cooperative, Alert and Oriented         Anesthesia Plan  Type of Anesthesia, risks & benefits discussed:  Anesthesia Type:  general  Patient's Preference:   Intra-op Monitoring Plan: standard ASA monitors  Intra-op Monitoring Plan Comments:   Post Op Pain Control Plan:   Post Op Pain Control Plan Comments:   Induction:   IV  Beta Blocker:  Patient is on a Beta-Blocker and has received one dose within the past 24 hours (No further documentation required).       Informed Consent: Patient understands risks and agrees with Anesthesia plan.  Questions answered. Anesthesia consent signed with patient.  ASA Score: 3     Day of Surgery Review of History & Physical:    H&P update referred to the provider.         Ready For Surgery From Anesthesia Perspective.

## 2019-07-16 NOTE — PLAN OF CARE
Pt ready for discharge, meets PADSS criteria. Denies nausea/pain at this time. Awaiting Dr Calvin to discuss procedure results with patient and family.

## 2019-07-16 NOTE — ANESTHESIA POSTPROCEDURE EVALUATION
Anesthesia Post Evaluation    Patient: Stephanie Sears    Procedure(s) Performed: Procedure(s) (LRB):  COLONOSCOPY, SINGLE OR DOUBLE BALLOON (N/A)    Final Anesthesia Type: general  Patient location during evaluation: PACU  Patient participation: Yes- Able to Participate  Level of consciousness: awake and alert  Post-procedure vital signs: reviewed and stable  Pain management: adequate  Airway patency: patent  PONV status at discharge: No PONV  Anesthetic complications: no      Cardiovascular status: hemodynamically stable  Respiratory status: unassisted, spontaneous ventilation and room air  Hydration status: euvolemic  Follow-up not needed.          Vitals Value Taken Time   /77 7/16/2019 10:35 AM   Temp 36.5 °C (97.7 °F) 7/16/2019 10:35 AM   Pulse 68 7/16/2019 10:40 AM   Resp 47 7/16/2019 10:40 AM   SpO2 99 % 7/16/2019 10:39 AM   Vitals shown include unvalidated device data.      No case tracking events are documented in the log.      Pain/Som Score: Som Score: 10 (7/16/2019 10:00 AM)

## 2019-07-24 ENCOUNTER — OFFICE VISIT (OUTPATIENT)
Dept: PHYSICAL MEDICINE AND REHAB | Facility: CLINIC | Age: 69
End: 2019-07-24
Payer: MEDICARE

## 2019-07-24 VITALS
HEIGHT: 62 IN | SYSTOLIC BLOOD PRESSURE: 183 MMHG | BODY MASS INDEX: 36.84 KG/M2 | DIASTOLIC BLOOD PRESSURE: 80 MMHG | HEART RATE: 55 BPM | WEIGHT: 200.19 LBS

## 2019-07-24 DIAGNOSIS — M54.2 CHRONIC NECK PAIN: ICD-10-CM

## 2019-07-24 DIAGNOSIS — G89.29 CHRONIC NECK PAIN: ICD-10-CM

## 2019-07-24 DIAGNOSIS — M70.62 TROCHANTERIC BURSITIS OF LEFT HIP: ICD-10-CM

## 2019-07-24 DIAGNOSIS — M54.42 CHRONIC BILATERAL LOW BACK PAIN WITH BILATERAL SCIATICA: Primary | ICD-10-CM

## 2019-07-24 DIAGNOSIS — G89.29 CHRONIC MIDLINE THORACIC BACK PAIN: ICD-10-CM

## 2019-07-24 DIAGNOSIS — M54.6 CHRONIC MIDLINE THORACIC BACK PAIN: ICD-10-CM

## 2019-07-24 DIAGNOSIS — M54.40 CHRONIC BILATERAL LOW BACK PAIN WITH SCIATICA, SCIATICA LATERALITY UNSPECIFIED: ICD-10-CM

## 2019-07-24 DIAGNOSIS — G89.29 CHRONIC BILATERAL LOW BACK PAIN WITH SCIATICA, SCIATICA LATERALITY UNSPECIFIED: ICD-10-CM

## 2019-07-24 DIAGNOSIS — M54.16 LUMBAR RADICULOPATHY: ICD-10-CM

## 2019-07-24 DIAGNOSIS — Z98.890 STATUS POST LUMBAR LAMINECTOMY: ICD-10-CM

## 2019-07-24 DIAGNOSIS — G89.29 CHRONIC BILATERAL LOW BACK PAIN WITH BILATERAL SCIATICA: Primary | ICD-10-CM

## 2019-07-24 DIAGNOSIS — M54.41 CHRONIC BILATERAL LOW BACK PAIN WITH BILATERAL SCIATICA: Primary | ICD-10-CM

## 2019-07-24 DIAGNOSIS — Z98.890 HISTORY OF BACK SURGERY: ICD-10-CM

## 2019-07-24 PROCEDURE — 99214 PR OFFICE/OUTPT VISIT, EST, LEVL IV, 30-39 MIN: ICD-10-PCS | Mod: S$PBB,,, | Performed by: PHYSICAL MEDICINE & REHABILITATION

## 2019-07-24 PROCEDURE — 99214 OFFICE O/P EST MOD 30 MIN: CPT | Mod: S$PBB,,, | Performed by: PHYSICAL MEDICINE & REHABILITATION

## 2019-07-24 PROCEDURE — 99213 OFFICE O/P EST LOW 20 MIN: CPT | Mod: PBBFAC | Performed by: PHYSICAL MEDICINE & REHABILITATION

## 2019-07-24 PROCEDURE — 99999 PR PBB SHADOW E&M-EST. PATIENT-LVL III: ICD-10-PCS | Mod: PBBFAC,,, | Performed by: PHYSICAL MEDICINE & REHABILITATION

## 2019-07-24 PROCEDURE — 99999 PR PBB SHADOW E&M-EST. PATIENT-LVL III: CPT | Mod: PBBFAC,,, | Performed by: PHYSICAL MEDICINE & REHABILITATION

## 2019-07-24 RX ORDER — GABAPENTIN 600 MG/1
600 TABLET ORAL 3 TIMES DAILY
Qty: 270 TABLET | Refills: 1 | Status: SHIPPED | OUTPATIENT
Start: 2019-07-24 | End: 2020-03-10

## 2019-07-24 RX ORDER — TRAMADOL HYDROCHLORIDE 50 MG/1
50 TABLET ORAL EVERY 8 HOURS PRN
Qty: 90 TABLET | Refills: 1 | Status: SHIPPED | OUTPATIENT
Start: 2019-07-24 | End: 2020-12-07

## 2019-07-24 NOTE — PROGRESS NOTES
Subjective:       Patient ID: Stephanie Sears is a 69 y.o. female.    Chief Complaint: Back Pain    Back Pain   Pertinent negatives include no abdominal pain, chest pain, fever, numbness or weakness. Headaches:     Neck Pain    Pertinent negatives include no chest pain, fever, numbness, trouble swallowing or weakness. Headaches:     Arm Pain    Pertinent negatives include no chest pain or numbness.      Mrs. Sears is 70 y/o female, who returns to clinic for upper back pain.  LCV 03/25/19.  Today, she c/o low back pain,   She is s/p Lumbar spinal L4-5 fusion in 2006, by dr. Alen Rose.   On LCV, she completed PT, and it was helping.     Back Pain Description:  Length: pain is chronic pain. Length > 6 month(s)  No past, recent injury, falls.  Intensity:  CURRENT  4/10,  AVERAGE  Pain   4-5/10. at BEST   3-4/10 , At WORST   7/10 on the WORST day.   Location: pain is localized across lower back, in both buttocks, and running down the both legs.   It is more Back >> leg pain.  Radiation: Positive to buttocks. Positive to legs, back of  Legs, to the knee level.   Timing : intermittent  morning pain , than eases later in day time, get better  with activity, in evening  QUALITY:  Dull/ toothache like pain in back,a nd in legs is more shooting pain.   No neuropathic : burning, tingling, numbness  Negative leg weakness. Can walk 2 blocks, and than gets tired.  Worsening factors: prolong sitting.  Alleviating factors: laying down  Symptoms interfere with daily activity, sleeping and work.   Current medications: Gabapentin 600 mg tid, Mobic,  Tramadol 50 mg 3-4 x/day.  Failed medications:  None   Prior procedures. Back surgery ,multiple JOHN, never helped.  PT/OT: none  Patient denies night fever/night sweats, bowel incontinence, significant weight loss and significant motor weakness ( red flags).  Patient denies any suicidal or homicidal ideations.      Pain medications are helping. She takes Gabapentin 600 mg BID, and  Tramadol 50 mg 3-4 x/day.  She is here for follow up, and chronic pain management.    Past Medical History:   Diagnosis Date    Angina pectoris     Anxiety     Bronchitis     Cataract     Cervical spondylosis with radiculopathy 7/10/2012    Chest pain     Chronic neck pain 7/26/2012    Depression     Fibrocystic breast     GERD (gastroesophageal reflux disease)     Glaucoma     Hyperlipidemia     Hypertension     Neck pain 7/10/2012    JAM (obstructive sleep apnea)     Primary osteoarthritis of both knees     Psoriasis     Subacromial or subdeltoid bursitis 7/10/2012    Thyroid disease     Type 2 diabetes mellitus 12/10/2013       Past Surgical History:   Procedure Laterality Date    ARTHROSCOPY - LEFT THUMB CMCJ - LINVATEC - CMC Left 12/7/2016    Performed by Marline Carney [facility] at [facility] OR 1ST FLR    BLOCK-NERVE Right 2/27/2018    Performed by Teresa Surgeon at [facility] TERESA    BREAST LUMPECTOMY Left 1988    [facility] breast surgery  1988    BREAST MASS EXCISION Right     benign    CHOLECYSTECTOMY      CHOLECYSTECTOMY, LAPAROSCOPIC N/A 1/28/2013    Performed by Shay Cordova MD at [facility] OR 2ND FLR    COLONOSCOPY N/A 5/22/2019    Performed by Darrin Calvin MD at [facility] ENDO (2ND FLR)    COLONOSCOPY, SINGLE OR DOUBLE BALLOON N/A 7/16/2019    Performed by Darrin Calvin MD at [facility] ENDO (2ND FLR)    HYSTERECTOMY  1980's    KNEE SURGERY Left 1-20-16    T[facility]    KNEE SURGERY Right 02/26/2018    TKR    L4-L5 fusion  2006    REPLACEMENT-KNEE-TOTAL Right 2/26/2018    Performed by [facility] Jr., MD at [facility] OR 2ND FLR    REPLACEMENT-KNEE-TOTAL Left 1/20/2016    [facility]formed by [facility] at [facility] OR 2ND FLR       Family History   Problem Relation Age of Onset    Diabetes Mother     Hypertension Mother         CHF, angina    Angina Mother     Kidney disease Mother     Heart disease Mother         CHF    Hypertension Father         glaucoma, Alzhiemer's , supra  pubic    Alzheimer's disease Father     Glaucoma Father     Kidney disease Brother         kidney transplant, HTN,DM    Diabetes Brother     Glaucoma Sister     Hypertension Sister     Hyperlipidemia Sister     Gout Son     Hypertension Son     Diabetes Son     Sleep apnea Sister     Hypertension Sister     Thyroid disease Sister     No Known Problems Sister     No Known Problems Sister     Hepatitis Brother     Amblyopia Neg Hx     Blindness Neg Hx     Melanoma Neg Hx        Social History     Socioeconomic History    Marital status:      Spouse name: Not on file    Number of children: Not on file    Years of education: Not on file    Highest education level: Not on file   Occupational History    Not on file   Social Needs    Financial resource strain: Not on file    Food insecurity:     Worry: Not on file     Inability: Not on file    Transportation needs:     Medical: Not on file     Non-medical: Not on file   Tobacco Use    Smoking status: Current Every Day Smoker     Packs/day: 1.00     Years: 40.00     Pack years: 40.00     Types: Cigarettes    Smokeless tobacco: Never Used    Tobacco comment: smokes a pack a week   Substance and Sexual Activity    Alcohol use: No     Alcohol/week: 0.0 oz    Drug use: No    Sexual activity: Yes     Partners: Male   Lifestyle    Physical activity:     Days per week: Not on file     Minutes per session: Not on file    Stress: Not on file   Relationships    Social connections:     Talks on phone: Not on file     Gets together: Not on file     Attends [employer] service: Not on file     Active member of club or organization: Not on file     Attends meetings of clubs or organizations: Not on file     Relationship status: Not on file   Other Topics Concern    Are you pregnant or think you may be? Not Asked    Breast-feeding Not Asked   Social History Narrative    Not on file       Current Outpatient Medications   Medication Sig Dispense  Refill    albuterol (PROAIR HFA) 90 mcg/actuation inhaler Inhale 2 puffs into the lungs every 6 (six) hours as needed for Wheezing or Shortness of Breath. 1 Inhaler 3    amlodipine-benazepril (LOTREL) 10-40 mg per capsule Take 1 capsule by mouth once daily. 90 capsule 1    atorvastatin (LIPITOR) 20 MG tablet Take 1 tablet (20 mg total) by mouth once daily. 90 tablet 1    benzonatate (TESSALON) 200 MG capsule Take 1 capsule (200 mg total) by mouth 3 (three) times daily as needed for Cough. 60 capsule 1    blood sugar diagnostic (ACCU-CHEK JOSE G PLUS TEST STRP) Strp Inject 1 each into the skin 2 (two) times daily. 200 each 11    blood sugar diagnostic Strp Use to test blood glucose levels 2 times per day as instructed. 200 strip 11    CALCIUM CARBONATE (TUMS ORAL) Take by mouth as needed (acid reflux, indigestion).      carvedilol (COREG) 12.5 MG tablet Take 1 tablet (12.5 mg total) by mouth 2 (two) times daily with meals. 180 tablet 1    clobetasol (TEMOVATE) 0.05 % cream Apply topically 2 (two) times daily. To rash on left lower leg 60 g 2    diphenhydrAMINE (BENADRYL) 25 mg capsule Take 25 mg by mouth every 6 (six) hours as needed for Itching or Allergies.      famotidine (PEPCID) 20 MG tablet Take 1 tablet (20 mg total) by mouth 2 (two) times daily. 60 tablet 0    fluticasone (FLONASE) 50 mcg/actuation nasal spray 1 spray by Each Nare route 2 (two) times daily. 1 Bottle 1    furosemide (LASIX) 40 MG tablet Take 1 tablet (40 mg total) by mouth once daily. 90 tablet 1    gabapentin (NEURONTIN) 600 MG tablet Take 1 tablet (600 mg total) by mouth 3 (three) times daily. 270 tablet 1    INULIN (FIBER GUMMIES ORAL) Take by mouth every morning.       lancets (ACCU-CHEK MULTICLIX LANCET) Misc Test blood sugar twice daily 300 each 3    lancets (ONETOUCH DELICA LANCETS) 33 gauge Misc Inject 1 lancet into the skin 2 (two) times daily before meals. 200 each 11    meloxicam (MOBIC) 7.5 MG tablet Take 1 tablet  (7.5 mg total) by mouth once daily. 90 tablet 1    nicotine (NICODERM CQ) 21 mg/24 hr Place 1 patch onto the skin once daily. 28 patch 0    nicotine polacrilex (NICORETTE) 4 MG Gum 1-2 per hour in place of a cigarette. Limit to 15 a day - oral.please dispense cool mint flavor, thanks 168 each 0    traMADol (ULTRAM) 50 mg tablet Take 1 tablet (50 mg total) by mouth every 8 (eight) hours as needed. 90 tablet 1    triamcinolone acetonide 0.1% (KENALOG) 0.1 % ointment KENDRA AA ON THE SKIN BID ON RASH ON LEGS  0     No current facility-administered medications for this visit.        Review of patient's allergies indicates:   Allergen Reactions    Hydrocodone Shortness Of Breath     Pt states can take oxycodone    Iodinated contrast- oral and iv dye Shortness Of Breath     Difficulty breathing    Adhesive Itching    Sulfa (sulfonamide antibiotics) Hives and Itching           Review of Systems   Constitutional: Negative for appetite change, chills, fatigue, fever and unexpected weight change.   HENT: Negative for drooling, trouble swallowing and voice change.    Eyes: Negative for pain and visual disturbance.   Respiratory: Negative for shortness of breath and wheezing.    Cardiovascular: Negative for chest pain and palpitations.   Gastrointestinal: Negative for abdominal distention, abdominal pain, constipation and diarrhea.   Genitourinary: Negative for difficulty urinating.   Musculoskeletal: Positive for back pain and neck pain. Negative for gait problem, joint swelling, myalgias and neck stiffness. Arthralgias: Rt knee pain, s/p Lt TKA,  Lt thumb pain, she is                Skin: Negative for color change and rash.   Neurological: Negative for dizziness, facial asymmetry, speech difficulty, weakness, light-headedness and numbness. Headaches:     Hematological: Negative for adenopathy.   Psychiatric/Behavioral: Negative for behavioral problems, confusion and sleep disturbance. The patient is not nervous/anxious.         Objective:      Physical Exam      GENERAL: The patient is alert, oriented, pleasant.   HEENT; PERRLA  NECK: supple,  No masses  MUSCULOSKELETAL:   Gait is normal .  Cervical spine :  Minimally decreased AROM in cervical spine.  Lumbar spine, decreased active range of motion in all planes, flexion to 90 degrees , ext. 0.  Side bending and rotation to 35-40 degrees.   Positive facet loading b/l.  Positive tenderness in lumbar paravertebral musculature, b/l.  Straight leg raising Negative bilaterally.   Full range of motion in all joints x4 extremities,    Muscle strength 5/5 throughout x4 extremities.   No  joint laxity throughout x4 extremities.   NEUROLOGIC: Cranial nerves II through XII intact.   Deep tendon reflexes is normal, +2 in the upper and lower extremities bilaterally.   Muscle tone is normal.   Sensory is intact to light touch and pinprick throughout x4 extremities.    Xray of Lumbar spine ( 01/23/19) showed:  The lumbar vertebra are intact.  No compression fracture or obvious paraspinal lesion is detected.  Degenerative changes are present with small osteophytes at most levels. There is progressive disc space narrowing at L3-4; this level also shows development of a grade 1 anterolisthesis, stable between flexion and extension.    The other lumbar disc spaces show no significant narrowing, but lower thoracic disc spaces are narrowed, some with vacuum disc.  Postoperative findings from posterior instrumented fusion are again seen at L4 and 5 with bilateral pedicle screws, vertical connecting rods, and a device in the disc space.  Impression:  Stable postoperative findings of L4-5 fusion  Degenerative spine changes with interval worsening at L3-4    Xray of Lumbar spine ( 2014) showed:  Postoperative changes of spinal fusion with associated pedicle screws identified at L4 and L5 levels.  The position and alignment is satisfactory.    Mild DJD.  No fracture or bone destruction identified.  No  spondylolisthesis evident.         Assessment:       1. Chronic bilateral low back pain with bilateral sciatica    2. Status post lumbar laminectomy    3. Chronic bilateral low back pain with sciatica, sciatica laterality unspecified    4. Chronic neck pain    5. Chronic midline thoracic back pain    6. History of back surgery    7. Lumbar radiculopathy    8. Trochanteric bursitis of left hip        Plan:       Chronic bilateral low back pain with bilateral sciatica  -     traMADol (ULTRAM) 50 mg tablet; Take 1 tablet (50 mg total) by mouth every 8 (eight) hours as needed.  Dispense: 90 tablet; Refill: 1    Status post lumbar laminectomy  -     traMADol (ULTRAM) 50 mg tablet; Take 1 tablet (50 mg total) by mouth every 8 (eight) hours as needed.  Dispense: 90 tablet; Refill: 1    Chronic bilateral low back pain with sciatica, sciatica laterality unspecified  -     traMADol (ULTRAM) 50 mg tablet; Take 1 tablet (50 mg total) by mouth every 8 (eight) hours as needed.  Dispense: 90 tablet; Refill: 1    Chronic neck pain  -     traMADol (ULTRAM) 50 mg tablet; Take 1 tablet (50 mg total) by mouth every 8 (eight) hours as needed.  Dispense: 90 tablet; Refill: 1  -     gabapentin (NEURONTIN) 600 MG tablet; Take 1 tablet (600 mg total) by mouth 3 (three) times daily.  Dispense: 270 tablet; Refill: 1    Chronic midline thoracic back pain  -     traMADol (ULTRAM) 50 mg tablet; Take 1 tablet (50 mg total) by mouth every 8 (eight) hours as needed.  Dispense: 90 tablet; Refill: 1    History of back surgery  -     traMADol (ULTRAM) 50 mg tablet; Take 1 tablet (50 mg total) by mouth every 8 (eight) hours as needed.  Dispense: 90 tablet; Refill: 1    Lumbar radiculopathy  -     traMADol (ULTRAM) 50 mg tablet; Take 1 tablet (50 mg total) by mouth every 8 (eight) hours as needed.  Dispense: 90 tablet; Refill: 1    Trochanteric bursitis of left hip  -     traMADol (ULTRAM) 50 mg tablet; Take 1 tablet (50 mg total) by mouth every 8  (eight) hours as needed.  Dispense: 90 tablet; Refill: 1      Patient with chronic back pain.    --  Xray of Lumbar spine results discussed.    -- Pain management   Will resume Gabapentin bid, Tramadol prn, Mobic daily.      RTC in 4 months.    Total time spent face to face with patient was 25 minutes.   More than 50% of that time was spent in counseling on diagnosis , prognosis and treatment options.   I also  patient  on common and most usual side effect of prescribed medications.   Risk and benefits of opiates, possible risk of developing opiate dependence and tolerance, need of strict compliance with prescribed medications.  Pain contract, rules and obligations were discussed with patient in details.  She is aware that I would be the only doctor prescribing her pain medications and ED in a case of emergency.  I reviewed Primary care , and other specialty's notes to better coordinate patient's  care.   All questions were answered, and patient voiced understanding.

## 2019-07-25 ENCOUNTER — CLINICAL SUPPORT (OUTPATIENT)
Dept: SMOKING CESSATION | Facility: CLINIC | Age: 69
End: 2019-07-25
Payer: COMMERCIAL

## 2019-07-25 DIAGNOSIS — F17.200 NICOTINE DEPENDENCE: Primary | ICD-10-CM

## 2019-07-25 PROCEDURE — 99999 PR PBB SHADOW E&M-EST. PATIENT-LVL I: ICD-10-PCS | Mod: PBBFAC,,,

## 2019-07-25 PROCEDURE — 99404 PREV MED CNSL INDIV APPRX 60: CPT | Mod: S$GLB,,,

## 2019-07-25 PROCEDURE — 99999 PR PBB SHADOW E&M-EST. PATIENT-LVL I: CPT | Mod: PBBFAC,,,

## 2019-07-25 PROCEDURE — 99404 PR PREVENT COUNSEL,INDIV,60 MIN: ICD-10-PCS | Mod: S$GLB,,,

## 2019-07-25 NOTE — PROGRESS NOTES
Individual Follow-Up Form    7/25/2019    Quit Date:     Clinical Status of Patient: Outpatient    Length of Service: 60 minutes    Continuing Medication: yes  Patches    Other Medications: -  none   Target Symptoms: Withdrawal and medication side effects. The following were  rated moderate (3) to severe (4) on TCRS:  · Moderate (3): none  · Severe (4): none    Comments: Patient presents for follow up smoking 12 cigarettes per day. Pt remains on tobacco cessation medication of  21 mg nicotine patch QD No adverse effects noted at this time. Pt is having some family stress at this time and feels this is making it difficult to quit .Pt encouraged to pick a quit day.  Reviewed coping strategies/habitual behavior/relapse prevention with patient. Exhaled carbon monoxide level was 7 ppm per Smokerlyzer  ( non- smoker = 0-5 ppm .)  Will see pt back in office in 2 weeks      Diagnosis: F17.200    Next Visit: 2 weeks

## 2019-07-31 ENCOUNTER — CLINICAL SUPPORT (OUTPATIENT)
Dept: SMOKING CESSATION | Facility: CLINIC | Age: 69
End: 2019-07-31
Payer: COMMERCIAL

## 2019-07-31 DIAGNOSIS — F17.200 NICOTINE DEPENDENCE: Primary | ICD-10-CM

## 2019-07-31 PROCEDURE — 99999 PR PBB SHADOW E&M-EST. PATIENT-LVL I: CPT | Mod: PBBFAC,,,

## 2019-07-31 PROCEDURE — 99999 PR PBB SHADOW E&M-EST. PATIENT-LVL I: ICD-10-PCS | Mod: PBBFAC,,,

## 2019-07-31 PROCEDURE — 99404 PREV MED CNSL INDIV APPRX 60: CPT | Mod: S$GLB,,,

## 2019-07-31 PROCEDURE — 99404 PR PREVENT COUNSEL,INDIV,60 MIN: ICD-10-PCS | Mod: S$GLB,,,

## 2019-07-31 NOTE — PROGRESS NOTES
Individual Follow-Up Form    7/31/2019    Quit Date: 8/1/19    Clinical Status of Patient: Outpatient    Length of Service: 60 minutes    Continuing Medication: yes  Patches    Other Medications: nrt gum     Target Symptoms: Withdrawal and medication side effects. The following were  rated moderate (3) to severe (4) on TCRS:  · Moderate (3): none  · Severe (4): none    Comments: Patient presents for follow up smoking 5-10 cigarettes per day. Pt remains on tobacco cessation medication of  21 mg nicotine patch QD and 4 mg nicotine gum PRN (1-2 per hour in place of cigarettes.) No adverse effects noted at this time. Pt states she is having trouble with boredom, when the weather is bad and she is confined to the home.  Discussed ways to cope with boredom.   Reviewed coping strategies/habitual behavior/relapse prevention with patient. Exhaled carbon monoxide level was () ppm per Smokerlyzer  ( non- smoker = 0-5 ppm .)  Will see pt back in office in 2 weeks  10/16    Diagnosis: F17.200    Next Visit: 2 weeks

## 2019-08-01 ENCOUNTER — OFFICE VISIT (OUTPATIENT)
Dept: DERMATOLOGY | Facility: CLINIC | Age: 69
End: 2019-08-01
Payer: MEDICARE

## 2019-08-01 DIAGNOSIS — L81.0 POST-INFLAMMATORY HYPERPIGMENTATION: Primary | ICD-10-CM

## 2019-08-01 DIAGNOSIS — L43.9 LICHEN PLANUS: ICD-10-CM

## 2019-08-01 PROCEDURE — 99213 OFFICE O/P EST LOW 20 MIN: CPT | Mod: S$PBB,,, | Performed by: DERMATOLOGY

## 2019-08-01 PROCEDURE — 99999 PR PBB SHADOW E&M-EST. PATIENT-LVL III: CPT | Mod: PBBFAC,,, | Performed by: DERMATOLOGY

## 2019-08-01 PROCEDURE — 99213 OFFICE O/P EST LOW 20 MIN: CPT | Mod: PBBFAC | Performed by: DERMATOLOGY

## 2019-08-01 PROCEDURE — 99213 PR OFFICE/OUTPT VISIT, EST, LEVL III, 20-29 MIN: ICD-10-PCS | Mod: S$PBB,,, | Performed by: DERMATOLOGY

## 2019-08-01 PROCEDURE — 99999 PR PBB SHADOW E&M-EST. PATIENT-LVL III: ICD-10-PCS | Mod: PBBFAC,,, | Performed by: DERMATOLOGY

## 2019-08-01 RX ORDER — CLOBETASOL PROPIONATE 0.5 MG/G
CREAM TOPICAL 2 TIMES DAILY
Qty: 60 G | Refills: 2 | Status: SHIPPED | OUTPATIENT
Start: 2019-08-01 | End: 2019-10-17

## 2019-08-01 NOTE — PROGRESS NOTES
Subjective:       Patient ID:  Stephanie Sears is a 69 y.o. female who presents for   Chief Complaint   Patient presents with    Rash     Follow up, improving, no new concerns      69F here for evaluation and management of biopsy proven lichen planus. It started shortly after her right knee replacement 3/2018. She developed some thickened, itchy areas over her lower leg. Biopsy was consistent with LP.    Patient was given Clobetasol to use under occlusion which she has used without occlusion. She notes improvement after 3 weeks of use. She is still using it regularly bid.     She also has a history of gold fillings and is currently experiencing some tooth pain. She is on multiple medications for management of her comorbidities (HTN, HLD, DM), including carvedilol. No other prosthetic joints other than her right knee.    At this time she is most disturbed the discoloration from the rash.  Two new spots on right ankle.  No vaginal lesions have developed.      Review of Systems   Skin: Negative for daily sunscreen use, activity-related sunscreen use, recent sunburn and wears hat.        Objective:    Physical Exam   Constitutional: She appears well-developed and well-nourished.   Neurological: She is alert and oriented to person, place, and time.   Psychiatric: She has a normal mood and affect.   Skin:   Areas Examined (abnormalities noted in diagram):   Head / Face Inspection Performed  RLE Inspected  LLE Inspection Performed                       Diagram Legend     Erythematous scaling macule/papule c/w actinic keratosis       Vascular papule c/w angioma      Pigmented verrucoid papule/plaque c/w seborrheic keratosis      Yellow umbilicated papule c/w sebaceous hyperplasia      Irregularly shaped tan macule c/w lentigo     1-2 mm smooth white papules consistent with Milia      Movable subcutaneous cyst with punctum c/w epidermal inclusion cyst      Subcutaneous movable cyst c/w pilar cyst      Firm pink to brown  papule c/w dermatofibroma      Pedunculated fleshy papule(s) c/w skin tag(s)      Evenly pigmented macule c/w junctional nevus     Mildly variegated pigmented, slightly irregular-bordered macule c/w mildly atypical nevus      Flesh colored to evenly pigmented papule c/w intradermal nevus       Pink pearly papule/plaque c/w basal cell carcinoma      Erythematous hyperkeratotic cursted plaque c/w SCC      Surgical scar with no sign of skin cancer recurrence      Open and closed comedones      Inflammatory papules and pustules      Verrucoid papule consistent consistent with wart     Erythematous eczematous patches and plaques     Dystrophic onycholytic nail with subungual debris c/w onychomycosis     Umbilicated papule    Erythematous-base heme-crusted tan verrucoid plaque consistent with inflamed seborrheic keratosis     Erythematous Silvery Scaling Plaque c/w Psoriasis     See annotation      Assessment / Plan:        Post-inflammatory hyperpigmentation  Will take months to resolve  Encouraged sun protection       Lichen planus - improving    For new spots on right ankle - clobetasol twice daily until resolved.  For older spots on leg - clobetasol twice daily WEEKENDS ONLY.  During week to entire leg (can use on both legs): AMLACTIN ULTRA over the counter - cheapest at [facility]  Remember to keep covered/sunblock if in the sun  Makeup brands - Dermablend, CoverFX - for cosmetic coverup    -     clobetasol (TEMOVATE) 0.05 % cream; Apply topically 2 (two) times daily. To rash on left lower leg  Dispense: 60 g; Refill: 2    Plaquenil in future pt to call if flares         Follow up if symptoms worsen or fail to improve.

## 2019-08-01 NOTE — PATIENT INSTRUCTIONS
INSTRUCTIONS FROM DR. CARLIN 8/1/19 -     For new spots on right ankle - clobetasol twice daily until resolved.  For older spots on leg - clobetasol twice daily WEEKENDS ONLY.  During week to entire leg (can use on both legs): AMLACTIN ULTRA over the counter - cheapest at [facility]  Remember to keep covered/sunblock if in the sun  Makeup brands - Dermablend, CoverFX -

## 2019-08-05 ENCOUNTER — OUTPATIENT CASE MANAGEMENT (OUTPATIENT)
Dept: ADMINISTRATIVE | Facility: OTHER | Age: 69
End: 2019-08-05

## 2019-08-05 DIAGNOSIS — E78.5 DYSLIPIDEMIA: ICD-10-CM

## 2019-08-05 DIAGNOSIS — E11.9 DIET-CONTROLLED DIABETES MELLITUS: Primary | ICD-10-CM

## 2019-08-05 DIAGNOSIS — G89.29 CHRONIC MIDLINE THORACIC BACK PAIN: ICD-10-CM

## 2019-08-05 DIAGNOSIS — M54.6 CHRONIC MIDLINE THORACIC BACK PAIN: ICD-10-CM

## 2019-08-05 NOTE — PROGRESS NOTES
Please note, patient's information has been sent to Outpatient Care Management for high risk screening.    Reason for Referral:   Chronic pain  Diabetes diet controlled  dyslipidemia  Risk score 89    Please contact [facility] with any questions (ext. [phone]).    Thank you,    Izabella Brown RN,CCM

## 2019-08-05 NOTE — Clinical Note
Patient has become extremely upset and verbally abusive to staff when orders were placed on her in the past without her knowledge. I have never discussed this type of referral with her.

## 2019-08-07 ENCOUNTER — TELEPHONE (OUTPATIENT)
Dept: FAMILY MEDICINE | Facility: CLINIC | Age: 69
End: 2019-08-07

## 2019-08-07 DIAGNOSIS — Z12.39 BREAST CANCER SCREENING: Primary | ICD-10-CM

## 2019-08-07 NOTE — TELEPHONE ENCOUNTER
----- Message from Nba Au sent at 8/7/2019 12:32 PM CDT -----  Contact: pt  Needs Advice    Reason for call: the pt is calling to have orders entered into the system for her yearly mmg.         Communication Preference: [phone]     Additional Information:

## 2019-08-07 NOTE — TELEPHONE ENCOUNTER
No answer, LVM for patient to contact the clinic to schedule mammogram. Orders placed.    *PATIENT NEEDS TO HAVE MAMMOGRAM SCHEDULED.

## 2019-08-13 ENCOUNTER — CLINICAL SUPPORT (OUTPATIENT)
Dept: SMOKING CESSATION | Facility: CLINIC | Age: 69
End: 2019-08-13
Payer: COMMERCIAL

## 2019-08-13 DIAGNOSIS — F17.200 NICOTINE DEPENDENCE: Primary | ICD-10-CM

## 2019-08-13 PROCEDURE — 99999 PR PBB SHADOW E&M-EST. PATIENT-LVL I: CPT | Mod: PBBFAC,,,

## 2019-08-13 PROCEDURE — 99404 PR PREVENT COUNSEL,INDIV,60 MIN: ICD-10-PCS | Mod: S$GLB,,,

## 2019-08-13 PROCEDURE — 99999 PR PBB SHADOW E&M-EST. PATIENT-LVL I: ICD-10-PCS | Mod: PBBFAC,,,

## 2019-08-13 PROCEDURE — 99404 PREV MED CNSL INDIV APPRX 60: CPT | Mod: S$GLB,,,

## 2019-08-13 RX ORDER — DM/P-EPHED/ACETAMINOPH/DOXYLAM 30-7.5/3
LIQUID (ML) ORAL
Qty: 144 LOZENGE | Refills: 2 | Status: SHIPPED | OUTPATIENT
Start: 2019-08-13 | End: 2019-09-10 | Stop reason: SDUPTHER

## 2019-08-13 NOTE — PROGRESS NOTES
Individual Follow-Up Form    8/13/2019    Quit Date: 9/1/19    Clinical Status of Patient: Outpatient    Length of Service: 60 minutes    Continuing Medication: yes  Patches    Other Medications: lozenge     Target Symptoms: Withdrawal and medication side effects. The following were  rated moderate (3) to severe (4) on TCRS:  · Moderate (3): none  · Severe (4): none    Comments: Patient presents for follow up smoking, she is smoking 6 cigarettes a day . Pt remains on tobacco cessation medication of  21 mg nicotine patch QD and 2 mg nicotine lozenge  PRN (1-2 per hour in place of cigarettes.) No adverse effects noted at this time.   Pt encouraged to pick a quit day.  Reviewed coping strategies/habitual behavior/relapse prevention with patient. Exhaled carbon monoxide level was 30 ppm per Smokerlyzer  ( non- smoker = 0-5 ppm .)   Patient is still struggling with a quit day, encouraged her to just do a trial at this point.  Will see pt back in office in 2 weeks    Diagnosis: F17.200    Next Visit: 2 weeks

## 2019-08-20 ENCOUNTER — CLINICAL SUPPORT (OUTPATIENT)
Dept: SMOKING CESSATION | Facility: CLINIC | Age: 69
End: 2019-08-20
Payer: COMMERCIAL

## 2019-08-20 DIAGNOSIS — F17.200 NICOTINE DEPENDENCE: Primary | ICD-10-CM

## 2019-08-20 PROCEDURE — 99402 PREV MED CNSL INDIV APPRX 30: CPT | Mod: S$GLB,,,

## 2019-08-20 PROCEDURE — 99999 PR PBB SHADOW E&M-EST. PATIENT-LVL I: CPT | Mod: PBBFAC,,,

## 2019-08-20 PROCEDURE — 99999 PR PBB SHADOW E&M-EST. PATIENT-LVL I: ICD-10-PCS | Mod: PBBFAC,,,

## 2019-08-20 PROCEDURE — 99402 PR PREVENT COUNSEL,INDIV,30 MIN: ICD-10-PCS | Mod: S$GLB,,,

## 2019-08-20 NOTE — PROGRESS NOTES
Individual Follow-Up Form    8/20/2019    Quit Date: 9/1/19    Clinical Status of Patient: Outpatient    Length of Service: 30 minutes    Continuing Medication: yes  Patches    Other Medications: lozenge     Target Symptoms: Withdrawal and medication side effects. The following were  rated moderate (3) to severe (4) on TCRS:  · Moderate (3): patches sticking  · Severe (4): none    Comments: Telephonic visit,  Patient presents smoking 3 cigarettes per day. Pt remains on tobacco cessation medication of  21 mg nicotine patch QD and 2 mg nicotine lozenge  PRN (1-2 per hour in place of cigarettes.) She just started the mini lozenge and she said she feels those are very effective for her.  No adverse effects noted at this time.  She is having problems with the patches sticking , we discussed several ways to help it stay on including using paper tape.  She has tried to use them on her abodmen but they  Pt doing well with rate reduction and wait times prior to smoking.  Pt encouraged to pick a quit day.  Reviewed coping strategies/habitual behavior/relapse prevention with patient. Will see pt back in office in 2 weeks      Diagnosis: F17.200    Next Visit: 2 weeks

## 2019-08-21 ENCOUNTER — HOSPITAL ENCOUNTER (OUTPATIENT)
Dept: RADIOLOGY | Facility: HOSPITAL | Age: 69
Discharge: HOME | End: 2019-08-21
Attending: FAMILY MEDICINE
Payer: MEDICARE

## 2019-08-21 DIAGNOSIS — Z12.39 BREAST CANCER SCREENING: ICD-10-CM

## 2019-08-21 PROCEDURE — 77067 SCR MAMMO BI INCL CAD: CPT | Mod: 26,,, | Performed by: RADIOLOGY

## 2019-08-21 PROCEDURE — 77067 MAMMO DIGITAL SCREENING BILAT WITH TOMOSYNTHESIS_CAD: ICD-10-PCS | Mod: 26,,, | Performed by: RADIOLOGY

## 2019-08-21 PROCEDURE — 77063 BREAST TOMOSYNTHESIS BI: CPT | Mod: 26,,, | Performed by: RADIOLOGY

## 2019-08-21 PROCEDURE — 77063 MAMMO DIGITAL SCREENING BILAT WITH TOMOSYNTHESIS_CAD: ICD-10-PCS | Mod: 26,,, | Performed by: RADIOLOGY

## 2019-08-21 PROCEDURE — 77067 SCR MAMMO BI INCL CAD: CPT | Mod: TC

## 2019-08-26 ENCOUNTER — TELEPHONE (OUTPATIENT)
Dept: FAMILY MEDICINE | Facility: CLINIC | Age: 69
End: 2019-08-26

## 2019-09-03 ENCOUNTER — CLINICAL SUPPORT (OUTPATIENT)
Dept: SMOKING CESSATION | Facility: CLINIC | Age: 69
End: 2019-09-03
Payer: COMMERCIAL

## 2019-09-03 DIAGNOSIS — F17.200 NICOTINE DEPENDENCE: Primary | ICD-10-CM

## 2019-09-03 PROCEDURE — 99407 BEHAV CHNG SMOKING > 10 MIN: CPT | Mod: S$GLB,,,

## 2019-09-03 PROCEDURE — 99407 PR TOBACCO USE CESSATION INTENSIVE >10 MINUTES: ICD-10-PCS | Mod: S$GLB,,,

## 2019-09-03 RX ORDER — IBUPROFEN 200 MG
1 TABLET ORAL DAILY
Qty: 28 PATCH | Refills: 0 | Status: CANCELLED | OUTPATIENT
Start: 2019-09-03 | End: 2019-10-01

## 2019-09-06 ENCOUNTER — TELEPHONE (OUTPATIENT)
Dept: FAMILY MEDICINE | Facility: CLINIC | Age: 69
End: 2019-09-06

## 2019-09-06 RX ORDER — ALBUTEROL SULFATE 90 UG/1
2 AEROSOL, METERED RESPIRATORY (INHALATION) EVERY 6 HOURS PRN
Qty: 1 INHALER | Refills: 3 | Status: ON HOLD | OUTPATIENT
Start: 2019-09-06 | End: 2020-10-21

## 2019-09-06 RX ORDER — ALBUTEROL SULFATE 90 UG/1
2 AEROSOL, METERED RESPIRATORY (INHALATION) EVERY 4 HOURS PRN
Qty: 1 INHALER | Refills: 2 | Status: SHIPPED | OUTPATIENT
Start: 2019-09-06 | End: 2020-07-20 | Stop reason: SDUPTHER

## 2019-09-06 NOTE — TELEPHONE ENCOUNTER
----- Message from Mainor Espino sent at 9/6/2019  8:10 AM CDT -----  Contact: Self: [phone]  Type: RX Refill Request    Who Called: Self    Refill or New Rx:Refill    RX Name and Strength:albuterol (PROAIR HFA) 90 mcg/actuation inhaler    How is the patient currently taking it? (As Needed)    Is this a 30 day or 90 day RX:90 Day    Preferred Pharmacy with phone number:..      [id number] - [address] AT [address] [address]  Phone: [phone] Fax: [phone]        Local or Mail Order:Local    Ordering Provider:Dr. Rossi    Would the patient rather a call back or a response via [facility]? Callback    Best Call Back Number:[phone]    Additional Information: N/A

## 2019-09-06 NOTE — TELEPHONE ENCOUNTER
----- Message from Dina Thompson LPN sent at 9/6/2019 11:41 AM CDT -----  Contact: pt  Patient requesting a cheaper alternative to Pro-Air. Please advise.   ----- Message -----  From: Kaitlyn Blanton  Sent: 9/6/2019  10:56 AM  To: Flo Kramer Staff    Type: Patient Call Back    Who called:pt    What is the request in detail:needs to speak to nurse in regards to proair. It is too expensive and would like to have something cheaper called in. Call pt    Can the clinic reply by [facility]?    Would the patient rather a call back or a response via [facility]? Call back    Best call back number:[phone]    Additional Information:

## 2019-09-10 ENCOUNTER — CLINICAL SUPPORT (OUTPATIENT)
Dept: SMOKING CESSATION | Facility: CLINIC | Age: 69
End: 2019-09-10
Payer: COMMERCIAL

## 2019-09-10 DIAGNOSIS — F17.200 NICOTINE DEPENDENCE: ICD-10-CM

## 2019-09-10 PROBLEM — R11.10 REGURGITATION OF STOMACH CONTENTS: Status: ACTIVE | Noted: 2019-09-10

## 2019-09-10 PROCEDURE — 99404 PR PREVENT COUNSEL,INDIV,60 MIN: ICD-10-PCS | Mod: S$GLB,,,

## 2019-09-10 PROCEDURE — 99404 PREV MED CNSL INDIV APPRX 60: CPT | Mod: S$GLB,,,

## 2019-09-10 PROCEDURE — 99999 PR PBB SHADOW E&M-EST. PATIENT-LVL I: ICD-10-PCS | Mod: PBBFAC,,,

## 2019-09-10 PROCEDURE — 99999 PR PBB SHADOW E&M-EST. PATIENT-LVL I: CPT | Mod: PBBFAC,,,

## 2019-09-10 RX ORDER — DM/P-EPHED/ACETAMINOPH/DOXYLAM 30-7.5/3
LIQUID (ML) ORAL
Qty: 162 LOZENGE | Refills: 1 | Status: SHIPPED | OUTPATIENT
Start: 2019-09-10 | End: 2020-11-09 | Stop reason: SDUPTHER

## 2019-09-10 NOTE — PROGRESS NOTES
Individual Follow-Up Form    9/10/2019    Quit Date: 9/30/19    Clinical Status of Patient: Outpatient    Length of Service: 60 minutes    Continuing Medication: yes  Patches    Other Medications: nrt lozenge     Target Symptoms: Withdrawal and medication side effects. The following were  rated moderate (3) to severe (4) on TCRS:  · Moderate (3): none  · Severe (4): none     Comments: Patient presents for follow up smoking 3 cigarettes per day. Pt remains on tobacco cessation medication of  21 mg nicotine patch QD and 2 mg nicotine lozenge  PRN (1-2 per hour in place of cigarettes.) No adverse effects noted at this time. Pt wants to be quit by 9/30/19 , she is going on a cruise in October.   Reviewed coping strategies/habitual behavior/relapse prevention with patient. Exhaled carbon monoxide level was 6  ppm per Smokerlyzer  ( non- smoker = 0-5 ppm .)  Will see pt back in office in 2 weeks      Diagnosis: F17.200    Next Visit: 2 weeks

## 2019-10-17 ENCOUNTER — HOSPITAL ENCOUNTER (INPATIENT)
Facility: HOSPITAL | Age: 69
LOS: 2 days | Discharge: HOME | DRG: 439 | End: 2019-10-19
Attending: EMERGENCY MEDICINE | Admitting: HOSPITALIST
Payer: MEDICARE

## 2019-10-17 ENCOUNTER — OFFICE VISIT (OUTPATIENT)
Dept: FAMILY MEDICINE | Facility: CLINIC | Age: 69
DRG: 439 | End: 2019-10-17
Payer: MEDICARE

## 2019-10-17 VITALS
HEART RATE: 75 BPM | HEIGHT: 62 IN | WEIGHT: 199.06 LBS | SYSTOLIC BLOOD PRESSURE: 138 MMHG | RESPIRATION RATE: 17 BRPM | BODY MASS INDEX: 36.63 KG/M2 | TEMPERATURE: 98 F | OXYGEN SATURATION: 97 % | DIASTOLIC BLOOD PRESSURE: 78 MMHG

## 2019-10-17 DIAGNOSIS — K85.90 ACUTE PANCREATITIS WITHOUT INFECTION OR NECROSIS, UNSPECIFIED PANCREATITIS TYPE: Primary | ICD-10-CM

## 2019-10-17 DIAGNOSIS — R10.13 ABDOMINAL PAIN, ACUTE, EPIGASTRIC: ICD-10-CM

## 2019-10-17 DIAGNOSIS — R10.13 EPIGASTRIC PAIN: ICD-10-CM

## 2019-10-17 DIAGNOSIS — R10.13 EPIGASTRIC PAIN: Primary | ICD-10-CM

## 2019-10-17 LAB
ALBUMIN SERPL BCP-MCNC: 3.2 G/DL (ref 3.5–5.2)
ALP SERPL-CCNC: 111 U/L (ref 55–135)
ALT SERPL W/O P-5'-P-CCNC: 18 U/L (ref 10–44)
ANION GAP SERPL CALC-SCNC: 8 MMOL/L (ref 8–16)
AST SERPL-CCNC: 15 U/L (ref 10–40)
BACTERIA #/AREA URNS HPF: ABNORMAL /HPF
BASOPHILS # BLD AUTO: 0.04 K/UL (ref 0–0.2)
BASOPHILS NFR BLD: 0.4 % (ref 0–1.9)
BILIRUB SERPL-MCNC: 0.3 MG/DL (ref 0.1–1)
BILIRUB UR QL STRIP: NEGATIVE
BUN SERPL-MCNC: 14 MG/DL (ref 8–23)
CALCIUM SERPL-MCNC: 9.8 MG/DL (ref 8.7–10.5)
CHLORIDE SERPL-SCNC: 106 MMOL/L (ref 95–110)
CHOLEST SERPL-MCNC: 134 MG/DL (ref 120–199)
CHOLEST/HDLC SERPL: 3.4 {RATIO} (ref 2–5)
CLARITY UR: ABNORMAL
CO2 SERPL-SCNC: 26 MMOL/L (ref 23–29)
COLOR UR: YELLOW
CREAT SERPL-MCNC: 0.9 MG/DL (ref 0.5–1.4)
DIFFERENTIAL METHOD: ABNORMAL
EOSINOPHIL # BLD AUTO: 0.5 K/UL (ref 0–0.5)
EOSINOPHIL NFR BLD: 4.9 % (ref 0–8)
ERYTHROCYTE [DISTWIDTH] IN BLOOD BY AUTOMATED COUNT: 15.1 % (ref 11.5–14.5)
EST. GFR  (AFRICAN AMERICAN): >60 ML/MIN/1.73 M^2
EST. GFR  (NON AFRICAN AMERICAN): >60 ML/MIN/1.73 M^2
GLUCOSE SERPL-MCNC: 123 MG/DL (ref 70–110)
GLUCOSE UR QL STRIP: NEGATIVE
HCT VFR BLD AUTO: 41.4 % (ref 37–48.5)
HDLC SERPL-MCNC: 40 MG/DL (ref 40–75)
HDLC SERPL: 29.9 % (ref 20–50)
HGB BLD-MCNC: 13.1 G/DL (ref 12–16)
HGB UR QL STRIP: ABNORMAL
HYALINE CASTS #/AREA URNS LPF: 0 /LPF
IMM GRANULOCYTES # BLD AUTO: 0.04 K/UL (ref 0–0.04)
IMM GRANULOCYTES NFR BLD AUTO: 0.4 % (ref 0–0.5)
KETONES UR QL STRIP: NEGATIVE
LDLC SERPL CALC-MCNC: 75.6 MG/DL (ref 63–159)
LEUKOCYTE ESTERASE UR QL STRIP: ABNORMAL
LIPASE SERPL-CCNC: 728 U/L (ref 4–60)
LYMPHOCYTES # BLD AUTO: 2.8 K/UL (ref 1–4.8)
LYMPHOCYTES NFR BLD: 30.2 % (ref 18–48)
MCH RBC QN AUTO: 26 PG (ref 27–31)
MCHC RBC AUTO-ENTMCNC: 31.6 G/DL (ref 32–36)
MCV RBC AUTO: 82 FL (ref 82–98)
MICROSCOPIC COMMENT: ABNORMAL
MONOCYTES # BLD AUTO: 0.6 K/UL (ref 0.3–1)
MONOCYTES NFR BLD: 6.9 % (ref 4–15)
NEUTROPHILS # BLD AUTO: 5.3 K/UL (ref 1.8–7.7)
NEUTROPHILS NFR BLD: 57.2 % (ref 38–73)
NITRITE UR QL STRIP: POSITIVE
NONHDLC SERPL-MCNC: 94 MG/DL
NRBC BLD-RTO: 0 /100 WBC
PH UR STRIP: 6 [PH] (ref 5–8)
PLATELET # BLD AUTO: 304 K/UL (ref 150–350)
PMV BLD AUTO: 10.4 FL (ref 9.2–12.9)
POCT GLUCOSE: 125 MG/DL (ref 70–110)
POCT GLUCOSE: 99 MG/DL (ref 70–110)
POTASSIUM SERPL-SCNC: 4.4 MMOL/L (ref 3.5–5.1)
PROT SERPL-MCNC: 7.4 G/DL (ref 6–8.4)
PROT UR QL STRIP: ABNORMAL
RBC # BLD AUTO: 5.04 M/UL (ref 4–5.4)
RBC #/AREA URNS HPF: 25 /HPF (ref 0–4)
SODIUM SERPL-SCNC: 140 MMOL/L (ref 136–145)
SP GR UR STRIP: 1.01 (ref 1–1.03)
TRIGL SERPL-MCNC: 92 MG/DL (ref 30–150)
URN SPEC COLLECT METH UR: ABNORMAL
UROBILINOGEN UR STRIP-ACNC: NEGATIVE EU/DL
WBC # BLD AUTO: 9.26 K/UL (ref 3.9–12.7)
WBC #/AREA URNS HPF: >100 /HPF (ref 0–5)
WBC CLUMPS URNS QL MICRO: ABNORMAL

## 2019-10-17 PROCEDURE — 93010 EKG 12-LEAD: ICD-10-PCS | Mod: ,,, | Performed by: INTERNAL MEDICINE

## 2019-10-17 PROCEDURE — 25000003 PHARM REV CODE 250: Performed by: EMERGENCY MEDICINE

## 2019-10-17 PROCEDURE — 27000190 HC CPAP FULL FACE MASK W/VALVE

## 2019-10-17 PROCEDURE — 80061 LIPID PANEL: CPT

## 2019-10-17 PROCEDURE — 99285 EMERGENCY DEPT VISIT HI MDM: CPT | Mod: 25,27

## 2019-10-17 PROCEDURE — 94761 N-INVAS EAR/PLS OXIMETRY MLT: CPT

## 2019-10-17 PROCEDURE — 11000001 HC ACUTE MED/SURG PRIVATE ROOM

## 2019-10-17 PROCEDURE — 83036 HEMOGLOBIN GLYCOSYLATED A1C: CPT

## 2019-10-17 PROCEDURE — 93005 ELECTROCARDIOGRAM TRACING: CPT

## 2019-10-17 PROCEDURE — 99214 OFFICE O/P EST MOD 30 MIN: CPT | Mod: S$PBB,,, | Performed by: FAMILY MEDICINE

## 2019-10-17 PROCEDURE — 80053 COMPREHEN METABOLIC PANEL: CPT

## 2019-10-17 PROCEDURE — 85025 COMPLETE CBC W/AUTO DIFF WBC: CPT

## 2019-10-17 PROCEDURE — 99214 PR OFFICE/OUTPT VISIT, EST, LEVL IV, 30-39 MIN: ICD-10-PCS | Mod: S$PBB,,, | Performed by: FAMILY MEDICINE

## 2019-10-17 PROCEDURE — 81000 URINALYSIS NONAUTO W/SCOPE: CPT

## 2019-10-17 PROCEDURE — 94660 CPAP INITIATION&MGMT: CPT

## 2019-10-17 PROCEDURE — 99999 PR PBB SHADOW E&M-EST. PATIENT-LVL III: CPT | Mod: PBBFAC,,, | Performed by: FAMILY MEDICINE

## 2019-10-17 PROCEDURE — 63600175 PHARM REV CODE 636 W HCPCS: Performed by: EMERGENCY MEDICINE

## 2019-10-17 PROCEDURE — 63600175 PHARM REV CODE 636 W HCPCS: Performed by: HOSPITALIST

## 2019-10-17 PROCEDURE — 96360 HYDRATION IV INFUSION INIT: CPT

## 2019-10-17 PROCEDURE — 99213 OFFICE O/P EST LOW 20 MIN: CPT | Mod: PBBFAC,PN | Performed by: FAMILY MEDICINE

## 2019-10-17 PROCEDURE — 96361 HYDRATE IV INFUSION ADD-ON: CPT

## 2019-10-17 PROCEDURE — 87086 URINE CULTURE/COLONY COUNT: CPT

## 2019-10-17 PROCEDURE — 83690 ASSAY OF LIPASE: CPT

## 2019-10-17 PROCEDURE — 87088 URINE BACTERIA CULTURE: CPT

## 2019-10-17 PROCEDURE — 25000003 PHARM REV CODE 250: Performed by: PHYSICIAN ASSISTANT

## 2019-10-17 PROCEDURE — 87186 SC STD MICRODIL/AGAR DIL: CPT

## 2019-10-17 PROCEDURE — 63600175 PHARM REV CODE 636 W HCPCS: Performed by: PHYSICIAN ASSISTANT

## 2019-10-17 PROCEDURE — 87077 CULTURE AEROBIC IDENTIFY: CPT

## 2019-10-17 PROCEDURE — 99900035 HC TECH TIME PER 15 MIN (STAT)

## 2019-10-17 PROCEDURE — 99999 PR PBB SHADOW E&M-EST. PATIENT-LVL III: ICD-10-PCS | Mod: PBBFAC,,, | Performed by: FAMILY MEDICINE

## 2019-10-17 PROCEDURE — S0119 ONDANSETRON 4 MG: HCPCS | Mod: PBBFAC,PN | Performed by: FAMILY MEDICINE

## 2019-10-17 PROCEDURE — 93010 ELECTROCARDIOGRAM REPORT: CPT | Mod: ,,, | Performed by: INTERNAL MEDICINE

## 2019-10-17 PROCEDURE — 25000003 PHARM REV CODE 250: Performed by: HOSPITALIST

## 2019-10-17 RX ORDER — DICYCLOMINE HYDROCHLORIDE 10 MG/5ML
20 SOLUTION ORAL
Status: DISCONTINUED | OUTPATIENT
Start: 2019-10-17 | End: 2019-10-17 | Stop reason: HOSPADM

## 2019-10-17 RX ORDER — TRAMADOL HYDROCHLORIDE 50 MG/1
50 TABLET ORAL EVERY 6 HOURS PRN
Status: DISCONTINUED | OUTPATIENT
Start: 2019-10-17 | End: 2019-10-19 | Stop reason: HOSPADM

## 2019-10-17 RX ORDER — PANTOPRAZOLE SODIUM 40 MG/1
40 TABLET, DELAYED RELEASE ORAL 2 TIMES DAILY
Status: DISCONTINUED | OUTPATIENT
Start: 2019-10-17 | End: 2019-10-19 | Stop reason: HOSPADM

## 2019-10-17 RX ORDER — RAMELTEON 8 MG/1
8 TABLET ORAL NIGHTLY PRN
Status: DISCONTINUED | OUTPATIENT
Start: 2019-10-17 | End: 2019-10-19 | Stop reason: HOSPADM

## 2019-10-17 RX ORDER — AMLODIPINE BESYLATE 5 MG/1
10 TABLET ORAL DAILY
Status: DISCONTINUED | OUTPATIENT
Start: 2019-10-17 | End: 2019-10-19 | Stop reason: HOSPADM

## 2019-10-17 RX ORDER — CARVEDILOL 6.25 MG/1
12.5 TABLET ORAL 2 TIMES DAILY WITH MEALS
Status: DISCONTINUED | OUTPATIENT
Start: 2019-10-17 | End: 2019-10-19 | Stop reason: HOSPADM

## 2019-10-17 RX ORDER — DICYCLOMINE HYDROCHLORIDE 10 MG/ML
20 INJECTION INTRAMUSCULAR
Status: DISCONTINUED | OUTPATIENT
Start: 2019-10-17 | End: 2019-10-17

## 2019-10-17 RX ORDER — LIDOCAINE HYDROCHLORIDE 20 MG/ML
10 SOLUTION OROPHARYNGEAL
Status: DISCONTINUED | OUTPATIENT
Start: 2019-10-17 | End: 2019-10-17 | Stop reason: HOSPADM

## 2019-10-17 RX ORDER — ATORVASTATIN CALCIUM 10 MG/1
20 TABLET, FILM COATED ORAL DAILY
Status: DISCONTINUED | OUTPATIENT
Start: 2019-10-18 | End: 2019-10-19 | Stop reason: HOSPADM

## 2019-10-17 RX ORDER — MAG HYDROX/ALUMINUM HYD/SIMETH 200-200-20
60 SUSPENSION, ORAL (FINAL DOSE FORM) ORAL
Status: DISCONTINUED | OUTPATIENT
Start: 2019-10-17 | End: 2019-10-17

## 2019-10-17 RX ORDER — ACETAMINOPHEN 325 MG/1
650 TABLET ORAL EVERY 6 HOURS PRN
Status: DISCONTINUED | OUTPATIENT
Start: 2019-10-17 | End: 2019-10-19 | Stop reason: HOSPADM

## 2019-10-17 RX ORDER — SODIUM CHLORIDE 9 MG/ML
INJECTION, SOLUTION INTRAVENOUS CONTINUOUS
Status: DISCONTINUED | OUTPATIENT
Start: 2019-10-17 | End: 2019-10-17

## 2019-10-17 RX ORDER — IBUPROFEN 200 MG
24 TABLET ORAL
Status: DISCONTINUED | OUTPATIENT
Start: 2019-10-17 | End: 2019-10-19 | Stop reason: HOSPADM

## 2019-10-17 RX ORDER — IBUPROFEN 200 MG
16 TABLET ORAL
Status: DISCONTINUED | OUTPATIENT
Start: 2019-10-17 | End: 2019-10-19 | Stop reason: HOSPADM

## 2019-10-17 RX ORDER — BENAZEPRIL HYDROCHLORIDE 40 MG/1
40 TABLET ORAL DAILY
Status: DISCONTINUED | OUTPATIENT
Start: 2019-10-17 | End: 2019-10-19 | Stop reason: HOSPADM

## 2019-10-17 RX ORDER — POLYETHYLENE GLYCOL 3350 17 G/17G
17 POWDER, FOR SOLUTION ORAL DAILY
Status: DISCONTINUED | OUTPATIENT
Start: 2019-10-17 | End: 2019-10-17

## 2019-10-17 RX ORDER — SODIUM CHLORIDE 0.9 % (FLUSH) 0.9 %
10 SYRINGE (ML) INJECTION
Status: DISCONTINUED | OUTPATIENT
Start: 2019-10-17 | End: 2019-10-19 | Stop reason: HOSPADM

## 2019-10-17 RX ORDER — PANTOPRAZOLE SODIUM 40 MG/1
80 TABLET, DELAYED RELEASE ORAL
Status: COMPLETED | OUTPATIENT
Start: 2019-10-17 | End: 2019-10-17

## 2019-10-17 RX ORDER — ALBUTEROL SULFATE 90 UG/1
2 AEROSOL, METERED RESPIRATORY (INHALATION) EVERY 6 HOURS PRN
Status: DISCONTINUED | OUTPATIENT
Start: 2019-10-17 | End: 2019-10-19 | Stop reason: HOSPADM

## 2019-10-17 RX ORDER — GABAPENTIN 300 MG/1
600 CAPSULE ORAL 3 TIMES DAILY
Status: DISCONTINUED | OUTPATIENT
Start: 2019-10-17 | End: 2019-10-19 | Stop reason: HOSPADM

## 2019-10-17 RX ORDER — AMLODIPINE AND BENAZEPRIL HYDROCHLORIDE 10; 40 MG/1; MG/1
1 CAPSULE ORAL DAILY
Status: DISCONTINUED | OUTPATIENT
Start: 2019-10-17 | End: 2019-10-17

## 2019-10-17 RX ORDER — TRAMADOL HYDROCHLORIDE 50 MG/1
50 TABLET ORAL EVERY 6 HOURS PRN
Status: DISCONTINUED | OUTPATIENT
Start: 2019-10-17 | End: 2019-10-17

## 2019-10-17 RX ORDER — GLUCAGON 1 MG
1 KIT INJECTION
Status: DISCONTINUED | OUTPATIENT
Start: 2019-10-17 | End: 2019-10-19 | Stop reason: HOSPADM

## 2019-10-17 RX ORDER — CLONIDINE HYDROCHLORIDE 0.1 MG/1
0.1 TABLET ORAL EVERY 6 HOURS PRN
Status: DISCONTINUED | OUTPATIENT
Start: 2019-10-17 | End: 2019-10-19 | Stop reason: HOSPADM

## 2019-10-17 RX ORDER — ENOXAPARIN SODIUM 100 MG/ML
40 INJECTION SUBCUTANEOUS EVERY 24 HOURS
Status: DISCONTINUED | OUTPATIENT
Start: 2019-10-17 | End: 2019-10-19 | Stop reason: HOSPADM

## 2019-10-17 RX ORDER — HYDROMORPHONE HYDROCHLORIDE 2 MG/ML
0.5 INJECTION, SOLUTION INTRAMUSCULAR; INTRAVENOUS; SUBCUTANEOUS EVERY 4 HOURS PRN
Status: DISCONTINUED | OUTPATIENT
Start: 2019-10-17 | End: 2019-10-18

## 2019-10-17 RX ORDER — SODIUM CHLORIDE, SODIUM LACTATE, POTASSIUM CHLORIDE, CALCIUM CHLORIDE 600; 310; 30; 20 MG/100ML; MG/100ML; MG/100ML; MG/100ML
INJECTION, SOLUTION INTRAVENOUS CONTINUOUS
Status: DISCONTINUED | OUTPATIENT
Start: 2019-10-17 | End: 2019-10-18

## 2019-10-17 RX ORDER — HYDROMORPHONE HYDROCHLORIDE 2 MG/ML
0.5 INJECTION, SOLUTION INTRAMUSCULAR; INTRAVENOUS; SUBCUTANEOUS
Status: DISCONTINUED | OUTPATIENT
Start: 2019-10-17 | End: 2019-10-17

## 2019-10-17 RX ORDER — INSULIN ASPART 100 [IU]/ML
0-5 INJECTION, SOLUTION INTRAVENOUS; SUBCUTANEOUS
Status: DISCONTINUED | OUTPATIENT
Start: 2019-10-17 | End: 2019-10-19 | Stop reason: HOSPADM

## 2019-10-17 RX ORDER — ONDANSETRON 4 MG/1
8 TABLET, ORALLY DISINTEGRATING ORAL
Status: DISCONTINUED | OUTPATIENT
Start: 2019-10-17 | End: 2019-10-17 | Stop reason: HOSPADM

## 2019-10-17 RX ORDER — MAG HYDROX/ALUMINUM HYD/SIMETH 200-200-20
30 SUSPENSION, ORAL (FINAL DOSE FORM) ORAL
Status: DISCONTINUED | OUTPATIENT
Start: 2019-10-17 | End: 2019-10-17 | Stop reason: HOSPADM

## 2019-10-17 RX ADMIN — SODIUM CHLORIDE: 0.9 INJECTION, SOLUTION INTRAVENOUS at 01:10

## 2019-10-17 RX ADMIN — GABAPENTIN 600 MG: 300 CAPSULE ORAL at 03:10

## 2019-10-17 RX ADMIN — TRAMADOL HYDROCHLORIDE 50 MG: 50 TABLET, COATED ORAL at 08:10

## 2019-10-17 RX ADMIN — SODIUM CHLORIDE, SODIUM LACTATE, POTASSIUM CHLORIDE, AND CALCIUM CHLORIDE: .6; .31; .03; .02 INJECTION, SOLUTION INTRAVENOUS at 03:10

## 2019-10-17 RX ADMIN — DICYCLOMINE HYDROCHLORIDE 20 MG: 10 SOLUTION ORAL at 08:10

## 2019-10-17 RX ADMIN — GABAPENTIN 600 MG: 300 CAPSULE ORAL at 08:10

## 2019-10-17 RX ADMIN — PANTOPRAZOLE SODIUM 80 MG: 40 TABLET, DELAYED RELEASE ORAL at 10:10

## 2019-10-17 RX ADMIN — Medication 30 ML: at 08:10

## 2019-10-17 RX ADMIN — ENOXAPARIN SODIUM 40 MG: 100 INJECTION SUBCUTANEOUS at 05:10

## 2019-10-17 RX ADMIN — CEFTRIAXONE 1 G: 1 INJECTION, SOLUTION INTRAVENOUS at 03:10

## 2019-10-17 RX ADMIN — PANTOPRAZOLE SODIUM 40 MG: 40 TABLET, DELAYED RELEASE ORAL at 08:10

## 2019-10-17 RX ADMIN — HYDROMORPHONE HYDROCHLORIDE 0.5 MG: 2 INJECTION, SOLUTION INTRAMUSCULAR; INTRAVENOUS; SUBCUTANEOUS at 05:10

## 2019-10-17 RX ADMIN — SODIUM CHLORIDE 500 ML: 0.9 INJECTION, SOLUTION INTRAVENOUS at 10:10

## 2019-10-17 RX ADMIN — CARVEDILOL 12.5 MG: 6.25 TABLET, FILM COATED ORAL at 05:10

## 2019-10-17 RX ADMIN — LIDOCAINE HYDROCHLORIDE 10 ML: 20 SOLUTION OROPHARYNGEAL at 08:10

## 2019-10-17 RX ADMIN — RAMELTEON 8 MG: 8 TABLET ORAL at 08:10

## 2019-10-17 RX ADMIN — ONDANSETRON 8 MG: 4 TABLET, ORALLY DISINTEGRATING ORAL at 08:10

## 2019-10-17 RX ADMIN — BENAZEPRIL HYDROCHLORIDE 40 MG: 10 TABLET ORAL at 02:10

## 2019-10-17 RX ADMIN — SODIUM CHLORIDE, SODIUM LACTATE, POTASSIUM CHLORIDE, AND CALCIUM CHLORIDE: .6; .31; .03; .02 INJECTION, SOLUTION INTRAVENOUS at 11:10

## 2019-10-17 RX ADMIN — HYDROMORPHONE HYDROCHLORIDE 0.5 MG: 2 INJECTION, SOLUTION INTRAMUSCULAR; INTRAVENOUS; SUBCUTANEOUS at 01:10

## 2019-10-17 RX ADMIN — AMLODIPINE BESYLATE 10 MG: 5 TABLET ORAL at 02:10

## 2019-10-17 NOTE — ASSESSMENT & PLAN NOTE
Lab Results   Component Value Date    HGBA1C 6.2 (H) 05/29/2019     Sliding scale insulin, advance diet as tolerated to diabetic. POCT glucose checks

## 2019-10-17 NOTE — HPI
Stephanie Sears 69 y.o. female with HTN, HLD, DM2, spinal setonosis, and GERD presents to the hospital with a chief complaint of abdominal pain. She reports since Monday she has had epigastric abdominal pain that is constant without radiation and described as a soreness. It is not worsened with eating. She saw her PCP today who recommended presentation to the ED as pain was not controlled with attempted treatment there. She denies any new medications. She states her newest medication is Lipitor that was recently restarted, but she has been on it for 8 to 9 years previously. She is s/p cholecystectomy. She is a borderline diabetic who is diet controlled. She smokes 1ppd. She had a previous episode of pancreatitis almost 20 years ago, that an etiology was never found. She denies alcohol or recreational drug use. She denies fever, chest pain, SOB, N/V/D, dizziness, syncope, hematemesis. She does report dysuria.    In the ED, LFT's and bilirubin within normal limits, lipse 728, , TG 92, afebrile without leukocytosis, UA with evidence of infection.

## 2019-10-17 NOTE — ASSESSMENT & PLAN NOTE
She reports pain with urination. UA today with nitrites, leukocytes, WBC, and moderate bacteria.   Will start on Rocephin  Culture pending

## 2019-10-17 NOTE — PLAN OF CARE
10/17/19 1456   Discharge Assessment   Assessment Type Discharge Planning Assessment   Confirmed/corrected address and phone number on facesheet? Yes   Assessment information obtained from? Patient   Prior to hospitilization cognitive status: Alert/Oriented   Prior to hospitalization functional status: Assistive Equipment   Current cognitive status: Alert/Oriented   Current Functional Status: Assistive Equipment   Facility Arrived From: home   Lives With alone   Able to Return to Prior Arrangements yes   Is patient able to care for self after discharge? Unable to determine at this time (comments)   Patient's perception of discharge disposition home or selfcare   Readmission Within the Last 30 Days no previous admission in last 30 days   Patient currently being followed by outpatient case management? No   Patient currently receives any other outside agency services? No   Equipment Currently Used at Home walker, rolling;CPAP;cane, straight;bedside commode   Do you have any problems affording any of your prescribed medications? No   Is the patient taking medications as prescribed? yes   Does the patient have transportation home? Yes   Transportation Anticipated car, drives self;family or friend will provide   Dialysis Name and Scheduled days N/A   Does the patient receive services at the [facility]? No   Discharge Plan A Home with family   Discharge Plan B Home with family   DME Needed Upon Discharge  none   Patient/Family in Agreement with Plan yes         [facility] - [place], [place] - [address] [place] [address]  [address] [facility] [address]  Phone: [phone] Fax: [phone]    [facility] #[id number] - [place] [address] [place] AT [address] [place] [address]  Phone: [phone] Fax: [phone]

## 2019-10-17 NOTE — ASSESSMENT & PLAN NOTE
Patient counseled on negative health effects of obesity. Will recommend lifestyle modifications outpatient.

## 2019-10-17 NOTE — PROGRESS NOTES
Routine Office Visit    Patient Name: Stephanie Sears    : 1950  MRN: [id number]    Subjective:  Stephanie is a 69 y.o. female who presents today for:   Chief Complaint   Patient presents with    Abdominal Pain     4 day onset     69-year-old female with hypertension, diabetes, and his smoker, comes in with complaint of epigastric pain for four days.  She reports that the only difference in her diet prior to the pain starting was that she had some fried oysters.  She reports that the pain is constant.  Nothing has improved pain. It does not radiate from the epigastric area.  The she has associated nausea.  She reports no changes in her stools.  She reports no fevers or chills.  She has not passed out.  She reports no changes in urine.  She reports no chest pain, palpitations, or shortness of breath.    Past Medical History  Past Medical History:   Diagnosis Date    Angina pectoris     Anxiety     Bronchitis     Cataract     Cervical spondylosis with radiculopathy 7/10/2012    Chest pain     Chronic neck pain 2012    Depression     Fibrocystic breast     GERD (gastroesophageal reflux disease)     Glaucoma     Hyperlipidemia     Hypertension     Neck pain 7/10/2012    JAM (obstructive sleep apnea)     Primary osteoarthritis of both knees     Psoriasis     Subacromial or subdeltoid bursitis 7/10/2012    Thyroid disease     Type 2 diabetes mellitus 12/10/2013       Past Surgical History  Past Surgical History:   Procedure Laterality Date    BREAST LUMPECTOMY Left     mass in the rt breast surgery  1988    BREAST MASS EXCISION Right     benign    CHOLECYSTECTOMY      COLONOSCOPY N/A 2019    Procedure: COLONOSCOPY;  Surgeon: Darrin Calvin MD;  Location: [address]);  Service: Endoscopy;  Laterality: N/A;  2nd floor - all other procedure done on 2, multiple comorbidities - ERW/   change in MD schedule, Pt notified and verbalized understanding, new arrival time 0800, Ins  mailed to Pt with new arrival time - ERW1/8/19@1130    HYSTERECTOMY  1980's    KNEE SURGERY Left 1-20-16    TKR    KNEE SURGERY Right 02/26/2018    [facility]    L4-L5 fusion  2006        Family History  Family History   Problem Relation Age of Onset    Diabetes Mother     Hypertension Mother         CHF, angina    Angina Mother     Kidney disease Mother     Heart disease Mother         CHF    Hypertension Father         glaucoma, Alzhiemer's , supra pubic    Alzheimer's disease Father     Glaucoma Father     Kidney disease Brother         kidney transplant, HTN,DM    Diabetes Brother     Glaucoma Sister     Hypertension Sister     Hyperlipidemia Sister     Gout Son     Hypertension Son     Diabetes Son     Sleep apnea Sister     Hypertension Sister     Thyroid disease Sister     No Known Problems Sister     No Known Problems Sister     Hepatitis Brother     Amblyopia Neg Hx     Blindness Neg Hx     Melanoma Neg Hx        Social History  Social History     Socioeconomic History    Marital status:      Spouse name: Not on file    Number of children: Not on file    Years of education: Not on file    Highest education level: Not on file   Occupational History    Not on file   Social Needs    Financial resource strain: Not on file    Food insecurity:     Worry: Not on file     Inability: Not on file    Transportation needs:     Medical: Not on file     Non-medical: Not on file   Tobacco Use    Smoking status: Current Every Day Smoker     Packs/day: 1.00     Years: 40.00     Pack years: 40.00     Types: Cigarettes    Smokeless tobacco: Never Used    Tobacco comment: smokes a pack a week   Substance and Sexual Activity    Alcohol use: No     Alcohol/week: 0.0 standard drinks    Drug use: No    Sexual activity: Yes     Partners: Male   Lifestyle    Physical activity:     Days per week: Not on file     Minutes per session: Not on file    Stress: Not on file   Relationships     Social connections:     Talks on phone: Not on file     Gets together: Not on file     Attends Buddhism service: Not on file     Active member of club or organization: Not on file     Attends meetings of clubs or organizations: Not on file     Relationship status: Not on file   Other Topics Concern    Are you pregnant or think you may be? Not Asked    Breast-feeding Not Asked   Social History Narrative    Not on file       Current Medications  Current Outpatient Medications on File Prior to Visit   Medication Sig Dispense Refill    albuterol (PROAIR HFA) 90 mcg/actuation inhaler Inhale 2 puffs into the lungs every 6 (six) hours as needed for Wheezing or Shortness of Breath. 1 Inhaler 3    albuterol (VENTOLIN HFA) 90 mcg/actuation inhaler Inhale 2 puffs into the lungs every 4 (four) hours as needed for Wheezing or Shortness of Breath. 1 Inhaler 2    amlodipine-benazepril (LOTREL) 10-40 mg per capsule Take 1 capsule by mouth once daily. 90 capsule 1    atorvastatin (LIPITOR) 20 MG tablet Take 1 tablet (20 mg total) by mouth once daily. 90 tablet 1    benzonatate (TESSALON) 200 MG capsule Take 1 capsule (200 mg total) by mouth 3 (three) times daily as needed for Cough. 60 capsule 1    blood sugar diagnostic (ACCU-CHEK JOSE G PLUS TEST STRP) Strp Inject 1 each into the skin 2 (two) times daily. 200 each 11    blood sugar diagnostic Strp Use to test blood glucose levels 2 times per day as instructed. 200 strip 11    CALCIUM CARBONATE (TUMS ORAL) Take by mouth as needed (acid reflux, indigestion).      carvedilol (COREG) 12.5 MG tablet Take 1 tablet (12.5 mg total) by mouth 2 (two) times daily with meals. 180 tablet 1    clobetasol (TEMOVATE) 0.05 % cream Apply topically 2 (two) times daily. To rash on left lower leg 60 g 2    diphenhydrAMINE (BENADRYL) 25 mg capsule Take 25 mg by mouth every 6 (six) hours as needed for Itching or Allergies.      famotidine (PEPCID) 20 MG tablet Take 1 tablet (20 mg total)  by mouth 2 (two) times daily. 60 tablet 0    fluticasone (FLONASE) 50 mcg/actuation nasal spray 1 spray by Each Nare route 2 (two) times daily. 1 Bottle 1    furosemide (LASIX) 40 MG tablet Take 1 tablet (40 mg total) by mouth once daily. 90 tablet 1    gabapentin (NEURONTIN) 600 MG tablet Take 1 tablet (600 mg total) by mouth 3 (three) times daily. 270 tablet 1    INULIN (FIBER GUMMIES ORAL) Take by mouth every morning.       lancets (ACCU-CHEK MULTICLIX LANCET) Misc Test blood sugar twice daily 300 each 3    lancets (ONETOUCH DELICA LANCETS) 33 gauge Misc Inject 1 lancet into the skin 2 (two) times daily before meals. 200 each 11    meloxicam (MOBIC) 7.5 MG tablet Take 1 tablet (7.5 mg total) by mouth once daily. 90 tablet 1    nicotine polacrilex 2 MG Lozg 1-2 per hour in place of a cigarette. Limit to 20 a day - oral. 162 lozenge 1    traMADol (ULTRAM) 50 mg tablet Take 1 tablet (50 mg total) by mouth every 8 (eight) hours as needed. 90 tablet 1    triamcinolone acetonide 0.1% (KENALOG) 0.1 % ointment KENDRA AA ON THE SKIN BID ON RASH ON LEGS  0     No current facility-administered medications on file prior to visit.        Allergies   Review of patient's allergies indicates:   Allergen Reactions    Hydrocodone Shortness Of Breath    Iodinated contrast media Shortness Of Breath     Difficulty breathing    Adhesive Itching     Skin peeling    Oxycodone      Hallucinations    Sulfa (sulfonamide antibiotics) Hives and Itching       Review of Systems   Constitutional: Positive for activity change, appetite change and fatigue. Negative for chills and fever.   Respiratory: Negative for shortness of breath and wheezing.    Cardiovascular: Negative for chest pain.   Gastrointestinal: Positive for abdominal pain and nausea. Negative for blood in stool, constipation, diarrhea and vomiting.   Genitourinary: Negative for dysuria.     /78 (BP Location: Left arm, Patient Position: Sitting, BP Method: Medium  "(Manual))   Pulse 75   Temp 97.7 °F (36.5 °C) (Oral)   Resp 17   Ht 5' 2" (1.575 m)   Wt 90.3 kg (199 lb 1.2 oz)   SpO2 97%   BMI 36.41 kg/m²     Physical Exam   Constitutional: She is cooperative. No distress (but appears uncomfortable).   HENT:   Head: Normocephalic and atraumatic.   Eyes: Pupils are equal, round, and reactive to light. Conjunctivae, EOM and lids are normal.   Neck: Normal range of motion. No neck rigidity. Normal range of motion present.   Cardiovascular: Regular rhythm, S1 normal and S2 normal.   Pulmonary/Chest: Effort normal. She has no wheezes. She has no rhonchi. She has no rales.   Abdominal: Soft. Normal appearance. She exhibits distension (slight). She exhibits no fluid wave and no mass. There is tenderness in the epigastric area and left upper quadrant. There is no rigidity, no rebound, no guarding and no CVA tenderness.   Neurological: She is alert.       Assessment/Plan:  Stephanie was seen today for abdominal pain.    Diagnoses and all orders for this visit:    Epigastric pain  -     aluminum-magnesium hydroxide-simethicone 200-200-20 mg/5 mL suspension 30 mL  -     lidocaine HCl 2% oral solution 10 mL  -     dicyclomine 10 mg/5 mL syrup 20 mg  -     ondansetron disintegrating tablet 8 mg     Patient was given a GI cocktail consisting of Maalox, Bentyl, and lidocaine and was also given Zofran 8 milligrams.  She was re-evaluated after 25 minutes.  She reports no improvement, and actually feels slightly worse.  On reexamination, she is still very tender in the epigastric and left upper quadrant area.  When questioned further, she reports that she does have a history of pancreatitis.  She reports very similar symptoms.  Advised patient to go to the emergency room immediately as she may need imaging and lab test to rule out pancreatitis.  Patient agreeable to do so.  My nurse called the emergency room and let the charge nurse know about the patient.            -Williams Rossi Jr., " MD, AAHIVS          This office note has been dictated.  This dictation has been generated using M-Modal Fluency Direct dictation; some phonetic errors may occur.

## 2019-10-17 NOTE — ED PROVIDER NOTES
Encounter Date: 10/17/2019    SCRIBE #1 NOTE: I, Gutierrez Rockwell, am scribing for, and in the presence of,  Daljit Sinha MD. I have scribed the following portions of the note - Other sections scribed: HPI, ROS.       History     Chief Complaint   Patient presents with    Abdominal Pain     pt was seen at PCP and was sent to ED for furthter eval for pancreatitis. c/o of generalized abdominal pain x2 days ago. denies NVD.      This is 69 y.o. female who presents to the ED with complaint of epigastric abdominal pain for 4 days. The pain is constant. Associated signs and symptoms include 3 episodes of diarrhea yesterday without diarrhea today, nausea, decreased appetite. Denies vomiting, fevers, chills, headache, chest pain, cough, dysuria. Patient was seen by PCP earlier this morning and sent to the ER to be evaluated for possible pancreatitis.     Past medical history of hypertension, diabetes, hyperlipidemia, lumbar disc disease. Patient states that she has a history of heart valve disease, but no other cardiac disease. Denies history of lung disease or thyroid disease.     Past surgical history includes cholecystectomy, hysterectomy, appendectomy, lumbar fusion.     Social history, denies smoking, denies EtOH use, denies street drug use.         Review of patient's allergies indicates:   Allergen Reactions    Hydrocodone Shortness Of Breath    Iodinated contrast media Shortness Of Breath     Difficulty breathing    Adhesive Itching     Skin peeling    Oxycodone      Hallucinations    Sulfa (sulfonamide antibiotics) Hives and Itching     Past Medical History:   Diagnosis Date    Angina pectoris     Anxiety     Bronchitis     Cataract     Cervical spondylosis with radiculopathy 7/10/2012    Chest pain     Chronic neck pain 7/26/2012    Depression     Fibrocystic breast     GERD (gastroesophageal reflux disease)     Glaucoma     Hyperlipidemia     Hypertension     Neck pain 7/10/2012    JAM (obstructive  sleep apnea)     Primary osteoarthritis of both knees     Psoriasis     Subacromial or subdeltoid bursitis 7/10/2012    Thyroid disease     Type 2 diabetes mellitus 12/10/2013     Past Surgical History:   Procedure Laterality Date    BREAST LUMPECTOMY Left 1988    mass in the rt breast surgery  1988    BREAST MASS EXCISION Right     benign    CHOLECYSTECTOMY      COLONOSCOPY N/A 5/22/2019    Procedure: COLONOSCOPY;  Surgeon: Darrin Calvin MD;  [facility] [place] [address]);  Service: Endoscopy;  Laterality: N/A;  2nd floor - all other procedure done on 2, multiple comorbidities - ERW/   change in MD schedule, Pt notified and verbalized understanding, new arrival time 0800, Ins mailed to Pt with new arrival time - ERW1/8/19@1130    HYSTERECTOMY  1980's    KNEE SURGERY Left 1-20-16    TKR    KNEE SURGERY Right 02/26/2018    TKR    L4-L5 fusion  2006     Family History   Problem Relation Age of Onset    Diabetes Mother     Hypertension Mother         CHF, angina    Angina Mother     Kidney disease Mother     Heart disease Mother         CHF    Hypertension Father         glaucoma, Alzhiemer's , supra pubic    Alzheimer's disease Father     Glaucoma Father     Kidney disease Brother         kidney transplant, HTN,DM    Diabetes Brother     Glaucoma Sister     Hypertension Sister     Hyperlipidemia Sister     Gout Son     Hypertension Son     Diabetes Son     Sleep apnea Sister     Hypertension Sister     Thyroid disease Sister     No Known Problems Sister     No Known Problems Sister     Hepatitis Brother     Amblyopia Neg Hx     Blindness Neg Hx     Melanoma Neg Hx      Social History     Tobacco Use    Smoking status: Current Every Day Smoker     Packs/day: 1.00     Years: 40.00     Pack years: 40.00     Types: Cigarettes    Smokeless tobacco: Never Used    Tobacco comment: smokes a pack a week   Substance Use Topics    Alcohol use: No     Alcohol/week: 0.0 standard drinks     Drug use: No     Review of Systems   Constitutional: Positive for appetite change. Negative for chills, diaphoresis and fever.   HENT: Negative for ear pain and sore throat.    Eyes:        Negative for eye problems.   Respiratory: Negative for cough and shortness of breath.    Cardiovascular: Negative for chest pain.   Gastrointestinal: Positive for abdominal pain (epigastric) and nausea. Negative for vomiting.   Genitourinary: Negative for dysuria.   Musculoskeletal:        Negative for arm and leg problems.   Skin: Negative for rash.   Neurological: Negative for headaches.       Physical Exam     Initial Vitals [10/17/19 0942]   BP Pulse Resp Temp SpO2   (!) 143/75 67 18 97.6 °F (36.4 °C) 97 %      MAP       --         Physical Exam  The patient was examined specifically for the following:   General:No significant distress, Good color, Warm and dry. Head and neck:Scalp atraumatic, Neck supple. Neurological:Appropriate conversation, Gross motor deficits. Eyes:Conjugate gaze, Clear corneas. ENT: No epistaxis. Cardiac: Regular rate and rhythm, Grossly normal heart tones. Pulmonary: Wheezing, Rales. Gastrointestinal: Abdominal tenderness, Abdominal distention. Musculoskeletal: Extremity deformity, Apparent pain with range of motion of the joints. Skin: Rash.   The findings on examination were normal except for the following:  The patient has mild-to-moderate epigastric abdominal tenderness.  There is no guarding rebound mass or distention.  The patient is afebrile vital signs are stable.  There is no evidence of respiratory distress  ED Course   Procedures  Labs Reviewed   COMPREHENSIVE METABOLIC PANEL - Abnormal; Notable for the following components:       Result Value    Glucose 123 (*)     Albumin 3.2 (*)     All other components within normal limits   LIPASE - Abnormal; Notable for the following components:    Lipase 728 (*)     All other components within normal limits   CBC W/ AUTO DIFFERENTIAL - Abnormal;  Notable for the following components:    Mean Corpuscular Hemoglobin 26.0 (*)     Mean Corpuscular Hemoglobin Conc 31.6 (*)     RDW 15.1 (*)     All other components within normal limits   URINALYSIS, REFLEX TO URINE CULTURE   URINALYSIS MICROSCOPIC     EKG Readings: (Independently Interpreted)   This patient is in a sinus bradycardia with a heart rate of 57.  P.r. QRS and QT intervals are normal.  There is no definite evidence of acute myocardial infarction or malignant arrhythmia.     ECG Results          EKG 12-lead (In process)  Result time 10/17/19 11:25:32    In process by Interface, Lab In [facility] (10/17/19 11:25:32)                 Narrative:    Test Reason : R10.13,    Vent. Rate : 057 BPM     Atrial Rate : 057 BPM     P-R Int : 148 ms          QRS Dur : 086 ms      QT Int : 416 ms       P-R-T Axes : 062 046 067 degrees     QTc Int : 404 ms    Sinus bradycardia  Otherwise normal ECG  When compared with ECG of 05-JUL-2018 08:16,  No significant change was found    Referred By: [email or web address]   SELF           Confirmed By:                   In process by Interface, Lab In [facility] (10/17/19 11:22:44)                 Narrative:    Test Reason : R10.13,    Vent. Rate : 057 BPM     Atrial Rate : 057 BPM     P-R Int : 148 ms          QRS Dur : 086 ms      QT Int : 416 ms       P-R-T Axes : 062 046 067 degrees     QTc Int : 404 ms    Sinus bradycardia  Otherwise normal ECG  When compared with ECG of 05-JUL-2018 08:16,  No significant change was found    Referred By: [email or web address]   SELF           Confirmed By:                             Imaging Results    None       I personally performed the services described in this documentation.  All medical record  entries made by the scribe are at my direction and in my presence.  Signed, Dr. Sinha    Medical decision making:  Given the above this patient presents to the emergency with a history of pancreatitis.  Her lipase today is 728.  The patient will be admitted for  management of her pancreatitis.  Ultrasound will be pending.  I discussed this case with , and will write orders on her behalf.     Medical Decision Making:   History:   Old Medical Records: I decided to obtain old medical records.  Clinical Tests:   Lab Tests: Ordered and Reviewed  Medical Tests: Ordered and Reviewed                      Clinical Impression:       ICD-10-CM ICD-9-CM   1. Acute pancreatitis without infection or necrosis, unspecified pancreatitis type K85.90 577.0   2. Abdominal pain, acute, epigastric R10.13 789.06     338.19   3. Epigastric pain R10.13 789.06                                Daljit Sinha MD  10/17/19 1248

## 2019-10-17 NOTE — SUBJECTIVE & OBJECTIVE
Past Medical History:   Diagnosis Date    Angina pectoris     Anxiety     Bronchitis     Cataract     Cervical spondylosis with radiculopathy 7/10/2012    Chest pain     Chronic neck pain 7/26/2012    Depression     Fibrocystic breast     GERD (gastroesophageal reflux disease)     Glaucoma     Hyperlipidemia     Hypertension     Neck pain 7/10/2012    JAM (obstructive sleep apnea)     Primary osteoarthritis of both knees     Psoriasis     Subacromial or subdeltoid bursitis 7/10/2012    Thyroid disease     Type 2 diabetes mellitus 12/10/2013       Past Surgical History:   Procedure Laterality Date    BREAST LUMPECTOMY Left 1988    mass in the rt breast surgery  1988    BREAST MASS EXCISION Right     benign    CHOLECYSTECTOMY      COLONOSCOPY N/A 5/22/2019    Procedure: COLONOSCOPY;  Surgeon: Darrin Calvin MD;  Location: [facility] ([address] [place] [address]);  Service: Endoscopy;  Laterality: N/A;  2nd floor - all other procedure done on 2, multiple comorbidities - ERW/   change in MD schedule, Pt notified and verbalized understanding, new arrival time 0800, Ins mailed to Pt with new arrival time - ERW1/8/19@1130    HYSTERECTOMY  1980's    KNEE SURGERY Left 1-20-16    TKR    KNEE SURGERY Right 02/26/2018    TKR    L4-L5 fusion  2006       Review of patient's allergies indicates:   Allergen Reactions    Hydrocodone Shortness Of Breath    Iodinated contrast media Shortness Of Breath     Difficulty breathing    Adhesive Itching     Skin peeling    Oxycodone      Hallucinations    Sulfa (sulfonamide antibiotics) Hives and Itching       Current Facility-Administered Medications on File Prior to Encounter   Medication    [COMPLETED] aluminum-magnesium hydroxide-simethicone 200-200-20 mg/5 mL suspension 30 mL    [COMPLETED] dicyclomine 10 mg/5 mL syrup 20 mg    [COMPLETED] lidocaine HCl 2% oral solution 10 mL    [COMPLETED] ondansetron disintegrating tablet 8 mg     Current Outpatient Medications on  File Prior to Encounter   Medication Sig    amlodipine-benazepril (LOTREL) 10-40 mg per capsule Take 1 capsule by mouth once daily.    atorvastatin (LIPITOR) 20 MG tablet Take 1 tablet (20 mg total) by mouth once daily.    CALCIUM CARBONATE (TUMS ORAL) Take by mouth as needed (acid reflux, indigestion).    carvedilol (COREG) 12.5 MG tablet Take 1 tablet (12.5 mg total) by mouth 2 (two) times daily with meals.    gabapentin (NEURONTIN) 600 MG tablet Take 1 tablet (600 mg total) by mouth 3 (three) times daily.    meloxicam (MOBIC) 7.5 MG tablet Take 1 tablet (7.5 mg total) by mouth once daily.    traMADol (ULTRAM) 50 mg tablet Take 1 tablet (50 mg total) by mouth every 8 (eight) hours as needed.    [DISCONTINUED] furosemide (LASIX) 40 MG tablet Take 1 tablet (40 mg total) by mouth once daily.    albuterol (PROAIR HFA) 90 mcg/actuation inhaler Inhale 2 puffs into the lungs every 6 (six) hours as needed for Wheezing or Shortness of Breath.    albuterol (VENTOLIN HFA) 90 mcg/actuation inhaler Inhale 2 puffs into the lungs every 4 (four) hours as needed for Wheezing or Shortness of Breath.    blood sugar diagnostic (ACCU-CHEK JOSE G PLUS TEST STRP) Strp Inject 1 each into the skin 2 (two) times daily.    blood sugar diagnostic Strp Use to test blood glucose levels 2 times per day as instructed.    diphenhydrAMINE (BENADRYL) 25 mg capsule Take 25 mg by mouth every 6 (six) hours as needed for Itching or Allergies.    fluticasone (FLONASE) 50 mcg/actuation nasal spray 1 spray by Each Nare route 2 (two) times daily.    INULIN (FIBER GUMMIES ORAL) Take by mouth every morning.     lancets (ACCU-CHEK MULTICLIX LANCET) Misc Test blood sugar twice daily    lancets (ONETOUCH DELICA LANCETS) 33 gauge Misc Inject 1 lancet into the skin 2 (two) times daily before meals.    nicotine polacrilex 2 MG Lozg 1-2 per hour in place of a cigarette. Limit to 20 a day - oral.    triamcinolone acetonide 0.1% (KENALOG) 0.1 %  ointment KENDRA AA ON THE SKIN BID ON RASH ON LEGS    [DISCONTINUED] benzonatate (TESSALON) 200 MG capsule Take 1 capsule (200 mg total) by mouth 3 (three) times daily as needed for Cough.    [DISCONTINUED] clobetasol (TEMOVATE) 0.05 % cream Apply topically 2 (two) times daily. To rash on left lower leg    [DISCONTINUED] famotidine (PEPCID) 20 MG tablet Take 1 tablet (20 mg total) by mouth 2 (two) times daily.     Family History     Problem Relation (Age of Onset)    Alzheimer's disease Father    Angina Mother    Diabetes Mother, Brother, Son    Glaucoma Father, Sister    Gout Son    Heart disease Mother    Hepatitis Brother    Hyperlipidemia Sister    Hypertension Mother, Father, Sister, Son, Sister    Kidney disease Mother, Brother    No Known Problems Sister, Sister    Sleep apnea Sister    Thyroid disease Sister        Tobacco Use    Smoking status: Current Every Day Smoker     Packs/day: 1.00     Years: 40.00     Pack years: 40.00     Types: Cigarettes    Smokeless tobacco: Never Used    Tobacco comment: smokes a pack a week   Substance and Sexual Activity    Alcohol use: No     Alcohol/week: 0.0 standard drinks    Drug use: No    Sexual activity: Yes     Partners: Male     Review of Systems   Constitutional: Positive for appetite change. Negative for chills and fever.   HENT: Negative for nosebleeds and tinnitus.    Eyes: Negative for photophobia and visual disturbance.   Respiratory: Negative for shortness of breath and wheezing.    Cardiovascular: Negative for chest pain, palpitations and leg swelling.   Gastrointestinal: Positive for abdominal pain. Negative for abdominal distention, nausea and vomiting.   Genitourinary: Positive for dysuria. Negative for flank pain and hematuria.   Musculoskeletal: Negative for gait problem and joint swelling.   Skin: Negative for rash and wound.   Neurological: Negative for seizures and syncope.     Objective:     Vital Signs (Most Recent):  Temp: 97.8 °F (36.6 °C)  (10/17/19 1332)  Pulse: 64 (10/17/19 1348)  Resp: 19 (10/17/19 1348)  BP: (!) 165/88 (10/17/19 1332)  SpO2: 98 % (10/17/19 1348) Vital Signs (24h Range):  Temp:  [97.6 °F (36.4 °C)-97.8 °F (36.6 °C)] 97.8 °F (36.6 °C)  Pulse:  [64-75] 64  Resp:  [17-20] 19  SpO2:  [96 %-99 %] 98 %  BP: (138-167)/(75-88) 165/88     Weight: 88 kg (194 lb)  Body mass index is 35.48 kg/m².    Physical Exam   Constitutional: She is oriented to person, place, and time. She appears well-developed and well-nourished. No distress.   HENT:   Head: Normocephalic and atraumatic.   Right Ear: External ear normal.   Left Ear: External ear normal.   Eyes: Conjunctivae and EOM are normal. Right eye exhibits no discharge. Left eye exhibits no discharge.   Neck: Normal range of motion. No thyromegaly present.   Cardiovascular: Normal rate and regular rhythm.   No murmur heard.  Pulmonary/Chest: Effort normal and breath sounds normal. No respiratory distress.   Abdominal: Soft. Bowel sounds are normal. She exhibits no distension and no mass. There is tenderness (epigastric).   Small scars on abdomen from previous cholecystectomy   Musculoskeletal: She exhibits no edema or deformity.   Neurological: She is alert and oriented to person, place, and time.   Skin: Skin is warm and dry.   Psychiatric: She has a normal mood and affect. Her behavior is normal.   Nursing note and vitals reviewed.        CRANIAL NERVES     CN III, IV, VI   Extraocular motions are normal.        Significant Labs:   CBC:   Recent Labs   Lab 10/17/19  1030   WBC 9.26   HGB 13.1   HCT 41.4        CMP:   Recent Labs   Lab 10/17/19  1030      K 4.4      CO2 26   *   BUN 14   CREATININE 0.9   CALCIUM 9.8   PROT 7.4   ALBUMIN 3.2*   BILITOT 0.3   ALKPHOS 111   AST 15   ALT 18   ANIONGAP 8   EGFRNONAA >60     Lipase:   Recent Labs   Lab 10/17/19  1030   LIPASE 728*     Urine Studies:   Recent Labs   Lab 10/17/19  1202   COLORU Yellow   APPEARANCEUA Cloudy*    PHUR 6.0   SPECGRAV 1.010   PROTEINUA 2+*   GLUCUA Negative   KETONESU Negative   BILIRUBINUA Negative   OCCULTUA 1+*   NITRITE Positive*   UROBILINOGEN Negative   LEUKOCYTESUR 3+*   RBCUA 25*   WBCUA >100*   BACTERIA Moderate*   HYALINECASTS 0       Significant Imaging:   Imaging Results          US Abdomen Limited (In process)  Result time 10/17/19 14:18:59

## 2019-10-17 NOTE — H&P
[facility] Medicine  History & Physical    Patient Name: Stephanie Sears  MRN: [id number]  Admission Date: 10/17/2019  Attending Physician: Radha Dubois MD   Primary Care Provider: Williams Rossi Jr, MD         Patient information was obtained from patient, past medical records and ER records.     Subjective:     Principal Problem:Acute pancreatitis without infection or necrosis    Chief Complaint:   Chief Complaint   Patient presents with    Abdominal Pain     pt was seen at PCP and was sent to ED for furthter eval for pancreatitis. c/o of generalized abdominal pain x2 days ago. denies NVD.         HPI: Stephanie Sears 69 y.o. female with HTN, HLD, DM2, spinal setonosis, and GERD presents to the hospital with a chief complaint of abdominal pain. She reports since Monday she has had epigastric abdominal pain that is constant without radiation and described as a soreness. It is not worsened with eating. She saw her PCP today who recommended presentation to the ED as pain was not controlled with attempted treatment there. She denies any new medications. She states her newest medication is Lipitor that was recently restarted, but she has been on it for 8 to 9 years previously. She is s/p cholecystectomy. She is a borderline diabetic who is diet controlled. She smokes 1ppd. She had a previous episode of pancreatitis almost 20 years ago, that an etiology was never found. She denies alcohol or recreational drug use. She denies fever, chest pain, SOB, N/V/D, dizziness, syncope, hematemesis. She does report dysuria.    In the ED, LFT's and bilirubin within normal limits, lipse 728, , TG 92, afebrile without leukocytosis, UA with evidence of infection.    Past Medical History:   Diagnosis Date    Angina pectoris     Anxiety     Bronchitis     Cataract     Cervical spondylosis with radiculopathy 7/10/2012    Chest pain     Chronic neck pain 7/26/2012    Depression      Fibrocystic breast     GERD (gastroesophageal reflux disease)     Glaucoma     Hyperlipidemia     Hypertension     Neck pain 7/10/2012    JAM (obstructive sleep apnea)     Primary osteoarthritis of both knees     Psoriasis     Subacromial or subdeltoid bursitis 7/10/2012    Thyroid disease     Type 2 diabetes mellitus 12/10/2013       Past Surgical History:   Procedure Laterality Date    BREAST LUMPECTOMY Left 1988    mass in the rt breast surgery  1988    BREAST MASS EXCISION Right     benign    CHOLECYSTECTOMY      COLONOSCOPY N/A 5/22/2019    Procedure: COLONOSCOPY;  Surgeon: Darrin Calvin MD;  Location: [facility] (2ND FLR);  Service: Endoscopy;  Laterality: N/A;  2nd floor - all other procedure done on 2, multiple comorbidities - ERW/   change in MD schedule, Pt notified and verbalized understanding, new arrival time 0800, Ins mailed to Pt with new arrival time - ERW1/8/19@1130    HYSTERECTOMY  1980's    KNEE SURGERY Left 1-20-16    TKR    KNEE SURGERY Right 02/26/2018    T[facility]    L4-L5 fusion  2006       Review of patient's allergies indicates:   Allergen Reactions    Hydrocodone Shortness Of Breath    Iodinated contrast media Shortness Of Breath     Difficulty breathing    Adhesive Itching     Skin peeling    Oxycodone      Hallucinations    Sulfa (sulfonamide antibiotics) Hives and Itching       Current Facility-Administered Medications on File Prior to Encounter   Medication    [COMPLETED] aluminum-magnesium hydroxide-simethicone 200-200-20 mg/5 mL suspension 30 mL    [COMPLETED] dicyclomine 10 mg/5 mL syrup 20 mg    [COMPLETED] lidocaine HCl 2% oral solution 10 mL    [COMPLETED] ondansetron disintegrating tablet 8 mg     Current Outpatient Medications on File Prior to Encounter   Medication Sig    amlodipine-benazepril (LOTREL) 10-40 mg per capsule Take 1 capsule by mouth once daily.    atorvastatin (LIPITOR) 20 MG tablet Take 1 tablet (20 mg total) by mouth once daily.     CALCIUM CARBONATE (TUMS ORAL) Take by mouth as needed (acid reflux, indigestion).    carvedilol (COREG) 12.5 MG tablet Take 1 tablet (12.5 mg total) by mouth 2 (two) times daily with meals.    gabapentin (NEURONTIN) 600 MG tablet Take 1 tablet (600 mg total) by mouth 3 (three) times daily.    meloxicam (MOBIC) 7.5 MG tablet Take 1 tablet (7.5 mg total) by mouth once daily.    traMADol (ULTRAM) 50 mg tablet Take 1 tablet (50 mg total) by mouth every 8 (eight) hours as needed.    [DISCONTINUED] furosemide (LASIX) 40 MG tablet Take 1 tablet (40 mg total) by mouth once daily.    albuterol (PROAIR HFA) 90 mcg/actuation inhaler Inhale 2 puffs into the lungs every 6 (six) hours as needed for Wheezing or Shortness of Breath.    albuterol (VENTOLIN HFA) 90 mcg/actuation inhaler Inhale 2 puffs into the lungs every 4 (four) hours as needed for Wheezing or Shortness of Breath.    blood sugar diagnostic (ACCU-CHEK JOSE G PLUS TEST STRP) Strp Inject 1 each into the skin 2 (two) times daily.    blood sugar diagnostic Strp Use to test blood glucose levels 2 times per day as instructed.    diphenhydrAMINE (BENADRYL) 25 mg capsule Take 25 mg by mouth every 6 (six) hours as needed for Itching or Allergies.    fluticasone (FLONASE) 50 mcg/actuation nasal spray 1 spray by Each Nare route 2 (two) times daily.    INULIN (FIBER GUMMIES ORAL) Take by mouth every morning.     lancets (ACCU-CHEK MULTICLIX LANCET) Misc Test blood sugar twice daily    lancets (ONETOUCH DELICA LANCETS) 33 gauge Misc Inject 1 lancet into the skin 2 (two) times daily before meals.    nicotine polacrilex 2 MG Lozg 1-2 per hour in place of a cigarette. Limit to 20 a day - oral.    triamcinolone acetonide 0.1% (KENALOG) 0.1 % ointment KENDRA AA ON THE SKIN BID ON RASH ON LEGS    [DISCONTINUED] benzonatate (TESSALON) 200 MG capsule Take 1 capsule (200 mg total) by mouth 3 (three) times daily as needed for Cough.    [DISCONTINUED] clobetasol (TEMOVATE)  0.05 % cream Apply topically 2 (two) times daily. To rash on left lower leg    [DISCONTINUED] famotidine (PEPCID) 20 MG tablet Take 1 tablet (20 mg total) by mouth 2 (two) times daily.     Family History     Problem Relation (Age of Onset)    Alzheimer's disease Father    Angina Mother    Diabetes Mother, Brother, Son    Glaucoma Father, Sister    Gout Son    Heart disease Mother    Hepatitis Brother    Hyperlipidemia Sister    Hypertension Mother, Father, Sister, Son, Sister    Kidney disease Mother, Brother    No Known Problems Sister, Sister    Sleep apnea Sister    Thyroid disease Sister        Tobacco Use    Smoking status: Current Every Day Smoker     Packs/day: 1.00     Years: 40.00     Pack years: 40.00     Types: Cigarettes    Smokeless tobacco: Never Used    Tobacco comment: smokes a pack a week   Substance and Sexual Activity    Alcohol use: No     Alcohol/week: 0.0 standard drinks    Drug use: No    Sexual activity: Yes     Partners: Male     Review of Systems   Constitutional: Positive for appetite change. Negative for chills and fever.   HENT: Negative for nosebleeds and tinnitus.    Eyes: Negative for photophobia and visual disturbance.   Respiratory: Negative for shortness of breath and wheezing.    Cardiovascular: Negative for chest pain, palpitations and leg swelling.   Gastrointestinal: Positive for abdominal pain. Negative for abdominal distention, nausea and vomiting.   Genitourinary: Positive for dysuria. Negative for flank pain and hematuria.   Musculoskeletal: Negative for gait problem and joint swelling.   Skin: Negative for rash and wound.   Neurological: Negative for seizures and syncope.     Objective:     Vital Signs (Most Recent):  Temp: 97.8 °F (36.6 °C) (10/17/19 1332)  Pulse: 64 (10/17/19 1348)  Resp: 19 (10/17/19 1348)  BP: (!) 165/88 (10/17/19 1332)  SpO2: 98 % (10/17/19 1348) Vital Signs (24h Range):  Temp:  [97.6 °F (36.4 °C)-97.8 °F (36.6 °C)] 97.8 °F (36.6 °C)  Pulse:   [64-75] 64  Resp:  [17-20] 19  SpO2:  [96 %-99 %] 98 %  BP: (138-167)/(75-88) 165/88     Weight: 88 kg (194 lb)  Body mass index is 35.48 kg/m².    Physical Exam   Constitutional: She is oriented to person, place, and time. She appears well-developed and well-nourished. No distress.   HENT:   Head: Normocephalic and atraumatic.   Right Ear: External ear normal.   Left Ear: External ear normal.   Eyes: Conjunctivae and EOM are normal. Right eye exhibits no discharge. Left eye exhibits no discharge.   Neck: Normal range of motion. No thyromegaly present.   Cardiovascular: Normal rate and regular rhythm.   No murmur heard.  Pulmonary/Chest: Effort normal and breath sounds normal. No respiratory distress.   Abdominal: Soft. Bowel sounds are normal. She exhibits no distension and no mass. There is tenderness (epigastric).   Small scars on abdomen from previous cholecystectomy   Musculoskeletal: She exhibits no edema or deformity.   Neurological: She is alert and oriented to person, place, and time.   Skin: Skin is warm and dry.   Psychiatric: She has a normal mood and affect. Her behavior is normal.   Nursing note and vitals reviewed.        CRANIAL NERVES     CN III, IV, VI   Extraocular motions are normal.        Significant Labs:   CBC:   Recent Labs   Lab 10/17/19  1030   WBC 9.26   HGB 13.1   HCT 41.4        CMP:   Recent Labs   Lab 10/17/19  1030      K 4.4      CO2 26   *   BUN 14   CREATININE 0.9   CALCIUM 9.8   PROT 7.4   ALBUMIN 3.2*   BILITOT 0.3   ALKPHOS 111   AST 15   ALT 18   ANIONGAP 8   EGFRNONAA >60     Lipase:   Recent Labs   Lab 10/17/19  1030   LIPASE 728*     Urine Studies:   Recent Labs   Lab 10/17/19  1202   COLORU Yellow   APPEARANCEUA Cloudy*   PHUR 6.0   SPECGRAV 1.010   PROTEINUA 2+*   GLUCUA Negative   KETONESU Negative   BILIRUBINUA Negative   OCCULTUA 1+*   NITRITE Positive*   UROBILINOGEN Negative   LEUKOCYTESUR 3+*   RBCUA 25*   WBCUA >100*   BACTERIA Moderate*    HYALINECASTS 0       Significant Imaging:   Imaging Results          US Abdomen Limited (In process)  Result time 10/17/19 14:18:59                  Assessment/Plan:     * Acute pancreatitis without infection or necrosis  Unclear as to etiology. LFT's bilirubin within normal limits, does not use alcohol, no new medications. Reports previous episode of unexplained etiology. Lipase today of 798. Atorvastatin and Benazepril are listed as class 3 and 4 drugs causing pancreatitis. However, patient has been on these medications long term unlikely to be etiology today.  -Clear liquid diet advance as tolerated  -IVF with LR  -Pain control  -anti-emetics    Dyslipidemia  Lab Results   Component Value Date    LDLCALC 90.4 05/29/2019    LDLCALC 90.4 05/29/2019     Last TG 98 in 5/2019. Will continue home statin as patient has been on for extended period of time, unlikely cause of pancreatitis      UTI (urinary tract infection)  She reports pain with urination. UA today with nitrites, leukocytes, WBC, and moderate bacteria.   Will start on Rocephin  Culture pending    JAM (obstructive sleep apnea)  cpap nightly      Tobacco abuse  Greater than 3 minutes spent cousenling patient on dangers of continued tobacco use. Will provide tobacco cessation. Attempting to stop as outpt currently. Unclear if contributory to pancreatitis      Class 2 obesity due to excess calories without serious comorbidity with body mass index (BMI) of 35.0 to 35.9 in adult  Patient counseled on negative health effects of obesity. Will recommend lifestyle modifications outpatient.      Essential hypertension  Fair control. Continue home coreg, amlodipine, benazepril. PRN clonidine    DJD (degenerative joint disease) of lumbar spine, mild  Currently taking gabapentin and tramadol. Verified on . Will continue home tramadol for pain.    Asymptomatic proteinuria  Follows with nephrology. Protein on UA today. Will continue home  benazepril.      Diet-controlled diabetes mellitus  Lab Results   Component Value Date    HGBA1C 6.2 (H) 05/29/2019     Sliding scale insulin, advance diet as tolerated to diabetic. POCT glucose checks        VTE Risk Mitigation (From admission, onward)         Ordered     enoxaparin injection 40 mg  Daily      10/17/19 1339     IP VTE HIGH RISK PATIENT  Once      10/17/19 1339              VTE: lovenox  Code: full  Diet: CLD  Dispo: pending improvement in abdominal pain     Mateusz Gerber PA-C  Department of Hospital Medicine   [facility]

## 2019-10-17 NOTE — ASSESSMENT & PLAN NOTE
Unclear as to etiology. LFT's bilirubin within normal limits, does not use alcohol, no new medications. Reports previous episode of unexplained etiology. Lipase today of 798. Atorvastatin and Benazepril are listed as class 3 and 4 drugs causing pancreatitis. However, patient has been on these medications long term unlikely to be etiology today.  -Clear liquid diet advance as tolerated  -IVF with LR  -Pain control  -anti-emetics

## 2019-10-17 NOTE — ASSESSMENT & PLAN NOTE
Lab Results   Component Value Date    LDLCALC 90.4 05/29/2019    LDLCALC 90.4 05/29/2019     Last TG 98 in 5/2019. Will continue home statin as patient has been on for extended period of time, unlikely cause of pancreatitis

## 2019-10-17 NOTE — ED TRIAGE NOTES
Pt arrived to the ED via POV from PCP with c/o abdominal pain. Pt reports being seen at PCP on today for epigastric pain that started on this past Monday evening. Pt reports four episodes on diarrhea on yesterday along with some nausea. On admit to ED bed, pt AAOx3, AND noted, VSS. Rates pain 8/10 on pain scale.

## 2019-10-17 NOTE — ASSESSMENT & PLAN NOTE
Greater than 3 minutes spent cousenling patient on dangers of continued tobacco use. Will provide tobacco cessation. Attempting to stop as outpt currently. Unclear if contributory to pancreatitis

## 2019-10-18 LAB
ALBUMIN SERPL BCP-MCNC: 2.6 G/DL (ref 3.5–5.2)
ALP SERPL-CCNC: 84 U/L (ref 55–135)
ALT SERPL W/O P-5'-P-CCNC: 14 U/L (ref 10–44)
ANION GAP SERPL CALC-SCNC: 8 MMOL/L (ref 8–16)
AST SERPL-CCNC: 12 U/L (ref 10–40)
BASOPHILS # BLD AUTO: 0.02 K/UL (ref 0–0.2)
BASOPHILS NFR BLD: 0.3 % (ref 0–1.9)
BILIRUB SERPL-MCNC: 0.3 MG/DL (ref 0.1–1)
BUN SERPL-MCNC: 9 MG/DL (ref 8–23)
CALCIUM SERPL-MCNC: 9 MG/DL (ref 8.7–10.5)
CHLORIDE SERPL-SCNC: 107 MMOL/L (ref 95–110)
CO2 SERPL-SCNC: 27 MMOL/L (ref 23–29)
CREAT SERPL-MCNC: 0.8 MG/DL (ref 0.5–1.4)
DIFFERENTIAL METHOD: ABNORMAL
EOSINOPHIL # BLD AUTO: 0.4 K/UL (ref 0–0.5)
EOSINOPHIL NFR BLD: 6 % (ref 0–8)
ERYTHROCYTE [DISTWIDTH] IN BLOOD BY AUTOMATED COUNT: 14.9 % (ref 11.5–14.5)
EST. GFR  (AFRICAN AMERICAN): >60 ML/MIN/1.73 M^2
EST. GFR  (NON AFRICAN AMERICAN): >60 ML/MIN/1.73 M^2
ESTIMATED AVG GLUCOSE: 140 MG/DL (ref 68–131)
GLUCOSE SERPL-MCNC: 110 MG/DL (ref 70–110)
HBA1C MFR BLD HPLC: 6.5 % (ref 4–5.6)
HCT VFR BLD AUTO: 37.3 % (ref 37–48.5)
HGB BLD-MCNC: 11.8 G/DL (ref 12–16)
IMM GRANULOCYTES # BLD AUTO: 0.02 K/UL (ref 0–0.04)
IMM GRANULOCYTES NFR BLD AUTO: 0.3 % (ref 0–0.5)
LYMPHOCYTES # BLD AUTO: 2.3 K/UL (ref 1–4.8)
LYMPHOCYTES NFR BLD: 33.4 % (ref 18–48)
MCH RBC QN AUTO: 26.2 PG (ref 27–31)
MCHC RBC AUTO-ENTMCNC: 31.6 G/DL (ref 32–36)
MCV RBC AUTO: 83 FL (ref 82–98)
MONOCYTES # BLD AUTO: 0.6 K/UL (ref 0.3–1)
MONOCYTES NFR BLD: 8.4 % (ref 4–15)
NEUTROPHILS # BLD AUTO: 3.5 K/UL (ref 1.8–7.7)
NEUTROPHILS NFR BLD: 51.6 % (ref 38–73)
NRBC BLD-RTO: 0 /100 WBC
PLATELET # BLD AUTO: 256 K/UL (ref 150–350)
PMV BLD AUTO: 10.4 FL (ref 9.2–12.9)
POCT GLUCOSE: 110 MG/DL (ref 70–110)
POCT GLUCOSE: 127 MG/DL (ref 70–110)
POCT GLUCOSE: 174 MG/DL (ref 70–110)
POCT GLUCOSE: 200 MG/DL (ref 70–110)
POTASSIUM SERPL-SCNC: 4.2 MMOL/L (ref 3.5–5.1)
PROT SERPL-MCNC: 5.9 G/DL (ref 6–8.4)
RBC # BLD AUTO: 4.5 M/UL (ref 4–5.4)
SODIUM SERPL-SCNC: 142 MMOL/L (ref 136–145)
WBC # BLD AUTO: 6.79 K/UL (ref 3.9–12.7)

## 2019-10-18 PROCEDURE — 85025 COMPLETE CBC W/AUTO DIFF WBC: CPT

## 2019-10-18 PROCEDURE — 25000003 PHARM REV CODE 250: Performed by: HOSPITALIST

## 2019-10-18 PROCEDURE — 80053 COMPREHEN METABOLIC PANEL: CPT

## 2019-10-18 PROCEDURE — 25000003 PHARM REV CODE 250: Performed by: EMERGENCY MEDICINE

## 2019-10-18 PROCEDURE — 99900035 HC TECH TIME PER 15 MIN (STAT)

## 2019-10-18 PROCEDURE — 94761 N-INVAS EAR/PLS OXIMETRY MLT: CPT

## 2019-10-18 PROCEDURE — 11000001 HC ACUTE MED/SURG PRIVATE ROOM

## 2019-10-18 PROCEDURE — 63600175 PHARM REV CODE 636 W HCPCS: Performed by: HOSPITALIST

## 2019-10-18 PROCEDURE — 36415 COLL VENOUS BLD VENIPUNCTURE: CPT

## 2019-10-18 PROCEDURE — 25000003 PHARM REV CODE 250: Performed by: PHYSICIAN ASSISTANT

## 2019-10-18 PROCEDURE — A4216 STERILE WATER/SALINE, 10 ML: HCPCS | Performed by: EMERGENCY MEDICINE

## 2019-10-18 PROCEDURE — 63600175 PHARM REV CODE 636 W HCPCS: Performed by: EMERGENCY MEDICINE

## 2019-10-18 PROCEDURE — 63600175 PHARM REV CODE 636 W HCPCS: Performed by: PHYSICIAN ASSISTANT

## 2019-10-18 RX ORDER — HYDROMORPHONE HYDROCHLORIDE 2 MG/1
2 TABLET ORAL EVERY 6 HOURS PRN
Status: DISCONTINUED | OUTPATIENT
Start: 2019-10-18 | End: 2019-10-19 | Stop reason: HOSPADM

## 2019-10-18 RX ORDER — MORPHINE SULFATE 10 MG/ML
1 INJECTION INTRAMUSCULAR; INTRAVENOUS; SUBCUTANEOUS EVERY 4 HOURS PRN
Status: DISCONTINUED | OUTPATIENT
Start: 2019-10-18 | End: 2019-10-18

## 2019-10-18 RX ADMIN — PANTOPRAZOLE SODIUM 40 MG: 40 TABLET, DELAYED RELEASE ORAL at 09:10

## 2019-10-18 RX ADMIN — GABAPENTIN 600 MG: 300 CAPSULE ORAL at 08:10

## 2019-10-18 RX ADMIN — ATORVASTATIN CALCIUM 20 MG: 10 TABLET, FILM COATED ORAL at 09:10

## 2019-10-18 RX ADMIN — PANTOPRAZOLE SODIUM 40 MG: 40 TABLET, DELAYED RELEASE ORAL at 08:10

## 2019-10-18 RX ADMIN — CEFTRIAXONE 1 G: 1 INJECTION, SOLUTION INTRAVENOUS at 02:10

## 2019-10-18 RX ADMIN — ENOXAPARIN SODIUM 40 MG: 100 INJECTION SUBCUTANEOUS at 05:10

## 2019-10-18 RX ADMIN — CARVEDILOL 12.5 MG: 6.25 TABLET, FILM COATED ORAL at 09:10

## 2019-10-18 RX ADMIN — CARVEDILOL 12.5 MG: 6.25 TABLET, FILM COATED ORAL at 05:10

## 2019-10-18 RX ADMIN — SODIUM CHLORIDE, SODIUM LACTATE, POTASSIUM CHLORIDE, AND CALCIUM CHLORIDE: .6; .31; .03; .02 INJECTION, SOLUTION INTRAVENOUS at 05:10

## 2019-10-18 RX ADMIN — GABAPENTIN 600 MG: 300 CAPSULE ORAL at 02:10

## 2019-10-18 RX ADMIN — HYDROMORPHONE HYDROCHLORIDE 2 MG: 2 TABLET ORAL at 08:10

## 2019-10-18 RX ADMIN — Medication 10 ML: at 09:10

## 2019-10-18 RX ADMIN — HYDROMORPHONE HYDROCHLORIDE 2 MG: 2 TABLET ORAL at 02:10

## 2019-10-18 RX ADMIN — BENAZEPRIL HYDROCHLORIDE 40 MG: 10 TABLET ORAL at 09:10

## 2019-10-18 RX ADMIN — AMLODIPINE BESYLATE 10 MG: 5 TABLET ORAL at 09:10

## 2019-10-18 RX ADMIN — GABAPENTIN 600 MG: 300 CAPSULE ORAL at 09:10

## 2019-10-18 NOTE — ASSESSMENT & PLAN NOTE
Lab Results   Component Value Date    LDLCALC 75.6 10/17/2019     Last TG 98 in 5/2019. Will continue home statin as patient has been on for extended period of time, unlikely cause of pancreatitis

## 2019-10-18 NOTE — PROGRESS NOTES
[facility] CASE MANAGEMENT                  WRITTEN DISCHARGE INFORMATION      APPOINTMENTS AND RESOURCES TO HELP YOU MANAGE YOUR CARE AT HOME BASED ON YOUR PREFERENCES:  (If an appointment is not scheduled for you when you leave the hospital, call your doctor to schedule a follow up visit within a week)    Follow-up Information     Williams Rossi Jr, MD On 10/31/2019.    Specialty:  Family Medicine  Why:  On Thursday @ 1:00pm, outpatient services  Contact information:  [address] [address]  [phone]                   Healthy Living Instructions to HELP MANAGE YOUR CARE AT HOME:  Things You are responsible for:  1.    Getting your prescriptions filled   2.    Taking your medications as directed, DO NOT MISS ANY DOSES!  3.    Following the diet and exercise recommended by your doctor  4.    Going to your follow-up doctor appointment. This is important because it allows the doctor to monitor your progress and determine if any changes need to made to your treatment plan.  5. If you have any questions about MANAGING YOUR CARE AT HOME Call the Nurse Care Line for 24/7 Assistance [phone]       Please answer any calls you may receive from [facility]. We want to continue to support you as you manage your healthcare needs. [facility] is happy to have the opportunity to serve you.      Thank you for choosing [facility] for your healthcare needs!  Your [facility] Case Management Team,

## 2019-10-18 NOTE — SUBJECTIVE & OBJECTIVE
Interval History:  Still has some epigastric abdominal pain which is improving and is less frequent.  She does not have any nausea, vomiting.  She is tolerating p.o. intake.      Review of Systems   Respiratory: Negative for cough, chest tightness, shortness of breath and wheezing.    Cardiovascular: Negative for chest pain, palpitations and leg swelling.   Gastrointestinal: Positive for abdominal pain and constipation. Negative for abdominal distention, diarrhea, nausea and vomiting.   Genitourinary: Negative for difficulty urinating.     Objective:     Vital Signs (Most Recent):  Temp: 98.9 °F (37.2 °C) (10/18/19 1119)  Pulse: (!) 58 (10/18/19 1119)  Resp: 18 (10/18/19 1119)  BP: 131/65 (10/18/19 1119)  SpO2: 96 % (10/18/19 1119) Vital Signs (24h Range):  Temp:  [97.6 °F (36.4 °C)-98.9 °F (37.2 °C)] 98.9 °F (37.2 °C)  Pulse:  [58-78] 58  Resp:  [16-20] 18  SpO2:  [93 %-98 %] 96 %  BP: (131-185)/(63-88) 131/65     Weight: 88 kg (194 lb)  Body mass index is 35.48 kg/m².    Intake/Output Summary (Last 24 hours) at 10/18/2019 1145  Last data filed at 10/17/2019 1722  Gross per 24 hour   Intake 740 ml   Output --   Net 740 ml      Physical Exam   Constitutional: She appears well-developed and well-nourished. No distress.   HENT:   Head: Normocephalic and atraumatic.   Nose: Nose normal.   Mouth/Throat: Oropharynx is clear and moist.   Cardiovascular: Normal rate, regular rhythm and normal heart sounds. Exam reveals no gallop and no friction rub.   No murmur heard.  Pulmonary/Chest: Effort normal and breath sounds normal. No stridor. No respiratory distress. She has no wheezes.   Abdominal: Soft. Bowel sounds are normal. She exhibits no distension and no mass. There is tenderness (With deep palpation in epigastrium). There is no guarding.   Musculoskeletal: She exhibits no edema.   Neurological: She is alert.   Skin: Skin is warm and dry. She is not diaphoretic.   Nursing note and vitals reviewed.      Significant Labs:  All pertinent labs within the past 24 hours have been reviewed.    Significant Imaging: I have reviewed and interpreted all pertinent imaging results/findings within the past 24 hours.

## 2019-10-18 NOTE — PLAN OF CARE
Patient is awake alert and oriented. Medicated with Dilaudid po for abdominal pain. IV antibiotics as ordered. Tolerating po intake; denies N/v. Ambulating to the BR. Hourly rounds, call light in reach, free of falls. Continue with plan of care as ordered. No distress noted.

## 2019-10-18 NOTE — PROGRESS NOTES
[facility] Medicine  Progress Note    Patient Name: Stephanie Sears  MRN: [id number]  Patient Class: IP- Inpatient   Admission Date: 10/17/2019  Length of Stay: 1 days  Attending Physician: Radha Dubois MD  Primary Care Provider: Williams Rossi Jr, MD        Subjective:     Principal Problem:Acute pancreatitis without infection or necrosis        HPI:  Stephanie Sears 69 y.o. female with HTN, HLD, DM2, spinal setonosis, and GERD presents to the hospital with a chief complaint of abdominal pain. She reports since Monday she has had epigastric abdominal pain that is constant without radiation and described as a soreness. It is not worsened with eating. She saw her PCP today who recommended presentation to the ED as pain was not controlled with attempted treatment there. She denies any new medications. She states her newest medication is Lipitor that was recently restarted, but she has been on it for 8 to 9 years previously. She is s/p cholecystectomy. She is a borderline diabetic who is diet controlled. She smokes 1ppd. She had a previous episode of pancreatitis almost 20 years ago, that an etiology was never found. She denies alcohol or recreational drug use. She denies fever, chest pain, SOB, N/V/D, dizziness, syncope, hematemesis. She does report dysuria.    In the ED, LFT's and bilirubin within normal limits, lipse 728, , TG 92, afebrile without leukocytosis, UA with evidence of infection.    Overview/Hospital Course:  Stephanie Sears 69 y.o. female admitted to the hospital for pancreatitis and UTI.  She is improving.  Tolerating diet.  No nausea or vomiting.  Still has some abdominal pain.    Interval History:  Still has some epigastric abdominal pain which is improving and is less frequent.  She does not have any nausea, vomiting.  She is tolerating p.o. intake.      Review of Systems   Respiratory: Negative for cough, chest tightness, shortness of breath and wheezing.     Cardiovascular: Negative for chest pain, palpitations and leg swelling.   Gastrointestinal: Positive for abdominal pain and constipation. Negative for abdominal distention, diarrhea, nausea and vomiting.   Genitourinary: Negative for difficulty urinating.     Objective:     Vital Signs (Most Recent):  Temp: 98.9 °F (37.2 °C) (10/18/19 1119)  Pulse: (!) 58 (10/18/19 1119)  Resp: 18 (10/18/19 1119)  BP: 131/65 (10/18/19 1119)  SpO2: 96 % (10/18/19 1119) Vital Signs (24h Range):  Temp:  [97.6 °F (36.4 °C)-98.9 °F (37.2 °C)] 98.9 °F (37.2 °C)  Pulse:  [58-78] 58  Resp:  [16-20] 18  SpO2:  [93 %-98 %] 96 %  BP: (131-185)/(63-88) 131/65     Weight: 88 kg (194 lb)  Body mass index is 35.48 kg/m².    Intake/Output Summary (Last 24 hours) at 10/18/2019 1145  Last data filed at 10/17/2019 1722  Gross per 24 hour   Intake 740 ml   Output --   Net 740 ml      Physical Exam   Constitutional: She appears well-developed and well-nourished. No distress.   HENT:   Head: Normocephalic and atraumatic.   Nose: Nose normal.   Mouth/Throat: Oropharynx is clear and moist.   Cardiovascular: Normal rate, regular rhythm and normal heart sounds. Exam reveals no gallop and no friction rub.   No murmur heard.  Pulmonary/Chest: Effort normal and breath sounds normal. No stridor. No respiratory distress. She has no wheezes.   Abdominal: Soft. Bowel sounds are normal. She exhibits no distension and no mass. There is tenderness (With deep palpation in epigastrium). There is no guarding.   Musculoskeletal: She exhibits no edema.   Neurological: She is alert.   Skin: Skin is warm and dry. She is not diaphoretic.   Nursing note and vitals reviewed.      Significant Labs: All pertinent labs within the past 24 hours have been reviewed.    Significant Imaging: I have reviewed and interpreted all pertinent imaging results/findings within the past 24 hours.      Assessment/Plan:      * Acute pancreatitis without infection or necrosis  Unclear as to  etiology. LFT's bilirubin within normal limits, does not use alcohol, no new medications. Reports previous episode of unexplained etiology. Lipase today of 798. Atorvastatin and Benazepril are listed as class 3 and 4 drugs causing pancreatitis. However, patient has been on these medications long term unlikely to be etiology today.  -Clear liquid diet advance as tolerated.  Tolerating bland diet.  -IVF with LR.  DC IV fluids on 10/18  -Pain control.  DC IV pain medications on 10/18  -anti-emetics p.r.n.    Dyslipidemia  Lab Results   Component Value Date    LDLCALC 75.6 10/17/2019     Last TG 98 in 5/2019. Will continue home statin as patient has been on for extended period of time, unlikely cause of pancreatitis      UTI (urinary tract infection)  She reports pain with urination. UA with nitrites, leukocytes, WBC, and moderate bacteria.   Started Rocephin 10/17  Culture pending- GNR    JAM (obstructive sleep apnea)  cpap nightly      Tobacco abuse  Greater than 3 minutes spent cousenling patient on dangers of continued tobacco use. Will provide tobacco cessation. Attempting to stop as outpt currently.       Class 2 obesity due to excess calories without serious comorbidity with body mass index (BMI) of 35.0 to 35.9 in adult  Body mass index is 35.48 kg/m².  Patient counseled on negative health effects of obesity. Will recommend lifestyle modifications outpatient.      Essential hypertension  Controlled. Continue home coreg, amlodipine, benazepril. PRN clonidine    DJD (degenerative joint disease) of lumbar spine, mild  Currently taking gabapentin and tramadol. Verified on . Will continue home tramadol for pain.    Asymptomatic proteinuria  Follows with nephrology. Protein on UA today. Will continue home benazepril.      Diet-controlled diabetes mellitus  Lab Results   Component Value Date    HGBA1C 6.5 (H) 10/17/2019     Sliding scale insulin, advance diet as tolerated to diabetic. POCT glucose checks        VTE  Risk Mitigation (From admission, onward)         Ordered     enoxaparin injection 40 mg  Daily      10/17/19 1339     IP VTE HIGH RISK PATIENT  Once      10/17/19 1534                      Radha Dubois MD  Department of Hospital Medicine   [facility]

## 2019-10-18 NOTE — CARE UPDATE
Pt. Not wearing CPAP.  Pt. States that she can not tolerate it.  Pt. Resting comfortably on RA. SpO2 95%, No RDS noted.

## 2019-10-18 NOTE — ASSESSMENT & PLAN NOTE
Lab Results   Component Value Date    HGBA1C 6.5 (H) 10/17/2019     Sliding scale insulin, advance diet as tolerated to diabetic. POCT glucose checks

## 2019-10-18 NOTE — ASSESSMENT & PLAN NOTE
She reports pain with urination. UA with nitrites, leukocytes, WBC, and moderate bacteria.   Started Rocephin 10/17  Culture pending- GNR

## 2019-10-18 NOTE — ASSESSMENT & PLAN NOTE
Unclear as to etiology. LFT's bilirubin within normal limits, does not use alcohol, no new medications. Reports previous episode of unexplained etiology. Lipase today of 798. Atorvastatin and Benazepril are listed as class 3 and 4 drugs causing pancreatitis. However, patient has been on these medications long term unlikely to be etiology today.  -Clear liquid diet advance as tolerated.  Tolerating bland diet.  -IVF with LR.  DC IV fluids on 10/18  -Pain control.  DC IV pain medications on 10/18  -anti-emetics p.r.n.

## 2019-10-18 NOTE — PLAN OF CARE
Pt remains on RA with no complaints of SOB/wheezing. Continue bipap and ventolin as needed per orders. Will continue to monitor. ELIZABETH Kramer, RRT

## 2019-10-18 NOTE — PROGRESS NOTES
"TN reviewed follow up appointment information as well as  "GI discharge instructions" handout with patient using teach back. Patient stated she will notify the doctor if she has severe abdominal pain and blood in her stools. Patient is in agreement and verbalized an understanding. Placed discharge information in blue discharge folder. TN also reviewed patient responsibility checklist with him using teach back. Patient was able to verbalize his responsibilities after discharge to manage her care at home being   1. Going to follow up appointments   2. Picking up rx from the pharmacy when discharged  3. Taking his medications as prescribed       "

## 2019-10-18 NOTE — PLAN OF CARE
Pt is irritable at times. Urgency to urinate, refuses to use bsc. Pt forgets to take iv pole with her to bathroom and almost removed IV. Multiple reminders to call staff for assistance.Refused to use CPAP machine due to noise and discomfort. VSS, nad. Callbell within reach. Will continue to monitor.

## 2019-10-18 NOTE — ASSESSMENT & PLAN NOTE
Greater than 3 minutes spent cousenling patient on dangers of continued tobacco use. Will provide tobacco cessation. Attempting to stop as outpt currently.

## 2019-10-18 NOTE — ASSESSMENT & PLAN NOTE
Body mass index is 35.48 kg/m².  Patient counseled on negative health effects of obesity. Will recommend lifestyle modifications outpatient.

## 2019-10-19 VITALS
WEIGHT: 194 LBS | HEART RATE: 63 BPM | OXYGEN SATURATION: 99 % | TEMPERATURE: 98 F | RESPIRATION RATE: 18 BRPM | BODY MASS INDEX: 35.7 KG/M2 | SYSTOLIC BLOOD PRESSURE: 143 MMHG | HEIGHT: 62 IN | DIASTOLIC BLOOD PRESSURE: 69 MMHG

## 2019-10-19 LAB
ALBUMIN SERPL BCP-MCNC: 2.7 G/DL (ref 3.5–5.2)
ALP SERPL-CCNC: 87 U/L (ref 55–135)
ALT SERPL W/O P-5'-P-CCNC: 14 U/L (ref 10–44)
ANION GAP SERPL CALC-SCNC: 7 MMOL/L (ref 8–16)
AST SERPL-CCNC: 13 U/L (ref 10–40)
BACTERIA UR CULT: ABNORMAL
BILIRUB SERPL-MCNC: 0.3 MG/DL (ref 0.1–1)
BUN SERPL-MCNC: 13 MG/DL (ref 8–23)
CALCIUM SERPL-MCNC: 9.1 MG/DL (ref 8.7–10.5)
CHLORIDE SERPL-SCNC: 106 MMOL/L (ref 95–110)
CO2 SERPL-SCNC: 28 MMOL/L (ref 23–29)
CREAT SERPL-MCNC: 0.8 MG/DL (ref 0.5–1.4)
EST. GFR  (AFRICAN AMERICAN): >60 ML/MIN/1.73 M^2
EST. GFR  (NON AFRICAN AMERICAN): >60 ML/MIN/1.73 M^2
GLUCOSE SERPL-MCNC: 120 MG/DL (ref 70–110)
POCT GLUCOSE: 106 MG/DL (ref 70–110)
POCT GLUCOSE: 129 MG/DL (ref 70–110)
POTASSIUM SERPL-SCNC: 4.1 MMOL/L (ref 3.5–5.1)
PROT SERPL-MCNC: 5.8 G/DL (ref 6–8.4)
SODIUM SERPL-SCNC: 141 MMOL/L (ref 136–145)

## 2019-10-19 PROCEDURE — 25000003 PHARM REV CODE 250: Performed by: EMERGENCY MEDICINE

## 2019-10-19 PROCEDURE — 99900035 HC TECH TIME PER 15 MIN (STAT)

## 2019-10-19 PROCEDURE — 25000003 PHARM REV CODE 250: Performed by: PHYSICIAN ASSISTANT

## 2019-10-19 PROCEDURE — 25000003 PHARM REV CODE 250: Performed by: HOSPITALIST

## 2019-10-19 PROCEDURE — 94761 N-INVAS EAR/PLS OXIMETRY MLT: CPT

## 2019-10-19 PROCEDURE — 80053 COMPREHEN METABOLIC PANEL: CPT

## 2019-10-19 PROCEDURE — 36415 COLL VENOUS BLD VENIPUNCTURE: CPT

## 2019-10-19 RX ORDER — CEPHALEXIN 500 MG/1
500 CAPSULE ORAL EVERY 12 HOURS
Qty: 8 CAPSULE | Refills: 0 | Status: SHIPPED | OUTPATIENT
Start: 2019-10-19 | End: 2019-10-23

## 2019-10-19 RX ORDER — SIMETHICONE 80 MG
1 TABLET,CHEWABLE ORAL 3 TIMES DAILY PRN
Status: DISCONTINUED | OUTPATIENT
Start: 2019-10-19 | End: 2019-10-19 | Stop reason: HOSPADM

## 2019-10-19 RX ORDER — CEPHALEXIN 500 MG/1
500 CAPSULE ORAL EVERY 12 HOURS
Status: DISCONTINUED | OUTPATIENT
Start: 2019-10-19 | End: 2019-10-19 | Stop reason: HOSPADM

## 2019-10-19 RX ADMIN — PANTOPRAZOLE SODIUM 40 MG: 40 TABLET, DELAYED RELEASE ORAL at 08:10

## 2019-10-19 RX ADMIN — TRAMADOL HYDROCHLORIDE 50 MG: 50 TABLET, COATED ORAL at 08:10

## 2019-10-19 RX ADMIN — CEPHALEXIN 500 MG: 500 CAPSULE ORAL at 11:10

## 2019-10-19 RX ADMIN — ATORVASTATIN CALCIUM 20 MG: 10 TABLET, FILM COATED ORAL at 08:10

## 2019-10-19 RX ADMIN — CARVEDILOL 12.5 MG: 6.25 TABLET, FILM COATED ORAL at 08:10

## 2019-10-19 RX ADMIN — GABAPENTIN 600 MG: 300 CAPSULE ORAL at 08:10

## 2019-10-19 RX ADMIN — AMLODIPINE BESYLATE 10 MG: 5 TABLET ORAL at 08:10

## 2019-10-19 RX ADMIN — BENAZEPRIL HYDROCHLORIDE 40 MG: 10 TABLET ORAL at 08:10

## 2019-10-19 NOTE — PLAN OF CARE
10/19/19 1609   Final Note   Assessment Type Final Discharge Note   Anticipated Discharge Disposition Home   What phone number can be called within the next 1-3 days to see how you are doing after discharge? [phone]   Hospital Follow Up  Appt(s) scheduled? Yes

## 2019-10-19 NOTE — DISCHARGE SUMMARY
[facility] Hospital Medicine  Discharge Summary      Patient Name: Stephanie Sears  MRN: [id number]  Admission Date: 10/17/2019  Hospital Length of Stay: 2 days  Discharge Date and Time:  10/19/2019 3:16 PM  Attending Physician: Radha Dubois MD   Discharging Provider: Radha Dubois MD  Primary Care Provider: Williams Rossi Jr, MD      HPI:   Stephanie Sears 69 y.o. female with HTN, HLD, DM2, spinal setonosis, and GERD presents to the hospital with a chief complaint of abdominal pain. She reports since Monday she has had epigastric abdominal pain that is constant without radiation and described as a soreness. It is not worsened with eating. She saw her PCP today who recommended presentation to the ED as pain was not controlled with attempted treatment there. She denies any new medications. She states her newest medication is Lipitor that was recently restarted, but she has been on it for 8 to 9 years previously. She is s/p cholecystectomy. She is a borderline diabetic who is diet controlled. She smokes 1ppd. She had a previous episode of pancreatitis almost 20 years ago, that an etiology was never found. She denies alcohol or recreational drug use. She denies fever, chest pain, SOB, N/V/D, dizziness, syncope, hematemesis. She does report dysuria.    In the ED, LFT's and bilirubin within normal limits, lipse 728, , TG 92, afebrile without leukocytosis, UA with evidence of infection.    * No surgery found *      Hospital Course:   Stephanie Sears 69 y.o. female admitted to the hospital for pancreatitis and UTI.  She is improving.  Tolerating diet.  No nausea or vomiting.  Abdominal pain controlled with home tramadol.  Urine culture is E coli.  Discharged with cefazolin to complete course of antibiotics.  Discharged home with PCP follow-up.     Consults:     No new Assessment & Plan notes have been filed under this hospital service since the last note was generated.  Service:  Hospital Medicine    Final Active Diagnoses:    Diagnosis Date Noted POA    PRINCIPAL PROBLEM:  Acute pancreatitis without infection or necrosis [K85.90] 10/17/2019 Yes    Dyslipidemia [E78.5] 07/05/2018 Yes    UTI (urinary tract infection) [N39.0] 02/09/2018 Unknown    JAM (obstructive sleep apnea) [G47.33] 12/06/2017 Yes    Tobacco abuse [Z72.0] 01/12/2016 Yes    Class 2 obesity due to excess calories without serious comorbidity with body mass index (BMI) of 35.0 to 35.9 in adult [E66.09, Z68.35] 01/12/2016 Not Applicable    Essential hypertension [I10] 01/07/2016 Yes    DJD (degenerative joint disease) of lumbar spine, 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    Specialty:  Family Medicine  Why:  On Thursday @ 1:00pm, outpatient services  Contact information:  [address] [place] [address] [id number] [phone]                 Patient Instructions:      Diet diabetic     Notify your health care provider if you experience any of the following:  temperature >100.4     Notify your health care provider if you experience any of the following:  persistent nausea and vomiting or diarrhea     Notify your health care provider if you experience any of the following:  severe uncontrolled pain     Notify your health care provider if you experience any of the following:  redness, tenderness, or signs of infection (pain, swelling, redness, odor or green/yellow discharge around incision site)     Notify your health care provider if you experience any of the following:  difficulty breathing or increased cough     Notify your health care provider if you experience any of the following:  severe persistent headache     Notify your health  care provider if you experience any of the following:  worsening rash     Notify your health care provider if you experience any of the following:  persistent dizziness, light-headedness, or visual disturbances     Notify your health care provider if you experience any of the following:  increased confusion or weakness     Activity as tolerated       Significant Diagnostic Studies: Labs: All labs within the past 24 hours have been reviewed    Pending Diagnostic Studies:     None         Medications:  Reconciled Home Medications:      Medication List      START taking these medications    cephALEXin 500 MG capsule  Commonly known as:  KEFLEX  Take 1 capsule (500 mg total) by mouth every 12 (twelve) hours. for 4 days        CONTINUE taking these medications    * albuterol 90 mcg/actuation inhaler  Commonly known as:  PROAIR HFA  Inhale 2 puffs into the lungs every 6 (six) hours as needed for Wheezing or Shortness of Breath.     * albuterol 90 mcg/actuation inhaler  Commonly known as:  VENTOLIN HFA  Inhale 2 puffs into the lungs every 4 (four) hours as needed for Wheezing or Shortness of Breath.     amlodipine-benazepril 10-40 mg per capsule  Commonly known as:  LOTREL  Take 1 capsule by mouth once daily.     atorvastatin 20 MG tablet  Commonly known as:  LIPITOR  Take 1 tablet (20 mg total) by mouth once daily.     * blood sugar diagnostic Strp  Commonly known as:  ACCU-CHEK JOSE G PLUS TEST STRP  Inject 1 each into the skin 2 (two) times daily.     * blood sugar diagnostic Strp  Use to test blood glucose levels 2 times per day as instructed.     carvedilol 12.5 MG tablet  Commonly known as:  COREG  Take 1 tablet (12.5 mg total) by mouth 2 (two) times daily with meals.     diphenhydrAMINE 25 mg capsule  Commonly known as:  BENADRYL  Take 25 mg by mouth every 6 (six) hours as needed for Itching or Allergies.     FIBER GUMMIES ORAL  Take by mouth every morning.     fluticasone propionate 50 mcg/actuation nasal  spray  Commonly known as:  FLONASE  1 spray by Each Nare route 2 (two) times daily.     gabapentin 600 MG tablet  Commonly known as:  NEURONTIN  Take 1 tablet (600 mg total) by mouth 3 (three) times daily.     * lancets Misc  Commonly known as:  ACCU-CHEK MULTICLIX LANCET  Test blood sugar twice daily     * lancets 33 gauge Misc  Commonly known as:  ONETOUCH DELICA LANCETS  Inject 1 lancet into the skin 2 (two) times daily before meals.     meloxicam 7.5 MG tablet  Commonly known as:  MOBIC  Take 1 tablet (7.5 mg total) by mouth once daily.     nicotine polacrilex 2 MG Lozg  1-2 per hour in place of a cigarette. Limit to 20 a day - oral.     traMADol 50 mg tablet  Commonly known as:  ULTRAM  Take 1 tablet (50 mg total) by mouth every 8 (eight) hours as needed.     triamcinolone acetonide 0.1% 0.1 % ointment  Commonly known as:  KENALOG  KENDRA AA ON THE SKIN BID ON RASH ON LEGS     TUMS ORAL  Take by mouth as needed (acid reflux, indigestion).         * This list has 6 medication(s) that are the same as other medications prescribed for you. Read the directions carefully, and ask your doctor or other care provider to review them with you.            STOP taking these medications    benzonatate 200 MG capsule  Commonly known as:  TESSALON     clobetasol 0.05 % cream  Commonly known as:  TEMOVATE     famotidine 20 MG tablet  Commonly known as:  PEPCID            Indwelling Lines/Drains at time of discharge:   Lines/Drains/Airways     None                 Time spent on the discharge of patient: 40 minutes  Patient was seen and examined on the date of discharge and determined to be suitable for discharge.         Radha Dubois MD  Department of Hospital Medicine  [facility]

## 2019-10-19 NOTE — PLAN OF CARE
Pt continues to be very rude, irritable and demanding. Uncooperative at several times. Pts behavior is worse when visitors are in the room. Vss. Nad. Will continue to monitor. Callbell within reach.

## 2019-10-22 ENCOUNTER — PATIENT OUTREACH (OUTPATIENT)
Dept: ADMINISTRATIVE | Facility: CLINIC | Age: 69
End: 2019-10-22

## 2019-10-22 NOTE — PATIENT INSTRUCTIONS
Discharge Instructions for Acute Pancreatitis  You have been diagnosed with acute pancreatitis. Your pancreas is inflamed or swollen. The pancreas is an organ that makes digestive juices and hormones. Gallstones are a common cause of pancreatitis. These hard stones form in the gallbladder. The gallbladder shares a tube with the pancreas into the small intestine. If gallstones block this tube, fluid cant leave the pancreas. The fluid backs up and causes redness and swelling (inflammation). There are other causes of pancreatitis. Make sure you understand the cause of your pancreatitis. Then you can try to stop it from happening again.  Immediate home care  · Find someone to drive you to appointments. Acute pancreatitis is a serious condition, and you should never drive if you are experiencing symptoms.  · Stop drinking if your illness was caused by alcohol.  ¨ Ask your healthcare provider about alcohol abuse programs and support groups such as Alcoholics Anonymous.  ¨ Ask your provider about prescription medicines that can help you stop drinking.  ¨ Tell your provider about the alcohol withdrawal symptoms you have when you stop drinking. This is very important. You may need close medical supervision and special medicines when you stop drinking. This will depend on your alcohol withdrawal history.   · Take your medicines exactly as directed. Dont skip doses.  · Eat a low-fat diet. Ask your provider for menus and other diet information.  · Learn to take your own pulse. Keep a record of your results. Ask your provider which readings mean that you need medical attention.  Ongoing care  · Tell your provider about any medicines you are taking. Some medicines can cause this condition.  · Before starting any new medicine, ask your provider if it will harm your pancreas. This includes any new over-the-counter medicines, vitamins, or herbal supplements.    · Tell your provider if you lose weight without dieting.  · Be aware  of symptoms that may mean your pancreatitis has come back. These symptoms include belly pain, nausea and vomiting, and fever.  · Keep all follow-up appointments with your provider. Problems can often show up later.  Follow-up  Follow up with your healthcare provider, or as advised.  When to call your provider  Call your healthcare provider right away if you have any of the following:  · Fever of 100.4°F (38.0°C) or higher, or as advised by your provider  · Severe pain from your upper belly to your back  · Nausea and vomiting  · Feely dizzy or lightheaded  · Yellowing of your skin or eyes (jaundice)  · Bruises on your belly or back  · Belly swelling and tenderness  · Rapid pulse  · Shallow, fast breathing   Date Last Reviewed: 8/1/2016  © 9920-1699 [employer]. [address] [place] [address], [place] [address]. All rights reserved. This information is not intended as a substitute for professional medical care. Always follow your healthcare professional's instructions.

## 2019-10-31 ENCOUNTER — OFFICE VISIT (OUTPATIENT)
Dept: FAMILY MEDICINE | Facility: CLINIC | Age: 69
End: 2019-10-31
Payer: MEDICARE

## 2019-10-31 VITALS
HEART RATE: 68 BPM | HEIGHT: 62 IN | WEIGHT: 199.06 LBS | BODY MASS INDEX: 36.63 KG/M2 | OXYGEN SATURATION: 100 % | DIASTOLIC BLOOD PRESSURE: 68 MMHG | TEMPERATURE: 98 F | SYSTOLIC BLOOD PRESSURE: 132 MMHG | RESPIRATION RATE: 20 BRPM

## 2019-10-31 DIAGNOSIS — K85.90 ACUTE PANCREATITIS WITHOUT INFECTION OR NECROSIS, UNSPECIFIED PANCREATITIS TYPE: Primary | ICD-10-CM

## 2019-10-31 DIAGNOSIS — N39.0 URINARY TRACT INFECTION WITHOUT HEMATURIA, SITE UNSPECIFIED: ICD-10-CM

## 2019-10-31 DIAGNOSIS — E11.9 DIET-CONTROLLED DIABETES MELLITUS: ICD-10-CM

## 2019-10-31 DIAGNOSIS — I10 ESSENTIAL HYPERTENSION: ICD-10-CM

## 2019-10-31 PROCEDURE — 99999 PR PBB SHADOW E&M-EST. PATIENT-LVL III: CPT | Mod: PBBFAC,,, | Performed by: FAMILY MEDICINE

## 2019-10-31 PROCEDURE — 99999 PR PBB SHADOW E&M-EST. PATIENT-LVL III: ICD-10-PCS | Mod: PBBFAC,,, | Performed by: FAMILY MEDICINE

## 2019-10-31 PROCEDURE — 99495 TRANSJ CARE MGMT MOD F2F 14D: CPT | Mod: S$PBB,,, | Performed by: FAMILY MEDICINE

## 2019-10-31 PROCEDURE — 99213 OFFICE O/P EST LOW 20 MIN: CPT | Mod: PBBFAC,PN | Performed by: FAMILY MEDICINE

## 2019-10-31 PROCEDURE — 99495 TRANSJ CARE MGMT MOD F2F 14D: CPT | Mod: PBBFAC,PN | Performed by: FAMILY MEDICINE

## 2019-10-31 PROCEDURE — 99495 TCM SERVICES (MODERATE COMPLEXITY): ICD-10-PCS | Mod: S$PBB,,, | Performed by: FAMILY MEDICINE

## 2019-10-31 NOTE — PROGRESS NOTES
Transitional Care Note  Subjective:       Patient ID: Stephanie Sears is a 69 y.o. female.  Chief Complaint: Hypertension and Follow-up    Family and/or Caretaker present at visit?  No.  Diagnostic tests reviewed/disposition: I have reviewed all completed as well as pending diagnostic tests at the time of discharge.  Disease/illness education: Yes  Home health/community services discussion/referrals: Patient does not have home health established from hospital visit.  They do not need home health.  If needed, we will set up home health for the patient.   Establishment or re-establishment of referral orders for community resources: No other necessary community resources.   Discussion with other health care providers: No discussion with other health care providers necessary.     HPI   69-year-old with multiple comorbidities comes in for hospital follow-up.  She was evaluated by myself last week for abdominal pain that did not improve with a GI and was sent into the hospital for pancreatitis evaluation.  She did have pancreatitis and was admitted to the hospital.  She reports that since being home, she is back to her normal diet and has no pain.  While in the hospital, she was also treated for a urinary tract infection, she reports no symptoms.  Hospital course is as follows:   HPI:   Stephanie Sears 69 y.o. female with HTN, HLD, DM2, spinal setonosis, and GERD presents to the hospital with a chief complaint of abdominal pain. She reports since Monday she has had epigastric abdominal pain that is constant without radiation and described as a soreness. It is not worsened with eating. She saw her PCP today who recommended presentation to the ED as pain was not controlled with attempted treatment there. She denies any new medications. She states her newest medication is Lipitor that was recently restarted, but she has been on it for 8 to 9 years previously. She is s/p cholecystectomy. She is a borderline diabetic who  "is diet controlled. She smokes 1ppd. She had a previous episode of pancreatitis almost 20 years ago, that an etiology was never found. She denies alcohol or recreational drug use. She denies fever, chest pain, SOB, N/V/D, dizziness, syncope, hematemesis. She does report dysuria.     In the ED, LFT's and bilirubin within normal limits, lipse 728, , TG 92, afebrile without leukocytosis, UA with evidence of infection.     * No surgery found *        Hospital Course:   Stephanie Sears 69 y.o. female admitted to the hospital for pancreatitis and UTI.  She is improving.  Tolerating diet.  No nausea or vomiting.  Abdominal pain controlled with home tramadol.  Urine culture is E coli.  Discharged with cefazolin to complete course of antibiotics.  Discharged home with PCP follow-up.     Review of Systems   Constitutional: Negative for chills, fever and unexpected weight change.   Respiratory: Negative for shortness of breath and wheezing.    Cardiovascular: Negative for chest pain, palpitations and leg swelling.   Gastrointestinal: Negative for abdominal pain, blood in stool, constipation and diarrhea.   Genitourinary: Positive for urgency. Negative for dysuria, frequency and hematuria.       Objective:     /68 (BP Location: Left arm, Patient Position: Sitting, BP Method: Medium (Automatic))   Pulse 68   Temp 97.8 °F (36.6 °C) (Oral)   Resp 20   Ht 5' 2" (1.575 m)   Wt 90.3 kg (199 lb 1.2 oz)   SpO2 100%   BMI 36.41 kg/m²     Physical Exam   Constitutional: She is oriented to person, place, and time. She appears well-developed and well-nourished.   HENT:   Head: Normocephalic and atraumatic.   Right Ear: External ear normal.   Left Ear: External ear normal.   Nose: Nose normal.   Mouth/Throat: Oropharynx is clear and moist. No oropharyngeal exudate.   Eyes: Pupils are equal, round, and reactive to light. Conjunctivae and EOM are normal. Right eye exhibits no discharge. Left eye exhibits no discharge. "   Neck: Normal range of motion. Neck supple. No tracheal deviation present.   Cardiovascular: Normal rate, regular rhythm, normal heart sounds and intact distal pulses.   No murmur heard.  Pulmonary/Chest: Effort normal and breath sounds normal. She has no wheezes. She has no rales.   Abdominal: Soft. Bowel sounds are normal. She exhibits no mass. There is no tenderness. There is no rigidity, no guarding and no CVA tenderness.   Lymphadenopathy:     She has no cervical adenopathy.   Neurological: She is oriented to person, place, and time. She has normal strength. She displays no atrophy. No sensory deficit. Gait normal.   Reflex Scores:       Patellar reflexes are 2+ on the right side and 2+ on the left side.  Psychiatric: She has a normal mood and affect.   Vitals reviewed.      Assessment:       1. Acute pancreatitis without infection or necrosis, unspecified pancreatitis type    2. Urinary tract infection without hematuria, site unspecified    3. Essential hypertension    4. Diet-controlled diabetes mellitus        Plan:       1.  Patient which improved.  2.  Patient has completed antibiotic regimen.  3.  Continue current regimen.  4.  Continue dietary control.

## 2019-11-06 ENCOUNTER — CLINICAL SUPPORT (OUTPATIENT)
Dept: SMOKING CESSATION | Facility: CLINIC | Age: 69
End: 2019-11-06
Payer: COMMERCIAL

## 2019-11-06 ENCOUNTER — CLINICAL SUPPORT (OUTPATIENT)
Dept: FAMILY MEDICINE | Facility: CLINIC | Age: 69
End: 2019-11-06
Payer: MEDICARE

## 2019-11-06 DIAGNOSIS — Z23 FLU VACCINE NEED: Primary | ICD-10-CM

## 2019-11-06 DIAGNOSIS — F17.200 NICOTINE DEPENDENCE: Primary | ICD-10-CM

## 2019-11-06 PROCEDURE — 99999 PR PBB SHADOW E&M-EST. PATIENT-LVL I: CPT | Mod: PBBFAC,,,

## 2019-11-06 PROCEDURE — 99499 UNLISTED E&M SERVICE: CPT | Mod: S$PBB,,, | Performed by: FAMILY MEDICINE

## 2019-11-06 PROCEDURE — 99404 PREV MED CNSL INDIV APPRX 60: CPT | Mod: S$GLB,,,

## 2019-11-06 PROCEDURE — 99499 NO LOS: ICD-10-PCS | Mod: S$PBB,,, | Performed by: FAMILY MEDICINE

## 2019-11-06 PROCEDURE — 90662 IIV NO PRSV INCREASED AG IM: CPT | Mod: PBBFAC,PN

## 2019-11-06 PROCEDURE — 99404 PR PREVENT COUNSEL,INDIV,60 MIN: ICD-10-PCS | Mod: S$GLB,,,

## 2019-11-06 PROCEDURE — 99999 PR PBB SHADOW E&M-EST. PATIENT-LVL I: ICD-10-PCS | Mod: PBBFAC,,,

## 2019-11-06 RX ORDER — IBUPROFEN 200 MG
1 TABLET ORAL DAILY
Qty: 28 PATCH | Refills: 0 | Status: SHIPPED | OUTPATIENT
Start: 2019-11-06 | End: 2019-12-18 | Stop reason: SDUPTHER

## 2019-11-06 NOTE — PROGRESS NOTES
11Patient will be participating in biweekly tobacco cessation meetings and will begin the prescribed tobacco cessation medication regime of  21 mg nicotine patch QD .  She currently smokes <10 cigarettes per day.  Pt started on rate reduction and wait time of 15 min prior to smoking. Pt's exhaled carbon monoxide level was 11  ppm as per Smokerlyzer. (non- smoker = 0-5 ppm.) Will see pt back in office in 2 weeks.

## 2019-11-08 ENCOUNTER — OFFICE VISIT (OUTPATIENT)
Dept: NEPHROLOGY | Facility: CLINIC | Age: 69
End: 2019-11-08
Payer: MEDICARE

## 2019-11-08 VITALS
WEIGHT: 197.56 LBS | OXYGEN SATURATION: 100 % | HEART RATE: 66 BPM | SYSTOLIC BLOOD PRESSURE: 154 MMHG | DIASTOLIC BLOOD PRESSURE: 80 MMHG | BODY MASS INDEX: 36.13 KG/M2

## 2019-11-08 DIAGNOSIS — N39.0 UTI (URINARY TRACT INFECTION), UNCOMPLICATED: ICD-10-CM

## 2019-11-08 DIAGNOSIS — E11.22 CONTROLLED TYPE 2 DIABETES MELLITUS WITH STAGE 2 CHRONIC KIDNEY DISEASE, WITH LONG-TERM CURRENT USE OF INSULIN: ICD-10-CM

## 2019-11-08 DIAGNOSIS — Z79.4 CONTROLLED TYPE 2 DIABETES MELLITUS WITH STAGE 2 CHRONIC KIDNEY DISEASE, WITH LONG-TERM CURRENT USE OF INSULIN: ICD-10-CM

## 2019-11-08 DIAGNOSIS — N18.2 CONTROLLED TYPE 2 DIABETES MELLITUS WITH STAGE 2 CHRONIC KIDNEY DISEASE, WITH LONG-TERM CURRENT USE OF INSULIN: ICD-10-CM

## 2019-11-08 DIAGNOSIS — N18.2 CHRONIC KIDNEY DISEASE, STAGE II (MILD): Primary | ICD-10-CM

## 2019-11-08 PROCEDURE — 99214 PR OFFICE/OUTPT VISIT, EST, LEVL IV, 30-39 MIN: ICD-10-PCS | Mod: S$PBB,,, | Performed by: INTERNAL MEDICINE

## 2019-11-08 PROCEDURE — 99999 PR PBB SHADOW E&M-EST. PATIENT-LVL III: CPT | Mod: PBBFAC,,, | Performed by: INTERNAL MEDICINE

## 2019-11-08 PROCEDURE — 99214 OFFICE O/P EST MOD 30 MIN: CPT | Mod: S$PBB,,, | Performed by: INTERNAL MEDICINE

## 2019-11-08 PROCEDURE — 99999 PR PBB SHADOW E&M-EST. PATIENT-LVL III: ICD-10-PCS | Mod: PBBFAC,,, | Performed by: INTERNAL MEDICINE

## 2019-11-08 PROCEDURE — 99213 OFFICE O/P EST LOW 20 MIN: CPT | Mod: PBBFAC | Performed by: INTERNAL MEDICINE

## 2019-11-08 NOTE — PROGRESS NOTES
Subjective:       Patient ID: Stephanie Sears is a 69 y.o. Black or  female who presents for re-evaluation of progression of Chronic Kidney Disease    HPI    Ms. Sears is a 69 year old woman with medical history of hypertension presenting for follow up of proteinuria.  Patient recently admitted for management of pancreatitis/UTI, reports doing well since discharge.  She reports blood pressure usually 130-140's systolic.  She reports adequate fluid intake, takes meloxicam only occasionally for joint pains.  She otherwise denies any fever, chest pain, shortness of breath, abdominal pain, diarrhea, dysuria/hematuria.     Review of Systems   Constitutional: Negative for appetite change, fatigue and fever.   Respiratory: Negative for chest tightness and shortness of breath.    Cardiovascular: Negative for chest pain and leg swelling.   Gastrointestinal: Negative for abdominal pain, constipation, diarrhea, nausea and vomiting.   Genitourinary: Negative for difficulty urinating, dysuria, flank pain, frequency, hematuria and urgency.   Musculoskeletal: Negative for arthralgias, joint swelling and myalgias.   Skin: Negative for rash and wound.   Neurological: Negative for dizziness, weakness and light-headedness.   All other systems reviewed and are negative.      Objective:      Physical Exam   Constitutional: She appears well-developed and well-nourished.   Cardiovascular: Normal rate, regular rhythm and normal heart sounds. Exam reveals no gallop and no friction rub.   No murmur heard.  Pulmonary/Chest: Effort normal and breath sounds normal. No respiratory distress. She has no wheezes. She has no rales.   Abdominal: Soft. Bowel sounds are normal. There is no tenderness.   Musculoskeletal: She exhibits no edema.   Neurological: She is alert.   Skin: Skin is warm and dry. No rash noted. No erythema.   Psychiatric: She has a normal mood and affect.       Assessment:       1. Chronic kidney disease,  stage II (mild)    2. Controlled type 2 diabetes mellitus with stage 2 chronic kidney disease, with long-term current use of insulin    3. UTI (urinary tract infection), uncomplicated        Plan:      Ms. Sears is a 69 year old woman with medical history of hypertension presenting for follow up of proteinuria.  Patient with well-preserved renal function (CKD stage II), with persistent subnephrotic proteinuria (~2gm) on ACEinh.  Will continue to trend creatinine, proteinuria, advised to avoid any NSAID's.  Stressed importance of blood pressure/glycemic control, along with weight loss, to prevent any further progression of kidney disease, patient voiced understanding.     Return to clinic in 8-12 months with renal/heme panel, iron/TIBC/ferritin, urinalysis/culture, urine protein/creatinine ratio prior to next visit

## 2019-11-21 ENCOUNTER — CLINICAL SUPPORT (OUTPATIENT)
Dept: SMOKING CESSATION | Facility: CLINIC | Age: 69
End: 2019-11-21
Payer: COMMERCIAL

## 2019-11-21 DIAGNOSIS — F17.200 NICOTINE DEPENDENCE: Primary | ICD-10-CM

## 2019-11-21 PROCEDURE — 99407 PR TOBACCO USE CESSATION INTENSIVE >10 MINUTES: ICD-10-PCS | Mod: S$GLB,,,

## 2019-11-21 PROCEDURE — 99407 BEHAV CHNG SMOKING > 10 MIN: CPT | Mod: S$GLB,,,

## 2019-11-21 NOTE — PROGRESS NOTES
Spoke with patient today in regard to smoking cessation progress for 12 month phone follow up on Quit 2, 3-6 month on quit 3. She states not tobacco free. Patient currently in smoking cessation program for Quit attempt # 4. Informed patient of benefit period, future follow ups, and contact information if any further help or support is needed. Will resolve episode and complete smart form for Quit attempt #2 and 3.

## 2019-11-25 ENCOUNTER — CLINICAL SUPPORT (OUTPATIENT)
Dept: SMOKING CESSATION | Facility: CLINIC | Age: 69
End: 2019-11-25
Payer: COMMERCIAL

## 2019-11-25 ENCOUNTER — OFFICE VISIT (OUTPATIENT)
Dept: ORTHOPEDICS | Facility: CLINIC | Age: 69
End: 2019-11-25
Payer: MEDICARE

## 2019-11-25 VITALS
HEART RATE: 73 BPM | RESPIRATION RATE: 16 BRPM | OXYGEN SATURATION: 99 % | SYSTOLIC BLOOD PRESSURE: 140 MMHG | BODY MASS INDEX: 36.47 KG/M2 | DIASTOLIC BLOOD PRESSURE: 86 MMHG | HEIGHT: 62 IN | WEIGHT: 198.19 LBS

## 2019-11-25 DIAGNOSIS — Z96.653 TOTAL KNEE REPLACEMENT STATUS, BILATERAL: ICD-10-CM

## 2019-11-25 DIAGNOSIS — M70.62 GREATER TROCHANTERIC BURSITIS OF BOTH HIPS: Primary | ICD-10-CM

## 2019-11-25 DIAGNOSIS — F17.200 NICOTINE DEPENDENCE: ICD-10-CM

## 2019-11-25 DIAGNOSIS — M70.61 GREATER TROCHANTERIC BURSITIS OF BOTH HIPS: Primary | ICD-10-CM

## 2019-11-25 PROCEDURE — 99999 PR PBB SHADOW E&M-EST. PATIENT-LVL I: CPT | Mod: PBBFAC,,,

## 2019-11-25 PROCEDURE — 99999 PR PBB SHADOW E&M-EST. PATIENT-LVL V: CPT | Mod: PBBFAC,,, | Performed by: ORTHOPAEDIC SURGERY

## 2019-11-25 PROCEDURE — 99999 PR PBB SHADOW E&M-EST. PATIENT-LVL I: ICD-10-PCS | Mod: PBBFAC,,,

## 2019-11-25 PROCEDURE — 99215 OFFICE O/P EST HI 40 MIN: CPT | Mod: PBBFAC,25,PN | Performed by: ORTHOPAEDIC SURGERY

## 2019-11-25 PROCEDURE — 1125F AMNT PAIN NOTED PAIN PRSNT: CPT | Mod: ,,, | Performed by: ORTHOPAEDIC SURGERY

## 2019-11-25 PROCEDURE — 1159F PR MEDICATION LIST DOCUMENTED IN MEDICAL RECORD: ICD-10-PCS | Mod: ,,, | Performed by: ORTHOPAEDIC SURGERY

## 2019-11-25 PROCEDURE — 99402 PREV MED CNSL INDIV APPRX 30: CPT | Mod: S$GLB,,,

## 2019-11-25 PROCEDURE — 20610 DRAIN/INJ JOINT/BURSA W/O US: CPT | Mod: PBBFAC,PN | Performed by: ORTHOPAEDIC SURGERY

## 2019-11-25 PROCEDURE — 99214 PR OFFICE/OUTPT VISIT, EST, LEVL IV, 30-39 MIN: ICD-10-PCS | Mod: 25,S$PBB,, | Performed by: ORTHOPAEDIC SURGERY

## 2019-11-25 PROCEDURE — 99999 PR PBB SHADOW E&M-EST. PATIENT-LVL V: ICD-10-PCS | Mod: PBBFAC,,, | Performed by: ORTHOPAEDIC SURGERY

## 2019-11-25 PROCEDURE — 1159F MED LIST DOCD IN RCRD: CPT | Mod: ,,, | Performed by: ORTHOPAEDIC SURGERY

## 2019-11-25 PROCEDURE — 20610 LARGE JOINT ASPIRATION/INJECTION: L GREATER TROCHANTERIC BURSA: ICD-10-PCS | Mod: S$PBB,LT,, | Performed by: ORTHOPAEDIC SURGERY

## 2019-11-25 PROCEDURE — 99402 PR PREVENT COUNSEL,INDIV,30 MIN: ICD-10-PCS | Mod: S$GLB,,,

## 2019-11-25 PROCEDURE — 99214 OFFICE O/P EST MOD 30 MIN: CPT | Mod: 25,S$PBB,, | Performed by: ORTHOPAEDIC SURGERY

## 2019-11-25 PROCEDURE — 1125F PR PAIN SEVERITY QUANTIFIED, PAIN PRESENT: ICD-10-PCS | Mod: ,,, | Performed by: ORTHOPAEDIC SURGERY

## 2019-11-25 RX ADMIN — TRIAMCINOLONE ACETONIDE 40 MG: 40 INJECTION, SUSPENSION INTRA-ARTICULAR; INTRAMUSCULAR at 11:11

## 2019-11-25 NOTE — PROGRESS NOTES
Individual Follow-Up Form    11/25/2019    Quit Date: 12/10/19    Clinical Status of Patient: Outpatient    Length of Service: 30 minutes    Continuing Medication: yes  Patches or Nicotine Lozenges    Other Medications:      Target Symptoms: Withdrawal and medication side effects. The following were  rated moderate (3) to severe (4) on TCRS:  · Moderate (3): none   · Severe (4): none    Comments: Telephonic visit .   Patient continues to smoke . Pt remains on tobacco cessation medication of  21 mg nicotine patch QD and 2 mg nicotine lozenge  PRN (1-2 per hour in place of cigarettes.) No adverse effects noted at this time. Pt doing well with rate reduction and wait times prior to smoking.  Pt encouraged to pick a quit day.  Reviewed coping strategies/habitual behavior/relapse prevention with patient.  Will see pt back in office in 2 weeks.       Diagnosis: F17.200    Next Visit: 2 weeks

## 2019-11-25 NOTE — PROGRESS NOTES
"Chief Complaint   Patient presents with    Right Hip - Pain    Left Hip - Pain       HPI: Stephanie Sears is a 69 y.o. female who presents today complaining of Pain of the Right Hip and Pain of the Left Hip     Duration of symptoms:  Over 1 year. Started with right and now has it on both sides   Right hip is worse  Localizes pain to greater trochanter with radiation up rtowards the buttock  No back pain  No radicular symptoms  Has had fusion at L4-L5 with Dr. Melinda Lowry. 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     Pain does interfere with sleep.    This is the extent of the patient's complaints at this time.     Review of Systems   Constitutional: Negative.    HENT: Negative.    Eyes: Negative.    Respiratory: Negative.    Cardiovascular: Negative.    Gastrointestinal: Negative.    Genitourinary: Negative.    Skin: Negative.    Neurological: Negative.    Endo/Heme/Allergies: Negative.    Psychiatric/Behavioral: Negative.    All other systems reviewed and are negative.        Review of patient's allergies indicates:   Allergen Reactions    Hydrocodone Shortness Of Breath    Iodinated contrast media Shortness Of Breath     Difficulty breathing    Adhesive Itching     Skin peeling    Oxycodone      Hallucinations    Sulfa (sulfonamide antibiotics) Hives and Itching    Morphine      Patient says it will make her "hallucinate"          Current Outpatient Medications:     albuterol (PROAIR HFA) 90 mcg/actuation inhaler, Inhale 2 puffs into the lungs every 6 (six) hours as needed for Wheezing or Shortness of Breath., Disp: 1 Inhaler, Rfl: 3    albuterol (VENTOLIN HFA) 90 mcg/actuation inhaler, Inhale 2 puffs into the lungs every 4 (four) hours as needed for Wheezing " or Shortness of Breath., Disp: 1 Inhaler, Rfl: 2    amlodipine-benazepril (LOTREL) 10-40 mg per capsule, Take 1 capsule by mouth once daily., Disp: 90 capsule, Rfl: 1    atorvastatin (LIPITOR) 20 MG tablet, Take 1 tablet (20 mg total) by mouth once daily., Disp: 90 tablet, Rfl: 1    blood sugar diagnostic (ACCU-CHEK JOSE G PLUS TEST STRP) Strp, Inject 1 each into the skin 2 (two) times daily., Disp: 200 each, Rfl: 11    blood sugar diagnostic Strp, Use to test blood glucose levels 2 times per day as instructed., Disp: 200 strip, Rfl: 11    CALCIUM CARBONATE (TUMS ORAL), Take by mouth as needed (acid reflux, indigestion)., Disp: , Rfl:     carvedilol (COREG) 12.5 MG tablet, Take 1 tablet (12.5 mg total) by mouth 2 (two) times daily with meals., Disp: 180 tablet, Rfl: 1    diphenhydrAMINE (BENADRYL) 25 mg capsule, Take 25 mg by mouth every 6 (six) hours as needed for Itching or Allergies., Disp: , Rfl:     fluticasone (FLONASE) 50 mcg/actuation nasal spray, 1 spray by Each Nare route 2 (two) times daily., Disp: 1 Bottle, Rfl: 1    gabapentin (NEURONTIN) 600 MG tablet, Take 1 tablet (600 mg total) by mouth 3 (three) times daily., Disp: 270 tablet, Rfl: 1    INULIN (FIBER GUMMIES ORAL), Take by mouth every morning. , Disp: , Rfl:     lancets (ACCU-CHEK MULTICLIX LANCET) Misc, Test blood sugar twice daily, Disp: 300 each, Rfl: 3    lancets (ONETOUCH DELICA LANCETS) 33 gauge Misc, Inject 1 lancet into the skin 2 (two) times daily before meals., Disp: 200 each, Rfl: 11    meloxicam (MOBIC) 7.5 MG tablet, Take 1 tablet (7.5 mg total) by mouth once daily., Disp: 90 tablet, Rfl: 1    nicotine (NICODERM CQ) 21 mg/24 hr, Place 1 patch onto the skin once daily., Disp: 28 patch, Rfl: 0    nicotine polacrilex 2 MG Lozg, 1-2 per hour in place of a cigarette. Limit to 20 a day - oral., Disp: 162 lozenge, Rfl: 1    triamcinolone acetonide 0.1% (KENALOG) 0.1 % ointment, KENDRA AA ON THE SKIN BID ON RASH ON LEGS, Disp: ,  Rfl: 0    traMADol (ULTRAM) 50 mg tablet, Take 1 tablet (50 mg total) by mouth every 8 (eight) hours as needed., Disp: 90 tablet, Rfl: 1    Past Medical History:   Diagnosis Date    Angina pectoris     Anxiety     Bronchitis     Cataract     Cervical spondylosis with radiculopathy 7/10/2012    Chest pain     Chronic neck pain 7/26/2012    Depression     Fibrocystic breast     GERD (gastroesophageal reflux disease)     Glaucoma     Hyperlipidemia     Hypertension     Neck pain 7/10/2012    JAM (obstructive sleep apnea)     Primary osteoarthritis of both knees     Psoriasis     Subacromial or subdeltoid bursitis 7/10/2012    Thyroid disease     Type 2 diabetes mellitus 12/10/2013       Patient Active Problem List   Diagnosis    GERD (gastroesophageal reflux disease)    Primary osteoarthritis of both knees    Cervical spondylosis with radiculopathy    Open angle with borderline findings, low risk - Both Eyes    Nontoxic uninodular goiter    Myopia with astigmatism and presbyopia - Both Eyes    Diet-controlled diabetes mellitus    Asymptomatic proteinuria    DJD (degenerative joint disease) of lumbar spine, mild    Osteoarthritis of left hip, mild    DJD (degenerative joint disease) of cervical spine    Essential hypertension    Class 2 obesity due to excess calories without serious comorbidity with body mass index (BMI) of 35.0 to 35.9 in adult    Tobacco abuse    H/O scarlet fever    Aortic valve sclerosis    Pulmonary hypertension    Diastolic dysfunction    Status post total knee replacement, left 1/20/2016    CMC arthritis    Chronic midline thoracic back pain    Primary osteoarthritis of right knee    Hand pain, left    JAM (obstructive sleep apnea)    Personal history of spine surgery    Fatty liver    UTI (urinary tract infection)    Status post total right knee replacement 2/26/2018    Dyslipidemia    Preoperative cardiovascular examination    Bilateral  carotid bruits    Atherosclerosis of aortic arch    Insomnia    Lichen planus    History of colon polyps    Acute pancreatitis without infection or necrosis       Past Surgical History:   Procedure Laterality Date    BREAST LUMPECTOMY Left 1988    mass in the rt breast surgery  1988    BREAST MASS EXCISION Right     benign    CHOLECYSTECTOMY      COLONOSCOPY N/A 5/22/2019    Procedure: COLONOSCOPY;  Surgeon: Darrin Calvin MD;  Location: [facility] ([address] [place] [address]);  Service: Endoscopy;  Laterality: N/A;  2nd floor - all other procedure done on 2, multiple comorbidities - ERW/   change in MD schedule, Pt notified and verbalized understanding, new arrival time 0800, Ins mailed to Pt with new arrival time - ERW1/8/19@1130    HYSTERECTOMY  1980's    KNEE SURGERY Left 1-20-16    TKR    KNEE SURGERY Right 02/26/2018    TKR    L4-L5 fusion  2006       Social History     Tobacco Use    Smoking status: Current Every Day Smoker     Packs/day: 1.00     Years: 40.00     Pack years: 40.00     Types: Cigarettes    Smokeless tobacco: Never Used    Tobacco comment: smokes a pack a week   Substance Use Topics    Alcohol use: No     Alcohol/week: 0.0 standard drinks    Drug use: No       Family History   Problem Relation Age of Onset    Diabetes Mother     Hypertension Mother         CHF, angina    Angina Mother     Kidney disease Mother     Heart disease Mother         CHF    Hypertension Father         glaucoma, Alzhiemer's , supra pubic    Alzheimer's disease Father     Glaucoma Father     Kidney disease Brother         kidney transplant, HTN,DM    Diabetes Brother     Glaucoma Sister     Hypertension Sister     Hyperlipidemia Sister     Gout Son     Hypertension Son     Diabetes Son     Sleep apnea Sister     Hypertension Sister     Thyroid disease Sister     No Known Problems Sister     No Known Problems Sister     Hepatitis Brother     Amblyopia Neg Hx     Blindness Neg Hx     Melanoma Neg  "Hx        Physical Exam:   Vitals:    19 1109   BP: (!) 140/86   Pulse: 73   Resp: 16   SpO2: 99%   Weight: 89.9 kg (198 lb 3.1 oz)   Height: 5' 2" (1.575 m)   PainSc:   8   PainLoc: Hip     General: Weight: 89.9 kg (198 lb 3.1 oz) Body mass index is 36.25 kg/m².   Patient is alert, awake and oriented to time, place and person. Mood and affect are appropriate.  Patient does not appear to be in any distress, denies any constitutional symptoms and appears stated age.   HEENT:  Pupils are equal and round, sclera are not injected. External examination of ears and nose reveals no abnormalities. Cranial nerves II-X are grossly intact  Skin:  no rashes, abrasions or open wounds on the affected extremity   Resp:  No respiratory distress or audible wheezing   CV: 2+  pulses, all extremities warm and well perfused   Bilateral Hip   Tender over lateral proximal femur        Imagin views bilateral hips: No significant degenerative changes, no fracture     I personally reviewed and interpreted the patient's imaging obtained today in clinic     Assessment: 69 y.o. female with bilateral greater trochanteric bursitis     I explained my diagnostic impression and the reasoning behind it in detail, using layman's terms.  Models and/or pictures were used to help in the explanation.  ]  We discussed non operative and operative treatment modalities -- They would like to start with non-operative treatment in the form of injection.     Plan:   - Injection of the left  greater trochanteric bursa performed, please see procedure note for more details.  Prior to the injection risks and benefits of corticosteroid injection were discussed with the patient including pain, infection, bleeding, skin color changes, swelling, steroid flare. We discussed that over time injections can result in chondral damage, acceleration of arthritis formation, damage to tendons and damage to joints.  The patient consented for the procedure.  " Post-injection instructions were given to the patient in writing.  - If she has continued pain following injection will refer to pain mgmt   - Return to clinic next week for R hip injection    All questions were answered in detail. The patient is in full agreement with the treatment plan and will proceed accordingly.      This note was created by combination of typed  and M-Modal dictation. Transcription and phonetic errors may be present.  If there are any questions, please contact me.

## 2019-11-29 RX ORDER — ATORVASTATIN CALCIUM 20 MG/1
TABLET, FILM COATED ORAL
Qty: 90 TABLET | Refills: 4 | Status: SHIPPED | OUTPATIENT
Start: 2019-11-29 | End: 2021-02-22

## 2019-12-02 ENCOUNTER — OFFICE VISIT (OUTPATIENT)
Dept: ORTHOPEDICS | Facility: CLINIC | Age: 69
End: 2019-12-02
Payer: MEDICARE

## 2019-12-02 VITALS
HEIGHT: 62 IN | SYSTOLIC BLOOD PRESSURE: 160 MMHG | HEART RATE: 62 BPM | WEIGHT: 198 LBS | RESPIRATION RATE: 16 BRPM | DIASTOLIC BLOOD PRESSURE: 80 MMHG | BODY MASS INDEX: 36.44 KG/M2 | OXYGEN SATURATION: 98 %

## 2019-12-02 DIAGNOSIS — M18.12 ARTHRITIS OF CARPOMETACARPAL (CMC) JOINT OF LEFT THUMB: ICD-10-CM

## 2019-12-02 DIAGNOSIS — M70.61 GREATER TROCHANTERIC BURSITIS OF RIGHT HIP: Primary | ICD-10-CM

## 2019-12-02 PROBLEM — M70.62 GREATER TROCHANTERIC BURSITIS OF BOTH HIPS: Status: ACTIVE | Noted: 2019-12-02

## 2019-12-02 PROCEDURE — 99212 OFFICE O/P EST SF 10 MIN: CPT | Mod: 25,S$PBB,, | Performed by: ORTHOPAEDIC SURGERY

## 2019-12-02 PROCEDURE — 20610 DRAIN/INJ JOINT/BURSA W/O US: CPT | Mod: PBBFAC,PN | Performed by: ORTHOPAEDIC SURGERY

## 2019-12-02 PROCEDURE — 99214 OFFICE O/P EST MOD 30 MIN: CPT | Mod: PBBFAC,PN | Performed by: ORTHOPAEDIC SURGERY

## 2019-12-02 PROCEDURE — 99999 PR PBB SHADOW E&M-EST. PATIENT-LVL IV: CPT | Mod: PBBFAC,,, | Performed by: ORTHOPAEDIC SURGERY

## 2019-12-02 PROCEDURE — 1159F PR MEDICATION LIST DOCUMENTED IN MEDICAL RECORD: ICD-10-PCS | Mod: ,,, | Performed by: ORTHOPAEDIC SURGERY

## 2019-12-02 PROCEDURE — 20610 LARGE JOINT ASPIRATION/INJECTION: R GREATER TROCHANTERIC BURSA: ICD-10-PCS | Mod: S$PBB,RT,, | Performed by: ORTHOPAEDIC SURGERY

## 2019-12-02 PROCEDURE — 1159F MED LIST DOCD IN RCRD: CPT | Mod: ,,, | Performed by: ORTHOPAEDIC SURGERY

## 2019-12-02 PROCEDURE — 99999 PR PBB SHADOW E&M-EST. PATIENT-LVL IV: ICD-10-PCS | Mod: PBBFAC,,, | Performed by: ORTHOPAEDIC SURGERY

## 2019-12-02 PROCEDURE — 1125F AMNT PAIN NOTED PAIN PRSNT: CPT | Mod: ,,, | Performed by: ORTHOPAEDIC SURGERY

## 2019-12-02 PROCEDURE — 1125F PR PAIN SEVERITY QUANTIFIED, PAIN PRESENT: ICD-10-PCS | Mod: ,,, | Performed by: ORTHOPAEDIC SURGERY

## 2019-12-02 PROCEDURE — 99212 PR OFFICE/OUTPT VISIT, EST, LEVL II, 10-19 MIN: ICD-10-PCS | Mod: 25,S$PBB,, | Performed by: ORTHOPAEDIC SURGERY

## 2019-12-02 RX ORDER — TRIAMCINOLONE ACETONIDE 40 MG/ML
40 INJECTION, SUSPENSION INTRA-ARTICULAR; INTRAMUSCULAR
Status: DISCONTINUED | OUTPATIENT
Start: 2019-11-25 | End: 2019-12-02 | Stop reason: HOSPADM

## 2019-12-02 RX ORDER — TRIAMCINOLONE ACETONIDE 40 MG/ML
40 INJECTION, SUSPENSION INTRA-ARTICULAR; INTRAMUSCULAR
Status: DISCONTINUED | OUTPATIENT
Start: 2019-12-02 | End: 2019-12-02 | Stop reason: HOSPADM

## 2019-12-02 RX ADMIN — TRIAMCINOLONE ACETONIDE 40 MG: 40 INJECTION, SUSPENSION INTRA-ARTICULAR; INTRAMUSCULAR at 09:12

## 2019-12-02 NOTE — PROGRESS NOTES
Follow up visit    History of Present Illness:   Stephanie comes to the office for follow up evaluation of right hip pain due to trochanteric bursitis.  Recommended treatment at the last visit included injection L hip with good pain relief. She is here today for right hip injection.    She also has questions about her left hand pain  - had arthroscopy with Dr. Mccarthy in 2016 with relief of pain until recently. She thinks this is related to the cold weather.  Pain with gripping, use of the hand.  Is wondering what her options are for further treatment    ROS: unremarkable and no change since last visit    Physical Examination:    NAD  Right hip   TTP over greater trochanter   Left hand:   Swelling over CMC, well healed incision  + grind    Radiographic imaging: no new imaging, previous hand images reviewed from 2017 which show mild CMC OA of the left hand    Assessment/Plan:  69 y.o. female  with Bilateral greater trochanteric bursitis, L CMC OA    We discussed the etiology of persistent pain and further treatment options.  Injection of the right greater trochanteric bursa performed, please see procedure note for more details.  Prior to the injection risks and benefits of corticosteroid injection were discussed with the patient including pain, infection, bleeding, skin color changes, swelling, steroid flare. We discussed that over time injections can result in chondral damage, acceleration of arthritis formation, damage to tendons and damage to joints.  The patient consented for the procedure.  Post-injection instructions were given to the patient in writing.I discussed the risk of increased blood glucose following steroid injection in the setting of diabetes- the patient will monitor closely for the next 24 hours and call her primary care physician with any questions or concerns regarding blood glucose control   Referred to Dr. Calixto for consideration of possible CMC arthroplasty. Has been seen by Dr. Mccarthy-Dariel in the  past but does not like going to [facility] for appts. Will have her scheduled into Dr Calixto's clinic at [facility] or [place]   Return for follow up visit:  P.r.n.    All questions were answered in detail. The patient  verbalized the understanding of the treatment plan and is in full agreement with the treatment plan.

## 2019-12-02 NOTE — PROGRESS NOTES
L hip injected last visit with good relief  Here for R hip injection -spaced out because of DM

## 2019-12-02 NOTE — PROCEDURES
Large Joint Aspiration/Injection: L greater trochanteric bursa  Date/Time: 11/25/2019 11:00 AM  Performed by: Tiffany Garrison MD  Authorized by: Tiffany Garrison MD     Consent Done?:  Yes (Written)  Indications:  Pain and diagnostic evaluation  Timeout: Prior to procedure the correct patient, procedure, and site was verified    Anesthesia  Local anesthesia used  Anesthetic: topical anesthetic    Location:  Hip  Site:  L greater trochanteric bursa  Prep: Patient was prepped and draped in usual sterile fashion    Needle size:  22 G  Approach:  Lateral  Medications:  40 mg triamcinolone acetonide 40 mg/mL  Patient tolerance:  Patient tolerated the procedure well with no immediate complications

## 2019-12-02 NOTE — PROCEDURES
Large Joint Aspiration/Injection: R greater trochanteric bursa  Date/Time: 12/2/2019 9:45 AM  Performed by: Tiffany Garrison MD  Authorized by: Tiffany Garrison MD     Consent Done?:  Yes (Verbal)  Indications:  Pain  Timeout: Prior to procedure the correct patient, procedure, and site was verified    Anesthesia  Local anesthesia used  Anesthetic: topical anesthetic    Location:  Hip  Site:  R greater trochanteric bursa  Prep: Patient was prepped and draped in usual sterile fashion    Medications:  40 mg triamcinolone acetonide 40 mg/mL  Patient tolerance:  Patient tolerated the procedure well with no immediate complications

## 2019-12-16 DIAGNOSIS — I10 ESSENTIAL HYPERTENSION: ICD-10-CM

## 2019-12-16 RX ORDER — CARVEDILOL 12.5 MG/1
TABLET ORAL
Qty: 180 TABLET | Refills: 4 | Status: SHIPPED | OUTPATIENT
Start: 2019-12-16 | End: 2021-03-02

## 2019-12-18 ENCOUNTER — CLINICAL SUPPORT (OUTPATIENT)
Dept: SMOKING CESSATION | Facility: CLINIC | Age: 69
End: 2019-12-18
Payer: COMMERCIAL

## 2019-12-18 DIAGNOSIS — F17.200 NICOTINE DEPENDENCE: ICD-10-CM

## 2019-12-18 PROCEDURE — 99999 PR PBB SHADOW E&M-EST. PATIENT-LVL I: ICD-10-PCS | Mod: PBBFAC,,,

## 2019-12-18 PROCEDURE — 99404 PREV MED CNSL INDIV APPRX 60: CPT | Mod: S$GLB,,,

## 2019-12-18 PROCEDURE — 99999 PR PBB SHADOW E&M-EST. PATIENT-LVL I: CPT | Mod: PBBFAC,,,

## 2019-12-18 PROCEDURE — 99404 PR PREVENT COUNSEL,INDIV,60 MIN: ICD-10-PCS | Mod: S$GLB,,,

## 2019-12-18 RX ORDER — IBUPROFEN 200 MG
1 TABLET ORAL DAILY
Qty: 28 PATCH | Refills: 0 | Status: SHIPPED | OUTPATIENT
Start: 2019-12-18 | End: 2020-02-10 | Stop reason: SDUPTHER

## 2019-12-18 NOTE — PROGRESS NOTES
Individual Follow-Up Form    12/18/2019    Quit Date: 1/2/20    Clinical Status of Patient: Outpatient    Length of Service: 60 minutes    Continuing Medication: yes  Patches or Nicotine Lozenges    Other Medications: none     Target Symptoms: Withdrawal and medication side effects. The following were rated moderate (3) to severe (4) on TCRS:  · Moderate (3): desire to smoke  · Severe (4): none    Comments: Patient presents for follow up smoking <3 cigarettes per day. Pt remains on tobacco cessation medication of  21 mg nicotine patch QD and 2 mg nicotine lozenge PRN (1-2 per hour in place of cigarettes.) No adverse effects noted at this time. Discussed how it is not the need for nicotine that is triggering her to light those cigarettes.   Pt encouraged to pick a quit day, she said she would be definitely be quit by her birthday but her target is 1/2/20.   Reviewed coping strategies/habitual behavior/relapse prevention with patient. Exhaled carbon monoxide level was 8 ppm per Smokerlyzer  ( non- smoker = 0-5 ppm .)  Will see pt back in office in 2 weeks      Diagnosis: F17.200    Next Visit: 2 weeks

## 2019-12-31 ENCOUNTER — CLINICAL SUPPORT (OUTPATIENT)
Dept: SMOKING CESSATION | Facility: CLINIC | Age: 69
End: 2019-12-31
Payer: COMMERCIAL

## 2019-12-31 DIAGNOSIS — F17.200 NICOTINE DEPENDENCE: Primary | ICD-10-CM

## 2019-12-31 PROCEDURE — 99404 PR PREVENT COUNSEL,INDIV,60 MIN: ICD-10-PCS | Mod: S$GLB,,,

## 2019-12-31 PROCEDURE — 99999 PR PBB SHADOW E&M-EST. PATIENT-LVL I: ICD-10-PCS | Mod: PBBFAC,,,

## 2019-12-31 PROCEDURE — 99999 PR PBB SHADOW E&M-EST. PATIENT-LVL I: CPT | Mod: PBBFAC,,,

## 2019-12-31 PROCEDURE — 99404 PREV MED CNSL INDIV APPRX 60: CPT | Mod: S$GLB,,,

## 2019-12-31 NOTE — PROGRESS NOTES
Individual Follow-Up Form    12/31/2019    Quit Date:     Clinical Status of Patient: Outpatient    Length of Service: 60 minutes    Continuing Medication: yes  Patches or Nicotine Lozenges    Other Medications:      Target Symptoms: Withdrawal and medication side effects. The following were  rated moderate (3) to severe (4) on TCRS:  · Moderate (3): none  · Severe (4): none    Comments: Patient presents for follow up smoking  cigarettes per day. Pt remains on tobacco cessation medication of  21 mg nicotine patch QD and 2 mg nicotine lozenge  PRN (1-2 per hour in place of cigarettes.) No adverse effects noted at this time. Pt doing well with rate reduction and wait times prior to smoking.  Pt encouraged to pick a quit day.  Reviewed coping strategies/habitual behavior/relapse prevention with patient. Exhaled carbon monoxide level was 7* ppm per Smokerlyzer  ( non- smoker = 0-5 ppm .)  Will see pt back in office in 2 weeks      Diagnosis: F17.200    Next Visit: 2 weeks

## 2020-01-12 DIAGNOSIS — I10 ESSENTIAL HYPERTENSION: ICD-10-CM

## 2020-01-12 RX ORDER — AMLODIPINE AND BENAZEPRIL HYDROCHLORIDE 10; 40 MG/1; MG/1
CAPSULE ORAL
Qty: 90 CAPSULE | Refills: 4 | Status: SHIPPED | OUTPATIENT
Start: 2020-01-12 | End: 2021-03-12 | Stop reason: SDUPTHER

## 2020-01-14 ENCOUNTER — CLINICAL SUPPORT (OUTPATIENT)
Dept: SMOKING CESSATION | Facility: CLINIC | Age: 70
End: 2020-01-14
Payer: COMMERCIAL

## 2020-01-14 DIAGNOSIS — F17.200 NICOTINE DEPENDENCE: Primary | ICD-10-CM

## 2020-01-14 PROCEDURE — 99404 PREV MED CNSL INDIV APPRX 60: CPT | Mod: S$GLB,,,

## 2020-01-14 PROCEDURE — 99999 PR PBB SHADOW E&M-EST. PATIENT-LVL I: ICD-10-PCS | Mod: PBBFAC,,,

## 2020-01-14 PROCEDURE — 99999 PR PBB SHADOW E&M-EST. PATIENT-LVL I: CPT | Mod: PBBFAC,,,

## 2020-01-14 PROCEDURE — 99404 PR PREVENT COUNSEL,INDIV,60 MIN: ICD-10-PCS | Mod: S$GLB,,,

## 2020-01-14 NOTE — PROGRESS NOTES
Individual Follow-Up Form    1/14/2020    Quit Date: 2/1/20    Clinical Status of Patient: Outpatient    Length of Service: 60 minutes    Continuing Medication: yes  Patches or Nicotine Lozenges    Other Medications:      Target Symptoms: Withdrawal and medication side effects. The following were  rated moderate (3) to severe (4) on TCRS:  · Moderate (3): none  · Severe (4): none    Comments: Patient presents for follow up smoking 2 cigarettes per day. Pt remains on tobacco cessation medication of  21 mg nicotine patch QD and 2 mg nicotine lozenge  PRN (1-2 per hour in place of cigarettes.) No adverse effects noted at this time. Pt doing well with rate reduction and wait times prior to smoking.  Pt encouraged to pick a quit day.  Reviewed coping strategies/habitual behavior/relapse prevention with patient. Exhaled carbon monoxide level was 7 ppm per Smokerlyzer  ( non- smoker = 0-5 ppm .)  Will see pt back in office in 2 weeks      Diagnosis: F17.200    Next Visit: 2 weeks

## 2020-01-20 ENCOUNTER — TELEPHONE (OUTPATIENT)
Dept: ORTHOPEDICS | Facility: CLINIC | Age: 70
End: 2020-01-20

## 2020-01-20 ENCOUNTER — TELEPHONE (OUTPATIENT)
Dept: FAMILY MEDICINE | Facility: CLINIC | Age: 70
End: 2020-01-20

## 2020-01-20 DIAGNOSIS — M79.642 BILATERAL HAND PAIN: Primary | ICD-10-CM

## 2020-01-20 DIAGNOSIS — M79.641 BILATERAL HAND PAIN: Primary | ICD-10-CM

## 2020-01-20 NOTE — TELEPHONE ENCOUNTER
Patient is requesting advice from PCP . Patient has been having leg and feet cramps for the last  4-5 night in both leg and feet the pain runs up and down but the cramp rest at her calfs. Patient would like to know what doctor does she need to go in to see . Patient was offered an appointment with PCP but patient refused. Patient also questioning the forms for the new walker  She needs. Please address

## 2020-01-20 NOTE — TELEPHONE ENCOUNTER
----- Message from Arlin Fareed sent at 1/20/2020 11:19 AM CST -----  Contact: Self : [phone]  Type: Patient Call Back    Who called:Self    What is the request in detail: pt is calling in regards to talking to someone about a problem that she is having so she can know what type of doctor she needs to see. Pt did not want to state what the problem is    Can the clinic reply by [facility]? Call back    Would the patient rather a call back or a response via [facility]? Call back    Best call back number: [phone]

## 2020-01-21 ENCOUNTER — HOSPITAL ENCOUNTER (OUTPATIENT)
Dept: RADIOLOGY | Facility: HOSPITAL | Age: 70
Discharge: HOME | End: 2020-01-21
Attending: ORTHOPAEDIC SURGERY
Payer: MEDICARE

## 2020-01-21 ENCOUNTER — OFFICE VISIT (OUTPATIENT)
Dept: ORTHOPEDICS | Facility: CLINIC | Age: 70
End: 2020-01-21
Payer: MEDICARE

## 2020-01-21 VITALS — WEIGHT: 198 LBS | BODY MASS INDEX: 36.44 KG/M2 | HEIGHT: 62 IN

## 2020-01-21 DIAGNOSIS — M79.642 BILATERAL HAND PAIN: ICD-10-CM

## 2020-01-21 DIAGNOSIS — M79.641 BILATERAL HAND PAIN: ICD-10-CM

## 2020-01-21 DIAGNOSIS — M18.12 ARTHRITIS OF CARPOMETACARPAL (CMC) JOINT OF LEFT THUMB: Primary | ICD-10-CM

## 2020-01-21 PROCEDURE — 99213 PR OFFICE/OUTPT VISIT, EST, LEVL III, 20-29 MIN: ICD-10-PCS | Mod: S$PBB,,, | Performed by: ORTHOPAEDIC SURGERY

## 2020-01-21 PROCEDURE — 73130 XR HAND COMPLETE 3 VIEWS BILATERAL: ICD-10-PCS | Mod: 26,50,, | Performed by: RADIOLOGY

## 2020-01-21 PROCEDURE — 99999 PR PBB SHADOW E&M-EST. PATIENT-LVL III: ICD-10-PCS | Mod: PBBFAC,,, | Performed by: ORTHOPAEDIC SURGERY

## 2020-01-21 PROCEDURE — 99999 PR PBB SHADOW E&M-EST. PATIENT-LVL III: CPT | Mod: PBBFAC,,, | Performed by: ORTHOPAEDIC SURGERY

## 2020-01-21 PROCEDURE — 73130 X-RAY EXAM OF HAND: CPT | Mod: 26,50,, | Performed by: RADIOLOGY

## 2020-01-21 PROCEDURE — 1125F PR PAIN SEVERITY QUANTIFIED, PAIN PRESENT: ICD-10-PCS | Mod: ,,, | Performed by: ORTHOPAEDIC SURGERY

## 2020-01-21 PROCEDURE — 1125F AMNT PAIN NOTED PAIN PRSNT: CPT | Mod: ,,, | Performed by: ORTHOPAEDIC SURGERY

## 2020-01-21 PROCEDURE — 1159F MED LIST DOCD IN RCRD: CPT | Mod: ,,, | Performed by: ORTHOPAEDIC SURGERY

## 2020-01-21 PROCEDURE — 99213 OFFICE O/P EST LOW 20 MIN: CPT | Mod: S$PBB,,, | Performed by: ORTHOPAEDIC SURGERY

## 2020-01-21 PROCEDURE — 1159F PR MEDICATION LIST DOCUMENTED IN MEDICAL RECORD: ICD-10-PCS | Mod: ,,, | Performed by: ORTHOPAEDIC SURGERY

## 2020-01-21 PROCEDURE — 73130 X-RAY EXAM OF HAND: CPT | Mod: 50,TC

## 2020-01-21 PROCEDURE — 99213 OFFICE O/P EST LOW 20 MIN: CPT | Mod: PBBFAC,25 | Performed by: ORTHOPAEDIC SURGERY

## 2020-01-21 NOTE — LETTER
January 21, 2020      Tiffany Garrison MD  [address]  [place] [address]           [facility] - Orthoped[facility]  [address]  [facility] [address]  Phone: [phone]          Patient: Stephanie Sears   MR Number: [id number]   YOB: 1950   Date of Visit: 1/21/2020       Dear Dr. Tiffany Garrison:    Thank you for referring Stephanie Sears to me for evaluation. Attached you will find relevant portions of my assessment and plan of care.    If you have questions, please do not hesitate to call me. I look forward to following Stephanie Sears along with you.    Sincerely,    Spike Calixto MD    Enclosure  CC:  No Recipients    If you would like to receive this communication electronically, please contact [email or web address] or [phone] to request more information on [facility] access.    For providers and/or their staff who would like to refer a patient to [facility], please contact us through our one-stop-shop provider referral line, [facility] [place], at [phone].    If you feel you have received this communication in error or would no longer like to receive these types of communications, please e-mail [email or web address]

## 2020-01-22 ENCOUNTER — CLINICAL SUPPORT (OUTPATIENT)
Dept: SMOKING CESSATION | Facility: CLINIC | Age: 70
End: 2020-01-22
Payer: COMMERCIAL

## 2020-01-22 ENCOUNTER — TELEPHONE (OUTPATIENT)
Dept: ORTHOPEDICS | Facility: CLINIC | Age: 70
End: 2020-01-22

## 2020-01-22 DIAGNOSIS — F17.200 NICOTINE DEPENDENCE: Primary | ICD-10-CM

## 2020-01-22 PROCEDURE — 99407 BEHAV CHNG SMOKING > 10 MIN: CPT | Mod: S$GLB,,,

## 2020-01-22 PROCEDURE — 99407 PR TOBACCO USE CESSATION INTENSIVE >10 MINUTES: ICD-10-PCS | Mod: S$GLB,,,

## 2020-01-22 NOTE — TELEPHONE ENCOUNTER
----- Message from Cris Lowry sent at 1/22/2020  3:07 PM CST -----  Contact:   Pt  [phone]  Pt stated that she needs to speak with the Dr, no additional information was provided

## 2020-01-22 NOTE — TELEPHONE ENCOUNTER
Called patient in regard to phone message. Discussed her concerns and will fax over her therapy orders to the place of her choice.

## 2020-01-24 ENCOUNTER — OFFICE VISIT (OUTPATIENT)
Dept: FAMILY MEDICINE | Facility: CLINIC | Age: 70
End: 2020-01-24
Payer: MEDICARE

## 2020-01-24 VITALS
SYSTOLIC BLOOD PRESSURE: 142 MMHG | RESPIRATION RATE: 18 BRPM | DIASTOLIC BLOOD PRESSURE: 80 MMHG | BODY MASS INDEX: 36.29 KG/M2 | HEART RATE: 68 BPM | TEMPERATURE: 98 F | OXYGEN SATURATION: 98 % | WEIGHT: 198.44 LBS

## 2020-01-24 DIAGNOSIS — I10 ESSENTIAL HYPERTENSION: ICD-10-CM

## 2020-01-24 DIAGNOSIS — R41.89 SUBJECTIVE MEMORY COMPLAINTS: ICD-10-CM

## 2020-01-24 DIAGNOSIS — I27.20 PULMONARY HYPERTENSION: ICD-10-CM

## 2020-01-24 DIAGNOSIS — N18.2 CONTROLLED TYPE 2 DIABETES MELLITUS WITH STAGE 2 CHRONIC KIDNEY DISEASE, WITH LONG-TERM CURRENT USE OF INSULIN: ICD-10-CM

## 2020-01-24 DIAGNOSIS — Z74.09 IMPAIRED MOBILITY: ICD-10-CM

## 2020-01-24 DIAGNOSIS — E46 HYPOALBUMINEMIA DUE TO PROTEIN-CALORIE MALNUTRITION: ICD-10-CM

## 2020-01-24 DIAGNOSIS — E88.09 HYPOALBUMINEMIA DUE TO PROTEIN-CALORIE MALNUTRITION: ICD-10-CM

## 2020-01-24 DIAGNOSIS — Z79.4 CONTROLLED TYPE 2 DIABETES MELLITUS WITH STAGE 2 CHRONIC KIDNEY DISEASE, WITH LONG-TERM CURRENT USE OF INSULIN: ICD-10-CM

## 2020-01-24 DIAGNOSIS — E11.22 CONTROLLED TYPE 2 DIABETES MELLITUS WITH STAGE 2 CHRONIC KIDNEY DISEASE, WITH LONG-TERM CURRENT USE OF INSULIN: ICD-10-CM

## 2020-01-24 DIAGNOSIS — G47.62 NOCTURNAL LEG CRAMPS: Primary | ICD-10-CM

## 2020-01-24 DIAGNOSIS — E11.51 TYPE II DIABETES MELLITUS WITH PERIPHERAL CIRCULATORY DISORDER: ICD-10-CM

## 2020-01-24 PROCEDURE — 99214 OFFICE O/P EST MOD 30 MIN: CPT | Mod: S$PBB,,, | Performed by: FAMILY MEDICINE

## 2020-01-24 PROCEDURE — 99999 PR PBB SHADOW E&M-EST. PATIENT-LVL III: CPT | Mod: PBBFAC,,, | Performed by: FAMILY MEDICINE

## 2020-01-24 PROCEDURE — 1159F MED LIST DOCD IN RCRD: CPT | Mod: ,,, | Performed by: FAMILY MEDICINE

## 2020-01-24 PROCEDURE — 99999 PR PBB SHADOW E&M-EST. PATIENT-LVL III: ICD-10-PCS | Mod: PBBFAC,,, | Performed by: FAMILY MEDICINE

## 2020-01-24 PROCEDURE — 99213 OFFICE O/P EST LOW 20 MIN: CPT | Mod: PBBFAC,PN | Performed by: FAMILY MEDICINE

## 2020-01-24 PROCEDURE — 1159F PR MEDICATION LIST DOCUMENTED IN MEDICAL RECORD: ICD-10-PCS | Mod: ,,, | Performed by: FAMILY MEDICINE

## 2020-01-24 PROCEDURE — 99214 PR OFFICE/OUTPT VISIT, EST, LEVL IV, 30-39 MIN: ICD-10-PCS | Mod: S$PBB,,, | Performed by: FAMILY MEDICINE

## 2020-01-27 NOTE — PROGRESS NOTES
Subjective:       Patient ID: Stephanie Sears is a 70 y.o. female.    Chief Complaint: Leg Problem (PM leg cramps)    HPI   70 year old female with DM, HTN, and pulmonary HTN, comes in with 2 concerns. She reports that recently she has been having leg cramps at night. She states that the cramps are only at night. She states that she cannot remember what helped with this previously.    She also states that she has been having concerns with her memory. She denies making financial mistakes, getting lost on the way home, or leaving the oven on. She wants testing for Alzheimer because it runs in her family. She has difficulty with remembering things quickly but eventually they come to her.     Review of Systems   Constitutional: Negative for activity change, appetite change and unexpected weight change.   Respiratory: Negative for shortness of breath and wheezing.    Cardiovascular: Negative for chest pain, palpitations and leg swelling.   Gastrointestinal: Negative for abdominal pain, blood in stool and constipation.   Genitourinary: Negative for dysuria.   Musculoskeletal: Positive for gait problem.   Neurological: Negative for dizziness, tremors, seizures, syncope, speech difficulty, light-headedness and headaches.       Objective:     BP (!) 142/80 (BP Location: Left arm, Patient Position: Sitting, BP Method: Medium (Manual))   Pulse 68   Temp 98.1 °F (36.7 °C) (Oral)   Resp 18   Wt 90 kg (198 lb 6.6 oz)   SpO2 98%   BMI 36.29 kg/m²     Physical Exam   Constitutional: She is oriented to person, place, and time. She appears well-developed and well-nourished.   HENT:   Head: Normocephalic and atraumatic.   Right Ear: External ear normal.   Left Ear: External ear normal.   Nose: Nose normal.   Mouth/Throat: Oropharynx is clear and moist. No oropharyngeal exudate.   Eyes: Pupils are equal, round, and reactive to light. Conjunctivae and EOM are normal. Right eye exhibits no discharge. Left eye exhibits no  discharge.   Neck: Normal range of motion. Neck supple. No tracheal deviation present.   Cardiovascular: Normal rate, regular rhythm, normal heart sounds and intact distal pulses.   No murmur heard.  Pulmonary/Chest: Effort normal and breath sounds normal. She has no wheezes. She has no rales.   Abdominal: Soft. Bowel sounds are normal. She exhibits no mass. There is no tenderness. There is no rigidity, no guarding and no CVA tenderness.   Lymphadenopathy:     She has no cervical adenopathy.   Neurological: She is oriented to person, place, and time. She has normal strength. She displays no atrophy. No sensory deficit. Gait normal.   Reflex Scores:       Patellar reflexes are 2+ on the right side and 2+ on the left side.  Psychiatric: She has a normal mood and affect.   Vitals reviewed.      Assessment:       1. Nocturnal leg cramps    2. Subjective memory complaints    3. Controlled type 2 diabetes mellitus with stage 2 chronic kidney disease, with long-term current use of insulin    4. Type II diabetes mellitus with peripheral circulatory disorder    5. Pulmonary hypertension    6. Impaired mobility    7. Essential hypertension    8. Hypoalbuminemia due to protein-calorie malnutrition        Plan:       Stephanie was seen today for leg problem.    Diagnoses and all orders for this visit:    Nocturnal leg cramps  -     Comprehensive metabolic panel; Future  -     Magnesium; Future  -     Vitamin B12; Future  Advised restarting Magnesium as this is what helped her previously    Subjective memory complaints  -     MRI Brain Without Contrast; Future  Discussed with patient that her symptoms are not consistent with Alzheimer. However will get MRI for further evaluation of subjective memory concerns.    Controlled type 2 diabetes mellitus with stage 2 chronic kidney disease, with long-term current use of insulin  -     Comprehensive metabolic panel; Future  -     Hemoglobin A1c; Future  Check diabetic labs    Type II  diabetes mellitus with peripheral circulatory disorder  -     Comprehensive metabolic panel; Future  -     Hemoglobin A1c; Future    Pulmonary hypertension  No acute concerns    Impaired mobility  -     WALKER FOR HOME USE  Patient given prescription for walker    Essential hypertension  -     Comprehensive metabolic panel; Future  -     Hemoglobin A1c; Future    Hypoalbuminemia due to protein-calorie malnutrition  -     Comprehensive metabolic panel; Future  -     WALKER FOR HOME USE  -     Vitamin B12; Future  Check labs

## 2020-01-28 ENCOUNTER — CLINICAL SUPPORT (OUTPATIENT)
Dept: SMOKING CESSATION | Facility: CLINIC | Age: 70
End: 2020-01-28
Payer: COMMERCIAL

## 2020-01-28 DIAGNOSIS — F17.200 NICOTINE DEPENDENCE: Primary | ICD-10-CM

## 2020-01-28 PROCEDURE — 99999 PR PBB SHADOW E&M-EST. PATIENT-LVL I: CPT | Mod: PBBFAC,,,

## 2020-01-28 PROCEDURE — 99404 PREV MED CNSL INDIV APPRX 60: CPT | Mod: S$GLB,,,

## 2020-01-28 PROCEDURE — 99404 PR PREVENT COUNSEL,INDIV,60 MIN: ICD-10-PCS | Mod: S$GLB,,,

## 2020-01-28 PROCEDURE — 99999 PR PBB SHADOW E&M-EST. PATIENT-LVL I: ICD-10-PCS | Mod: PBBFAC,,,

## 2020-01-28 NOTE — PROGRESS NOTES
Individual Follow-Up Form    1/28/2020    Quit Date: 2/1/20    Clinical Status of Patient: Outpatient    Length of Service: 60 minutes    Continuing Medication: yes  Patches or Nicotine Lozenges    Other Medications: none     Target Symptoms: Withdrawal and medication side effects. The following were  rated moderate (3) to severe (4) on TCRS:  · Moderate (3): none  · Severe (4): none    Comments: Patient presents for follow up smoking 1 cigarettes per day. Pt remains on tobacco cessation medication of  21 mg nicotine patch QD and 2 mg nicotine lozenge / gum PRN (1-2 per hour in place of cigarettes.) No adverse effects noted at this time. Pt doing well with rate reduction and wait times prior to smoking.  Pt encouraged to pick a quit day.  Reviewed coping strategies/habitual behavior/relapse prevention with patient. Exhaled carbon monoxide level was () ppm per Smokerlyzer  ( non- smoker = 0-5 ppm .)  Will see pt back in office in 2 weeks      Diagnosis: F17.200    Next Visit: 2 weeks

## 2020-01-31 ENCOUNTER — PATIENT OUTREACH (OUTPATIENT)
Dept: ADMINISTRATIVE | Facility: OTHER | Age: 70
End: 2020-01-31

## 2020-02-03 ENCOUNTER — CLINICAL SUPPORT (OUTPATIENT)
Dept: OPHTHALMOLOGY | Facility: CLINIC | Age: 70
End: 2020-02-03
Payer: MEDICARE

## 2020-02-03 ENCOUNTER — OFFICE VISIT (OUTPATIENT)
Dept: OPHTHALMOLOGY | Facility: CLINIC | Age: 70
End: 2020-02-03
Payer: MEDICARE

## 2020-02-03 DIAGNOSIS — H40.1131 PRIMARY OPEN-ANGLE GLAUCOMA, BILATERAL, MILD STAGE: ICD-10-CM

## 2020-02-03 DIAGNOSIS — H40.013 OPEN ANGLE WITH BORDERLINE FINDINGS, LOW RISK, BILATERAL: ICD-10-CM

## 2020-02-03 DIAGNOSIS — H40.053 BORDERLINE GLAUCOMA WITH OCULAR HYPERTENSION, BILATERAL: ICD-10-CM

## 2020-02-03 DIAGNOSIS — H52.203 MYOPIA WITH ASTIGMATISM AND PRESBYOPIA, BILATERAL: ICD-10-CM

## 2020-02-03 DIAGNOSIS — H40.1131 PRIMARY OPEN-ANGLE GLAUCOMA, BILATERAL, MILD STAGE: Primary | ICD-10-CM

## 2020-02-03 DIAGNOSIS — H43.811 POSTERIOR VITREOUS DETACHMENT, RIGHT: ICD-10-CM

## 2020-02-03 DIAGNOSIS — H25.13 NUCLEAR SCLEROSIS, BILATERAL: ICD-10-CM

## 2020-02-03 DIAGNOSIS — H52.4 MYOPIA WITH ASTIGMATISM AND PRESBYOPIA, BILATERAL: ICD-10-CM

## 2020-02-03 DIAGNOSIS — H52.13 MYOPIA WITH ASTIGMATISM AND PRESBYOPIA, BILATERAL: ICD-10-CM

## 2020-02-03 PROCEDURE — 92014 PR EYE EXAM, EST PATIENT,COMPREHESV: ICD-10-PCS | Mod: S$PBB,,, | Performed by: OPHTHALMOLOGY

## 2020-02-03 PROCEDURE — 99212 OFFICE O/P EST SF 10 MIN: CPT | Mod: PBBFAC,25 | Performed by: OPHTHALMOLOGY

## 2020-02-03 PROCEDURE — 92250 COLOR FUNDUS PHOTOGRAPHY - OU - BOTH EYES: ICD-10-PCS | Mod: 26,S$PBB,, | Performed by: OPHTHALMOLOGY

## 2020-02-03 PROCEDURE — 99999 PR PBB SHADOW E&M-EST. PATIENT-LVL II: ICD-10-PCS | Mod: PBBFAC,,, | Performed by: OPHTHALMOLOGY

## 2020-02-03 PROCEDURE — 92083 HUMPHREY VISUAL FIELD - OU - BOTH EYES: ICD-10-PCS | Mod: 26,S$PBB,, | Performed by: OPHTHALMOLOGY

## 2020-02-03 PROCEDURE — 92083 EXTENDED VISUAL FIELD XM: CPT | Mod: 26,S$PBB,, | Performed by: OPHTHALMOLOGY

## 2020-02-03 PROCEDURE — 99999 PR PBB SHADOW E&M-EST. PATIENT-LVL II: CPT | Mod: PBBFAC,,, | Performed by: OPHTHALMOLOGY

## 2020-02-03 PROCEDURE — 92014 COMPRE OPH EXAM EST PT 1/>: CPT | Mod: S$PBB,,, | Performed by: OPHTHALMOLOGY

## 2020-02-03 PROCEDURE — 92083 EXTENDED VISUAL FIELD XM: CPT | Mod: PBBFAC

## 2020-02-03 PROCEDURE — 92250 FUNDUS PHOTOGRAPHY W/I&R: CPT | Mod: PBBFAC | Performed by: OPHTHALMOLOGY

## 2020-02-03 NOTE — PROGRESS NOTES
"HPI     DLS: 5/06/19    Pt here for HVF review;    Meds: No GTTS     1. Borderline glaucoma with ocular hypertension, bilateral   2. Open angle with borderline findings, low risk, bilateral   3. Nuclear sclerosis, bilateral   4. Posterior vitreous detachment, right   5. Myopia with astigmatism and presbyopia, bilateral     Last edited by Angelic Saravia on 2/3/2020  3:02 PM. (History)              Assessment /Plan     For exam results, see Encounter Report.    Primary open-angle glaucoma, bilateral, mild stage  -     Color Fundus Photography - OU - Both Eyes  -     Posterior Segment OCT Optic Nerve- Both eyes; Future    Nuclear sclerosis, bilateral    Posterior vitreous detachment, right    Myopia with astigmatism and presbyopia, bilateral    Borderline glaucoma with ocular hypertension, bilateral    Open angle with borderline findings, low risk, bilateral          1. Pre-perimetric glaucoma /OHT/ borderline glaucoma   -Followed at [facility] since 1991   -First HVF 1999   -First photos 1991   - Intolerant to all gtts - they aggravate his blepharitis-? RAGHU allergy   -IOP "OK" off gtts and s/p ALT ou and SLT od - 2008    Family history neg   Glaucoma meds none (( off gtts post SLT ou -))   H/O adverse rxn to glaucoma drops Intolerant to all gtts - 2/2 aggravates blepharitis- RAGHU allergy   LASERS ALT ou -? Date / SLT OD 7/17/08 - good response   GLAUCOMA SURGERIES none   OTHER EYE SURGERIES none   CDR 0.6/0.3   Tbase 19-26 / 16-21   Tmax 26/21   Ttarget ?   HVF 14 test - 1999 to 2020 - Full ou   Gonio +3 ou   /588   OCT 4 test 2005 to 2018 -  RNFL - OD:NL // OS:NL  HRT 8 test 2004 to 2019 -MR -  nl od // nl os  Disc photos 1991, 1996, 2003 - slides // 2012 , 2016, 2020   - OIS     - Ttoday 19/18  - Test done today HVF/DFE/photos    2. Nuclear Sclerosis    NVS yet - monitor    3. Posterior Vitreous Detachment OD - 11/2008   - RD PRECAUTIONS    4. Eyelid inflammation / Blepharitis / MGD    5. Allergic Conjunctivitis - " RAGHU allergy     6. Myopia/Astigmatism/presbyopia     Plan   OHT-pre-perimetric glaucoma - intolerant to all gtts   IOP ok s/p ALT ou - years ago and s/p SLT OD 2008 ( no SLT os)  Continue to monitor HVF/DFE/OCT/HRT/photos/IOP   If increase IOP or progression on VF testing consider repeat SLT OD and primary SLT os     Consider phaco/IOL in future - BCVA 20/30 ou // but still needs BAT testing - C/O glare at night   F/U 1 month with AR/MR/BAT and OCT - can discuss cataract surgery OS then     DO IOL CALCULATIONS NEXT VISIT

## 2020-02-04 ENCOUNTER — HOSPITAL ENCOUNTER (OUTPATIENT)
Dept: RADIOLOGY | Facility: HOSPITAL | Age: 70
Discharge: HOME | End: 2020-02-04
Attending: FAMILY MEDICINE
Payer: MEDICARE

## 2020-02-04 ENCOUNTER — OFFICE VISIT (OUTPATIENT)
Dept: PULMONOLOGY | Facility: CLINIC | Age: 70
End: 2020-02-04
Payer: MEDICARE

## 2020-02-04 VITALS
HEIGHT: 62 IN | BODY MASS INDEX: 36.7 KG/M2 | DIASTOLIC BLOOD PRESSURE: 80 MMHG | HEART RATE: 64 BPM | OXYGEN SATURATION: 99 % | SYSTOLIC BLOOD PRESSURE: 158 MMHG | WEIGHT: 199.44 LBS

## 2020-02-04 DIAGNOSIS — E66.09 CLASS 2 OBESITY DUE TO EXCESS CALORIES WITHOUT SERIOUS COMORBIDITY WITH BODY MASS INDEX (BMI) OF 35.0 TO 35.9 IN ADULT: ICD-10-CM

## 2020-02-04 DIAGNOSIS — R41.89 SUBJECTIVE MEMORY COMPLAINTS: ICD-10-CM

## 2020-02-04 DIAGNOSIS — G47.33 OSA (OBSTRUCTIVE SLEEP APNEA): ICD-10-CM

## 2020-02-04 DIAGNOSIS — G47.00 INSOMNIA, UNSPECIFIED TYPE: ICD-10-CM

## 2020-02-04 DIAGNOSIS — Z72.0 TOBACCO ABUSE: ICD-10-CM

## 2020-02-04 PROCEDURE — 99214 OFFICE O/P EST MOD 30 MIN: CPT | Mod: S$PBB,,, | Performed by: INTERNAL MEDICINE

## 2020-02-04 PROCEDURE — 99999 PR PBB SHADOW E&M-EST. PATIENT-LVL IV: CPT | Mod: PBBFAC,,, | Performed by: INTERNAL MEDICINE

## 2020-02-04 PROCEDURE — 70551 MRI BRAIN WITHOUT CONTRAST: ICD-10-PCS | Mod: 26,,, | Performed by: RADIOLOGY

## 2020-02-04 PROCEDURE — 99214 PR OFFICE/OUTPT VISIT, EST, LEVL IV, 30-39 MIN: ICD-10-PCS | Mod: S$PBB,,, | Performed by: INTERNAL MEDICINE

## 2020-02-04 PROCEDURE — 70551 MRI BRAIN STEM W/O DYE: CPT | Mod: 26,,, | Performed by: RADIOLOGY

## 2020-02-04 PROCEDURE — 99214 OFFICE O/P EST MOD 30 MIN: CPT | Mod: PBBFAC,25 | Performed by: INTERNAL MEDICINE

## 2020-02-04 PROCEDURE — 99999 PR PBB SHADOW E&M-EST. PATIENT-LVL IV: ICD-10-PCS | Mod: PBBFAC,,, | Performed by: INTERNAL MEDICINE

## 2020-02-04 PROCEDURE — 70551 MRI BRAIN STEM W/O DYE: CPT | Mod: TC

## 2020-02-04 NOTE — PROGRESS NOTES
Stephanie Sears  was seen as a follow up.      CHIEF COMPLAINT:    Chief Complaint   Patient presents with    Apnea       HISTORY OF PRESENT ILLNESS: Stephanie Sears is a 70 y.o. female is here for sleep evaluation.   Patient was diagnosed with jarrett in 8/17.  Patient was seen by KRZYSZTOF Olson.  Patient was started on cpap therapy 10 cm H20 with maykel view.  Patient was doing well with cpap for the first 7-8 months.  Our first encounter was 8/18.  Since 6/18, patient with 1-2 awakenings per night.  Can take up to 1 hour to go back to sleep.  Feeling rested upon awake.  +dry mouth upon awake.  Recently, her boyfriend x 40 years moved back to his home in [place].      Patient sleeps alone.  She is not sure if she snores with cpap.  Overall, sleep is better when compared to sleeping without cpap.  No parasomnia.  No cataplexy.      Chronic knee pain s/p knee replacement.  Better since knee replacement.  Chronic knee pain.      [place] Sleepiness Scale score during initial sleep evaluation was 8 (with cpap).  Used to be 16 without cpap.    During last encounter 3/19,  Patient has difficulty with sleep maintenance since.  Wake up 1-2 times per night.  20 minutes to go back to sleep.   Patient was sleeping well until 1/19.  Patient was diagnosed with rll pneumonia.  Patient was treated with doxycycline and amoxicillin.  Cough and dyspnea improve with antibiotic.      Since last encounter, patient has been doing well with cpap.  Patient still watch tv in bed despite counseling.  Sleep maintenance has improved since last visit.  Patient stated that not taking cpap mask has helped with ease of going back to sleep.      SLEEP ROUTINE:  Activity the hour prior to sleep: watch tv     Bed partner:  Alone   Time to bed:  9-10  PM   Lights off:  Gonzales light is on, TV is on   Sleep onset latency:  Watch tv until fall asleep; 20 minutes after putting cpap mask on.          Disruptions or awakenings:    1 times (no difficulty going back  to sleep)    Wakeup time:      7 am   Perceived sleep quality:  rested       Daytime naps:      Lay down 90 minutes but does not sleep  Weekend sleep routine:      same  Caffeine use: 2 cups of coffee in morning  exercise habit:   none      PAST MEDICAL HISTORY:    Active Ambulatory Problems     Diagnosis Date Noted    GERD (gastroesophageal reflux disease)     Primary osteoarthritis of both knees     Cervical spondylosis with radiculopathy 07/10/2012    Open angle with borderline findings, low risk - Both Eyes 10/18/2012    Nontoxic uninodular goiter 12/13/2010    Myopia with astigmatism and presbyopia - Both Eyes 08/12/2013    Diet-controlled diabetes mellitus 12/10/2013    Asymptomatic proteinuria 12/10/2013    DJD (degenerative joint disease) of lumbar spine, mild 04/17/2014    Osteoarthritis of left hip, mild 04/17/2014    DJD (degenerative joint disease) of cervical spine 08/21/2014    Essential hypertension 01/07/2016    Class 2 obesity due to excess calories without serious comorbidity with body mass index (BMI) of 35.0 to 35.9 in adult 01/12/2016    Tobacco abuse 01/12/2016    H/O scarlet fever 01/12/2016    Aortic valve sclerosis 01/12/2016    Pulmonary hypertension 01/12/2016    Diastolic dysfunction 01/14/2016    Status post total knee replacement, left 1/20/2016 02/02/2016    CMC arthritis 12/07/2016    Chronic midline thoracic back pain 09/15/2017    Primary osteoarthritis of right knee 09/15/2017    Hand pain, left 09/15/2017    JAM (obstructive sleep apnea) 12/06/2017    Personal history of spine surgery 02/09/2018    Fatty liver 02/09/2018    UTI (urinary tract infection) 02/09/2018    Status post total right knee replacement 2/26/2018 03/13/2018    Dyslipidemia 07/05/2018    Preoperative cardiovascular examination 07/05/2018    Bilateral carotid bruits 07/05/2018    Atherosclerosis of aortic arch 01/22/2019    Insomnia 03/11/2019    Lichen planus 03/27/2019     History of colon polyps 05/22/2019    Acute pancreatitis without infection or necrosis 10/17/2019    Arthritis of carpometacarpal (CMC) joint of left thumb 12/02/2019    Greater trochanteric bursitis of both hips 12/02/2019    Controlled type 2 diabetes mellitus with stage 2 chronic kidney disease, with long-term current use of insulin 01/24/2020    Hypoalbuminemia due to protein-calorie malnutrition 01/24/2020     Resolved Ambulatory Problems     Diagnosis Date Noted    Posterior vitreous detachment - Right Eye 10/18/2012    Nuclear sclerosis - Both Eyes 08/12/2013    Carpal tunnel syndrome, bilateral 08/13/2013    Chronic LBP 06/17/2014    Borderline glaucoma with ocular hypertension 07/21/2014    Obesity 02/23/2015    Dermatitis 01/07/2016    Cardiac murmur 01/12/2016    At risk for aspiration 01/12/2016    Mild aortic stenosis 01/14/2016    Primary osteoarthritis of left knee 01/20/2016    Annual physical exam 10/18/2016    Encounter for gynecological examination without abnormal finding 10/18/2016    Status post hysterectomy 10/18/2016    Menopausal state 10/18/2016    Hematuria, unspecified 10/18/2016    DM (diabetes mellitus screen) 10/18/2016    Hematuria, gross 10/31/2016     Past Medical History:   Diagnosis Date    Angina pectoris     Anxiety     Bronchitis     Cataract     Chest pain     Chronic neck pain 7/26/2012    Depression     Fibrocystic breast     Glaucoma     Hyperlipidemia     Hypertension     Neck pain 7/10/2012    Psoriasis     Subacromial or subdeltoid bursitis 7/10/2012    Thyroid disease     Type 2 diabetes mellitus 12/10/2013                PAST SURGICAL HISTORY:    Past Surgical History:   Procedure Laterality Date    BREAST LUMPECTOMY Left 1988    mass in the rt breast surgery  1988    BREAST MASS EXCISION Right     benign    CHOLECYSTECTOMY      COLONOSCOPY N/A 5/22/2019    Procedure: COLONOSCOPY;  Surgeon: Darrin Calvin MD;  Location: [facility]  ENDO (2ND FLR);  Service: Endoscopy;  Laterality: N/A;  2nd floor - all other procedure done on 2, multiple comorbidities - ERW/   change in MD schedule, Pt notified and verbalized understanding, new arrival time 0800, Ins mailed to Pt with new arrival time - ERW1/8/19@1130    HYSTERECTOMY  1980's    KNEE SURGERY Left 1-20-16    TKR    KNEE SURGERY Right 02/26/2018    TKR    L4-L5 fusion  2006         FAMILY HISTORY:                Family History   Problem Relation Age of Onset    Diabetes Mother     Hypertension Mother         CHF, angina    Angina Mother     Kidney disease Mother     Heart disease Mother         CHF    Hypertension Father         glaucoma, Alzhiemer's , supra pubic    Alzheimer's disease Father     Glaucoma Father     Kidney disease Brother         kidney transplant, HTN,DM    Diabetes Brother     Glaucoma Sister     Hypertension Sister     Hyperlipidemia Sister     Gout Son     Hypertension Son     Diabetes Son     Sleep apnea Sister     Hypertension Sister     Thyroid disease Sister     No Known Problems Sister     No Known Problems Sister     Hepatitis Brother     Amblyopia Neg Hx     Blindness Neg Hx     Melanoma Neg Hx        SOCIAL HISTORY:          Tobacco:   Social History     Tobacco Use   Smoking Status Current Every Day Smoker    Packs/day: 1.00    Years: 40.00    Pack years: 40.00    Types: Cigarettes   Smokeless Tobacco Never Used       alcohol use:    Social History     Substance and Sexual Activity   Alcohol Use No    Alcohol/week: 0.0 standard drinks                 Occupation: former     ALLERGIES:    Review of patient's allergies indicates:   Allergen Reactions    Hydrocodone Shortness Of Breath    Iodinated contrast- oral and iv dye Shortness Of Breath     Difficulty breathing    Adhesive Itching    Oxycodone      Hallucinations    Sulfa (sulfonamide antibiotics) Hives and Itching       CURRENT MEDICATIONS:     Current Outpatient Medications   Medication Sig Dispense Refill    albuterol (PROAIR HFA) 90 mcg/actuation inhaler Inhale 2 puffs into the lungs every 6 (six) hours as needed for Wheezing or Shortness of Breath. 1 Inhaler 3    albuterol (VENTOLIN HFA) 90 mcg/actuation inhaler Inhale 2 puffs into the lungs every 4 (four) hours as needed for Wheezing or Shortness of Breath. 1 Inhaler 2    amlodipine-benazepril (LOTREL) 10-40 mg per capsule TAKE 1 CAPSULE DAILY 90 capsule 4    atorvastatin (LIPITOR) 20 MG tablet TAKE 1 TABLET DAILY 90 tablet 4    blood sugar diagnostic (ACCU-CHEK JOSE G PLUS TEST STRP) Strp Inject 1 each into the skin 2 (two) times daily. 200 each 11    blood sugar diagnostic Strp Use to test blood glucose levels 2 times per day as instructed. 200 strip 11    CALCIUM CARBONATE (TUMS ORAL) Take by mouth as needed (acid reflux, indigestion).      carvedilol (COREG) 12.5 MG tablet TAKE 1 TABLET TWICE A DAY WITH MEALS 180 tablet 4    diphenhydrAMINE (BENADRYL) 25 mg capsule Take 25 mg by mouth every 6 (six) hours as needed for Itching or Allergies.      fluticasone (FLONASE) 50 mcg/actuation nasal spray 1 spray by Each Nare route 2 (two) times daily. 1 Bottle 1    gabapentin (NEURONTIN) 600 MG tablet Take 1 tablet (600 mg total) by mouth 3 (three) times daily. 270 tablet 1    INULIN (FIBER GUMMIES ORAL) Take by mouth every morning.       lancets (ACCU-CHEK MULTICLIX LANCET) Misc Test blood sugar twice daily 300 each 3    lancets (ONETOUCH DELICA LANCETS) 33 gauge Misc Inject 1 lancet into the skin 2 (two) times daily before meals. 200 each 11    meloxicam (MOBIC) 7.5 MG tablet Take 1 tablet (7.5 mg total) by mouth once daily. 90 tablet 1    nicotine (NICODERM CQ) 21 mg/24 hr Place 1 patch onto the skin once daily. 28 patch 0    nicotine polacrilex 2 MG Lozg 1-2 per hour in place of a cigarette. Limit to 20 a day - oral. 162 lozenge 1    triamcinolone acetonide 0.1% (KENALOG) 0.1 %  "ointment KENDRA AA ON THE SKIN BID ON RASH ON LEGS  0    traMADol (ULTRAM) 50 mg tablet Take 1 tablet (50 mg total) by mouth every 8 (eight) hours as needed. 90 tablet 1     No current facility-administered medications for this visit.                   REVIEW OF SYSTEMS:     Sleep related symptoms as per HPI.  CONST:Denies weight gain; loosing weight    HEENT: + sinus congestion  PULM: + dyspnea x 2 block  CARD:  Denies palpitations   GI:  +occasional acid reflux  : Denies polyuria  NEURO: Denies headaches  PSYCH: Denies mood disturbance  HEME: Denies anemia   Otherwise, a balance of systems reviewed is negative.          PHYSICAL EXAM:  Vitals:    02/04/20 0855   BP: (!) 158/80   Pulse: 64   SpO2: 99%   Weight: 90.5 kg (199 lb 6.5 oz)   Height: 5' 2" (1.575 m)   PainSc:   5   PainLoc: Generalized     Body mass index is 36.47 kg/m².     GENERAL: Normal development, well groomed  HEENT:  Conjunctivae are non-erythematous; Pupils equal, round, and reactive to light; Nose is symmetrical; Nasal mucosa is pink and moist; Septum is midline; Inferior turbinates are normal; Nasal airflow is normal; Posterior pharynx is pink; Modified Mallampati: 4; Posterior palate is normal; Tonsils +1; Uvula is normal and pink;Tongue is normal; Dentition is fair; No TMJ tenderness; Jaw opening and protrusion without click and without discomfort.  NECK: Supple. Neck circumference is 13 inches. No thyromegaly. No palpable nodes.     SKIN: On face and neck: No abrasions, no rashes, no lesions.  No subcutaneous nodules are palpable.  RESPIRATORY: Chest is clear to auscultation.  Normal chest expansion and non-labored breathing at rest.  CARDIOVASCULAR: Normal S1, S2.  No murmurs, gallops or rubs. No carotid bruits bilaterally.  EXTREMITIES: No edema. No clubbing. No cyanosis. Station normal. Gait normal.        NEURO/PSYCH: Oriented to time, place and person. Normal attention span and concentration. Affect is full. Mood is normal.             "                                  DATA   PSG 8/11/17: AHI was 6.0 with an oxygen aey of 86.0%. The RERA index was 4.8 and RDI was 10.8 events per hour.   9/6/17 Ttiration study, effective supine REM cpap 10cm    Echo 1/13/16  CONCLUSIONS     1 - Normal left ventricular systolic function (EF 60-65%).     2 - Left ventricular diastolic dysfunction.     3 - Normal right ventricular systolic function .     4 - Mild left atrial enlargement.     5 - Mild tricuspid regurgitation.     6 - The estimated PA systolic pressure is 39 mmHg.     7 - Mild aortic stenosis, JOHN = 1.46 cm2, peak velocity = 2.7 m/s, mean gradient = 17.0 mmHg.    Lab Results   Component Value Date    TSH 0.708 05/29/2019     ASSESSMENT  Problem List Items Addressed This Visit     Class 2 obesity due to excess calories without serious comorbidity with body mass index (BMI) of 35.0 to 35.9 in adult    Current Assessment & Plan     Aware of need for drastic weight loss.  S/p diabetic teaching.           Insomnia    Overview     difficulty with sleep maintenance.  Poor sleep sleep hygiene + nocturia + psychophysiologic in etiology. Improve.           Current Assessment & Plan     Still watch tv in bed but have turned down the volume.  In addition, not taking cpap mask off also help with resuming sleep.           JAM (obstructive sleep apnea)    Overview     ahi of 6, rdi of 10.8         Current Assessment & Plan     interrogation of cpap confirmed pressure of 10 cm H20 with good compliance. 96% greater 4 hours.  Residual ahi of 1.5.  Patient is benefiting from cpap         Relevant Orders    CPAP/BIPAP SUPPLIES    Tobacco abuse    Overview     encourage tobacco cessation.  Patient is enrolled in smoking cessation.  Nicotine patch                Education: During our discussion today, we talked about the etiology of obstructive sleep apnea as well as the potential ramifications of untreated sleep apnea, which could include daytime sleepiness, hypertension,  heart disease and/or stroke.     Precautions: The patient was advised to abstain from driving should they feel sleepy or drowsy.       Patient will No follow-ups on file. with md/np.    This is 25 minutes visit, over 50% of time spent in direct consultation with patient.

## 2020-02-04 NOTE — ASSESSMENT & PLAN NOTE
interrogation of cpap confirmed pressure of 10 cm H20 with good compliance. 96% greater 4 hours.  Residual ahi of 1.5.  Patient is benefiting from cpap

## 2020-02-04 NOTE — ASSESSMENT & PLAN NOTE
Still watch tv in bed but have turned down the volume.  In addition, not taking cpap mask off also help with resuming sleep.

## 2020-02-05 ENCOUNTER — CLINICAL SUPPORT (OUTPATIENT)
Dept: SMOKING CESSATION | Facility: CLINIC | Age: 70
End: 2020-02-05
Payer: COMMERCIAL

## 2020-02-05 ENCOUNTER — TELEPHONE (OUTPATIENT)
Dept: FAMILY MEDICINE | Facility: CLINIC | Age: 70
End: 2020-02-05

## 2020-02-05 DIAGNOSIS — F17.200 NICOTINE DEPENDENCE: Primary | ICD-10-CM

## 2020-02-05 PROCEDURE — 99407 BEHAV CHNG SMOKING > 10 MIN: CPT | Mod: S$GLB,,,

## 2020-02-05 PROCEDURE — 99407 PR TOBACCO USE CESSATION INTENSIVE >10 MINUTES: ICD-10-PCS | Mod: S$GLB,,,

## 2020-02-05 NOTE — TELEPHONE ENCOUNTER
----- Message from Mike Whalen sent at 2/5/2020 10:27 AM CST -----  Contact: Self  Type: Patient Call Back    Who called: Self    What is the request in detail: 01/22/2020 patient was told [facility] would contact her about her walker. She left some paperwork and would like to know the status    Can the clinic reply by [facility]? no    Would the patient rather a call back or a response via [facility]? Call     Best call back number: [phone]

## 2020-02-05 NOTE — TELEPHONE ENCOUNTER
Pt advised that paperwork was sent to [employer] and once it is approved by insurance she will be contacted for delivery. Pt requests contact information be provided for [employer] so she can call to check the status. Information provided.

## 2020-02-10 ENCOUNTER — CLINICAL SUPPORT (OUTPATIENT)
Dept: SMOKING CESSATION | Facility: CLINIC | Age: 70
End: 2020-02-10
Payer: COMMERCIAL

## 2020-02-10 DIAGNOSIS — F17.200 NICOTINE DEPENDENCE: Primary | ICD-10-CM

## 2020-02-10 PROCEDURE — 99999 PR PBB SHADOW E&M-EST. PATIENT-LVL I: CPT | Mod: PBBFAC,,,

## 2020-02-10 PROCEDURE — 99999 PR PBB SHADOW E&M-EST. PATIENT-LVL I: ICD-10-PCS | Mod: PBBFAC,,,

## 2020-02-10 PROCEDURE — 99402 PREV MED CNSL INDIV APPRX 30: CPT | Mod: S$GLB,,,

## 2020-02-10 PROCEDURE — 99402 PR PREVENT COUNSEL,INDIV,30 MIN: ICD-10-PCS | Mod: S$GLB,,,

## 2020-02-10 RX ORDER — IBUPROFEN 200 MG
1 TABLET ORAL DAILY
Qty: 28 PATCH | Refills: 0 | Status: SHIPPED | OUTPATIENT
Start: 2020-02-10 | End: 2020-02-27 | Stop reason: SDUPTHER

## 2020-02-10 NOTE — PROGRESS NOTES
Individual Follow-Up Form    2/10/2020    Quit Date:     Clinical Status of Patient: Outpatient    Length of Service: 30 minutes    Continuing Medication: yes  Patches    Other Medications: none     Target Symptoms: Withdrawal and medication side effects. The following were  rated moderate (3) to severe (4) on TCRS:  · Moderate (3): none  · Severe (4): none    Comments: Telephonic visit .   Patient continues to smoke . Pt remains on tobacco cessation medication of  21 mg nicotine patch QD and 2 mg nicotine lozenge  PRN (1-2 per hour in place of cigarettes.) No adverse effects noted at this time.  Pt encouraged to pick a quit day.  Reviewed coping strategies/habitual behavior/relapse prevention with patient.  Will see pt back in office in 2 weeks.          Diagnosis: F17.200    Next Visit: 2 weeks

## 2020-02-26 ENCOUNTER — CLINICAL SUPPORT (OUTPATIENT)
Dept: SMOKING CESSATION | Facility: CLINIC | Age: 70
End: 2020-02-26
Payer: COMMERCIAL

## 2020-02-26 ENCOUNTER — PES CALL (OUTPATIENT)
Dept: ADMINISTRATIVE | Facility: CLINIC | Age: 70
End: 2020-02-26

## 2020-02-26 DIAGNOSIS — F17.200 NICOTINE DEPENDENCE: Primary | ICD-10-CM

## 2020-02-26 PROCEDURE — 99406 PR TOBACCO USE CESSATION INTERMEDIATE 3-10 MINUTES: ICD-10-PCS | Mod: S$GLB,,,

## 2020-02-26 PROCEDURE — 99406 BEHAV CHNG SMOKING 3-10 MIN: CPT | Mod: S$GLB,,,

## 2020-02-27 ENCOUNTER — CLINICAL SUPPORT (OUTPATIENT)
Dept: SMOKING CESSATION | Facility: CLINIC | Age: 70
End: 2020-02-27
Payer: COMMERCIAL

## 2020-02-27 DIAGNOSIS — F17.200 NICOTINE DEPENDENCE: ICD-10-CM

## 2020-02-27 PROCEDURE — 99999 PR PBB SHADOW E&M-EST. PATIENT-LVL I: ICD-10-PCS | Mod: PBBFAC,,,

## 2020-02-27 PROCEDURE — 99403 PREV MED CNSL INDIV APPRX 45: CPT | Mod: S$GLB,,,

## 2020-02-27 PROCEDURE — 99403 PR PREVENT COUNSEL,INDIV,45 MIN: ICD-10-PCS | Mod: S$GLB,,,

## 2020-02-27 PROCEDURE — 99999 PR PBB SHADOW E&M-EST. PATIENT-LVL I: CPT | Mod: PBBFAC,,,

## 2020-02-27 RX ORDER — IBUPROFEN 200 MG
1 TABLET ORAL DAILY
Qty: 28 PATCH | Refills: 2 | Status: SHIPPED | OUTPATIENT
Start: 2020-02-27 | End: 2020-11-09

## 2020-02-27 NOTE — PROGRESS NOTES
Individual Follow-Up Form    2/27/2020    Quit Date: 3/1/20    Clinical Status of Patient: Outpatient    Length of Service: 45 minutes    Continuing Medication: yes  Patches or Nicotine gum    Other Medications: none     Target Symptoms: Withdrawal and medication side effects. The following were  rated moderate (3) to severe (4) on TCRS:  · Moderate (3): none  · Severe (4): none    Comments: Patient presents for follow up smoking 5 cigarettes per day. Pt remains on tobacco cessation medication of  21 mg nicotine patch QD and 2 mg nicotine gum PRN (1-2 per hour in place of cigarettes.) No adverse effects noted at this time. Pt had a setback her significant other had a stroke in [place] , she has been there trying to help with his care.  Reviewed coping strategies/habitual behavior/relapse prevention with patient. Exhaled carbon monoxide level was 6 ppm per Smokerlyzer  ( non- smoker = 0-5 ppm .)  Will see pt back in office in 2 weeks      Diagnosis: F17.200    Next Visit: 2 weeks

## 2020-03-01 ENCOUNTER — PATIENT OUTREACH (OUTPATIENT)
Dept: ADMINISTRATIVE | Facility: OTHER | Age: 70
End: 2020-03-01

## 2020-03-02 ENCOUNTER — CLINICAL SUPPORT (OUTPATIENT)
Dept: SMOKING CESSATION | Facility: CLINIC | Age: 70
End: 2020-03-02
Payer: COMMERCIAL

## 2020-03-02 ENCOUNTER — OFFICE VISIT (OUTPATIENT)
Dept: FAMILY MEDICINE | Facility: CLINIC | Age: 70
End: 2020-03-02
Payer: MEDICARE

## 2020-03-02 VITALS
OXYGEN SATURATION: 98 % | HEART RATE: 73 BPM | SYSTOLIC BLOOD PRESSURE: 122 MMHG | RESPIRATION RATE: 16 BRPM | HEIGHT: 62 IN | WEIGHT: 199.75 LBS | DIASTOLIC BLOOD PRESSURE: 68 MMHG | BODY MASS INDEX: 36.76 KG/M2 | TEMPERATURE: 98 F

## 2020-03-02 DIAGNOSIS — F17.200 NICOTINE DEPENDENCE: Primary | ICD-10-CM

## 2020-03-02 DIAGNOSIS — Z00.00 ENCOUNTER FOR PREVENTIVE HEALTH EXAMINATION: Primary | ICD-10-CM

## 2020-03-02 DIAGNOSIS — M85.80 OSTEOPENIA, UNSPECIFIED LOCATION: ICD-10-CM

## 2020-03-02 DIAGNOSIS — E66.01 SEVERE OBESITY (BMI 35.0-39.9) WITH COMORBIDITY: ICD-10-CM

## 2020-03-02 DIAGNOSIS — Z78.0 ASYMPTOMATIC MENOPAUSAL STATE: ICD-10-CM

## 2020-03-02 PROCEDURE — 99213 OFFICE O/P EST LOW 20 MIN: CPT | Mod: PBBFAC,PN | Performed by: NURSE PRACTITIONER

## 2020-03-02 PROCEDURE — 99999 PR PBB SHADOW E&M-EST. PATIENT-LVL I: CPT | Mod: PBBFAC,,,

## 2020-03-02 PROCEDURE — 99999 PR PBB SHADOW E&M-EST. PATIENT-LVL III: ICD-10-PCS | Mod: PBBFAC,,, | Performed by: NURSE PRACTITIONER

## 2020-03-02 PROCEDURE — G0439 PPPS, SUBSEQ VISIT: HCPCS | Mod: S$GLB,,, | Performed by: NURSE PRACTITIONER

## 2020-03-02 PROCEDURE — 99999 PR PBB SHADOW E&M-EST. PATIENT-LVL I: ICD-10-PCS | Mod: PBBFAC,,,

## 2020-03-02 PROCEDURE — 99999 PR PBB SHADOW E&M-EST. PATIENT-LVL III: CPT | Mod: PBBFAC,,, | Performed by: NURSE PRACTITIONER

## 2020-03-02 PROCEDURE — 99404 PR PREVENT COUNSEL,INDIV,60 MIN: ICD-10-PCS | Mod: S$GLB,,,

## 2020-03-02 PROCEDURE — 99404 PREV MED CNSL INDIV APPRX 60: CPT | Mod: S$GLB,,,

## 2020-03-02 PROCEDURE — G0439 PR MEDICARE ANNUAL WELLNESS SUBSEQUENT VISIT: ICD-10-PCS | Mod: S$GLB,,, | Performed by: NURSE PRACTITIONER

## 2020-03-02 NOTE — PROGRESS NOTES
"Stephanie Sears presented for a  Medicare AWV and comprehensive Health Risk Assessment today. The following components were reviewed and updated:    · Medical history  · Family History  · Social history  · Allergies and Current Medications  · Health Risk Assessment  · Health Maintenance  · Care Team     ** See Completed Assessments for Annual Wellness Visit within the encounter summary.**       The following assessments were completed:  · Living Situation  · CAGE  · Depression Screening  · Timed Get Up and Go  · Whisper Test  · Cognitive Function Screening  · Nutrition Screening  · ADL Screening  · PAQ Screening    Vitals:    03/02/20 1453 03/02/20 1538   BP: (!) 142/74 122/68   BP Location: Right arm Right arm   Patient Position: Sitting Sitting   BP Method: Small (Manual) Large (Manual)   Pulse: 73    Resp: 16    Temp: 97.9 °F (36.6 °C)    TempSrc: Oral    SpO2: 98%    Weight: 90.6 kg (199 lb 11.8 oz)    Height: 5' 2" (1.575 m)      Body mass index is 36.53 kg/m².  Physical Exam   Constitutional: She is oriented to person, place, and time. She appears well-developed and well-nourished. No distress.   Cardiovascular: Normal rate, regular rhythm, normal heart sounds and intact distal pulses.   Pulmonary/Chest: Effort normal and breath sounds normal.   Neurological: She is alert and oriented to person, place, and time.   Skin: Skin is warm and dry. Capillary refill takes less than 2 seconds. She is not diaphoretic.   Psychiatric: She has a normal mood and affect. Her behavior is normal. Thought content normal.   Vitals reviewed.            Diagnoses and health risks identified today and associated recommendations/orders:    1. Encounter for preventive health examination  The patient was seen today for an annual Medicare wellness exam.  Health maintenance and screening topics were discussed.  Proper diet and exercise recommendations reviewed.    2. Severe obesity (BMI 35.0-39.9) with comorbidity  Proper diet and " exercise recommendations reviewed    3. Osteopenia, unspecified location  - DXA Bone Density Spine And Hip; Future    4. Asymptomatic menopausal state   - DXA Bone Density Spine And Hip; Future    Patient denies vaccines at this visit today    Provided Stephanie with a 5-10 year written screening schedule and personal prevention plan. Recommendations were developed using the USPSTF age appropriate recommendations. Education, counseling, and referrals were provided as needed. After Visit Summary printed and given to patient which includes a list of additional screenings\tests needed.    Follow up in about 1 year (around 3/2/2021) for AWV.    Denny Veronica, [id number]  I offered to discuss end of life issues, including information on how to make advance directives that the patient could use to name someone who would make medical decisions on their behalf if they became too ill to make themselves.    ___Patient declined  _X_Patient is interested, I provided paper work and offered to discuss.

## 2020-03-02 NOTE — PATIENT INSTRUCTIONS
Counseling and Referral of Other Preventative  (Italic type indicates deductible and co-insurance are waived)    Patient Name: Stephanie Sears  Today's Date: 3/2/2020    Health Maintenance       Date Due Completion Date    TETANUS VACCINE 01[birth year]10/1968 ---    Shingles Vaccine (1 of 2) 01/10/2000 ---    Foot Exam 12/27/2019 12/27/2018 (Done)    Override on 12/27/2018: Done    Override on 12/11/2015: Done    DEXA SCAN 01/24/2020 1/24/2017    LDCT Lung Screen 06/07/2020 6/7/2019    Hemoglobin A1c 07/24/2020 1/24/2020    Lipid Panel 01/24/2021 1/24/2020    Eye Exam 02/03/2021 2/3/2020    High Dose Statin 02/04/2021 2/4/2020    Mammogram 08/21/2021 8/21/2019    Colonoscopy 07/16/2024 7/16/2019        No orders of the defined types were placed in this encounter.    The following information is provided to all patients.  This information is to help you find resources for any of the problems found today that may be affecting your health:                Living healthy guide: www.[facility].[place].gov      Understanding Diabetes: www.diabetes.org      Eating healthy: www.cdc.gov/healthyweight      CDC home safety checklist: www.cdc.gov/steadi/patient.html      Agency on Aging: www.goea.[place].[facility]      Alcoholics anonymous (AA): www.aa.org      Physical Activity: www.kirill.nih.gov/ct9zslp      Tobacco use: www.quitwithusla.org

## 2020-03-02 NOTE — PROGRESS NOTES
Individual Follow-Up Form    3/2/2020    Quit Date: 3/4/20    Clinical Status of Patient: Outpatient    Length of Service: 60 minutes    Continuing Medication: yes  Patches or Nicotine gum    Other Medications: none     Target Symptoms: Withdrawal and medication side effects. The following were  rated moderate (3) to severe (4) on TCRS:  · Moderate (3): crave /desire   · Severe (4): none    Comments: Patient presents for follow up smoking 1-2 cigarettes per day. Pt remains on tobacco cessation medication of  21 mg nicotine patch QD and 2 mg nicotine gum PRN (1-2 per hour in place of cigarettes.) No adverse effects noted at this time. Pt feels she could have maintained her quit but her significant other had a cva and she is trying to handle his affairs and rehab.    Reviewed coping strategies/habitual behavior/relapse prevention with patient. Exhaled carbon monoxide level was 9 ppm per Smokerlyzer  ( non- smoker = 0-5 ppm .)  Patient understands she can call me at any time.     Diagnosis: F17.200    Next Visit: 2 weeks

## 2020-03-03 ENCOUNTER — OFFICE VISIT (OUTPATIENT)
Dept: OPHTHALMOLOGY | Facility: CLINIC | Age: 70
End: 2020-03-03
Payer: MEDICARE

## 2020-03-03 DIAGNOSIS — H40.053 BORDERLINE GLAUCOMA WITH OCULAR HYPERTENSION, BILATERAL: ICD-10-CM

## 2020-03-03 DIAGNOSIS — H52.4 MYOPIA WITH ASTIGMATISM AND PRESBYOPIA, BILATERAL: ICD-10-CM

## 2020-03-03 DIAGNOSIS — H52.203 MYOPIA WITH ASTIGMATISM AND PRESBYOPIA, BILATERAL: ICD-10-CM

## 2020-03-03 DIAGNOSIS — H25.13 NUCLEAR SCLEROSIS, BILATERAL: ICD-10-CM

## 2020-03-03 DIAGNOSIS — H43.811 POSTERIOR VITREOUS DETACHMENT, RIGHT: ICD-10-CM

## 2020-03-03 DIAGNOSIS — H40.1131 PRIMARY OPEN-ANGLE GLAUCOMA, BILATERAL, MILD STAGE: Primary | ICD-10-CM

## 2020-03-03 DIAGNOSIS — H52.13 MYOPIA WITH ASTIGMATISM AND PRESBYOPIA, BILATERAL: ICD-10-CM

## 2020-03-03 PROCEDURE — 99999 PR PBB SHADOW E&M-EST. PATIENT-LVL II: ICD-10-PCS | Mod: PBBFAC,,, | Performed by: OPHTHALMOLOGY

## 2020-03-03 PROCEDURE — 92012 PR EYE EXAM, EST PATIENT,INTERMED: ICD-10-PCS | Mod: S$PBB,,, | Performed by: OPHTHALMOLOGY

## 2020-03-03 PROCEDURE — 92136 OPHTHALMIC BIOMETRY: CPT | Mod: PBBFAC | Performed by: OPHTHALMOLOGY

## 2020-03-03 PROCEDURE — 99999 PR PBB SHADOW E&M-EST. PATIENT-LVL II: CPT | Mod: PBBFAC,,, | Performed by: OPHTHALMOLOGY

## 2020-03-03 PROCEDURE — 92012 INTRM OPH EXAM EST PATIENT: CPT | Mod: S$PBB,,, | Performed by: OPHTHALMOLOGY

## 2020-03-03 PROCEDURE — 92136 IOL MASTER - OU - BOTH EYES: ICD-10-PCS | Mod: 26,S$PBB,LT, | Performed by: OPHTHALMOLOGY

## 2020-03-03 PROCEDURE — 99212 OFFICE O/P EST SF 10 MIN: CPT | Mod: PBBFAC,25 | Performed by: OPHTHALMOLOGY

## 2020-03-03 PROCEDURE — 92133 CPTRZD OPH DX IMG PST SGM ON: CPT | Mod: PBBFAC | Performed by: OPHTHALMOLOGY

## 2020-03-03 PROCEDURE — 92133 POSTERIOR SEGMENT OCT OPTIC NERVE(OCULAR COHERENCE TOMOGRAPHY) - OU - BOTH EYES: ICD-10-PCS | Mod: 26,S$PBB,, | Performed by: OPHTHALMOLOGY

## 2020-03-03 NOTE — PROGRESS NOTES
"HPI     DLS: 2/03/20    Pt here for 1 month check/OCT/IOL calculations;    Meds;   No GTTS     1. Borderline glaucoma with ocular hypertension, bilateral   2. Open angle with borderline findings, low risk, bilateral   3. Nuclear sclerosis, bilateral   4. Posterior vitreous detachment, right   5. Myopia with astigmatism and presbyopia, bilateral     Last edited by Angelic Saravia on 3/3/2020  8:28 AM. (History)              Assessment /Plan     For exam results, see Encounter Report.    Primary open-angle glaucoma, bilateral, mild stage    Nuclear sclerosis, bilateral    Posterior vitreous detachment, right    Myopia with astigmatism and presbyopia, bilateral    Borderline glaucoma with ocular hypertension, bilateral    Open angle with borderline findings, low risk, bilateral            1. Pre-perimetric glaucoma /OHT/ borderline glaucoma   -Followed at [facility] since 1991   -First HVF 1999   -First photos 1991   - Intolerant to all gtts - they aggravate his blepharitis-? RAGHU allergy   -IOP "OK" off gtts and s/p ALT ou and SLT od - 2008    Family history neg   Glaucoma meds none (( off gtts post SLT ou -))   H/O adverse rxn to glaucoma drops Intolerant to all gtts - 2/2 aggravates blepharitis- RAGHU allergy   LASERS ALT ou -? Date / SLT OD 7/17/08 - good response   GLAUCOMA SURGERIES none   OTHER EYE SURGERIES none   CDR 0.6/0.3   Tbase 19-26 / 16-21   Tmax 26/21   Ttarget ?   HVF 14 test - 1999 to 2020 - Full ou   Gonio +3 ou   /588   OCT 5 test 2005 to 2020 -  RNFL - OD:NL // OS:NL  HRT 8 test 2004 to 2019 - -  nl od // nl os  Disc photos 1991, 1996, 2003 - slides // 2012 , 2016, 2020   - OIS     - Ttoday 15/17  - Test done today IOP/OCT/lens star IOL     2. Nuclear Sclerosis    VS - BATh 20/50 od // 20/60 os -    Pt C/O glare / nicola at night     3. Posterior Vitreous Detachment OD - 11/2008   - RD PRECAUTIONS    4. Eyelid inflammation / Blepharitis / MGD    5. Allergic Conjunctivitis - RAGHU allergy     6. " Myopia/Astigmatism/presbyopia     Plan   OHT-pre-perimetric glaucoma - intolerant to all gtts   IOP ok s/p ALT ou - years ago and s/p SLT OD 2008 ( no SLT os)  Continue to monitor HVF/DFE/OCT/HRT/photos/IOP   If increase IOP or progression on VF testing consider repeat SLT OD and primary SLT os      Pt is a myope - sph eq is -5.25 od and -4.25 os   - discuss IOL options (? Aim for emmetropia to -0.50 or keep pt more myopic at about -2.50 for near vision or a -1.50 for mid range)   Pt is use to wearing her glasses all the time - even to sleep   - pt uses the computer a lot - ? would like to be set for dist to mid range - ? About a -1.00 to -1.50 -so can do some computer work without glasses     IOL OD  PCB00 13.50 (-0.25 to -0.53)  Can re-work to a -1.50 for mid range - if she desires - re-discuss - pre-surgery     IOL OS   PCB00 15.0 ( -0.35 to -0.59)  Can rework to -1.50 for mid range - if she desires re-discuss - pre-surgery     PLAN   Pt will think about cataract surgery - does not want it just now   Cont to monitor glaucoma off gtts (intolerant to most gtts) IOP ok post slt ou     F/U 6 months with HRT / gonio - call sooner if wants cataract surgery os

## 2020-03-06 DIAGNOSIS — G89.29 CHRONIC NECK PAIN: ICD-10-CM

## 2020-03-06 DIAGNOSIS — M54.2 CHRONIC NECK PAIN: ICD-10-CM

## 2020-03-10 RX ORDER — GABAPENTIN 600 MG/1
TABLET ORAL
Qty: 270 TABLET | Refills: 3 | Status: SHIPPED | OUTPATIENT
Start: 2020-03-10 | End: 2021-03-12 | Stop reason: SDUPTHER

## 2020-05-14 ENCOUNTER — CLINICAL SUPPORT (OUTPATIENT)
Dept: SMOKING CESSATION | Facility: CLINIC | Age: 70
End: 2020-05-14
Payer: COMMERCIAL

## 2020-05-14 DIAGNOSIS — F17.200 NICOTINE DEPENDENCE: Primary | ICD-10-CM

## 2020-05-14 PROCEDURE — 99407 PR TOBACCO USE CESSATION INTENSIVE >10 MINUTES: ICD-10-PCS | Mod: S$GLB,,, | Performed by: INTERNAL MEDICINE

## 2020-05-14 PROCEDURE — 99407 BEHAV CHNG SMOKING > 10 MIN: CPT | Mod: S$GLB,,, | Performed by: INTERNAL MEDICINE

## 2020-05-14 NOTE — PROGRESS NOTES
Spoke with patient today in regard to smoking cessation progress for 6 month telephone follow up, she states not tobacco free. Patient states smoking 8-10 cigs/day and was using the prescribed tobacco cessation medication of nicotine patches. She states not ready to return to the program at this time and would like to call back at a later date. Informed patient of benefit period, future follow up, and contact information if any further help or support is needed. Will resolve episode and complete smart form for Quit attempt #3 and complete smart form for 3 and 6 month follow up on Quit #4.

## 2020-07-02 ENCOUNTER — PATIENT OUTREACH (OUTPATIENT)
Dept: ADMINISTRATIVE | Facility: OTHER | Age: 70
End: 2020-07-02

## 2020-07-02 ENCOUNTER — TELEPHONE (OUTPATIENT)
Dept: PHYSICAL MEDICINE AND REHAB | Facility: CLINIC | Age: 70
End: 2020-07-02

## 2020-07-02 NOTE — PROGRESS NOTES
Requested updates within Care Everywhere.  Patient's chart was reviewed for overdue JULIÁN topics.  Immunizations reconciled.

## 2020-07-02 NOTE — TELEPHONE ENCOUNTER
Called patient number in chart, no answer.  Patient last seen 03/2019        ----- Message from Patricia Hernandez sent at 7/2/2020  3:14 PM CDT -----  Regarding: pt  Pt is calling to speak with the nurse to schedule a fu appt can you please call pt at [phone].    TAHIRA

## 2020-07-06 ENCOUNTER — TELEPHONE (OUTPATIENT)
Dept: PHYSICAL MEDICINE AND REHAB | Facility: CLINIC | Age: 70
End: 2020-07-06

## 2020-07-06 ENCOUNTER — OFFICE VISIT (OUTPATIENT)
Dept: ORTHOPEDICS | Facility: CLINIC | Age: 70
End: 2020-07-06
Payer: MEDICARE

## 2020-07-06 VITALS
SYSTOLIC BLOOD PRESSURE: 180 MMHG | RESPIRATION RATE: 17 BRPM | BODY MASS INDEX: 36.52 KG/M2 | DIASTOLIC BLOOD PRESSURE: 80 MMHG | HEIGHT: 62 IN | OXYGEN SATURATION: 98 % | WEIGHT: 198.44 LBS | HEART RATE: 70 BPM

## 2020-07-06 DIAGNOSIS — M70.61 GREATER TROCHANTERIC BURSITIS OF BOTH HIPS: Primary | ICD-10-CM

## 2020-07-06 DIAGNOSIS — M70.62 GREATER TROCHANTERIC BURSITIS OF BOTH HIPS: Primary | ICD-10-CM

## 2020-07-06 PROCEDURE — 99213 OFFICE O/P EST LOW 20 MIN: CPT | Mod: 25,S$PBB,, | Performed by: ORTHOPAEDIC SURGERY

## 2020-07-06 PROCEDURE — 99999 PR PBB SHADOW E&M-EST. PATIENT-LVL IV: ICD-10-PCS | Mod: PBBFAC,,, | Performed by: ORTHOPAEDIC SURGERY

## 2020-07-06 PROCEDURE — 99214 OFFICE O/P EST MOD 30 MIN: CPT | Mod: PBBFAC,PN,25 | Performed by: ORTHOPAEDIC SURGERY

## 2020-07-06 PROCEDURE — 99213 PR OFFICE/OUTPT VISIT, EST, LEVL III, 20-29 MIN: ICD-10-PCS | Mod: 25,S$PBB,, | Performed by: ORTHOPAEDIC SURGERY

## 2020-07-06 PROCEDURE — 99999 PR PBB SHADOW E&M-EST. PATIENT-LVL IV: CPT | Mod: PBBFAC,,, | Performed by: ORTHOPAEDIC SURGERY

## 2020-07-06 PROCEDURE — 20610 LARGE JOINT ASPIRATION/INJECTION: L GREATER TROCHANTERIC BURSA: ICD-10-PCS | Mod: S$PBB,LT,, | Performed by: ORTHOPAEDIC SURGERY

## 2020-07-06 PROCEDURE — 20610 DRAIN/INJ JOINT/BURSA W/O US: CPT | Mod: PBBFAC,PN | Performed by: ORTHOPAEDIC SURGERY

## 2020-07-06 RX ORDER — TRIAMCINOLONE ACETONIDE 40 MG/ML
40 INJECTION, SUSPENSION INTRA-ARTICULAR; INTRAMUSCULAR
Status: DISCONTINUED | OUTPATIENT
Start: 2020-07-06 | End: 2020-07-06 | Stop reason: HOSPADM

## 2020-07-06 RX ORDER — LIDOCAINE HYDROCHLORIDE 10 MG/ML
5 INJECTION, SOLUTION EPIDURAL; INFILTRATION; INTRACAUDAL; PERINEURAL
Status: DISCONTINUED | OUTPATIENT
Start: 2020-07-06 | End: 2020-07-06 | Stop reason: HOSPADM

## 2020-07-06 RX ADMIN — LIDOCAINE HYDROCHLORIDE 5 ML: 10 INJECTION, SOLUTION EPIDURAL; INFILTRATION; INTRACAUDAL; PERINEURAL at 09:07

## 2020-07-06 RX ADMIN — TRIAMCINOLONE ACETONIDE 40 MG: 40 INJECTION, SUSPENSION INTRA-ARTICULAR; INTRAMUSCULAR at 09:07

## 2020-07-06 NOTE — PROCEDURES
Large Joint Aspiration/Injection: L greater trochanteric bursa    Date/Time: 7/6/2020 9:00 AM  Performed by: Tiffany Garrison MD  Authorized by: Tiffany Garrison MD     Consent Done?:  Yes (Verbal)  Indications:  Pain  Timeout: prior to procedure the correct patient, procedure, and site was verified    Prep: patient was prepped and draped in usual sterile fashion      Local anesthesia used?: Yes    Local anesthetic:  Topical anesthetic    Details:  Needle Size:  22 G  Approach:  Lateral  Location:  Hip  Site:  L greater trochanteric bursa  Medications:  40 mg triamcinolone acetonide 40 mg/mL; 5 mL lidocaine (PF) 10 mg/ml (1%) 10 mg/mL (1 %)  Patient tolerance:  Patient tolerated the procedure well with no immediate complications

## 2020-07-06 NOTE — PROGRESS NOTES
Follow up visit    Chief Complaint   Patient presents with    Left Hip - Pain     Interval history (07/06/2020):   Here for follow up of bilateral greater trochanteric bursitis.  Last seen in December and both GT bursa were injected spaced out a week apart because of her DM diagnosis.  She reports excellent relief of bilateral hip pain for about 6.5 months - pain returned about 3 weeks ago. No issues with blood glucose control with injections.     Unable to lay on either side due to pain  Has pain when she first gets up from sitting    Interval history (12/2/2019):  Returns to clinic for planned R GT bursa injection     HPI (11/25/2019): Stephanie Sears is a 69 y.o. female who presents today complaining of Pain of the Right Hip and Pain of the Left Hip     Duration of symptoms:  Over 1 year. Started with right and now has it on both sides   Right hip is worse  Localizes pain to greater trochanter with radiation up rtowards the buttock  No back pain  No radicular symptoms  Has had fusion at L4-L5 with Dr. Melinda Lowry. 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     Pain does interfere with sleep.     This is the extent of the patient's complaints at this time.      Review of Systems   Unremarkable, no changes since last visit.     No change in medical, surgical, family or surgical history except as stated above             Review of patient's allergies indicates:   Allergen Reactions    Hydrocodone Shortness Of Breath    Iodinated contrast media Shortness Of Breath       Difficulty breathing    Adhesive Itching       Skin peeling    Oxycodone         Hallucinations    Sulfa (sulfonamide antibiotics) Hives and Itching    Morphine         Patient says it will make her  ""hallucinate"         Physical Exam:   Vitals:    20 0906   BP: (!) 180/80   Pulse: 70   Resp: 17   SpO2: 98%   Weight: 90 kg (198 lb 6.6 oz)   Height: 5' 2" (1.575 m)   PainSc:   8   PainLoc: Hip        General: Weight: 89.9 kg (198 lb 3.1 oz) Body mass index is 36.25 kg/m².   Patient is alert, awake and oriented to time, place and person. Mood and affect are appropriate.  Patient does not appear to be in any distress, denies any constitutional symptoms and appears stated age.   HEENT:  Pupils are equal and round, sclera are not injected. External examination of ears and nose reveals no abnormalities. Cranial nerves II-X are grossly intact  Skin:  no rashes, abrasions or open wounds on the affected extremity   Resp:  No respiratory distress or audible wheezing   CV: 2+  pulses, all extremities warm and well perfused   Bilateral Hip   Tender bilaterally over greater trochanteric bursa  No groin pain   Negative [facility]   PROM: ER 40, IR 30 without pain   Calf non tender and non swollen  No NV changes      Imagin views bilateral hips: No significant degenerative changes, no fracture      I personally reviewed and interpreted the patient's imaging obtained today in clinic     Assessment: 69 y.o. female with bilateral greater trochanteric bursitis      I explained my diagnostic impression and the reasoning behind it in detail, using layman's terms.  Models and/or pictures were used to help in the explanation.    We discussed non operative and operative treatment modalities -- They would like to start with non-operative treatment in the form of injection.      Plan:   - Injection of the left  greater trochanteric bursa performed, please see procedure note for more details.  Prior to the injection risks and benefits of corticosteroid injection were discussed with the patient including pain, infection, bleeding, skin color changes, swelling, steroid flare. We discussed that over time injections can result in " chondral damage, acceleration of arthritis formation, damage to tendons and damage to joints.  The patient consented for the procedure.  Post-injection instructions were given to the patient in writing.  - hip stretches given  - Return to clinic next week for R hip injection     All questions were answered in detail. The patient is in full agreement with the treatment plan and will proceed accordingly.        This note was created by combination of typed  and M-Modal dictation. Transcription and phonetic errors may be present.  If there are any questions, please contact me.          Current Outpatient Medications:     albuterol (PROAIR HFA) 90 mcg/actuation inhaler, Inhale 2 puffs into the lungs every 6 (six) hours as needed for Wheezing or Shortness of Breath., Disp: 1 Inhaler, Rfl: 3    albuterol (VENTOLIN HFA) 90 mcg/actuation inhaler, Inhale 2 puffs into the lungs every 4 (four) hours as needed for Wheezing or Shortness of Breath., Disp: 1 Inhaler, Rfl: 2    amlodipine-benazepril (LOTREL) 10-40 mg per capsule, Take 1 capsule by mouth once daily., Disp: 90 capsule, Rfl: 1    atorvastatin (LIPITOR) 20 MG tablet, Take 1 tablet (20 mg total) by mouth once daily., Disp: 90 tablet, Rfl: 1    blood sugar diagnostic (ACCU-CHEK JOSE G PLUS TEST STRP) Strp, Inject 1 each into the skin 2 (two) times daily., Disp: 200 each, Rfl: 11    blood sugar diagnostic Strp, Use to test blood glucose levels 2 times per day as instructed., Disp: 200 strip, Rfl: 11    CALCIUM CARBONATE (TUMS ORAL), Take by mouth as needed (acid reflux, indigestion)., Disp: , Rfl:     carvedilol (COREG) 12.5 MG tablet, Take 1 tablet (12.5 mg total) by mouth 2 (two) times daily with meals., Disp: 180 tablet, Rfl: 1    diphenhydrAMINE (BENADRYL) 25 mg capsule, Take 25 mg by mouth every 6 (six) hours as needed for Itching or Allergies., Disp: , Rfl:     fluticasone (FLONASE) 50 mcg/actuation nasal spray, 1 spray by Each Nare route 2 (two)  times daily., Disp: 1 Bottle, Rfl: 1    gabapentin (NEURONTIN) 600 MG tablet, Take 1 tablet (600 mg total) by mouth 3 (three) times daily., Disp: 270 tablet, Rfl: 1    INULIN (FIBER GUMMIES ORAL), Take by mouth every morning. , Disp: , Rfl:     lancets (ACCU-CHEK MULTICLIX LANCET) Misc, Test blood sugar twice daily, Disp: 300 each, Rfl: 3    lancets (ONETOUCH DELICA LANCETS) 33 gauge Misc, Inject 1 lancet into the skin 2 (two) times daily before meals., Disp: 200 each, Rfl: 11    meloxicam (MOBIC) 7.5 MG tablet, Take 1 tablet (7.5 mg total) by mouth once daily., Disp: 90 tablet, Rfl: 1    nicotine (NICODERM CQ) 21 mg/24 hr, Place 1 patch onto the skin once daily., Disp: 28 patch, Rfl: 0    nicotine polacrilex 2 MG Lozg, 1-2 per hour in place of a cigarette. Limit to 20 a day - oral., Disp: 162 lozenge, Rfl: 1    triamcinolone acetonide 0.1% (KENALOG) 0.1 % ointment, KENDRA AA ON THE SKIN BID ON RASH ON LEGS, Disp: , Rfl: 0    traMADol (ULTRAM) 50 mg tablet, Take 1 tablet (50 mg total) by mouth every 8 (eight) hours as needed., Disp: 90 tablet, Rfl: 1          Past Medical History:   Diagnosis Date    Angina pectoris      Anxiety      Bronchitis      Cataract      Cervical spondylosis with radiculopathy 7/10/2012    Chest pain      Chronic neck pain 7/26/2012    Depression      Fibrocystic breast      GERD (gastroesophageal reflux disease)      Glaucoma      Hyperlipidemia      Hypertension      Neck pain 7/10/2012    JAM (obstructive sleep apnea)      Primary osteoarthritis of both knees      Psoriasis      Subacromial or subdeltoid bursitis 7/10/2012    Thyroid disease      Type 2 diabetes mellitus 12/10/2013            Patient Active Problem List   Diagnosis    GERD (gastroesophageal reflux disease)    Primary osteoarthritis of both knees    Cervical spondylosis with radiculopathy    Open angle with borderline findings, low risk - Both Eyes    Nontoxic uninodular goiter    Myopia  with astigmatism and presbyopia - Both Eyes    Diet-controlled diabetes mellitus    Asymptomatic proteinuria    DJD (degenerative joint disease) of lumbar spine, mild    Osteoarthritis of left hip, mild    DJD (degenerative joint disease) of cervical spine    Essential hypertension    Class 2 obesity due to excess calories without serious comorbidity with body mass index (BMI) of 35.0 to 35.9 in adult    Tobacco abuse    H/O scarlet fever    Aortic valve sclerosis    Pulmonary hypertension    Diastolic dysfunction    Status post total knee replacement, left 1/20/2016    CMC arthritis    Chronic midline thoracic back pain    Primary osteoarthritis of right knee    Hand pain, left    JAM (obstructive sleep apnea)    Personal history of spine surgery    Fatty liver    UTI (urinary tract infection)    Status post total right knee replacement 2/26/2018    Dyslipidemia    Preoperative cardiovascular examination    Bilateral carotid bruits    Atherosclerosis of aortic arch    Insomnia    Lichen planus    History of colon polyps    Acute pancreatitis without infection or necrosis              Past Surgical History:   Procedure Laterality Date    BREAST LUMPECTOMY Left 1988     mass in the rt breast surgery  1988    BREAST MASS EXCISION Right       benign    CHOLECYSTECTOMY        COLONOSCOPY N/A 5/22/2019     Procedure: COLONOSCOPY;  Surgeon: Darrin Calvin MD;  Location: [facility] ([address] [place] [address]);  Service: Endoscopy;  Laterality: N/A;  2nd floor - all other procedure done on 2, multiple comorbidities - ERW/   change in MD schedule, Pt notified and verbalized understanding, new arrival time 0800, Ins mailed to Pt with new arrival time - ERW1/8/19@1130    HYSTERECTOMY   1980's    KNEE SURGERY Left 1-20-16     TKR    KNEE SURGERY Right 02/26/2018     TKR    L4-L5 fusion   2006        Social History            Tobacco Use    Smoking status: Current Every Day Smoker       Packs/day: 1.00        Years: 40.00       Pack years: 40.00       Types: Cigarettes    Smokeless tobacco: Never Used    Tobacco comment: smokes a pack a week   Substance Use Topics    Alcohol use: No       Alcohol/week: 0.0 standard drinks    Drug use: No              Family History   Problem Relation Age of Onset    Diabetes Mother      Hypertension Mother           CHF, angina    Angina Mother      Kidney disease Mother      Heart disease Mother           CHF    Hypertension Father           glaucoma, Alzhiemer's , supra pubic    Alzheimer's disease Father      Glaucoma Father      Kidney disease Brother           kidney transplant, HTN,DM    Diabetes Brother      Glaucoma Sister      Hypertension Sister      Hyperlipidemia Sister      Gout Son      Hypertension Son      Diabetes Son      Sleep apnea Sister      Hypertension Sister      Thyroid disease Sister      No Known Problems Sister      No Known Problems Sister      Hepatitis Brother      Amblyopia Neg Hx      Blindness Neg Hx      Melanoma Neg Hx

## 2020-07-06 NOTE — TELEPHONE ENCOUNTER
----- Message from [place] sent at 6/30/2020  1:10 PM CDT -----  Regarding: FW: Pt  Ms. Carreno I will leave scheduling to you  ----- Message -----  From: Balbina Richardson  Sent: 6/30/2020  10:01 AM CDT  To: Melody Smith Staff  Subject: Pt                                               Reason: Pt received a call stated to call and schedule appt. Pt stated she sees  and need a f/u .    Communication: [phone] or [phone]

## 2020-07-13 ENCOUNTER — PATIENT OUTREACH (OUTPATIENT)
Dept: ADMINISTRATIVE | Facility: OTHER | Age: 70
End: 2020-07-13

## 2020-07-14 ENCOUNTER — OFFICE VISIT (OUTPATIENT)
Dept: ORTHOPEDICS | Facility: CLINIC | Age: 70
End: 2020-07-14
Payer: MEDICARE

## 2020-07-14 VITALS
WEIGHT: 198.44 LBS | HEART RATE: 79 BPM | TEMPERATURE: 98 F | SYSTOLIC BLOOD PRESSURE: 144 MMHG | BODY MASS INDEX: 36.52 KG/M2 | RESPIRATION RATE: 18 BRPM | OXYGEN SATURATION: 97 % | DIASTOLIC BLOOD PRESSURE: 68 MMHG | HEIGHT: 62 IN

## 2020-07-14 DIAGNOSIS — M70.62 GREATER TROCHANTERIC BURSITIS OF BOTH HIPS: Primary | ICD-10-CM

## 2020-07-14 DIAGNOSIS — M70.61 GREATER TROCHANTERIC BURSITIS OF BOTH HIPS: Primary | ICD-10-CM

## 2020-07-14 PROCEDURE — 20610 DRAIN/INJ JOINT/BURSA W/O US: CPT | Mod: PBBFAC,PN | Performed by: ORTHOPAEDIC SURGERY

## 2020-07-14 PROCEDURE — 99999 PR PBB SHADOW E&M-EST. PATIENT-LVL IV: CPT | Mod: PBBFAC,,, | Performed by: ORTHOPAEDIC SURGERY

## 2020-07-14 PROCEDURE — 99999 PR PBB SHADOW E&M-EST. PATIENT-LVL IV: ICD-10-PCS | Mod: PBBFAC,,, | Performed by: ORTHOPAEDIC SURGERY

## 2020-07-14 PROCEDURE — 99214 OFFICE O/P EST MOD 30 MIN: CPT | Mod: PBBFAC,PN | Performed by: ORTHOPAEDIC SURGERY

## 2020-07-14 PROCEDURE — 99499 UNLISTED E&M SERVICE: CPT | Mod: S$PBB,,, | Performed by: ORTHOPAEDIC SURGERY

## 2020-07-14 PROCEDURE — 20610 LARGE JOINT ASPIRATION/INJECTION: R GREATER TROCHANTERIC BURSA: ICD-10-PCS | Mod: S$PBB,RT,, | Performed by: ORTHOPAEDIC SURGERY

## 2020-07-14 PROCEDURE — 99499 NO LOS: ICD-10-PCS | Mod: S$PBB,,, | Performed by: ORTHOPAEDIC SURGERY

## 2020-07-14 RX ORDER — TRIAMCINOLONE ACETONIDE 40 MG/ML
40 INJECTION, SUSPENSION INTRA-ARTICULAR; INTRAMUSCULAR
Status: DISCONTINUED | OUTPATIENT
Start: 2020-07-14 | End: 2020-07-14 | Stop reason: HOSPADM

## 2020-07-14 RX ORDER — LIDOCAINE HYDROCHLORIDE 10 MG/ML
5 INJECTION, SOLUTION EPIDURAL; INFILTRATION; INTRACAUDAL; PERINEURAL
Status: DISCONTINUED | OUTPATIENT
Start: 2020-07-14 | End: 2020-07-14 | Stop reason: HOSPADM

## 2020-07-14 RX ADMIN — TRIAMCINOLONE ACETONIDE 40 MG: 40 INJECTION, SUSPENSION INTRA-ARTICULAR; INTRAMUSCULAR at 11:07

## 2020-07-14 RX ADMIN — LIDOCAINE HYDROCHLORIDE 5 ML: 10 INJECTION, SOLUTION EPIDURAL; INFILTRATION; INTRACAUDAL; PERINEURAL at 11:07

## 2020-07-14 NOTE — PROCEDURES
Large Joint Aspiration/Injection: R greater trochanteric bursa    Date/Time: 7/14/2020 11:00 AM  Performed by: Tiffany Garrison MD  Authorized by: Tiffany Garrison MD     Consent Done?:  Yes (Verbal)  Indications:  Pain  Timeout: prior to procedure the correct patient, procedure, and site was verified    Prep: patient was prepped and draped in usual sterile fashion      Local anesthesia used?: Yes    Local anesthetic:  Topical anesthetic    Details:  Needle Size:  22 G  Approach:  Lateral  Location:  Hip  Site:  R greater trochanteric bursa  Medications:  40 mg triamcinolone acetonide 40 mg/mL; 5 mL lidocaine (PF) 10 mg/ml (1%) 10 mg/mL (1 %)  Patient tolerance:  Patient tolerated the procedure well with no immediate complications

## 2020-07-14 NOTE — PROGRESS NOTES
"Follow up visit    History of Present Illness:   Stephanie comes to the office for follow up of right greater troch bursitis - had injection of left side last week with good relief. Here for right sided injection. Injections spaced bc of DM     ROS: unremarkable and no change since last visit    Physical Examination:    Vitals:    07/14/20 1111   BP: (!) 144/68   Pulse: 79   Resp: 18   Temp: 97.8 °F (36.6 °C)   SpO2: 97%   Weight: 90 kg (198 lb 6.6 oz)   Height: 5' 2" (1.575 m)   PainSc:   6   PainLoc: Hip        NAD  Right hip  No change in exam     Radiographic imaging: no new     Assessment/Plan:  70 y.o. female  with Bilateral greater troch bursitis     We discussed the etiology of persistent pain and further treatment options.  Injection of the right greater trochanteric bursa performed, please see procedure note for more details.  Prior to the injection risks and benefits of corticosteroid injection were discussed with the patient including pain, infection, bleeding, skin color changes, swelling, steroid flare. We discussed that over time injections can result in chondral damage, acceleration of arthritis formation, damage to tendons and damage to joints.  The patient consented for the procedure.  Post-injection instructions were given to the patient in writing.I discussed the risk of increased blood glucose following steroid injection in the setting of diabetes- the patient will monitor closely for the next 24 hours and call her primary care physician with any questions or concerns regarding blood glucose control   1. Return for follow up visit: PRN    All questions were answered in detail. The patient  verbalized the understanding of the treatment plan and is in full agreement with the treatment plan.    "

## 2020-07-20 RX ORDER — ALBUTEROL SULFATE 90 UG/1
2 AEROSOL, METERED RESPIRATORY (INHALATION) EVERY 4 HOURS PRN
Qty: 36 G | Refills: 3 | Status: SHIPPED | OUTPATIENT
Start: 2020-07-20 | End: 2021-05-27

## 2020-08-07 DIAGNOSIS — E11.9 TYPE 2 DIABETES MELLITUS WITHOUT COMPLICATION: ICD-10-CM

## 2020-08-27 ENCOUNTER — PATIENT OUTREACH (OUTPATIENT)
Dept: ADMINISTRATIVE | Facility: OTHER | Age: 70
End: 2020-08-27

## 2020-08-28 ENCOUNTER — OFFICE VISIT (OUTPATIENT)
Dept: NEPHROLOGY | Facility: CLINIC | Age: 70
End: 2020-08-28
Payer: MEDICARE

## 2020-08-28 VITALS
HEIGHT: 62 IN | WEIGHT: 192.88 LBS | DIASTOLIC BLOOD PRESSURE: 80 MMHG | OXYGEN SATURATION: 98 % | BODY MASS INDEX: 35.49 KG/M2 | SYSTOLIC BLOOD PRESSURE: 162 MMHG | HEART RATE: 68 BPM

## 2020-08-28 DIAGNOSIS — N18.2 CONTROLLED TYPE 2 DIABETES MELLITUS WITH STAGE 2 CHRONIC KIDNEY DISEASE, WITH LONG-TERM CURRENT USE OF INSULIN: Primary | ICD-10-CM

## 2020-08-28 DIAGNOSIS — E11.22 CONTROLLED TYPE 2 DIABETES MELLITUS WITH STAGE 2 CHRONIC KIDNEY DISEASE, WITH LONG-TERM CURRENT USE OF INSULIN: Primary | ICD-10-CM

## 2020-08-28 DIAGNOSIS — Z79.4 CONTROLLED TYPE 2 DIABETES MELLITUS WITH STAGE 2 CHRONIC KIDNEY DISEASE, WITH LONG-TERM CURRENT USE OF INSULIN: Primary | ICD-10-CM

## 2020-08-28 PROCEDURE — 99214 OFFICE O/P EST MOD 30 MIN: CPT | Mod: PBBFAC | Performed by: INTERNAL MEDICINE

## 2020-08-28 PROCEDURE — 99999 PR PBB SHADOW E&M-EST. PATIENT-LVL IV: ICD-10-PCS | Mod: PBBFAC,,, | Performed by: INTERNAL MEDICINE

## 2020-08-28 PROCEDURE — 99213 OFFICE O/P EST LOW 20 MIN: CPT | Mod: S$PBB,,, | Performed by: INTERNAL MEDICINE

## 2020-08-28 PROCEDURE — 99999 PR PBB SHADOW E&M-EST. PATIENT-LVL IV: CPT | Mod: PBBFAC,,, | Performed by: INTERNAL MEDICINE

## 2020-08-28 PROCEDURE — 99213 PR OFFICE/OUTPT VISIT, EST, LEVL III, 20-29 MIN: ICD-10-PCS | Mod: S$PBB,,, | Performed by: INTERNAL MEDICINE

## 2020-08-28 NOTE — PROGRESS NOTES
Subjective:       Patient ID: Stephanie Sears is a 70 y.o. Black or  female who presents for follow up of Chronic Kidney Disease    HPI  Ms. Sears is a 70 year old woman with medical history of hypertension presenting for follow up of proteinuria.  Patient reports blood pressure usually 130-140's systolic.  She reports adequate fluid intake, takes meloxicam only occasionally for joint pains.  She otherwise denies any fever, chest pain, shortness of breath, abdominal pain, diarrhea, dysuria/hematuria.     Review of Systems   Constitutional: Negative for appetite change, fatigue and fever.   Respiratory: Negative for chest tightness and shortness of breath.    Cardiovascular: Negative for chest pain and leg swelling.   Gastrointestinal: Negative for abdominal pain, constipation, diarrhea, nausea and vomiting.   Genitourinary: Negative for difficulty urinating, dysuria, flank pain, frequency, hematuria and urgency.   Musculoskeletal: Negative for arthralgias, joint swelling and myalgias.   Skin: Negative for rash and wound.   Neurological: Negative for dizziness, weakness and light-headedness.   All other systems reviewed and are negative.      Objective:      Physical Exam  Vitals signs reviewed.   Constitutional:       Appearance: She is well-developed.   Pulmonary:      Effort: Pulmonary effort is normal. No respiratory distress.   Musculoskeletal:         General: No swelling.   Neurological:      Mental Status: She is alert.         Assessment:       1. Controlled type 2 diabetes mellitus with stage 2 chronic kidney disease, with long-term current use of insulin        Plan:      Ms. Sears is a 70 year old woman with medical history of hypertension presenting for follow up of proteinuria.  Patient with well-preserved renal function (CKD stage II), with persistent subnephrotic proteinuria (~2gm) on ACEinh.  Will continue to trend creatinine, proteinuria, advised to avoid any NSAID's.   Stressed importance of blood pressure/glycemic control, along with weight loss, to prevent any further progression of kidney disease, patient voiced understanding.     Return to clinic in 8-12 months with renal/heme panel, iron/TIBC/ferritin, urinalysis/culture, urine protein/creatinine ratio prior to next visit

## 2020-09-15 ENCOUNTER — OFFICE VISIT (OUTPATIENT)
Dept: OPHTHALMOLOGY | Facility: CLINIC | Age: 70
End: 2020-09-15
Payer: MEDICARE

## 2020-09-15 DIAGNOSIS — H43.811 POSTERIOR VITREOUS DETACHMENT, RIGHT: ICD-10-CM

## 2020-09-15 DIAGNOSIS — H52.203 MYOPIA WITH ASTIGMATISM AND PRESBYOPIA, BILATERAL: ICD-10-CM

## 2020-09-15 DIAGNOSIS — H25.13 NUCLEAR SCLEROSIS, BILATERAL: ICD-10-CM

## 2020-09-15 DIAGNOSIS — H52.4 MYOPIA WITH ASTIGMATISM AND PRESBYOPIA, BILATERAL: ICD-10-CM

## 2020-09-15 DIAGNOSIS — H40.1131 PRIMARY OPEN-ANGLE GLAUCOMA, BILATERAL, MILD STAGE: Primary | ICD-10-CM

## 2020-09-15 DIAGNOSIS — H52.13 MYOPIA WITH ASTIGMATISM AND PRESBYOPIA, BILATERAL: ICD-10-CM

## 2020-09-15 PROCEDURE — 92133 CPTRZD OPH DX IMG PST SGM ON: CPT | Mod: PBBFAC | Performed by: OPHTHALMOLOGY

## 2020-09-15 PROCEDURE — 92012 INTRM OPH EXAM EST PATIENT: CPT | Mod: S$PBB,,, | Performed by: OPHTHALMOLOGY

## 2020-09-15 PROCEDURE — 92133 HEIDELBERG RETINA TOMOGRAPHY (HRT) - OU - BOTH EYES: ICD-10-PCS | Mod: 26,S$PBB,, | Performed by: OPHTHALMOLOGY

## 2020-09-15 PROCEDURE — 99213 OFFICE O/P EST LOW 20 MIN: CPT | Mod: PBBFAC | Performed by: OPHTHALMOLOGY

## 2020-09-15 PROCEDURE — 92012 PR EYE EXAM, EST PATIENT,INTERMED: ICD-10-PCS | Mod: S$PBB,,, | Performed by: OPHTHALMOLOGY

## 2020-09-15 PROCEDURE — 92020 GONIOSCOPY: CPT | Mod: S$PBB,,, | Performed by: OPHTHALMOLOGY

## 2020-09-15 PROCEDURE — 92020 GONIOSCOPY: CPT | Mod: PBBFAC | Performed by: OPHTHALMOLOGY

## 2020-09-15 PROCEDURE — 92020 PR SPECIAL EYE EVAL,GONIOSCOPY: ICD-10-PCS | Mod: S$PBB,,, | Performed by: OPHTHALMOLOGY

## 2020-09-15 PROCEDURE — 99999 PR PBB SHADOW E&M-EST. PATIENT-LVL III: ICD-10-PCS | Mod: PBBFAC,,, | Performed by: OPHTHALMOLOGY

## 2020-09-15 PROCEDURE — 99999 PR PBB SHADOW E&M-EST. PATIENT-LVL III: CPT | Mod: PBBFAC,,, | Performed by: OPHTHALMOLOGY

## 2020-09-15 NOTE — PROGRESS NOTES
"HPI     DLS: 3/03/20    Pt here for HRT review;  Pt states she is ready for cataract surgery. She wants to be able to see   in the distance. She can't tolerate any glaucoma drops due to allergies.     Meds;   No GTTS     1. Borderline glaucoma with ocular hypertension, bilateral   2. Open angle with borderline findings, low risk, bilateral   3. Nuclear sclerosis, bilateral   4. Posterior vitreous detachment, right   5. Myopia with astigmatism and presbyopia, bilateral     Last edited by Emily Gonsalez MD on 9/15/2020  9:48 AM. (History)            Assessment /Plan     For exam results, see Encounter Report.    Primary open-angle glaucoma, bilateral, mild stage  -     [facility] (HRT) - OU - Both Eyes    Nuclear sclerosis, bilateral    Posterior vitreous detachment, right    Myopia with astigmatism and presbyopia, bilateral          1. Pre-perimetric glaucoma /OHT/ borderline glaucoma   -Followed at [facility] since 1991   -First HVF 1999   -First photos 1991   - Intolerant to all gtts - they aggravate his blepharitis-? RAGHU allergy   -IOP "OK" off gtts and s/p ALT ou and SLT od - 2008    Family history neg   Glaucoma meds none (( off gtts post SLT ou -))   H/O adverse rxn to glaucoma drops Intolerant to all gtts - 2/2 aggravates blepharitis- RAGHU allergy   LASERS ALT ou -? Date / SLT OD 7/17/08 - good response   GLAUCOMA SURGERIES none   OTHER EYE SURGERIES none   CDR 0.6/0.3   Tbase 19-26 / 16-21   Tmax 26/21   Ttarget ?   HVF 14 test - 1999 to 2020 - Full ou   Gonio +3 ou   /588   OCT 5 test 2005 to 2020 -  RNFL - OD:NL // OS:NL  HRT 9 test 2004 to 2020 -MR -  MR -  Dec T, border NI od // full os /// CDR 0.619 od // 0.508 os - but unreliable OD  Disc photos 1991, 1996, 2003 - slides // 2012 , 2016, 2020   - OIS     - Ttoday 15/17  - Test done today IOP/OCT/lens star IOL     2. Nuclear Sclerosis    VS - BATh 20/50 od // 20/60 os -    Pt C/O glare / nicola at night     3. Posterior Vitreous " Detachment OD - 11/2008   - RD PRECAUTIONS    4. Eyelid inflammation / Blepharitis / MGD    5. Allergic Conjunctivitis - RAGHU allergy     6. Myopia/Astigmatism/presbyopia     Plan   OHT-pre-perimetric glaucoma - intolerant to all gtts   IOP ok s/p ALT ou - years ago and s/p SLT OD 2008 ( no SLT os)  Continue to monitor HVF/DFE/OCT/HRT/photos/IOP   If increase IOP or progression on VF testing consider repeat SLT OD and primary SLT os      Pt is a myope - sph eq is -5.25 od and -4.25 os   - discuss IOL options (? Aim for emmetropia to -0.50 or keep pt more myopic at about -2.50 for near vision or a -1.50 for mid range)   Pt is use to wearing her glasses all the time - even to sleep   - pt uses the computer a lot - ? would like to be set for dist to mid range - ? About a -1.00 to -1.50 -so can do some computer work without glasses     IOL OD  PCB00 13.50 (-0.25 to -0.53)  Can re-work to a -1.50 for mid range - if she desires - re-discuss - pre-surgery     IOL OS   PCB00 15.0 ( -0.35 to -0.59)  Can rework to -1.50 for mid range - if she desires re-discuss - pre-surgery     Re-Peat IOL calc OU - use jessica and anne     PLAN   Pt is interested in cataract surgery now, OS then OD  Wants distance correction  Would target for -0.75  Combine with iStent inject for mild glaucoma    F/U for pre-op phaco/IOL with ? I stent (or other AD's such as kahook) - OD   Will aim for between - 0.50 to -1.00 - discuss further on pre-op visit

## 2020-09-15 NOTE — Clinical Note
Phaco/IOL - trypan - combined with a AD's procedure - I-stent (or if unable to do I-stent - larissa) phone - [phone]  October some time

## 2020-09-23 ENCOUNTER — TELEPHONE (OUTPATIENT)
Dept: OPHTHALMOLOGY | Facility: CLINIC | Age: 70
End: 2020-09-23

## 2020-09-23 DIAGNOSIS — H40.1131 PRIMARY OPEN-ANGLE GLAUCOMA, BILATERAL, MILD STAGE: Primary | ICD-10-CM

## 2020-09-23 DIAGNOSIS — Z13.9 SCREENING PROCEDURE: ICD-10-CM

## 2020-09-23 DIAGNOSIS — H25.11 NUCLEAR SCLEROTIC CATARACT OF RIGHT EYE: ICD-10-CM

## 2020-09-29 ENCOUNTER — PATIENT MESSAGE (OUTPATIENT)
Dept: OTHER | Facility: OTHER | Age: 70
End: 2020-09-29

## 2020-09-30 ENCOUNTER — TELEPHONE (OUTPATIENT)
Dept: PHYSICAL MEDICINE AND REHAB | Facility: CLINIC | Age: 70
End: 2020-09-30

## 2020-09-30 NOTE — TELEPHONE ENCOUNTER
----- Message from Alysia Emery sent at 9/30/2020 10:58 AM CDT -----  Patient states she cannot wait until March 2021 to see the Dr.   Pt states if unable to see Dr. Oliver sooner she wants to be seen by another  as soon as possible.

## 2020-09-30 NOTE — TELEPHONE ENCOUNTER
Spoke to the patient and was told she is in a lot of pain and wanted to see someone sooner than the appt she had. She is having hip and back pain. Said she saw someone in orthopedics and will try and get an appt with them.

## 2020-10-02 ENCOUNTER — TELEPHONE (OUTPATIENT)
Dept: FAMILY MEDICINE | Facility: CLINIC | Age: 70
End: 2020-10-02

## 2020-10-02 DIAGNOSIS — Z12.31 VISIT FOR SCREENING MAMMOGRAM: Primary | ICD-10-CM

## 2020-10-02 NOTE — TELEPHONE ENCOUNTER
----- Message from Clare Chirinos MA sent at 10/2/2020 11:15 AM CDT -----  Regarding: FW: orders  If I am correct it seems patient is not due until 8/2021 for next mammogram?  ----- Message -----  From: Mainor Espino  Sent: 10/2/2020  11:11 AM CDT  To: Flo Kramer Staff  Subject: orders                                           Type: Patient Call Back    Who called:Self    What is the request in detail:Patient called requesting that mammogram orders be placed for her to get done at [facility]    Can the clinic reply by [facility]? no    Would the patient rather a call back or a response via [facility]?   Callback    Best call back number:..[phone]

## 2020-10-02 NOTE — TELEPHONE ENCOUNTER
Called pt and informed that PCP placed mammogram orders. Pt states will call the appt center to schedule.

## 2020-10-05 ENCOUNTER — PATIENT MESSAGE (OUTPATIENT)
Dept: ADMINISTRATIVE | Facility: HOSPITAL | Age: 70
End: 2020-10-05

## 2020-10-07 ENCOUNTER — PATIENT OUTREACH (OUTPATIENT)
Dept: ADMINISTRATIVE | Facility: OTHER | Age: 70
End: 2020-10-07

## 2020-10-08 NOTE — PROGRESS NOTES
Patient's chart was reviewed for overdue JULIÁN topics.  Immunizations reconciled.    Orders placed:n/a  Tasked appts:n/a  Labs Linked:n/a

## 2020-10-09 ENCOUNTER — OFFICE VISIT (OUTPATIENT)
Dept: OPHTHALMOLOGY | Facility: CLINIC | Age: 70
End: 2020-10-09
Payer: MEDICARE

## 2020-10-09 DIAGNOSIS — H52.13 MYOPIA WITH ASTIGMATISM AND PRESBYOPIA, BILATERAL: ICD-10-CM

## 2020-10-09 DIAGNOSIS — H52.4 MYOPIA WITH ASTIGMATISM AND PRESBYOPIA, BILATERAL: ICD-10-CM

## 2020-10-09 DIAGNOSIS — H52.203 MYOPIA WITH ASTIGMATISM AND PRESBYOPIA, BILATERAL: ICD-10-CM

## 2020-10-09 DIAGNOSIS — H25.11 NUCLEAR SCLEROTIC CATARACT OF RIGHT EYE: Primary | ICD-10-CM

## 2020-10-09 DIAGNOSIS — H40.1131 PRIMARY OPEN-ANGLE GLAUCOMA, BILATERAL, MILD STAGE: ICD-10-CM

## 2020-10-09 DIAGNOSIS — H25.13 NUCLEAR SCLEROSIS, BILATERAL: ICD-10-CM

## 2020-10-09 PROCEDURE — 92136 OPHTHALMIC BIOMETRY: CPT | Mod: PBBFAC | Performed by: OPHTHALMOLOGY

## 2020-10-09 PROCEDURE — 99499 UNLISTED E&M SERVICE: CPT | Mod: S$PBB,,, | Performed by: OPHTHALMOLOGY

## 2020-10-09 PROCEDURE — 99999 PR PBB SHADOW E&M-EST. PATIENT-LVL III: ICD-10-PCS | Mod: PBBFAC,,, | Performed by: OPHTHALMOLOGY

## 2020-10-09 PROCEDURE — 99213 OFFICE O/P EST LOW 20 MIN: CPT | Mod: PBBFAC | Performed by: OPHTHALMOLOGY

## 2020-10-09 PROCEDURE — 99999 PR PBB SHADOW E&M-EST. PATIENT-LVL III: CPT | Mod: PBBFAC,,, | Performed by: OPHTHALMOLOGY

## 2020-10-09 PROCEDURE — 92136 IOL MASTER - OU - BOTH EYES: ICD-10-PCS | Mod: 26,S$PBB,RT, | Performed by: OPHTHALMOLOGY

## 2020-10-09 PROCEDURE — 99499 NO LOS: ICD-10-PCS | Mod: S$PBB,,, | Performed by: OPHTHALMOLOGY

## 2020-10-09 RX ORDER — PHENYLEPHRINE HYDROCHLORIDE 25 MG/ML
1 SOLUTION/ DROPS OPHTHALMIC
Status: CANCELLED | OUTPATIENT
Start: 2020-10-09

## 2020-10-09 RX ORDER — KETOROLAC TROMETHAMINE 5 MG/ML
1 SOLUTION OPHTHALMIC
Status: CANCELLED | OUTPATIENT
Start: 2020-10-09

## 2020-10-09 RX ORDER — TROPICAMIDE 10 MG/ML
1 SOLUTION/ DROPS OPHTHALMIC
Status: CANCELLED | OUTPATIENT
Start: 2020-10-09

## 2020-10-09 RX ORDER — MOXIFLOXACIN 5 MG/ML
1 SOLUTION/ DROPS OPHTHALMIC
Status: CANCELLED | OUTPATIENT
Start: 2020-10-09

## 2020-10-09 RX ORDER — SODIUM CHLORIDE 0.9 % (FLUSH) 0.9 %
10 SYRINGE (ML) INJECTION
Status: CANCELLED | OUTPATIENT
Start: 2020-10-09

## 2020-10-09 NOTE — H&P
Subjective:       Patient ID: Stephanie Sears is a 70 y.o. y.o. female.    Chief Complaint: Pre-op Exam    Past Medical History:   Diagnosis Date    Acute pancreatitis     Angina pectoris     Anxiety     Arthritis     Back pain     Bronchitis     Cataract     Cervical spondylosis with radiculopathy 7/10/2012    Chest pain     Chronic neck pain 7/26/2012    Controlled type 2 diabetes mellitus with stage 2 chronic kidney disease, with long-term current use of insulin 1/24/2020    Coronary artery disease     Depression     Fibrocystic breast     GERD (gastroesophageal reflux disease)     Glaucoma     Heart failure     Hyperlipidemia     Hypertension     Neck pain 7/10/2012    Obesity     JAM (obstructive sleep apnea)     Primary osteoarthritis of both knees     Psoriasis     Subacromial or subdeltoid bursitis 7/10/2012    Thyroid disease     Tobacco dependence     Trouble in sleeping     Type 2 diabetes mellitus 12/10/2013     Past Surgical History:   Procedure Laterality Date    BREAST LUMPECTOMY Left 1988    mass in the rt breast surgery  1988    BREAST MASS EXCISION Right     benign    CHOLECYSTECTOMY      COLONOSCOPY N/A 5/22/2019    Procedure: COLONOSCOPY;  Surgeon: Darrin Calvin MD;  Location: [facility] ([address] [place] [address]);  Service: Endoscopy;  Laterality: N/A;  2nd floor - all other procedure done on 2, multiple comorbidities - ERW/   change in MD schedule, Pt notified and verbalized understanding, new arrival time 0800, Ins mailed to Pt with new arrival time - ERW1/8/19@1130    HYSTERECTOMY  1980's    KNEE SURGERY Left 1-20-16    TKR    KNEE SURGERY Right 02/26/2018    TKR    L4-L5 fusion  2006             Review of Systems   All other systems reviewed and are negative.         Objective:      There were no vitals filed for this visit.  Physical Exam   Constitutional: Oriented to person, place, and time. She appears well-developed and well-nourished.   HENT: See clinic  note  Head: Normocephalic.   Skin: Warm and dry  Cardiovascular: Normal rate and regular rhythm.    Pulmonary/Chest: Effort normal.   Neurological: Oriented to person, place, and time.         Assessment:     1. Nuclear sclerotic cataract of right eye     2. Nuclear sclerosis, bilateral     3. Primary open-angle glaucoma, bilateral, mild stage     4. Myopia with astigmatism and presbyopia, bilateral            Plan:       1. Cataract extraction by phacoemulsification with implantation of intraocular lens,  OD   2. Possible insertion of iStent versus possible goniotomy by Kahook Dual Blade, right eye

## 2020-10-09 NOTE — H&P (VIEW-ONLY)
Subjective:       Patient ID: Stephanie Sears is a 70 y.o. y.o. female.    Chief Complaint: Pre-op Exam    Past Medical History:   Diagnosis Date    Acute pancreatitis     Angina pectoris     Anxiety     Arthritis     Back pain     Bronchitis     Cataract     Cervical spondylosis with radiculopathy 7/10/2012    Chest pain     Chronic neck pain 7/26/2012    Controlled type 2 diabetes mellitus with stage 2 chronic kidney disease, with long-term current use of insulin 1/24/2020    Coronary artery disease     Depression     Fibrocystic breast     GERD (gastroesophageal reflux disease)     Glaucoma     Heart failure     Hyperlipidemia     Hypertension     Neck pain 7/10/2012    Obesity     JAM (obstructive sleep apnea)     Primary osteoarthritis of both knees     Psoriasis     Subacromial or subdeltoid bursitis 7/10/2012    Thyroid disease     Tobacco dependence     Trouble in sleeping     Type 2 diabetes mellitus 12/10/2013     Past Surgical History:   Procedure Laterality Date    BREAST LUMPECTOMY Left 1988    mass in the rt breast surgery  1988    BREAST MASS EXCISION Right     benign    CHOLECYSTECTOMY      COLONOSCOPY N/A 5/22/2019    Procedure: COLONOSCOPY;  Surgeon: Darrin Calvin MD;  Location: [facility] ([address] [place] [address]);  Service: Endoscopy;  Laterality: N/A;  2nd floor - all other procedure done on 2, multiple comorbidities - ERW/   change in MD schedule, Pt notified and verbalized understanding, new arrival time 0800, Ins mailed to Pt with new arrival time - ERW1/8/19@1130    HYSTERECTOMY  1980's    KNEE SURGERY Left 1-20-16    TKR    KNEE SURGERY Right 02/26/2018    TKR    L4-L5 fusion  2006             Review of Systems   All other systems reviewed and are negative.         Objective:      There were no vitals filed for this visit.  Physical Exam   Constitutional: Oriented to person, place, and time. She appears well-developed and well-nourished.   HENT: See clinic  note  Head: Normocephalic.   Skin: Warm and dry  Cardiovascular: Normal rate and regular rhythm.    Pulmonary/Chest: Effort normal.   Neurological: Oriented to person, place, and time.         Assessment:     1. Nuclear sclerotic cataract of right eye     2. Nuclear sclerosis, bilateral     3. Primary open-angle glaucoma, bilateral, mild stage     4. Myopia with astigmatism and presbyopia, bilateral            Plan:       1. Cataract extraction by phacoemulsification with implantation of intraocular lens,  OD   2. Possible insertion of iStent versus possible goniotomy by Kahook Dual Blade, right eye

## 2020-10-09 NOTE — PROGRESS NOTES
"HPI     DLS: 9/15/20    Pt here for Pre-Op phaco w/IOL OD- (Surgery is 10/21/20)    Meds:  No GTTS     1. POAG - ou - mild vs suspect with OHT   2. Nuclear sclerosis, bilateral   3. Posterior vitreous detachment, right   4. Myopia with astigmatism and presbyopia, bilateral     Last edited by Melany Tse MD on 10/9/2020  5:14 PM. (History)            Assessment /Plan     For exam results, see Encounter Report.    Nuclear sclerotic cataract of right eye    Nuclear sclerosis, bilateral    Primary open-angle glaucoma, bilateral, mild stage    Myopia with astigmatism and presbyopia, bilateral          1. Pre-perimetric glaucoma /OHT/ borderline glaucoma   -Followed at [facility] since 1991   -First HVF 1999   -First photos 1991   - Intolerant to all gtts - they aggravate his blepharitis-? RAGHU allergy   -IOP "OK" off gtts and s/p ALT ou and SLT od - 2008    Family history neg   Glaucoma meds none (( off gtts post SLT ou -))   H/O adverse rxn to glaucoma drops Intolerant to all gtts - 2/2 aggravates blepharitis- RAGHU allergy   LASERS ALT ou -? Date / SLT OD 7/17/08 - good response   GLAUCOMA SURGERIES none   OTHER EYE SURGERIES none   CDR 0.6/0.3   Tbase 19-26 / 16-21   Tmax 26/21   Ttarget ?   HVF 14 test - 1999 to 2020 - Full ou   Gonio +3 ou   /588   OCT 5 test 2005 to 2020 -  RNFL - OD:NL // OS:NL  HRT 9 test 2004 to 2020 -MR - MR -  Dec T, border NI od // full os /// CDR 0.619 od // 0.508 os - but unreliable OD  Disc photos 1991, 1996, 2003 - slides // 2012 , 2016, 2020   - OIS     - Ttoday 16/17   - Test done today IOP/OCT/lens star IOL - pre-op phaco.IOL - istent OD     2. Nuclear Sclerosis    VS - BATh 20/50 od // 20/60 os -    Pt C/O glare / nicola at night     3. Posterior Vitreous Detachment OD - 11/2008   - RD PRECAUTIONS    4. Eyelid inflammation / Blepharitis / MGD    5. Allergic Conjunctivitis - RAGHU allergy     6. Myopia/Astigmatism/presbyopia     Plan   OHT-pre-perimetric glaucoma - intolerant to " all gtts   IOP ok s/p ALT ou - years ago and s/p SLT OD 2008 ( no SLT os)  Continue to monitor HVF/DFE/OCT/HRT/photos/IOP   If increase IOP or progression on VF testing consider repeat SLT OD and primary SLT os      Pt is a myope - sph eq is -5.25 od and -4.25 os   - discuss IOL options (? Aim for emmetropia to -0.50 or keep pt more myopic at about -2.50 for near vision or a -1.50 for mid range)   Pt is use to wearing her glasses all the time - even to sleep   - pt uses the computer a lot - ? would like to be set for dist to mid range - ? About a -1.00 to -1.50 -so can do some computer work without glasses     IOL OD -pt want to have IOL for distance vision - is no longer uing a computer much - has retired   PCB00  14.0  (-0.7 to -0.81)  AC IOL 11.5    IOL OS   PCB00 15.0 ( -0.35 to -0.59)  AC IOL - 12.5       PLAN   Pt is interested in cataract surgery  - both are similar - will do right eye first   Wants distance correction  Would target for -0.50 to -0.75   Combine with iStent inject for mild glaucoma    Risks and benefits discussed and consent signed.

## 2020-10-14 ENCOUNTER — TELEPHONE (OUTPATIENT)
Dept: FAMILY MEDICINE | Facility: CLINIC | Age: 70
End: 2020-10-14

## 2020-10-14 DIAGNOSIS — Z12.31 BREAST CANCER SCREENING BY MAMMOGRAM: Primary | ICD-10-CM

## 2020-10-14 NOTE — TELEPHONE ENCOUNTER
----- Message from Balbina Richardson sent at 10/14/2020 12:12 PM CDT -----  Regarding: Pt  Reason: Pt missed mammo appt on 10/13 and the order is no longer available. Please put in another order       Communication: [phone]

## 2020-10-14 NOTE — TELEPHONE ENCOUNTER
Called pt and informed mammogram orders placed per NP. Pt states will call appt center to schedule.

## 2020-10-15 ENCOUNTER — OFFICE VISIT (OUTPATIENT)
Dept: ORTHOPEDICS | Facility: CLINIC | Age: 70
End: 2020-10-15
Payer: MEDICARE

## 2020-10-15 VITALS
OXYGEN SATURATION: 98 % | HEART RATE: 60 BPM | DIASTOLIC BLOOD PRESSURE: 84 MMHG | SYSTOLIC BLOOD PRESSURE: 160 MMHG | RESPIRATION RATE: 18 BRPM | BODY MASS INDEX: 35.51 KG/M2 | HEIGHT: 62 IN | WEIGHT: 193 LBS

## 2020-10-15 DIAGNOSIS — M54.50 LUMBAR PAIN: Primary | ICD-10-CM

## 2020-10-15 DIAGNOSIS — M70.62 GREATER TROCHANTERIC BURSITIS OF BOTH HIPS: ICD-10-CM

## 2020-10-15 DIAGNOSIS — M25.559 HIP PAIN: Primary | ICD-10-CM

## 2020-10-15 DIAGNOSIS — M70.61 GREATER TROCHANTERIC BURSITIS OF BOTH HIPS: ICD-10-CM

## 2020-10-15 PROCEDURE — 99213 PR OFFICE/OUTPT VISIT, EST, LEVL III, 20-29 MIN: ICD-10-PCS | Mod: S$PBB,,, | Performed by: ORTHOPAEDIC SURGERY

## 2020-10-15 PROCEDURE — 99213 OFFICE O/P EST LOW 20 MIN: CPT | Mod: S$PBB,,, | Performed by: ORTHOPAEDIC SURGERY

## 2020-10-15 PROCEDURE — 99999 PR PBB SHADOW E&M-EST. PATIENT-LVL IV: ICD-10-PCS | Mod: PBBFAC,,, | Performed by: ORTHOPAEDIC SURGERY

## 2020-10-15 PROCEDURE — 99999 PR PBB SHADOW E&M-EST. PATIENT-LVL IV: CPT | Mod: PBBFAC,,, | Performed by: ORTHOPAEDIC SURGERY

## 2020-10-15 PROCEDURE — 99214 OFFICE O/P EST MOD 30 MIN: CPT | Mod: PBBFAC,25,PN | Performed by: ORTHOPAEDIC SURGERY

## 2020-10-15 NOTE — PROGRESS NOTES
"Follow up visit    History of Present Illness:   Stephanie comes to the office for follow up evaluation of bilateral groin pain - underwent injections for B greater troch bursitis in July approximately 3 months ago.   She reports that she got relief of pain for about 2 months. Has progressively worsened. She is not complaining of back pain.  She thinks this is her S1 nerve root. She had L4-L5 fusion in 2005 before Judy. No numbness or tingling. No weakness, bowel or bladder changes.     Hip pain has not recurred    Her nephrologist does not want her to take NSAIDs long term - has an Rx of meloxicam from Dr Rossi. She has done fine in the past with taking it intermittently     ROS: unremarkable and no change since last visit    Physical Examination:    Vitals:    10/15/20 0809   BP: (!) 160/84   Pulse: 60   Resp: 18   SpO2: 98%   Weight: 87.5 kg (193 lb)   Height: 5' 2" (1.575 m)   PainSc:   8   PainLoc: Hip        NAD  Left and Right hip   Non tender over GT   Non tender over PSIS   Neg SLR  Ltsi s/s/sp/dp/t  + ehl/fhl/ta/gs  2+ DP        Radiographic imaging: no new imaging, Previous L spine images from 2019 show healed L4- L5 fusion. Degenerative changes at L5-S1    Assessment/Plan:  70 y.o. 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  1. Referred to pain management. Has been followed by PM&R but her appt keeps getting pushed back   2. RTC PRN    All questions were answered in detail. The patient  verbalized the understanding of the treatment plan and is in full agreement with the treatment plan.    "

## 2020-10-19 ENCOUNTER — LAB VISIT (OUTPATIENT)
Dept: FAMILY MEDICINE | Facility: CLINIC | Age: 70
End: 2020-10-19
Payer: MEDICARE

## 2020-10-19 DIAGNOSIS — Z13.9 SCREENING PROCEDURE: ICD-10-CM

## 2020-10-19 LAB — SARS-COV-2 RNA RESP QL NAA+PROBE: NOT DETECTED

## 2020-10-19 PROCEDURE — U0003 INFECTIOUS AGENT DETECTION BY NUCLEIC ACID (DNA OR RNA); SEVERE ACUTE RESPIRATORY SYNDROME CORONAVIRUS 2 (SARS-COV-2) (CORONAVIRUS DISEASE [COVID-19]), AMPLIFIED PROBE TECHNIQUE, MAKING USE OF HIGH THROUGHPUT TECHNOLOGIES AS DESCRIBED BY CMS-2020-01-R: HCPCS

## 2020-10-20 ENCOUNTER — TELEPHONE (OUTPATIENT)
Dept: OPTOMETRY | Facility: CLINIC | Age: 70
End: 2020-10-20

## 2020-10-21 ENCOUNTER — ANESTHESIA EVENT (OUTPATIENT)
Dept: SURGERY | Facility: HOSPITAL | Age: 70
End: 2020-10-21
Payer: MEDICARE

## 2020-10-21 ENCOUNTER — HOSPITAL ENCOUNTER (OUTPATIENT)
Facility: HOSPITAL | Age: 70
Discharge: HOME | End: 2020-10-21
Attending: OPHTHALMOLOGY | Admitting: OPHTHALMOLOGY
Payer: MEDICARE

## 2020-10-21 ENCOUNTER — ANESTHESIA (OUTPATIENT)
Dept: SURGERY | Facility: HOSPITAL | Age: 70
End: 2020-10-21
Payer: MEDICARE

## 2020-10-21 VITALS
SYSTOLIC BLOOD PRESSURE: 142 MMHG | TEMPERATURE: 98 F | HEART RATE: 68 BPM | WEIGHT: 193 LBS | HEIGHT: 62 IN | RESPIRATION RATE: 16 BRPM | BODY MASS INDEX: 35.51 KG/M2 | OXYGEN SATURATION: 100 % | DIASTOLIC BLOOD PRESSURE: 75 MMHG

## 2020-10-21 DIAGNOSIS — H40.1131 PRIMARY OPEN-ANGLE GLAUCOMA, BILATERAL, MILD STAGE: Primary | ICD-10-CM

## 2020-10-21 DIAGNOSIS — H25.11 NUCLEAR SCLEROTIC CATARACT OF RIGHT EYE: ICD-10-CM

## 2020-10-21 LAB
POCT GLUCOSE: 102 MG/DL (ref 70–110)
POCT GLUCOSE: 103 MG/DL (ref 70–110)

## 2020-10-21 PROCEDURE — D9220A PRA ANESTHESIA: ICD-10-PCS | Mod: CRNA,,, | Performed by: NURSE ANESTHETIST, CERTIFIED REGISTERED

## 2020-10-21 PROCEDURE — 0191T PR INSERT ANT SEGMENT DRAIN WO RESERVOIR, INTERNAL APPR, INTO TRABECULAR: ICD-10-PCS | Mod: RT,,, | Performed by: OPHTHALMOLOGY

## 2020-10-21 PROCEDURE — 63600175 PHARM REV CODE 636 W HCPCS: Performed by: OPHTHALMOLOGY

## 2020-10-21 PROCEDURE — C1783 OCULAR IMP, AQUEOUS DRAIN DE: HCPCS | Performed by: OPHTHALMOLOGY

## 2020-10-21 PROCEDURE — 82962 GLUCOSE BLOOD TEST: CPT | Performed by: OPHTHALMOLOGY

## 2020-10-21 PROCEDURE — 0191T PR INSERT ANT SEGMENT DRAIN WO RESERVOIR, INTERNAL APPR, INTO TRABECULAR: CPT | Mod: RT,,, | Performed by: OPHTHALMOLOGY

## 2020-10-21 PROCEDURE — 71000015 HC POSTOP RECOV 1ST HR: Performed by: OPHTHALMOLOGY

## 2020-10-21 PROCEDURE — 82962 GLUCOSE BLOOD TEST: CPT | Mod: 91 | Performed by: OPHTHALMOLOGY

## 2020-10-21 PROCEDURE — D9220A PRA ANESTHESIA: Mod: CRNA,,, | Performed by: NURSE ANESTHETIST, CERTIFIED REGISTERED

## 2020-10-21 PROCEDURE — 25000003 PHARM REV CODE 250: Performed by: NURSE ANESTHETIST, CERTIFIED REGISTERED

## 2020-10-21 PROCEDURE — 25000003 PHARM REV CODE 250

## 2020-10-21 PROCEDURE — V2632 POST CHMBR INTRAOCULAR LENS: HCPCS | Performed by: OPHTHALMOLOGY

## 2020-10-21 PROCEDURE — 25000003 PHARM REV CODE 250: Performed by: OPHTHALMOLOGY

## 2020-10-21 PROCEDURE — 25000003 PHARM REV CODE 250: Performed by: STUDENT IN AN ORGANIZED HEALTH CARE EDUCATION/TRAINING PROGRAM

## 2020-10-21 PROCEDURE — 71000044 HC DOSC ROUTINE RECOVERY FIRST HOUR: Performed by: OPHTHALMOLOGY

## 2020-10-21 PROCEDURE — 27201423 OPTIME MED/SURG SUP & DEVICES STERILE SUPPLY: Performed by: OPHTHALMOLOGY

## 2020-10-21 PROCEDURE — 37000009 HC ANESTHESIA EA ADD 15 MINS: Performed by: OPHTHALMOLOGY

## 2020-10-21 PROCEDURE — 36000707: Performed by: OPHTHALMOLOGY

## 2020-10-21 PROCEDURE — 63600175 PHARM REV CODE 636 W HCPCS: Performed by: NURSE ANESTHETIST, CERTIFIED REGISTERED

## 2020-10-21 PROCEDURE — 37000008 HC ANESTHESIA 1ST 15 MINUTES: Performed by: OPHTHALMOLOGY

## 2020-10-21 PROCEDURE — D9220A PRA ANESTHESIA: Mod: ANES,,, | Performed by: ANESTHESIOLOGY

## 2020-10-21 PROCEDURE — 66982 XCAPSL CTRC RMVL CPLX WO ECP: CPT | Mod: RT,,, | Performed by: OPHTHALMOLOGY

## 2020-10-21 PROCEDURE — 25000242 PHARM REV CODE 250 ALT 637 W/ HCPCS: Performed by: NURSE ANESTHETIST, CERTIFIED REGISTERED

## 2020-10-21 PROCEDURE — 66982 PR REMOVAL, CATARACT, W/INSRT INTRAOC LENS, W/O ENDO CYCLO, CMPLX: ICD-10-PCS | Mod: RT,,, | Performed by: OPHTHALMOLOGY

## 2020-10-21 PROCEDURE — D9220A PRA ANESTHESIA: ICD-10-PCS | Mod: ANES,,, | Performed by: ANESTHESIOLOGY

## 2020-10-21 PROCEDURE — 36000706: Performed by: OPHTHALMOLOGY

## 2020-10-21 DEVICE — LENS IOL ITEC PRELOAD 14.0D: Type: IMPLANTABLE DEVICE | Site: EYE | Status: FUNCTIONAL

## 2020-10-21 DEVICE — SYS INJ ISTENT TRAEBECULAR: Type: IMPLANTABLE DEVICE | Site: EYE | Status: FUNCTIONAL

## 2020-10-21 RX ORDER — ACETAMINOPHEN 325 MG/1
650 TABLET ORAL EVERY 6 HOURS PRN
Status: DISCONTINUED | OUTPATIENT
Start: 2020-10-21 | End: 2020-10-21 | Stop reason: HOSPADM

## 2020-10-21 RX ORDER — LIDOCAINE HYDROCHLORIDE 20 MG/ML
JELLY TOPICAL
Status: DISCONTINUED
Start: 2020-10-21 | End: 2020-10-21 | Stop reason: HOSPADM

## 2020-10-21 RX ORDER — SUCCINYLCHOLINE CHLORIDE 20 MG/ML
INJECTION INTRAMUSCULAR; INTRAVENOUS
Status: DISCONTINUED | OUTPATIENT
Start: 2020-10-21 | End: 2020-10-21

## 2020-10-21 RX ORDER — ONDANSETRON 2 MG/ML
INJECTION INTRAMUSCULAR; INTRAVENOUS
Status: DISCONTINUED | OUTPATIENT
Start: 2020-10-21 | End: 2020-10-21

## 2020-10-21 RX ORDER — EPHEDRINE SULFATE 50 MG/ML
INJECTION, SOLUTION INTRAVENOUS
Status: DISCONTINUED | OUTPATIENT
Start: 2020-10-21 | End: 2020-10-21

## 2020-10-21 RX ORDER — PREDNISOLONE ACETATE 10 MG/ML
SUSPENSION/ DROPS OPHTHALMIC
Status: DISCONTINUED
Start: 2020-10-21 | End: 2020-10-21 | Stop reason: HOSPADM

## 2020-10-21 RX ORDER — PHENYLEPHRINE HYDROCHLORIDE 10 MG/ML
INJECTION INTRAVENOUS
Status: DISCONTINUED | OUTPATIENT
Start: 2020-10-21 | End: 2020-10-21

## 2020-10-21 RX ORDER — PHENYLEPHRINE HYDROCHLORIDE 25 MG/ML
1 SOLUTION/ DROPS OPHTHALMIC
Status: DISCONTINUED | OUTPATIENT
Start: 2020-10-21 | End: 2020-10-21 | Stop reason: HOSPADM

## 2020-10-21 RX ORDER — MOXIFLOXACIN 5 MG/ML
SOLUTION/ DROPS OPHTHALMIC
Status: DISCONTINUED | OUTPATIENT
Start: 2020-10-21 | End: 2020-10-21 | Stop reason: HOSPADM

## 2020-10-21 RX ORDER — LIDOCAINE HYDROCHLORIDE 20 MG/ML
INJECTION INTRAVENOUS
Status: DISCONTINUED | OUTPATIENT
Start: 2020-10-21 | End: 2020-10-21

## 2020-10-21 RX ORDER — ALBUTEROL SULFATE 90 UG/1
AEROSOL, METERED RESPIRATORY (INHALATION)
Status: DISCONTINUED | OUTPATIENT
Start: 2020-10-21 | End: 2020-10-21

## 2020-10-21 RX ORDER — SODIUM CHLORIDE 0.9 % (FLUSH) 0.9 %
10 SYRINGE (ML) INJECTION
Status: DISCONTINUED | OUTPATIENT
Start: 2020-10-21 | End: 2020-10-21 | Stop reason: HOSPADM

## 2020-10-21 RX ORDER — TROPICAMIDE 10 MG/ML
1 SOLUTION/ DROPS OPHTHALMIC
Status: DISCONTINUED | OUTPATIENT
Start: 2020-10-21 | End: 2020-10-21 | Stop reason: HOSPADM

## 2020-10-21 RX ORDER — PREDNISOLONE ACETATE 10 MG/ML
SUSPENSION/ DROPS OPHTHALMIC
Status: DISCONTINUED | OUTPATIENT
Start: 2020-10-21 | End: 2020-10-21 | Stop reason: HOSPADM

## 2020-10-21 RX ORDER — LIDOCAINE HYDROCHLORIDE 40 MG/ML
INJECTION, SOLUTION RETROBULBAR
Status: DISCONTINUED
Start: 2020-10-21 | End: 2020-10-21 | Stop reason: HOSPADM

## 2020-10-21 RX ORDER — LIDOCAINE HYDROCHLORIDE 40 MG/ML
INJECTION, SOLUTION RETROBULBAR
Status: DISCONTINUED | OUTPATIENT
Start: 2020-10-21 | End: 2020-10-21 | Stop reason: HOSPADM

## 2020-10-21 RX ORDER — PROPOFOL 10 MG/ML
VIAL (ML) INTRAVENOUS
Status: DISCONTINUED | OUTPATIENT
Start: 2020-10-21 | End: 2020-10-21

## 2020-10-21 RX ORDER — KETOROLAC TROMETHAMINE 5 MG/ML
1 SOLUTION OPHTHALMIC
Status: DISCONTINUED | OUTPATIENT
Start: 2020-10-21 | End: 2020-10-21 | Stop reason: HOSPADM

## 2020-10-21 RX ORDER — LIDOCAINE HYDROCHLORIDE 10 MG/ML
INJECTION, SOLUTION EPIDURAL; INFILTRATION; INTRACAUDAL; PERINEURAL
Status: DISCONTINUED
Start: 2020-10-21 | End: 2020-10-21 | Stop reason: HOSPADM

## 2020-10-21 RX ORDER — ROCURONIUM BROMIDE 10 MG/ML
INJECTION, SOLUTION INTRAVENOUS
Status: DISCONTINUED | OUTPATIENT
Start: 2020-10-21 | End: 2020-10-21

## 2020-10-21 RX ORDER — LIDOCAINE HYDROCHLORIDE 10 MG/ML
1 INJECTION, SOLUTION EPIDURAL; INFILTRATION; INTRACAUDAL; PERINEURAL ONCE
Status: COMPLETED | OUTPATIENT
Start: 2020-10-21 | End: 2020-10-21

## 2020-10-21 RX ORDER — FENTANYL CITRATE 50 UG/ML
INJECTION, SOLUTION INTRAMUSCULAR; INTRAVENOUS
Status: DISCONTINUED | OUTPATIENT
Start: 2020-10-21 | End: 2020-10-21

## 2020-10-21 RX ORDER — DEXAMETHASONE SODIUM PHOSPHATE 4 MG/ML
INJECTION, SOLUTION INTRA-ARTICULAR; INTRALESIONAL; INTRAMUSCULAR; INTRAVENOUS; SOFT TISSUE
Status: DISCONTINUED | OUTPATIENT
Start: 2020-10-21 | End: 2020-10-21

## 2020-10-21 RX ORDER — MOXIFLOXACIN 5 MG/ML
1 SOLUTION/ DROPS OPHTHALMIC
Status: DISCONTINUED | OUTPATIENT
Start: 2020-10-21 | End: 2020-10-21 | Stop reason: HOSPADM

## 2020-10-21 RX ORDER — SODIUM CHLORIDE 9 MG/ML
INJECTION, SOLUTION INTRAVENOUS CONTINUOUS
Status: DISCONTINUED | OUTPATIENT
Start: 2020-10-21 | End: 2020-10-21 | Stop reason: HOSPADM

## 2020-10-21 RX ADMIN — PHENYLEPHRINE HYDROCHLORIDE 100 MCG: 10 INJECTION INTRAVENOUS at 11:10

## 2020-10-21 RX ADMIN — MOXIFLOXACIN 1 DROP: 5 SOLUTION/ DROPS OPHTHALMIC at 09:10

## 2020-10-21 RX ADMIN — FENTANYL CITRATE 100 MCG: 50 INJECTION, SOLUTION INTRAMUSCULAR; INTRAVENOUS at 10:10

## 2020-10-21 RX ADMIN — PHENYLEPHRINE HYDROCHLORIDE 1 DROP: 25 SOLUTION/ DROPS OPHTHALMIC at 09:10

## 2020-10-21 RX ADMIN — ALBUTEROL SULFATE 4 PUFF: 90 AEROSOL, METERED RESPIRATORY (INHALATION) at 12:10

## 2020-10-21 RX ADMIN — SODIUM CHLORIDE: 0.9 INJECTION, SOLUTION INTRAVENOUS at 09:10

## 2020-10-21 RX ADMIN — ROCURONIUM BROMIDE 30 MG: 10 INJECTION, SOLUTION INTRAVENOUS at 11:10

## 2020-10-21 RX ADMIN — KETOROLAC TROMETHAMINE 1 DROP: 5 SOLUTION/ DROPS OPHTHALMIC at 09:10

## 2020-10-21 RX ADMIN — DEXAMETHASONE SODIUM PHOSPHATE 8 MG: 4 INJECTION, SOLUTION INTRAMUSCULAR; INTRAVENOUS at 11:10

## 2020-10-21 RX ADMIN — LIDOCAINE HYDROCHLORIDE 10 MG: 10 INJECTION, SOLUTION EPIDURAL; INFILTRATION; INTRACAUDAL at 09:10

## 2020-10-21 RX ADMIN — PROPOFOL 120 MG: 10 INJECTION, EMULSION INTRAVENOUS at 10:10

## 2020-10-21 RX ADMIN — TROPICAMIDE 1 DROP: 10 SOLUTION/ DROPS OPHTHALMIC at 09:10

## 2020-10-21 RX ADMIN — ROCURONIUM BROMIDE 5 MG: 10 INJECTION, SOLUTION INTRAVENOUS at 10:10

## 2020-10-21 RX ADMIN — LIDOCAINE HYDROCHLORIDE 100 MG: 20 INJECTION, SOLUTION INTRAVENOUS at 10:10

## 2020-10-21 RX ADMIN — SUCCINYLCHOLINE CHLORIDE 120 MG: 20 INJECTION, SOLUTION INTRAMUSCULAR; INTRAVENOUS at 10:10

## 2020-10-21 RX ADMIN — SUGAMMADEX 200 MG: 100 INJECTION, SOLUTION INTRAVENOUS at 11:10

## 2020-10-21 RX ADMIN — EPHEDRINE SULFATE 5 MG: 50 INJECTION INTRAVENOUS at 11:10

## 2020-10-21 RX ADMIN — ONDANSETRON 4 MG: 2 INJECTION, SOLUTION INTRAMUSCULAR; INTRAVENOUS at 11:10

## 2020-10-21 NOTE — TRANSFER OF CARE
"Anesthesia Transfer of Care Note    Patient: Stephanie Sears    Procedure(s) Performed: Procedure(s) (LRB):  PHACOEMULSIFICATION, CATARACT (Right)  INSERTION, IOL PROSTHESIS (Right)  INSERTION, DEVICE, FOR GLAUCOMA/ i Stent (Right)    Patient location: PACU    Anesthesia Type: general    Transport from OR: Transported from OR on 6-10 L/min O2 by face mask with adequate spontaneous ventilation    Post pain: adequate analgesia    Post assessment: no apparent anesthetic complications and tolerated procedure well    Post vital signs: stable    Level of consciousness: alert, awake and oriented    Nausea/Vomiting: no nausea/vomiting    Complications: none    Transfer of care protocol was followed      Last vitals:   Visit Vitals  BP (!) 141/67 (BP Location: Left arm, Patient Position: Lying)   Pulse 67   Temp 36.5 °C (97.7 °F) (Skin)   Resp 18   Ht 5' 2" (1.575 m)   Wt 87.5 kg (193 lb)   SpO2 99%   Breastfeeding No   BMI 35.30 kg/m²     "

## 2020-10-21 NOTE — ANESTHESIA PREPROCEDURE EVALUATION
10/21/2020  Stephanie Sears is a 70 y.o., female.  Pre-operative evaluation for Procedure(s) (LRB):  PHACOEMULSIFICATION, CATARACT (Right)  INSERTION, IOL PROSTHESIS (Right)  INSERTION, DEVICE, FOR GLAUCOMA/ i Stent (Right)    Stephanie Sears is a 70 y.o. female     Patient Active Problem List   Diagnosis    GERD (gastroesophageal reflux disease)    Primary osteoarthritis of both knees    Cervical spondylosis with radiculopathy    Open angle with borderline findings, low risk - Both Eyes    Nontoxic uninodular goiter    Myopia with astigmatism and presbyopia - Both Eyes    Diet-controlled diabetes mellitus    Asymptomatic proteinuria    DJD (degenerative joint disease) of lumbar spine, mild    Osteoarthritis of left hip, mild    DJD (degenerative joint disease) of cervical spine    Obesity    Essential hypertension    Severe obesity (BMI 35.0-39.9) with comorbidity    Tobacco abuse    H/O scarlet fever    Aortic valve sclerosis    Pulmonary hypertension    Diastolic dysfunction    Status post total knee replacement, left 1/20/2016    CMC arthritis    Chronic midline thoracic back pain    Primary osteoarthritis of right knee    Hand pain, left    JAM (obstructive sleep apnea)    Personal history of spine surgery    Fatty liver    UTI (urinary tract infection)    Status post total right knee replacement 2/26/2018    Dyslipidemia    Preoperative cardiovascular examination    Bilateral carotid bruits    Atherosclerosis of aortic arch    Insomnia    Lichen planus    History of colon polyps    Acute pancreatitis without infection or necrosis    Arthritis of carpometacarpal (CMC) joint of left thumb    Greater trochanteric bursitis of both hips    Controlled type 2 diabetes mellitus with stage 2 chronic kidney disease, with long-term current use of insulin     "Hypoalbuminemia due to protein-calorie malnutrition       Review of patient's allergies indicates:   Allergen Reactions    Hydrocodone Shortness Of Breath    Iodinated contrast media Shortness Of Breath     Difficulty breathing    Adhesive Itching     Skin peeling    Oxycodone      Hallucinations    Sulfa (sulfonamide antibiotics) Hives and Itching    Morphine      Patient says it will make her "hallucinate"        No current facility-administered medications on file prior to encounter.      Current Outpatient Medications on File Prior to Encounter   Medication Sig Dispense Refill    albuterol (PROAIR HFA) 90 mcg/actuation inhaler Inhale 2 puffs into the lungs every 6 (six) hours as needed for Wheezing or Shortness of Breath. 1 Inhaler 3    albuterol (VENTOLIN HFA) 90 mcg/actuation inhaler Inhale 2 puffs into the lungs every 4 (four) hours as needed for Wheezing or Shortness of Breath. 36 g 3    amlodipine-benazepril (LOTREL) 10-40 mg per capsule TAKE 1 CAPSULE DAILY 90 capsule 4    atorvastatin (LIPITOR) 20 MG tablet TAKE 1 TABLET DAILY 90 tablet 4    blood sugar diagnostic (ACCU-CHEK JOSE G PLUS TEST STRP) Strp Inject 1 each into the skin 2 (two) times daily. 200 each 11    blood sugar diagnostic Strp Use to test blood glucose levels 2 times per day as instructed. 200 strip 11    CALCIUM CARBONATE (TUMS ORAL) Take by mouth as needed (acid reflux, indigestion).      carvedilol (COREG) 12.5 MG tablet TAKE 1 TABLET TWICE A DAY WITH MEALS 180 tablet 4    diphenhydrAMINE (BENADRYL) 25 mg capsule Take 25 mg by mouth every 6 (six) hours as needed for Itching or Allergies.      fluticasone (FLONASE) 50 mcg/actuation nasal spray 1 spray by Each Nare route 2 (two) times daily. 1 Bottle 1    gabapentin (NEURONTIN) 600 MG tablet TAKE 1 TABLET THREE TIMES A  tablet 3    INULIN (FIBER GUMMIES ORAL) Take by mouth every morning.       lancets (ACCU-CHEK MULTICLIX LANCET) Misc Test blood sugar twice daily " 300 each 3    lancets (ONETOUCH DELICA LANCETS) 33 gauge Misc Inject 1 lancet into the skin 2 (two) times daily before meals. 200 each 11    nicotine (NICODERM CQ) 21 mg/24 hr Place 1 patch onto the skin once daily. 28 patch 2    nicotine polacrilex 2 MG Lozg 1-2 per hour in place of a cigarette. Limit to 20 a day - oral. 162 lozenge 1    traMADol (ULTRAM) 50 mg tablet Take 1 tablet (50 mg total) by mouth every 8 (eight) hours as needed. (Patient not taking: Reported on 8/28/2020) 90 tablet 1    triamcinolone acetonide 0.1% (KENALOG) 0.1 % ointment KENDRA AA ON THE SKIN BID ON RASH ON LEGS  0       Past Surgical History:   Procedure Laterality Date    BREAST LUMPECTOMY Left 1988    mass in the rt breast surgery  1988    BREAST MASS EXCISION Right     benign    CHOLECYSTECTOMY      COLONOSCOPY N/A 5/22/2019    Procedure: COLONOSCOPY;  Surgeon: Darrin Calvin MD;  Location: [facility] ([address] [place] [address]);  Service: Endoscopy;  Laterality: N/A;  2nd floor - all other procedure done on 2, multiple comorbidities - ERW/   change in MD schedule, Pt notified and verbalized understanding, new arrival time 0800, Ins mailed to Pt with new arrival time - ERW1/8/19@1130    HYSTERECTOMY  1980's    KNEE SURGERY Left 1-20-16    TKR    KNEE SURGERY Right 02/26/2018    TKR    L4-L5 fusion  2006       Social History     Socioeconomic History    Marital status: Single     Spouse name: Not on file    Number of children: Not on file    Years of education: Not on file    Highest education level: Not on file   Occupational History    Not on file   Social Needs    Financial resource strain: Not on file    Food insecurity     Worry: Not on file     Inability: Not on file    Transportation needs     Medical: Not on file     Non-medical: Not on file   Tobacco Use    Smoking status: Current Every Day Smoker     Packs/day: 1.00     Years: 40.00     Pack years: 40.00     Types: Cigarettes    Smokeless tobacco: Never Used   Substance  and Sexual Activity    Alcohol use: No     Alcohol/week: 0.0 standard drinks    Drug use: No    Sexual activity: Yes     Partners: Male   Lifestyle    Physical activity     Days per week: Not on file     Minutes per session: Not on file    Stress: Not on file   Relationships    Social connections     Talks on phone: Not on file     Gets together: Not on file     Attends Sikh service: Not on file     Active member of club or organization: Not on file     Attends meetings of clubs or organizations: Not on file     Relationship status: Not on file   Other Topics Concern    Are you pregnant or think you may be? Not Asked    Breast-feeding Not Asked   Social History Narrative    Not on file           2D Echo:  Results for orders placed or performed during the hospital encounter of 01/13/16   Echo doppler color flow   Result Value Ref Range    QEF 65 55 - 65    Diastolic Dysfunction Yes (A)     Aortic Valve Stenosis MILD (A)     Est. PA Systolic Pressure 39     Tricuspid Valve Regurgitation MILD          Anesthesia Evaluation    I have reviewed the Patient Summary Reports.    I have reviewed the Nursing Notes.    I have reviewed the Medications.     Review of Systems  Anesthesia Hx:  No problems with previous Anesthesia  History of prior surgery of interest to airway management or planning: Denies Family Hx of Anesthesia complications.   Denies Personal Hx of Anesthesia complications.   Social:  Smoker    Hematology/Oncology:  Hematology Normal   Oncology Normal     EENT/Dental:EENT/Dental Normal   Cardiovascular:   Hypertension, poorly controlled Valvular problems/Murmurs, AS CAD   Angina    Pulmonary:  Pulmonary Normal    Renal/:   Chronic Renal Disease    Hepatic/GI:  Hepatic/GI Normal    Musculoskeletal:   Arthritis     Neurological:  Neurology Normal    Endocrine:   Diabetes    Psych:  Psychiatric Normal           Physical Exam  General:  Well nourished, Obesity    Airway/Jaw/Neck:  Airway Findings:  Mouth Opening: Normal Tongue: Normal  General Airway Assessment: Adult  Mallampati: II  TM Distance: Normal, at least 6 cm  Jaw/Neck Findings:  Neck ROM: Normal ROM      Dental:  Dental Findings: In tact   Chest/Lungs:  Chest/Lungs Findings: Clear to auscultation, Normal Respiratory Rate     Heart/Vascular:  Heart Findings: Rate: Normal  Rhythm: Regular Rhythm  Sounds: Normal        Mental Status:  Mental Status Findings:  Cooperative, Alert and Oriented         Anesthesia Plan  Type of Anesthesia, risks & benefits discussed:  Anesthesia Type:  general, MAC  Patient's Preference:   Intra-op Monitoring Plan: standard ASA monitors  Intra-op Monitoring Plan Comments:   Post Op Pain Control Plan: multimodal analgesia  Post Op Pain Control Plan Comments:   Induction:   IV  Beta Blocker:  Patient is not currently on a Beta-Blocker (No further documentation required).       Informed Consent: Patient understands risks and agrees with Anesthesia plan.  Questions answered. Anesthesia consent signed with patient.  ASA Score: 3     Day of Surgery Review of History & Physical:    H&P update referred to the surgeon.     Anesthesia Plan Notes: Will proceed with general anesthesia per patient request.  She reports being uncomfortable during previous anesthetics, specifically during colonscopies.  Given history of aspiration during colonoscopy, will intubate.        Ready For Surgery From Anesthesia Perspective.

## 2020-10-21 NOTE — PLAN OF CARE
Discharge instructions given to and explained to patient and her grandaughter. All questions answered and pt and family member verbalized understanding. IV discontinued with cannula intact. Vital signs stable and pt AOx4.

## 2020-10-21 NOTE — OP NOTE
DATE OF PROCEDURE: 10/21/2020    PREOPERATIVE DIAGNOSIS: 1. / Primary open-angle glaucoma, bilateral, mild stage OD.       2. Complex cataract right eye - poor red reflex     POSTOPERATIVE DIAGNOSIS: 1. // Primary open-angle glaucoma, bilateral, mild stageOD,      2. Complex  Cataract with poor red reflex and status post procedure.     PROCEDURE PERFORMED:    1. I-stent W inject x 2 for glaucoma - right eye      2. Cataract extraction with phacoemulsification, posterior   chamber intraocular lens placement.     SURGEON: Melany Tse M.D.     ASSISTANT: carlos martinez md      COMPLICATIONS: None.     BLOOD LOSS: Less than 5 mL.     ANESTHESIA: General    IMPLANT DATA:   1. Abbott - PCB00 , power 14.0 diopters, serial #    PROCEDURE IN DETAIL: After informed consent including risks, benefits,   alternatives, the patient was brought to the operating room table, placed in   supine position. Monitored anesthesia care was used throughout the entire   procedure. Once adequate anesthesia was confirmed, the patient was then prepped   and draped in usual sterile fashion for intraocular surgery. Wire speculum was   used to hold the eyelids apart and the procedure was initiated by making   a partial thickness corneal wound with a suzette blade temporally.   A supersharp blade was then used to make a second entry into the eye via a paracentesis incision.  Intracameral lidocaine followed by trypan blue, followed by  Viscoat were placed in the anterior chamber.   A 2.4 mm blade was then used to make to complete the clear corneal   incision temporally.    Nex th I-stent glaucoma surgery was performed.  The head was turned and the microscope tilted. A gonio lens was used to vizualize the nasal angle.  The I-stents were placed at the 2 and 4 o'clock positions. There was reflux of beme into the anterior chamber.    The head and microsocpe were returned to the original position.   A cystotome was used to make a tear in   the  anterior capsular flap, which was continued around with Utrata forceps for   continuous curvilinear capsulorrhexis. BSS on a Best cannula was then used for   hydrodissection and hydrodelineation of lens. The lens was noted to spin   freely in the bag. Phacoemulsification handpiece was then used to remove the   lens in a divide-and-conquer manner. Irrigation aspiration handpiece removed   the remaining cortical material. Provisc was placed in the eye followed by the   lens as mentioned above without any complications. Once the lens was in proper   position, irrigation aspiration handpiece was used to remove the remaining Provisc. The   wounds were then hydrated with BSS and noted to be watertight. The eye had a   good physiological pressure and the lid speculum was removed under the   microscope without any shallowing. The eye was then covered with a collagen   shield soaked in Pred Forte and Vigamox and turned over to Anesthesia in stable   condition after placement of patch and shield.

## 2020-10-21 NOTE — ANESTHESIA PROCEDURE NOTES
Intubation  Performed by: Melany Portillo CRNA  Authorized by: Gemini Diaz MD     Intubation:     Induction:  Intravenous    Intubated:  Postinduction    Mask Ventilation:  Easy mask    Attempts:  2    Attempted By:  CRNA    Method of Intubation:  Direct    Blade:  Cherry 2    Attempted By (2nd Attempt):  CRNA    Method of Intubation (2nd Attempt):  Direct    Blade (2nd Attempt):  Cherry 2    Laryngeal View Grade (2nd Attempt): Grade IIa - cords partially seen      Difficult Airway Encountered?: No      Complications:  None    Airway Device:  Oral endotracheal tube    Airway Device Size:  7.0    Style/Cuff Inflation:  Cuffed    Inflation Amount (mL):  6    Tube secured:  22    Secured at:  The lips    Placement Verified By:  Capnometry    Complicating Factors:  Anterior larynx, large/floppy epiglottis, obesity and oropharyngeal edema or fat    Findings Post-Intubation:  BS equal bilateral

## 2020-10-21 NOTE — ANESTHESIA POSTPROCEDURE EVALUATION
Anesthesia Post Evaluation    Patient: Stephanie Sears    Procedure(s) Performed: Procedure(s) (LRB):  PHACOEMULSIFICATION, CATARACT (Right)  INSERTION, IOL PROSTHESIS (Right)  INSERTION, DEVICE, FOR GLAUCOMA/ i Stent (Right)    Final Anesthesia Type: general    Patient location during evaluation: PACU  Patient participation: Yes- Able to Participate  Level of consciousness: awake and alert  Post-procedure vital signs: reviewed and stable  Pain management: adequate  Airway patency: patent    PONV status at discharge: No PONV  Anesthetic complications: no      Cardiovascular status: blood pressure returned to baseline  Respiratory status: unassisted, spontaneous ventilation and room air  Hydration status: euvolemic  Follow-up not needed.          Vitals Value Taken Time   /75 10/21/20 1240   Temp 36.7 °C (98 °F) 10/21/20 1240   Pulse 68 10/21/20 1240   Resp 16 10/21/20 1240   SpO2 100 % 10/21/20 1240         No case tracking events are documented in the log.      Pain/Som Score: Som Score: 10 (10/21/2020 12:25 PM)

## 2020-10-21 NOTE — DISCHARGE SUMMARY
[facility]  Discharge Note  Short Stay    Procedure(s) (LRB):  PHACOEMULSIFICATION, CATARACT (Right)  INSERTION, IOL PROSTHESIS (Right)  INSERTION, DEVICE, FOR GLAUCOMA/ i Stent (Right)    OUTCOME: Patient tolerated treatment/procedure well without complication and is now ready for discharge.    DISPOSITION: Home or Self Care    FINAL DIAGNOSIS:  Primary open-angle glaucoma, bilateral, mild stage    FOLLOWUP: In clinic    DISCHARGE INSTRUCTIONS:    Discharge Procedure Orders   Leave dressing on - Keep it clean, dry, and intact until clinic visit     Date of Procedure / Discharge: 10/21/2020     PreOp Diagnosis: 1. Primary open angle glaucoma right eye mild stage     2. Complex Cataract OD  - poor red reflex     PostOp Diagnosis:as above s/p procedure    Procedure:Complex 1. I-stent inject W - x 2 - right eye for glaucoma     2 Cataract Extraction with Phacoemulsification and trypan blue  w/ Posterior Chamber IOL Implantation    Attending:Melany Tse MD    Assistant:Emily martinez md      Anesthesia:General    Complications:: None    Blood loss: <5 CC    Specimens: none    Procedure Details:  See Dictation      -D/C home when patient meets anesthesia requirements  -Follow up tomorrow with surgeon in the Eye Clinic.

## 2020-10-22 ENCOUNTER — OFFICE VISIT (OUTPATIENT)
Dept: OPHTHALMOLOGY | Facility: CLINIC | Age: 70
End: 2020-10-22
Payer: MEDICARE

## 2020-10-22 DIAGNOSIS — H40.1131 PRIMARY OPEN-ANGLE GLAUCOMA, BILATERAL, MILD STAGE: ICD-10-CM

## 2020-10-22 DIAGNOSIS — H52.203 MYOPIA WITH ASTIGMATISM AND PRESBYOPIA, BILATERAL: ICD-10-CM

## 2020-10-22 DIAGNOSIS — H52.13 MYOPIA WITH ASTIGMATISM AND PRESBYOPIA, BILATERAL: ICD-10-CM

## 2020-10-22 DIAGNOSIS — Z48.810 POSTOPERATIVE CARE FOR CATARACT: Primary | ICD-10-CM

## 2020-10-22 DIAGNOSIS — Z98.49 POSTOPERATIVE CARE FOR CATARACT: Primary | ICD-10-CM

## 2020-10-22 DIAGNOSIS — H52.4 MYOPIA WITH ASTIGMATISM AND PRESBYOPIA, BILATERAL: ICD-10-CM

## 2020-10-22 DIAGNOSIS — H43.811 POSTERIOR VITREOUS DETACHMENT, RIGHT: ICD-10-CM

## 2020-10-22 DIAGNOSIS — H25.13 NUCLEAR SCLEROSIS, BILATERAL: ICD-10-CM

## 2020-10-22 PROCEDURE — 99024 POSTOP FOLLOW-UP VISIT: CPT | Mod: POP,,, | Performed by: OPHTHALMOLOGY

## 2020-10-22 PROCEDURE — 99213 OFFICE O/P EST LOW 20 MIN: CPT | Mod: PBBFAC | Performed by: OPHTHALMOLOGY

## 2020-10-22 PROCEDURE — 99024 PR POST-OP FOLLOW-UP VISIT: ICD-10-PCS | Mod: POP,,, | Performed by: OPHTHALMOLOGY

## 2020-10-22 PROCEDURE — 99999 PR PBB SHADOW E&M-EST. PATIENT-LVL III: CPT | Mod: PBBFAC,,, | Performed by: OPHTHALMOLOGY

## 2020-10-22 PROCEDURE — 99999 PR PBB SHADOW E&M-EST. PATIENT-LVL III: ICD-10-PCS | Mod: PBBFAC,,, | Performed by: OPHTHALMOLOGY

## 2020-10-28 NOTE — PROGRESS NOTES
"HPI     DLS: 10/22/2020    Vision doing better  No pains    S/p phaco w/IOL with I-stent x 2 -  OD- 10/21/2020    Meds:  Vigamox qid OD  Pred qid OD     1. POAG - ou - mild vs suspect with OHT   2. Nuclear sclerosis OS  3. Posterior vitreous detachment, right   4. Myopia with astigmatism and presbyopia, bilateral     Last edited by Melany Tse MD on 10/29/2020  2:28 PM. (History)              Assessment /Plan     For exam results, see Encounter Report.    Postoperative care for cataract    Nuclear sclerosis, left    Primary open-angle glaucoma, bilateral, mild stage    Myopia with astigmatism and presbyopia, bilateral    Posterior vitreous detachment, right    Pseudophakia, right eye          1. Pre-perimetric mild POAG   Vs OHT  -Followed at [facility] since 1991   -First HVF 1999   -First photos 1991   - Intolerant to all gtts - they aggravate his blepharitis-? RAGHU allergy   -IOP "OK" off gtts and s/p ALT ou and SLT od - 2008    Family history neg   Glaucoma meds none (( off gtts post SLT ou -))   H/O adverse rxn to glaucoma drops Intolerant to all gtts - 2/2 aggravates blepharitis- RAGHU allergy   LASERS ALT ou -? Date / SLT OD 7/17/08 - good response   GLAUCOMA SURGERIES - I stent x 2 - OD  - 10/21/2020   OTHER EYE SURGERIES - phaco/IOL od - 10/21/2020 - PCB00 14.0   CDR 0.6/0.3   Tbase 19-26 / 16-21   Tmax 26/21   Ttarget ?   HVF 14 test - 1999 to 2020 - Full ou   Gonio +3 ou   /588   OCT 5 test 2005 to 2020 -  RNFL - OD:NL // OS:NL  HRT 9 test 2004 to 2020 -MR -  MR -  Dec T, border NI od // full os /// CDR 0.619 od // 0.508 os - but unreliable OD  Disc photos 1991, 1996, 2003 - slides // 2012 , 2016, 2020   - OIS     - Ttoday  - 22/?  - Test done today  Post -op phaco.IOL - istent - x 2  OD - PCB00 - 14.0 - 10/21/2020     2. Nuclear Sclerosis    VS - BATh 20/50 od // 20/60 os -    Pt C/O glare / nicola at night     3. Posterior Vitreous Detachment OD - 11/2008   - RD PRECAUTIONS    4. Eyelid " inflammation / Blepharitis / MGD    5. Allergic Conjunctivitis - RAGHU allergy     6. Myopia/Astigmatism/presbyopia     Plan   POAG - mild -pre-perimetric glaucoma - intolerant to all gtts   IOP ok s/p ALT ou - years ago and s/p SLT OD 2008 ( no SLT os)  Continue to monitor HVF/DFE/OCT/HRT/photos/IOP   If increase IOP or progression on VF testing consider repeat SLT OD and primary SLT os      Pt is a myope - sph eq is -5.25 od and -4.25 os   - discuss IOL options (? Aim for emmetropia to -0.50 or keep pt more myopic at about -2.50 for near vision or a -1.50 for mid range)   Pt is use to wearing her glasses all the time - even to sleep   - pt uses the computer a lot - ? would like to be set for dist to mid range - ? About a -1.00 to -1.50 -so can do some computer work without glasses     IOL OD -pt want to have IOL for distance vision - is no longer uing a computer much - has retired   PCB00  14.0  (-0.7 to -0.81)  AC IOL 11.5    IOL OS   PCB00 15.0 ( -0.35 to -0.59)  AC IOL - 12.5       S/P  cataract surgery  - phaco/IOL w/ I-stent x 2 OD - 10/21/2020 - PCB00 - 14.0   POW # 1 - phaco/IOL - I stetn x 2   Doing well  Begin Pred Acetate  - tid x 1 week // bid till next visit   Begin vigamox QID- stop   Shield at night - 1 more week   Glasses day  No lifting, no bending, no eye rubbing  F/U 3 week, AR/MR ou - monitor for a steroid response     Pt will see how she does with the old glasses - she can have the lens removed from the right side if needed - if she is too off balance - can be done on [employer]

## 2020-10-29 ENCOUNTER — OFFICE VISIT (OUTPATIENT)
Dept: OPHTHALMOLOGY | Facility: CLINIC | Age: 70
End: 2020-10-29
Payer: MEDICARE

## 2020-10-29 DIAGNOSIS — H40.1131 PRIMARY OPEN-ANGLE GLAUCOMA, BILATERAL, MILD STAGE: ICD-10-CM

## 2020-10-29 DIAGNOSIS — H52.13 MYOPIA WITH ASTIGMATISM AND PRESBYOPIA, BILATERAL: ICD-10-CM

## 2020-10-29 DIAGNOSIS — Z48.810 POSTOPERATIVE CARE FOR CATARACT: Primary | ICD-10-CM

## 2020-10-29 DIAGNOSIS — Z96.1 PSEUDOPHAKIA, RIGHT EYE: ICD-10-CM

## 2020-10-29 DIAGNOSIS — H52.203 MYOPIA WITH ASTIGMATISM AND PRESBYOPIA, BILATERAL: ICD-10-CM

## 2020-10-29 DIAGNOSIS — H52.4 MYOPIA WITH ASTIGMATISM AND PRESBYOPIA, BILATERAL: ICD-10-CM

## 2020-10-29 DIAGNOSIS — H25.12 NUCLEAR SCLEROSIS, LEFT: ICD-10-CM

## 2020-10-29 DIAGNOSIS — H43.811 POSTERIOR VITREOUS DETACHMENT, RIGHT: ICD-10-CM

## 2020-10-29 DIAGNOSIS — Z98.49 POSTOPERATIVE CARE FOR CATARACT: Primary | ICD-10-CM

## 2020-10-29 PROCEDURE — 99213 OFFICE O/P EST LOW 20 MIN: CPT | Mod: PBBFAC | Performed by: OPHTHALMOLOGY

## 2020-10-29 PROCEDURE — 99999 PR PBB SHADOW E&M-EST. PATIENT-LVL III: ICD-10-PCS | Mod: PBBFAC,,, | Performed by: OPHTHALMOLOGY

## 2020-10-29 PROCEDURE — 99024 PR POST-OP FOLLOW-UP VISIT: ICD-10-PCS | Mod: POP,,, | Performed by: OPHTHALMOLOGY

## 2020-10-29 PROCEDURE — 99024 POSTOP FOLLOW-UP VISIT: CPT | Mod: POP,,, | Performed by: OPHTHALMOLOGY

## 2020-10-29 PROCEDURE — 99999 PR PBB SHADOW E&M-EST. PATIENT-LVL III: CPT | Mod: PBBFAC,,, | Performed by: OPHTHALMOLOGY

## 2020-10-29 RX ORDER — PREDNISOLONE ACETATE 10 MG/ML
1 SUSPENSION/ DROPS OPHTHALMIC 3 TIMES DAILY
Qty: 10 ML | Refills: 0 | Status: SHIPPED | OUTPATIENT
Start: 2020-10-29 | End: 2020-10-31

## 2020-10-31 ENCOUNTER — NURSE TRIAGE (OUTPATIENT)
Dept: ADMINISTRATIVE | Facility: CLINIC | Age: 70
End: 2020-10-31

## 2020-10-31 DIAGNOSIS — Z48.810 POSTOPERATIVE CARE FOR CATARACT: ICD-10-CM

## 2020-10-31 DIAGNOSIS — Z98.49 POSTOPERATIVE CARE FOR CATARACT: ICD-10-CM

## 2020-10-31 RX ORDER — PREDNISOLONE ACETATE 10 MG/ML
1 SUSPENSION/ DROPS OPHTHALMIC 3 TIMES DAILY
Qty: 10 ML | Refills: 0 | Status: SHIPPED | OUTPATIENT
Start: 2020-10-31 | End: 2021-03-18 | Stop reason: ALTCHOICE

## 2020-10-31 NOTE — TELEPHONE ENCOUNTER
"  Reason for Disposition   [1] Caller has medication question about med not prescribed by PCP AND [2] triager unable to answer question (e.g., compatibility with other med, storage)    Additional Information   Negative: Drug overdose and triager unable to answer question   Negative: Caller requesting information unrelated to medicine   Negative: Caller requesting a prescription for Strep throat and has a positive culture result   Negative: Rash while taking a medication or within 3 days of stopping it   Negative: Immunization reaction suspected   Negative: [1] Asthma and [2] having symptoms of asthma (cough, wheezing, etc.)   Negative: [1] Influenza symptoms AND [2] anti-viral med prescription request, such as Tamiflu   Negative: [1] Symptom of illness (e.g., headache, abdominal pain, earache, vomiting) AND [2] more than mild   Negative: MORE THAN A DOUBLE DOSE of a prescription or over-the-counter (OTC) drug   Negative: [1] DOUBLE DOSE (an extra dose or lesser amount) of over-the-counter (OTC) drug AND [2] any symptoms (e.g., dizziness, nausea, pain, sleepiness)   Negative: [1] DOUBLE DOSE (an extra dose or lesser amount) of prescription drug AND [2] any symptoms (e.g., dizziness, nausea, pain, sleepiness)   Negative: Took another person's prescription drug   Negative: [1] DOUBLE DOSE (an extra dose or lesser amount) of prescription drug AND [2] NO symptoms (Exception: a double dose of antibiotics)   Negative: Diabetes drug error or overdose (e.g., took wrong type of insulin or took extra dose)   Negative: [1] Request for URGENT new prescription or refill of "essential" medication (i.e., likelihood of harm to patient if not taken) AND [2] triager unable to fill per unit policy   Negative: [1] Prescription not at pharmacy AND [2] was prescribed by PCP recently   Negative: [1] Pharmacy calling with prescription questions AND [2] triager unable to answer question   Negative: [1] Caller has URGENT " medication question about med that PCP or specialist prescribed AND [2] triager unable to answer question   Negative: [1] Caller has NON-URGENT medication question about med that PCP prescribed AND [2] triager unable to answer question   Negative: [1] Caller requesting a NON-URGENT new prescription or refill AND [2] triager unable to refill per unit policy    Protocols used: MEDICATION QUESTION CALL-A-  Pt called re surg on eye on 10/21seen thurs and gave pred. pharmacy states didnt have electricity and did get it. LM on pharm VM at 1258pm with pred forte rx.

## 2020-11-04 ENCOUNTER — HOSPITAL ENCOUNTER (OUTPATIENT)
Dept: RADIOLOGY | Facility: HOSPITAL | Age: 70
Discharge: HOME | End: 2020-11-04
Attending: NURSE PRACTITIONER
Payer: MEDICARE

## 2020-11-04 DIAGNOSIS — Z12.31 BREAST CANCER SCREENING BY MAMMOGRAM: ICD-10-CM

## 2020-11-04 PROCEDURE — 77067 SCR MAMMO BI INCL CAD: CPT | Mod: TC

## 2020-11-04 PROCEDURE — 77067 MAMMO DIGITAL SCREENING BILAT WITH TOMO: ICD-10-PCS | Mod: 26,,, | Performed by: RADIOLOGY

## 2020-11-04 PROCEDURE — 77063 BREAST TOMOSYNTHESIS BI: CPT | Mod: 26,,, | Performed by: RADIOLOGY

## 2020-11-04 PROCEDURE — 77067 SCR MAMMO BI INCL CAD: CPT | Mod: 26,,, | Performed by: RADIOLOGY

## 2020-11-04 PROCEDURE — 77063 MAMMO DIGITAL SCREENING BILAT WITH TOMO: ICD-10-PCS | Mod: 26,,, | Performed by: RADIOLOGY

## 2020-11-05 ENCOUNTER — CLINICAL SUPPORT (OUTPATIENT)
Dept: FAMILY MEDICINE | Facility: CLINIC | Age: 70
End: 2020-11-05
Payer: MEDICARE

## 2020-11-05 ENCOUNTER — PATIENT MESSAGE (OUTPATIENT)
Dept: FAMILY MEDICINE | Facility: CLINIC | Age: 70
End: 2020-11-05

## 2020-11-05 DIAGNOSIS — Z23 NEED FOR IMMUNIZATION AGAINST INFLUENZA: Primary | ICD-10-CM

## 2020-11-05 PROCEDURE — 99999 PR PBB SHADOW E&M-EST. PATIENT-LVL I: CPT | Mod: PBBFAC,,,

## 2020-11-05 PROCEDURE — 99499 NO LOS: ICD-10-PCS | Mod: S$PBB,,, | Performed by: FAMILY MEDICINE

## 2020-11-05 PROCEDURE — 99999 PR PBB SHADOW E&M-EST. PATIENT-LVL I: ICD-10-PCS | Mod: PBBFAC,,,

## 2020-11-05 PROCEDURE — 90694 VACC AIIV4 NO PRSRV 0.5ML IM: CPT | Mod: PBBFAC,PN

## 2020-11-05 PROCEDURE — 99499 UNLISTED E&M SERVICE: CPT | Mod: S$PBB,,, | Performed by: FAMILY MEDICINE

## 2020-11-05 PROCEDURE — G0008 ADMIN INFLUENZA VIRUS VAC: HCPCS | Mod: PBBFAC,PN

## 2020-11-05 PROCEDURE — 99211 OFF/OP EST MAY X REQ PHY/QHP: CPT | Mod: PBBFAC,PN

## 2020-11-09 ENCOUNTER — CLINICAL SUPPORT (OUTPATIENT)
Dept: SMOKING CESSATION | Facility: CLINIC | Age: 70
End: 2020-11-09
Payer: COMMERCIAL

## 2020-11-09 DIAGNOSIS — F17.200 NICOTINE DEPENDENCE: Primary | ICD-10-CM

## 2020-11-09 PROCEDURE — 99404 PREV MED CNSL INDIV APPRX 60: CPT | Mod: S$GLB,,,

## 2020-11-09 PROCEDURE — 99999 PR PBB SHADOW E&M-EST. PATIENT-LVL I: CPT | Mod: PBBFAC,,,

## 2020-11-09 PROCEDURE — 99999 PR PBB SHADOW E&M-EST. PATIENT-LVL I: ICD-10-PCS | Mod: PBBFAC,,,

## 2020-11-09 PROCEDURE — 99404 PR PREVENT COUNSEL,INDIV,60 MIN: ICD-10-PCS | Mod: S$GLB,,,

## 2020-11-09 RX ORDER — DM/P-EPHED/ACETAMINOPH/DOXYLAM 30-7.5/3
LIQUID (ML) ORAL
Qty: 162 LOZENGE | Refills: 1 | Status: SHIPPED | OUTPATIENT
Start: 2020-11-09 | End: 2020-12-07

## 2020-11-09 RX ORDER — IBUPROFEN 200 MG
1 TABLET ORAL DAILY
Qty: 28 PATCH | Refills: 0 | Status: SHIPPED | OUTPATIENT
Start: 2020-11-09 | End: 2020-12-03 | Stop reason: SDUPTHER

## 2020-11-09 NOTE — PROGRESS NOTES
Patient will be participating in biweekly tobacco cessation meetings and will begin the prescribed tobacco cessation medication regimen of  21 mg nicotine patch QD .  She currently smokes 20 cigarettes per day.  Pt started on rate reduction and wait time of 15 min prior to smoking.

## 2020-11-17 ENCOUNTER — TELEPHONE (OUTPATIENT)
Dept: OPHTHALMOLOGY | Facility: CLINIC | Age: 70
End: 2020-11-17

## 2020-11-17 ENCOUNTER — OFFICE VISIT (OUTPATIENT)
Dept: OPHTHALMOLOGY | Facility: CLINIC | Age: 70
End: 2020-11-17
Payer: MEDICARE

## 2020-11-17 DIAGNOSIS — Z98.49 POSTOPERATIVE CARE FOR CATARACT: ICD-10-CM

## 2020-11-17 DIAGNOSIS — H25.012 CORTICAL SENILE CATARACT, LEFT: ICD-10-CM

## 2020-11-17 DIAGNOSIS — H52.4 MYOPIA WITH ASTIGMATISM AND PRESBYOPIA, BILATERAL: ICD-10-CM

## 2020-11-17 DIAGNOSIS — Z96.1 PSEUDOPHAKIA, RIGHT EYE: ICD-10-CM

## 2020-11-17 DIAGNOSIS — H40.1131 PRIMARY OPEN-ANGLE GLAUCOMA, BILATERAL, MILD STAGE: ICD-10-CM

## 2020-11-17 DIAGNOSIS — Z98.83 STATUS POST GLAUCOMA SURGERY: ICD-10-CM

## 2020-11-17 DIAGNOSIS — H52.13 MYOPIA WITH ASTIGMATISM AND PRESBYOPIA, BILATERAL: ICD-10-CM

## 2020-11-17 DIAGNOSIS — Z13.9 SCREENING PROCEDURE: ICD-10-CM

## 2020-11-17 DIAGNOSIS — H25.12 NUCLEAR SCLEROSIS, LEFT: Primary | ICD-10-CM

## 2020-11-17 DIAGNOSIS — H52.203 MYOPIA WITH ASTIGMATISM AND PRESBYOPIA, BILATERAL: ICD-10-CM

## 2020-11-17 DIAGNOSIS — H43.811 POSTERIOR VITREOUS DETACHMENT, RIGHT: ICD-10-CM

## 2020-11-17 DIAGNOSIS — Z48.810 POSTOPERATIVE CARE FOR CATARACT: ICD-10-CM

## 2020-11-17 PROCEDURE — 99999 PR PBB SHADOW E&M-EST. PATIENT-LVL III: ICD-10-PCS | Mod: PBBFAC,,, | Performed by: OPHTHALMOLOGY

## 2020-11-17 PROCEDURE — 99213 OFFICE O/P EST LOW 20 MIN: CPT | Mod: PBBFAC | Performed by: OPHTHALMOLOGY

## 2020-11-17 PROCEDURE — 99999 PR PBB SHADOW E&M-EST. PATIENT-LVL III: CPT | Mod: PBBFAC,,, | Performed by: OPHTHALMOLOGY

## 2020-11-17 PROCEDURE — 92136 OPHTHALMIC BIOMETRY: CPT | Mod: PBBFAC,LT | Performed by: OPHTHALMOLOGY

## 2020-11-17 PROCEDURE — 99024 PR POST-OP FOLLOW-UP VISIT: ICD-10-PCS | Mod: POP,,, | Performed by: OPHTHALMOLOGY

## 2020-11-17 PROCEDURE — 92136 IOL MASTER - OS - LEFT EYE: ICD-10-PCS | Mod: 26,S$PBB,LT, | Performed by: OPHTHALMOLOGY

## 2020-11-17 PROCEDURE — 99024 POSTOP FOLLOW-UP VISIT: CPT | Mod: POP,,, | Performed by: OPHTHALMOLOGY

## 2020-11-17 RX ORDER — TROPICAMIDE 10 MG/ML
1 SOLUTION/ DROPS OPHTHALMIC
Status: CANCELLED | OUTPATIENT
Start: 2020-11-17

## 2020-11-17 RX ORDER — MOXIFLOXACIN 5 MG/ML
1 SOLUTION/ DROPS OPHTHALMIC
Status: CANCELLED | OUTPATIENT
Start: 2020-11-17

## 2020-11-17 RX ORDER — KETOROLAC TROMETHAMINE 5 MG/ML
1 SOLUTION OPHTHALMIC
Status: CANCELLED | OUTPATIENT
Start: 2020-11-17

## 2020-11-17 RX ORDER — SODIUM CHLORIDE 0.9 % (FLUSH) 0.9 %
10 SYRINGE (ML) INJECTION
Status: DISCONTINUED | OUTPATIENT
Start: 2020-11-17 | End: 2020-12-17

## 2020-11-17 RX ORDER — PHENYLEPHRINE HYDROCHLORIDE 100 MG/ML
1 SOLUTION/ DROPS OPHTHALMIC
Status: CANCELLED | OUTPATIENT
Start: 2020-11-17

## 2020-11-17 NOTE — PROGRESS NOTES
"HPI     DLS: 10/29/20    S/p phaco w/IOL with I-stent x 2 -  OD- 10/21/2020   Pt states no eye pain or discomfort.     Meds:   PA BID OD    1. POAG - ou - mild vs suspect with OHT   2. Nuclear sclerosis OS   3. Posterior vitreous detachment, right   4. Myopia with astigmatism and presbyopia, bilateral     Last edited by Angelic Saravia on 11/17/2020  8:22 AM. (History)            Assessment /Plan     For exam results, see Encounter Report.    Nuclear sclerosis, left  -     IOL Master - OS - Left Eye  -     Diet NPO; Standing    Postoperative care for cataract    Cortical senile cataract, left  -     Diet NPO; Standing    Primary open-angle glaucoma, bilateral, mild stage    Myopia with astigmatism and presbyopia, bilateral    Posterior vitreous detachment, right    Pseudophakia, right eye    Status post glaucoma surgery    Other orders  -     sodium chloride 0.9% flush 10 mL          1. Pre-perimetric mild POAG   Vs OHT  -Followed at [facility] since 1991   -First HVF 1999   -First photos 1991   - Intolerant to all gtts - they aggravate his blepharitis-? RAGHU allergy   -IOP "OK" off gtts and s/p ALT ou and SLT od - 2008    Family history neg   Glaucoma meds none (( off gtts post SLT ou -))   H/O adverse rxn to glaucoma drops Intolerant to all gtts - 2/2 aggravates blepharitis- RAGHU allergy   LASERS ALT ou -? Date / SLT OD 7/17/08 - good response   GLAUCOMA SURGERIES - I stent x 2 - OD  - 10/21/2020   OTHER EYE SURGERIES - phaco/IOL od - 10/21/2020 - PCB00 14.0   CDR 0.6/0.3   Tbase 19-26 / 16-21   Tmax 26/21   Ttarget ?   HVF 14 test - 1999 to 2020 - Full ou   Gonio +3 ou   /588   OCT 5 test 2005 to 2020 -  RNFL - OD:NL // OS:NL  HRT 9 test 2004 to 2020 -MR -  MR -  Dec T, border NI od // full os /// CDR 0.619 od // 0.508 os - but unreliable OD  Disc photos 1991, 1996, 2003 - slides // 2012 , 2016, 2020   - OIS     - Ttoday  - 22/?  - Test done today  Post -op phaco.IOL - istent - x 2  OD - PCB00 - 14.0 - 10/21/2020 "     2. Nuclear Sclerosis    VS - BATh 20/50 od // 20/60 os -    Pt C/O glare / nicola at night     3. Posterior Vitreous Detachment OD - 11/2008   - RD PRECAUTIONS    4. Eyelid inflammation / Blepharitis / MGD    5. Allergic Conjunctivitis - RAGHU allergy     6. Myopia/Astigmatism/presbyopia     Plan   POAG - mild -pre-perimetric glaucoma - intolerant to all gtts   IOP ok s/p ALT ou - years ago and s/p SLT OD 2008 ( no SLT os)  Continue to monitor HVF/DFE/OCT/HRT/photos/IOP   If increase IOP or progression on VF testing consider repeat SLT OD and primary SLT os      Pt is a myope - sph eq is -5.25 od and -4.25 os   - discuss IOL options (? Aim for emmetropia to -0.50 or keep pt more myopic at about -2.50 for near vision or a -1.50 for mid range)   Pt is use to wearing her glasses all the time - even to sleep   - pt uses the computer a lot - ? would like to be set for dist to mid range - ? About a -1.00 to -1.50 -so can do some computer work without glasses     IOL OD -pt want to have IOL for distance vision - is no longer uing a computer much - has retired   PCB00  14.0  (-0.7 to -0.81)  AC IOL 11.5    IOL OS   PCB00 15.0 ( -0.35 to -0.59)  AC IOL - 12.5       S/P  cataract surgery  - phaco/IOL w/ I-stent x 2 OD - 10/21/2020 - PCB00 - 14.0   POW # 4 - phaco/IOL - I stent  x 2   Doing well  Begin Pred Acetate  -decrease to q day        Pt would like to get 2nd eye done - having anisometropia symptoms   plan phaco/IOL w/ trypan blue and I-stents os - ? Dec 9th - 2nd case - arrive for 8 am     H&P done   IOL calc done    PCB00 15.5   AC IOL 13.0   Surgery and covid test scheduled   Risks and benefits discussed and consent signed.

## 2020-11-17 NOTE — H&P (VIEW-ONLY)
Subjective:       Patient ID: Stephanie Sears is a 70 y.o. female.    Chief Complaint: Post-op Evaluation    HPI  Review of Systems   Constitutional: Negative.    HENT: Negative.    Eyes: Positive for visual disturbance.   Respiratory: Negative.    Cardiovascular: Negative.    Neurological: Negative.    Psychiatric/Behavioral: Negative.          Objective:      Physical Exam  Constitutional:       Appearance: She is well-developed.   HENT:      Head: Normocephalic.   Eyes:      Comments: See eye exam   Cardiovascular:      Rate and Rhythm: Normal rate and regular rhythm.   Pulmonary:      Effort: Pulmonary effort is normal.   Neurological:      Mental Status: She is oriented to person, place, and time.         Assessment:       1. Postoperative care for cataract    2. Nuclear sclerosis, left    3. Primary open-angle glaucoma, bilateral, mild stage    4. Myopia with astigmatism and presbyopia, bilateral    5. Posterior vitreous detachment, right    6. Pseudophakia, right eye    7. Status post glaucoma surgery        Plan:       Combined - complex phaco/IOL w/ trypan os and I-stent inject x 2

## 2020-11-19 ENCOUNTER — CLINICAL SUPPORT (OUTPATIENT)
Dept: SMOKING CESSATION | Facility: CLINIC | Age: 70
End: 2020-11-19
Payer: COMMERCIAL

## 2020-11-19 DIAGNOSIS — F17.200 NICOTINE DEPENDENCE: Primary | ICD-10-CM

## 2020-11-19 PROCEDURE — 99404 PREV MED CNSL INDIV APPRX 60: CPT | Mod: S$GLB,,,

## 2020-11-19 PROCEDURE — 99999 PR PBB SHADOW E&M-EST. PATIENT-LVL I: ICD-10-PCS | Mod: PBBFAC,,,

## 2020-11-19 PROCEDURE — 99404 PR PREVENT COUNSEL,INDIV,60 MIN: ICD-10-PCS | Mod: S$GLB,,,

## 2020-11-19 PROCEDURE — 99999 PR PBB SHADOW E&M-EST. PATIENT-LVL I: CPT | Mod: PBBFAC,,,

## 2020-11-19 NOTE — PROGRESS NOTES
Individual Follow-Up Form    11/19/2020    Quit Date: TBD    Clinical Status of Patient: Outpatient    Length of Service: 60 minutes    Continuing Medication: yes  Patches    Other Medications: lozenge     Target Symptoms: Withdrawal and medication side effects. The following were  rated moderate (3) to severe (4) on TCRS:  · Moderate (3): none  · Severe (4): none    Comments: Telephonic visit due to Covid 19 pandemic.     Patient continues to smoke . Pt remains on tobacco cessation medication of  21 mg nicotine patch QD and 2 mg nicotine lozenge PRN (1-2 per hour in place of cigarettes.) No adverse effects noted at this time.Reviewed strategies, habitual behavior, stress, and high risk situations. Introduced stress with addition interventions, SOLVE, relaxation with interventions, nutrition, exercise, weight gain, and the importance of rewarding oneself for accomplishments toward becoming tobacco free.          Diagnosis: F17.200    Next Visit: 2 weeks

## 2020-12-03 ENCOUNTER — PATIENT OUTREACH (OUTPATIENT)
Dept: ADMINISTRATIVE | Facility: OTHER | Age: 70
End: 2020-12-03

## 2020-12-03 ENCOUNTER — CLINICAL SUPPORT (OUTPATIENT)
Dept: SMOKING CESSATION | Facility: CLINIC | Age: 70
End: 2020-12-03
Payer: COMMERCIAL

## 2020-12-03 DIAGNOSIS — F17.200 NICOTINE DEPENDENCE: ICD-10-CM

## 2020-12-03 DIAGNOSIS — F17.200 NICOTINE DEPENDENCE: Primary | ICD-10-CM

## 2020-12-03 PROCEDURE — 99402 PREV MED CNSL INDIV APPRX 30: CPT | Mod: S$GLB,,,

## 2020-12-03 PROCEDURE — 99999 PR PBB SHADOW E&M-EST. PATIENT-LVL I: CPT | Mod: PBBFAC,,,

## 2020-12-03 PROCEDURE — 99999 PR PBB SHADOW E&M-EST. PATIENT-LVL I: ICD-10-PCS | Mod: PBBFAC,,,

## 2020-12-03 PROCEDURE — 99402 PR PREVENT COUNSEL,INDIV,30 MIN: ICD-10-PCS | Mod: S$GLB,,,

## 2020-12-03 RX ORDER — IBUPROFEN 200 MG
1 TABLET ORAL DAILY
Qty: 28 PATCH | Refills: 0 | Status: SHIPPED | OUTPATIENT
Start: 2020-12-03 | End: 2021-01-02

## 2020-12-03 NOTE — PROGRESS NOTES
Individual Follow-Up Form    12/3/2020    Quit Date:   Clinical Status of Patient: Outpatient    Length of Service: 30 minutes    Continuing Medication: yes  Patches or Nicotine Lozenges    Other Medications: ---     Target Symptoms: Withdrawal and medication side effects. The following were  rated moderate (3) to severe (4) on TCRS:  · Moderate (3): none  · Severe (4): none    Comments: Telephonic visit due to Covid 19 pandemic.     Patient continues to smoke . Pt remains on tobacco cessation medication of  21 mg nicotine patch QD and 2 mg nicotine lozenge PRN (1-2 per hour in place of cigarettes.) No adverse effects noted at this time. Reviewed strategies, habitual behavior, stress, and high risk situations. Introduced stress with addition interventions, SOLVE, relaxation with interventions, nutrition, exercise, weight gain, and the importance of rewarding oneself for accomplishments toward becoming tobacco free.       Diagnosis: F17.200    Next Visit: 1 week

## 2020-12-07 ENCOUNTER — LAB VISIT (OUTPATIENT)
Dept: FAMILY MEDICINE | Facility: CLINIC | Age: 70
End: 2020-12-07
Payer: MEDICARE

## 2020-12-07 ENCOUNTER — OFFICE VISIT (OUTPATIENT)
Dept: PAIN MEDICINE | Facility: CLINIC | Age: 70
End: 2020-12-07
Payer: MEDICARE

## 2020-12-07 VITALS
DIASTOLIC BLOOD PRESSURE: 84 MMHG | WEIGHT: 186.69 LBS | HEIGHT: 62 IN | TEMPERATURE: 98 F | BODY MASS INDEX: 34.35 KG/M2 | HEART RATE: 97 BPM | SYSTOLIC BLOOD PRESSURE: 178 MMHG

## 2020-12-07 DIAGNOSIS — M54.50 LUMBAR PAIN: ICD-10-CM

## 2020-12-07 DIAGNOSIS — M47.816 LUMBAR SPONDYLOSIS: ICD-10-CM

## 2020-12-07 DIAGNOSIS — Z98.1 S/P LUMBAR FUSION: ICD-10-CM

## 2020-12-07 DIAGNOSIS — M51.36 DDD (DEGENERATIVE DISC DISEASE), LUMBAR: Primary | ICD-10-CM

## 2020-12-07 DIAGNOSIS — Z13.9 SCREENING PROCEDURE: ICD-10-CM

## 2020-12-07 PROBLEM — M51.369 DDD (DEGENERATIVE DISC DISEASE), LUMBAR: Status: ACTIVE | Noted: 2020-12-07

## 2020-12-07 PROCEDURE — 99204 PR OFFICE/OUTPT VISIT, NEW, LEVL IV, 45-59 MIN: ICD-10-PCS | Mod: S$PBB,,, | Performed by: PAIN MEDICINE

## 2020-12-07 PROCEDURE — 99999 PR PBB SHADOW E&M-EST. PATIENT-LVL V: CPT | Mod: PBBFAC,,, | Performed by: PAIN MEDICINE

## 2020-12-07 PROCEDURE — 99215 OFFICE O/P EST HI 40 MIN: CPT | Mod: PBBFAC,PN | Performed by: PAIN MEDICINE

## 2020-12-07 PROCEDURE — U0003 INFECTIOUS AGENT DETECTION BY NUCLEIC ACID (DNA OR RNA); SEVERE ACUTE RESPIRATORY SYNDROME CORONAVIRUS 2 (SARS-COV-2) (CORONAVIRUS DISEASE [COVID-19]), AMPLIFIED PROBE TECHNIQUE, MAKING USE OF HIGH THROUGHPUT TECHNOLOGIES AS DESCRIBED BY CMS-2020-01-R: HCPCS

## 2020-12-07 PROCEDURE — 99204 OFFICE O/P NEW MOD 45 MIN: CPT | Mod: S$PBB,,, | Performed by: PAIN MEDICINE

## 2020-12-07 PROCEDURE — 99999 PR PBB SHADOW E&M-EST. PATIENT-LVL V: ICD-10-PCS | Mod: PBBFAC,,, | Performed by: PAIN MEDICINE

## 2020-12-07 NOTE — PROGRESS NOTES
Subjective:     Patient ID: Stephanie Sears is a 70 y.o. female    Chief Complaint: Back Pain (L4-L5 Fusion 2006), Hip Pain (BiLateral), Rectal Pain (Pain ), and Knee Pain (Bilateral replacements)      Referred by: Tiffany Garrison MD      HPI:    Initial Encounter (12/7/20):  Stephanie Sears is a 70 y.o. female who presents today with chronic bilateral low back pain.  This pain has been present for years.  Patient is status post L4-5 fusion in 2006.  She states that she did have some pain radiating into her lower extremities in the past, but her current pain is isolated to the bilateral lower lumbar/lumbosacral regions.  She denies any associated numbness, tingling, weakness, bowel bladder dysfunction.  The pain is constant worse with prolonged sitting and standing..   This pain is described in detail below.    Physical Therapy:  Yes.    Non-pharmacologic Treatment:  Rest helps         · TENS?  No    Pain Medications:         · Currently taking:  Meloxicam, gabapentin    · Has tried in the past:  Tramadol, NSAIDs, Tylenol    · Has not tried:   Muscle relaxants, TCAs, SNRIs, topical creams    Blood thinners:  None    Interventional Therapies:  None    Relevant Surgeries:   L4-5 fusion in 2006    Affecting sleep?  Yes    Affecting daily activities? yes    Depressive symptoms? no          · SI/HI? No    Work status: Retired    Pain Scores:    Best:       0/10  Worst:     10/10  Usually:   6/10  Today:    6/10    Review of Systems   Constitutional: Negative for activity change, appetite change, chills, fatigue, fever and unexpected weight change.   HENT: Negative for hearing loss.    Eyes: Negative for visual disturbance.   Respiratory: Negative for chest tightness and shortness of breath.    Cardiovascular: Negative for chest pain.   Gastrointestinal: Negative for abdominal pain, constipation, diarrhea, nausea and vomiting.   Genitourinary: Negative for difficulty urinating.   Musculoskeletal: Positive for  arthralgias, back pain, gait problem and myalgias. Negative for neck pain.   Skin: Negative for rash.   Neurological: Negative for dizziness, weakness, light-headedness, numbness and headaches.   Psychiatric/Behavioral: Positive for sleep disturbance. Negative for hallucinations and suicidal ideas. The patient is not nervous/anxious.        Past Medical History:   Diagnosis Date    Acute pancreatitis     Angina pectoris     Anxiety     Arthritis     Back pain     Bronchitis     Cataract     Cervical spondylosis with radiculopathy 7/10/2012    Chest pain     Chronic neck pain 7/26/2012    Colon polyps     Controlled type 2 diabetes mellitus with stage 2 chronic kidney disease, with long-term current use of insulin 1/24/2020    Coronary artery disease     Depression     Fibrocystic breast     GERD (gastroesophageal reflux disease)     Glaucoma     Heart failure     Hyperlipidemia     Hypertension     Neck pain 7/10/2012    Obesity     JAM (obstructive sleep apnea)     Primary osteoarthritis of both knees     Psoriasis     Subacromial or subdeltoid bursitis 7/10/2012    Thyroid disease     Tobacco dependence     Trouble in sleeping     Type 2 diabetes mellitus 12/10/2013       Past Surgical History:   Procedure Laterality Date    BREAST BIOPSY Bilateral 1988    BREAST MASS EXCISION Right     benign    CATARACT EXTRACTION      CHOLECYSTECTOMY      COLONOSCOPY N/A 5/22/2019    Procedure: COLONOSCOPY;  Surgeon: Darrin Calvin MD;  Location: [facility] ([address] [place] [address]);  Service: Endoscopy;  Laterality: N/A;  2nd floor - all other procedure done on 2, multiple comorbidities - ERW/   change in MD schedule, Pt notified and verbalized understanding, new arrival time 0800, Ins mailed to Pt with new arrival time - ERW1/8/19@1130    HYSTERECTOMY  1980's    IMPLANTATION OF DEVICE FOR GLAUCOMA Right 10/21/2020    Procedure: INSERTION, DEVICE, FOR GLAUCOMA/ i Stent;  Surgeon: Melany Tse MD;   Location: [address];  Service: Ophthalmology;  Laterality: Right;    INTRAOCULAR PROSTHESES INSERTION Right 10/21/2020    Procedure: INSERTION, IOL PROSTHESIS;  Surgeon: Melany Tse MD;  Location: [address];  Service: Ophthalmology;  Laterality: Right;    KNEE SURGERY Left 1-20-16    TKR    KNEE SURGERY Right 02/26/2018    TKR    L4-L5 fusion  2006    PHACOEMULSIFICATION OF CATARACT Right 10/21/2020    Procedure: PHACOEMULSIFICATION, CATARACT;  Surgeon: Melany Tse MD;  Location: [address];  Service: Ophthalmology;  Laterality: Right;       Social History     Socioeconomic History    Marital status: Single     Spouse name: Not on file    Number of children: Not on file    Years of education: Not on file    Highest education level: Not on file   Occupational History    Not on file   Social Needs    Financial resource strain: Not on file    Food insecurity     Worry: Not on file     Inability: Not on file    Transportation needs     Medical: Not on file     Non-medical: Not on file   Tobacco Use    Smoking status: Current Every Day Smoker     Packs/day: 1.00     Years: 40.00     Pack years: 40.00     Types: Cigarettes    Smokeless tobacco: Never Used   Substance and Sexual Activity    Alcohol use: No     Alcohol/week: 0.0 standard drinks    Drug use: No    Sexual activity: Yes     Partners: Male   Lifestyle    Physical activity     Days per week: Not on file     Minutes per session: Not on file    Stress: Not on file   Relationships    Social connections     Talks on phone: Not on file     Gets together: Not on file     Attends Protestant service: Not on file     Active member of club or organization: Not on file     Attends meetings of clubs or organizations: Not on file     Relationship status: Not on file   Other Topics Concern    Are you pregnant or think you may be? Not Asked    Breast-feeding Not Asked   Social History Narrative    Not on file  "      Review of patient's allergies indicates:   Allergen Reactions    Hydrocodone Shortness Of Breath    Iodinated contrast media Shortness Of Breath     Difficulty breathing    Adhesive Itching     Skin peeling    Oxycodone      Hallucinations    Sulfa (sulfonamide antibiotics) Hives and Itching    Morphine      Patient says it will make her "hallucinate"        Current Outpatient Medications on File Prior to Visit   Medication Sig Dispense Refill    albuterol (VENTOLIN HFA) 90 mcg/actuation inhaler Inhale 2 puffs into the lungs every 4 (four) hours as needed for Wheezing or Shortness of Breath. 36 g 3    amlodipine-benazepril (LOTREL) 10-40 mg per capsule TAKE 1 CAPSULE DAILY 90 capsule 4    atorvastatin (LIPITOR) 20 MG tablet TAKE 1 TABLET DAILY 90 tablet 4    blood sugar diagnostic (ACCU-CHEK JOSE G PLUS TEST STRP) Strp Inject 1 each into the skin 2 (two) times daily. 200 each 11    blood sugar diagnostic Strp Use to test blood glucose levels 2 times per day as instructed. 200 strip 11    CALCIUM CARBONATE (TUMS ORAL) Take by mouth as needed (acid reflux, indigestion).      carvedilol (COREG) 12.5 MG tablet TAKE 1 TABLET TWICE A DAY WITH MEALS 180 tablet 4    diphenhydrAMINE (BENADRYL) 25 mg capsule Take 25 mg by mouth every 6 (six) hours as needed for Itching or Allergies.      gabapentin (NEURONTIN) 600 MG tablet TAKE 1 TABLET THREE TIMES A  tablet 3    INULIN (FIBER GUMMIES ORAL) Take by mouth every morning.       lancets (ACCU-CHEK MULTICLIX LANCET) Misc Test blood sugar twice daily 300 each 3    lancets (ONETOUCH DELICA LANCETS) 33 gauge Misc Inject 1 lancet into the skin 2 (two) times daily before meals. 200 each 11    meloxicam (MOBIC) 7.5 MG tablet TAKE 1 TABLET DAILY 90 tablet 3    nicotine (NICODERM CQ) 21 mg/24 hr Place 1 patch onto the skin once daily. Please dispense Round Youngblood patch only 28 patch 0    prednisoLONE acetate (PRED FORTE) 1 % DrpS Place 1 drop into the right " "eye 3 (three) times daily. 3 x day for 1 week then decrease to 2 x day for 3 weeks 10 mL 0    [DISCONTINUED] fluticasone (FLONASE) 50 mcg/actuation nasal spray 1 spray by Each Nare route 2 (two) times daily. (Patient not taking: Reported on 12/7/2020) 1 Bottle 1    [DISCONTINUED] nicotine polacrilex 2 MG Lozg 1-2 per hour in place of a cigarette. Limit to 20 a day - oral.Please dispense minis, thanks (Patient not taking: Reported on 12/7/2020) 162 lozenge 1    [DISCONTINUED] traMADol (ULTRAM) 50 mg tablet Take 1 tablet (50 mg total) by mouth every 8 (eight) hours as needed. (Patient not taking: Reported on 8/28/2020) 90 tablet 1    [DISCONTINUED] triamcinolone acetonide 0.1% (KENALOG) 0.1 % ointment KENDRA AA ON THE SKIN BID ON RASH ON LEGS  0     Current Facility-Administered Medications on File Prior to Visit   Medication Dose Route Frequency Provider Last Rate Last Dose    sodium chloride 0.9% flush 10 mL  10 mL Intravenous PRN Melany Tse MD           Objective:      BP (!) 178/84 (BP Location: Right arm, Patient Position: Sitting, BP Method: Medium (Automatic))   Pulse 97   Temp 98.3 °F (36.8 °C)   Ht 5' 2" (1.575 m)   Wt 84.7 kg (186 lb 11.2 oz)   BMI 34.15 kg/m²     Exam:  GEN:  Well developed, well nourished.  No acute distress.  Normal pain behavior.  HEENT:  No trauma.  Mucous membranes moist.  Nares patent bilaterally.  PSYCH: Normal affect. Thought content appropriate.  CHEST:  Breathing symmetric.  No audible wheezing.  ABD: Soft, non-distended.  SKIN:  Warm, pink, dry.  No rash on exposed areas.    EXT:  No cyanosis, clubbing, or edema.  No color change or changes in nail or hair growth.  NEURO/MUSCULOSKELETAL:  Fully alert, oriented, and appropriate. Speech normal janneth. No cranial nerve deficits.   Gait:  Antalgic.  No trendelenburg sign bilaterally.   Motor Strength:  5/5 motor strength throughout lower extremities.   Sensory:  No sensory deficit in the lower extremities. "   Reflexes:  1 + and symmetric throughout.  Downgoing Babinski's bilaterally.  No clonus or spasticity.  L-Spine:  Full ROM with pain on extension.  Positive pain with axial/facet loading bilaterally.  Negative SLR bilaterally.    Positive TTP over lumbar paraspinals, bilateral SI joints        Imaging:    Narrative & Impression    EXAMINATION:  XR LUMBAR SPINE 5 VIEW WITH FLEX AND EXT     CLINICAL HISTORY:  Low back pain, cauda equina syndrome suspected;  Lumbago with sciatica, left side     TECHNIQUE:  Five views of the lumbar spine plus flexion extension views were performed.     COMPARISON:  02/25/2014     FINDINGS:  The lumbar vertebra are intact.  No compression fracture or obvious paraspinal lesion is detected.  Degenerative changes are present with small osteophytes at most levels.  There is progressive disc space narrowing at L3-4; this level also shows development of a grade 1 anterolisthesis, stable between flexion and extension.  The other lumbar disc spaces show no significant narrowing, but lower thoracic disc spaces are narrowed, some with vacuum disc.     Postoperative findings from posterior instrumented fusion are again seen at L4 and 5 with bilateral pedicle screws, vertical connecting rods, and a device in the disc space.     Visualized abdomen shows aortic atherosclerosis.     IMPRESSION:      Stable postoperative findings of L4-5 fusion     Degenerative spine changes with interval worsening at L3-4.        Electronically signed by: Valentín Booker MD  Date:                                            01/23/2019  Time:                                           10:16         Assessment:       Encounter Diagnoses   Name Primary?    Lumbar pain     DDD (degenerative disc disease), lumbar Yes    Lumbar spondylosis     S/P lumbar fusion          Plan:       Stephanie was seen today for back pain, hip pain, rectal pain and knee pain.    Diagnoses and all orders for this visit:    DDD (degenerative  disc disease), lumbar  -     MRI Lumbar Spine W WO Contrast; Future  -     Creatinine, serum; Future    Lumbar pain  -     Ambulatory referral/consult to Pain Clinic    Lumbar spondylosis  -     MRI Lumbar Spine W WO Contrast; Future  -     Creatinine, serum; Future    S/P lumbar fusion  -     MRI Lumbar Spine W WO Contrast; Future  -     Creatinine, serum; Future        Stephanie Sears is a 70 y.o. female with chronic bilateral low back pain.  Pain appears to be axial.  History of L4-5 fusion in 2006.  Current symptoms most consistent with lower lumbar facet pain primarily at the L5-S1 level.  May also have some degree of sacroiliac joint pain bilaterally.  No overt symptoms consistent with radiculopathy or spinal stenosis at this time..    1.  Pertinent imaging studies reviewed by me. Imaging results were discussed with patient.  2.  Lumbar MRI with and without contrast better evaluate lumbar intervertebral discs and lumbar facet joints.  3.  Renal function panel prior to contrast MRI  4.  Return to clinic after MRI to review results.  May consider bilateral L4 and L5 medial branch blocks/RFA.  May also consider bilateral sacroiliac joint injections.

## 2020-12-07 NOTE — LETTER
December 7, 2020      Tiffany Garrison MD  [address] [place] [address]           [facility]  [address]  [place] [address]  Phone: [phone]  Fax: [phone]          Patient: Stephanie Sears   MR Number: [id number]   YOB: 1950   Date of Visit: 12/7/2020       Dear Dr. Tiffany Garrison:    Thank you for referring Stephanie Sears to me for evaluation. Attached you will find relevant portions of my assessment and plan of care.    If you have questions, please do not hesitate to call me. I look forward to following Stephanie Sears along with you.    Sincerely,    Yonatan Garsia Jr., MD    Enclosure  CC:  No Recipients    If you would like to receive this communication electronically, please contact [email or web address] or [phone] to request more information on [employer] access.    For providers and/or their staff who would like to refer a patient to [facility], please contact us through our one-stop-shop provider referral line, [facility], at [phone].    If you feel you have received this communication in error or would no longer like to receive these types of communications, please e-mail [email or web address]

## 2020-12-08 ENCOUNTER — TELEPHONE (OUTPATIENT)
Dept: OPTOMETRY | Facility: CLINIC | Age: 70
End: 2020-12-08

## 2020-12-08 LAB — SARS-COV-2 RNA RESP QL NAA+PROBE: NOT DETECTED

## 2020-12-08 NOTE — PRE-PROCEDURE INSTRUCTIONS
PREOP INSTRUCTIONS:    No solid food ,milk or milk products for 8 hours prior to procedure.Clear liquids are allowed up to 2 hours before procedure.Clear liquids are:water,apple juice,gatorade & powerade.Instructed to follow the surgeon's instructions if they differ from these.Shower instructions as well as directions to the Surgery Center were given.Encouraged to wear loose fitting,comfortable clothing.Medication instructions for pm prior to and am of procedure reviewed.Instructed to avoid taking vitamins,supplements,aspirin and ibuprofen the morning of surgery. Patient's questions and concerns addressed .Patient informed of the current visitor policy and advised patient that one visitor may accompany each patient into the hospital and wait (socially distanced) until a member of the medical team provides an update at the conclusion of the procedure.When they enter the hospital both patient and visitor will have their temperature checked.All visitors are asked to arrive with a mask and to keep their mask on throughout the visit.      Patient denies any side effects or issues with anesthesia or sedation.      0800 - ARRIVAL TIME TO ALTAGRACIA BOWDEN GIVEN PER MD'S OFFICE.    GRANDDAUGHTER - TERIONNE WILL BE PROVIDING TRANSPORTATION HOME UPON DISCHARGE.

## 2020-12-09 ENCOUNTER — ANESTHESIA EVENT (OUTPATIENT)
Dept: SURGERY | Facility: HOSPITAL | Age: 70
End: 2020-12-09
Payer: MEDICARE

## 2020-12-09 ENCOUNTER — HOSPITAL ENCOUNTER (OUTPATIENT)
Facility: HOSPITAL | Age: 70
Discharge: HOME | End: 2020-12-09
Attending: OPHTHALMOLOGY | Admitting: OPHTHALMOLOGY
Payer: MEDICARE

## 2020-12-09 ENCOUNTER — ANESTHESIA (OUTPATIENT)
Dept: SURGERY | Facility: HOSPITAL | Age: 70
End: 2020-12-09
Payer: MEDICARE

## 2020-12-09 VITALS
HEART RATE: 62 BPM | RESPIRATION RATE: 18 BRPM | TEMPERATURE: 98 F | DIASTOLIC BLOOD PRESSURE: 80 MMHG | BODY MASS INDEX: 34.37 KG/M2 | WEIGHT: 186.75 LBS | HEIGHT: 62 IN | OXYGEN SATURATION: 98 % | SYSTOLIC BLOOD PRESSURE: 156 MMHG

## 2020-12-09 DIAGNOSIS — H25.12 NUCLEAR SCLEROSIS, LEFT: ICD-10-CM

## 2020-12-09 DIAGNOSIS — Z48.810 POSTOPERATIVE CARE FOR CATARACT: ICD-10-CM

## 2020-12-09 DIAGNOSIS — Z98.49 POSTOPERATIVE CARE FOR CATARACT: ICD-10-CM

## 2020-12-09 DIAGNOSIS — H25.012 CORTICAL SENILE CATARACT, LEFT: ICD-10-CM

## 2020-12-09 LAB
POCT GLUCOSE: 92 MG/DL (ref 70–110)
POCT GLUCOSE: 96 MG/DL (ref 70–110)

## 2020-12-09 PROCEDURE — 66984 XCAPSL CTRC RMVL W/O ECP: CPT | Mod: 79,LT,, | Performed by: OPHTHALMOLOGY

## 2020-12-09 PROCEDURE — 66984 PR REMOVAL, CATARACT, W/INSRT INTRAOC LENS, W/O ENDO CYCLO: ICD-10-PCS | Mod: 79,LT,, | Performed by: OPHTHALMOLOGY

## 2020-12-09 PROCEDURE — 36000707: Performed by: OPHTHALMOLOGY

## 2020-12-09 PROCEDURE — 63600175 PHARM REV CODE 636 W HCPCS: Performed by: OPHTHALMOLOGY

## 2020-12-09 PROCEDURE — D9220A PRA ANESTHESIA: ICD-10-PCS | Mod: CRNA,,, | Performed by: NURSE ANESTHETIST, CERTIFIED REGISTERED

## 2020-12-09 PROCEDURE — 25000003 PHARM REV CODE 250: Performed by: OPHTHALMOLOGY

## 2020-12-09 PROCEDURE — 71000044 HC DOSC ROUTINE RECOVERY FIRST HOUR: Performed by: OPHTHALMOLOGY

## 2020-12-09 PROCEDURE — D9220A PRA ANESTHESIA: Mod: ANES,,, | Performed by: ANESTHESIOLOGY

## 2020-12-09 PROCEDURE — 37000008 HC ANESTHESIA 1ST 15 MINUTES: Performed by: OPHTHALMOLOGY

## 2020-12-09 PROCEDURE — 71000016 HC POSTOP RECOV ADDL HR: Performed by: OPHTHALMOLOGY

## 2020-12-09 PROCEDURE — 71000015 HC POSTOP RECOV 1ST HR: Performed by: OPHTHALMOLOGY

## 2020-12-09 PROCEDURE — 82962 GLUCOSE BLOOD TEST: CPT | Performed by: OPHTHALMOLOGY

## 2020-12-09 PROCEDURE — V2632 POST CHMBR INTRAOCULAR LENS: HCPCS | Performed by: OPHTHALMOLOGY

## 2020-12-09 PROCEDURE — 25000003 PHARM REV CODE 250: Performed by: NURSE ANESTHETIST, CERTIFIED REGISTERED

## 2020-12-09 PROCEDURE — 36000706: Performed by: OPHTHALMOLOGY

## 2020-12-09 PROCEDURE — D9220A PRA ANESTHESIA: Mod: CRNA,,, | Performed by: NURSE ANESTHETIST, CERTIFIED REGISTERED

## 2020-12-09 PROCEDURE — C1783 OCULAR IMP, AQUEOUS DRAIN DE: HCPCS | Performed by: OPHTHALMOLOGY

## 2020-12-09 PROCEDURE — 0191T PR INSERT ANT SEGMENT DRAIN WO RESERVOIR, INTERNAL APPR, INTO TRABECULAR: ICD-10-PCS | Mod: 79,LT,, | Performed by: OPHTHALMOLOGY

## 2020-12-09 PROCEDURE — D9220A PRA ANESTHESIA: ICD-10-PCS | Mod: ANES,,, | Performed by: ANESTHESIOLOGY

## 2020-12-09 PROCEDURE — 0191T PR INSERT ANT SEGMENT DRAIN WO RESERVOIR, INTERNAL APPR, INTO TRABECULAR: CPT | Mod: 79,LT,, | Performed by: OPHTHALMOLOGY

## 2020-12-09 PROCEDURE — 25000003 PHARM REV CODE 250

## 2020-12-09 PROCEDURE — 27201423 OPTIME MED/SURG SUP & DEVICES STERILE SUPPLY: Performed by: OPHTHALMOLOGY

## 2020-12-09 PROCEDURE — 63600175 PHARM REV CODE 636 W HCPCS: Performed by: NURSE ANESTHETIST, CERTIFIED REGISTERED

## 2020-12-09 PROCEDURE — 37000009 HC ANESTHESIA EA ADD 15 MINS: Performed by: OPHTHALMOLOGY

## 2020-12-09 DEVICE — LENS IOL ITEC PRELOAD 15.5D: Type: IMPLANTABLE DEVICE | Site: EYE | Status: FUNCTIONAL

## 2020-12-09 RX ORDER — PREDNISOLONE ACETATE 10 MG/ML
SUSPENSION/ DROPS OPHTHALMIC
Status: DISCONTINUED
Start: 2020-12-09 | End: 2020-12-09 | Stop reason: HOSPADM

## 2020-12-09 RX ORDER — FENTANYL CITRATE 50 UG/ML
INJECTION, SOLUTION INTRAMUSCULAR; INTRAVENOUS
Status: DISCONTINUED | OUTPATIENT
Start: 2020-12-09 | End: 2020-12-09

## 2020-12-09 RX ORDER — MOXIFLOXACIN 5 MG/ML
SOLUTION/ DROPS OPHTHALMIC
Status: DISCONTINUED | OUTPATIENT
Start: 2020-12-09 | End: 2020-12-09 | Stop reason: HOSPADM

## 2020-12-09 RX ORDER — FENTANYL CITRATE 50 UG/ML
25 INJECTION, SOLUTION INTRAMUSCULAR; INTRAVENOUS EVERY 5 MIN PRN
Status: DISCONTINUED | OUTPATIENT
Start: 2020-12-09 | End: 2020-12-09 | Stop reason: HOSPADM

## 2020-12-09 RX ORDER — SODIUM CHLORIDE 9 MG/ML
INJECTION, SOLUTION INTRAVENOUS CONTINUOUS PRN
Status: DISCONTINUED | OUTPATIENT
Start: 2020-12-09 | End: 2020-12-09

## 2020-12-09 RX ORDER — LIDOCAINE HYDROCHLORIDE 20 MG/ML
JELLY TOPICAL
Status: DISCONTINUED
Start: 2020-12-09 | End: 2020-12-09 | Stop reason: HOSPADM

## 2020-12-09 RX ORDER — MOXIFLOXACIN 5 MG/ML
1 SOLUTION/ DROPS OPHTHALMIC
Status: DISCONTINUED | OUTPATIENT
Start: 2020-12-09 | End: 2020-12-09 | Stop reason: HOSPADM

## 2020-12-09 RX ORDER — LIDOCAINE HYDROCHLORIDE 40 MG/ML
INJECTION, SOLUTION RETROBULBAR
Status: DISCONTINUED
Start: 2020-12-09 | End: 2020-12-09 | Stop reason: HOSPADM

## 2020-12-09 RX ORDER — PHENYLEPHRINE HCL IN 0.9% NACL 1 MG/10 ML
SYRINGE (ML) INTRAVENOUS
Status: DISCONTINUED | OUTPATIENT
Start: 2020-12-09 | End: 2020-12-09

## 2020-12-09 RX ORDER — LIDOCAINE HYDROCHLORIDE 40 MG/ML
INJECTION, SOLUTION RETROBULBAR
Status: DISCONTINUED | OUTPATIENT
Start: 2020-12-09 | End: 2020-12-09 | Stop reason: HOSPADM

## 2020-12-09 RX ORDER — PHENYLEPHRINE HYDROCHLORIDE 100 MG/ML
1 SOLUTION/ DROPS OPHTHALMIC
Status: DISCONTINUED | OUTPATIENT
Start: 2020-12-09 | End: 2020-12-09 | Stop reason: HOSPADM

## 2020-12-09 RX ORDER — LIDOCAINE HYDROCHLORIDE 10 MG/ML
INJECTION, SOLUTION EPIDURAL; INFILTRATION; INTRACAUDAL; PERINEURAL
Status: DISCONTINUED | OUTPATIENT
Start: 2020-12-09 | End: 2020-12-09 | Stop reason: HOSPADM

## 2020-12-09 RX ORDER — DIPHENHYDRAMINE HYDROCHLORIDE 50 MG/ML
25 INJECTION INTRAMUSCULAR; INTRAVENOUS EVERY 6 HOURS PRN
Status: DISCONTINUED | OUTPATIENT
Start: 2020-12-09 | End: 2020-12-09 | Stop reason: HOSPADM

## 2020-12-09 RX ORDER — KETOROLAC TROMETHAMINE 5 MG/ML
1 SOLUTION OPHTHALMIC
Status: DISCONTINUED | OUTPATIENT
Start: 2020-12-09 | End: 2020-12-09 | Stop reason: HOSPADM

## 2020-12-09 RX ORDER — LIDOCAINE HYDROCHLORIDE 10 MG/ML
INJECTION, SOLUTION EPIDURAL; INFILTRATION; INTRACAUDAL; PERINEURAL
Status: DISCONTINUED
Start: 2020-12-09 | End: 2020-12-09 | Stop reason: HOSPADM

## 2020-12-09 RX ORDER — LIDOCAINE HYDROCHLORIDE 20 MG/ML
INJECTION, SOLUTION EPIDURAL; INFILTRATION; INTRACAUDAL; PERINEURAL
Status: DISCONTINUED | OUTPATIENT
Start: 2020-12-09 | End: 2020-12-09

## 2020-12-09 RX ORDER — MIDAZOLAM HYDROCHLORIDE 1 MG/ML
INJECTION, SOLUTION INTRAMUSCULAR; INTRAVENOUS
Status: DISCONTINUED | OUTPATIENT
Start: 2020-12-09 | End: 2020-12-09

## 2020-12-09 RX ORDER — PREDNISOLONE ACETATE 10 MG/ML
SUSPENSION/ DROPS OPHTHALMIC
Status: DISCONTINUED | OUTPATIENT
Start: 2020-12-09 | End: 2020-12-09 | Stop reason: HOSPADM

## 2020-12-09 RX ORDER — SUCCINYLCHOLINE CHLORIDE 20 MG/ML
INJECTION INTRAMUSCULAR; INTRAVENOUS
Status: DISCONTINUED | OUTPATIENT
Start: 2020-12-09 | End: 2020-12-09

## 2020-12-09 RX ORDER — PROPOFOL 10 MG/ML
VIAL (ML) INTRAVENOUS
Status: DISCONTINUED | OUTPATIENT
Start: 2020-12-09 | End: 2020-12-09

## 2020-12-09 RX ORDER — HYDROMORPHONE HYDROCHLORIDE 1 MG/ML
0.2 INJECTION, SOLUTION INTRAMUSCULAR; INTRAVENOUS; SUBCUTANEOUS EVERY 5 MIN PRN
Status: DISCONTINUED | OUTPATIENT
Start: 2020-12-09 | End: 2020-12-09 | Stop reason: HOSPADM

## 2020-12-09 RX ORDER — ONDANSETRON 2 MG/ML
4 INJECTION INTRAMUSCULAR; INTRAVENOUS ONCE AS NEEDED
Status: DISCONTINUED | OUTPATIENT
Start: 2020-12-09 | End: 2020-12-09 | Stop reason: HOSPADM

## 2020-12-09 RX ORDER — TROPICAMIDE 10 MG/ML
1 SOLUTION/ DROPS OPHTHALMIC
Status: DISCONTINUED | OUTPATIENT
Start: 2020-12-09 | End: 2020-12-09 | Stop reason: HOSPADM

## 2020-12-09 RX ORDER — ROCURONIUM BROMIDE 10 MG/ML
INJECTION, SOLUTION INTRAVENOUS
Status: DISCONTINUED | OUTPATIENT
Start: 2020-12-09 | End: 2020-12-09

## 2020-12-09 RX ORDER — ONDANSETRON 2 MG/ML
INJECTION INTRAMUSCULAR; INTRAVENOUS
Status: DISCONTINUED | OUTPATIENT
Start: 2020-12-09 | End: 2020-12-09

## 2020-12-09 RX ORDER — ACETAMINOPHEN 325 MG/1
650 TABLET ORAL EVERY 6 HOURS PRN
Status: CANCELLED | OUTPATIENT
Start: 2020-12-09

## 2020-12-09 RX ADMIN — MOXIFLOXACIN 1 DROP: 5 SOLUTION/ DROPS OPHTHALMIC at 09:12

## 2020-12-09 RX ADMIN — PHENYLEPHRINE HYDROCHLORIDE 1 DROP: 100 SOLUTION/ DROPS OPHTHALMIC at 09:12

## 2020-12-09 RX ADMIN — KETOROLAC TROMETHAMINE 1 DROP: 5 SOLUTION/ DROPS OPHTHALMIC at 09:12

## 2020-12-09 RX ADMIN — PROPOFOL 120 MG: 10 INJECTION, EMULSION INTRAVENOUS at 09:12

## 2020-12-09 RX ADMIN — MIDAZOLAM 2 MG: 1 INJECTION INTRAMUSCULAR; INTRAVENOUS at 09:12

## 2020-12-09 RX ADMIN — FENTANYL CITRATE 25 MCG: 50 INJECTION INTRAMUSCULAR; INTRAVENOUS at 10:12

## 2020-12-09 RX ADMIN — FENTANYL CITRATE 25 MCG: 50 INJECTION INTRAMUSCULAR; INTRAVENOUS at 09:12

## 2020-12-09 RX ADMIN — TROPICAMIDE 1 DROP: 10 SOLUTION/ DROPS OPHTHALMIC at 09:12

## 2020-12-09 RX ADMIN — SODIUM CHLORIDE: 9 INJECTION, SOLUTION INTRAVENOUS at 09:12

## 2020-12-09 RX ADMIN — LIDOCAINE HYDROCHLORIDE 100 MG: 20 INJECTION, SOLUTION EPIDURAL; INFILTRATION; INTRACAUDAL at 10:12

## 2020-12-09 RX ADMIN — SUCCINYLCHOLINE CHLORIDE 120 MG: 20 INJECTION, SOLUTION INTRAMUSCULAR; INTRAVENOUS; PARENTERAL at 09:12

## 2020-12-09 RX ADMIN — ROCURONIUM BROMIDE 10 MG: 10 INJECTION, SOLUTION INTRAVENOUS at 09:12

## 2020-12-09 RX ADMIN — Medication 100 MCG: at 10:12

## 2020-12-09 RX ADMIN — PROPOFOL 40 MG: 10 INJECTION, EMULSION INTRAVENOUS at 10:12

## 2020-12-09 RX ADMIN — ONDANSETRON 4 MG: 2 INJECTION INTRAMUSCULAR; INTRAVENOUS at 10:12

## 2020-12-09 NOTE — DISCHARGE SUMMARY
[facility]  Discharge Note  Short Stay    Procedure(s) (LRB):  PHACOEMULSIFICATION, CATARACT (Left)  INSERTION, IOL PROSTHESIS (Left)  INSERTION, DEVICE, FOR GLAUCOMA/I SENT (Left)    OUTCOME: Patient tolerated treatment/procedure well without complication and is now ready for discharge.    DISPOSITION: Home or Self Care    FINAL DIAGNOSIS:  <principal problem not specified>    FOLLOWUP: In clinic    DISCHARGE INSTRUCTIONS:  No discharge procedures on file.

## 2020-12-09 NOTE — ANESTHESIA POSTPROCEDURE EVALUATION
Anesthesia Post Evaluation    Patient: Stephanie Sears    Procedure(s) Performed: Procedure(s) (LRB):  PHACOEMULSIFICATION, CATARACT (Left)  INSERTION, IOL PROSTHESIS (Left)  INSERTION, DEVICE, FOR GLAUCOMA/I SENT (Left)    Final Anesthesia Type: general    Patient location during evaluation: PACU  Patient participation: Yes- Able to Participate  Level of consciousness: awake and alert  Post-procedure vital signs: reviewed and stable  Pain management: adequate  Airway patency: patent    PONV status at discharge: No PONV  Anesthetic complications: no      Cardiovascular status: hemodynamically stable  Respiratory status: unassisted  Hydration status: euvolemic  Follow-up not needed.          Vitals Value Taken Time   /79 12/09/20 1203   Temp 36.7 °C (98 °F) 12/09/20 1200   Pulse 64 12/09/20 1202   Resp 18 12/09/20 1200   SpO2 99 % 12/09/20 1202   Vitals shown include unvalidated device data.      No case tracking events are documented in the log.      Pain/Som Score: Som Score: 9 (12/9/2020 10:53 AM)

## 2020-12-09 NOTE — TRANSFER OF CARE
"Anesthesia Transfer of Care Note    Patient: Stephanie Sears    Procedure(s) Performed: Procedure(s) (LRB):  PHACOEMULSIFICATION, CATARACT (Left)  INSERTION, IOL PROSTHESIS (Left)  INSERTION, DEVICE, FOR GLAUCOMA/I SENT (Left)    Patient location: [facility]    Anesthesia Type: general    Transport from OR: Transported from OR on 6-10 L/min O2 by face mask with adequate spontaneous ventilation    Post pain: adequate analgesia    Post assessment: no apparent anesthetic complications    Post vital signs: stable    Level of consciousness: awake and alert    Nausea/Vomiting: no nausea/vomiting    Complications: none    Transfer of care protocol was followed      Last vitals:   Visit Vitals  BP (!) 155/71 (BP Location: Right arm, Patient Position: Lying)   Pulse (!) 58   Temp 36.9 °C (98.4 °F) (Oral)   Resp 18   Ht 5' 2" (1.575 m)   Wt 84.7 kg (186 lb 11.7 oz)   SpO2 97%   Breastfeeding No   BMI 34.15 kg/m²     "

## 2020-12-09 NOTE — OP NOTE
DATE OF PROCEDURE: 12/9/2020    PREOPERATIVE DIAGNOSIS:1. Nuclear sclerotic  Cataract - left eye // Primary open angle glaucoma - left eye - mild stage > OS.     POSTOPERATIVE DIAGNOSIS:1.  NS Cataract// // 2. POAG - left - mild stage >OS, status post procedure.     PROCEDURE PERFORMED: 1. Cataract extraction with trypan blue and  phacoemulsification, posterior   chamber intraocular lens placement. 2. Trabecular byass with I-stent inject OS     SURGEON: Melany Tse M.D.     ASSISTANT: Emily Gonsalez MD      COMPLICATIONS: None.     BLOOD LOSS: Less than 5 mL.     ANESTHESIA: General    IMPLANT DATA:   1. Mendes PCB00, power 15.5 diopters, serial #    PROCEDURE IN DETAIL: After informed consent including risks, benefits,   alternatives, the patient was brought to the operating room table, placed in   supine position. General anesthesia care was used throughout the entire   procedure. Once adequate anesthesia was confirmed, the patient was then prepped   and draped in usual sterile fashion for intraocular surgery. Wire speculum was   used to hold the eyelids apart and the procedure was initiated by making   a partial thickness corneal wound with a suzette blade temporally.   A supersharp blade was then used to make a second entry into the eye via a paracentesis incision.  Intracameral lidocaine followed by trypan blue, follwed by Viscoat were placed in the anterior chamber.   A 2.4 mm blade was then used to make to complete the clear corneal   incision temporally.    Next attention was directed to the trabecular by pass portion of the surgery.  The head was turned and the microscope tilted. A gonio prism was used to visualize the nasal angle.  The I-stent was injected into the trabecular meshwork. The initial one was inferonasal, the second was super-nasal .  Both appeared to be in good position.    Next the head and microscope were returned to their initial position and the cataract surgery was continued.      A cystotome was used to make a tear in   the anterior capsular flap, which was continued around with Utrata forceps for   continuous curvilinear capsulorrhexis. BSS on a Best cannula was then used for   hydrodissection and hydrodelineation of lens. The lens was noted to spin   freely in the bag. Phacoemulsification handpiece was then used to remove the   lens in a divide-and-conquer manner. Irrigation aspiration handpiece removed   the remaining cortical material. Provisc was placed in the eye followed by the   lens as mentioned above without any complications. Once the lens was in proper   position, irrigation aspiration handpiece was used to remove the remaining Provisc. The   wounds were then hydrated with BSS and noted to be watertight. The eye had a   good physiological pressure and the lid speculum was removed under the   microscope without any shallowing. The eye was then covered with a collagen   shield soaked in Pred Forte and Vigamox and turned over to Anesthesia in stable   condition after placement of patch and shield.

## 2020-12-09 NOTE — PLAN OF CARE
Pt and family given dc instructions. Pt with no c/o pain. Pt and family reminded of tomorrow appt.

## 2020-12-09 NOTE — ANESTHESIA PREPROCEDURE EVALUATION
12/09/2020  Stephanie Sears is a 70 y.o., female   Patient Active Problem List   Diagnosis    GERD (gastroesophageal reflux disease)    Primary osteoarthritis of both knees    Cervical spondylosis with radiculopathy    Open angle with borderline findings, low risk - Both Eyes    Nontoxic uninodular goiter    Myopia with astigmatism and presbyopia - Both Eyes    Diet-controlled diabetes mellitus    Asymptomatic proteinuria    Lumbar spondylosis    Osteoarthritis of left hip, mild    DJD (degenerative joint disease) of cervical spine    Obesity    Essential hypertension    Severe obesity (BMI 35.0-39.9) with comorbidity    Tobacco abuse    H/O scarlet fever    Aortic valve sclerosis    Pulmonary hypertension    Diastolic dysfunction    Status post total knee replacement, left 1/20/2016    CMC arthritis    Chronic midline thoracic back pain    Primary osteoarthritis of right knee    Hand pain, left    JAM (obstructive sleep apnea)    Personal history of spine surgery    Fatty liver    UTI (urinary tract infection)    Status post total right knee replacement 2/26/2018    Dyslipidemia    Preoperative cardiovascular examination    Bilateral carotid bruits    Atherosclerosis of aortic arch    Insomnia    Lichen planus    History of colon polyps    Acute pancreatitis without infection or necrosis    Arthritis of carpometacarpal (CMC) joint of left thumb    Greater trochanteric bursitis of both hips    Controlled type 2 diabetes mellitus with stage 2 chronic kidney disease, with long-term current use of insulin    Hypoalbuminemia due to protein-calorie malnutrition    Nuclear sclerotic cataract of right eye    Primary open-angle glaucoma, bilateral, mild stage    S/P lumbar fusion    DDD (degenerative disc disease), lumbar     .    Anesthesia Evaluation          Review of  Systems      Physical Exam   Airway/Jaw/Neck:  Airway Findings: Mouth Opening: Normal Tongue: Normal  General Airway Assessment: Adult  Mallampati: II  Improves to II with phonation.  TM Distance: Normal, at least 6 cm      Dental:  No active dental problems.    Chest/Lungs:  Chest/Lungs Findings: Clear to auscultation     Heart/Vascular:  Heart Findings: Rate: Normal  Rhythm: Regular Rhythm  Sounds: Normal        Mental Status:  Mental Status Findings:  Cooperative, Alert and Oriented         Anesthesia Plan  Type of Anesthesia, risks & benefits discussed:  Anesthesia Type:  general  Patient's Preference:   Intra-op Monitoring Plan: standard ASA monitors  Intra-op Monitoring Plan Comments:   Post Op Pain Control Plan:   Post Op Pain Control Plan Comments:   Induction:   IV  Beta Blocker:         Informed Consent: Patient understands risks and agrees with Anesthesia plan.  Questions answered. Anesthesia consent signed with patient.  ASA Score: 3     Day of Surgery Review of History & Physical:        Anesthesia Plan Notes: Chart reviewed, patient interviewed and examined.  The plan for anesthesia for this case was discussed.  Questions were answered and the consent was signed.  Khai Lopez M.D.         Ready For Surgery From Anesthesia Perspective.

## 2020-12-09 NOTE — INTERVAL H&P NOTE
The patient has been examined and the H&P has been reviewed:    I concur with the findings and no changes have occurred since H&P was written.    Anesthesia/Surgery risks, benefits and alternative options discussed and understood by patient/family.          Active Hospital Problems    Diagnosis  POA    Postoperative care for cataract [Z48.810, Z98.49]  Not Applicable      Resolved Hospital Problems   No resolved problems to display.

## 2020-12-10 ENCOUNTER — OFFICE VISIT (OUTPATIENT)
Dept: OPHTHALMOLOGY | Facility: CLINIC | Age: 70
End: 2020-12-10
Payer: MEDICARE

## 2020-12-10 DIAGNOSIS — H40.1131 PRIMARY OPEN-ANGLE GLAUCOMA, BILATERAL, MILD STAGE: ICD-10-CM

## 2020-12-10 DIAGNOSIS — Z48.810 POSTOPERATIVE CARE FOR CATARACT: Primary | ICD-10-CM

## 2020-12-10 DIAGNOSIS — Z98.49 POSTOPERATIVE CARE FOR CATARACT: Primary | ICD-10-CM

## 2020-12-10 DIAGNOSIS — Z96.1 PSEUDOPHAKIA, BOTH EYES: ICD-10-CM

## 2020-12-10 PROCEDURE — 99024 PR POST-OP FOLLOW-UP VISIT: ICD-10-PCS | Mod: POP,,, | Performed by: OPHTHALMOLOGY

## 2020-12-10 PROCEDURE — 99024 POSTOP FOLLOW-UP VISIT: CPT | Mod: POP,,, | Performed by: OPHTHALMOLOGY

## 2020-12-10 PROCEDURE — 99999 PR PBB SHADOW E&M-EST. PATIENT-LVL III: ICD-10-PCS | Mod: PBBFAC,,, | Performed by: OPHTHALMOLOGY

## 2020-12-10 PROCEDURE — 99999 PR PBB SHADOW E&M-EST. PATIENT-LVL III: CPT | Mod: PBBFAC,,, | Performed by: OPHTHALMOLOGY

## 2020-12-10 PROCEDURE — 99213 OFFICE O/P EST LOW 20 MIN: CPT | Mod: PBBFAC | Performed by: OPHTHALMOLOGY

## 2020-12-10 RX ORDER — PREDNISOLONE ACETATE 10 MG/ML
1 SUSPENSION/ DROPS OPHTHALMIC 4 TIMES DAILY
COMMUNITY
Start: 2020-12-10 | End: 2020-12-17 | Stop reason: SDUPTHER

## 2020-12-10 RX ORDER — MOXIFLOXACIN 5 MG/ML
1 SOLUTION/ DROPS OPHTHALMIC 4 TIMES DAILY
COMMUNITY
Start: 2020-12-10 | End: 2020-12-17

## 2020-12-10 NOTE — PROGRESS NOTES
"HPI     DLS: 11/17/20    Pt here for 1 day post phaco w/IOL/ with I-stent x 2  OS- 12/09/20    1. POAG - ou - mild vs suspect with OHT   2. Nuclear sclerosis OS   3. Posterior vitreous detachment, right   4. Myopia with astigmatism and presbyopia, bilateral     Glaucoma gtts - none - intolerant to all gtts - S/P ALT ou // SLT od     pred acetate - 1 x day od     Last edited by Melany Tse MD on 12/10/2020 10:07 AM. (History)            Assessment /Plan     For exam results, see Encounter Report.    Postoperative care for cataract    Primary open-angle glaucoma, bilateral, mild stage    Pseudophakia, both eyes          1. Pre-perimetric mild POAG   Vs OHT  -Followed at [facility] since 1991   -First HVF 1999   -First photos 1991   - Intolerant to all gtts - they aggravate his blepharitis-? ARGHU allergy   -IOP "OK" off gtts and s/p ALT ou and SLT od - 2008    Family history neg   Glaucoma meds none (( off gtts post SLT ou -))   H/O adverse rxn to glaucoma drops Intolerant to all gtts - 2/2 aggravates blepharitis- RAGHU allergy   LASERS ALT ou -? Date / SLT OD 7/17/08 - good response   GLAUCOMA SURGERIES - I stent x 2 - OD  - 10/21/2020   OTHER EYE SURGERIES - phaco/IOL od - 10/21/2020 - PCB00 14.0   CDR 0.6/0.3   Tbase 19-26 / 16-21   Tmax 26/21   Ttarget ?   HVF 14 test - 1999 to 2020 - Full ou   Gonio +3 ou   /588   OCT 5 test 2005 to 2020 -  RNFL - OD:NL // OS:NL  HRT 9 test 2004 to 2020 -MR -  MR -  Dec T, border NI od // full os /// CDR 0.619 od // 0.508 os - but unreliable OD  Disc photos 1991, 1996, 2003 - slides // 2012 , 2016, 2020   - OIS     - Ttoday  - ?? / 15   - Test done today S/P phaco / IOL / I-stent os - 12/10/2020    Post -op phaco.IOL - istent - x 2  OD - PCB00 - 14.0 - 10/21/2020     2. Nuclear Sclerosis    VS - BATh 20/50 od // 20/60 os -    Pt C/O glare / nicola at night     3. Posterior Vitreous Detachment OD - 11/2008   - RD PRECAUTIONS    4. Eyelid inflammation / Blepharitis / " MGD    5. Allergic Conjunctivitis - RAGHU allergy     6. Myopia/Astigmatism/presbyopia     Plan   POAG - mild -pre-perimetric glaucoma - intolerant to all gtts   IOP ok s/p ALT ou - years ago and s/p SLT OD 2008 ( no SLT os)  Continue to monitor HVF/DFE/OCT/HRT/photos/IOP   If increase IOP or progression on VF testing consider repeat SLT OD and primary SLT os      Pt is a myope - sph eq is -5.25 od and -4.25 os   - discuss IOL options (? Aim for emmetropia to -0.50 or keep pt more myopic at about -2.50 for near vision or a -1.50 for mid range)   Pt is use to wearing her glasses all the time - even to sleep   - pt uses the computer a lot - ? would like to be set for dist to mid range - ? About a -1.00 to -1.50 -so can do some computer work without glasses     IOL OD -pt want to have IOL for distance vision - is no longer uing a computer much - has retired   PCB00  14.0  (-0.7 to -0.81)  AC IOL 11.5    IOL OS   PCB00 15.0 ( -0.35 to -0.59)  AC IOL - 12.5       S/P  cataract surgery  - phaco/IOL w/ I-stent x 2 OD - 10/21/2020 - PCB00 - 14.0   POW # 6 - phaco/IOL - I stent  x 2   Doing well  Begin Pred Acetate  -cont  q day       Phaco / IOL / I-stent  OS Date: 12/9/2020 - PCB00 15.0   POD # 1 - phaco/IOL   Doing well  Begin Pred Acetate QID   Begin vigamox QID  Shield at night  Glasses day  No lifting, no bending, no eye rubbing  F/U 1 week, AR/MR ou

## 2020-12-15 NOTE — PROGRESS NOTES
"HPI     Post-op Evaluation      Additional comments: Pt states no complaints this morning              Comments     -S/p Phaco IOL with I-stent OS 12/9/20  -S/p Phaco IOL with I-stent x 2 OD 10/21/20    MEDS:  PA QID OS  Vigamox QID OS  PA QDAY OD          Last edited by Alina Olivares MA on 12/17/2020  8:12 AM. (History)              Assessment /Plan     For exam results, see Encounter Report.    Postoperative care for cataract  -     prednisoLONE acetate (PRED FORTE) 1 % DrpS; Place 1 drop into the left eye 3 (three) times daily.  Dispense: 10 mL; Refill: 0    Primary open-angle glaucoma, bilateral, mild stage    Pseudophakia, both eyes        1. Pre-perimetric mild POAG   Vs OHT  -Followed at [facility] since 1991   -First HVF 1999   -First photos 1991   - Intolerant to all gtts - they aggravate his blepharitis-? RAGHU allergy   -IOP "OK" off gtts and s/p ALT ou and SLT od - 2008    Family history neg   Glaucoma meds none (( off gtts post SLT ou -))   H/O adverse rxn to glaucoma drops Intolerant to all gtts - 2/2 aggravates blepharitis- RAGHU allergy   LASERS ALT ou -? Date / SLT OD 7/17/08 - good response   GLAUCOMA SURGERIES - I stent x 2 - OD  - 10/21/2020   OTHER EYE SURGERIES - phaco/IOL od - 10/21/2020 - PCB00 14.0   CDR 0.6/0.3   Tbase 19-26 / 16-21   Tmax 26/21   Ttarget ?   HVF 14 test - 1999 to 2020 - Full ou   Gonio +3 ou   /588   OCT 5 test 2005 to 2020 -  RNFL - OD:NL // OS:NL  HRT 9 test 2004 to 2020 -MR -  MR -  Dec T, border NI od // full os /// CDR 0.619 od // 0.508 os - but unreliable OD  Disc photos 1991, 1996, 2003 - slides // 2012 , 2016, 2020   - OIS     - Ttoday  -18/16  - Test done today S/P phaco / IOL / I-stent os - 12/10/2020    Post -op phaco.IOL - istent - x 2  OD - PCB00 - 14.0 - 10/21/2020       2. Posterior Vitreous Detachment OD - 11/2008   - RD PRECAUTIONS    3. Eyelid inflammation / Blepharitis / MGD    4. Allergic Conjunctivitis - RAGHU allergy     5. Myopia/Astigmatism/presbyopia "     Plan   POAG - mild -pre-perimetric glaucoma - intolerant to all gtts   IOP ok s/p ALT ou - years ago and s/p SLT OD 2008 ( no SLT os)  Continue to monitor HVF/DFE/OCT/HRT/photos/IOP   If increase IOP or progression on VF testing consider repeat SLT OD and primary SLT os      Pt is a myope - sph eq is -5.25 od and -4.25 os   - discuss IOL options (? Aim for emmetropia to -0.50 or keep pt more myopic at about -2.50 for near vision or a -1.50 for mid range)   Pt is use to wearing her glasses all the time - even to sleep   - pt uses the computer a lot - ? would like to be set for dist to mid range - ? About a -1.00 to -1.50 -so can do some computer work without glasses     IOL OD -pt want to have IOL for distance vision - is no longer uing a computer much - has retired   PCB00  14.0  (-0.7 to -0.81)  AC IOL 11.5    IOL OS   PCB00 15.0 ( -0.35 to -0.59)  AC IOL - 12.5       S/P  cataract surgery  - phaco/IOL w/ I-stent x 2 OD - 10/21/2020 - PCB00 - 14.0   POW # 7 - phaco/IOL - I stent  x 2   Doing well  Begin Pred Acetate  -cont  q day - WILL PLAN ON STOPPING AT NEXT VISIT       Phaco / IOL / I-stent  OS Date: 12/9/2020 - PCB00 15.0   POW # 1 - phaco/IOL   Doing well  Begin Pred Acetate QID - decrease to tid for 2 weeks then 2 x day  - until next visit  - 3 weeks form now     Shield at night - 1 more week   Glasses day  No lifting, no bending, no eye rubbing    F/U 3 week, AR/MR ou and OCT macula and ? Gonio to look at I-stent placement

## 2020-12-17 ENCOUNTER — OFFICE VISIT (OUTPATIENT)
Dept: OPHTHALMOLOGY | Facility: CLINIC | Age: 70
End: 2020-12-17
Payer: MEDICARE

## 2020-12-17 ENCOUNTER — HOSPITAL ENCOUNTER (OUTPATIENT)
Dept: RADIOLOGY | Facility: HOSPITAL | Age: 70
Discharge: HOME | End: 2020-12-17
Attending: PAIN MEDICINE
Payer: MEDICARE

## 2020-12-17 DIAGNOSIS — Z48.810 POSTOPERATIVE CARE FOR CATARACT: Primary | ICD-10-CM

## 2020-12-17 DIAGNOSIS — Z98.49 POSTOPERATIVE CARE FOR CATARACT: Primary | ICD-10-CM

## 2020-12-17 DIAGNOSIS — H40.1131 PRIMARY OPEN-ANGLE GLAUCOMA, BILATERAL, MILD STAGE: ICD-10-CM

## 2020-12-17 DIAGNOSIS — M47.816 LUMBAR SPONDYLOSIS: ICD-10-CM

## 2020-12-17 DIAGNOSIS — Z98.1 S/P LUMBAR FUSION: ICD-10-CM

## 2020-12-17 DIAGNOSIS — M51.36 DDD (DEGENERATIVE DISC DISEASE), LUMBAR: ICD-10-CM

## 2020-12-17 DIAGNOSIS — Z96.1 PSEUDOPHAKIA, BOTH EYES: ICD-10-CM

## 2020-12-17 PROCEDURE — 72148 MRI LUMBAR SPINE W/O DYE: CPT | Mod: 26,,, | Performed by: RADIOLOGY

## 2020-12-17 PROCEDURE — 99212 OFFICE O/P EST SF 10 MIN: CPT | Mod: PBBFAC,25 | Performed by: OPHTHALMOLOGY

## 2020-12-17 PROCEDURE — 99999 PR PBB SHADOW E&M-EST. PATIENT-LVL II: CPT | Mod: PBBFAC,,, | Performed by: OPHTHALMOLOGY

## 2020-12-17 PROCEDURE — 99024 PR POST-OP FOLLOW-UP VISIT: ICD-10-PCS | Mod: POP,,, | Performed by: OPHTHALMOLOGY

## 2020-12-17 PROCEDURE — 99024 POSTOP FOLLOW-UP VISIT: CPT | Mod: POP,,, | Performed by: OPHTHALMOLOGY

## 2020-12-17 PROCEDURE — 99999 PR PBB SHADOW E&M-EST. PATIENT-LVL II: ICD-10-PCS | Mod: PBBFAC,,, | Performed by: OPHTHALMOLOGY

## 2020-12-17 PROCEDURE — 72148 MRI LUMBAR SPINE WITHOUT CONTRAST: ICD-10-PCS | Mod: 26,,, | Performed by: RADIOLOGY

## 2020-12-17 PROCEDURE — 72148 MRI LUMBAR SPINE W/O DYE: CPT | Mod: TC

## 2020-12-17 RX ORDER — PREDNISOLONE ACETATE 10 MG/ML
1 SUSPENSION/ DROPS OPHTHALMIC 3 TIMES DAILY
Qty: 10 ML | Refills: 0 | Status: SHIPPED | OUTPATIENT
Start: 2020-12-17 | End: 2021-07-23 | Stop reason: ALTCHOICE

## 2020-12-21 ENCOUNTER — OFFICE VISIT (OUTPATIENT)
Dept: PAIN MEDICINE | Facility: CLINIC | Age: 70
End: 2020-12-21
Payer: MEDICARE

## 2020-12-21 VITALS
BODY MASS INDEX: 34.78 KG/M2 | SYSTOLIC BLOOD PRESSURE: 158 MMHG | HEART RATE: 64 BPM | HEIGHT: 62 IN | DIASTOLIC BLOOD PRESSURE: 73 MMHG | OXYGEN SATURATION: 99 % | WEIGHT: 189 LBS | TEMPERATURE: 99 F

## 2020-12-21 DIAGNOSIS — M70.62 GREATER TROCHANTERIC BURSITIS OF BOTH HIPS: Primary | ICD-10-CM

## 2020-12-21 DIAGNOSIS — M70.70 BURSITIS, ISCHIAL, UNSPECIFIED LATERALITY: ICD-10-CM

## 2020-12-21 DIAGNOSIS — M47.816 LUMBAR SPONDYLOSIS: ICD-10-CM

## 2020-12-21 DIAGNOSIS — Z98.1 S/P LUMBAR FUSION: ICD-10-CM

## 2020-12-21 DIAGNOSIS — M70.61 GREATER TROCHANTERIC BURSITIS OF BOTH HIPS: Primary | ICD-10-CM

## 2020-12-21 DIAGNOSIS — M51.36 DDD (DEGENERATIVE DISC DISEASE), LUMBAR: ICD-10-CM

## 2020-12-21 PROCEDURE — 99999 PR PBB SHADOW E&M-EST. PATIENT-LVL V: ICD-10-PCS | Mod: PBBFAC,,, | Performed by: PAIN MEDICINE

## 2020-12-21 PROCEDURE — 99215 OFFICE O/P EST HI 40 MIN: CPT | Mod: PBBFAC,PN | Performed by: PAIN MEDICINE

## 2020-12-21 PROCEDURE — 99214 PR OFFICE/OUTPT VISIT, EST, LEVL IV, 30-39 MIN: ICD-10-PCS | Mod: S$PBB,,, | Performed by: PAIN MEDICINE

## 2020-12-21 PROCEDURE — 99999 PR PBB SHADOW E&M-EST. PATIENT-LVL V: CPT | Mod: PBBFAC,,, | Performed by: PAIN MEDICINE

## 2020-12-21 PROCEDURE — 99214 OFFICE O/P EST MOD 30 MIN: CPT | Mod: S$PBB,,, | Performed by: PAIN MEDICINE

## 2020-12-21 NOTE — PROGRESS NOTES
Subjective:     Patient ID: Stephanie Sears is a 70 y.o. female    Chief Complaint: Low-back Pain (F/U post MRI)      Referred by: No ref. provider found      HPI:    Interval History (12/21/20):  She returns today for follow up and imaging review.  She reports that she continues to have pain mainly surrounding the bilateral hip girdles.  She states that she has focal pain in the bilateral gluteal regions when sitting upright.  She also has pain in the bilateral lateral hip regions.  She has undergone multiple greater trochanteric bursa steroid injections in the past.  These were initially effective, but gradually provided less relief with subsequent injections.  She continues to deny any significant low back pain.        Initial Encounter (12/7/20):  Stephanie Sears is a 70 y.o. female who presents today with chronic bilateral low back pain.  This pain has been present for years.  Patient is status post L4-5 fusion in 2006.  She states that she did have some pain radiating into her lower extremities in the past, but her current pain is isolated to the bilateral lower lumbar/lumbosacral regions.  She denies any associated numbness, tingling, weakness, bowel bladder dysfunction.  The pain is constant worse with prolonged sitting and standing..   This pain is described in detail below.    Physical Therapy:  Yes.    Non-pharmacologic Treatment:  Rest helps         · TENS?  No    Pain Medications:         · Currently taking:  Meloxicam, gabapentin    · Has tried in the past:  Tramadol, NSAIDs, Tylenol    · Has not tried:   Muscle relaxants, TCAs, SNRIs, topical creams    Blood thinners:  None    Interventional Therapies:  None    Relevant Surgeries:   L4-5 fusion in 2006    Affecting sleep?  Yes    Affecting daily activities? yes    Depressive symptoms? no          · SI/HI? No    Work status: Retired    Pain Scores:    Best:       5/10  Worst:     9/10  Usually:   5/10  Today:    7/10    Review of Systems    Constitutional: Negative for activity change, appetite change, chills, fatigue, fever and unexpected weight change.   HENT: Negative for hearing loss.    Eyes: Negative for visual disturbance.   Respiratory: Negative for chest tightness and shortness of breath.    Cardiovascular: Negative for chest pain.   Gastrointestinal: Negative for abdominal pain, constipation, diarrhea, nausea and vomiting.   Genitourinary: Negative for difficulty urinating.   Musculoskeletal: Positive for arthralgias, back pain, gait problem and myalgias. Negative for neck pain.   Skin: Negative for rash.   Neurological: Negative for dizziness, weakness, light-headedness, numbness and headaches.   Psychiatric/Behavioral: Positive for sleep disturbance. Negative for hallucinations and suicidal ideas. The patient is not nervous/anxious.        Past Medical History:   Diagnosis Date    Acute pancreatitis     Angina pectoris     Anxiety     Arthritis     Back pain     Bronchitis     Cervical spondylosis with radiculopathy 7/10/2012    Chest pain     Chronic neck pain 7/26/2012    Colon polyps     Controlled type 2 diabetes mellitus with stage 2 chronic kidney disease, with long-term current use of insulin 1/24/2020    Coronary artery disease     Depression     Fibrocystic breast     GERD (gastroesophageal reflux disease)     Glaucoma     Heart failure     Hyperlipidemia     Hypertension     Neck pain 7/10/2012    Obesity     JAM (obstructive sleep apnea)     Postoperative care for cataract 12/9/2020    Primary osteoarthritis of both knees     Psoriasis     Subacromial or subdeltoid bursitis 7/10/2012    Thyroid disease     Tobacco dependence     Trouble in sleeping     Type 2 diabetes mellitus 12/10/2013       Past Surgical History:   Procedure Laterality Date    BREAST BIOPSY Bilateral 1988    BREAST MASS EXCISION Right     benign    CATARACT EXTRACTION W/  INTRAOCULAR LENS IMPLANT Left 12/09/2020    PHACO IOL  WITH I-STENT ()    CATARACT EXTRACTION W/  INTRAOCULAR LENS IMPLANT Right 10/21/2020    PHACO IOL WITH I-STENT x 2 ()    CHOLECYSTECTOMY      COLONOSCOPY N/A 5/22/2019    Procedure: COLONOSCOPY;  Surgeon: Darrin Calvin MD;  Location: [facility] ([facility]);  Service: Endoscopy;  Laterality: N/A;  2nd floor - all other procedure done on 2, multiple comorbidities - ERW/   change in MD schedule, Pt notified and verbalized understanding, new arrival time 0800, Ins mailed to Pt with new arrival time - ERW1/8/19@1130    HYSTERECTOMY  1980's    IMPLANTATION OF DEVICE FOR GLAUCOMA Right 10/21/2020    Procedure: INSERTION, DEVICE, FOR GLAUCOMA/ i Stent;  Surgeon: Melany Tse MD;  Location: [facility] OR [address] [place] [address];  Service: Ophthalmology;  Laterality: Right;    IMPLANTATION OF DEVICE FOR GLAUCOMA Left 12/9/2020    Procedure: INSERTION, DEVICE, FOR GLAUCOMA/I SENT;  Surgeon: Melany Tse MD;  Location: [address];  Service: Ophthalmology;  Laterality: Left;    INTRAOCULAR PROSTHESES INSERTION Right 10/21/2020    Procedure: INSERTION, IOL PROSTHESIS;  Surgeon: Melany Tse MD;  Location: [facility] OR [address] [place] [address];  Service: Ophthalmology;  Laterality: Right;    INTRAOCULAR PROSTHESES INSERTION Left 12/9/2020    Procedure: INSERTION, IOL PROSTHESIS;  Surgeon: Melany Tse MD;  Location: [address];  Service: Ophthalmology;  Laterality: Left;    KNEE SURGERY Left 1-20-16    TKR    KNEE SURGERY Right 02/26/2018    TKR    L4-L5 fusion  2006    PHACOEMULSIFICATION OF CATARACT Right 10/21/2020    Procedure: PHACOEMULSIFICATION, CATARACT;  Surgeon: Melany Tse MD;  Location: [address];  Service: Ophthalmology;  Laterality: Right;    PHACOEMULSIFICATION OF CATARACT Left 12/9/2020    Procedure: PHACOEMULSIFICATION, CATARACT;  Surgeon: Melany Tse MD;  Location: [facility] OR [address] [place] [address];  Service: Ophthalmology;  Laterality: Left;       Social History  "    Socioeconomic History    Marital status: Single     Spouse name: Not on file    Number of children: Not on file    Years of education: Not on file    Highest education level: Not on file   Occupational History    Not on file   Social Needs    Financial resource strain: Not on file    Food insecurity     Worry: Not on file     Inability: Not on file    Transportation needs     Medical: Not on file     Non-medical: Not on file   Tobacco Use    Smoking status: Current Every Day Smoker     Packs/day: 1.00     Years: 40.00     Pack years: 40.00     Types: Cigarettes    Smokeless tobacco: Never Used   Substance and Sexual Activity    Alcohol use: No     Alcohol/week: 0.0 standard drinks    Drug use: No    Sexual activity: Yes     Partners: Male   Lifestyle    Physical activity     Days per week: Not on file     Minutes per session: Not on file    Stress: Not on file   Relationships    Social connections     Talks on phone: Not on file     Gets together: Not on file     Attends Scientologist service: Not on file     Active member of club or organization: Not on file     Attends meetings of clubs or organizations: Not on file     Relationship status: Not on file   Other Topics Concern    Are you pregnant or think you may be? Not Asked    Breast-feeding Not Asked   Social History Narrative    Not on file       Review of patient's allergies indicates:   Allergen Reactions    Hydrocodone Shortness Of Breath    Iodinated contrast media Shortness Of Breath     Difficulty breathing    Adhesive Itching     Skin peeling    Morphine Hallucinations     Patient says it will make her "hallucinate"     Oxycodone      Hallucinations    Sulfa (sulfonamide antibiotics) Hives and Itching       Current Outpatient Medications on File Prior to Visit   Medication Sig Dispense Refill    albuterol (VENTOLIN HFA) 90 mcg/actuation inhaler Inhale 2 puffs into the lungs every 4 (four) hours as needed for Wheezing or " "Shortness of Breath. 36 g 3    amlodipine-benazepril (LOTREL) 10-40 mg per capsule TAKE 1 CAPSULE DAILY 90 capsule 4    atorvastatin (LIPITOR) 20 MG tablet TAKE 1 TABLET DAILY 90 tablet 4    blood sugar diagnostic (ACCU-CHEK JOSE G PLUS TEST STRP) Strp Inject 1 each into the skin 2 (two) times daily. 200 each 11    blood sugar diagnostic Strp Use to test blood glucose levels 2 times per day as instructed. 200 strip 11    CALCIUM CARBONATE (TUMS ORAL) Take 1 tablet by mouth as needed (acid reflux, indigestion).       carvedilol (COREG) 12.5 MG tablet TAKE 1 TABLET TWICE A DAY WITH MEALS 180 tablet 4    diphenhydrAMINE (BENADRYL) 25 mg capsule Take 25 mg by mouth every 6 (six) hours as needed for Itching or Allergies.      gabapentin (NEURONTIN) 600 MG tablet TAKE 1 TABLET THREE TIMES A  tablet 3    INULIN (FIBER GUMMIES ORAL) Take by mouth every morning.       lancets (ACCU-CHEK MULTICLIX LANCET) Misc Test blood sugar twice daily 300 each 3    lancets (ONETOUCH DELICA LANCETS) 33 gauge Misc Inject 1 lancet into the skin 2 (two) times daily before meals. 200 each 11    meloxicam (MOBIC) 7.5 MG tablet TAKE 1 TABLET DAILY 90 tablet 3    nicotine (NICODERM CQ) 21 mg/24 hr Place 1 patch onto the skin once daily. Please dispense Round Youngblood patch only 28 patch 0    prednisoLONE acetate (PRED FORTE) 1 % DrpS Place 1 drop into the right eye 3 (three) times daily. 3 x day for 1 week then decrease to 2 x day for 3 weeks (Patient taking differently: Place 1 drop into the right eye once daily. 3 x day for 1 week then decrease to 2 x day for 3 weeks) 10 mL 0    prednisoLONE acetate (PRED FORTE) 1 % DrpS Place 1 drop into the left eye 3 (three) times daily. 10 mL 0     No current facility-administered medications on file prior to visit.        Objective:      BP (!) 158/73 (BP Location: Left arm, Patient Position: Sitting, BP Method: Large (Automatic))   Pulse 64   Temp 98.5 °F (36.9 °C) (Oral)   Ht 5' 2" " (1.575 m)   Wt 85.7 kg (189 lb)   SpO2 99%   BMI 34.57 kg/m²     Exam:  GEN:  Well developed, well nourished.  No acute distress.   HEENT:  No trauma.  Mucous membranes moist.  Nares patent bilaterally.  PSYCH: Normal affect. Thought content appropriate.  CHEST:  Breathing symmetric.  No audible wheezing.  ABD: Soft, non-distended.  SKIN:  Warm, pink, dry.  No rash on exposed areas.    EXT:  No cyanosis, clubbing, or edema.  No color change or changes in nail or hair growth.  NEURO/MUSCULOSKELETAL:  Fully alert, oriented, and appropriate. Speech normal janneth. No cranial nerve deficits.   Gait:  Antalgic.  No focal motor deficits.     Tender to palpation over the bilateral ischial bursa  Tender to palpation over the bilateral greater trochanters.          Imaging:    Narrative & Impression    EXAMINATION:  XR LUMBAR SPINE 5 VIEW WITH FLEX AND EXT     CLINICAL HISTORY:  Low back pain, cauda equina syndrome suspected;  Lumbago with sciatica, left side     TECHNIQUE:  Five views of the lumbar spine plus flexion extension views were performed.     COMPARISON:  02/25/2014     FINDINGS:  The lumbar vertebra are intact.  No compression fracture or obvious paraspinal lesion is detected.  Degenerative changes are present with small osteophytes at most levels.  There is progressive disc space narrowing at L3-4; this level also shows development of a grade 1 anterolisthesis, stable between flexion and extension.  The other lumbar disc spaces show no significant narrowing, but lower thoracic disc spaces are narrowed, some with vacuum disc.     Postoperative findings from posterior instrumented fusion are again seen at L4 and 5 with bilateral pedicle screws, vertical connecting rods, and a device in the disc space.     Visualized abdomen shows aortic atherosclerosis.     IMPRESSION:      Stable postoperative findings of L4-5 fusion     Degenerative spine changes with interval worsening at L3-4.        Electronically signed  by: Valentín Booker MD  Date:                                            01/23/2019  Time:                                           10:16         Assessment:       Encounter Diagnoses   Name Primary?    Greater trochanteric bursitis of both hips Yes    S/P lumbar fusion     Lumbar spondylosis     DDD (degenerative disc disease), lumbar     Bursitis, ischial, unspecified laterality          Plan:       Stephanie was seen today for low-back pain.    Diagnoses and all orders for this visit:    Greater trochanteric bursitis of both hips    S/P lumbar fusion    Lumbar spondylosis    DDD (degenerative disc disease), lumbar    Bursitis, ischial, unspecified laterality  -     Ambulatory referral/consult to Physical/Occupational Therapy; Future        Stephanie Sears is a 70 y.o. female with chronic bilateral low back pain.  Pain appears to be axial.  History of L4-5 fusion in 2006.  May have lower lumbar facet pain primarily at the L5-S1 level.  Lumbar facet degeneration noted on lumbar MRI mainly at the L3-4 level but also at the L5-S1 level.  Patient also with spinal stenosis at the L3-4 and L5-S1 levels.  Also with bilateral foraminal stenosis at the L5-S1 levels and right foraminal stenosis at the L3-4 level.  Symptoms not overly consistent with radiculopathy at this time.  She does have some symptoms somewhat consistent with neurogenic claudication but her main complaints are most consistent with pain related to the bilateral ischial bursa and possibly trochanteric bursa.  This is likely related to altered gait mechanics following lumbar fusion.    1.  Pertinent imaging studies reviewed by me. Imaging results were discussed with patient.  2.  Refer to PT for ROM, strengthening, stretching and HEP.  3.  Return to clinic in 8 weeks or sooner if needed.  At that time we will discuss efficacy of physical therapy/home exercise program.  May consider ischial bursa steroid injections versus trochanteric bursa steroid  injections.  May also consider lumbar medial branch blocks/RFA versus lumbar epidural.

## 2020-12-28 ENCOUNTER — TELEPHONE (OUTPATIENT)
Dept: OPHTHALMOLOGY | Facility: CLINIC | Age: 70
End: 2020-12-28

## 2020-12-28 NOTE — TELEPHONE ENCOUNTER
Left message for pt stating that the card would have been in her 1 day post op bag. Pt advised that we do not have extra cards b/c it comes from the surgery center.

## 2020-12-28 NOTE — TELEPHONE ENCOUNTER
----- Message from Melo Mueller sent at 12/28/2020 10:47 AM CST -----  Regarding: Speak to office  Contact: Pt  Pt states she needs registration card for Left Eye and did not receive post sx.    Call back # [phone]

## 2021-01-04 ENCOUNTER — PATIENT MESSAGE (OUTPATIENT)
Dept: ADMINISTRATIVE | Facility: HOSPITAL | Age: 71
End: 2021-01-04

## 2021-01-06 ENCOUNTER — CLINICAL SUPPORT (OUTPATIENT)
Dept: SMOKING CESSATION | Facility: CLINIC | Age: 71
End: 2021-01-06
Payer: COMMERCIAL

## 2021-01-06 DIAGNOSIS — F17.200 NICOTINE DEPENDENCE: Primary | ICD-10-CM

## 2021-01-06 PROCEDURE — 99407 BEHAV CHNG SMOKING > 10 MIN: CPT | Mod: S$GLB,,, | Performed by: INTERNAL MEDICINE

## 2021-01-06 PROCEDURE — 99407 PR TOBACCO USE CESSATION INTENSIVE >10 MINUTES: ICD-10-PCS | Mod: S$GLB,,, | Performed by: INTERNAL MEDICINE

## 2021-01-07 ENCOUNTER — OFFICE VISIT (OUTPATIENT)
Dept: OPHTHALMOLOGY | Facility: CLINIC | Age: 71
End: 2021-01-07
Payer: MEDICARE

## 2021-01-07 DIAGNOSIS — Z98.83 STATUS POST GLAUCOMA SURGERY: ICD-10-CM

## 2021-01-07 DIAGNOSIS — Z48.810 POSTOPERATIVE CARE FOR CATARACT: Primary | ICD-10-CM

## 2021-01-07 DIAGNOSIS — Z98.49 POSTOPERATIVE CARE FOR CATARACT: Primary | ICD-10-CM

## 2021-01-07 DIAGNOSIS — H40.1131 PRIMARY OPEN-ANGLE GLAUCOMA, BILATERAL, MILD STAGE: ICD-10-CM

## 2021-01-07 DIAGNOSIS — H35.372 EPIRETINAL MEMBRANE (ERM) OF LEFT EYE: ICD-10-CM

## 2021-01-07 DIAGNOSIS — Z96.1 PSEUDOPHAKIA, BOTH EYES: ICD-10-CM

## 2021-01-07 PROCEDURE — 99213 OFFICE O/P EST LOW 20 MIN: CPT | Mod: PBBFAC | Performed by: OPHTHALMOLOGY

## 2021-01-07 PROCEDURE — 92134 CPTRZ OPH DX IMG PST SGM RTA: CPT | Mod: PBBFAC | Performed by: OPHTHALMOLOGY

## 2021-01-07 PROCEDURE — 99024 POSTOP FOLLOW-UP VISIT: CPT | Mod: POP,,, | Performed by: OPHTHALMOLOGY

## 2021-01-07 PROCEDURE — 99999 PR PBB SHADOW E&M-EST. PATIENT-LVL III: CPT | Mod: PBBFAC,,, | Performed by: OPHTHALMOLOGY

## 2021-01-07 PROCEDURE — 92134 POSTERIOR SEGMENT OCT RETINA (OCULAR COHERENCE TOMOGRAPHY)-BOTH EYES: ICD-10-PCS | Mod: 26,S$PBB,, | Performed by: OPHTHALMOLOGY

## 2021-01-07 PROCEDURE — 99999 PR PBB SHADOW E&M-EST. PATIENT-LVL III: ICD-10-PCS | Mod: PBBFAC,,, | Performed by: OPHTHALMOLOGY

## 2021-01-07 PROCEDURE — 99024 PR POST-OP FOLLOW-UP VISIT: ICD-10-PCS | Mod: POP,,, | Performed by: OPHTHALMOLOGY

## 2021-01-22 ENCOUNTER — PATIENT MESSAGE (OUTPATIENT)
Dept: ADMINISTRATIVE | Facility: OTHER | Age: 71
End: 2021-01-22

## 2021-01-29 ENCOUNTER — PATIENT MESSAGE (OUTPATIENT)
Dept: ADMINISTRATIVE | Facility: HOSPITAL | Age: 71
End: 2021-01-29

## 2021-02-04 ENCOUNTER — OFFICE VISIT (OUTPATIENT)
Dept: OPHTHALMOLOGY | Facility: CLINIC | Age: 71
End: 2021-02-04
Payer: MEDICARE

## 2021-02-04 DIAGNOSIS — H40.1131 PRIMARY OPEN-ANGLE GLAUCOMA, BILATERAL, MILD STAGE: ICD-10-CM

## 2021-02-04 DIAGNOSIS — Z48.810 POSTOPERATIVE CARE FOR CATARACT: Primary | ICD-10-CM

## 2021-02-04 DIAGNOSIS — Z98.49 POSTOPERATIVE CARE FOR CATARACT: Primary | ICD-10-CM

## 2021-02-04 DIAGNOSIS — Z96.1 PSEUDOPHAKIA, BOTH EYES: ICD-10-CM

## 2021-02-04 DIAGNOSIS — Z98.83 STATUS POST GLAUCOMA SURGERY: ICD-10-CM

## 2021-02-04 PROCEDURE — 99213 OFFICE O/P EST LOW 20 MIN: CPT | Mod: PBBFAC | Performed by: OPHTHALMOLOGY

## 2021-02-04 PROCEDURE — 99024 PR POST-OP FOLLOW-UP VISIT: ICD-10-PCS | Mod: POP,,, | Performed by: OPHTHALMOLOGY

## 2021-02-04 PROCEDURE — 99024 POSTOP FOLLOW-UP VISIT: CPT | Mod: POP,,, | Performed by: OPHTHALMOLOGY

## 2021-02-04 PROCEDURE — 99999 PR PBB SHADOW E&M-EST. PATIENT-LVL III: ICD-10-PCS | Mod: PBBFAC,,, | Performed by: OPHTHALMOLOGY

## 2021-02-04 PROCEDURE — 99999 PR PBB SHADOW E&M-EST. PATIENT-LVL III: CPT | Mod: PBBFAC,,, | Performed by: OPHTHALMOLOGY

## 2021-02-10 DIAGNOSIS — N18.2 CONTROLLED TYPE 2 DIABETES MELLITUS WITH STAGE 2 CHRONIC KIDNEY DISEASE, WITH LONG-TERM CURRENT USE OF INSULIN: ICD-10-CM

## 2021-02-10 DIAGNOSIS — E11.22 CONTROLLED TYPE 2 DIABETES MELLITUS WITH STAGE 2 CHRONIC KIDNEY DISEASE, WITH LONG-TERM CURRENT USE OF INSULIN: ICD-10-CM

## 2021-02-10 DIAGNOSIS — Z79.4 CONTROLLED TYPE 2 DIABETES MELLITUS WITH STAGE 2 CHRONIC KIDNEY DISEASE, WITH LONG-TERM CURRENT USE OF INSULIN: ICD-10-CM

## 2021-02-12 ENCOUNTER — PATIENT OUTREACH (OUTPATIENT)
Dept: ADMINISTRATIVE | Facility: OTHER | Age: 71
End: 2021-02-12

## 2021-02-19 ENCOUNTER — OFFICE VISIT (OUTPATIENT)
Dept: PAIN MEDICINE | Facility: CLINIC | Age: 71
End: 2021-02-19
Payer: MEDICARE

## 2021-02-19 ENCOUNTER — IMMUNIZATION (OUTPATIENT)
Dept: OBSTETRICS AND GYNECOLOGY | Facility: CLINIC | Age: 71
End: 2021-02-19
Payer: MEDICARE

## 2021-02-19 VITALS
BODY MASS INDEX: 34.53 KG/M2 | WEIGHT: 187.63 LBS | SYSTOLIC BLOOD PRESSURE: 160 MMHG | HEIGHT: 62 IN | OXYGEN SATURATION: 96 % | DIASTOLIC BLOOD PRESSURE: 72 MMHG | HEART RATE: 69 BPM

## 2021-02-19 DIAGNOSIS — Z23 NEED FOR VACCINATION: Primary | ICD-10-CM

## 2021-02-19 DIAGNOSIS — M51.36 DDD (DEGENERATIVE DISC DISEASE), LUMBAR: ICD-10-CM

## 2021-02-19 DIAGNOSIS — M47.816 LUMBAR SPONDYLOSIS: ICD-10-CM

## 2021-02-19 DIAGNOSIS — Z01.818 PRE-OP TESTING: ICD-10-CM

## 2021-02-19 DIAGNOSIS — M70.62 GREATER TROCHANTERIC BURSITIS OF BOTH HIPS: ICD-10-CM

## 2021-02-19 DIAGNOSIS — M70.70 BURSITIS, ISCHIAL, UNSPECIFIED LATERALITY: Primary | ICD-10-CM

## 2021-02-19 DIAGNOSIS — Z98.1 S/P LUMBAR FUSION: ICD-10-CM

## 2021-02-19 DIAGNOSIS — M70.61 GREATER TROCHANTERIC BURSITIS OF BOTH HIPS: ICD-10-CM

## 2021-02-19 PROCEDURE — 91300 COVID-19, MRNA, LNP-S, PF, 30 MCG/0.3 ML DOSE VACCINE: CPT | Mod: PBBFAC | Performed by: NURSE PRACTITIONER

## 2021-02-19 PROCEDURE — 99213 PR OFFICE/OUTPT VISIT, EST, LEVL III, 20-29 MIN: ICD-10-PCS | Mod: S$PBB,,, | Performed by: PAIN MEDICINE

## 2021-02-19 PROCEDURE — 99214 OFFICE O/P EST MOD 30 MIN: CPT | Mod: PBBFAC,PN | Performed by: PAIN MEDICINE

## 2021-02-19 PROCEDURE — 99999 PR PBB SHADOW E&M-EST. PATIENT-LVL IV: ICD-10-PCS | Mod: PBBFAC,,, | Performed by: PAIN MEDICINE

## 2021-02-19 PROCEDURE — 99213 OFFICE O/P EST LOW 20 MIN: CPT | Mod: S$PBB,,, | Performed by: PAIN MEDICINE

## 2021-02-19 PROCEDURE — 99999 PR PBB SHADOW E&M-EST. PATIENT-LVL IV: CPT | Mod: PBBFAC,,, | Performed by: PAIN MEDICINE

## 2021-02-21 ENCOUNTER — TELEPHONE (OUTPATIENT)
Dept: FAMILY MEDICINE | Facility: CLINIC | Age: 71
End: 2021-02-21

## 2021-02-22 RX ORDER — ATORVASTATIN CALCIUM 20 MG/1
TABLET, FILM COATED ORAL
Qty: 90 TABLET | Refills: 0 | Status: SHIPPED | OUTPATIENT
Start: 2021-02-22 | End: 2021-03-12 | Stop reason: SDUPTHER

## 2021-02-23 ENCOUNTER — TELEPHONE (OUTPATIENT)
Dept: FAMILY MEDICINE | Facility: CLINIC | Age: 71
End: 2021-02-23

## 2021-02-23 ENCOUNTER — LAB VISIT (OUTPATIENT)
Dept: FAMILY MEDICINE | Facility: CLINIC | Age: 71
End: 2021-02-23
Payer: MEDICARE

## 2021-02-23 DIAGNOSIS — Z01.818 PRE-OP TESTING: ICD-10-CM

## 2021-02-23 PROCEDURE — U0005 INFEC AGEN DETEC AMPLI PROBE: HCPCS

## 2021-02-23 PROCEDURE — U0003 INFECTIOUS AGENT DETECTION BY NUCLEIC ACID (DNA OR RNA); SEVERE ACUTE RESPIRATORY SYNDROME CORONAVIRUS 2 (SARS-COV-2) (CORONAVIRUS DISEASE [COVID-19]), AMPLIFIED PROBE TECHNIQUE, MAKING USE OF HIGH THROUGHPUT TECHNOLOGIES AS DESCRIBED BY CMS-2020-01-R: HCPCS

## 2021-02-24 ENCOUNTER — CLINICAL SUPPORT (OUTPATIENT)
Dept: SMOKING CESSATION | Facility: CLINIC | Age: 71
End: 2021-02-24
Payer: COMMERCIAL

## 2021-02-24 DIAGNOSIS — F17.200 NICOTINE DEPENDENCE: Primary | ICD-10-CM

## 2021-02-24 LAB — SARS-COV-2 RNA RESP QL NAA+PROBE: NOT DETECTED

## 2021-02-24 PROCEDURE — 99407 BEHAV CHNG SMOKING > 10 MIN: CPT | Mod: S$GLB,,,

## 2021-02-24 PROCEDURE — 99407 PR TOBACCO USE CESSATION INTENSIVE >10 MINUTES: ICD-10-PCS | Mod: S$GLB,,,

## 2021-02-25 ENCOUNTER — PES CALL (OUTPATIENT)
Dept: ADMINISTRATIVE | Facility: CLINIC | Age: 71
End: 2021-02-25

## 2021-02-26 ENCOUNTER — HOSPITAL ENCOUNTER (OUTPATIENT)
Facility: HOSPITAL | Age: 71
Discharge: HOME | End: 2021-02-26
Attending: PAIN MEDICINE | Admitting: PAIN MEDICINE
Payer: MEDICARE

## 2021-02-26 ENCOUNTER — PATIENT OUTREACH (OUTPATIENT)
Dept: ADMINISTRATIVE | Facility: HOSPITAL | Age: 71
End: 2021-02-26

## 2021-02-26 VITALS
TEMPERATURE: 98 F | RESPIRATION RATE: 18 BRPM | OXYGEN SATURATION: 99 % | SYSTOLIC BLOOD PRESSURE: 145 MMHG | HEART RATE: 65 BPM | DIASTOLIC BLOOD PRESSURE: 68 MMHG

## 2021-02-26 DIAGNOSIS — M70.70 BURSITIS, ISCHIAL, UNSPECIFIED LATERALITY: Primary | ICD-10-CM

## 2021-02-26 DIAGNOSIS — M70.70 ISCHIAL BURSITIS, UNSPECIFIED LATERALITY: ICD-10-CM

## 2021-02-26 LAB — POCT GLUCOSE: 135 MG/DL (ref 70–110)

## 2021-02-26 PROCEDURE — 63600175 PHARM REV CODE 636 W HCPCS: Performed by: PAIN MEDICINE

## 2021-02-26 PROCEDURE — 77002 NEEDLE LOCALIZATION BY XRAY: CPT | Performed by: PAIN MEDICINE

## 2021-02-26 PROCEDURE — 20610 DRAIN/INJ JOINT/BURSA W/O US: CPT | Mod: 50,,, | Performed by: PAIN MEDICINE

## 2021-02-26 PROCEDURE — A9585 GADOBUTROL INJECTION: HCPCS

## 2021-02-26 PROCEDURE — 20610 DRAIN/INJ JOINT/BURSA W/O US: CPT | Mod: 50 | Performed by: PAIN MEDICINE

## 2021-02-26 PROCEDURE — 25500020 PHARM REV CODE 255

## 2021-02-26 PROCEDURE — 25000003 PHARM REV CODE 250: Performed by: PAIN MEDICINE

## 2021-02-26 PROCEDURE — 20610 PR DRAIN/INJECT LARGE JOINT/BURSA: ICD-10-PCS | Mod: 50,,, | Performed by: PAIN MEDICINE

## 2021-02-26 RX ORDER — TRIAMCINOLONE ACETONIDE 40 MG/ML
40 INJECTION, SUSPENSION INTRA-ARTICULAR; INTRAMUSCULAR ONCE
Status: COMPLETED | OUTPATIENT
Start: 2021-02-26 | End: 2021-02-26

## 2021-02-26 RX ORDER — BUPIVACAINE HYDROCHLORIDE 2.5 MG/ML
INJECTION, SOLUTION EPIDURAL; INFILTRATION; INTRACAUDAL
Status: DISCONTINUED
Start: 2021-02-26 | End: 2021-02-26 | Stop reason: HOSPADM

## 2021-02-26 RX ORDER — ONDANSETRON 8 MG/1
8 TABLET, ORALLY DISINTEGRATING ORAL ONCE
Status: DISCONTINUED | OUTPATIENT
Start: 2021-02-26 | End: 2021-02-26 | Stop reason: HOSPADM

## 2021-02-26 RX ORDER — BUPIVACAINE HYDROCHLORIDE 2.5 MG/ML
10 INJECTION, SOLUTION EPIDURAL; INFILTRATION; INTRACAUDAL ONCE
Status: COMPLETED | OUTPATIENT
Start: 2021-02-26 | End: 2021-02-26

## 2021-02-26 RX ORDER — TRIAMCINOLONE ACETONIDE 40 MG/ML
INJECTION, SUSPENSION INTRA-ARTICULAR; INTRAMUSCULAR
Status: DISCONTINUED
Start: 2021-02-26 | End: 2021-02-26 | Stop reason: HOSPADM

## 2021-02-26 RX ORDER — LIDOCAINE HYDROCHLORIDE 20 MG/ML
10 INJECTION, SOLUTION INFILTRATION; PERINEURAL ONCE
Status: COMPLETED | OUTPATIENT
Start: 2021-02-26 | End: 2021-02-26

## 2021-02-26 RX ORDER — GADOBUTROL 604.72 MG/ML
INJECTION INTRAVENOUS
Status: COMPLETED
Start: 2021-02-26 | End: 2021-02-26

## 2021-02-26 RX ORDER — ALPRAZOLAM 0.5 MG/1
1 TABLET, ORALLY DISINTEGRATING ORAL ONCE AS NEEDED
Status: COMPLETED | OUTPATIENT
Start: 2021-02-26 | End: 2021-02-26

## 2021-02-26 RX ORDER — GADOBUTROL 604.72 MG/ML
10 INJECTION INTRAVENOUS
Status: COMPLETED | OUTPATIENT
Start: 2021-02-26 | End: 2021-02-26

## 2021-02-26 RX ORDER — LIDOCAINE HYDROCHLORIDE 20 MG/ML
INJECTION, SOLUTION INFILTRATION; PERINEURAL
Status: DISCONTINUED
Start: 2021-02-26 | End: 2021-02-26 | Stop reason: HOSPADM

## 2021-02-26 RX ADMIN — GADOBUTROL 4 ML: 604.72 INJECTION INTRAVENOUS at 02:02

## 2021-02-26 RX ADMIN — ALPRAZOLAM 1 MG: 0.5 TABLET, ORALLY DISINTEGRATING ORAL at 01:02

## 2021-03-04 ENCOUNTER — OFFICE VISIT (OUTPATIENT)
Dept: OPHTHALMOLOGY | Facility: CLINIC | Age: 71
End: 2021-03-04
Payer: MEDICARE

## 2021-03-04 DIAGNOSIS — Z98.83 STATUS POST GLAUCOMA SURGERY: ICD-10-CM

## 2021-03-04 DIAGNOSIS — H40.1131 PRIMARY OPEN-ANGLE GLAUCOMA, BILATERAL, MILD STAGE: ICD-10-CM

## 2021-03-04 DIAGNOSIS — H35.372 EPIRETINAL MEMBRANE (ERM) OF LEFT EYE: ICD-10-CM

## 2021-03-04 DIAGNOSIS — Z96.1 PSEUDOPHAKIA, BOTH EYES: ICD-10-CM

## 2021-03-04 DIAGNOSIS — Z98.49 POSTOPERATIVE CARE FOR CATARACT: Primary | ICD-10-CM

## 2021-03-04 DIAGNOSIS — Z48.810 POSTOPERATIVE CARE FOR CATARACT: Primary | ICD-10-CM

## 2021-03-04 PROCEDURE — 99213 OFFICE O/P EST LOW 20 MIN: CPT | Mod: PBBFAC | Performed by: OPHTHALMOLOGY

## 2021-03-04 PROCEDURE — 99999 PR PBB SHADOW E&M-EST. PATIENT-LVL III: ICD-10-PCS | Mod: PBBFAC,,, | Performed by: OPHTHALMOLOGY

## 2021-03-04 PROCEDURE — 99024 POSTOP FOLLOW-UP VISIT: CPT | Mod: POP,,, | Performed by: OPHTHALMOLOGY

## 2021-03-04 PROCEDURE — 99999 PR PBB SHADOW E&M-EST. PATIENT-LVL III: CPT | Mod: PBBFAC,,, | Performed by: OPHTHALMOLOGY

## 2021-03-04 PROCEDURE — 99024 PR POST-OP FOLLOW-UP VISIT: ICD-10-PCS | Mod: POP,,, | Performed by: OPHTHALMOLOGY

## 2021-03-12 ENCOUNTER — IMMUNIZATION (OUTPATIENT)
Dept: OBSTETRICS AND GYNECOLOGY | Facility: CLINIC | Age: 71
End: 2021-03-12
Payer: MEDICARE

## 2021-03-12 ENCOUNTER — OFFICE VISIT (OUTPATIENT)
Dept: FAMILY MEDICINE | Facility: CLINIC | Age: 71
End: 2021-03-12
Payer: MEDICARE

## 2021-03-12 VITALS
OXYGEN SATURATION: 98 % | SYSTOLIC BLOOD PRESSURE: 136 MMHG | HEART RATE: 62 BPM | DIASTOLIC BLOOD PRESSURE: 63 MMHG | HEIGHT: 62 IN | BODY MASS INDEX: 34.93 KG/M2 | TEMPERATURE: 98 F | WEIGHT: 189.81 LBS | RESPIRATION RATE: 18 BRPM

## 2021-03-12 DIAGNOSIS — Z23 NEED FOR VACCINATION: Primary | ICD-10-CM

## 2021-03-12 DIAGNOSIS — D64.9 NORMOCYTIC ANEMIA: ICD-10-CM

## 2021-03-12 DIAGNOSIS — E66.09 CLASS 1 OBESITY DUE TO EXCESS CALORIES WITH SERIOUS COMORBIDITY AND BODY MASS INDEX (BMI) OF 34.0 TO 34.9 IN ADULT: ICD-10-CM

## 2021-03-12 DIAGNOSIS — I10 ESSENTIAL HYPERTENSION: ICD-10-CM

## 2021-03-12 DIAGNOSIS — E11.51 TYPE II DIABETES MELLITUS WITH PERIPHERAL CIRCULATORY DISORDER: ICD-10-CM

## 2021-03-12 DIAGNOSIS — M54.2 CHRONIC NECK PAIN: ICD-10-CM

## 2021-03-12 DIAGNOSIS — H61.21 RIGHT EAR IMPACTED CERUMEN: ICD-10-CM

## 2021-03-12 DIAGNOSIS — Z00.00 ROUTINE CHECK-UP: Primary | ICD-10-CM

## 2021-03-12 DIAGNOSIS — G89.29 CHRONIC NECK PAIN: ICD-10-CM

## 2021-03-12 PROCEDURE — 99999 PR PBB SHADOW E&M-EST. PATIENT-LVL IV: ICD-10-PCS | Mod: PBBFAC,,, | Performed by: FAMILY MEDICINE

## 2021-03-12 PROCEDURE — 91300 COVID-19, MRNA, LNP-S, PF, 30 MCG/0.3 ML DOSE VACCINE: CPT | Mod: ,,, | Performed by: FAMILY MEDICINE

## 2021-03-12 PROCEDURE — 91300 COVID-19, MRNA, LNP-S, PF, 30 MCG/0.3 ML DOSE VACCINE: ICD-10-PCS | Mod: ,,, | Performed by: FAMILY MEDICINE

## 2021-03-12 PROCEDURE — 99999 PR PBB SHADOW E&M-EST. PATIENT-LVL IV: CPT | Mod: PBBFAC,,, | Performed by: FAMILY MEDICINE

## 2021-03-12 PROCEDURE — 0002A COVID-19, MRNA, LNP-S, PF, 30 MCG/0.3 ML DOSE VACCINE: CPT | Mod: CV19,,, | Performed by: FAMILY MEDICINE

## 2021-03-12 PROCEDURE — 99214 PR OFFICE/OUTPT VISIT, EST, LEVL IV, 30-39 MIN: ICD-10-PCS | Mod: S$PBB,,, | Performed by: FAMILY MEDICINE

## 2021-03-12 PROCEDURE — 99214 OFFICE O/P EST MOD 30 MIN: CPT | Mod: S$PBB,,, | Performed by: FAMILY MEDICINE

## 2021-03-12 PROCEDURE — 99214 OFFICE O/P EST MOD 30 MIN: CPT | Mod: PBBFAC,PN | Performed by: FAMILY MEDICINE

## 2021-03-12 PROCEDURE — 0002A COVID-19, MRNA, LNP-S, PF, 30 MCG/0.3 ML DOSE VACCINE: ICD-10-PCS | Mod: CV19,,, | Performed by: FAMILY MEDICINE

## 2021-03-12 RX ORDER — AMLODIPINE AND BENAZEPRIL HYDROCHLORIDE 10; 40 MG/1; MG/1
1 CAPSULE ORAL DAILY
Qty: 90 CAPSULE | Refills: 3 | Status: SHIPPED | OUTPATIENT
Start: 2021-03-12 | End: 2022-02-14

## 2021-03-12 RX ORDER — CARVEDILOL 12.5 MG/1
12.5 TABLET ORAL 2 TIMES DAILY WITH MEALS
Qty: 180 TABLET | Refills: 3 | Status: SHIPPED | OUTPATIENT
Start: 2021-03-12 | End: 2022-05-03

## 2021-03-12 RX ORDER — GABAPENTIN 600 MG/1
600 TABLET ORAL 3 TIMES DAILY
Qty: 270 TABLET | Refills: 3 | Status: SHIPPED | OUTPATIENT
Start: 2021-03-12 | End: 2022-06-03

## 2021-03-12 RX ORDER — ATORVASTATIN CALCIUM 20 MG/1
20 TABLET, FILM COATED ORAL DAILY
Qty: 90 TABLET | Refills: 3 | Status: SHIPPED | OUTPATIENT
Start: 2021-03-12 | End: 2022-06-02

## 2021-03-15 ENCOUNTER — TELEPHONE (OUTPATIENT)
Dept: FAMILY MEDICINE | Facility: CLINIC | Age: 71
End: 2021-03-15

## 2021-03-16 ENCOUNTER — OFFICE VISIT (OUTPATIENT)
Dept: PAIN MEDICINE | Facility: CLINIC | Age: 71
End: 2021-03-16
Payer: MEDICARE

## 2021-03-16 VITALS
SYSTOLIC BLOOD PRESSURE: 138 MMHG | DIASTOLIC BLOOD PRESSURE: 69 MMHG | WEIGHT: 188.63 LBS | BODY MASS INDEX: 34.71 KG/M2 | HEIGHT: 62 IN | OXYGEN SATURATION: 98 % | HEART RATE: 57 BPM

## 2021-03-16 DIAGNOSIS — M70.70 ISCHIAL BURSITIS, UNSPECIFIED LATERALITY: Primary | ICD-10-CM

## 2021-03-16 PROCEDURE — 99999 PR PBB SHADOW E&M-EST. PATIENT-LVL III: CPT | Mod: PBBFAC,,, | Performed by: REGISTERED NURSE

## 2021-03-16 PROCEDURE — 99999 PR PBB SHADOW E&M-EST. PATIENT-LVL III: ICD-10-PCS | Mod: PBBFAC,,, | Performed by: REGISTERED NURSE

## 2021-03-16 PROCEDURE — 99213 PR OFFICE/OUTPT VISIT, EST, LEVL III, 20-29 MIN: ICD-10-PCS | Mod: S$PBB,,, | Performed by: REGISTERED NURSE

## 2021-03-16 PROCEDURE — 99213 OFFICE O/P EST LOW 20 MIN: CPT | Mod: PBBFAC,PN | Performed by: REGISTERED NURSE

## 2021-03-16 PROCEDURE — 99213 OFFICE O/P EST LOW 20 MIN: CPT | Mod: S$PBB,,, | Performed by: REGISTERED NURSE

## 2021-03-30 ENCOUNTER — PES CALL (OUTPATIENT)
Dept: ADMINISTRATIVE | Facility: CLINIC | Age: 71
End: 2021-03-30

## 2021-04-20 ENCOUNTER — OFFICE VISIT (OUTPATIENT)
Dept: FAMILY MEDICINE | Facility: CLINIC | Age: 71
End: 2021-04-20
Payer: MEDICARE

## 2021-04-20 VITALS
TEMPERATURE: 99 F | RESPIRATION RATE: 18 BRPM | HEIGHT: 62 IN | DIASTOLIC BLOOD PRESSURE: 60 MMHG | OXYGEN SATURATION: 97 % | WEIGHT: 190.06 LBS | SYSTOLIC BLOOD PRESSURE: 138 MMHG | BODY MASS INDEX: 34.98 KG/M2 | HEART RATE: 68 BPM

## 2021-04-20 DIAGNOSIS — Z13.820 SCREENING FOR OSTEOPOROSIS: ICD-10-CM

## 2021-04-20 DIAGNOSIS — Z72.0 TOBACCO ABUSE: ICD-10-CM

## 2021-04-20 DIAGNOSIS — Z78.0 ASYMPTOMATIC MENOPAUSAL STATE: ICD-10-CM

## 2021-04-20 DIAGNOSIS — Z00.00 ENCOUNTER FOR PREVENTIVE HEALTH EXAMINATION: Primary | ICD-10-CM

## 2021-04-20 DIAGNOSIS — I27.20 PULMONARY HYPERTENSION: ICD-10-CM

## 2021-04-20 DIAGNOSIS — I10 ESSENTIAL HYPERTENSION: ICD-10-CM

## 2021-04-20 DIAGNOSIS — R91.1 LUNG NODULE: ICD-10-CM

## 2021-04-20 PROCEDURE — G0439 PR MEDICARE ANNUAL WELLNESS SUBSEQUENT VISIT: ICD-10-PCS | Mod: ,,, | Performed by: NURSE PRACTITIONER

## 2021-04-20 PROCEDURE — G0439 PPPS, SUBSEQ VISIT: HCPCS | Mod: ,,, | Performed by: NURSE PRACTITIONER

## 2021-04-20 PROCEDURE — 99215 OFFICE O/P EST HI 40 MIN: CPT | Mod: PBBFAC,PN | Performed by: NURSE PRACTITIONER

## 2021-04-20 PROCEDURE — 99999 PR PBB SHADOW E&M-EST. PATIENT-LVL V: CPT | Mod: PBBFAC,,, | Performed by: NURSE PRACTITIONER

## 2021-04-20 PROCEDURE — 99999 PR PBB SHADOW E&M-EST. PATIENT-LVL V: ICD-10-PCS | Mod: PBBFAC,,, | Performed by: NURSE PRACTITIONER

## 2021-04-27 ENCOUNTER — HOSPITAL ENCOUNTER (OUTPATIENT)
Dept: RADIOLOGY | Facility: HOSPITAL | Age: 71
Discharge: HOME | End: 2021-04-27
Attending: NURSE PRACTITIONER
Payer: MEDICARE

## 2021-04-27 DIAGNOSIS — R91.1 LUNG NODULE: ICD-10-CM

## 2021-04-27 PROCEDURE — 71250 CT CHEST WITHOUT CONTRAST: ICD-10-PCS | Mod: 26,,, | Performed by: RADIOLOGY

## 2021-04-27 PROCEDURE — 71250 CT THORAX DX C-: CPT | Mod: 26,,, | Performed by: RADIOLOGY

## 2021-04-27 PROCEDURE — 71250 CT THORAX DX C-: CPT | Mod: TC

## 2021-04-30 ENCOUNTER — EXTERNAL CHRONIC CARE MANAGEMENT (OUTPATIENT)
Dept: PRIMARY CARE CLINIC | Facility: CLINIC | Age: 71
End: 2021-04-30

## 2021-05-06 ENCOUNTER — HOSPITAL ENCOUNTER (OUTPATIENT)
Dept: RADIOLOGY | Facility: CLINIC | Age: 71
Discharge: HOME | End: 2021-05-06
Attending: NURSE PRACTITIONER
Payer: MEDICARE

## 2021-05-06 DIAGNOSIS — Z13.820 SCREENING FOR OSTEOPOROSIS: ICD-10-CM

## 2021-05-06 DIAGNOSIS — Z78.0 ASYMPTOMATIC MENOPAUSAL STATE: ICD-10-CM

## 2021-05-06 PROCEDURE — 77080 DXA BONE DENSITY AXIAL: CPT | Mod: TC

## 2021-05-06 PROCEDURE — 77080 DEXA BONE DENSITY SPINE HIP: ICD-10-PCS | Mod: 26,,, | Performed by: INTERNAL MEDICINE

## 2021-05-06 PROCEDURE — 77080 DXA BONE DENSITY AXIAL: CPT | Mod: 26,,, | Performed by: INTERNAL MEDICINE

## 2021-05-07 ENCOUNTER — NURSE TRIAGE (OUTPATIENT)
Dept: ADMINISTRATIVE | Facility: CLINIC | Age: 71
End: 2021-05-07

## 2021-05-07 ENCOUNTER — TELEPHONE (OUTPATIENT)
Dept: PULMONOLOGY | Facility: CLINIC | Age: 71
End: 2021-05-07

## 2021-05-12 ENCOUNTER — OFFICE VISIT (OUTPATIENT)
Dept: PULMONOLOGY | Facility: CLINIC | Age: 71
End: 2021-05-12
Payer: MEDICARE

## 2021-05-12 VITALS
HEART RATE: 63 BPM | DIASTOLIC BLOOD PRESSURE: 64 MMHG | SYSTOLIC BLOOD PRESSURE: 146 MMHG | BODY MASS INDEX: 35.11 KG/M2 | WEIGHT: 190.81 LBS | OXYGEN SATURATION: 98 % | HEIGHT: 62 IN

## 2021-05-12 DIAGNOSIS — I10 ESSENTIAL HYPERTENSION: ICD-10-CM

## 2021-05-12 DIAGNOSIS — G47.33 OSA (OBSTRUCTIVE SLEEP APNEA): ICD-10-CM

## 2021-05-12 PROCEDURE — 99214 PR OFFICE/OUTPT VISIT, EST, LEVL IV, 30-39 MIN: ICD-10-PCS | Mod: S$PBB,,, | Performed by: INTERNAL MEDICINE

## 2021-05-12 PROCEDURE — 99999 PR PBB SHADOW E&M-EST. PATIENT-LVL III: CPT | Mod: PBBFAC,,, | Performed by: INTERNAL MEDICINE

## 2021-05-12 PROCEDURE — 99213 OFFICE O/P EST LOW 20 MIN: CPT | Mod: PBBFAC | Performed by: INTERNAL MEDICINE

## 2021-05-12 PROCEDURE — 99999 PR PBB SHADOW E&M-EST. PATIENT-LVL III: ICD-10-PCS | Mod: PBBFAC,,, | Performed by: INTERNAL MEDICINE

## 2021-05-12 PROCEDURE — 99214 OFFICE O/P EST MOD 30 MIN: CPT | Mod: S$PBB,,, | Performed by: INTERNAL MEDICINE

## 2021-05-13 ENCOUNTER — NURSE TRIAGE (OUTPATIENT)
Dept: ADMINISTRATIVE | Facility: CLINIC | Age: 71
End: 2021-05-13

## 2021-05-14 ENCOUNTER — TELEPHONE (OUTPATIENT)
Dept: FAMILY MEDICINE | Facility: CLINIC | Age: 71
End: 2021-05-14

## 2021-05-14 RX ORDER — HYDRALAZINE HYDROCHLORIDE 25 MG/1
25 TABLET, FILM COATED ORAL EVERY 12 HOURS
Qty: 60 TABLET | Refills: 3 | Status: SHIPPED | OUTPATIENT
Start: 2021-05-14 | End: 2021-07-23

## 2021-05-21 ENCOUNTER — PATIENT OUTREACH (OUTPATIENT)
Dept: ADMINISTRATIVE | Facility: OTHER | Age: 71
End: 2021-05-21

## 2021-05-26 ENCOUNTER — OFFICE VISIT (OUTPATIENT)
Dept: CARDIOLOGY | Facility: CLINIC | Age: 71
End: 2021-05-26
Payer: MEDICARE

## 2021-05-26 VITALS
WEIGHT: 191.81 LBS | OXYGEN SATURATION: 98 % | BODY MASS INDEX: 35.3 KG/M2 | SYSTOLIC BLOOD PRESSURE: 178 MMHG | HEART RATE: 59 BPM | DIASTOLIC BLOOD PRESSURE: 83 MMHG | HEIGHT: 62 IN

## 2021-05-26 DIAGNOSIS — G47.33 OSA (OBSTRUCTIVE SLEEP APNEA): ICD-10-CM

## 2021-05-26 DIAGNOSIS — K21.9 GASTROESOPHAGEAL REFLUX DISEASE, UNSPECIFIED WHETHER ESOPHAGITIS PRESENT: ICD-10-CM

## 2021-05-26 DIAGNOSIS — Z96.652 STATUS POST TOTAL KNEE REPLACEMENT, LEFT: ICD-10-CM

## 2021-05-26 DIAGNOSIS — E66.01 SEVERE OBESITY (BMI 35.0-39.9) WITH COMORBIDITY: ICD-10-CM

## 2021-05-26 DIAGNOSIS — R09.89 BILATERAL CAROTID BRUITS: ICD-10-CM

## 2021-05-26 DIAGNOSIS — I35.8 AORTIC VALVE SCLEROSIS: ICD-10-CM

## 2021-05-26 DIAGNOSIS — I10 ESSENTIAL HYPERTENSION: Primary | ICD-10-CM

## 2021-05-26 DIAGNOSIS — L43.9 LICHEN PLANUS: ICD-10-CM

## 2021-05-26 DIAGNOSIS — I51.89 DIASTOLIC DYSFUNCTION: ICD-10-CM

## 2021-05-26 DIAGNOSIS — E78.5 DYSLIPIDEMIA: ICD-10-CM

## 2021-05-26 DIAGNOSIS — Z96.651 STATUS POST TOTAL RIGHT KNEE REPLACEMENT: ICD-10-CM

## 2021-05-26 DIAGNOSIS — Z72.0 TOBACCO ABUSE: ICD-10-CM

## 2021-05-26 PROCEDURE — 99214 PR OFFICE/OUTPT VISIT, EST, LEVL IV, 30-39 MIN: ICD-10-PCS | Mod: S$PBB,,, | Performed by: INTERNAL MEDICINE

## 2021-05-26 PROCEDURE — 99214 OFFICE O/P EST MOD 30 MIN: CPT | Mod: S$PBB,,, | Performed by: INTERNAL MEDICINE

## 2021-05-26 PROCEDURE — 99999 PR PBB SHADOW E&M-EST. PATIENT-LVL III: ICD-10-PCS | Mod: PBBFAC,,, | Performed by: INTERNAL MEDICINE

## 2021-05-26 PROCEDURE — 99213 OFFICE O/P EST LOW 20 MIN: CPT | Mod: PBBFAC | Performed by: INTERNAL MEDICINE

## 2021-05-26 PROCEDURE — 99999 PR PBB SHADOW E&M-EST. PATIENT-LVL III: CPT | Mod: PBBFAC,,, | Performed by: INTERNAL MEDICINE

## 2021-05-27 ENCOUNTER — TELEPHONE (OUTPATIENT)
Dept: FAMILY MEDICINE | Facility: CLINIC | Age: 71
End: 2021-05-27

## 2021-05-27 RX ORDER — ALBUTEROL SULFATE 90 UG/1
2 AEROSOL, METERED RESPIRATORY (INHALATION) EVERY 4 HOURS PRN
Qty: 18 G | Refills: 3 | Status: SHIPPED | OUTPATIENT
Start: 2021-05-27 | End: 2022-06-02

## 2021-05-31 ENCOUNTER — EXTERNAL CHRONIC CARE MANAGEMENT (OUTPATIENT)
Dept: PRIMARY CARE CLINIC | Facility: CLINIC | Age: 71
End: 2021-05-31
Payer: MEDICARE

## 2021-05-31 PROCEDURE — 99490 PR CHRONIC CARE MGMT, 1ST 20 MIN: ICD-10-PCS | Mod: S$PBB,,, | Performed by: FAMILY MEDICINE

## 2021-05-31 PROCEDURE — 99490 CHRNC CARE MGMT STAFF 1ST 20: CPT | Mod: S$PBB,,, | Performed by: FAMILY MEDICINE

## 2021-05-31 PROCEDURE — 99490 CHRNC CARE MGMT STAFF 1ST 20: CPT | Mod: PBBFAC,PN | Performed by: FAMILY MEDICINE

## 2021-06-01 ENCOUNTER — CLINICAL SUPPORT (OUTPATIENT)
Dept: SMOKING CESSATION | Facility: CLINIC | Age: 71
End: 2021-06-01
Payer: COMMERCIAL

## 2021-06-01 DIAGNOSIS — F17.200 NICOTINE DEPENDENCE: Primary | ICD-10-CM

## 2021-06-01 PROCEDURE — 99407 BEHAV CHNG SMOKING > 10 MIN: CPT | Mod: S$GLB,,,

## 2021-06-01 PROCEDURE — 99407 PR TOBACCO USE CESSATION INTENSIVE >10 MINUTES: ICD-10-PCS | Mod: S$GLB,,,

## 2021-06-07 ENCOUNTER — TELEPHONE (OUTPATIENT)
Dept: SMOKING CESSATION | Facility: CLINIC | Age: 71
End: 2021-06-07

## 2021-06-19 ENCOUNTER — OFFICE VISIT (OUTPATIENT)
Dept: URGENT CARE | Facility: CLINIC | Age: 71
End: 2021-06-19
Payer: MEDICARE

## 2021-06-19 VITALS
HEART RATE: 67 BPM | WEIGHT: 191 LBS | BODY MASS INDEX: 35.15 KG/M2 | OXYGEN SATURATION: 97 % | TEMPERATURE: 98 F | HEIGHT: 62 IN | RESPIRATION RATE: 16 BRPM | SYSTOLIC BLOOD PRESSURE: 154 MMHG | DIASTOLIC BLOOD PRESSURE: 82 MMHG

## 2021-06-19 DIAGNOSIS — R06.2 WHEEZING: ICD-10-CM

## 2021-06-19 DIAGNOSIS — J40 BRONCHITIS WITH WHEEZING: ICD-10-CM

## 2021-06-19 DIAGNOSIS — R05.9 COUGH: ICD-10-CM

## 2021-06-19 DIAGNOSIS — J01.40 ACUTE NON-RECURRENT PANSINUSITIS: Primary | ICD-10-CM

## 2021-06-19 LAB
CTP QC/QA: YES
SARS-COV-2 RDRP RESP QL NAA+PROBE: NEGATIVE

## 2021-06-19 PROCEDURE — 99214 OFFICE O/P EST MOD 30 MIN: CPT | Mod: 25,S$GLB,, | Performed by: PHYSICIAN ASSISTANT

## 2021-06-19 PROCEDURE — 94640 AIRWAY INHALATION TREATMENT: CPT | Mod: S$GLB,,, | Performed by: PHYSICIAN ASSISTANT

## 2021-06-19 PROCEDURE — U0002: ICD-10-PCS | Mod: QW,CR,S$GLB, | Performed by: PHYSICIAN ASSISTANT

## 2021-06-19 PROCEDURE — 71046 XR CHEST PA AND LATERAL: ICD-10-PCS | Mod: S$GLB,,, | Performed by: RADIOLOGY

## 2021-06-19 PROCEDURE — 71046 X-RAY EXAM CHEST 2 VIEWS: CPT | Mod: S$GLB,,, | Performed by: RADIOLOGY

## 2021-06-19 PROCEDURE — 94640 PR INHAL RX, AIRWAY OBST/DX SPUTUM INDUCT: ICD-10-PCS | Mod: S$GLB,,, | Performed by: PHYSICIAN ASSISTANT

## 2021-06-19 PROCEDURE — 99214 PR OFFICE/OUTPT VISIT, EST, LEVL IV, 30-39 MIN: ICD-10-PCS | Mod: 25,S$GLB,, | Performed by: PHYSICIAN ASSISTANT

## 2021-06-19 PROCEDURE — U0002 COVID-19 LAB TEST NON-CDC: HCPCS | Mod: QW,CR,S$GLB, | Performed by: PHYSICIAN ASSISTANT

## 2021-06-19 RX ORDER — ALBUTEROL SULFATE 0.83 MG/ML
2.5 SOLUTION RESPIRATORY (INHALATION)
Status: COMPLETED | OUTPATIENT
Start: 2021-06-19 | End: 2021-06-19

## 2021-06-19 RX ORDER — IPRATROPIUM BROMIDE 0.5 MG/2.5ML
0.5 SOLUTION RESPIRATORY (INHALATION)
Status: COMPLETED | OUTPATIENT
Start: 2021-06-19 | End: 2021-06-19

## 2021-06-19 RX ORDER — PREDNISONE 20 MG/1
TABLET ORAL
Qty: 10 TABLET | Refills: 0 | Status: SHIPPED | OUTPATIENT
Start: 2021-06-19 | End: 2021-12-14

## 2021-06-19 RX ORDER — BENZONATATE 100 MG/1
200 CAPSULE ORAL 3 TIMES DAILY PRN
Qty: 30 CAPSULE | Refills: 1 | Status: SHIPPED | OUTPATIENT
Start: 2021-06-19 | End: 2021-06-26

## 2021-06-19 RX ORDER — DOXYCYCLINE 100 MG/1
100 CAPSULE ORAL 2 TIMES DAILY
Qty: 20 CAPSULE | Refills: 0 | Status: SHIPPED | OUTPATIENT
Start: 2021-06-19 | End: 2021-06-29

## 2021-06-19 RX ADMIN — IPRATROPIUM BROMIDE 0.5 MG: 0.5 SOLUTION RESPIRATORY (INHALATION) at 11:06

## 2021-06-19 RX ADMIN — ALBUTEROL SULFATE 2.5 MG: 0.83 SOLUTION RESPIRATORY (INHALATION) at 11:06

## 2021-06-20 ENCOUNTER — PATIENT MESSAGE (OUTPATIENT)
Dept: PULMONOLOGY | Facility: CLINIC | Age: 71
End: 2021-06-20

## 2021-06-30 ENCOUNTER — EXTERNAL CHRONIC CARE MANAGEMENT (OUTPATIENT)
Dept: PRIMARY CARE CLINIC | Facility: CLINIC | Age: 71
End: 2021-06-30
Payer: MEDICARE

## 2021-06-30 PROCEDURE — 99490 CHRNC CARE MGMT STAFF 1ST 20: CPT | Mod: S$PBB,,, | Performed by: FAMILY MEDICINE

## 2021-06-30 PROCEDURE — 99490 PR CHRONIC CARE MGMT, 1ST 20 MIN: ICD-10-PCS | Mod: S$PBB,,, | Performed by: FAMILY MEDICINE

## 2021-06-30 PROCEDURE — 99490 CHRNC CARE MGMT STAFF 1ST 20: CPT | Mod: PBBFAC,PN | Performed by: FAMILY MEDICINE

## 2021-07-07 ENCOUNTER — TELEPHONE (OUTPATIENT)
Dept: PULMONOLOGY | Facility: CLINIC | Age: 71
End: 2021-07-07

## 2021-07-14 ENCOUNTER — PATIENT MESSAGE (OUTPATIENT)
Dept: ADMINISTRATIVE | Facility: HOSPITAL | Age: 71
End: 2021-07-14

## 2021-07-19 ENCOUNTER — OFFICE VISIT (OUTPATIENT)
Dept: FAMILY MEDICINE | Facility: CLINIC | Age: 71
End: 2021-07-19
Payer: MEDICARE

## 2021-07-19 VITALS
SYSTOLIC BLOOD PRESSURE: 134 MMHG | OXYGEN SATURATION: 97 % | TEMPERATURE: 98 F | HEIGHT: 62 IN | BODY MASS INDEX: 35.25 KG/M2 | WEIGHT: 191.56 LBS | DIASTOLIC BLOOD PRESSURE: 60 MMHG | HEART RATE: 66 BPM | RESPIRATION RATE: 19 BRPM

## 2021-07-19 DIAGNOSIS — M25.522 LEFT ELBOW PAIN: ICD-10-CM

## 2021-07-19 DIAGNOSIS — M70.22 OLECRANON BURSITIS OF LEFT ELBOW: Primary | ICD-10-CM

## 2021-07-19 PROCEDURE — 99215 OFFICE O/P EST HI 40 MIN: CPT | Mod: PBBFAC,PN | Performed by: NURSE PRACTITIONER

## 2021-07-19 PROCEDURE — 99213 OFFICE O/P EST LOW 20 MIN: CPT | Mod: S$PBB,,, | Performed by: NURSE PRACTITIONER

## 2021-07-19 PROCEDURE — 99999 PR PBB SHADOW E&M-EST. PATIENT-LVL V: CPT | Mod: PBBFAC,,, | Performed by: NURSE PRACTITIONER

## 2021-07-19 PROCEDURE — 99213 PR OFFICE/OUTPT VISIT, EST, LEVL III, 20-29 MIN: ICD-10-PCS | Mod: S$PBB,,, | Performed by: NURSE PRACTITIONER

## 2021-07-19 PROCEDURE — 99999 PR PBB SHADOW E&M-EST. PATIENT-LVL V: ICD-10-PCS | Mod: PBBFAC,,, | Performed by: NURSE PRACTITIONER

## 2021-07-22 ENCOUNTER — PATIENT OUTREACH (OUTPATIENT)
Dept: ADMINISTRATIVE | Facility: OTHER | Age: 71
End: 2021-07-22

## 2021-07-23 ENCOUNTER — OFFICE VISIT (OUTPATIENT)
Dept: OPHTHALMOLOGY | Facility: CLINIC | Age: 71
End: 2021-07-23
Payer: MEDICARE

## 2021-07-23 ENCOUNTER — CLINICAL SUPPORT (OUTPATIENT)
Dept: OPHTHALMOLOGY | Facility: CLINIC | Age: 71
End: 2021-07-23
Payer: MEDICARE

## 2021-07-23 DIAGNOSIS — Z98.83 STATUS POST GLAUCOMA SURGERY: ICD-10-CM

## 2021-07-23 DIAGNOSIS — Z96.1 PSEUDOPHAKIA, BOTH EYES: ICD-10-CM

## 2021-07-23 DIAGNOSIS — H40.1131 PRIMARY OPEN-ANGLE GLAUCOMA, BILATERAL, MILD STAGE: Primary | ICD-10-CM

## 2021-07-23 DIAGNOSIS — H35.372 EPIRETINAL MEMBRANE (ERM) OF LEFT EYE: ICD-10-CM

## 2021-07-23 PROCEDURE — 92083 HUMPHREY VISUAL FIELD - OU - BOTH EYES: ICD-10-PCS | Mod: 26,S$PBB,, | Performed by: OPHTHALMOLOGY

## 2021-07-23 PROCEDURE — 92083 EXTENDED VISUAL FIELD XM: CPT | Mod: PBBFAC | Performed by: OPHTHALMOLOGY

## 2021-07-23 PROCEDURE — 92133 POSTERIOR SEGMENT OCT OPTIC NERVE(OCULAR COHERENCE TOMOGRAPHY) - OU - BOTH EYES: ICD-10-PCS | Mod: 26,S$PBB,, | Performed by: OPHTHALMOLOGY

## 2021-07-23 PROCEDURE — 92133 CPTRZD OPH DX IMG PST SGM ON: CPT | Mod: PBBFAC | Performed by: OPHTHALMOLOGY

## 2021-07-23 PROCEDURE — 99213 OFFICE O/P EST LOW 20 MIN: CPT | Mod: PBBFAC | Performed by: OPHTHALMOLOGY

## 2021-07-23 PROCEDURE — 92014 COMPRE OPH EXAM EST PT 1/>: CPT | Mod: S$PBB,,, | Performed by: OPHTHALMOLOGY

## 2021-07-23 PROCEDURE — 99999 PR PBB SHADOW E&M-EST. PATIENT-LVL III: ICD-10-PCS | Mod: PBBFAC,,, | Performed by: OPHTHALMOLOGY

## 2021-07-23 PROCEDURE — 99999 PR PBB SHADOW E&M-EST. PATIENT-LVL III: CPT | Mod: PBBFAC,,, | Performed by: OPHTHALMOLOGY

## 2021-07-23 PROCEDURE — 92014 PR EYE EXAM, EST PATIENT,COMPREHESV: ICD-10-PCS | Mod: S$PBB,,, | Performed by: OPHTHALMOLOGY

## 2021-07-31 ENCOUNTER — EXTERNAL CHRONIC CARE MANAGEMENT (OUTPATIENT)
Dept: PRIMARY CARE CLINIC | Facility: CLINIC | Age: 71
End: 2021-07-31
Payer: MEDICARE

## 2021-07-31 PROCEDURE — 99439 CHRNC CARE MGMT STAF EA ADDL: CPT | Mod: S$PBB,,, | Performed by: FAMILY MEDICINE

## 2021-07-31 PROCEDURE — 99439 PR CHRONIC CARE MGMT, EA ADDTL 20 MIN: ICD-10-PCS | Mod: S$PBB,,, | Performed by: FAMILY MEDICINE

## 2021-07-31 PROCEDURE — 99439 CHRNC CARE MGMT STAF EA ADDL: CPT | Mod: PBBFAC,PN | Performed by: FAMILY MEDICINE

## 2021-07-31 PROCEDURE — 99490 PR CHRONIC CARE MGMT, 1ST 20 MIN: ICD-10-PCS | Mod: S$PBB,,, | Performed by: FAMILY MEDICINE

## 2021-07-31 PROCEDURE — 99490 CHRNC CARE MGMT STAFF 1ST 20: CPT | Mod: PBBFAC,PN | Performed by: FAMILY MEDICINE

## 2021-07-31 PROCEDURE — 99490 CHRNC CARE MGMT STAFF 1ST 20: CPT | Mod: S$PBB,,, | Performed by: FAMILY MEDICINE

## 2021-08-25 ENCOUNTER — PATIENT OUTREACH (OUTPATIENT)
Dept: ADMINISTRATIVE | Facility: OTHER | Age: 71
End: 2021-08-25

## 2021-08-31 ENCOUNTER — EXTERNAL CHRONIC CARE MANAGEMENT (OUTPATIENT)
Dept: PRIMARY CARE CLINIC | Facility: CLINIC | Age: 71
End: 2021-08-31
Payer: MEDICARE

## 2021-08-31 PROCEDURE — 99490 CHRNC CARE MGMT STAFF 1ST 20: CPT | Mod: S$PBB,,, | Performed by: FAMILY MEDICINE

## 2021-08-31 PROCEDURE — 99490 PR CHRONIC CARE MGMT, 1ST 20 MIN: ICD-10-PCS | Mod: S$PBB,,, | Performed by: FAMILY MEDICINE

## 2021-08-31 PROCEDURE — 99490 CHRNC CARE MGMT STAFF 1ST 20: CPT | Mod: PBBFAC,PN | Performed by: FAMILY MEDICINE

## 2021-09-13 ENCOUNTER — OFFICE VISIT (OUTPATIENT)
Dept: FAMILY MEDICINE | Facility: CLINIC | Age: 71
End: 2021-09-13
Payer: MEDICARE

## 2021-09-13 ENCOUNTER — LAB VISIT (OUTPATIENT)
Dept: LAB | Facility: HOSPITAL | Age: 71
End: 2021-09-13
Attending: FAMILY MEDICINE
Payer: MEDICARE

## 2021-09-13 VITALS
HEIGHT: 62 IN | WEIGHT: 186.75 LBS | DIASTOLIC BLOOD PRESSURE: 80 MMHG | BODY MASS INDEX: 34.37 KG/M2 | OXYGEN SATURATION: 99 % | SYSTOLIC BLOOD PRESSURE: 160 MMHG | HEART RATE: 66 BPM | TEMPERATURE: 98 F

## 2021-09-13 DIAGNOSIS — R10.13 EPIGASTRIC ABDOMINAL PAIN: ICD-10-CM

## 2021-09-13 DIAGNOSIS — I10 ESSENTIAL HYPERTENSION: ICD-10-CM

## 2021-09-13 DIAGNOSIS — E78.5 DYSLIPIDEMIA: ICD-10-CM

## 2021-09-13 DIAGNOSIS — J01.40 ACUTE NON-RECURRENT PANSINUSITIS: ICD-10-CM

## 2021-09-13 DIAGNOSIS — E11.51 TYPE II DIABETES MELLITUS WITH PERIPHERAL CIRCULATORY DISORDER: Primary | ICD-10-CM

## 2021-09-13 DIAGNOSIS — J40 BRONCHITIS WITH WHEEZING: ICD-10-CM

## 2021-09-13 DIAGNOSIS — E11.51 TYPE II DIABETES MELLITUS WITH PERIPHERAL CIRCULATORY DISORDER: ICD-10-CM

## 2021-09-13 PROCEDURE — 99999 PR PBB SHADOW E&M-EST. PATIENT-LVL IV: ICD-10-PCS | Mod: PBBFAC,,, | Performed by: FAMILY MEDICINE

## 2021-09-13 PROCEDURE — 99214 PR OFFICE/OUTPT VISIT, EST, LEVL IV, 30-39 MIN: ICD-10-PCS | Mod: S$PBB,,, | Performed by: FAMILY MEDICINE

## 2021-09-13 PROCEDURE — 99999 PR PBB SHADOW E&M-EST. PATIENT-LVL IV: CPT | Mod: PBBFAC,,, | Performed by: FAMILY MEDICINE

## 2021-09-13 PROCEDURE — 99214 OFFICE O/P EST MOD 30 MIN: CPT | Mod: S$PBB,,, | Performed by: FAMILY MEDICINE

## 2021-09-13 PROCEDURE — 99214 OFFICE O/P EST MOD 30 MIN: CPT | Mod: PBBFAC,PN | Performed by: FAMILY MEDICINE

## 2021-09-13 PROCEDURE — 82570 ASSAY OF URINE CREATININE: CPT | Performed by: FAMILY MEDICINE

## 2021-09-13 RX ORDER — PANTOPRAZOLE SODIUM 20 MG/1
20 TABLET, DELAYED RELEASE ORAL DAILY
Qty: 30 TABLET | Refills: 0 | Status: SHIPPED | OUTPATIENT
Start: 2021-09-13 | End: 2021-09-13 | Stop reason: SDUPTHER

## 2021-09-13 RX ORDER — DICYCLOMINE HYDROCHLORIDE 10 MG/1
10 CAPSULE ORAL
Qty: 120 CAPSULE | Refills: 0 | Status: SHIPPED | OUTPATIENT
Start: 2021-09-13 | End: 2021-09-13 | Stop reason: SDUPTHER

## 2021-09-14 LAB
ALBUMIN/CREAT UR: 2890.9 UG/MG (ref 0–30)
CREAT UR-MCNC: 66 MG/DL (ref 15–325)
MICROALBUMIN UR DL<=1MG/L-MCNC: 1908 UG/ML

## 2021-09-14 RX ORDER — DICYCLOMINE HYDROCHLORIDE 10 MG/1
10 CAPSULE ORAL
Qty: 120 CAPSULE | Refills: 0 | Status: SHIPPED | OUTPATIENT
Start: 2021-09-14 | End: 2021-10-14

## 2021-09-14 RX ORDER — PANTOPRAZOLE SODIUM 20 MG/1
20 TABLET, DELAYED RELEASE ORAL DAILY
Qty: 30 TABLET | Refills: 0 | Status: SHIPPED | OUTPATIENT
Start: 2021-09-14 | End: 2021-12-14

## 2021-09-17 ENCOUNTER — TELEPHONE (OUTPATIENT)
Dept: NEPHROLOGY | Facility: CLINIC | Age: 71
End: 2021-09-17

## 2021-09-17 ENCOUNTER — TELEPHONE (OUTPATIENT)
Dept: FAMILY MEDICINE | Facility: CLINIC | Age: 71
End: 2021-09-17

## 2021-09-17 DIAGNOSIS — E11.29 DIABETES MELLITUS WITH MICROALBUMINURIA: Primary | ICD-10-CM

## 2021-09-17 DIAGNOSIS — R80.9 DIABETES MELLITUS WITH MICROALBUMINURIA: Primary | ICD-10-CM

## 2021-09-27 ENCOUNTER — PATIENT OUTREACH (OUTPATIENT)
Dept: ADMINISTRATIVE | Facility: OTHER | Age: 71
End: 2021-09-27

## 2021-09-30 ENCOUNTER — EXTERNAL CHRONIC CARE MANAGEMENT (OUTPATIENT)
Dept: PRIMARY CARE CLINIC | Facility: CLINIC | Age: 71
End: 2021-09-30
Payer: MEDICARE

## 2021-09-30 PROCEDURE — 99490 PR CHRONIC CARE MGMT, 1ST 20 MIN: ICD-10-PCS | Mod: S$PBB,,, | Performed by: FAMILY MEDICINE

## 2021-09-30 PROCEDURE — 99490 CHRNC CARE MGMT STAFF 1ST 20: CPT | Mod: S$PBB,,, | Performed by: FAMILY MEDICINE

## 2021-09-30 PROCEDURE — 99490 CHRNC CARE MGMT STAFF 1ST 20: CPT | Mod: PBBFAC,PN | Performed by: FAMILY MEDICINE

## 2021-10-13 ENCOUNTER — OFFICE VISIT (OUTPATIENT)
Dept: NEPHROLOGY | Facility: CLINIC | Age: 71
End: 2021-10-13
Payer: MEDICARE

## 2021-10-13 VITALS
SYSTOLIC BLOOD PRESSURE: 160 MMHG | HEIGHT: 62 IN | BODY MASS INDEX: 34.56 KG/M2 | DIASTOLIC BLOOD PRESSURE: 70 MMHG | WEIGHT: 187.81 LBS

## 2021-10-13 DIAGNOSIS — N18.2 CONTROLLED TYPE 2 DIABETES MELLITUS WITH STAGE 2 CHRONIC KIDNEY DISEASE, WITH LONG-TERM CURRENT USE OF INSULIN: Primary | ICD-10-CM

## 2021-10-13 DIAGNOSIS — R80.1 PERSISTENT PROTEINURIA: ICD-10-CM

## 2021-10-13 DIAGNOSIS — Z79.4 CONTROLLED TYPE 2 DIABETES MELLITUS WITH STAGE 2 CHRONIC KIDNEY DISEASE, WITH LONG-TERM CURRENT USE OF INSULIN: Primary | ICD-10-CM

## 2021-10-13 DIAGNOSIS — E11.22 CONTROLLED TYPE 2 DIABETES MELLITUS WITH STAGE 2 CHRONIC KIDNEY DISEASE, WITH LONG-TERM CURRENT USE OF INSULIN: Primary | ICD-10-CM

## 2021-10-13 PROCEDURE — 99213 OFFICE O/P EST LOW 20 MIN: CPT | Mod: PBBFAC,PO | Performed by: INTERNAL MEDICINE

## 2021-10-13 PROCEDURE — 99999 PR PBB SHADOW E&M-EST. PATIENT-LVL III: CPT | Mod: PBBFAC,,, | Performed by: INTERNAL MEDICINE

## 2021-10-13 PROCEDURE — 99999 PR PBB SHADOW E&M-EST. PATIENT-LVL III: ICD-10-PCS | Mod: PBBFAC,,, | Performed by: INTERNAL MEDICINE

## 2021-10-13 PROCEDURE — 99213 PR OFFICE/OUTPT VISIT, EST, LEVL III, 20-29 MIN: ICD-10-PCS | Mod: S$PBB,,, | Performed by: INTERNAL MEDICINE

## 2021-10-13 PROCEDURE — 99213 OFFICE O/P EST LOW 20 MIN: CPT | Mod: S$PBB,,, | Performed by: INTERNAL MEDICINE

## 2021-10-27 ENCOUNTER — TELEPHONE (OUTPATIENT)
Dept: FAMILY MEDICINE | Facility: CLINIC | Age: 71
End: 2021-10-27
Payer: MEDICARE

## 2021-10-27 DIAGNOSIS — Z12.31 VISIT FOR SCREENING MAMMOGRAM: Primary | ICD-10-CM

## 2021-10-31 ENCOUNTER — EXTERNAL CHRONIC CARE MANAGEMENT (OUTPATIENT)
Dept: PRIMARY CARE CLINIC | Facility: CLINIC | Age: 71
End: 2021-10-31
Payer: MEDICARE

## 2021-10-31 PROCEDURE — 99490 CHRNC CARE MGMT STAFF 1ST 20: CPT | Mod: S$PBB,,, | Performed by: FAMILY MEDICINE

## 2021-10-31 PROCEDURE — 99490 CHRNC CARE MGMT STAFF 1ST 20: CPT | Mod: PBBFAC,PN | Performed by: FAMILY MEDICINE

## 2021-10-31 PROCEDURE — 99490 PR CHRONIC CARE MGMT, 1ST 20 MIN: ICD-10-PCS | Mod: S$PBB,,, | Performed by: FAMILY MEDICINE

## 2021-11-23 ENCOUNTER — HOSPITAL ENCOUNTER (OUTPATIENT)
Dept: RADIOLOGY | Facility: HOSPITAL | Age: 71
Discharge: HOME | End: 2021-11-23
Attending: FAMILY MEDICINE
Payer: MEDICARE

## 2021-11-23 DIAGNOSIS — Z12.31 VISIT FOR SCREENING MAMMOGRAM: ICD-10-CM

## 2021-11-23 PROCEDURE — 77063 MAMMO DIGITAL SCREENING BILAT WITH TOMO: ICD-10-PCS | Mod: 26,,, | Performed by: RADIOLOGY

## 2021-11-23 PROCEDURE — 77063 BREAST TOMOSYNTHESIS BI: CPT | Mod: 26,,, | Performed by: RADIOLOGY

## 2021-11-23 PROCEDURE — 77067 MAMMO DIGITAL SCREENING BILAT WITH TOMO: ICD-10-PCS | Mod: 26,,, | Performed by: RADIOLOGY

## 2021-11-23 PROCEDURE — 77067 SCR MAMMO BI INCL CAD: CPT | Mod: TC

## 2021-11-23 PROCEDURE — 77067 SCR MAMMO BI INCL CAD: CPT | Mod: 26,,, | Performed by: RADIOLOGY

## 2021-11-24 ENCOUNTER — TELEPHONE (OUTPATIENT)
Dept: FAMILY MEDICINE | Facility: CLINIC | Age: 71
End: 2021-11-24
Payer: MEDICARE

## 2021-11-29 ENCOUNTER — OFFICE VISIT (OUTPATIENT)
Dept: OPHTHALMOLOGY | Facility: CLINIC | Age: 71
End: 2021-11-29
Payer: MEDICARE

## 2021-11-29 ENCOUNTER — PATIENT OUTREACH (OUTPATIENT)
Dept: ADMINISTRATIVE | Facility: OTHER | Age: 71
End: 2021-11-29
Payer: MEDICARE

## 2021-11-29 DIAGNOSIS — Z96.1 PSEUDOPHAKIA, BOTH EYES: ICD-10-CM

## 2021-11-29 DIAGNOSIS — Z98.83 STATUS POST GLAUCOMA SURGERY: ICD-10-CM

## 2021-11-29 DIAGNOSIS — H35.372 EPIRETINAL MEMBRANE (ERM) OF LEFT EYE: ICD-10-CM

## 2021-11-29 DIAGNOSIS — H40.1131 PRIMARY OPEN-ANGLE GLAUCOMA, BILATERAL, MILD STAGE: Primary | ICD-10-CM

## 2021-11-29 PROCEDURE — 92012 PR EYE EXAM, EST PATIENT,INTERMED: ICD-10-PCS | Mod: S$PBB,,, | Performed by: OPHTHALMOLOGY

## 2021-11-29 PROCEDURE — 99999 PR PBB SHADOW E&M-EST. PATIENT-LVL III: CPT | Mod: PBBFAC,,, | Performed by: OPHTHALMOLOGY

## 2021-11-29 PROCEDURE — 92020 GONIOSCOPY: CPT | Mod: S$PBB,,, | Performed by: OPHTHALMOLOGY

## 2021-11-29 PROCEDURE — 92012 INTRM OPH EXAM EST PATIENT: CPT | Mod: S$PBB,,, | Performed by: OPHTHALMOLOGY

## 2021-11-29 PROCEDURE — 99999 PR PBB SHADOW E&M-EST. PATIENT-LVL III: ICD-10-PCS | Mod: PBBFAC,,, | Performed by: OPHTHALMOLOGY

## 2021-11-29 PROCEDURE — 92020 PR SPECIAL EYE EVAL,GONIOSCOPY: ICD-10-PCS | Mod: S$PBB,,, | Performed by: OPHTHALMOLOGY

## 2021-11-29 PROCEDURE — 92020 GONIOSCOPY: CPT | Mod: PBBFAC | Performed by: OPHTHALMOLOGY

## 2021-11-29 PROCEDURE — 99213 OFFICE O/P EST LOW 20 MIN: CPT | Mod: PBBFAC | Performed by: OPHTHALMOLOGY

## 2021-11-30 ENCOUNTER — EXTERNAL CHRONIC CARE MANAGEMENT (OUTPATIENT)
Dept: PRIMARY CARE CLINIC | Facility: CLINIC | Age: 71
End: 2021-11-30
Payer: MEDICARE

## 2021-11-30 PROCEDURE — 99439 PR CHRONIC CARE MGMT, EA ADDTL 20 MIN: ICD-10-PCS | Mod: S$PBB,,, | Performed by: FAMILY MEDICINE

## 2021-11-30 PROCEDURE — 99490 CHRNC CARE MGMT STAFF 1ST 20: CPT | Mod: S$PBB,,, | Performed by: FAMILY MEDICINE

## 2021-11-30 PROCEDURE — 99439 CHRNC CARE MGMT STAF EA ADDL: CPT | Mod: PBBFAC,PN | Performed by: FAMILY MEDICINE

## 2021-11-30 PROCEDURE — 99490 PR CHRONIC CARE MGMT, 1ST 20 MIN: ICD-10-PCS | Mod: S$PBB,,, | Performed by: FAMILY MEDICINE

## 2021-11-30 PROCEDURE — 99490 CHRNC CARE MGMT STAFF 1ST 20: CPT | Mod: PBBFAC,PN | Performed by: FAMILY MEDICINE

## 2021-11-30 PROCEDURE — 99439 CHRNC CARE MGMT STAF EA ADDL: CPT | Mod: S$PBB,,, | Performed by: FAMILY MEDICINE

## 2021-12-02 ENCOUNTER — PATIENT OUTREACH (OUTPATIENT)
Dept: ADMINISTRATIVE | Facility: HOSPITAL | Age: 71
End: 2021-12-02
Payer: MEDICARE

## 2021-12-14 ENCOUNTER — OFFICE VISIT (OUTPATIENT)
Dept: PAIN MEDICINE | Facility: CLINIC | Age: 71
End: 2021-12-14
Payer: MEDICARE

## 2021-12-14 VITALS
DIASTOLIC BLOOD PRESSURE: 72 MMHG | HEIGHT: 62 IN | BODY MASS INDEX: 34.16 KG/M2 | WEIGHT: 185.63 LBS | HEART RATE: 73 BPM | RESPIRATION RATE: 17 BRPM | OXYGEN SATURATION: 100 % | SYSTOLIC BLOOD PRESSURE: 164 MMHG

## 2021-12-14 DIAGNOSIS — M16.12 PRIMARY OSTEOARTHRITIS OF LEFT HIP: ICD-10-CM

## 2021-12-14 DIAGNOSIS — M54.2 CHRONIC NECK PAIN: ICD-10-CM

## 2021-12-14 DIAGNOSIS — M70.70 BURSITIS, ISCHIAL, UNSPECIFIED LATERALITY: Primary | ICD-10-CM

## 2021-12-14 DIAGNOSIS — G89.29 CHRONIC NECK PAIN: ICD-10-CM

## 2021-12-14 PROCEDURE — 99213 OFFICE O/P EST LOW 20 MIN: CPT | Mod: S$PBB,,, | Performed by: PAIN MEDICINE

## 2021-12-14 PROCEDURE — 99214 OFFICE O/P EST MOD 30 MIN: CPT | Mod: PBBFAC,PN | Performed by: PAIN MEDICINE

## 2021-12-14 PROCEDURE — 99213 PR OFFICE/OUTPT VISIT, EST, LEVL III, 20-29 MIN: ICD-10-PCS | Mod: S$PBB,,, | Performed by: PAIN MEDICINE

## 2021-12-14 PROCEDURE — 99999 PR PBB SHADOW E&M-EST. PATIENT-LVL IV: CPT | Mod: PBBFAC,,, | Performed by: PAIN MEDICINE

## 2021-12-14 PROCEDURE — 99999 PR PBB SHADOW E&M-EST. PATIENT-LVL IV: ICD-10-PCS | Mod: PBBFAC,,, | Performed by: PAIN MEDICINE

## 2021-12-14 RX ORDER — MELOXICAM 7.5 MG/1
TABLET ORAL
Qty: 90 TABLET | Refills: 3 | Status: SHIPPED | OUTPATIENT
Start: 2021-12-14 | End: 2022-04-18

## 2021-12-15 ENCOUNTER — PATIENT MESSAGE (OUTPATIENT)
Dept: ADMINISTRATIVE | Facility: HOSPITAL | Age: 71
End: 2021-12-15
Payer: MEDICARE

## 2021-12-16 ENCOUNTER — CLINICAL SUPPORT (OUTPATIENT)
Dept: SMOKING CESSATION | Facility: CLINIC | Age: 71
End: 2021-12-16
Payer: COMMERCIAL

## 2021-12-16 DIAGNOSIS — F17.200 NICOTINE DEPENDENCE: Primary | ICD-10-CM

## 2021-12-16 PROCEDURE — 99407 PR TOBACCO USE CESSATION INTENSIVE >10 MINUTES: ICD-10-PCS | Mod: S$GLB,,,

## 2021-12-16 PROCEDURE — 99407 BEHAV CHNG SMOKING > 10 MIN: CPT | Mod: S$GLB,,,

## 2021-12-30 ENCOUNTER — PATIENT OUTREACH (OUTPATIENT)
Dept: ADMINISTRATIVE | Facility: OTHER | Age: 71
End: 2021-12-30
Payer: MEDICARE

## 2021-12-31 ENCOUNTER — EXTERNAL CHRONIC CARE MANAGEMENT (OUTPATIENT)
Dept: PRIMARY CARE CLINIC | Facility: CLINIC | Age: 71
End: 2021-12-31
Payer: MEDICARE

## 2021-12-31 PROCEDURE — 99490 CHRNC CARE MGMT STAFF 1ST 20: CPT | Mod: PBBFAC,PN | Performed by: FAMILY MEDICINE

## 2021-12-31 PROCEDURE — 99490 CHRNC CARE MGMT STAFF 1ST 20: CPT | Mod: S$PBB,,, | Performed by: FAMILY MEDICINE

## 2021-12-31 PROCEDURE — 99490 PR CHRONIC CARE MGMT, 1ST 20 MIN: ICD-10-PCS | Mod: S$PBB,,, | Performed by: FAMILY MEDICINE

## 2022-01-03 ENCOUNTER — TELEPHONE (OUTPATIENT)
Dept: SMOKING CESSATION | Facility: CLINIC | Age: 72
End: 2022-01-03
Payer: MEDICARE

## 2022-01-03 NOTE — TELEPHONE ENCOUNTER
0900-Counselor attempted to contact patient for scheduled telephonic visit, but patient reported she is unavailable for telephonic visit and would rather come into the clinic.  Patient would like to reschedule appoint for 1/11/22 at 0800 in clinic.   contacted to reschedule visit.     Erica Cherry RRT, MSW, LMSW, TTS  [phone]

## 2022-01-06 ENCOUNTER — TELEPHONE (OUTPATIENT)
Dept: PAIN MEDICINE | Facility: CLINIC | Age: 72
End: 2022-01-06

## 2022-01-06 ENCOUNTER — OFFICE VISIT (OUTPATIENT)
Dept: PAIN MEDICINE | Facility: CLINIC | Age: 72
End: 2022-01-06
Payer: MEDICARE

## 2022-01-06 VITALS
SYSTOLIC BLOOD PRESSURE: 163 MMHG | HEIGHT: 62 IN | OXYGEN SATURATION: 100 % | BODY MASS INDEX: 34.46 KG/M2 | HEART RATE: 72 BPM | WEIGHT: 187.25 LBS | DIASTOLIC BLOOD PRESSURE: 77 MMHG

## 2022-01-06 DIAGNOSIS — M70.70 BURSITIS, ISCHIAL, UNSPECIFIED LATERALITY: ICD-10-CM

## 2022-01-06 DIAGNOSIS — M50.30 DDD (DEGENERATIVE DISC DISEASE), CERVICAL: Primary | ICD-10-CM

## 2022-01-06 DIAGNOSIS — M47.812 CERVICAL SPONDYLOSIS: ICD-10-CM

## 2022-01-06 PROCEDURE — 99999 PR PBB SHADOW E&M-EST. PATIENT-LVL V: ICD-10-PCS | Mod: PBBFAC,,, | Performed by: PAIN MEDICINE

## 2022-01-06 PROCEDURE — 99214 OFFICE O/P EST MOD 30 MIN: CPT | Mod: S$PBB,,, | Performed by: PAIN MEDICINE

## 2022-01-06 PROCEDURE — 99215 OFFICE O/P EST HI 40 MIN: CPT | Mod: PBBFAC,PN | Performed by: PAIN MEDICINE

## 2022-01-06 PROCEDURE — 99999 PR PBB SHADOW E&M-EST. PATIENT-LVL V: CPT | Mod: PBBFAC,,, | Performed by: PAIN MEDICINE

## 2022-01-06 PROCEDURE — 99214 PR OFFICE/OUTPT VISIT, EST, LEVL IV, 30-39 MIN: ICD-10-PCS | Mod: S$PBB,,, | Performed by: PAIN MEDICINE

## 2022-01-06 NOTE — TELEPHONE ENCOUNTER
Successfully faxed signed PT referral to [facility] on 1/6/22 @ 3:17 PM & scanned into chart. AP

## 2022-01-06 NOTE — PROGRESS NOTES
Subjective:     Patient ID: Stephanie Sears is a 71 y.o. female    Chief Complaint: Follow-up (F/U post Bilateral Ischial Bursa SI)      Referred by: No ref. provider found      HPI:    Interval History (1/6/22):  She returns today for follow up.  She reports that bilateral ischial bursa steroid injections has been helpful for the bilateral buttock/hip pain.  She reports 60% relief following this procedure.  Today she wishes to discuss her neck pain.  She has had neck pain intermittently for years.  The pain is located the midline mid to lower cervical region.  The pain does not radiate.  She denies any associated numbness, tingling, weakness, bowel bladder dysfunction.  She would like to attend additional physical therapy for the management of her neck pain.      Interval History (12/14/21):  She returns today for follow up.  She reports that ischial bursa pain has returned.  She reports near complete relief following bilateral ischial bursa steroid injection done in February 2021. She got at least 9 months of good relief from this procedure.  She states the pain has since returned.  She denies any changes in the quality location the pain.  She denies any new or worsening symptoms.        Interval History NP (3/16/21):    Pt returns today for follow up. She states that the bilateral ischial bursa injections have relieved nearly all of the bilateral buttock pain.  She is very happy with the results at this time.     Interval History (2/19/21):  She returns today for follow up.  She reports that she continues to have bilateral buttock pain particular when sitting.  She denies any changes in the quality location was pain.  She denies any new worsening symptoms.  She has been attending physical therapy and finds that it has been helpful.  She states that her pain is still quite severe especially given recent cold weather.        Interval History (12/21/20):  She returns today for follow up and imaging review.  She  reports that she continues to have pain mainly surrounding the bilateral hip girdles.  She states that she has focal pain in the bilateral gluteal regions when sitting upright.  She also has pain in the bilateral lateral hip regions.  She has undergone multiple greater trochanteric bursa steroid injections in the past.  These were initially effective, but gradually provided less relief with subsequent injections.  She continues to deny any significant low back pain.        Initial Encounter (12/7/20):  Stephanie Sears is a 71 y.o. female who presents today with chronic bilateral low back pain.  This pain has been present for years.  Patient is status post L4-5 fusion in 2006.  She states that she did have some pain radiating into her lower extremities in the past, but her current pain is isolated to the bilateral lower lumbar/lumbosacral regions.  She denies any associated numbness, tingling, weakness, bowel bladder dysfunction.  The pain is constant worse with prolonged sitting and standing..   This pain is described in detail below.    Physical Therapy:  Yes.    Non-pharmacologic Treatment:  Rest helps         · TENS?  No    Pain Medications:         · Currently taking:  Meloxicam, gabapentin    · Has tried in the past:  Tramadol, NSAIDs, Tylenol    · Has not tried:   Muscle relaxants, TCAs, SNRIs, topical creams    Blood thinners:  None    Interventional Therapies:    2/26/21 - bilateral ischial bursa injections - 90% relief for at least 9 months  12/23/21 - bilateral ischial bursa steroid injections - 60% relief    Relevant Surgeries:   L4-5 fusion in 2006    Affecting sleep?  Yes    Affecting daily activities? yes    Depressive symptoms? no          · SI/HI? No    Work status: Retired    Pain Scores:    Best:       0/10  Worst:     8/10  Usually:   4/10  Today:    0/10    Review of Systems   Constitutional: Negative for activity change, appetite change, chills, fatigue, fever and unexpected weight change.    HENT: Negative for hearing loss.    Eyes: Negative for visual disturbance.   Respiratory: Negative for chest tightness and shortness of breath.    Cardiovascular: Negative for chest pain.   Gastrointestinal: Negative for abdominal pain, constipation, diarrhea, nausea and vomiting.   Genitourinary: Negative for difficulty urinating.   Musculoskeletal: Positive for arthralgias, back pain, gait problem, myalgias, neck pain and neck stiffness.   Skin: Negative for rash.   Neurological: Negative for dizziness, weakness, light-headedness, numbness and headaches.   Psychiatric/Behavioral: Positive for sleep disturbance. Negative for hallucinations and suicidal ideas. The patient is not nervous/anxious.        Past Medical History:   Diagnosis Date    Acute pancreatitis     Angina pectoris     Anxiety     Arthritis     Back pain     Bronchitis     Cervical spondylosis with radiculopathy 7/10/2012    Chest pain     Chronic neck pain 7/26/2012    Colon polyps     Controlled type 2 diabetes mellitus with stage 2 chronic kidney disease, with long-term current use of insulin 1/24/2020    Coronary artery disease     Depression     Fibrocystic breast     GERD (gastroesophageal reflux disease)     Glaucoma     Heart failure     Hyperlipidemia     Hypertension     Neck pain 7/10/2012    Obesity     JAM (obstructive sleep apnea)     Pneumonia     Pneumonia due to other staphylococcus     Primary osteoarthritis of both knees     Psoriasis     Subacromial or subdeltoid bursitis 7/10/2012    Thyroid disease     Tobacco dependence     Trouble in sleeping     Type 2 diabetes mellitus 12/10/2013       Past Surgical History:   Procedure Laterality Date    BREAST BIOPSY Bilateral 1988    BREAST MASS EXCISION Right     benign    CATARACT EXTRACTION W/  INTRAOCULAR LENS IMPLANT Left 12/09/2020    PHACO IOL WITH I-STENT ()    CATARACT EXTRACTION W/  INTRAOCULAR LENS IMPLANT Right 10/21/2020     PHACO IOL WITH I-STENT x 2 ()    CHOLECYSTECTOMY      COLONOSCOPY N/A 5/22/2019    Procedure: COLONOSCOPY;  Surgeon: Darrin Calvin MD;  Location: [facility] ENDO ([address] [place] [address]);  Service: Endoscopy;  Laterality: N/A;  2nd floor - all other procedure done on 2, multiple comorbidities - ERW/   change in MD schedule, Pt notified and verbalized understanding, new arrival time 0800, Ins mailed to Pt with new arrival time - ERW1/8/19@1130    EPIDURAL STEROID INJECTION Bilateral 2/26/2021    Procedure: Injection, Steroid, Ischial Bursa;  Surgeon: Yonatan Garsia Jr., MD;  Location: [facility] ENDO;  Service: Pain Management;  Laterality: Bilateral;  Bilateral Ischial Bursa Steroid Injections  Arrive @ 1230; No ATC; Check BG    HYSTERECTOMY  1980's    IMPLANTATION OF DEVICE FOR GLAUCOMA Right 10/21/2020    Procedure: INSERTION, DEVICE, FOR GLAUCOMA/ i Stent;  Surgeon: Melnay Tse MD;  Location: [address];  Service: Ophthalmology;  Laterality: Right;    IMPLANTATION OF DEVICE FOR GLAUCOMA Left 12/9/2020    Procedure: INSERTION, DEVICE, FOR GLAUCOMA/I SENT;  Surgeon: Melany Tse MD;  Location: [facility] OR [address] [place] [address];  Service: Ophthalmology;  Laterality: Left;    INJECTION OF JOINT Bilateral 12/23/2021    Procedure: Injection, Joint---BILATERAL ISCHIAL BURSA STEROID INJECTION;  Surgeon: Yonatan Garsia Jr., MD;  Location: [facility] PAIN MGMT;  Service: Pain Management;  Laterality: Bilateral;    INTRAOCULAR PROSTHESES INSERTION Right 10/21/2020    Procedure: INSERTION, IOL PROSTHESIS;  Surgeon: Melany Tse MD;  Location: [address];  Service: Ophthalmology;  Laterality: Right;    INTRAOCULAR PROSTHESES INSERTION Left 12/9/2020    Procedure: INSERTION, IOL PROSTHESIS;  Surgeon: Melany Tse MD;  Location: [address];  Service: Ophthalmology;  Laterality: Left;    KNEE SURGERY Left 1-20-16    TKR    KNEE SURGERY Right 02/26/2018    TKR    L4-L5 fusion  2006     PHACOEMULSIFICATION OF CATARACT Right 10/21/2020    Procedure: PHACOEMULSIFICATION, CATARACT;  Surgeon: Melany Tse MD;  Location: [facility] OR [address] [place] [address];  Service: Ophthalmology;  Laterality: Right;    PHACOEMULSIFICATION OF CATARACT Left 12/9/2020    Procedure: PHACOEMULSIFICATION, CATARACT;  Surgeon: Melany Tse MD;  Location: [facility] OR [address] [place] [address];  Service: Ophthalmology;  Laterality: Left;       Social History     Socioeconomic History    Marital status: Single   Tobacco Use    Smoking status: Current Every Day Smoker     Packs/day: 1.00     Years: 40.00     Pack years: 40.00     Types: Cigarettes    Smokeless tobacco: Never Used   Substance and Sexual Activity    Alcohol use: No     Alcohol/week: 0.0 standard drinks    Drug use: No    Sexual activity: Yes     Partners: Male       Review of patient's allergies indicates:   Allergen Reactions    Hydrocodone Shortness Of Breath    Iodinated contrast media Shortness Of Breath     Difficulty breathing    Adhesive Itching     Skin peeling    Morphine Hallucinations    Oxycodone Hallucinations    Sulfa (sulfonamide antibiotics) Hives and Itching       Current Outpatient Medications on File Prior to Visit   Medication Sig Dispense Refill    albuterol (PROVENTIL/VENTOLIN HFA) 90 mcg/actuation inhaler Inhale 2 puffs into the lungs every 4 (four) hours as needed for Wheezing. Rescue 18 g 3    amLODIPine-benazepriL (LOTREL) 10-40 mg per capsule Take 1 capsule by mouth once daily. 90 capsule 3    atorvastatin (LIPITOR) 20 MG tablet Take 1 tablet (20 mg total) by mouth once daily. 90 tablet 3    blood sugar diagnostic (ACCU-CHEK JOSE G PLUS TEST STRP) Strp Inject 1 each into the skin 2 (two) times daily. 200 each 11    blood sugar diagnostic Strp Use to test blood glucose levels 2 times per day as instructed. 200 strip 11    carvediloL (COREG) 12.5 MG tablet Take 1 tablet (12.5 mg total) by mouth 2 (two) times daily with meals. 180 tablet 3     "diphenhydrAMINE (BENADRYL) 25 mg capsule Take 25 mg by mouth every 6 (six) hours as needed for Itching or Allergies.      gabapentin (NEURONTIN) 600 MG tablet Take 1 tablet (600 mg total) by mouth 3 (three) times daily. 270 tablet 3    INULIN (FIBER GUMMIES ORAL) Take 1 each by mouth every morning.       lancets (ACCU-CHEK MULTICLIX LANCET) Misc Test blood sugar twice daily 300 each 3    lancets (ONETOUCH DELICA LANCETS) 33 gauge Misc Inject 1 lancet into the skin 2 (two) times daily before meals. 200 each 11    meloxicam (MOBIC) 7.5 MG tablet TAKE 1 TABLET DAILY 90 tablet 3    simethicone (GAS-X ORAL) Take 1 tablet by mouth as needed.        No current facility-administered medications on file prior to visit.       Objective:      BP (!) 163/77 (BP Location: Right arm, Patient Position: Sitting, BP Method: Medium (Automatic))   Pulse 72   Ht 5' 2" (1.575 m)   Wt 84.9 kg (187 lb 4.5 oz)   SpO2 100%   BMI 34.25 kg/m²     Exam:  GEN:  Well developed, well nourished.  No acute distress.  Normal pain behavior.  HEENT:  No trauma.  Mucous membranes moist.  Nares patent bilaterally.  PSYCH: Normal affect. Thought content appropriate.  CHEST:  Breathing symmetric.  No audible wheezing.  ABD: Soft, non-distended.  SKIN:  Warm, pink, dry.  No rash on exposed areas.    EXT:  No cyanosis, clubbing, or edema.  No color change or changes in nail or hair growth.  NEURO/MUSCULOSKELETAL:  Fully alert, oriented, and appropriate. Speech normal janneth. No cranial nerve deficits.   Gait:   normal.  5/5 motor strength throughout upper extremities.   Sensory:   no  sensory deficit in the upper extremities.   Reflexes:   1 + and symmetric throughout.   absent  Randall's bilaterally.  C-Spine:   full  ROM with pain on  flexion more than extension.  negative  facet loading bilaterally.   negative  Spurling's bilaterally.    Positive  TTP over midline mid to lower cervical spine                   Imaging:    Narrative & " Impression    EXAMINATION:  XR LUMBAR SPINE 5 VIEW WITH FLEX AND EXT     CLINICAL HISTORY:  Low back pain, cauda equina syndrome suspected;  Lumbago with sciatica, left side     TECHNIQUE:  Five views of the lumbar spine plus flexion extension views were performed.     COMPARISON:  02/25/2014     FINDINGS:  The lumbar vertebra are intact.  No compression fracture or obvious paraspinal lesion is detected.  Degenerative changes are present with small osteophytes at most levels.  There is progressive disc space narrowing at L3-4; this level also shows development of a grade 1 anterolisthesis, stable between flexion and extension.  The other lumbar disc spaces show no significant narrowing, but lower thoracic disc spaces are narrowed, some with vacuum disc.     Postoperative findings from posterior instrumented fusion are again seen at L4 and 5 with bilateral pedicle screws, vertical connecting rods, and a device in the disc space.     Visualized abdomen shows aortic atherosclerosis.     IMPRESSION:      Stable postoperative findings of L4-5 fusion     Degenerative spine changes with interval worsening at L3-4.        Electronically signed by: Valentín Booker MD  Date:                                            01/23/2019  Time:                                           10:16         Assessment:       Encounter Diagnoses   Name Primary?    DDD (degenerative disc disease), cervical Yes    Cervical spondylosis     Bursitis, ischial, unspecified laterality          Plan:       Stephanie was seen today for follow-up.    Diagnoses and all orders for this visit:    DDD (degenerative disc disease), cervical  -     X-Ray Cervical Spine AP Lat with Flexion  Extension; Future  -     Ambulatory referral/consult to Physical/Occupational Therapy; Future    Cervical spondylosis  -     X-Ray Cervical Spine AP Lat with Flexion  Extension; Future  -     Ambulatory referral/consult to Physical/Occupational Therapy; Future    Bursitis,  ischial, unspecified laterality        Stephanie Sears is a 71 y.o. female with chronic bilateral low back pain.  Pain appears to be axial.  History of L4-5 fusion in 2006.  May have lower lumbar facet pain primarily at the L5-S1 level.  Lumbar facet degeneration noted on lumbar MRI mainly at the L3-4 level but also at the L5-S1 level.  Patient also with spinal stenosis at the L3-4 and L5-S1 levels.  Also with bilateral foraminal stenosis at the L5-S1 levels and right foraminal stenosis at the L3-4 level.  Symptoms not overly consistent with radiculopathy at this time.  She does have some symptoms somewhat consistent with neurogenic claudication but her main complaints are most consistent with pain related to the bilateral ischial bursa and possibly trochanteric bursa.  This is likely related to altered gait mechanics following lumbar fusion.  Good relief following bilateral ischial bursa injections.  Now having recurrent neck pain.  Pain appears to be mostly axial without any signs or symptoms of myelopathy/radiculopathy.    1.  Cervical x-rays with flexion extension to get updated imaging of the cervical spine to rule out instability/fracture.  2.  Refer to PT for cervical ROM, strengthening, stretching and HEP.  3.  Return to clinic in 8 weeks or sooner if needed.  At that time we will discuss efficacy of physical therapy/home exercise program.

## 2022-01-10 ENCOUNTER — APPOINTMENT (OUTPATIENT)
Dept: RADIOLOGY | Facility: HOSPITAL | Age: 72
End: 2022-01-10
Attending: PAIN MEDICINE
Payer: MEDICARE

## 2022-01-10 DIAGNOSIS — M50.30 DDD (DEGENERATIVE DISC DISEASE), CERVICAL: ICD-10-CM

## 2022-01-10 DIAGNOSIS — M47.812 CERVICAL SPONDYLOSIS: ICD-10-CM

## 2022-01-10 PROCEDURE — 72050 X-RAY EXAM NECK SPINE 4/5VWS: CPT | Mod: TC,FY,PN

## 2022-01-10 PROCEDURE — 72050 XR CERVICAL SPINE AP LAT WITH FLEX EXTEN: ICD-10-PCS | Mod: 26,,, | Performed by: RADIOLOGY

## 2022-01-10 PROCEDURE — 72050 X-RAY EXAM NECK SPINE 4/5VWS: CPT | Mod: 26,,, | Performed by: RADIOLOGY

## 2022-01-11 ENCOUNTER — CLINICAL SUPPORT (OUTPATIENT)
Dept: SMOKING CESSATION | Facility: CLINIC | Age: 72
End: 2022-01-11
Payer: COMMERCIAL

## 2022-01-11 DIAGNOSIS — F17.200 NICOTINE DEPENDENCE: Primary | ICD-10-CM

## 2022-01-11 PROCEDURE — 99999 PR PBB SHADOW E&M-EST. PATIENT-LVL I: ICD-10-PCS | Mod: PBBFAC,,,

## 2022-01-11 PROCEDURE — 99404 PR PREVENT COUNSEL,INDIV,60 MIN: ICD-10-PCS | Mod: S$GLB,,,

## 2022-01-11 PROCEDURE — 99404 PREV MED CNSL INDIV APPRX 60: CPT | Mod: S$GLB,,,

## 2022-01-11 PROCEDURE — 99999 PR PBB SHADOW E&M-EST. PATIENT-LVL I: CPT | Mod: PBBFAC,,,

## 2022-01-11 RX ORDER — DM/P-EPHED/ACETAMINOPH/DOXYLAM 30-7.5/3
2 LIQUID (ML) ORAL
Qty: 288 LOZENGE | Refills: 0 | Status: SHIPPED | OUTPATIENT
Start: 2022-01-11 | End: 2022-01-18

## 2022-01-11 RX ORDER — IBUPROFEN 200 MG
1 TABLET ORAL DAILY
Qty: 28 PATCH | Refills: 0 | Status: SHIPPED | OUTPATIENT
Start: 2022-01-11 | End: 2022-03-07 | Stop reason: SDUPTHER

## 2022-01-11 NOTE — PROGRESS NOTES
Counselor spoke with patient for telephonic visit, name and date of birth verified as two patient identifiers.  Patients FTND score of 2 is indicative of a low level of nicotine dependence, and her CESD score of 8 is perceived as 9 is perceived as no mental distress noted.  Patient reported she currently smokes 18 cpd.  Counselor discussed nicotine replacement options and packet 1 with patient.  Counselor outlined cues and triggers, differentiating between urge and habit, behavior modification, initiating a smoking journal, waiting 15 minutes prior to smoking, and engaging in positive outlets such as playing games on her phone and reading in lieu of smoking.  Patient will begin biweekly cessation counseling sessions, and she will begin NRT with 21 mg nicotine patch in conjunction with 2 mg nicotine lozenge.  Patient will attempt a rate reduction of 17 cpd or less.   Counselor allotted time for questions, and she provided patient with her contact information.  Follow-up visit scheduled in two weeks.  Counselor will remain available should any further needs arise.

## 2022-01-18 ENCOUNTER — CLINICAL SUPPORT (OUTPATIENT)
Dept: SMOKING CESSATION | Facility: CLINIC | Age: 72
End: 2022-01-18
Payer: COMMERCIAL

## 2022-01-18 DIAGNOSIS — F17.200 NICOTINE DEPENDENCE: Primary | ICD-10-CM

## 2022-01-18 PROCEDURE — 99999 PR PBB SHADOW E&M-EST. PATIENT-LVL I: ICD-10-PCS | Mod: PBBFAC,,,

## 2022-01-18 PROCEDURE — 99406 BEHAV CHNG SMOKING 3-10 MIN: CPT | Mod: S$GLB,,,

## 2022-01-18 PROCEDURE — 99999 PR PBB SHADOW E&M-EST. PATIENT-LVL I: CPT | Mod: PBBFAC,,,

## 2022-01-18 PROCEDURE — 99406 PR TOBACCO USE CESSATION INTERMEDIATE 3-10 MINUTES: ICD-10-PCS | Mod: S$GLB,,,

## 2022-01-18 RX ORDER — NICOTINE POLACRILEX 2 MG/1
2 LOZENGE ORAL
Qty: 243 EACH | Refills: 0 | Status: SHIPPED | OUTPATIENT
Start: 2022-01-18 | End: 2022-03-07 | Stop reason: SDUPTHER

## 2022-01-18 NOTE — PROGRESS NOTES
Counselor returned patient's phone call, and patient reported she received her lozenges from the pharmacy but they were large instead of minis.  Counselor explained to patient she would contact the pharmacy to inquire about prescription.  Counselor contacted [facility] mail order pharmacy to inquire about prescription, and she adjusted pharmacy note to have mini lozenges mailed to patient.  Counselor called patient back to inform her of change.  Follow-up visit in one week.  Counselor will remain available should any further needs arise.

## 2022-01-24 ENCOUNTER — TELEPHONE (OUTPATIENT)
Dept: SMOKING CESSATION | Facility: CLINIC | Age: 72
End: 2022-01-24
Payer: MEDICARE

## 2022-01-24 NOTE — TELEPHONE ENCOUNTER
Smoking Cessation counselor attempted to contact patient regarding resheduling her canceled appointment for 1/24/22, but patient did not answer.  Counselor was unable to leave a message.      Erica Cherry RRT,MSW,LMSW,TTS  [phone]

## 2022-01-25 ENCOUNTER — CLINICAL SUPPORT (OUTPATIENT)
Dept: SMOKING CESSATION | Facility: CLINIC | Age: 72
End: 2022-01-25
Payer: COMMERCIAL

## 2022-01-25 DIAGNOSIS — F17.200 NICOTINE DEPENDENCE: Primary | ICD-10-CM

## 2022-01-25 PROCEDURE — 99404 PR PREVENT COUNSEL,INDIV,60 MIN: ICD-10-PCS | Mod: S$GLB,,,

## 2022-01-25 PROCEDURE — 99999 PR PBB SHADOW E&M-EST. PATIENT-LVL II: CPT | Mod: PBBFAC,,,

## 2022-01-25 PROCEDURE — 99999 PR PBB SHADOW E&M-EST. PATIENT-LVL II: ICD-10-PCS | Mod: PBBFAC,,,

## 2022-01-25 PROCEDURE — 99404 PREV MED CNSL INDIV APPRX 60: CPT | Mod: S$GLB,,,

## 2022-01-25 NOTE — PROGRESS NOTES
Individual Follow-Up Form    1/25/2022    Quit Date: TBD    Clinical Status of Patient: Outpatient    Length of Service: 60 minutes    Continuing Medication: yes  Patches or Nicotine Lozenges    Other Medications: None     Target Symptoms: Withdrawal and medication side effects. The following were  rated moderate (3) to severe (4) on TCRS:  · Moderate (3): Crave and Desire   · Severe (4): None     Comments: Counselor spoke with patient for telephonic visit, name and date of birth verified as two patient identifiers.  Patient reported she is still smoking about 12-15 cpd, but she does not always smoke the entire cigarette.  Patient also reported she is tolerating Patient also reported she is and she realizes she is smoking more out of habit.  Counselor discussed behavior modification strategies such as reading, moving the location of her cigarettes, and incorporating new activities in her daily routine in lieu of smoking.  Patient agreeable to plan.  Follow-up visit scheduled in two weeks.      Diagnosis: F17.200    Next Visit: 2 weeks

## 2022-01-29 ENCOUNTER — TELEPHONE (OUTPATIENT)
Dept: FAMILY MEDICINE | Facility: CLINIC | Age: 72
End: 2022-01-29
Payer: MEDICARE

## 2022-01-29 DIAGNOSIS — E78.5 DYSLIPIDEMIA: ICD-10-CM

## 2022-01-29 DIAGNOSIS — E11.51 TYPE II DIABETES MELLITUS WITH PERIPHERAL CIRCULATORY DISORDER: Primary | ICD-10-CM

## 2022-01-29 DIAGNOSIS — I10 ESSENTIAL HYPERTENSION: ICD-10-CM

## 2022-01-29 NOTE — TELEPHONE ENCOUNTER
Patient was last seen by me in Sept and has no follow up visit on file. Needs to schedule follow up visit with labs a week prior to visit

## 2022-01-31 ENCOUNTER — EXTERNAL CHRONIC CARE MANAGEMENT (OUTPATIENT)
Dept: PRIMARY CARE CLINIC | Facility: CLINIC | Age: 72
End: 2022-01-31
Payer: MEDICARE

## 2022-01-31 PROCEDURE — 99490 CHRNC CARE MGMT STAFF 1ST 20: CPT | Mod: PBBFAC,PN | Performed by: FAMILY MEDICINE

## 2022-01-31 PROCEDURE — 99490 CHRNC CARE MGMT STAFF 1ST 20: CPT | Mod: S$PBB,,, | Performed by: FAMILY MEDICINE

## 2022-01-31 PROCEDURE — 99490 PR CHRONIC CARE MGMT, 1ST 20 MIN: ICD-10-PCS | Mod: S$PBB,,, | Performed by: FAMILY MEDICINE

## 2022-02-03 ENCOUNTER — CLINICAL SUPPORT (OUTPATIENT)
Dept: FAMILY MEDICINE | Facility: CLINIC | Age: 72
End: 2022-02-03
Payer: MEDICARE

## 2022-02-03 VITALS — SYSTOLIC BLOOD PRESSURE: 138 MMHG | DIASTOLIC BLOOD PRESSURE: 88 MMHG

## 2022-02-03 DIAGNOSIS — Z23 NEED FOR VACCINATION: Primary | ICD-10-CM

## 2022-02-03 PROCEDURE — 99499 NO LOS: ICD-10-PCS | Mod: S$PBB,,, | Performed by: FAMILY MEDICINE

## 2022-02-03 PROCEDURE — 90694 VACC AIIV4 NO PRSRV 0.5ML IM: CPT | Mod: PBBFAC,PN | Performed by: FAMILY MEDICINE

## 2022-02-03 PROCEDURE — 99499 UNLISTED E&M SERVICE: CPT | Mod: S$PBB,,, | Performed by: FAMILY MEDICINE

## 2022-02-03 PROCEDURE — 99999 PR PBB SHADOW E&M-EST. PATIENT-LVL I: CPT | Mod: PBBFAC,,,

## 2022-02-03 PROCEDURE — G0008 ADMIN INFLUENZA VIRUS VAC: HCPCS | Mod: PBBFAC,PN

## 2022-02-03 PROCEDURE — 99999 PR PBB SHADOW E&M-EST. PATIENT-LVL I: ICD-10-PCS | Mod: PBBFAC,,,

## 2022-02-03 PROCEDURE — 99211 OFF/OP EST MAY X REQ PHY/QHP: CPT | Mod: PBBFAC,PN

## 2022-02-03 NOTE — PROGRESS NOTES
BP update and flu vaccine done today whole patient had brought her partner to office for his routine office visit

## 2022-02-08 ENCOUNTER — CLINICAL SUPPORT (OUTPATIENT)
Dept: SMOKING CESSATION | Facility: CLINIC | Age: 72
End: 2022-02-08
Payer: COMMERCIAL

## 2022-02-08 DIAGNOSIS — F17.200 NICOTINE DEPENDENCE: Primary | ICD-10-CM

## 2022-02-08 PROCEDURE — 99404 PREV MED CNSL INDIV APPRX 60: CPT | Mod: S$GLB,,,

## 2022-02-08 PROCEDURE — 99999 PR PBB SHADOW E&M-EST. PATIENT-LVL II: ICD-10-PCS | Mod: PBBFAC,,,

## 2022-02-08 PROCEDURE — 99404 PR PREVENT COUNSEL,INDIV,60 MIN: ICD-10-PCS | Mod: S$GLB,,,

## 2022-02-08 PROCEDURE — 99999 PR PBB SHADOW E&M-EST. PATIENT-LVL II: CPT | Mod: PBBFAC,,,

## 2022-02-08 NOTE — PROGRESS NOTES
Individual Follow-Up Form    2/8/2022    Quit Date: TBD    Clinical Status of Patient: Outpatient    Length of Service: 60 minutes    Continuing Medication: yes  Patches or Nicotine Lozenges    Other Medications: None      Target Symptoms: Withdrawal and medication side effects. The following were  rated moderate (3) to severe (4) on TCRS:  · Moderate (3): Crave and Desire   · Severe (4): None     Comments: Patient presented to clinic for follow-up visit, name and date of birth verified as two patient identifiers.   Patient reported she is still smoking about 12 CPD, and she is still using 21 mg nicotine patch in conjunction with 2 mg nicotine lozenge without any negative side effects reported at this time.  Counselor discussed packet 3 with patient outlining high risk situations and developing a plan for them, understanding urges and cravings, managing emotions, and breathing exercises and meditation techniques.  Follow-up visit scheduled in two weeks.  Counselor will remain available should any further needs arise.      Diagnosis: F17.200    Next Visit: 2 weeks

## 2022-02-14 ENCOUNTER — TELEPHONE (OUTPATIENT)
Dept: PAIN MEDICINE | Facility: CLINIC | Age: 72
End: 2022-02-14
Payer: MEDICARE

## 2022-02-14 NOTE — TELEPHONE ENCOUNTER
I called the pt to reschedule her appointment from 03/03/22 to 03/21/22. She was not happy about not having the notification sent to her.

## 2022-02-17 NOTE — PROGRESS NOTES
"HPI     DLS: 10/9/2020    Patient states she had some discomfort last night. No other ocular   complaints.    S/p phaco w/IOL with I-stent x 2 -  OD- 10/21/20    Meds:  Vigamox qid OD  Pred qid OD     1. POAG - ou - mild vs suspect with OHT   2. Nuclear sclerosis OS  3. Posterior vitreous detachment, right   4. Myopia with astigmatism and presbyopia, bilateral     Last edited by Melany Tse MD on 10/22/2020 10:01 AM. (History)            Assessment /Plan     For exam results, see Encounter Report.    Postoperative care for cataract    Nuclear sclerosis, bilateral    Primary open-angle glaucoma, bilateral, mild stage    Myopia with astigmatism and presbyopia, bilateral    Posterior vitreous detachment, right          1. Pre-perimetric mild POAG   Vs OHT  -Followed at [facility] since 1991   -First HVF 1999   -First photos 1991   - Intolerant to all gtts - they aggravate his blepharitis-? RAGHU allergy   -IOP "OK" off gtts and s/p ALT ou and SLT od - 2008    Family history neg   Glaucoma meds none (( off gtts post SLT ou -))   H/O adverse rxn to glaucoma drops Intolerant to all gtts - 2/2 aggravates blepharitis- RAGHU allergy   LASERS ALT ou -? Date / SLT OD 7/17/08 - good response   GLAUCOMA SURGERIES - I stent x 2 - OD  - 10/21/2020   OTHER EYE SURGERIES - phaco/IOL od - 10/21/2020 - PCB00 14.0   CDR 0.6/0.3   Tbase 19-26 / 16-21   Tmax 26/21   Ttarget ?   HVF 14 test - 1999 to 2020 - Full ou   Gonio +3 ou   /588   OCT 5 test 2005 to 2020 -  RNFL - OD:NL // OS:NL  HRT 9 test 2004 to 2020 -MR -  MR -  Dec T, border NI od // full os /// CDR 0.619 od // 0.508 os - but unreliable OD  Disc photos 1991, 1996, 2003 - slides // 2012 , 2016, 2020   - OIS     - Ttoday 15/ ??   - Test done today  Post -op phaco.IOL - istent - x 2  OD - PCB00 - 14.0 - 10/21/2020     2. Nuclear Sclerosis    VS - BATh 20/50 od // 20/60 os -    Pt C/O glare / nicola at night     3. Posterior Vitreous Detachment OD - 11/2008   - RD " Gastroenterology Clinic Visit    Eric Corley  [id number]  Chief Complaint   Patient presents with   [place] Doctor     Patient was having rectal bleeding two days ago which lasted all day. Patient states that the next morning there was some clotting but nothing after that. Patient states that prior to the bleeding, she had one episode of diarrhea. Patient states that she is typically constipated. HPI: 68 y.o. female presents to the clinic with history of abrupt onset diarrhea followed by rectal bleeding about 2 days ago and and symptoms lasted for about 24 hours and then resolved, had vague abdominal discomfort, no history of nausea vomiting or diaphoresis. No history of any recent antibiotics use, no history of eating anything unusual or travel. No prior history of rectal bleeding. Patient has history of chronic constipation for many years. Last colonoscopy was about 15 years ago. Patient uses estrogen vaginal cream, does not take any hormone pills. No history of fever or chills, no history of any significant cardiovascular issues. No history of peripheral vascular disease or CVA, social history does not smoke, drinks very occasionally. Family history significant for thromboembolic disease, no known history of colon cancer. Patient has history of bladder cancer which was treated by cystoscopy and removal and had received immunotherapy. Chemotherapy was about 15 years ago. Surgical history includes hysterectomy and appendectomy    Review of Systems   Constitutional: Negative. HENT: Negative. Eyes: Negative. Respiratory: Negative. Cardiovascular: Negative. No cardiac issues   Gastrointestinal: Positive for blood in stool and constipation. Negative for abdominal distention, abdominal pain, anal bleeding, diarrhea, nausea, rectal pain and vomiting. Endocrine: Negative. Genitourinary: Negative. Musculoskeletal: Negative. Skin: Negative.     Allergic/Immunologic: Negative PRECAUTIONS    4. Eyelid inflammation / Blepharitis / MGD    5. Allergic Conjunctivitis - RAGHU allergy     6. Myopia/Astigmatism/presbyopia     Plan   POAG - mild -pre-perimetric glaucoma - intolerant to all gtts   IOP ok s/p ALT ou - years ago and s/p SLT OD 2008 ( no SLT os)  Continue to monitor HVF/DFE/OCT/HRT/photos/IOP   If increase IOP or progression on VF testing consider repeat SLT OD and primary SLT os      Pt is a myope - sph eq is -5.25 od and -4.25 os   - discuss IOL options (? Aim for emmetropia to -0.50 or keep pt more myopic at about -2.50 for near vision or a -1.50 for mid range)   Pt is use to wearing her glasses all the time - even to sleep   - pt uses the computer a lot - ? would like to be set for dist to mid range - ? About a -1.00 to -1.50 -so can do some computer work without glasses     IOL OD -pt want to have IOL for distance vision - is no longer uing a computer much - has retired   PCB00  14.0  (-0.7 to -0.81)  AC IOL 11.5    IOL OS   PCB00 15.0 ( -0.35 to -0.59)  AC IOL - 12.5       S/P  cataract surgery  - phaco/IOL w/ I-stent x 2 OD - 10/21/2020 - PCB00 - 14.0   POD # 1 - phaco/IOL - I stetn x 2   Doing well  Begin Pred Acetate QID   Begin vigamox QID  Shield at night  Glasses day  No lifting, no bending, no eye rubbing  F/U 1 week, AR/MR ou    Pt will see how she does with the old glasses - she can have the lens removed from the right side if needed - if she is too off balance - can be done on [employer]      Financial Resource Strain:     Difficulty of Paying Living Expenses: Not on file   Food Insecurity:     Worried About Running Out of Food in the Last Year: Not on file    Russell of Food in the Last Year: Not on file   Transportation Needs:     Lack of Transportation (Medical): Not on file    Lack of Transportation (Non-Medical): Not on file   Physical Activity:     Days of Exercise per Week: Not on file    Minutes of Exercise per Session: Not on file   Stress:     Feeling of Stress : Not on file   Social Connections:     Frequency of Communication with Friends and Family: Not on file    Frequency of Social Gatherings with Friends and Family: Not on file    Attends Sabianism Services: Not on file    Active Member of [address] [place] [address] [employer] or Organizations: Not on file    Attends Club or Organization Meetings: Not on file    Marital Status: Not on file   Intimate Partner Violence:     Fear of Current or Ex-Partner: Not on file    Emotionally Abused: Not on file    Physically Abused: Not on file    Sexually Abused: Not on file   Housing Stability:     Unable to Pay for Housing in the Last Year: Not on file    Number of [place] in the Last Year: Not on file    Unstable Housing in the Last Year: Not on file       Height 5' 4\" (1.626 m), weight 204 lb (92.5 kg). Physical Exam  Constitutional:       Appearance: She is well-developed. HENT:      Head: Normocephalic and atraumatic. Eyes:      Conjunctiva/sclera: Conjunctivae normal.      Pupils: Pupils are equal, round, and reactive to light. Cardiovascular:      Rate and Rhythm: Normal rate. Pulmonary:      Effort: Pulmonary effort is normal.   Abdominal:      General: Bowel sounds are normal.      Palpations: Abdomen is soft. Comments: Soft nontender no palpable mass no audible bruit   Musculoskeletal:         General: Normal range of motion. Cervical back: Normal range of motion. Skin:     General: Skin is warm.    Neurological: Mental Status: She is alert. Laboratory, Pathology, Radiology reviewed indetail with relevant important investigations summarized below:  Lab Results   Component Value Date    WBC 3.8 (L) 05/02/2014    HGB 14.6 05/02/2014    HCT 41.5 05/02/2014    MCV 91.2 05/02/2014     05/02/2014     Lab Results   Component Value Date    ALT 26 07/10/2019    AST 23 07/10/2019    ALKPHOS 75 04/25/2018    BILITOT 1.0 04/25/2018       No results found. Endoscopic investigations:     Assessmentand Plan:  68 y.o. female with history of diarrhea and rectal bleeding which seem to have resolved, differential diagnosis includes diverticular bleeding, ischemic colitis or neoplasm, or hemorrhoid. We'll schedule colonoscopy   Diagnosis Orders   1. Rectal bleeding  Endoscopy, colon, diagnostic     Return in about 2 months (around 4/17/2022). James Booth MD   Staff Gastroenterologist  [facility]    Please note this report has been partially produced using speech recognition software and may cause contain errors related to thatsystem including grammar, punctuation and spelling as well as words and phrases that may seem inappropriate. If there are questions or concerns please feel free to contact me to clarify.

## 2022-02-22 ENCOUNTER — TELEPHONE (OUTPATIENT)
Dept: SMOKING CESSATION | Facility: CLINIC | Age: 72
End: 2022-02-22
Payer: MEDICARE

## 2022-02-22 NOTE — TELEPHONE ENCOUNTER
Smoking Cessation counselor contacted patient regarding scheduled 3:00 P.M.  visit, but patient has not arrived at clinic, nor did she answer the phone.  Counselor left a message requesting a return call.      Erica Cherry RRT, MSW, LMSW, TTS  [phone]

## 2022-02-28 ENCOUNTER — EXTERNAL CHRONIC CARE MANAGEMENT (OUTPATIENT)
Dept: PRIMARY CARE CLINIC | Facility: CLINIC | Age: 72
End: 2022-02-28
Payer: MEDICARE

## 2022-02-28 PROCEDURE — 99490 CHRNC CARE MGMT STAFF 1ST 20: CPT | Mod: S$PBB,,, | Performed by: FAMILY MEDICINE

## 2022-02-28 PROCEDURE — 99490 CHRNC CARE MGMT STAFF 1ST 20: CPT | Mod: PBBFAC,PN | Performed by: FAMILY MEDICINE

## 2022-02-28 PROCEDURE — 99490 PR CHRONIC CARE MGMT, 1ST 20 MIN: ICD-10-PCS | Mod: S$PBB,,, | Performed by: FAMILY MEDICINE

## 2022-03-02 ENCOUNTER — CLINICAL SUPPORT (OUTPATIENT)
Dept: SMOKING CESSATION | Facility: CLINIC | Age: 72
End: 2022-03-02
Payer: COMMERCIAL

## 2022-03-02 DIAGNOSIS — F17.200 NICOTINE DEPENDENCE: Primary | ICD-10-CM

## 2022-03-02 PROCEDURE — 99407 BEHAV CHNG SMOKING > 10 MIN: CPT | Mod: S$GLB,,,

## 2022-03-02 PROCEDURE — 99407 PR TOBACCO USE CESSATION INTENSIVE >10 MINUTES: ICD-10-PCS | Mod: S$GLB,,,

## 2022-03-02 PROCEDURE — 99999 PR PBB SHADOW E&M-EST. PATIENT-LVL I: ICD-10-PCS | Mod: PBBFAC,,,

## 2022-03-02 PROCEDURE — 99999 PR PBB SHADOW E&M-EST. PATIENT-LVL I: CPT | Mod: PBBFAC,,,

## 2022-03-02 NOTE — PROGRESS NOTES
Counselor contacted patient to check on her smoking progress.  Patient reported she is still smoking about 12 cpd, and she continues to use 21 mg nicotine patch in conjunction with 2 mg nicotine lozenges.  Counselor discussed behavior modification skills with patient along with waiting 15 minutes prior to smoking.  Counselor also discussed health risks of smoking with patient.  Follow-up visit scheduled in one week.  Counselor will remain available should any further needs arise.

## 2022-03-07 ENCOUNTER — CLINICAL SUPPORT (OUTPATIENT)
Dept: SMOKING CESSATION | Facility: CLINIC | Age: 72
End: 2022-03-07
Payer: COMMERCIAL

## 2022-03-07 ENCOUNTER — TELEPHONE (OUTPATIENT)
Dept: FAMILY MEDICINE | Facility: CLINIC | Age: 72
End: 2022-03-07
Payer: MEDICARE

## 2022-03-07 DIAGNOSIS — F17.200 NICOTINE DEPENDENCE: Primary | ICD-10-CM

## 2022-03-07 PROCEDURE — 99402 PR PREVENT COUNSEL,INDIV,30 MIN: ICD-10-PCS | Mod: S$GLB,,,

## 2022-03-07 PROCEDURE — 99402 PREV MED CNSL INDIV APPRX 30: CPT | Mod: S$GLB,,,

## 2022-03-07 PROCEDURE — 99999 PR PBB SHADOW E&M-EST. PATIENT-LVL II: CPT | Mod: PBBFAC,,,

## 2022-03-07 PROCEDURE — 99999 PR PBB SHADOW E&M-EST. PATIENT-LVL II: ICD-10-PCS | Mod: PBBFAC,,,

## 2022-03-07 RX ORDER — IBUPROFEN 200 MG
1 TABLET ORAL DAILY
Qty: 28 PATCH | Refills: 0 | Status: SHIPPED | OUTPATIENT
Start: 2022-03-07 | End: 2022-06-28 | Stop reason: SDUPTHER

## 2022-03-07 RX ORDER — IBUPROFEN 200 MG
1 TABLET ORAL DAILY
Qty: 28 PATCH | Refills: 0 | Status: SHIPPED | OUTPATIENT
Start: 2022-03-07 | End: 2022-03-07

## 2022-03-07 RX ORDER — NICOTINE POLACRILEX 2 MG/1
2 LOZENGE ORAL
Qty: 243 EACH | Refills: 0 | Status: SHIPPED | OUTPATIENT
Start: 2022-03-07 | End: 2022-03-07

## 2022-03-07 RX ORDER — NICOTINE POLACRILEX 2 MG/1
2 LOZENGE ORAL
Qty: 243 EACH | Refills: 0 | Status: SHIPPED | OUTPATIENT
Start: 2022-03-07 | End: 2023-08-30 | Stop reason: SDUPTHER

## 2022-03-07 NOTE — TELEPHONE ENCOUNTER
Medications cannot be called in without a proper evaluation. She will need to make an appointment with me or NP

## 2022-03-07 NOTE — TELEPHONE ENCOUNTER
----- Message from Zohra Neff MA sent at 3/7/2022  8:22 AM CST -----  Regarding: FW: Medication  Please Advise   ----- Message -----  From: Meenakshi Saldivar  Sent: 3/7/2022   8:15 AM CST  To: Flo Kramer Staff  Subject: Medication                                       She has a burn that has been healing since Tuesday but not seeing much improvement. Please call her something in at [facility] on [address] so that she can go pick it up.

## 2022-03-07 NOTE — PROGRESS NOTES
Individual Follow-Up Form    3/7/2022    Quit Date: TBD    Clinical Status of Patient: Outpatient    Length of Service: 30 minutes    Continuing Medication: yes  Patches or Nicotine Lozenges    Other Medications: None      Target Symptoms: Withdrawal and medication side effects. The following were  rated moderate (3) to severe (4) on TCRS:  · Moderate (3): Crave and Desire   · Severe (4): None     Comments: Patient presented to clinic for follow-up visit, name and date of birth verified as two patient identifiers.   Patient reported she is still smoking about 12 CPD, and she is still using 21 mg nicotine patch in conjunction with 2 mg nicotine lozenge without any negative side effects reported at this time.  Counselor discussed packet 4 with problematic situations and developing a plan for them, what it means to be an active participant as one works towards tobacco free status, weight gain, hunger evaluation, healthy food options, increasing physical activity, and short-term v long term rewards.  Counselor also discussed a rate reduction, patient agreed to plan of smoking 9 cpd or less per day.  Follow-up visit scheduled in two weeks.  Counselor will remain available should any further needs arise.      Diagnosis: F17.200    Next Visit: 2 weeks

## 2022-03-23 ENCOUNTER — OFFICE VISIT (OUTPATIENT)
Dept: PAIN MEDICINE | Facility: CLINIC | Age: 72
End: 2022-03-23
Payer: MEDICARE

## 2022-03-23 ENCOUNTER — CLINICAL SUPPORT (OUTPATIENT)
Dept: SMOKING CESSATION | Facility: CLINIC | Age: 72
End: 2022-03-23
Payer: COMMERCIAL

## 2022-03-23 VITALS
HEART RATE: 63 BPM | HEIGHT: 62 IN | OXYGEN SATURATION: 100 % | BODY MASS INDEX: 34.57 KG/M2 | DIASTOLIC BLOOD PRESSURE: 84 MMHG | SYSTOLIC BLOOD PRESSURE: 178 MMHG | WEIGHT: 187.88 LBS

## 2022-03-23 DIAGNOSIS — M48.062 SPINAL STENOSIS OF LUMBAR REGION WITH NEUROGENIC CLAUDICATION: ICD-10-CM

## 2022-03-23 DIAGNOSIS — Z98.1 S/P LUMBAR FUSION: ICD-10-CM

## 2022-03-23 DIAGNOSIS — F17.200 NICOTINE DEPENDENCE: Primary | ICD-10-CM

## 2022-03-23 DIAGNOSIS — M51.36 DDD (DEGENERATIVE DISC DISEASE), LUMBAR: Primary | ICD-10-CM

## 2022-03-23 DIAGNOSIS — M47.816 LUMBAR SPONDYLOSIS: ICD-10-CM

## 2022-03-23 PROCEDURE — 99999 PR PBB SHADOW E&M-EST. PATIENT-LVL IV: ICD-10-PCS | Mod: PBBFAC,,, | Performed by: PAIN MEDICINE

## 2022-03-23 PROCEDURE — 99214 OFFICE O/P EST MOD 30 MIN: CPT | Mod: PBBFAC,PN | Performed by: PAIN MEDICINE

## 2022-03-23 PROCEDURE — 99213 OFFICE O/P EST LOW 20 MIN: CPT | Mod: S$PBB,,, | Performed by: PAIN MEDICINE

## 2022-03-23 PROCEDURE — 99999 PR PBB SHADOW E&M-EST. PATIENT-LVL II: CPT | Mod: PBBFAC,,,

## 2022-03-23 PROCEDURE — 99402 PR PREVENT COUNSEL,INDIV,30 MIN: ICD-10-PCS | Mod: S$GLB,,,

## 2022-03-23 PROCEDURE — 99999 PR PBB SHADOW E&M-EST. PATIENT-LVL IV: CPT | Mod: PBBFAC,,, | Performed by: PAIN MEDICINE

## 2022-03-23 PROCEDURE — 99999 PR PBB SHADOW E&M-EST. PATIENT-LVL II: ICD-10-PCS | Mod: PBBFAC,,,

## 2022-03-23 PROCEDURE — 99402 PREV MED CNSL INDIV APPRX 30: CPT | Mod: S$GLB,,,

## 2022-03-23 PROCEDURE — 99213 PR OFFICE/OUTPT VISIT, EST, LEVL III, 20-29 MIN: ICD-10-PCS | Mod: S$PBB,,, | Performed by: PAIN MEDICINE

## 2022-03-23 RX ORDER — PENICILLIN V POTASSIUM 500 MG/1
500 TABLET, FILM COATED ORAL 3 TIMES DAILY
COMMUNITY
Start: 2022-01-21 | End: 2022-06-15

## 2022-03-23 NOTE — PROGRESS NOTES
Individual Follow-Up Form    3/23/2022    Quit Date: TBD     Clinical Status of Patient: Outpatient    Length of Service: 30 minutes    Continuing Medication: yes  Patches or Nicotine Lozenges    Other Medications: None      Target Symptoms: Withdrawal and medication side effects. The following were  rated moderate (3) to severe (4) on TCRS:  · Moderate (3): Crave and Desire   · Severe (4): None     Comments: Patient presented to clinic for follow-up visit, name and date of birth verified as two patient identifiers.   Patient reported she is still smoking about 12 CPD, and she is still using 21 mg nicotine patch in conjunction with 2 mg nicotine gum without any negative side effects reported at this time.  Patient also reported she is trying to quit smoking, but stressful situations keep her smoking.  Counselor discussed packet 5 with patient outlining behavior modification strategies, lapse and relapse assessment, emotion evaluation, and conflict resolution.  Follow-up visit scheduled in two weeks.  Counselor will remain available should any further needs arise.      Diagnosis: F17.200    Next Visit: 2 weeks

## 2022-03-23 NOTE — PROGRESS NOTES
Subjective:     Patient ID: Stephanie Sears is a 72 y.o. female    Chief Complaint: Low-back Pain (F/u for LBP for efficacy of PT and HEP, review xray )      Referred by: No ref. provider found      HPI:    Interval History (3/23/22):  She returns today for follow up.  She reports that she has completed physical therapy for her neck pain.  She states this issue was doing well.  Today she wishes discuss her low back pain.  This pain is located the midline lower lumbar/lumbosacral region.  The pain will radiate to the bilateral lumbar paraspinal regions and bilateral posterior thighs.  The pain does not cross the knees.  The pain is worse with standing/walking.  She denies any associated numbness, tingling, weakness, bowel bladder dysfunction.        Interval History (1/6/22):  She returns today for follow up.  She reports that bilateral ischial bursa steroid injections has been helpful for the bilateral buttock/hip pain.  She reports 60% relief following this procedure.  Today she wishes to discuss her neck pain.  She has had neck pain intermittently for years.  The pain is located the midline mid to lower cervical region.  The pain does not radiate.  She denies any associated numbness, tingling, weakness, bowel bladder dysfunction.  She would like to attend additional physical therapy for the management of her neck pain.      Interval History (12/14/21):  She returns today for follow up.  She reports that ischial bursa pain has returned.  She reports near complete relief following bilateral ischial bursa steroid injection done in February 2021. She got at least 9 months of good relief from this procedure.  She states the pain has since returned.  She denies any changes in the quality location the pain.  She denies any new or worsening symptoms.        Interval History NP (3/16/21):    Pt returns today for follow up. She states that the bilateral ischial bursa injections have relieved nearly all of the bilateral  buttock pain.  She is very happy with the results at this time.     Interval History (2/19/21):  She returns today for follow up.  She reports that she continues to have bilateral buttock pain particular when sitting.  She denies any changes in the quality location was pain.  She denies any new worsening symptoms.  She has been attending physical therapy and finds that it has been helpful.  She states that her pain is still quite severe especially given recent cold weather.        Interval History (12/21/20):  She returns today for follow up and imaging review.  She reports that she continues to have pain mainly surrounding the bilateral hip girdles.  She states that she has focal pain in the bilateral gluteal regions when sitting upright.  She also has pain in the bilateral lateral hip regions.  She has undergone multiple greater trochanteric bursa steroid injections in the past.  These were initially effective, but gradually provided less relief with subsequent injections.  She continues to deny any significant low back pain.        Initial Encounter (12/7/20):  Stephanie Sears is a 72 y.o. female who presents today with chronic bilateral low back pain.  This pain has been present for years.  Patient is status post L4-5 fusion in 2006.  She states that she did have some pain radiating into her lower extremities in the past, but her current pain is isolated to the bilateral lower lumbar/lumbosacral regions.  She denies any associated numbness, tingling, weakness, bowel bladder dysfunction.  The pain is constant worse with prolonged sitting and standing..   This pain is described in detail below.    Physical Therapy:  Yes.    Non-pharmacologic Treatment:  Rest helps         · TENS?  No    Pain Medications:         · Currently taking:  Meloxicam, gabapentin    · Has tried in the past:  Tramadol, NSAIDs, Tylenol    · Has not tried:   Muscle relaxants, TCAs, SNRIs, topical creams    Blood thinners:   None    Interventional Therapies:    2/26/21 - bilateral ischial bursa injections - 90% relief for at least 9 months  12/23/21 - bilateral ischial bursa steroid injections - 60% relief    Relevant Surgeries:   L4-5 fusion in 2006    Affecting sleep?  Yes    Affecting daily activities? yes    Depressive symptoms? no          · SI/HI? No    Work status: Retired    Pain Scores:    Best:       5/10  Worst:     9/10  Usually:   7/10  Today:    5/10    Review of Systems   Constitutional: Negative for activity change, appetite change, chills, fatigue, fever and unexpected weight change.   HENT: Negative for hearing loss.    Eyes: Negative for visual disturbance.   Respiratory: Negative for chest tightness and shortness of breath.    Cardiovascular: Negative for chest pain.   Gastrointestinal: Negative for abdominal pain, constipation, diarrhea, nausea and vomiting.   Genitourinary: Negative for difficulty urinating.   Musculoskeletal: Positive for arthralgias, back pain, gait problem, myalgias, neck pain and neck stiffness.   Skin: Negative for rash.   Neurological: Negative for dizziness, weakness, light-headedness, numbness and headaches.   Psychiatric/Behavioral: Positive for sleep disturbance. Negative for hallucinations and suicidal ideas. The patient is not nervous/anxious.        Past Medical History:   Diagnosis Date    Acute pancreatitis     Angina pectoris     Anxiety     Arthritis     Back pain     Bronchitis     Cervical spondylosis with radiculopathy 7/10/2012    Chest pain     Chronic neck pain 7/26/2012    Colon polyps     Controlled type 2 diabetes mellitus with stage 2 chronic kidney disease, with long-term current use of insulin 1/24/2020    Coronary artery disease     Depression     Fibrocystic breast     GERD (gastroesophageal reflux disease)     Glaucoma     Heart failure     Hyperlipidemia     Hypertension     Neck pain 7/10/2012    Obesity     JAM (obstructive sleep apnea)      Pneumonia     Pneumonia due to other staphylococcus     Primary osteoarthritis of both knees     Psoriasis     Subacromial or subdeltoid bursitis 7/10/2012    Thyroid disease     Tobacco dependence     Trouble in sleeping     Type 2 diabetes mellitus 12/10/2013       Past Surgical History:   Procedure Laterality Date    BREAST BIOPSY Bilateral 1988    BREAST MASS EXCISION Right     benign    CATARACT EXTRACTION W/  INTRAOCULAR LENS IMPLANT Left 12/09/2020    PHACO IOL WITH I-STENT ()    CATARACT EXTRACTION W/  INTRAOCULAR LENS IMPLANT Right 10/21/2020    PHACO IOL WITH I-STENT x 2 ()    CHOLECYSTECTOMY      COLONOSCOPY N/A 5/22/2019    Procedure: COLONOSCOPY;  Surgeon: Darrin Calvin MD;  Location: [facility] ([address] [place] [address]);  Service: Endoscopy;  Laterality: N/A;  2nd floor - all other procedure done on 2, multiple comorbidities - ERW/   change in MD schedule, Pt notified and verbalized understanding, new arrival time 0800, Ins mailed to Pt with new arrival time - ERW1/8/19@1130    EPIDURAL STEROID INJECTION Bilateral 2/26/2021    Procedure: Injection, Steroid, Ischial Bursa;  Surgeon: Yonatan Garsia Jr., MD;  Location: [facility];  Service: Pain Management;  Laterality: Bilateral;  Bilateral Ischial Bursa Steroid Injections  Arrive @ 1230; No ATC; Check BG    HYSTERECTOMY  1980's    IMPLANTATION OF DEVICE FOR GLAUCOMA Right 10/21/2020    Procedure: INSERTION, DEVICE, FOR GLAUCOMA/ i Stent;  Surgeon: Melany Tse MD;  Location: [facility] OR [address] [place] [address];  Service: Ophthalmology;  Laterality: Right;    IMPLANTATION OF DEVICE FOR GLAUCOMA Left 12/9/2020    Procedure: INSERTION, DEVICE, FOR GLAUCOMA/I SENT;  Surgeon: Melany Tse MD;  Location: [facility] OR [address] [place] [address];  Service: Ophthalmology;  Laterality: Left;    INJECTION OF JOINT Bilateral 12/23/2021    Procedure: Injection, Joint---BILATERAL ISCHIAL BURSA STEROID INJECTION;  Surgeon: Yonatan Garsia Jr., MD;  Location:  [facility];  Service: Pain Management;  Laterality: Bilateral;    INTRAOCULAR PROSTHESES INSERTION Right 10/21/2020    Procedure: INSERTION, IOL PROSTHESIS;  Surgeon: Melany Tse MD;  Location: [facility] OR [address] [place] [address];  Service: Ophthalmology;  Laterality: Right;    INTRAOCULAR PROSTHESES INSERTION Left 12/9/2020    Procedure: INSERTION, IOL PROSTHESIS;  Surgeon: Melany Tse MD;  Location: [facility] OR [address] [place] [address];  Service: Ophthalmology;  Laterality: Left;    KNEE SURGERY Left 1-20-16    TKR    KNEE SURGERY Right 02/26/2018    TKR    L4-L5 fusion  2006    PHACOEMULSIFICATION OF CATARACT Right 10/21/2020    Procedure: PHACOEMULSIFICATION, CATARACT;  Surgeon: Melany Tse MD;  Location: [facility] OR [address] [place] [address];  Service: Ophthalmology;  Laterality: Right;    PHACOEMULSIFICATION OF CATARACT Left 12/9/2020    Procedure: PHACOEMULSIFICATION, CATARACT;  Surgeon: Melany Tse MD;  Location: [facility] OR [address] [place] [address];  Service: Ophthalmology;  Laterality: Left;       Social History     Socioeconomic History    Marital status: Single   Tobacco Use    Smoking status: Current Every Day Smoker     Packs/day: 1.00     Years: 40.00     Pack years: 40.00     Types: Cigarettes    Smokeless tobacco: Never Used   Substance and Sexual Activity    Alcohol use: No     Alcohol/week: 0.0 standard drinks    Drug use: No    Sexual activity: Yes     Partners: Male       Review of patient's allergies indicates:   Allergen Reactions    Hydrocodone Shortness Of Breath    Iodinated contrast media Shortness Of Breath     Difficulty breathing    Adhesive Itching     Skin peeling    Morphine Hallucinations    Oxycodone Hallucinations    Sulfa (sulfonamide antibiotics) Hives and Itching       Current Outpatient Medications on File Prior to Visit   Medication Sig Dispense Refill    albuterol (PROVENTIL/VENTOLIN HFA) 90 mcg/actuation inhaler Inhale 2 puffs into the lungs every 4 (four) hours as needed for Wheezing. Rescue 18 g  "3    amLODIPine-benazepriL (LOTREL) 10-40 mg per capsule TAKE 1 CAPSULE DAILY 90 capsule 0    atorvastatin (LIPITOR) 20 MG tablet Take 1 tablet (20 mg total) by mouth once daily. 90 tablet 3    carvediloL (COREG) 12.5 MG tablet Take 1 tablet (12.5 mg total) by mouth 2 (two) times daily with meals. 180 tablet 3    diphenhydrAMINE (BENADRYL) 25 mg capsule Take 25 mg by mouth every 6 (six) hours as needed for Itching or Allergies.      gabapentin (NEURONTIN) 600 MG tablet Take 1 tablet (600 mg total) by mouth 3 (three) times daily. 270 tablet 3    INULIN (FIBER GUMMIES ORAL) Take 1 each by mouth every morning.       lancets (ACCU-CHEK MULTICLIX LANCET) Misc Test blood sugar twice daily 300 each 3    meloxicam (MOBIC) 7.5 MG tablet TAKE 1 TABLET DAILY 90 tablet 3    nicotine (NICODERM CQ) 21 mg/24 hr Place 1 patch onto the skin once daily. 28 patch 0    nicotine, polacrilex, 2 mg lzmn Take 1 each (2 mg total) by mouth as needed (Take 1 piece as needed. Maximum dose of 10 per day.). 243 each 0    penicillin v potassium (VEETID) 500 MG tablet Take 500 mg by mouth 3 (three) times daily.      simethicone (GAS-X ORAL) Take 1 tablet by mouth as needed.       [DISCONTINUED] blood sugar diagnostic (ACCU-CHEK JOSE G PLUS TEST STRP) Strp Inject 1 each into the skin 2 (two) times daily. 200 each 11    [DISCONTINUED] blood sugar diagnostic Strp Use to test blood glucose levels 2 times per day as instructed. 200 strip 11    [DISCONTINUED] lancets (ONETOUCH DELICA LANCETS) 33 gauge Misc Inject 1 lancet into the skin 2 (two) times daily before meals. 200 each 11     No current facility-administered medications on file prior to visit.       Objective:      BP (!) 178/84 (BP Location: Left arm, Patient Position: Sitting, BP Method: Medium (Automatic))   Pulse 63   Ht 5' 2" (1.575 m)   Wt 85.2 kg (187 lb 14.4 oz)   SpO2 100%   BMI 34.37 kg/m²     Exam:  GEN:  Well developed, well nourished.  No acute distress.   HEENT: "  No trauma.  Mucous membranes moist.  Nares patent bilaterally.  PSYCH: Normal affect. Thought content appropriate.  CHEST:  Breathing symmetric.  No audible wheezing.  ABD: Soft, non-distended.  SKIN:  Warm, pink, dry.  No rash on exposed areas.    EXT:  No cyanosis, clubbing, or edema.  No color change or changes in nail or hair growth.  NEURO/MUSCULOSKELETAL:  Fully alert, oriented, and appropriate. Speech normal janneth. No cranial nerve deficits.   Gait:  Antalgic.  No focal motor deficits.                     Imaging:      Narrative & Impression    EXAMINATION:  MRI LUMBAR SPINE WITHOUT CONTRAST     CLINICAL HISTORY:  s/p lumbar fusion; Other intervertebral disc degeneration, lumbar region     TECHNIQUE:  Multiplanar, multisequence MR images of the lumbar spine were performed without the administration of contrast.     COMPARISON:  Lumbar spine radiograph 01/23/2019; CT abdomen contrast 11/16/2012; MRI lumbar spine 10/26/2007     FINDINGS:  Alignment: Grade 1 anterolisthesis of L3 on L4.  Grade 1 retrolisthesis of L5 on S1.     Vertebrae: Postoperative changes from previous L4-5 posterior spinal fusion with bilateral transpedicular screws and posterior stabilizing bars and interbody spacer.  Normal marrow signal. No fracture.     Discs: Mild multilevel intervertebral disc height loss and desiccation.     Cord: Normal.  Conus terminates normally at L2.  Cauda equina appears normal.     Degenerative findings:     T10-T11: Diffuse disc bulge and bilateral facet arthropathy resulting in mild central canal stenosis, moderate left, and mild right neural foraminal narrowing.     T11-T12: Diffuse disc bulge and bilateral facet arthropathy resulting in moderate bilateral neural foraminal narrowing and mild central canal stenosis.     T12-L1: No significant disc abnormality.  Mild bilateral facet arthropathy without significant neural foraminal narrowing or central canal stenosis.     L1-L2: No significant disc  abnormality.  Mild bilateral facet arthropathy without significant neural foraminal narrowing or central canal stenosis.     L2-L3: No significant disc abnormality.  Mild bilateral facet arthropathy without significant neural foraminal narrowing or central canal stenosis.     L3-L4: Diffuse disc bulge, moderate bilateral facet joint osseous hypertrophy, and ligamentum flavum buckling contribute to moderate central canal stenosis, moderate right, and mild left neural foraminal narrowing.     L4-L5: Postoperative change of previous decompression and posterior spinal fusion.  Productive changes contribute to mild left neural foraminal narrowing.     L5-S1: Diffuse disc bulge, bilateral facet arthropathy, and ligamentum flavum buckling resulting in severe left and moderate right neural foraminal narrowing and mild central canal stenosis.     Paraspinal muscles & soft tissues: Mild fatty atrophy of the lower paraspinous musculature.  No paraspinal fluid collections.  Upper sacrum and sacroiliac joints are unremarkable.     Impression:     1. Postsurgical changes of L4-L5 fusion.  2. Multilevel degenerative changes as detailed above.  Moderate spinal canal stenosis noted at L3-L4.  Moderate neural foraminal narrowing noted at L3-L4 and L5-S1, as well as within the lower thoracic spine.     Electronically signed by resident: Naveen Resendez  Date:                                            12/17/2020  Time:                                           09:53     Electronically signed by: Mitch Pappas MD  Date:                                            12/17/2020  Time:                                           14:49           Narrative & Impression    EXAMINATION:  XR LUMBAR SPINE 5 VIEW WITH FLEX AND EXT     CLINICAL HISTORY:  Low back pain, cauda equina syndrome suspected;  Lumbago with sciatica, left side     TECHNIQUE:  Five views of the lumbar spine plus flexion extension views were  performed.     COMPARISON:  02/25/2014     FINDINGS:  The lumbar vertebra are intact.  No compression fracture or obvious paraspinal lesion is detected.  Degenerative changes are present with small osteophytes at most levels.  There is progressive disc space narrowing at L3-4; this level also shows development of a grade 1 anterolisthesis, stable between flexion and extension.  The other lumbar disc spaces show no significant narrowing, but lower thoracic disc spaces are narrowed, some with vacuum disc.     Postoperative findings from posterior instrumented fusion are again seen at L4 and 5 with bilateral pedicle screws, vertical connecting rods, and a device in the disc space.     Visualized abdomen shows aortic atherosclerosis.     IMPRESSION:      Stable postoperative findings of L4-5 fusion     Degenerative spine changes with interval worsening at L3-4.        Electronically signed by: Valentín Booker MD  Date:                                            01/23/2019  Time:                                           10:16         Assessment:       Encounter Diagnoses   Name Primary?    DDD (degenerative disc disease), lumbar Yes    Lumbar spondylosis     Spinal stenosis of lumbar region with neurogenic claudication     S/P lumbar fusion          Plan:       Stephanie was seen today for low-back pain.    Diagnoses and all orders for this visit:    DDD (degenerative disc disease), lumbar  -     Ambulatory referral/consult to Physical/Occupational Therapy; Future    Lumbar spondylosis    Spinal stenosis of lumbar region with neurogenic claudication    S/P lumbar fusion        Stephanie Sears is a 72 y.o. female with chronic bilateral low back pain.  Pain appears to be axial.  History of L4-5 fusion in 2006.  May have lower lumbar facet pain primarily at the L5-S1 level.  Lumbar facet degeneration noted on lumbar MRI mainly at the L3-4 level but also at the L5-S1 level.  Patient also with spinal stenosis at the  L3-4 and L5-S1 levels.  Also with bilateral foraminal stenosis at the L5-S1 levels and right foraminal stenosis at the L3-4 level.  Symptoms not overly consistent with radiculopathy at this time.  She does have some symptoms somewhat consistent with neurogenic claudication.      1.  Pertinent imaging studies reviewed by me. Imaging results were discussed with patient.  2.  Refer to PT for lumbar ROM, strengthening, stretching and HEP.  3.  Return to clinic in 8 weeks or sooner if needed.  At that time we will discuss efficacy of physical therapy/home exercise program.  We will likely order lumbar MRI pain persists or worsens.  May consider caudal epidural steroid injection.

## 2022-03-28 ENCOUNTER — OFFICE VISIT (OUTPATIENT)
Dept: OPHTHALMOLOGY | Facility: CLINIC | Age: 72
End: 2022-03-28
Payer: MEDICARE

## 2022-03-28 DIAGNOSIS — Z98.83 STATUS POST GLAUCOMA SURGERY: ICD-10-CM

## 2022-03-28 DIAGNOSIS — H35.372 EPIRETINAL MEMBRANE (ERM) OF LEFT EYE: ICD-10-CM

## 2022-03-28 DIAGNOSIS — H40.1131 PRIMARY OPEN-ANGLE GLAUCOMA, BILATERAL, MILD STAGE: Primary | ICD-10-CM

## 2022-03-28 DIAGNOSIS — Z96.1 PSEUDOPHAKIA, BOTH EYES: ICD-10-CM

## 2022-03-28 PROCEDURE — 92012 INTRM OPH EXAM EST PATIENT: CPT | Mod: S$PBB,,, | Performed by: OPHTHALMOLOGY

## 2022-03-28 PROCEDURE — 92012 PR EYE EXAM, EST PATIENT,INTERMED: ICD-10-PCS | Mod: S$PBB,,, | Performed by: OPHTHALMOLOGY

## 2022-03-28 PROCEDURE — 99213 OFFICE O/P EST LOW 20 MIN: CPT | Mod: PBBFAC | Performed by: OPHTHALMOLOGY

## 2022-03-28 PROCEDURE — 99999 PR PBB SHADOW E&M-EST. PATIENT-LVL III: CPT | Mod: PBBFAC,,, | Performed by: OPHTHALMOLOGY

## 2022-03-28 PROCEDURE — 99999 PR PBB SHADOW E&M-EST. PATIENT-LVL III: ICD-10-PCS | Mod: PBBFAC,,, | Performed by: OPHTHALMOLOGY

## 2022-03-28 NOTE — PROGRESS NOTES
"  HPI     Glaucoma     Comments: 4 month ck and pt c/o difficulty seeing in bright sunlight              Comments     DLS: 11/29/21    1  Preperimetric POAG vs OHT  2. PVD OD   3. PCIOL OU   4. Blepharitis / MGD   5. RAGHU Allergy   6. Myopia with Astigmatism and Presbyopia    MEDS   No glaucoma eye drops    AT's prn - 1-2 x day           Last edited by Melany Tse MD on 3/28/2022  9:47 AM. (History)            Assessment /Plan     For exam results, see Encounter Report.    Primary open-angle glaucoma, bilateral, mild stage    Epiretinal membrane (ERM) of left eye    Pseudophakia, both eyes    Status post glaucoma surgery          1. Pre-perimetric mild POAG   Vs OHT  -Followed at [facility] since 1991   -First HVF 1999   -First photos 1991   - Intolerant to all gtts - they aggravate his blepharitis-? RAGHU allergy   -IOP "OK" off gtts and s/p ALT ou and SLT od - 2008    Family history neg   Glaucoma meds none (( off gtts post SLT ou -))   H/O adverse rxn to glaucoma drops Intolerant to all gtts - 2/2 aggravates blepharitis- ? RAGHU allergy   LASERS ALT ou -? Date / SLT OD 7/17/08 - good response   GLAUCOMA SURGERIES - I stent x 2 - OD  - 10/21/2020 // I stent 12/9/2020 - OS   OTHER EYE SURGERIES - phaco/IOL od - 10/21/2020 - PCB00 14.0  // oS 12/9/2020 - pcb00 15.0   CDR 0.6/0.3   Tbase 19-26 / 16-21   Tmax 26/21   Ttarget ?   HVF 15 test - 1999 to 2021 - Full ou   Gonio +3 ou   /588   OCT 6 test 2005 to 2021 -  RNFL - OD: bord TI (?prog)  // OS:NL  HRT 9 test 2004 to 2020 -MR -  MR -  Dec T, border NI od // full os /// CDR 0.619 od // 0.508 os - but unreliable OD  Disc photos 1991, 1996, 2003 - slides // 2012 , 2016, 2020   - OIS     - Ttoday   23/17  - Test done today  IOP     S/P phaco / IOL / I-stent os - 12/10/2020    Post -op phaco.IOL - istent - x 2  OD - PCB00 - 14.0 - 10/21/2020      2. Posterior Vitreous Detachment OD - 11/2008   - RD PRECAUTIONS    3. Eyelid inflammation / Blepharitis / MGD    4. " Allergic Conjunctivitis - RAGHU allergy     5. PCO    Not vis sign yet     6. Myopia/Astigmatism/presbyopia     Plan   POAG - mild -pre-perimetric glaucoma - intolerant to all gtts   IOP ok s/p ALT ou - years ago and s/p SLT OD 2008 ( no SLT os)  Continue to monitor HVF/DFE/OCT/HRT/photos/IOP   If increase IOP or progression on VF testing consider repeat SLT OD and primary SLT os      Pt was a myope - sph eq is -5.25 od and -4.25 os - pre- phac0 -- set for distance post op     IOL OD -pt want to have IOL for distance vision - is no longer using a computer much - has retired   PCB00  14.0  (-0.7 to -0.81)  AC IOL 11.5    IOL OS   PCB00 15.0 ( -0.35 to -0.59)  AC IOL - 12.5       S/P  cataract surgery  - phaco/IOL w/ I-stent x 2 OD - 10/21/2020 - PCB00 - 14.0   POY 1.5  - phaco/IOL - I stent  x 2   Doing well      Phaco / IOL / I-stent x 2   OS Date: 12/9/2020 - PCB00 15.0   POM 15 - phaco/IOL   Doing well -    OCT macula - NO CME     Rx for bifocals  Given - or ok to use no distance correction and OTC readers - re-printed 3/28/2022  Pt mostly uses the drugstore reading glasses     If IOP too high off gtts and post I-stent - can re-start gtts (H/O intol to all galucoma gtts in past - ? RAGHU intol)   ?? If could try travatan Z again in future again - non RAGHU   ?? If a candidate for repeat SLT's - can avoid the I-stent areas     4 months with IOP check // HVF // DFE // OCT

## 2022-03-31 ENCOUNTER — EXTERNAL CHRONIC CARE MANAGEMENT (OUTPATIENT)
Dept: PRIMARY CARE CLINIC | Facility: CLINIC | Age: 72
End: 2022-03-31
Payer: MEDICARE

## 2022-03-31 PROCEDURE — 99490 PR CHRONIC CARE MGMT, 1ST 20 MIN: ICD-10-PCS | Mod: S$PBB,,, | Performed by: FAMILY MEDICINE

## 2022-03-31 PROCEDURE — 99490 CHRNC CARE MGMT STAFF 1ST 20: CPT | Mod: S$PBB,,, | Performed by: FAMILY MEDICINE

## 2022-03-31 PROCEDURE — 99490 CHRNC CARE MGMT STAFF 1ST 20: CPT | Mod: PBBFAC,PN | Performed by: FAMILY MEDICINE

## 2022-04-18 ENCOUNTER — OFFICE VISIT (OUTPATIENT)
Dept: FAMILY MEDICINE | Facility: CLINIC | Age: 72
End: 2022-04-18
Payer: MEDICARE

## 2022-04-18 VITALS
BODY MASS INDEX: 35.91 KG/M2 | DIASTOLIC BLOOD PRESSURE: 80 MMHG | SYSTOLIC BLOOD PRESSURE: 170 MMHG | TEMPERATURE: 98 F | OXYGEN SATURATION: 99 % | HEART RATE: 60 BPM | HEIGHT: 62 IN | WEIGHT: 195.13 LBS | RESPIRATION RATE: 18 BRPM

## 2022-04-18 DIAGNOSIS — E66.01 CLASS 2 SEVERE OBESITY DUE TO EXCESS CALORIES WITH SERIOUS COMORBIDITY AND BODY MASS INDEX (BMI) OF 35.0 TO 35.9 IN ADULT: ICD-10-CM

## 2022-04-18 DIAGNOSIS — I27.20 PULMONARY HTN: ICD-10-CM

## 2022-04-18 DIAGNOSIS — I10 ESSENTIAL HYPERTENSION: ICD-10-CM

## 2022-04-18 DIAGNOSIS — R82.90 ABNORMAL URINALYSIS: ICD-10-CM

## 2022-04-18 DIAGNOSIS — E78.5 DYSLIPIDEMIA: ICD-10-CM

## 2022-04-18 DIAGNOSIS — M54.50 ACUTE LEFT-SIDED LOW BACK PAIN WITHOUT SCIATICA: Primary | ICD-10-CM

## 2022-04-18 DIAGNOSIS — E11.51 TYPE II DIABETES MELLITUS WITH PERIPHERAL CIRCULATORY DISORDER: ICD-10-CM

## 2022-04-18 LAB
BILIRUB SERPL-MCNC: NEGATIVE MG/DL
BLOOD URINE, POC: NEGATIVE
CLARITY, POC UA: ABNORMAL
COLOR, POC UA: YELLOW
GLUCOSE UR QL STRIP: NORMAL
KETONES UR QL STRIP: NEGATIVE
LEUKOCYTE ESTERASE URINE, POC: ABNORMAL
NITRITE, POC UA: ABNORMAL
PH, POC UA: 6
PROTEIN, POC: ABNORMAL
SPECIFIC GRAVITY, POC UA: ABNORMAL
UROBILINOGEN, POC UA: NEGATIVE

## 2022-04-18 PROCEDURE — 99214 PR OFFICE/OUTPT VISIT, EST, LEVL IV, 30-39 MIN: ICD-10-PCS | Mod: S$PBB,,, | Performed by: FAMILY MEDICINE

## 2022-04-18 PROCEDURE — 99214 OFFICE O/P EST MOD 30 MIN: CPT | Mod: S$PBB,,, | Performed by: FAMILY MEDICINE

## 2022-04-18 PROCEDURE — 99999 PR PBB SHADOW E&M-EST. PATIENT-LVL IV: CPT | Mod: PBBFAC,,, | Performed by: FAMILY MEDICINE

## 2022-04-18 PROCEDURE — 87086 URINE CULTURE/COLONY COUNT: CPT | Performed by: FAMILY MEDICINE

## 2022-04-18 PROCEDURE — 81002 URINALYSIS NONAUTO W/O SCOPE: CPT | Mod: PBBFAC,PN | Performed by: FAMILY MEDICINE

## 2022-04-18 PROCEDURE — 99999 PR PBB SHADOW E&M-EST. PATIENT-LVL IV: ICD-10-PCS | Mod: PBBFAC,,, | Performed by: FAMILY MEDICINE

## 2022-04-18 PROCEDURE — 99214 OFFICE O/P EST MOD 30 MIN: CPT | Mod: PBBFAC,PN | Performed by: FAMILY MEDICINE

## 2022-04-18 RX ORDER — NAPROXEN 500 MG/1
500 TABLET ORAL 2 TIMES DAILY WITH MEALS
Qty: 60 TABLET | Refills: 0 | Status: SHIPPED | OUTPATIENT
Start: 2022-04-18 | End: 2022-06-14 | Stop reason: CLARIF

## 2022-04-18 RX ORDER — CYCLOBENZAPRINE HCL 10 MG
10 TABLET ORAL 3 TIMES DAILY PRN
Qty: 45 TABLET | Refills: 0 | Status: SHIPPED | OUTPATIENT
Start: 2022-04-18 | End: 2022-08-10

## 2022-04-19 ENCOUNTER — PES CALL (OUTPATIENT)
Dept: ADMINISTRATIVE | Facility: CLINIC | Age: 72
End: 2022-04-19
Payer: MEDICARE

## 2022-04-20 LAB — BACTERIA UR CULT: NORMAL

## 2022-04-24 NOTE — PROGRESS NOTES
Subjective:       Patient ID: Stephanie Sears is a 72 y.o. female.    Chief Complaint: Back Pain (Left side )    HPI   32-year-old female comes in for evaluation of a left low back pain.  It does not radiate into her legs.  She reports that has been going on for several days.  It is constant.  It feels like a deep ache.  It is not affected by bowel movement or urination.  She denies any injury.       She is taking all her routine medications for DM, HTN, CHF without concern.    Review of Systems   Constitutional: Negative for fatigue.   Cardiovascular: Negative for chest pain.   Gastrointestinal: Negative for abdominal pain, constipation, diarrhea, nausea and vomiting.   Endocrine: Negative for polydipsia, polyphagia and polyuria.   Genitourinary: Negative for dysuria and hematuria.   Musculoskeletal: Positive for back pain.   Neurological: Negative for weakness.         Objective:      Physical Exam  Vitals reviewed.   Constitutional:       General: She is not in acute distress.     Appearance: She is well-developed. She is not diaphoretic.   HENT:      Head: Normocephalic and atraumatic.      Right Ear: Tympanic membrane, ear canal and external ear normal.      Left Ear: Tympanic membrane, ear canal and external ear normal.      Nose: Nose normal.   Eyes:      General:         Right eye: No discharge.         Left eye: No discharge.      Conjunctiva/sclera: Conjunctivae normal.      Pupils: Pupils are equal, round, and reactive to light.   Neck:      Thyroid: No thyromegaly.      Trachea: No tracheal deviation.   Cardiovascular:      Rate and Rhythm: Normal rate and regular rhythm.      Pulses:           Radial pulses are 2+ on the right side and 2+ on the left side.      Heart sounds: Normal heart sounds, S1 normal and S2 normal. No murmur heard.  Pulmonary:      Effort: Pulmonary effort is normal. No respiratory distress.      Breath sounds: Normal breath sounds. No wheezing, rhonchi or rales.   Abdominal:       General: Bowel sounds are normal. There is no distension.      Palpations: Abdomen is soft. Abdomen is not rigid. There is no mass.      Tenderness: There is no abdominal tenderness. There is no guarding.   Musculoskeletal:      Cervical back: Normal range of motion and neck supple.      Lumbar back: No deformity, tenderness or bony tenderness. Normal range of motion. Negative right straight leg raise test and negative left straight leg raise test.   Lymphadenopathy:      Cervical: No cervical adenopathy.   Skin:     General: Skin is warm and dry.      Capillary Refill: Capillary refill takes less than 2 seconds.      Findings: No rash.   Neurological:      Mental Status: She is alert and oriented to person, place, and time.      Cranial Nerves: No cranial nerve deficit.      Sensory: No sensory deficit.      Motor: No atrophy or abnormal muscle tone.      Deep Tendon Reflexes:      Reflex Scores:       Patellar reflexes are 2+ on the right side and 2+ on the left side.  Psychiatric:         Behavior: Behavior normal.         Assessment:       Problem List Items Addressed This Visit    None     Visit Diagnoses     Acute left-sided low back pain without sciatica    -  Primary    Relevant Medications    cyclobenzaprine (FLEXERIL) 10 MG tablet    naproxen (NAPROSYN) 500 MG tablet    Other Relevant Orders    POCT URINE DIPSTICK WITHOUT MICROSCOPE (Completed)    Abnormal urinalysis        Relevant Orders    Urine culture (Completed)          Plan:       Stephanie was seen today for back pain.    Diagnoses and all orders for this visit:    Acute left-sided low back pain without sciatica  -     POCT URINE DIPSTICK WITHOUT MICROSCOPE  -     cyclobenzaprine (FLEXERIL) 10 MG tablet; Take 1 tablet (10 mg total) by mouth 3 (three) times daily as needed for Muscle spasms.  -     naproxen (NAPROSYN) 500 MG tablet; Take 1 tablet (500 mg total) by mouth 2 (two) times daily with meals.  Advised using heating pad or hot towel in the  area, but to not fall asleep with it as it could cause a burn.  Advised massaging and stretching the area.  Prescribed anti-inflammatory and muscle relaxer as directed.  Side effects of muscle relaxer including sedation and impact on driving reviewed.  The patient was advised that NSAID-type medications have two very important potential side effects: gastrointestinal irritation including hemorrhage and renal injuries. She was asked to take the medication with food and to stop if she experiences any GI upset. I asked her to call for vomiting, abdominal pain or black/bloody stools. The patient expresses understanding of these issues and questions were answered.    Abnormal urinalysis  -     Urine culture  Check urine culture as POCT UA was not normal    Type II diabetes mellitus with peripheral circulatory disorder  Continue current therapy    Essential hypertension  BP elevated  Recheck BP in next 4 weeks    Dyslipidemia  Continue statin    Class 2 severe obesity due to excess calories with serious comorbidity and body mass index (BMI) of 35.0 to 35.9 in adult  The patient's BMI has been recorded in the chart. The patient has been provided educational materials regarding the benefits of attaining and maintaining a normal weight. We will continue to address and follow this issue during follow up visits.    Pulmonary HTN  Continue medical management

## 2022-04-29 ENCOUNTER — LAB VISIT (OUTPATIENT)
Dept: LAB | Facility: HOSPITAL | Age: 72
End: 2022-04-29
Attending: FAMILY MEDICINE
Payer: MEDICARE

## 2022-04-29 DIAGNOSIS — I10 ESSENTIAL HYPERTENSION: ICD-10-CM

## 2022-04-29 DIAGNOSIS — E78.5 DYSLIPIDEMIA: ICD-10-CM

## 2022-04-29 DIAGNOSIS — E11.51 TYPE II DIABETES MELLITUS WITH PERIPHERAL CIRCULATORY DISORDER: ICD-10-CM

## 2022-04-29 LAB
ALBUMIN SERPL BCP-MCNC: 2.9 G/DL (ref 3.5–5.2)
ALP SERPL-CCNC: 115 U/L (ref 55–135)
ALT SERPL W/O P-5'-P-CCNC: 16 U/L (ref 10–44)
ANION GAP SERPL CALC-SCNC: 6 MMOL/L (ref 8–16)
AST SERPL-CCNC: 15 U/L (ref 10–40)
BASOPHILS # BLD AUTO: 0.04 K/UL (ref 0–0.2)
BASOPHILS NFR BLD: 0.6 % (ref 0–1.9)
BILIRUB SERPL-MCNC: 0.3 MG/DL (ref 0.1–1)
BUN SERPL-MCNC: 17 MG/DL (ref 8–23)
CALCIUM SERPL-MCNC: 9.4 MG/DL (ref 8.7–10.5)
CHLORIDE SERPL-SCNC: 109 MMOL/L (ref 95–110)
CHOLEST SERPL-MCNC: 149 MG/DL (ref 120–199)
CHOLEST/HDLC SERPL: 3.8 {RATIO} (ref 2–5)
CO2 SERPL-SCNC: 26 MMOL/L (ref 23–29)
CREAT SERPL-MCNC: 0.9 MG/DL (ref 0.5–1.4)
DIFFERENTIAL METHOD: ABNORMAL
EOSINOPHIL # BLD AUTO: 0.5 K/UL (ref 0–0.5)
EOSINOPHIL NFR BLD: 6.5 % (ref 0–8)
ERYTHROCYTE [DISTWIDTH] IN BLOOD BY AUTOMATED COUNT: 15.6 % (ref 11.5–14.5)
EST. GFR  (AFRICAN AMERICAN): >60 ML/MIN/1.73 M^2
EST. GFR  (NON AFRICAN AMERICAN): >60 ML/MIN/1.73 M^2
ESTIMATED AVG GLUCOSE: 120 MG/DL (ref 68–131)
GLUCOSE SERPL-MCNC: 101 MG/DL (ref 70–110)
HBA1C MFR BLD: 5.8 % (ref 4–5.6)
HCT VFR BLD AUTO: 39.7 % (ref 37–48.5)
HDLC SERPL-MCNC: 39 MG/DL (ref 40–75)
HDLC SERPL: 26.2 % (ref 20–50)
HGB BLD-MCNC: 12.4 G/DL (ref 12–16)
IMM GRANULOCYTES # BLD AUTO: 0.02 K/UL (ref 0–0.04)
IMM GRANULOCYTES NFR BLD AUTO: 0.3 % (ref 0–0.5)
LDLC SERPL CALC-MCNC: 88.4 MG/DL (ref 63–159)
LYMPHOCYTES # BLD AUTO: 2.6 K/UL (ref 1–4.8)
LYMPHOCYTES NFR BLD: 36.7 % (ref 18–48)
MCH RBC QN AUTO: 26.4 PG (ref 27–31)
MCHC RBC AUTO-ENTMCNC: 31.2 G/DL (ref 32–36)
MCV RBC AUTO: 85 FL (ref 82–98)
MONOCYTES # BLD AUTO: 0.7 K/UL (ref 0.3–1)
MONOCYTES NFR BLD: 10.1 % (ref 4–15)
NEUTROPHILS # BLD AUTO: 3.2 K/UL (ref 1.8–7.7)
NEUTROPHILS NFR BLD: 45.8 % (ref 38–73)
NONHDLC SERPL-MCNC: 110 MG/DL
NRBC BLD-RTO: 0 /100 WBC
PLATELET # BLD AUTO: 251 K/UL (ref 150–450)
PMV BLD AUTO: 11.5 FL (ref 9.2–12.9)
POTASSIUM SERPL-SCNC: 4.4 MMOL/L (ref 3.5–5.1)
PROT SERPL-MCNC: 6.4 G/DL (ref 6–8.4)
RBC # BLD AUTO: 4.69 M/UL (ref 4–5.4)
SODIUM SERPL-SCNC: 141 MMOL/L (ref 136–145)
TRIGL SERPL-MCNC: 108 MG/DL (ref 30–150)
WBC # BLD AUTO: 6.95 K/UL (ref 3.9–12.7)

## 2022-04-29 PROCEDURE — 80061 LIPID PANEL: CPT | Performed by: FAMILY MEDICINE

## 2022-04-29 PROCEDURE — 80053 COMPREHEN METABOLIC PANEL: CPT | Performed by: FAMILY MEDICINE

## 2022-04-29 PROCEDURE — 83036 HEMOGLOBIN GLYCOSYLATED A1C: CPT | Performed by: FAMILY MEDICINE

## 2022-04-29 PROCEDURE — 36415 COLL VENOUS BLD VENIPUNCTURE: CPT | Mod: PN | Performed by: FAMILY MEDICINE

## 2022-04-29 PROCEDURE — 85025 COMPLETE CBC W/AUTO DIFF WBC: CPT | Performed by: FAMILY MEDICINE

## 2022-04-30 ENCOUNTER — EXTERNAL CHRONIC CARE MANAGEMENT (OUTPATIENT)
Dept: PRIMARY CARE CLINIC | Facility: CLINIC | Age: 72
End: 2022-04-30
Payer: MEDICARE

## 2022-04-30 PROCEDURE — 99490 CHRNC CARE MGMT STAFF 1ST 20: CPT | Mod: PBBFAC,PN | Performed by: FAMILY MEDICINE

## 2022-04-30 PROCEDURE — 99490 CHRNC CARE MGMT STAFF 1ST 20: CPT | Mod: S$PBB,,, | Performed by: FAMILY MEDICINE

## 2022-04-30 PROCEDURE — 99490 PR CHRONIC CARE MGMT, 1ST 20 MIN: ICD-10-PCS | Mod: S$PBB,,, | Performed by: FAMILY MEDICINE

## 2022-04-30 PROCEDURE — 99439 CHRNC CARE MGMT STAF EA ADDL: CPT | Mod: S$PBB,,, | Performed by: FAMILY MEDICINE

## 2022-04-30 PROCEDURE — 99439 PR CHRONIC CARE MGMT, EA ADDTL 20 MIN: ICD-10-PCS | Mod: S$PBB,,, | Performed by: FAMILY MEDICINE

## 2022-04-30 PROCEDURE — 99439 CHRNC CARE MGMT STAF EA ADDL: CPT | Mod: PBBFAC,PN | Performed by: FAMILY MEDICINE

## 2022-05-03 ENCOUNTER — CLINICAL SUPPORT (OUTPATIENT)
Dept: SMOKING CESSATION | Facility: CLINIC | Age: 72
End: 2022-05-03
Payer: COMMERCIAL

## 2022-05-03 ENCOUNTER — OFFICE VISIT (OUTPATIENT)
Dept: FAMILY MEDICINE | Facility: CLINIC | Age: 72
End: 2022-05-03
Payer: MEDICARE

## 2022-05-03 ENCOUNTER — TELEPHONE (OUTPATIENT)
Dept: SMOKING CESSATION | Facility: CLINIC | Age: 72
End: 2022-05-03
Payer: MEDICARE

## 2022-05-03 VITALS
HEIGHT: 62 IN | BODY MASS INDEX: 34.16 KG/M2 | OXYGEN SATURATION: 97 % | HEART RATE: 76 BPM | WEIGHT: 185.63 LBS | DIASTOLIC BLOOD PRESSURE: 74 MMHG | TEMPERATURE: 98 F | SYSTOLIC BLOOD PRESSURE: 128 MMHG | RESPIRATION RATE: 16 BRPM

## 2022-05-03 DIAGNOSIS — R80.1 PERSISTENT PROTEINURIA: ICD-10-CM

## 2022-05-03 DIAGNOSIS — Z12.12 ENCOUNTER FOR SCREENING FOR COLORECTAL CANCER IN HIGH RISK PATIENT: ICD-10-CM

## 2022-05-03 DIAGNOSIS — Z12.11 ENCOUNTER FOR SCREENING FOR COLORECTAL CANCER IN HIGH RISK PATIENT: ICD-10-CM

## 2022-05-03 DIAGNOSIS — I10 ESSENTIAL HYPERTENSION: ICD-10-CM

## 2022-05-03 DIAGNOSIS — F17.200 NICOTINE DEPENDENCE: Primary | ICD-10-CM

## 2022-05-03 DIAGNOSIS — Z91.89 ENCOUNTER FOR SCREENING FOR COLORECTAL CANCER IN HIGH RISK PATIENT: ICD-10-CM

## 2022-05-03 DIAGNOSIS — Z87.891 PERSONAL HISTORY OF NICOTINE DEPENDENCE: ICD-10-CM

## 2022-05-03 DIAGNOSIS — E11.51 TYPE II DIABETES MELLITUS WITH PERIPHERAL CIRCULATORY DISORDER: Primary | ICD-10-CM

## 2022-05-03 DIAGNOSIS — Z12.2 SCREENING FOR LUNG CANCER: ICD-10-CM

## 2022-05-03 DIAGNOSIS — Z86.010 HISTORY OF COLON POLYPS: ICD-10-CM

## 2022-05-03 PROCEDURE — 99407 PR TOBACCO USE CESSATION INTENSIVE >10 MINUTES: ICD-10-PCS | Mod: S$GLB,,,

## 2022-05-03 PROCEDURE — 99407 BEHAV CHNG SMOKING > 10 MIN: CPT | Mod: S$GLB,,,

## 2022-05-03 PROCEDURE — 99214 PR OFFICE/OUTPT VISIT, EST, LEVL IV, 30-39 MIN: ICD-10-PCS | Mod: S$PBB,,, | Performed by: FAMILY MEDICINE

## 2022-05-03 PROCEDURE — 99999 PR PBB SHADOW E&M-EST. PATIENT-LVL IV: CPT | Mod: PBBFAC,,, | Performed by: FAMILY MEDICINE

## 2022-05-03 PROCEDURE — 99999 PR PBB SHADOW E&M-EST. PATIENT-LVL I: ICD-10-PCS | Mod: PBBFAC,,,

## 2022-05-03 PROCEDURE — 99999 PR PBB SHADOW E&M-EST. PATIENT-LVL I: CPT | Mod: PBBFAC,,,

## 2022-05-03 PROCEDURE — 99999 PR PBB SHADOW E&M-EST. PATIENT-LVL IV: ICD-10-PCS | Mod: PBBFAC,,, | Performed by: FAMILY MEDICINE

## 2022-05-03 PROCEDURE — 99214 OFFICE O/P EST MOD 30 MIN: CPT | Mod: PBBFAC,PN | Performed by: FAMILY MEDICINE

## 2022-05-03 PROCEDURE — 84156 ASSAY OF PROTEIN URINE: CPT | Performed by: FAMILY MEDICINE

## 2022-05-03 PROCEDURE — 99214 OFFICE O/P EST MOD 30 MIN: CPT | Mod: S$PBB,,, | Performed by: FAMILY MEDICINE

## 2022-05-03 RX ORDER — CARVEDILOL 12.5 MG/1
TABLET ORAL
Qty: 180 TABLET | Refills: 3 | Status: SHIPPED | OUTPATIENT
Start: 2022-05-03 | End: 2022-08-10 | Stop reason: SDUPTHER

## 2022-05-03 NOTE — TELEPHONE ENCOUNTER
Smoking Cessation counselor attempted to contact patient regarding no show for follow-up appointment but patient did not answer.  Counselor left a message requesting a return call.     Erica Cherry RRT,MSW,LMSW,TTS  [phone]

## 2022-05-03 NOTE — PROGRESS NOTES
Subjective:       Patient ID: Stephanie Sears is a 72 y.o. female.    Chief Complaint: DM, HTN    HPI   72 year old female comes in for follow up on diet controlled DM, and HTN. Does not check sugars at home. She reports taking all medications as prescribed. Reports no medication side effects.    Review of Systems   Constitutional: Negative for fatigue.   Eyes: Negative for visual disturbance.   Respiratory: Negative for shortness of breath and wheezing.    Cardiovascular: Negative for chest pain.   Gastrointestinal: Negative for abdominal pain, constipation, diarrhea, nausea and vomiting.   Endocrine: Negative for polydipsia, polyphagia and polyuria.   Genitourinary: Negative for dysuria and hematuria.   Musculoskeletal: Positive for back pain.   Neurological: Negative for weakness.         Objective:      Physical Exam  Vitals reviewed.   Constitutional:       General: She is not in acute distress.     Appearance: She is well-developed. She is not diaphoretic.   HENT:      Head: Normocephalic and atraumatic.      Right Ear: Tympanic membrane, ear canal and external ear normal.      Left Ear: Tympanic membrane, ear canal and external ear normal.      Nose: Nose normal.   Eyes:      General:         Right eye: No discharge.         Left eye: No discharge.      Conjunctiva/sclera: Conjunctivae normal.      Pupils: Pupils are equal, round, and reactive to light.   Neck:      Thyroid: No thyromegaly.      Trachea: No tracheal deviation.   Cardiovascular:      Rate and Rhythm: Normal rate and regular rhythm.      Pulses:           Radial pulses are 2+ on the right side and 2+ on the left side.      Heart sounds: Normal heart sounds, S1 normal and S2 normal. No murmur heard.  Pulmonary:      Effort: Pulmonary effort is normal. No respiratory distress.      Breath sounds: Normal breath sounds. No wheezing, rhonchi or rales.   Abdominal:      General: Bowel sounds are normal. There is no distension.      Palpations:  Abdomen is soft. Abdomen is not rigid. There is no mass.      Tenderness: There is no abdominal tenderness. There is no guarding.   Musculoskeletal:      Cervical back: Normal range of motion and neck supple.   Lymphadenopathy:      Cervical: No cervical adenopathy.   Skin:     General: Skin is warm and dry.      Capillary Refill: Capillary refill takes less than 2 seconds.      Findings: No rash.   Neurological:      Mental Status: She is alert and oriented to person, place, and time.   Psychiatric:         Behavior: Behavior normal.         Office Visit on 05/03/2022   Component Date Value Ref Range Status    Protein, Urine Random 05/03/2022 414  mg/dL Final    Creatinine, Urine 05/03/2022 106.6  15.0 - 325.0 mg/dL Final    Prot/Creat Ratio, Urine 05/03/2022 3.88 (A) 0.00 - 0.20 Final   Lab Visit on 04/29/2022   Component Date Value Ref Range Status    Microalbumin, Urine 04/29/2022 2425.0  ug/mL Final    Creatinine, Urine 04/29/2022 113.0  15.0 - 325.0 mg/dL Final    Microalb/Creat Ratio 04/29/2022 2146.0 (A) 0.0 - 30.0 ug/mg Final   Lab Visit on 04/29/2022   Component Date Value Ref Range Status    WBC 04/29/2022 6.95  3.90 - 12.70 K/uL Final    RBC 04/29/2022 4.69  4.00 - 5.40 M/uL Final    Hemoglobin 04/29/2022 12.4  12.0 - 16.0 g/dL Final    Hematocrit 04/29/2022 39.7  37.0 - 48.5 % Final    MCV 04/29/2022 85  82 - 98 fL Final    MCH 04/29/2022 26.4 (A) 27.0 - 31.0 pg Final    MCHC 04/29/2022 31.2 (A) 32.0 - 36.0 g/dL Final    RDW 04/29/2022 15.6 (A) 11.5 - 14.5 % Final    Platelets 04/29/2022 251  150 - 450 K/uL Final    MPV 04/29/2022 11.5  9.2 - 12.9 fL Final    Immature Granulocytes 04/29/2022 0.3  0.0 - 0.5 % Final    Gran # (ANC) 04/29/2022 3.2  1.8 - 7.7 K/uL Final    Immature Grans (Abs) 04/29/2022 0.02  0.00 - 0.04 K/uL Final    Comment: Mild elevation in immature granulocytes is non specific and   can be seen in a variety of conditions including stress response,   acute  inflammation, trauma and pregnancy. Correlation with other   laboratory and clinical findings is essential.      Lymph # 04/29/2022 2.6  1.0 - 4.8 K/uL Final    Mono # 04/29/2022 0.7  0.3 - 1.0 K/uL Final    Eos # 04/29/2022 0.5  0.0 - 0.5 K/uL Final    Baso # 04/29/2022 0.04  0.00 - 0.20 K/uL Final    nRBC 04/29/2022 0  0 /100 WBC Final    Gran % 04/29/2022 45.8  38.0 - 73.0 % Final    Lymph % 04/29/2022 36.7  18.0 - 48.0 % Final    Mono % 04/29/2022 10.1  4.0 - 15.0 % Final    Eosinophil % 04/29/2022 6.5  0.0 - 8.0 % Final    Basophil % 04/29/2022 0.6  0.0 - 1.9 % Final    Differential Method 04/29/2022 Automated   Final    Sodium 04/29/2022 141  136 - 145 mmol/L Final    Potassium 04/29/2022 4.4  3.5 - 5.1 mmol/L Final    Chloride 04/29/2022 109  95 - 110 mmol/L Final    CO2 04/29/2022 26  23 - 29 mmol/L Final    Glucose 04/29/2022 101  70 - 110 mg/dL Final    BUN 04/29/2022 17  8 - 23 mg/dL Final    Creatinine 04/29/2022 0.9  0.5 - 1.4 mg/dL Final    Calcium 04/29/2022 9.4  8.7 - 10.5 mg/dL Final    Total Protein 04/29/2022 6.4  6.0 - 8.4 g/dL Final    Albumin 04/29/2022 2.9 (A) 3.5 - 5.2 g/dL Final    Total Bilirubin 04/29/2022 0.3  0.1 - 1.0 mg/dL Final    Comment: For infants and newborns, interpretation of results should be based  on gestational age, weight and in agreement with clinical  observations.    Premature Infant recommended reference ranges:  Up to 24 hours.............<8.0 mg/dL  Up to 48 hours............<12.0 mg/dL  3-5 days..................<15.0 mg/dL  6-29 days.................<15.0 mg/dL      Alkaline Phosphatase 04/29/2022 115  55 - 135 U/L Final    AST 04/29/2022 15  10 - 40 U/L Final    ALT 04/29/2022 16  10 - 44 U/L Final    Anion Gap 04/29/2022 6 (A) 8 - 16 mmol/L Final    eGFR if African American 04/29/2022 >60  >60 mL/min/1.73 m^2 Final    eGFR if non African American 04/29/2022 >60  >60 mL/min/1.73 m^2 Final    Comment: Calculation used to obtain the  estimated glomerular filtration  rate (eGFR) is the CKD-EPI equation.       Hemoglobin A1C 04/29/2022 5.8 (A) 4.0 - 5.6 % Final    Comment: ADA Screening Guidelines:  5.7-6.4%  Consistent with prediabetes  >or=6.5%  Consistent with diabetes    High levels of fetal hemoglobin interfere with the HbA1C  assay. Heterozygous hemoglobin variants (HbS, HgC, etc)do  not significantly interfere with this assay.   However, presence of multiple variants may affect accuracy.      Estimated Avg Glucose 04/29/2022 120  68 - 131 mg/dL Final    Cholesterol 04/29/2022 149  120 - 199 mg/dL Final    Comment: The National Cholesterol Education Program (NCEP) has set the  following guidelines (reference ranges) for Cholesterol:  Optimal.....................<200 mg/dL  Borderline High.............200-239 mg/dL  High........................> or = 240 mg/dL      Triglycerides 04/29/2022 108  30 - 150 mg/dL Final    Comment: The National Cholesterol Education Program (NCEP) has set the  following guidelines (reference values) for triglycerides:  Normal......................<150 mg/dL  Borderline High.............150-199 mg/dL  High........................200-499 mg/dL      HDL 04/29/2022 39 (A) 40 - 75 mg/dL Final    Comment: The National Cholesterol Education Program (NCEP) has set the  following guidelines (reference values) for HDL Cholesterol:  Low...............<40 mg/dL  Optimal...........>60 mg/dL      LDL Cholesterol 04/29/2022 88.4  63.0 - 159.0 mg/dL Final    Comment: The National Cholesterol Education Program (NCEP) has set the  following guidelines (reference values) for LDL Cholesterol:  Optimal.......................<130 mg/dL  Borderline High...............130-159 mg/dL  High..........................160-189 mg/dL  Very High.....................>190 mg/dL      HDL/Cholesterol Ratio 04/29/2022 26.2  20.0 - 50.0 % Final    Total Cholesterol/HDL Ratio 04/29/2022 3.8  2.0 - 5.0 Final    Non-HDL Cholesterol 04/29/2022 110   mg/dL Final    Comment: Risk category and Non-HDL cholesterol goals:  Coronary heart disease (CHD)or equivalent (10-year risk of CHD >20%):  Non-HDL cholesterol goal     <130 mg/dL  Two or more CHD risk factors and 10-year risk of CHD <= 20%:  Non-HDL cholesterol goal     <160 mg/dL  0 to 1 CHD risk factor:  Non-HDL cholesterol goal     <190 mg/dL     Office Visit on 04/18/2022   Component Date Value Ref Range Status    Color, UA 04/18/2022 Yellow   Final    pH, UA 04/18/2022 6   Final    WBC, UA 04/18/2022 Trace   Final    Nitrite, UA 04/18/2022 Neg   Final    Protein, POC 04/18/2022 +++   Final    Glucose, UA 04/18/2022 Normal   Final    Ketones, UA 04/18/2022 Negative   Final    Urobilinogen, UA 04/18/2022 Negative   Final    Bilirubin, POC 04/18/2022 Negative   Final    Blood, UA 04/18/2022 Negative   Final    Urine Culture, Routine 04/18/2022 No significant growth   Final       Assessment:       Problem List Items Addressed This Visit        Cardiac/Vascular    Essential hypertension       GI    History of colon polyps    Relevant Orders    Case Request Endoscopy: COLONOSCOPY (Completed)      Other Visit Diagnoses     Type II diabetes mellitus with peripheral circulatory disorder    -  Primary    Relevant Orders    Ambulatory referral/consult to Podiatry    Comprehensive Metabolic Panel    Hemoglobin A1C    Persistent proteinuria        Relevant Orders    Protein / creatinine ratio, urine (Completed)    Ambulatory referral/consult to Nephrology    Encounter for screening for colorectal cancer in high risk patient        Relevant Orders    Case Request Endoscopy: COLONOSCOPY (Completed)    Personal history of nicotine dependence        Relevant Orders    CT Chest Lung Screening Low Dose    Screening for lung cancer        Relevant Orders    CT Chest Lung Screening Low Dose          Plan:       Stephanie was seen today for annual exam.    Diagnoses and all orders for this visit:    Type II diabetes  mellitus with peripheral circulatory disorder  -     Ambulatory referral/consult to Podiatry; Future  -     Comprehensive Metabolic Panel; Future  -     Hemoglobin A1C; Future  Continue diet control    Essential hypertension  Current therapy effective, will continue    Persistent proteinuria  -     Protein / creatinine ratio, urine  -     Ambulatory referral/consult to Nephrology; Future  Will refer to nephrology for eval    Encounter for screening for colorectal cancer in high risk patient/History of colon polyps  -     Case Request Endoscopy: COLONOSCOPY    Personal history of nicotine dependence/Screening for lung cancer  -     CT Chest Lung Screening Low Dose; Future  Risks/benefits discussed  Will get LDCT

## 2022-05-03 NOTE — PROGRESS NOTES
Counselor spoke with patient telephonically to follow up on her smoking progress.  Name and date of birth verified as two patient identifiers.  Patient reported she is still smoking, but she is planning to become more dedicated to quitting. Counselor discussed behavior modification strategies with patient along with remaining consistent with the use of smoking cessation medications.  Patient verbalized understanding.  Follow-up visit scheduled in one week.  Counselor will remain available should any further needs arise.

## 2022-05-03 NOTE — TELEPHONE ENCOUNTER
Refill Authorization Note   Stephanie Orion  is requesting a refill authorization.  Brief Assessment and Rationale for Refill:  Approve     Medication Therapy Plan:       Medication Reconciliation Completed: No   Comments:     No Care Gaps recommended.     Note composed:3:31 PM 05/03/2022

## 2022-05-03 NOTE — TELEPHONE ENCOUNTER
No new care gaps identified.  Powered by [employer]. Reference number: [id number].   5/03/2022 12:12:02 AM CDT

## 2022-05-04 LAB
CREAT UR-MCNC: 106.6 MG/DL (ref 15–325)
PROT UR-MCNC: 414 MG/DL
PROT/CREAT UR: 3.88 MG/G{CREAT} (ref 0–0.2)

## 2022-05-09 ENCOUNTER — TELEPHONE (OUTPATIENT)
Dept: FAMILY MEDICINE | Facility: CLINIC | Age: 72
End: 2022-05-09
Payer: MEDICARE

## 2022-05-09 ENCOUNTER — HOSPITAL ENCOUNTER (OUTPATIENT)
Dept: RADIOLOGY | Facility: HOSPITAL | Age: 72
Discharge: HOME | End: 2022-05-09
Attending: FAMILY MEDICINE
Payer: MEDICARE

## 2022-05-09 DIAGNOSIS — Z87.891 PERSONAL HISTORY OF NICOTINE DEPENDENCE: ICD-10-CM

## 2022-05-09 DIAGNOSIS — Z12.2 SCREENING FOR LUNG CANCER: ICD-10-CM

## 2022-05-09 PROCEDURE — 71271 CT THORAX LUNG CANCER SCR C-: CPT | Mod: 26,,, | Performed by: RADIOLOGY

## 2022-05-09 PROCEDURE — 71271 CT CHEST LUNG SCREENING LOW DOSE: ICD-10-PCS | Mod: 26,,, | Performed by: RADIOLOGY

## 2022-05-09 PROCEDURE — 71271 CT THORAX LUNG CANCER SCR C-: CPT | Mod: TC

## 2022-05-09 NOTE — TELEPHONE ENCOUNTER
Patient notified of lab results, verbalized understanding. Instructed to contact office with any questions or concerns.   ----- Message from Williams Rossi Jr., MD sent at 5/9/2022  1:38 PM CDT -----  No signs concerning for lung cancer. Repeat CT scan in 12 months for annual screening

## 2022-05-09 NOTE — TELEPHONE ENCOUNTER
Patient notified of lab results, verbalized understanding. Instructed to contact office with any questions or concerns.   ----- Message from Williams Rossi Jr., MD sent at 5/7/2022 10:35 PM CDT -----  Urine test does confirm high protein level in urine. Please keep appt with kidney specialist

## 2022-05-11 ENCOUNTER — OFFICE VISIT (OUTPATIENT)
Dept: PULMONOLOGY | Facility: CLINIC | Age: 72
End: 2022-05-11
Payer: MEDICARE

## 2022-05-11 ENCOUNTER — CLINICAL SUPPORT (OUTPATIENT)
Dept: SMOKING CESSATION | Facility: CLINIC | Age: 72
End: 2022-05-11
Payer: COMMERCIAL

## 2022-05-11 VITALS
OXYGEN SATURATION: 99 % | HEART RATE: 58 BPM | BODY MASS INDEX: 33.9 KG/M2 | SYSTOLIC BLOOD PRESSURE: 146 MMHG | HEIGHT: 62 IN | WEIGHT: 184.19 LBS | DIASTOLIC BLOOD PRESSURE: 75 MMHG

## 2022-05-11 DIAGNOSIS — Z72.0 TOBACCO ABUSE: ICD-10-CM

## 2022-05-11 DIAGNOSIS — R06.09 DYSPNEA ON EXERTION: ICD-10-CM

## 2022-05-11 DIAGNOSIS — I27.20 PULMONARY HYPERTENSION: ICD-10-CM

## 2022-05-11 DIAGNOSIS — F17.200 NICOTINE DEPENDENCE: Primary | ICD-10-CM

## 2022-05-11 DIAGNOSIS — Z71.89 GOALS OF CARE, COUNSELING/DISCUSSION: ICD-10-CM

## 2022-05-11 DIAGNOSIS — G47.33 OSA (OBSTRUCTIVE SLEEP APNEA): Primary | ICD-10-CM

## 2022-05-11 PROCEDURE — 99215 OFFICE O/P EST HI 40 MIN: CPT | Mod: S$PBB,,, | Performed by: INTERNAL MEDICINE

## 2022-05-11 PROCEDURE — 99214 OFFICE O/P EST MOD 30 MIN: CPT | Mod: PBBFAC | Performed by: INTERNAL MEDICINE

## 2022-05-11 PROCEDURE — 99999 PR PBB SHADOW E&M-EST. PATIENT-LVL IV: ICD-10-PCS | Mod: PBBFAC,,, | Performed by: INTERNAL MEDICINE

## 2022-05-11 PROCEDURE — 99403 PREV MED CNSL INDIV APPRX 45: CPT | Mod: S$GLB,,,

## 2022-05-11 PROCEDURE — 99403 PR PREVENT COUNSEL,INDIV,45 MIN: ICD-10-PCS | Mod: S$GLB,,,

## 2022-05-11 PROCEDURE — 99215 PR OFFICE/OUTPT VISIT, EST, LEVL V, 40-54 MIN: ICD-10-PCS | Mod: S$PBB,,, | Performed by: INTERNAL MEDICINE

## 2022-05-11 PROCEDURE — 99999 PR PBB SHADOW E&M-EST. PATIENT-LVL IV: CPT | Mod: PBBFAC,,, | Performed by: INTERNAL MEDICINE

## 2022-05-11 PROCEDURE — 99999 PR PBB SHADOW E&M-EST. PATIENT-LVL II: ICD-10-PCS | Mod: PBBFAC,,,

## 2022-05-11 PROCEDURE — 99999 PR PBB SHADOW E&M-EST. PATIENT-LVL II: CPT | Mod: PBBFAC,,,

## 2022-05-11 NOTE — PROGRESS NOTES
Stephanie Sears  was seen as a follow up.      CHIEF COMPLAINT:    Chief Complaint   Patient presents with    Apnea       HISTORY OF PRESENT ILLNESS: Stephanie Sears is a 72 y.o. female is here for sleep evaluation.   Patient was diagnosed with jarrett in 8/17.  Patient was seen by KRZYSZTOF Olson.  Patient was started on cpap therapy 10 cm H20 with maykel salazar.  Patient was doing well with cpap for the first 7-8 months.  Our first encounter was 8/18.  Since 6/18, patient with 1-2 awakenings per night.  Can take up to 1 hour to go back to sleep.  Feeling rested upon awake.  +dry mouth upon awake.  During our initial visit, patient endorsed stress a/w her boyfriend x 40 years moved back to his home in [place].     Chronic knee pain s/p knee replacement.  Better since knee replacement.  Chronic knee pain.      [place] Sleepiness Scale score during initial sleep evaluation was 8 (with cpap).  Used to be 16 without cpap.    S/p psg 8/11/2017 with ahi of 6.  patient was set up with cpap 10 cm H20.   Patient sleeps alone.  She is not sure if she snores with cpap.  Overall, sleep is better when compared to sleeping without cpap.  No parasomnia.  No cataplexy.      During last encounter, patient endorsed elevated bp upon awake.  Overall, sleep is better with cpap. Since our last encounter, her boyfriend/common law  had moved back from [place].  Unfortunately, he suffered from massive stroke prior to moving back.  No dry mouth.  No nasal congestion.   Sleep maintenance has improved since last visit.  Patient is here to discuss about recall notification.        SLEEP ROUTINE:  Activity the hour prior to sleep: watch tv     Bed partner:  Alone   Time to bed:  9-10  PM   Lights off:  Gonzales light is on, TV is on   Sleep onset latency:  Watch tv until fall asleep; 20 minutes after putting cpap mask on.          Disruptions or awakenings:    1-2 times (no difficulty going back to sleep)    Wakeup time:      7 am   Perceived sleep  quality:  rested       Daytime naps:      Lay down 90 minutes but does not sleep  Weekend sleep routine:      same  Caffeine use: 2 cups of coffee in morning  exercise habit:   none      PAST MEDICAL HISTORY:    Active Ambulatory Problems     Diagnosis Date Noted    GERD (gastroesophageal reflux disease)     Primary osteoarthritis of both knees     Cervical spondylosis with radiculopathy 07/10/2012    Open angle with borderline findings, low risk - Both Eyes 10/18/2012    Nontoxic uninodular goiter 12/13/2010    Myopia with astigmatism and presbyopia - Both Eyes 08/12/2013    Diet-controlled diabetes mellitus 12/10/2013    Asymptomatic proteinuria 12/10/2013    Lumbar spondylosis 04/17/2014    Osteoarthritis of left hip, mild 04/17/2014    DDD (degenerative disc disease), cervical 08/21/2014    Obesity 02/23/2015    Essential hypertension 01/07/2016    Severe obesity (BMI 35.0-39.9) with comorbidity 01/12/2016    Tobacco abuse 01/12/2016    H/O scarlet fever 01/12/2016    Aortic valve sclerosis 01/12/2016    Pulmonary hypertension 01/12/2016    Diastolic dysfunction 01/14/2016    Status post total knee replacement, left 1/20/2016 02/02/2016    CMC arthritis 12/07/2016    Chronic midline thoracic back pain 09/15/2017    Primary osteoarthritis of right knee 09/15/2017    Hand pain, left 09/15/2017    JAM (obstructive sleep apnea) 12/06/2017    Personal history of spine surgery 02/09/2018    Fatty liver 02/09/2018    UTI (urinary tract infection) 02/09/2018    Status post total right knee replacement 2/26/2018 03/13/2018    Dyslipidemia 07/05/2018    Goals of care, counseling/discussion 07/05/2018    Bilateral carotid bruits 07/05/2018    Atherosclerosis of aortic arch 01/22/2019    Insomnia 03/11/2019    Lichen planus 03/27/2019    History of colon polyps 05/22/2019    Acute pancreatitis without infection or necrosis 10/17/2019    Arthritis of carpometacarpal (CMC) joint of left  thumb 12/02/2019    Greater trochanteric bursitis of both hips 12/02/2019    Controlled type 2 diabetes mellitus with stage 2 chronic kidney disease, with long-term current use of insulin 01/24/2020    Hypoalbuminemia due to protein-calorie malnutrition 01/24/2020    Nuclear sclerotic cataract of right eye 10/21/2020    Primary open-angle glaucoma, bilateral, mild stage 10/21/2020    S/P lumbar fusion 12/07/2020    DDD (degenerative disc disease), lumbar 12/07/2020    Postoperative care for cataract 12/09/2020    Bursitis, ischial, unspecified laterality 12/21/2020    Ischial bursitis 02/26/2021    Dyspnea on exertion 05/11/2022     Resolved Ambulatory Problems     Diagnosis Date Noted    Posterior vitreous detachment - Right Eye 10/18/2012    Nuclear sclerosis - Both Eyes 08/12/2013    Carpal tunnel syndrome, bilateral 08/13/2013    Chronic LBP 06/17/2014    Borderline glaucoma with ocular hypertension 07/21/2014    Dermatitis 01/07/2016    Cardiac murmur 01/12/2016    At risk for aspiration 01/12/2016    Mild aortic stenosis 01/14/2016    Primary osteoarthritis of left knee 01/20/2016    Annual physical exam 10/18/2016    Encounter for gynecological examination without abnormal finding 10/18/2016    Status post hysterectomy 10/18/2016    Menopausal state 10/18/2016    Hematuria, unspecified 10/18/2016    DM (diabetes mellitus screen) 10/18/2016    Hematuria, gross 10/31/2016     Past Medical History:   Diagnosis Date    Acute pancreatitis     Angina pectoris     Anxiety     Arthritis     Back pain     Bronchitis     Chest pain     Chronic neck pain 7/26/2012    Colon polyps     Coronary artery disease     Depression     Fibrocystic breast     Glaucoma     Heart failure     Hyperlipidemia     Hypertension     Neck pain 7/10/2012    Pneumonia     Pneumonia due to other staphylococcus     Psoriasis     Subacromial or subdeltoid bursitis 7/10/2012    Thyroid disease      Tobacco dependence     Trouble in sleeping     Type 2 diabetes mellitus 12/10/2013                PAST SURGICAL HISTORY:    Past Surgical History:   Procedure Laterality Date    BREAST BIOPSY Bilateral 1988    BREAST MASS EXCISION Right     benign    CATARACT EXTRACTION W/  INTRAOCULAR LENS IMPLANT Left 12/09/2020    PHACO IOL WITH I-STENT ()    CATARACT EXTRACTION W/  INTRAOCULAR LENS IMPLANT Right 10/21/2020    PHACO IOL WITH I-STENT x 2 ()    CHOLECYSTECTOMY      COLONOSCOPY N/A 5/22/2019    Procedure: COLONOSCOPY;  Surgeon: Darrin Calvin MD;  Location: [facility] (2ND FLR);  Service: Endoscopy;  Laterality: N/A;  2nd floor - all other procedure done on 2, multiple comorbidities - ERW/   change in MD schedule, Pt notified and verbalized understanding, new arrival time 0800, Ins mailed to Pt with new arrival time - ERW1/8/19@1130    EPIDURAL STEROID INJECTION Bilateral 2/26/2021    Procedure: Injection, Steroid, Ischial Bursa;  Surgeon: Yonatan Garsia Jr., MD;  Location: [facility] ENDO;  Service: Pain Management;  Laterality: Bilateral;  Bilateral Ischial Bursa Steroid Injections  Arrive @ 1230; No ATC; Check BG    HYSTERECTOMY  1980's    IMPLANTATION OF DEVICE FOR GLAUCOMA Right 10/21/2020    Procedure: INSERTION, DEVICE, FOR GLAUCOMA/ i Stent;  Surgeon: Melany Tse MD;  Location: [facility] OR [address] [place] [address];  Service: Ophthalmology;  Laterality: Right;    IMPLANTATION OF DEVICE FOR GLAUCOMA Left 12/9/2020    Procedure: INSERTION, DEVICE, FOR GLAUCOMA/I SENT;  Surgeon: Melany Tse MD;  Location: [facility] OR [address] [place] [address];  Service: Ophthalmology;  Laterality: Left;    INJECTION OF JOINT Bilateral 12/23/2021    Procedure: Injection, Joint---BILATERAL ISCHIAL BURSA STEROID INJECTION;  Surgeon: Yonatan aGrsia Jr., MD;  [facility] PAIN MGMT;  Service: Pain Management;  Laterality: Bilateral;    INTRAOCULAR PROSTHESES INSERTION Right 10/21/2020    Procedure: INSERTION,  IOL PROSTHESIS;  Surgeon: Melany Tse MD;  Location: [facility] OR [address] [place] [address];  Service: Ophthalmology;  Laterality: Right;    INTRAOCULAR PROSTHESES INSERTION Left 12/9/2020    Procedure: INSERTION, IOL PROSTHESIS;  Surgeon: Melany Tse MD;  Location: [facility] OR [address] [place] [address];  Service: Ophthalmology;  Laterality: Left;    KNEE SURGERY Left 1-20-16    TKR    KNEE SURGERY Right 02/26/2018    TKR    L4-L5 fusion  2006    PHACOEMULSIFICATION OF CATARACT Right 10/21/2020    Procedure: PHACOEMULSIFICATION, CATARACT;  Surgeon: Melany Tse MD;  Location: [facility] OR [address] [place] [address];  Service: Ophthalmology;  Laterality: Right;    PHACOEMULSIFICATION OF CATARACT Left 12/9/2020    Procedure: PHACOEMULSIFICATION, CATARACT;  Surgeon: Melany Tse MD;  Location: [facility] OR [address] [place] [address];  Service: Ophthalmology;  Laterality: Left;         FAMILY HISTORY:                Family History   Problem Relation Age of Onset    Diabetes Mother     Hypertension Mother         CHF, angina    Angina Mother     Kidney disease Mother     Heart disease Mother         CHF    Hypertension Father         glaucoma, Alzhiemer's , supra pubic    Alzheimer's disease Father     Glaucoma Father     Kidney disease Brother         kidney transplant, HTN,DM    Diabetes Brother     Glaucoma Sister     Hypertension Sister     Hyperlipidemia Sister     Gout Son     Hypertension Son     Diabetes Son     Sleep apnea Sister     Hypertension Sister     Thyroid disease Sister     No Known Problems Sister     No Known Problems Sister     Hepatitis Brother     Amblyopia Neg Hx     Blindness Neg Hx     Melanoma Neg Hx     Macular degeneration Neg Hx     Retinal detachment Neg Hx     Strabismus Neg Hx        SOCIAL HISTORY:          Tobacco:   Social History     Tobacco Use   Smoking Status Current Every Day Smoker    Packs/day: 1.00    Years: 40.00    Pack years: 40.00    Types: Cigarettes   Smokeless Tobacco Current User        alcohol use:    Social History     Substance and Sexual Activity   Alcohol Use No    Alcohol/week: 0.0 standard drinks                 Occupation: former     ALLERGIES:    Review of patient's allergies indicates:   Allergen Reactions    Hydrocodone Shortness Of Breath    Iodinated contrast- oral and iv dye Shortness Of Breath     Difficulty breathing    Adhesive Itching    Oxycodone      Hallucinations    Sulfa (sulfonamide antibiotics) Hives and Itching       CURRENT MEDICATIONS:    Current Outpatient Medications   Medication Sig Dispense Refill    albuterol (PROVENTIL/VENTOLIN HFA) 90 mcg/actuation inhaler Inhale 2 puffs into the lungs every 4 (four) hours as needed for Wheezing. Rescue 18 g 3    amLODIPine-benazepriL (LOTREL) 10-40 mg per capsule TAKE 1 CAPSULE DAILY 90 capsule 0    atorvastatin (LIPITOR) 20 MG tablet Take 1 tablet (20 mg total) by mouth once daily. 90 tablet 3    carvediloL (COREG) 12.5 MG tablet TAKE 1 TABLET TWICE A DAY WITH MEALS 180 tablet 3    cyclobenzaprine (FLEXERIL) 10 MG tablet Take 1 tablet (10 mg total) by mouth 3 (three) times daily as needed for Muscle spasms. 45 tablet 0    diphenhydrAMINE (BENADRYL) 25 mg capsule Take 25 mg by mouth every 6 (six) hours as needed for Itching or Allergies.      gabapentin (NEURONTIN) 600 MG tablet Take 1 tablet (600 mg total) by mouth 3 (three) times daily. 270 tablet 3    INULIN (FIBER GUMMIES ORAL) Take 1 each by mouth every morning.       lancets (ACCU-CHEK MULTICLIX LANCET) Misc Test blood sugar twice daily 300 each 3    naproxen (NAPROSYN) 500 MG tablet Take 1 tablet (500 mg total) by mouth 2 (two) times daily with meals. 60 tablet 0    nicotine (NICODERM CQ) 21 mg/24 hr Place 1 patch onto the skin once daily. 28 patch 0    nicotine, polacrilex, 2 mg lzmn Take 1 each (2 mg total) by mouth as needed (Take 1 piece as needed. Maximum dose of 10 per day.). 243 each 0    penicillin v potassium  "(VEETID) 500 MG tablet Take 500 mg by mouth 3 (three) times daily.      simethicone (GAS-X ORAL) Take 1 tablet by mouth as needed.        No current facility-administered medications for this visit.                  REVIEW OF SYSTEMS:     Sleep related symptoms as per HPI.  CONST:Denies weight gain; loosing weight    HEENT: no sinus congestion  PULM: + dyspnea x 2 block  CARD:  Denies palpitations   GI:  +occasional acid reflux  : Denies polyuria  NEURO: Denies headaches  PSYCH: Denies mood disturbance  HEME: Denies anemia   Otherwise, a balance of systems reviewed is negative.          PHYSICAL EXAM:  Vitals:    05/11/22 0806   BP: (!) 146/75   Pulse: (!) 58   SpO2: 99%   Weight: 83.6 kg (184 lb 3.1 oz)   Height: 5' 2" (1.575 m)   PainSc:   4   PainLoc: Hip     Body mass index is 33.69 kg/m².     GENERAL: Normal development, well groomed  HEENT:  Conjunctivae are non-erythematous; Pupils equal, round, and reactive to light; Nose is symmetrical; Nasal mucosa is pink and moist; Septum is midline; Inferior turbinates are normal; Nasal airflow is normal; Posterior pharynx is pink; Modified Mallampati: 4; Posterior palate is normal; Tonsils +1; Uvula is normal and pink;Tongue is normal; Dentition is fair; No TMJ tenderness; Jaw opening and protrusion without click and without discomfort.  NECK: Supple. Neck circumference is 13 inches. No thyromegaly. No palpable nodes.     SKIN: On face and neck: No abrasions, no rashes, no lesions.  No subcutaneous nodules are palpable.  RESPIRATORY: Chest is clear to auscultation.  Normal chest expansion and non-labored breathing at rest.  CARDIOVASCULAR: Normal S1, S2.  No murmurs, gallops or rubs. No carotid bruits bilaterally.  EXTREMITIES: No edema. No clubbing. No cyanosis. Station normal. Gait normal.        NEURO/PSYCH: Oriented to time, place and person. Normal attention span and concentration. Affect is full. Mood is normal.                                          "     DATA   PSG 8/11/17: AHI was 6.0 with an oxygen aye of 86.0%. The RERA index was 4.8 and RDI was 10.8 events per hour.   9/6/17 Ttiration study, effective supine REM cpap 10cm    Echo 1/13/16  CONCLUSIONS     1 - Normal left ventricular systolic function (EF 60-65%).     2 - Left ventricular diastolic dysfunction.     3 - Normal right ventricular systolic function .     4 - Mild left atrial enlargement.     5 - Mild tricuspid regurgitation.     6 - The estimated PA systolic pressure is 39 mmHg.     7 - Mild aortic stenosis, JOHN = 1.46 cm2, peak velocity = 2.7 m/s, mean gradient = 17.0 mmHg.    Lab Results   Component Value Date    TSH 0.708 05/29/2019     ASSESSMENT  Problem List Items Addressed This Visit     Dyspnea on exertion    Overview     diastolic dysfunction along with back and knee pain.  +smoking history.  Baseline pft.           Goals of care, counseling/discussion    Overview     Advance Care Planning      During this visit, I engaged the patient in the advance care planning process.  The patient and I reviewed the role for advance directives and their purpose in directing future healthcare if the patient's unable to speak for him/herself.  At this point in time, the patient does have full decision-making capacity.  We discussed different extreme health states that patient could experience, and reviewed what kind of medical care patient would want in those situations.  The patient communicated that if she was comatose and had little chance of a meaningful recovery, she would NOT want machines/life-sustaining treatments used.  In addition to the above preference, she patient  HAS NOT completed a living will to reflect these preferences.  The patient HAS already designated Smith Sears, grand daughter, as healthcare power of  to make decisions on her behalf.  In the case of cardiopulmonary arrest, patient does wish for CPR and cardioversion.        minutes spent discussing goc            JAM (obstructive sleep apnea) - Primary    Overview     -ahi of 6, rdi of 10.8.  Doing well with cpap of 10 cm H20.  92%>4 hours.  Residual ahi of 2.2.  Patient is benefit from cpap  -we discussed at length about Respironics recall.  Patient already will register her apap.  Information for respironics registration provided to patient.  Risk and benefits discussed.  Per patient, sleep quality improves with apap.  Cannot sleep without apap.  In light of uncertainty of replacement time line, patient elect to continue with apap.               Relevant Orders    CPAP/BIPAP SUPPLIES    Pulmonary hypertension    Overview     -pasp of 39.  -group 2 with diastolic dysfunction           Relevant Orders    Complete PFT with bronchodilator    Tobacco abuse    Overview     encourage tobacco cessation.  Patient is enrolled in smoking cessation.  Nicotine patch           Relevant Orders    Complete PFT with bronchodilator           Education: During our discussion today, we talked about the etiology of obstructive sleep apnea as well as the potential ramifications of untreated sleep apnea, which could include daytime sleepiness, hypertension, heart disease and/or stroke.     Precautions: The patient was advised to abstain from driving should they feel sleepy or drowsy.       Patient will No follow-ups on file. with md/np.    45 minutes of total time spent on the encounter, which includes face to face time and non-face to face time preparing to see the patient (eg, review of tests), Obtaining and/or reviewing separately obtained history, documenting clinical information in the electronic or other health record, independently interpreting results (not separately reported) and communicating results to the patient/family/caregiver, or Care coordination (not separately reported).    .

## 2022-05-11 NOTE — PROGRESS NOTES
Individual Follow-Up Form    5/11/2022    Quit Date: TBD     Clinical Status of Patient: Outpatient    Length of Service: 45 minutes    Continuing Medication: yes  Patches or Nicotine Lozenges    Other Medications: None      Target Symptoms: Withdrawal and medication side effects. The following were  rated moderate (3) to severe (4) on TCRS:  · Moderate (3): Crave and Desire   · Severe (4): None     Comments: Patient presented to clinic for follow-up visit, name and date of birth verified as two patient identifiers.   Patient reported she is still smoking about 12 CPD, and she is still using 21 mg nicotine patch in conjunction with 2 mg nicotine lozenge without any negative side effects reported at this time.  Patient also reported she is not wearing her patch everyday due to warmer weather.  Counselor reiterated the purpose and benefits of NRT with patient, and she verbalized understanding.  Counselor also discussed outlining behavior modification strategies, lapse and and relapse assessment.  Follow-up visit scheduled in one week.  Counselor will remain available should any further needs arise.      Diagnosis: F17.200    Next Visit: 1 week

## 2022-05-16 DIAGNOSIS — I10 ESSENTIAL HYPERTENSION: ICD-10-CM

## 2022-05-16 NOTE — TELEPHONE ENCOUNTER
No new care gaps identified.  [facility] Embedded Care Gaps. Reference number: [id number]. 5/16/2022   12:26:50 AM CDT

## 2022-05-17 RX ORDER — AMLODIPINE AND BENAZEPRIL HYDROCHLORIDE 10; 40 MG/1; MG/1
CAPSULE ORAL
Qty: 90 CAPSULE | Refills: 1 | Status: SHIPPED | OUTPATIENT
Start: 2022-05-17 | End: 2022-11-11

## 2022-05-17 NOTE — TELEPHONE ENCOUNTER
Refill Routing Note   Medication(s) are not appropriate for processing by [facility] for the following reason(s):      - Required vitals are abnormal    [facility] action(s):  Defer          Medication reconciliation completed: No     Appointments  past 12m or future 3m with PCP    Date Provider   Last Visit   5/3/2022 Williams Rossi Jr., MD   Next Visit   8/10/2022 Williams Rossi Jr., MD   ED visits in past 90 days: 0        Note composed:10:39 PM 05/16/2022

## 2022-05-18 ENCOUNTER — OFFICE VISIT (OUTPATIENT)
Dept: PAIN MEDICINE | Facility: CLINIC | Age: 72
End: 2022-05-18
Payer: MEDICARE

## 2022-05-18 VITALS
BODY MASS INDEX: 33.65 KG/M2 | SYSTOLIC BLOOD PRESSURE: 152 MMHG | TEMPERATURE: 98 F | DIASTOLIC BLOOD PRESSURE: 74 MMHG | WEIGHT: 184 LBS | HEART RATE: 73 BPM | OXYGEN SATURATION: 98 %

## 2022-05-18 DIAGNOSIS — M47.816 LUMBAR SPONDYLOSIS: ICD-10-CM

## 2022-05-18 DIAGNOSIS — M51.36 DDD (DEGENERATIVE DISC DISEASE), LUMBAR: ICD-10-CM

## 2022-05-18 DIAGNOSIS — Z98.1 S/P LUMBAR FUSION: ICD-10-CM

## 2022-05-18 DIAGNOSIS — M70.61 GREATER TROCHANTERIC BURSITIS OF BOTH HIPS: Primary | ICD-10-CM

## 2022-05-18 DIAGNOSIS — M70.62 GREATER TROCHANTERIC BURSITIS OF BOTH HIPS: Primary | ICD-10-CM

## 2022-05-18 PROCEDURE — 99999 PR PBB SHADOW E&M-EST. PATIENT-LVL III: CPT | Mod: PBBFAC,,, | Performed by: PAIN MEDICINE

## 2022-05-18 PROCEDURE — 99999 PR PBB SHADOW E&M-EST. PATIENT-LVL III: ICD-10-PCS | Mod: PBBFAC,,, | Performed by: PAIN MEDICINE

## 2022-05-18 PROCEDURE — 20610 DRAIN/INJ JOINT/BURSA W/O US: CPT | Mod: S$PBB,LT,, | Performed by: PAIN MEDICINE

## 2022-05-18 PROCEDURE — 20610 PR DRAIN/INJECT LARGE JOINT/BURSA: ICD-10-PCS | Mod: S$PBB,LT,, | Performed by: PAIN MEDICINE

## 2022-05-18 PROCEDURE — 99214 OFFICE O/P EST MOD 30 MIN: CPT | Mod: 25,S$PBB,, | Performed by: PAIN MEDICINE

## 2022-05-18 PROCEDURE — 99213 OFFICE O/P EST LOW 20 MIN: CPT | Mod: PBBFAC,PN,25 | Performed by: PAIN MEDICINE

## 2022-05-18 PROCEDURE — 99214 PR OFFICE/OUTPT VISIT, EST, LEVL IV, 30-39 MIN: ICD-10-PCS | Mod: 25,S$PBB,, | Performed by: PAIN MEDICINE

## 2022-05-18 PROCEDURE — 20610 DRAIN/INJ JOINT/BURSA W/O US: CPT | Mod: PBBFAC,PN | Performed by: PAIN MEDICINE

## 2022-05-18 RX ORDER — METHYLPREDNISOLONE ACETATE 40 MG/ML
40 INJECTION, SUSPENSION INTRA-ARTICULAR; INTRALESIONAL; INTRAMUSCULAR; SOFT TISSUE
Status: COMPLETED | OUTPATIENT
Start: 2022-05-18 | End: 2022-05-18

## 2022-05-18 RX ADMIN — METHYLPREDNISOLONE ACETATE 40 MG: 40 INJECTION, SUSPENSION INTRA-ARTICULAR; INTRALESIONAL; INTRAMUSCULAR; SOFT TISSUE at 08:05

## 2022-05-18 NOTE — PROGRESS NOTES
Subjective:     Patient ID: Stephanie Sears is a 72 y.o. female    Chief Complaint: Follow-up, Low-back Pain, and Hip Pain (Left hip )      Referred by: No ref. provider found      HPI:    Interval History (5/18/22):  She returns today for follow up.  She reports that physical therapy has been helpful for the low back pain.  She states the low back is currently doing well and she is happy with his progress.  Today, she mainly complains of left lateral hip pain consistent with trochanteric bursitis.  Has had injections for this in the past that have helped.  She would like repeat injection today.      Interval History (3/23/22):  She returns today for follow up.  She reports that she has completed physical therapy for her neck pain.  She states this issue was doing well.  Today she wishes discuss her low back pain.  This pain is located the midline lower lumbar/lumbosacral region.  The pain will radiate to the bilateral lumbar paraspinal regions and bilateral posterior thighs.  The pain does not cross the knees.  The pain is worse with standing/walking.  She denies any associated numbness, tingling, weakness, bowel bladder dysfunction.        Interval History (1/6/22):  She returns today for follow up.  She reports that bilateral ischial bursa steroid injections has been helpful for the bilateral buttock/hip pain.  She reports 60% relief following this procedure.  Today she wishes to discuss her neck pain.  She has had neck pain intermittently for years.  The pain is located the midline mid to lower cervical region.  The pain does not radiate.  She denies any associated numbness, tingling, weakness, bowel bladder dysfunction.  She would like to attend additional physical therapy for the management of her neck pain.      Interval History (12/14/21):  She returns today for follow up.  She reports that ischial bursa pain has returned.  She reports near complete relief following bilateral ischial bursa steroid  injection done in February 2021. She got at least 9 months of good relief from this procedure.  She states the pain has since returned.  She denies any changes in the quality location the pain.  She denies any new or worsening symptoms.        Interval History NP (3/16/21):    Pt returns today for follow up. She states that the bilateral ischial bursa injections have relieved nearly all of the bilateral buttock pain.  She is very happy with the results at this time.     Interval History (2/19/21):  She returns today for follow up.  She reports that she continues to have bilateral buttock pain particular when sitting.  She denies any changes in the quality location was pain.  She denies any new worsening symptoms.  She has been attending physical therapy and finds that it has been helpful.  She states that her pain is still quite severe especially given recent cold weather.        Interval History (12/21/20):  She returns today for follow up and imaging review.  She reports that she continues to have pain mainly surrounding the bilateral hip girdles.  She states that she has focal pain in the bilateral gluteal regions when sitting upright.  She also has pain in the bilateral lateral hip regions.  She has undergone multiple greater trochanteric bursa steroid injections in the past.  These were initially effective, but gradually provided less relief with subsequent injections.  She continues to deny any significant low back pain.        Initial Encounter (12/7/20):  Stephanie Sears is a 72 y.o. female who presents today with chronic bilateral low back pain.  This pain has been present for years.  Patient is status post L4-5 fusion in 2006.  She states that she did have some pain radiating into her lower extremities in the past, but her current pain is isolated to the bilateral lower lumbar/lumbosacral regions.  She denies any associated numbness, tingling, weakness, bowel bladder dysfunction.  The pain is constant  worse with prolonged sitting and standing..   This pain is described in detail below.    Physical Therapy:  Yes.    Non-pharmacologic Treatment:  Rest helps         · TENS?  No    Pain Medications:         · Currently taking:  gabapentin, Flexeril, naproxen    · Has tried in the past:  Tramadol, NSAIDs, Tylenol, meloxicam    · Has not tried:  TCAs, SNRIs, topical creams    Blood thinners:  None    Interventional Therapies:    2/26/21 - bilateral ischial bursa injections - 90% relief for at least 9 months  12/23/21 - bilateral ischial bursa steroid injections - 60% relief    Relevant Surgeries:   L4-5 fusion in 2006    Affecting sleep?  Yes    Affecting daily activities? yes    Depressive symptoms? no          · SI/HI? No    Work status: Retired    Pain Scores:    Best:       4/10  Worst:     9/10  Usually:   7/10  Today:    7/10    Review of Systems   Constitutional: Negative for activity change, appetite change, chills, fatigue, fever and unexpected weight change.   HENT: Negative for hearing loss.    Eyes: Negative for visual disturbance.   Respiratory: Negative for chest tightness and shortness of breath.    Cardiovascular: Negative for chest pain.   Gastrointestinal: Negative for abdominal pain, constipation, diarrhea, nausea and vomiting.   Genitourinary: Negative for difficulty urinating.   Musculoskeletal: Positive for arthralgias, back pain, gait problem, myalgias, neck pain and neck stiffness.   Skin: Negative for rash.   Neurological: Negative for dizziness, weakness, light-headedness, numbness and headaches.   Psychiatric/Behavioral: Positive for sleep disturbance. Negative for hallucinations and suicidal ideas. The patient is not nervous/anxious.        Past Medical History:   Diagnosis Date    Acute pancreatitis     Angina pectoris     Anxiety     Arthritis     Back pain     Bronchitis     Cervical spondylosis with radiculopathy 7/10/2012    Chest pain     Chronic neck pain 7/26/2012    Colon  polyps     Controlled type 2 diabetes mellitus with stage 2 chronic kidney disease, with long-term current use of insulin 1/24/2020    Coronary artery disease     Depression     Fibrocystic breast     GERD (gastroesophageal reflux disease)     Glaucoma     Heart failure     Hyperlipidemia     Hypertension     Neck pain 7/10/2012    Obesity     JAM (obstructive sleep apnea)     Pneumonia     Pneumonia due to other staphylococcus     Primary osteoarthritis of both knees     Psoriasis     Subacromial or subdeltoid bursitis 7/10/2012    Thyroid disease     Tobacco dependence     Trouble in sleeping     Type 2 diabetes mellitus 12/10/2013       Past Surgical History:   Procedure Laterality Date    BREAST BIOPSY Bilateral 1988    BREAST MASS EXCISION Right     benign    CATARACT EXTRACTION W/  INTRAOCULAR LENS IMPLANT Left 12/09/2020    PHACO IOL WITH I-STENT ()    CATARACT EXTRACTION W/  INTRAOCULAR LENS IMPLANT Right 10/21/2020    PHACO IOL WITH I-STENT x 2 ()    CHOLECYSTECTOMY      COLONOSCOPY N/A 5/22/2019    Procedure: COLONOSCOPY;  Surgeon: Darrin Calvin MD;  Location: [facility] ([address] [place] [address]);  Service: Endoscopy;  Laterality: N/A;  2nd floor - all other procedure done on 2, multiple comorbidities - ERW/   change in MD schedule, Pt notified and verbalized understanding, new arrival time 0800, Ins mailed to Pt with new arrival time - ERW1/8/19@1130    EPIDURAL STEROID INJECTION Bilateral 2/26/2021    Procedure: Injection, Steroid, Ischial Bursa;  Surgeon: Yonatan Garsia Jr., MD;  Location: [facility];  Service: Pain Management;  Laterality: Bilateral;  Bilateral Ischial Bursa Steroid Injections  Arrive @ 1230; No ATC; Check BG    HYSTERECTOMY  1980's    IMPLANTATION OF DEVICE FOR GLAUCOMA Right 10/21/2020    Procedure: INSERTION, DEVICE, FOR GLAUCOMA/ i Stent;  Surgeon: Melany Tse MD;  Location: [address];  Service: Ophthalmology;  Laterality:  Right;    IMPLANTATION OF DEVICE FOR GLAUCOMA Left 12/9/2020    Procedure: INSERTION, DEVICE, FOR GLAUCOMA/I SENT;  Surgeon: Melany Tse MD;  Location: [facility] OR [address] [place] [address];  Service: Ophthalmology;  Laterality: Left;    INJECTION OF JOINT Bilateral 12/23/2021    Procedure: Injection, Joint---BILATERAL ISCHIAL BURSA STEROID INJECTION;  Surgeon: Yonatan Garsia Jr., MD;  Location: [facility];  Service: Pain Management;  Laterality: Bilateral;    INTRAOCULAR PROSTHESES INSERTION Right 10/21/2020    Procedure: INSERTION, IOL PROSTHESIS;  Surgeon: Melany Tse MD;  Location: [address];  Service: Ophthalmology;  Laterality: Right;    INTRAOCULAR PROSTHESES INSERTION Left 12/9/2020    Procedure: INSERTION, IOL PROSTHESIS;  Surgeon: Melany Tse MD;  Location: [facility] OR [address] [place] [address];  Service: Ophthalmology;  Laterality: Left;    KNEE SURGERY Left 1-20-16    TKR    KNEE SURGERY Right 02/26/2018    TKR    L4-L5 fusion  2006    PHACOEMULSIFICATION OF CATARACT Right 10/21/2020    Procedure: PHACOEMULSIFICATION, CATARACT;  Surgeon: Melany Tse MD;  Location: [address];  Service: Ophthalmology;  Laterality: Right;    PHACOEMULSIFICATION OF CATARACT Left 12/9/2020    Procedure: PHACOEMULSIFICATION, CATARACT;  Surgeon: Melany Tse MD;  Location: [address];  Service: Ophthalmology;  Laterality: Left;       Social History     Socioeconomic History    Marital status: Single   Tobacco Use    Smoking status: Current Every Day Smoker     Packs/day: 1.00     Years: 40.00     Pack years: 40.00     Types: Cigarettes    Smokeless tobacco: Current User   Substance and Sexual Activity    Alcohol use: No     Alcohol/week: 0.0 standard drinks    Drug use: No    Sexual activity: Yes     Partners: Male       Review of patient's allergies indicates:   Allergen Reactions    Hydrocodone Shortness Of Breath    Iodinated contrast media Shortness Of Breath     Difficulty  breathing    Adhesive Itching     Skin peeling    Morphine Hallucinations    Oxycodone Hallucinations    Sulfa (sulfonamide antibiotics) Hives and Itching       Current Outpatient Medications on File Prior to Visit   Medication Sig Dispense Refill    albuterol (PROVENTIL/VENTOLIN HFA) 90 mcg/actuation inhaler Inhale 2 puffs into the lungs every 4 (four) hours as needed for Wheezing. Rescue 18 g 3    amLODIPine-benazepriL (LOTREL) 10-40 mg per capsule TAKE 1 CAPSULE DAILY 90 capsule 1    atorvastatin (LIPITOR) 20 MG tablet Take 1 tablet (20 mg total) by mouth once daily. 90 tablet 3    carvediloL (COREG) 12.5 MG tablet TAKE 1 TABLET TWICE A DAY WITH MEALS 180 tablet 3    cyclobenzaprine (FLEXERIL) 10 MG tablet Take 1 tablet (10 mg total) by mouth 3 (three) times daily as needed for Muscle spasms. 45 tablet 0    diphenhydrAMINE (BENADRYL) 25 mg capsule Take 25 mg by mouth every 6 (six) hours as needed for Itching or Allergies.      gabapentin (NEURONTIN) 600 MG tablet Take 1 tablet (600 mg total) by mouth 3 (three) times daily. 270 tablet 3    INULIN (FIBER GUMMIES ORAL) Take 1 each by mouth every morning.       lancets (ACCU-CHEK MULTICLIX LANCET) Misc Test blood sugar twice daily 300 each 3    naproxen (NAPROSYN) 500 MG tablet Take 1 tablet (500 mg total) by mouth 2 (two) times daily with meals. 60 tablet 0    nicotine (NICODERM CQ) 21 mg/24 hr Place 1 patch onto the skin once daily. 28 patch 0    nicotine, polacrilex, 2 mg lzmn Take 1 each (2 mg total) by mouth as needed (Take 1 piece as needed. Maximum dose of 10 per day.). 243 each 0    penicillin v potassium (VEETID) 500 MG tablet Take 500 mg by mouth 3 (three) times daily.      simethicone (GAS-X ORAL) Take 1 tablet by mouth as needed.       [DISCONTINUED] meloxicam (MOBIC) 7.5 MG tablet TAKE 1 TABLET DAILY 90 tablet 3     No current facility-administered medications on file prior to visit.       Objective:      BP (!) 152/74 (BP Location:  Right arm, Patient Position: Sitting, BP Method: Medium (Automatic))   Pulse 73   Temp 97.9 °F (36.6 °C) (Skin)   Wt 83.5 kg (184 lb)   SpO2 98%   BMI 33.65 kg/m²     Exam:  GEN:  Well developed, well nourished.  No acute distress.   HEENT:  No trauma.  Mucous membranes moist.  Nares patent bilaterally.  PSYCH: Normal affect. Thought content appropriate.  CHEST:  Breathing symmetric.  No audible wheezing.  ABD: Soft, non-distended.  SKIN:  Warm, pink, dry.  No rash on exposed areas.    EXT:  No cyanosis, clubbing, or edema.  No color change or changes in nail or hair growth.  NEURO/MUSCULOSKELETAL:  Fully alert, oriented, and appropriate. Speech normal janneth. No cranial nerve deficits.   Gait:  Antalgic.  No focal motor deficits.       Tender to palpation over the left greater trochanter              Imaging:      Narrative & Impression    EXAMINATION:  MRI LUMBAR SPINE WITHOUT CONTRAST     CLINICAL HISTORY:  s/p lumbar fusion; Other intervertebral disc degeneration, lumbar region     TECHNIQUE:  Multiplanar, multisequence MR images of the lumbar spine were performed without the administration of contrast.     COMPARISON:  Lumbar spine radiograph 01/23/2019; CT abdomen contrast 11/16/2012; MRI lumbar spine 10/26/2007     FINDINGS:  Alignment: Grade 1 anterolisthesis of L3 on L4.  Grade 1 retrolisthesis of L5 on S1.     Vertebrae: Postoperative changes from previous L4-5 posterior spinal fusion with bilateral transpedicular screws and posterior stabilizing bars and interbody spacer.  Normal marrow signal. No fracture.     Discs: Mild multilevel intervertebral disc height loss and desiccation.     Cord: Normal.  Conus terminates normally at L2.  Cauda equina appears normal.     Degenerative findings:     T10-T11: Diffuse disc bulge and bilateral facet arthropathy resulting in mild central canal stenosis, moderate left, and mild right neural foraminal narrowing.     T11-T12: Diffuse disc bulge and bilateral  facet arthropathy resulting in moderate bilateral neural foraminal narrowing and mild central canal stenosis.     T12-L1: No significant disc abnormality.  Mild bilateral facet arthropathy without significant neural foraminal narrowing or central canal stenosis.     L1-L2: No significant disc abnormality.  Mild bilateral facet arthropathy without significant neural foraminal narrowing or central canal stenosis.     L2-L3: No significant disc abnormality.  Mild bilateral facet arthropathy without significant neural foraminal narrowing or central canal stenosis.     L3-L4: Diffuse disc bulge, moderate bilateral facet joint osseous hypertrophy, and ligamentum flavum buckling contribute to moderate central canal stenosis, moderate right, and mild left neural foraminal narrowing.     L4-L5: Postoperative change of previous decompression and posterior spinal fusion.  Productive changes contribute to mild left neural foraminal narrowing.     L5-S1: Diffuse disc bulge, bilateral facet arthropathy, and ligamentum flavum buckling resulting in severe left and moderate right neural foraminal narrowing and mild central canal stenosis.     Paraspinal muscles & soft tissues: Mild fatty atrophy of the lower paraspinous musculature.  No paraspinal fluid collections.  Upper sacrum and sacroiliac joints are unremarkable.     Impression:     1. Postsurgical changes of L4-L5 fusion.  2. Multilevel degenerative changes as detailed above.  Moderate spinal canal stenosis noted at L3-L4.  Moderate neural foraminal narrowing noted at L3-L4 and L5-S1, as well as within the lower thoracic spine.     Electronically signed by resident: Naveen Resendez  Date:                                            12/17/2020  Time:                                           09:53     Electronically signed by: Mitch Pappas MD  Date:                                            12/17/2020  Time:                                           14:49           Narrative &  Impression    EXAMINATION:  XR LUMBAR SPINE 5 VIEW WITH FLEX AND EXT     CLINICAL HISTORY:  Low back pain, cauda equina syndrome suspected;  Lumbago with sciatica, left side     TECHNIQUE:  Five views of the lumbar spine plus flexion extension views were performed.     COMPARISON:  02/25/2014     FINDINGS:  The lumbar vertebra are intact.  No compression fracture or obvious paraspinal lesion is detected.  Degenerative changes are present with small osteophytes at most levels.  There is progressive disc space narrowing at L3-4; this level also shows development of a grade 1 anterolisthesis, stable between flexion and extension.  The other lumbar disc spaces show no significant narrowing, but lower thoracic disc spaces are narrowed, some with vacuum disc.     Postoperative findings from posterior instrumented fusion are again seen at L4 and 5 with bilateral pedicle screws, vertical connecting rods, and a device in the disc space.     Visualized abdomen shows aortic atherosclerosis.     IMPRESSION:      Stable postoperative findings of L4-5 fusion     Degenerative spine changes with interval worsening at L3-4.        Electronically signed by: Valentín Booker MD  Date:                                            01/23/2019  Time:                                           10:16         Assessment:       Encounter Diagnoses   Name Primary?    Greater trochanteric bursitis of both hips Yes    S/P lumbar fusion     Lumbar spondylosis     DDD (degenerative disc disease), lumbar          Plan:       Stephanie was seen today for follow-up, low-back pain and hip pain.    Diagnoses and all orders for this visit:    Greater trochanteric bursitis of both hips  -     methylPREDNISolone acetate injection 40 mg    S/P lumbar fusion    Lumbar spondylosis    DDD (degenerative disc disease), lumbar        Stephanie Sears is a 72 y.o. female with chronic bilateral low back pain.  Pain appears to be axial.  History of L4-5 fusion in  2006.  May have lower lumbar facet pain primarily at the L5-S1 level.  Lumbar facet degeneration noted on lumbar MRI mainly at the L3-4 level but also at the L5-S1 level.  Patient also with spinal stenosis at the L3-4 and L5-S1 levels.  Also with bilateral foraminal stenosis at the L5-S1 levels and right foraminal stenosis at the L3-4 level.  Symptoms not overly consistent with radiculopathy at this time.  She does have some symptoms somewhat consistent with neurogenic claudication.  Getting good relief with physical therapy.  Also now with recurrent left trochanteric bursitis    1.  Left greater trochanteric bursa steroid injection today.  2.  Continue physical therapy/home exercise program.  If additional physical therapy orders are needed for continuation of therapy, then we can place a new referral.  3.  Return to clinic as needed.      left Greater Trochanteric bursa Steroid Injection     Risks vs. Benefits were discussed with patient. Patient expressed understanding and written consent was obtained. The left greater trochanter was palpated and marked.  The lateral hip(s) was/were draped and prepped with chrolaprep. A 27 gauge 1.5 inch needle was advanced to the left greater trochanter. After negative aspiration, 40 mg of Depo-Medrol and 2.0 cc of 1% lidocaine were injected.    The patient tolerated the procedure well, without any complications or bleeding. She was discharged from clinic in good condition.

## 2022-05-19 ENCOUNTER — TELEPHONE (OUTPATIENT)
Dept: PAIN MEDICINE | Facility: CLINIC | Age: 72
End: 2022-05-19
Payer: MEDICARE

## 2022-05-19 NOTE — TELEPHONE ENCOUNTER
----- Message from Katt Acevedo sent at 5/19/2022  4:31 PM CDT -----  Type: Patient Call Back    Who called: Self    What is the request in detail: sent physical therapy to the wrong place.  She goes to [facility]. Please send orders there    Can the clinic reply by [facility]?no    Would the patient rather a call back or a response via [facility]? yes    Best call back number: ..[phone] (home)

## 2022-05-19 NOTE — TELEPHONE ENCOUNTER
Spoke with patient and let her know that the orders for PT with [facility] were faxed back to [facility] earlier today. Pt verbalized understanding, nothing further discussed.

## 2022-05-23 ENCOUNTER — OFFICE VISIT (OUTPATIENT)
Dept: PODIATRY | Facility: CLINIC | Age: 72
End: 2022-05-23
Payer: MEDICARE

## 2022-05-23 VITALS — BODY MASS INDEX: 33.86 KG/M2 | WEIGHT: 184 LBS | HEIGHT: 62 IN

## 2022-05-23 DIAGNOSIS — L84 CORN OR CALLUS: ICD-10-CM

## 2022-05-23 DIAGNOSIS — M20.40 HAMMER TOE, UNSPECIFIED LATERALITY: Primary | ICD-10-CM

## 2022-05-23 DIAGNOSIS — E11.51 TYPE II DIABETES MELLITUS WITH PERIPHERAL CIRCULATORY DISORDER: ICD-10-CM

## 2022-05-23 DIAGNOSIS — B35.1 ONYCHOMYCOSIS DUE TO DERMATOPHYTE: ICD-10-CM

## 2022-05-23 PROCEDURE — 99999 PR PBB SHADOW E&M-EST. PATIENT-LVL III: ICD-10-PCS | Mod: PBBFAC,,, | Performed by: PODIATRIST

## 2022-05-23 PROCEDURE — 99999 PR PBB SHADOW E&M-EST. PATIENT-LVL III: CPT | Mod: PBBFAC,,, | Performed by: PODIATRIST

## 2022-05-23 PROCEDURE — 99213 OFFICE O/P EST LOW 20 MIN: CPT | Mod: PBBFAC,PO | Performed by: PODIATRIST

## 2022-05-23 PROCEDURE — 99204 OFFICE O/P NEW MOD 45 MIN: CPT | Mod: S$PBB,,, | Performed by: PODIATRIST

## 2022-05-23 PROCEDURE — 99204 PR OFFICE/OUTPT VISIT, NEW, LEVL IV, 45-59 MIN: ICD-10-PCS | Mod: S$PBB,,, | Performed by: PODIATRIST

## 2022-05-23 RX ORDER — CICLOPIROX 80 MG/ML
SOLUTION TOPICAL NIGHTLY
Qty: 6.6 ML | Refills: 11 | Status: ON HOLD | OUTPATIENT
Start: 2022-05-23 | End: 2022-09-26 | Stop reason: HOSPADM

## 2022-05-23 RX ORDER — AMMONIUM LACTATE 12 G/100G
CREAM TOPICAL
Qty: 140 G | Refills: 11 | Status: ON HOLD | OUTPATIENT
Start: 2022-05-23 | End: 2022-09-26 | Stop reason: HOSPADM

## 2022-05-23 NOTE — PROGRESS NOTES
Subjective:      Patient ID: Stephanie Sears is a 72 y.o. female.    Chief Complaint: Diabetes Mellitus (PCP  05/03/2022), Diabetic Foot Exam, and Ingrown Toenail (Left foot, Great toe)    Stephanie is a 72 y.o. female who presents to the clinic for evaluation and treatment of high risk feet. Stephanie has a past medical history of Acute pancreatitis, Angina pectoris, Anxiety, Arthritis, Back pain, Bronchitis, Cervical spondylosis with radiculopathy (7/10/2012), Chest pain, Chronic neck pain (7/26/2012), Colon polyps, Controlled type 2 diabetes mellitus with stage 2 chronic kidney disease, with long-term current use of insulin (1/24/2020), Coronary artery disease, Depression, Fibrocystic breast, GERD (gastroesophageal reflux disease), Glaucoma, Heart failure, Hyperlipidemia, Hypertension, Neck pain (7/10/2012), Obesity, JAM (obstructive sleep apnea), Pneumonia, Pneumonia due to other staphylococcus, Primary osteoarthritis of both knees, Psoriasis, Subacromial or subdeltoid bursitis (7/10/2012), Thyroid disease, Tobacco dependence, Trouble in sleeping, and Type 2 diabetes mellitus (12/10/2013). The patient's chief complaint is long, thick toenails. Gradual onset, worsening over past several weeks, aggravated by increased weight bearing, shoe gear, pressure.  No previous medical treatment.  OTC pain med not helping.  .    PCP: Williams Rossi Jr, MD    Date Last Seen by PCP:   Chief Complaint   Patient presents with    Diabetes Mellitus     PCP  05/03/2022    Diabetic Foot Exam    Ingrown Toenail     Left foot, Great toe         Current shoe gear:  Affected Foot: Casual shoes     Unaffected Foot: Casual shoes    Hemoglobin A1C   Date Value Ref Range Status   04/29/2022 5.8 (H) 4.0 - 5.6 % Final     Comment:     ADA Screening Guidelines:  5.7-6.4%  Consistent with prediabetes  >or=6.5%  Consistent with diabetes    High levels of fetal hemoglobin interfere with the HbA1C  assay. Heterozygous hemoglobin  variants (HbS, HgC, etc)do  not significantly interfere with this assay.   However, presence of multiple variants may affect accuracy.     09/13/2021 6.0 (H) 4.0 - 5.6 % Final     Comment:     ADA Screening Guidelines:  5.7-6.4%  Consistent with prediabetes  >or=6.5%  Consistent with diabetes    High levels of fetal hemoglobin interfere with the HbA1C  assay. Heterozygous hemoglobin variants (HbS, HgC, etc)do  not significantly interfere with this assay.   However, presence of multiple variants may affect accuracy.     03/12/2021 5.9 (H) 4.0 - 5.6 % Final     Comment:     ADA Screening Guidelines:  5.7-6.4%  Consistent with prediabetes  >or=6.5%  Consistent with diabetes    High levels of fetal hemoglobin interfere with the HbA1C  assay. Heterozygous hemoglobin variants (HbS, HgC, etc)do  not significantly interfere with this assay.   However, presence of multiple variants may affect accuracy.         Review of Systems   Constitutional: Negative for chills, diaphoresis, fever, malaise/fatigue and night sweats.   Cardiovascular: Negative for claudication, cyanosis, leg swelling and syncope.   Skin: Positive for nail changes and suspicious lesions. Negative for color change, dry skin, rash and unusual hair distribution.   Musculoskeletal: Negative for falls, joint pain, joint swelling, muscle cramps, muscle weakness and stiffness.   Gastrointestinal: Negative for constipation, diarrhea, nausea and vomiting.   Neurological: Positive for sensory change. Negative for brief paralysis, disturbances in coordination, focal weakness, numbness, paresthesias and tremors.           Objective:      Physical Exam  Constitutional:       General: She is not in acute distress.     Appearance: She is well-developed. She is not diaphoretic.   Cardiovascular:      Pulses:           Popliteal pulses are 2+ on the right side and 2+ on the left side.        Dorsalis pedis pulses are 2+ on the right side and 2+ on the left side.         Posterior tibial pulses are 2+ on the right side and 2+ on the left side.      Comments: Capillary refill 3 seconds all toes/distal feet, all toes/both feet warm to touch.      Negative lymphadenopathy bilateral popliteal fossa and tarsal tunnel.      Negavie lower extremity edema bilateral.    Musculoskeletal:      Right ankle: No swelling, deformity, ecchymosis or lacerations. Normal range of motion. Normal pulse.      Right Achilles Tendon: Normal. No defects. Patrick's test negative.      Comments: Patient has hammertoes of digits   2-4 bilateral                partially reducible without symptom today.'    Otherwise, Normal angle, base, station of gait. All ten toes without clubbing, cyanosis, or signs of ischemia.  No pain to palpation bilateral lower extremities.  Range of motion, stability, muscle strength, and muscle tone normal bilateral feet and legs.    Lymphadenopathy:      Lower Body: No right inguinal adenopathy. No left inguinal adenopathy.      Comments: Negative lymphadenopathy bilateral popliteal fossa and tarsal tunnel.    Negative lymphangitic streaking bilateral feet/ankles/legs.   Skin:     General: Skin is warm and dry.      Capillary Refill: Capillary refill takes 2 to 3 seconds.      Coloration: Skin is not pale.      Findings: No abrasion, bruising, burn, ecchymosis, erythema, laceration, lesion or rash.      Nails: There is no clubbing.      Comments: Focal hyperkeratotic lesion consisting entirely of hyperkeratotic tissue without open skin, drainage, pus, fluctuance, malodor, or signs of infection dorsal 2nd pipj bilateral.    Otehrwise, Skin is normal age and health appropriate color, turgor, texture, and temperature bilateral lower extremities without ulceration, hyperpigmentation, discoloration, masses nodules or cords palpated.  No ecchymosis, erythema, edema, or cardinal signs of infection bilateral lower extremities.    Toenails 1st, 2nd, 3rd, 4th, 5th  bilateral are hypertrophic  thickened 2-3 mm, dystrophic, discolored tanish brown with tan, gray crumbly subungual debris.  Long, not tender to distal nail plate pressure, without periungual skin abnormality of each.        Neurological:      Mental Status: She is alert and oriented to person, place, and time.      Sensory: Sensory deficit present.      Motor: No tremor, atrophy or abnormal muscle tone.      Gait: Gait normal.      Deep Tendon Reflexes:      Reflex Scores:       Patellar reflexes are 2+ on the right side and 2+ on the left side.       Achilles reflexes are 2+ on the right side and 2+ on the left side.     Comments: Decreased/absent vibratory sensation bilateral feet to 128Hz tuning fork.    Otherwise, Negative tinel sign to percussion sural, superficial peroneal, deep peroneal, saphenous, and posterior tibial nerves right and left ankles and feet.    Psychiatric:         Behavior: Behavior is cooperative.               Assessment:       Encounter Diagnoses   Name Primary?    Type II diabetes mellitus with peripheral circulatory disorder     Hammer toe, unspecified laterality Yes    Corn or callus     Onychomycosis due to dermatophyte          Plan:       Stephanie was seen today for diabetes mellitus, diabetic foot exam and ingrown toenail.    Diagnoses and all orders for this visit:    Hammer toe, unspecified laterality  -      DIABETES FOOT EXAM  -     DIABETIC SHOES FOR HOME USE    Type II diabetes mellitus with peripheral circulatory disorder  -     Ambulatory referral/consult to Podiatry  -      DIABETES FOOT EXAM  -     DIABETIC SHOES FOR HOME USE    Corn or callus  -      DIABETES FOOT EXAM  -     DIABETIC SHOES FOR HOME USE    Onychomycosis due to dermatophyte  -      DIABETES FOOT EXAM  -     DIABETIC SHOES FOR HOME USE    Other orders  -     ammonium lactate 12 % Crea; Apply twice daily to affected parts both feet as needed.  -     ciclopirox (PENLAC) 8 % Soln; Apply topically nightly.      I counseled the  patient on her conditions, their implications and medical management.        - Shoe inspection. Diabetic Foot Education. Patient reminded of the importance of good nutrition and blood sugar control to help prevent podiatric complications of diabetes. Patient instructed on proper foot hygeine. We discussed wearing proper shoe gear, daily foot inspections, never walking without protective shoe gear, never putting sharp instruments to feet, routine podiatric visits at least annually.      Discussed conservative treatment with shoes of adequate dimensions, material, and style to alleviate symptoms and delay or prevent surgical intervention.    Rx DM shoes, penlac, lac hydrin    Declines non covered foot care  Very angry that this is not a covered service with her current findings.          No follow-ups on file.

## 2022-05-24 ENCOUNTER — TELEPHONE (OUTPATIENT)
Dept: SMOKING CESSATION | Facility: CLINIC | Age: 72
End: 2022-05-24
Payer: MEDICARE

## 2022-05-24 ENCOUNTER — CLINICAL SUPPORT (OUTPATIENT)
Dept: SMOKING CESSATION | Facility: CLINIC | Age: 72
End: 2022-05-24
Payer: COMMERCIAL

## 2022-05-24 DIAGNOSIS — F17.200 NICOTINE DEPENDENCE: Primary | ICD-10-CM

## 2022-05-24 PROCEDURE — 99999 PR PBB SHADOW E&M-EST. PATIENT-LVL I: ICD-10-PCS | Mod: PBBFAC,,,

## 2022-05-24 PROCEDURE — 99403 PREV MED CNSL INDIV APPRX 45: CPT | Mod: S$GLB,,,

## 2022-05-24 PROCEDURE — 99403 PR PREVENT COUNSEL,INDIV,45 MIN: ICD-10-PCS | Mod: S$GLB,,,

## 2022-05-24 PROCEDURE — 99999 PR PBB SHADOW E&M-EST. PATIENT-LVL I: CPT | Mod: PBBFAC,,,

## 2022-05-24 NOTE — PROGRESS NOTES
Individual Follow-Up Form    5/24/2022    Quit Date:     Clinical Status of Patient: Outpatient    Length of Service: 45 minutes    Continuing Medication: yes  Patches or Nicotine Lozenges    Other Medications: None      Target Symptoms: Withdrawal and medication side effects. The following were  rated moderate (3) to severe (4) on TCRS:  · Moderate (3): Crave and  Desire   · Severe (4): None     Comments: Patient presented to clinic for follow-up visit, name and date of birth verified as two patient identifiers.   Patient reported she is still smoking about 9 CPD, and she is still using 21 mg nicotine patch in conjunction with 2 mg nicotine lozenge without any negative side effects reported at this time.  Patient also reported most of the time she is not smoking the entire cigarette.  Counselor discussed behavior modification strategies, selecting a quit date,  and conflict resolution. Follow-up visit scheduled in one week.  Counselor will remain available should any further needs arise.      Diagnosis: F17.200    Next Visit: 1 week

## 2022-05-24 NOTE — TELEPHONE ENCOUNTER
Counselor attempted to contact patient for scheduled telephonic visit, but patient did not answer.  Counselor left a message, awaiting return call.      Erica Cherry RRT, MSW, LMSW, TTS  [phone]

## 2022-05-30 ENCOUNTER — CLINICAL SUPPORT (OUTPATIENT)
Dept: SMOKING CESSATION | Facility: CLINIC | Age: 72
End: 2022-05-30
Payer: COMMERCIAL

## 2022-05-30 DIAGNOSIS — F17.200 NICOTINE DEPENDENCE: Primary | ICD-10-CM

## 2022-05-30 PROCEDURE — 99404 PR PREVENT COUNSEL,INDIV,60 MIN: ICD-10-PCS | Mod: S$GLB,,,

## 2022-05-30 PROCEDURE — 99404 PREV MED CNSL INDIV APPRX 60: CPT | Mod: S$GLB,,,

## 2022-05-30 PROCEDURE — 99999 PR PBB SHADOW E&M-EST. PATIENT-LVL II: ICD-10-PCS | Mod: PBBFAC,,,

## 2022-05-30 PROCEDURE — 99999 PR PBB SHADOW E&M-EST. PATIENT-LVL II: CPT | Mod: PBBFAC,,,

## 2022-05-30 NOTE — PROGRESS NOTES
Individual Follow-Up Form    5/30/2022    Quit Date: TBD     Clinical Status of Patient: Outpatient    Length of Service: 60 minutes    Continuing Medication: yes  Patches or Nicotine Lozenges    Other Medications: None      Target Symptoms: Withdrawal and medication side effects. The following were  rated moderate (3) to severe (4) on TCRS:  · Moderate (3): Crave and Desire   · Severe (4): None     Comments: Counselor spoke with patient telephonically for follow-up visit, name and date of birth verified as two patient identifiers.   Patient reported she is still smoking about 9 CPD, and she is still using 21 mg nicotine patch in conjunction with 2 mg nicotine lozenge without any negative side effects reported at this time.  Counselor reviewed behavior modification strategies and attempting to quit prior to benefits end date.   Follow-up visit scheduled in two weeks.  Counselor will remain available should any further needs arise.      Diagnosis: F17.200    Next Visit: 2 weeks

## 2022-05-31 ENCOUNTER — EXTERNAL CHRONIC CARE MANAGEMENT (OUTPATIENT)
Dept: PRIMARY CARE CLINIC | Facility: CLINIC | Age: 72
End: 2022-05-31
Payer: MEDICARE

## 2022-05-31 ENCOUNTER — OFFICE VISIT (OUTPATIENT)
Dept: PODIATRY | Facility: CLINIC | Age: 72
End: 2022-05-31
Payer: MEDICARE

## 2022-05-31 DIAGNOSIS — E11.51 TYPE II DIABETES MELLITUS WITH PERIPHERAL CIRCULATORY DISORDER: Primary | ICD-10-CM

## 2022-05-31 DIAGNOSIS — M20.5X2 HALLUX LIMITUS, ACQUIRED, LEFT: ICD-10-CM

## 2022-05-31 DIAGNOSIS — L84 CORN OR CALLUS: ICD-10-CM

## 2022-05-31 DIAGNOSIS — M20.40 HAMMER TOE, UNSPECIFIED LATERALITY: ICD-10-CM

## 2022-05-31 DIAGNOSIS — B35.1 ONYCHOMYCOSIS DUE TO DERMATOPHYTE: ICD-10-CM

## 2022-05-31 DIAGNOSIS — M20.5X1 HALLUX LIMITUS, ACQUIRED, RIGHT: ICD-10-CM

## 2022-05-31 PROCEDURE — 99999 PR PBB SHADOW E&M-EST. PATIENT-LVL II: ICD-10-PCS | Mod: PBBFAC,,, | Performed by: PODIATRIST

## 2022-05-31 PROCEDURE — 99999 PR PBB SHADOW E&M-EST. PATIENT-LVL II: CPT | Mod: PBBFAC,,, | Performed by: PODIATRIST

## 2022-05-31 PROCEDURE — 99213 OFFICE O/P EST LOW 20 MIN: CPT | Mod: S$PBB,,, | Performed by: PODIATRIST

## 2022-05-31 PROCEDURE — 99490 CHRNC CARE MGMT STAFF 1ST 20: CPT | Mod: S$PBB,,, | Performed by: FAMILY MEDICINE

## 2022-05-31 PROCEDURE — 99212 OFFICE O/P EST SF 10 MIN: CPT | Mod: PBBFAC,PO,25 | Performed by: PODIATRIST

## 2022-05-31 PROCEDURE — 99213 PR OFFICE/OUTPT VISIT, EST, LEVL III, 20-29 MIN: ICD-10-PCS | Mod: S$PBB,,, | Performed by: PODIATRIST

## 2022-05-31 PROCEDURE — 99490 PR CHRONIC CARE MGMT, 1ST 20 MIN: ICD-10-PCS | Mod: S$PBB,,, | Performed by: FAMILY MEDICINE

## 2022-05-31 PROCEDURE — 99490 CHRNC CARE MGMT STAFF 1ST 20: CPT | Mod: 27,PBBFAC,PN | Performed by: FAMILY MEDICINE

## 2022-05-31 NOTE — PROGRESS NOTES
Subjective:      Patient ID: Stephanie Sears is a 72 y.o. female.    Chief Complaint: No chief complaint on file.    Stephanie is a 72 y.o. female who presents to the clinic for evaluation and treatment of high risk feet. Stephanie has a past medical history of Acute pancreatitis, Angina pectoris, Anxiety, Arthritis, Back pain, Bronchitis, Cervical spondylosis with radiculopathy (7/10/2012), Chest pain, Chronic neck pain (7/26/2012), Colon polyps, Controlled type 2 diabetes mellitus with stage 2 chronic kidney disease, with long-term current use of insulin (1/24/2020), Coronary artery disease, Depression, Fibrocystic breast, GERD (gastroesophageal reflux disease), Glaucoma, Heart failure, Hyperlipidemia, Hypertension, Neck pain (7/10/2012), Obesity, JAM (obstructive sleep apnea), Pneumonia, Pneumonia due to other staphylococcus, Primary osteoarthritis of both knees, Psoriasis, Subacromial or subdeltoid bursitis (7/10/2012), Thyroid disease, Tobacco dependence, Trouble in sleeping, and Type 2 diabetes mellitus (12/10/2013). The patient's chief complaint is long, thick toenails. Gradual onset, worsening over past several weeks, aggravated by increased weight bearing, shoe gear, pressure.  Seen by my colleage last week an prescribed penlac.    PCP: Williams Rossi Jr, MD    Date Last Seen by PCP:   No chief complaint on file.      Current shoe gear:  Sketchers, received shoe rx last week    Hemoglobin A1C   Date Value Ref Range Status   04/29/2022 5.8 (H) 4.0 - 5.6 % Final     Comment:     ADA Screening Guidelines:  5.7-6.4%  Consistent with prediabetes  >or=6.5%  Consistent with diabetes    High levels of fetal hemoglobin interfere with the HbA1C  assay. Heterozygous hemoglobin variants (HbS, HgC, etc)do  not significantly interfere with this assay.   However, presence of multiple variants may affect accuracy.     09/13/2021 6.0 (H) 4.0 - 5.6 % Final     Comment:     ADA Screening Guidelines:  5.7-6.4%  Consistent with  prediabetes  >or=6.5%  Consistent with diabetes    High levels of fetal hemoglobin interfere with the HbA1C  assay. Heterozygous hemoglobin variants (HbS, HgC, etc)do  not significantly interfere with this assay.   However, presence of multiple variants may affect accuracy.     03/12/2021 5.9 (H) 4.0 - 5.6 % Final     Comment:     ADA Screening Guidelines:  5.7-6.4%  Consistent with prediabetes  >or=6.5%  Consistent with diabetes    High levels of fetal hemoglobin interfere with the HbA1C  assay. Heterozygous hemoglobin variants (HbS, HgC, etc)do  not significantly interfere with this assay.   However, presence of multiple variants may affect accuracy.         Review of Systems   Constitutional: Negative for chills, diaphoresis, fever, malaise/fatigue and night sweats.   Cardiovascular: Negative for claudication, cyanosis, leg swelling and syncope.   Skin: Positive for nail changes and suspicious lesions. Negative for color change, dry skin, rash and unusual hair distribution.   Musculoskeletal: Negative for falls, joint pain, joint swelling, muscle cramps, muscle weakness and stiffness.   Gastrointestinal: Negative for constipation, diarrhea, nausea and vomiting.   Neurological: Positive for sensory change. Negative for brief paralysis, disturbances in coordination, focal weakness, numbness, paresthesias and tremors.           Objective:       There were no vitals filed for this visit.    Physical Exam  Constitutional:       General: She is not in acute distress.     Appearance: She is well-developed. She is not diaphoretic.   Cardiovascular:      Pulses:           Popliteal pulses are 2+ on the right side and 2+ on the left side.        Dorsalis pedis pulses are 2+ on the right side and 2+ on the left side.        Posterior tibial pulses are 2+ on the right side and 2+ on the left side.      Comments: Capillary refill 3 seconds all toes/distal feet, all toes/both feet warm to touch.      Negative lymphadenopathy  bilateral popliteal fossa and tarsal tunnel.      Negavie lower extremity edema bilateral.    Musculoskeletal:      Right ankle: No swelling, deformity, ecchymosis or lacerations. Normal range of motion. Normal pulse.      Right Achilles Tendon: Normal. No defects. Patrick's test negative.      Comments: Patient has hammertoes of digits   2-4 bilateral                partially reducible without symptom today.'    Otherwise, Normal angle, base, station of gait. All ten toes without clubbing, cyanosis, or signs of ischemia.  No pain to palpation bilateral lower extremities.  Range of motion, stability, muscle strength, and muscle tone normal bilateral feet and legs.    Lymphadenopathy:      Lower Body: No right inguinal adenopathy. No left inguinal adenopathy.      Comments: Negative lymphadenopathy bilateral popliteal fossa and tarsal tunnel.    Negative lymphangitic streaking bilateral feet/ankles/legs.   Skin:     General: Skin is warm and dry.      Capillary Refill: Capillary refill takes 2 to 3 seconds.      Coloration: Skin is not pale.      Findings: No abrasion, bruising, burn, ecchymosis, erythema, laceration, lesion or rash.      Nails: There is no clubbing.      Comments: Focal hyperkeratotic lesion consisting entirely of hyperkeratotic tissue without open skin, drainage, pus, fluctuance, malodor, or signs of infection dorsal 2nd pipj bilateral.    Otehrwise, Skin is normal age and health appropriate color, turgor, texture, and temperature bilateral lower extremities without ulceration, hyperpigmentation, discoloration, masses nodules or cords palpated.  No ecchymosis, erythema, edema, or cardinal signs of infection bilateral lower extremities.    Toenails 1st, 2nd, 3rd, 4th, 5th  bilateral are hypertrophic thickened 2-3 mm, dystrophic, discolored tanish brown with tan, gray crumbly subungual debris.  Long, not tender to distal nail plate pressure, without periungual skin abnormality of each.         Neurological:      Mental Status: She is alert and oriented to person, place, and time.      Sensory: Sensory deficit present.      Motor: No tremor, atrophy or abnormal muscle tone.      Gait: Gait normal.      Deep Tendon Reflexes:      Reflex Scores:       Patellar reflexes are 2+ on the right side and 2+ on the left side.       Achilles reflexes are 2+ on the right side and 2+ on the left side.     Comments: Decreased/absent vibratory sensation bilateral feet to 128Hz tuning fork.    Otherwise, Negative tinel sign to percussion sural, superficial peroneal, deep peroneal, saphenous, and posterior tibial nerves right and left ankles and feet.    Psychiatric:         Behavior: Behavior is cooperative.               Assessment:       Encounter Diagnoses   Name Primary?    Type II diabetes mellitus with peripheral circulatory disorder Yes    Onychomycosis due to dermatophyte     Corn or callus     Hammer toe, unspecified laterality     Hallux limitus, acquired, right     Hallux limitus, acquired, left          Plan:       Diagnoses and all orders for this visit:    Type II diabetes mellitus with peripheral circulatory disorder    Onychomycosis due to dermatophyte    Corn or callus    Hammer toe, unspecified laterality    Hallux limitus, acquired, right    Hallux limitus, acquired, left      I counseled the patient on her conditions, their implications and medical management.      Greater than 50% of this visit spent on counseling and coordination of care.    Greater than 20 minutes spent on education about the diabetic foot, neuropathy, and prevention of limb loss.    Shoe inspection. Diabetic Foot Education. Patient reminded of the importance of good nutrition/healthy diet/weight management and blood sugar control to help prevent podiatric complications of diabetes. Patient instructed on proper foot hygeine. Wear comfortable, proper fitting shoes. Wash feet daily. Dry well. After drying, apply moisturizer to  feet (no lotion to webspaces). Inspect feet daily for skin breaks, blisters, swelling, or redness. Wear cotton socks (preferably white)  Change socks every day. Do NOT walk barefoot. Do NOT use heating pads or hot water soaks. We discussed wearing proper shoe gear, daily foot inspections, never walking without protective shoe gear.     Discussed edema control and the importance of daily moisturizer to the feet such as Gold bonds diabetic foot cream    Recommend applying vicks vaporub to thick abnormal toenails daily x 6 months to treat fungal nail infection.    Discussed all treatment options such as topical, oral, laser treatments vs surgical removal of nail permanent vs non-permanent in detail pertaining to nail fungus. Instructed patient on the importance of keeping fungus off of skin while treating nails. Patient instructed to use absorbent cotton socks and change them if they become sweaty; or wear an open-toe shoe or sandal. Wash the feet at least once a day with soap and water. Patient wants to try topical.Instructed patient that it takes time for symptoms to completely dissipate. Patient instructed to use lysol or over-the-counter antifungal powders or sprays to shoes daily and allow them to air dry, switching shoes from every other day would be optimal. Patient is to avoid barefoot walking in  high-risk environments (public showers, gyms and locker rooms) may prevent future infections.     Counseled patient on the effects of smoking on healing. She verbalizes understanding that smoking and/or history of smoking has an adverse effect on circulation an can increase the chances of delayed healing and this prolonged exposure leads to infection or progression of infection.    she will continue to monitor the areas daily, inspect her feet, wear protective shoe gear when ambulatory, moisturizer to maintain skin integrity and follow in this office in approximately 12 months, sooner p.r.n.          Follow up in  about 1 year (around 5/31/2023).

## 2022-05-31 NOTE — PATIENT INSTRUCTIONS
Over the counter pain creams: Voltaren Gel, Biofreeze, Bengay, tiger balm, two old goat, lidocaine gel,  Absorbine Veterinary Liniment Gel Topical Analgesic Sore Muscle and Joint Pain Relief    Recommend lotions: eucerin, eucerin for diabetics, aquaphor, A&D ointment, gold bond for diabetics, sween, [place]'s Bees all purpose baby ointment,  urea 40 with aloe (found on amazon.com)    Shoe recommendations: (try 6pm.com, zappos.com , nordstromrack[employer], or shoes.com for discounted prices) you can visit [employer] shoes in [place]  or [employer] [facility] in the [facility] (there are also several shoe brand outlets in the [facility])    Asics (GT 2000 or gel foundations), new balance stability type shoes (such as the 940 series), saucony (stabil c3),  Alamo (GTS or Beast or transcend), propet (tennis shoe)    Francesco Eric (women) Cherry&Moise (men), clarks, crocs, aerosoles, naturalizers, SAS, ecco, born, meghana hernandez, rockports (dress shoes)    Vionic, burkenstocks, fitflops, propet (sandals)  Nike comfort thong sandals, crocs, propet (house shoes)    Nail Home remedy:  Vicks Vapor rub or Emuaid to nails for easier manageability    Diabetes: Inspecting Your Feet  Diabetes increases your chances of developing foot problems. So inspect your feet every day. This helps you find small skin irritations before they become serious infections. If you have trouble seeing the bottoms of your feet, use a mirror or ask a family member or friend to help.     Pressure spots on the bottom of the foot are common areas where problems develop.   How to check your feet  Below are tips to help you look for foot problems. Try to check your feet at the same time each day, such as when you get out of bed in the morning:  Check the top of each foot. The tops of toes, back of the heel, and outer edge of the foot can get a lot of rubbing from poor-fitting shoes.  Check the bottom of each foot. Daily wear and tear often leads to problems at pressure spots.  Check  the toes and nails. Fungal infections often occur between toes. Toenail problems can also be a sign of fungal infections or lead to breaks in the skin.  Check your shoes, too. Loose objects inside a shoe can injure the foot. Use your hand to feel inside your shoes for things like néstor, loose stitching, or rough areas that could irritate your skin.  Warning signs  Look for any color changes in the foot. Redness with streaks can signal a severe infection, which needs immediate medical attention. Tell your doctor right away if you have any of these problems:  Swelling, sometimes with color changes, may be a sign of poor blood flow or infection. Symptoms include tenderness and an increase in the size of your foot.  Warm or hot areas on your feet may be signs of infection. A foot that is cold may not be getting enough blood.  Sensations such as burning, tingling, or pins and needles can be signs of a problem. Also check for areas that may be numb.  Hot spots are caused by friction or pressure. Look for hot spots in areas that get a lot of rubbing. Hot spots can turn into blisters, calluses, or sores.  Cracks and sores are caused by dry or irritated skin. They are a sign that the skin is breaking down, which can lead to infection.  Toenail problems to watch for include nails growing into the skin (ingrown toenail) and causing redness or pain. Thick, yellow, or discolored nails can signal a fungal infection.  Drainage and odor can develop from untreated sores and ulcers. Call your doctor right away if you notice white or yellow drainage, bleeding, or unpleasant odor.   © 3937-1900 [employer]. [address] [place] [address]. All rights reserved. This information is not intended as a substitute for professional medical care. Always follow your healthcare professional's instructions.        Step-by-Step:  Inspecting Your Feet (Diabetes)    Date Last Reviewed: 10/1/2016  © 8823-7533 [employer]. [address] [place] [address], [place] [address]. All rights reserved. This information is not intended as a substitute for professional medical care. Always follow your healthcare professional's instructions.

## 2022-06-01 ENCOUNTER — TELEPHONE (OUTPATIENT)
Dept: SMOKING CESSATION | Facility: CLINIC | Age: 72
End: 2022-06-01
Payer: MEDICARE

## 2022-06-01 NOTE — TELEPHONE ENCOUNTER
Patient called to give an update on change of counselor for Smoking Cessation, while Erica Cherry is out on leave. My name and contact information was given.

## 2022-06-02 DIAGNOSIS — G89.29 CHRONIC NECK PAIN: ICD-10-CM

## 2022-06-02 DIAGNOSIS — M54.2 CHRONIC NECK PAIN: ICD-10-CM

## 2022-06-02 DIAGNOSIS — E11.51 TYPE II DIABETES MELLITUS WITH PERIPHERAL CIRCULATORY DISORDER: ICD-10-CM

## 2022-06-02 RX ORDER — ATORVASTATIN CALCIUM 20 MG/1
TABLET, FILM COATED ORAL
Qty: 90 TABLET | Refills: 3 | Status: SHIPPED | OUTPATIENT
Start: 2022-06-02 | End: 2022-07-09

## 2022-06-02 RX ORDER — ALBUTEROL SULFATE 90 UG/1
AEROSOL, METERED RESPIRATORY (INHALATION)
Qty: 54 G | Refills: 3 | Status: SHIPPED | OUTPATIENT
Start: 2022-06-02 | End: 2022-07-09

## 2022-06-02 NOTE — TELEPHONE ENCOUNTER
No new care gaps identified.  Health [facility] Embedded Care Gaps. Reference number: [id number]. 6/02/2022   12:23:44 PM CDT

## 2022-06-03 RX ORDER — GABAPENTIN 600 MG/1
TABLET ORAL
Qty: 270 TABLET | Refills: 0 | Status: SHIPPED | OUTPATIENT
Start: 2022-06-03 | End: 2022-08-10 | Stop reason: SDUPTHER

## 2022-06-03 NOTE — TELEPHONE ENCOUNTER
Refill Routing Note   Medication(s) are not appropriate for processing by [facility] for the following reason(s):      - Outside of protocol    [facility] action(s):  Route  Approve          Medication reconciliation completed: No     Appointments  past 12m or future 3m with PCP    Date Provider   Last Visit   5/3/2022 Williams Rossi Jr., MD   Next Visit   8/10/2022 Williams Rossi Jr., MD   ED visits in past 90 days: 0        Note composed:10:49 PM 06/02/2022

## 2022-06-13 ENCOUNTER — LAB VISIT (OUTPATIENT)
Dept: LAB | Facility: HOSPITAL | Age: 72
End: 2022-06-13
Attending: HOSPITALIST
Payer: MEDICARE

## 2022-06-13 DIAGNOSIS — Z79.4 CONTROLLED TYPE 2 DIABETES MELLITUS WITH STAGE 2 CHRONIC KIDNEY DISEASE, WITH LONG-TERM CURRENT USE OF INSULIN: Primary | ICD-10-CM

## 2022-06-13 DIAGNOSIS — N18.2 CONTROLLED TYPE 2 DIABETES MELLITUS WITH STAGE 2 CHRONIC KIDNEY DISEASE, WITH LONG-TERM CURRENT USE OF INSULIN: Primary | ICD-10-CM

## 2022-06-13 DIAGNOSIS — N18.2 CONTROLLED TYPE 2 DIABETES MELLITUS WITH STAGE 2 CHRONIC KIDNEY DISEASE, WITH LONG-TERM CURRENT USE OF INSULIN: ICD-10-CM

## 2022-06-13 DIAGNOSIS — Z79.4 CONTROLLED TYPE 2 DIABETES MELLITUS WITH STAGE 2 CHRONIC KIDNEY DISEASE, WITH LONG-TERM CURRENT USE OF INSULIN: ICD-10-CM

## 2022-06-13 DIAGNOSIS — E11.22 CONTROLLED TYPE 2 DIABETES MELLITUS WITH STAGE 2 CHRONIC KIDNEY DISEASE, WITH LONG-TERM CURRENT USE OF INSULIN: Primary | ICD-10-CM

## 2022-06-13 DIAGNOSIS — N18.2 STAGE 2 CHRONIC KIDNEY DISEASE: Primary | ICD-10-CM

## 2022-06-13 DIAGNOSIS — E11.22 CONTROLLED TYPE 2 DIABETES MELLITUS WITH STAGE 2 CHRONIC KIDNEY DISEASE, WITH LONG-TERM CURRENT USE OF INSULIN: ICD-10-CM

## 2022-06-13 DIAGNOSIS — N18.2 STAGE 2 CHRONIC KIDNEY DISEASE: ICD-10-CM

## 2022-06-13 LAB
ALBUMIN SERPL BCP-MCNC: 2.8 G/DL (ref 3.5–5.2)
ANION GAP SERPL CALC-SCNC: 10 MMOL/L (ref 8–16)
BASOPHILS # BLD AUTO: 0.04 K/UL (ref 0–0.2)
BASOPHILS NFR BLD: 0.4 % (ref 0–1.9)
BUN SERPL-MCNC: 19 MG/DL (ref 8–23)
CALCIUM SERPL-MCNC: 9.3 MG/DL (ref 8.7–10.5)
CHLORIDE SERPL-SCNC: 108 MMOL/L (ref 95–110)
CO2 SERPL-SCNC: 25 MMOL/L (ref 23–29)
CREAT SERPL-MCNC: 1.1 MG/DL (ref 0.5–1.4)
DIFFERENTIAL METHOD: ABNORMAL
EOSINOPHIL # BLD AUTO: 0.3 K/UL (ref 0–0.5)
EOSINOPHIL NFR BLD: 3.4 % (ref 0–8)
ERYTHROCYTE [DISTWIDTH] IN BLOOD BY AUTOMATED COUNT: 15.2 % (ref 11.5–14.5)
EST. GFR  (AFRICAN AMERICAN): 58 ML/MIN/1.73 M^2
EST. GFR  (NON AFRICAN AMERICAN): 50 ML/MIN/1.73 M^2
GLUCOSE SERPL-MCNC: 104 MG/DL (ref 70–110)
HCT VFR BLD AUTO: 36.2 % (ref 37–48.5)
HGB BLD-MCNC: 11.7 G/DL (ref 12–16)
IMM GRANULOCYTES # BLD AUTO: 0.03 K/UL (ref 0–0.04)
IMM GRANULOCYTES NFR BLD AUTO: 0.3 % (ref 0–0.5)
LYMPHOCYTES # BLD AUTO: 2.4 K/UL (ref 1–4.8)
LYMPHOCYTES NFR BLD: 26.1 % (ref 18–48)
MCH RBC QN AUTO: 26.7 PG (ref 27–31)
MCHC RBC AUTO-ENTMCNC: 32.3 G/DL (ref 32–36)
MCV RBC AUTO: 83 FL (ref 82–98)
MONOCYTES # BLD AUTO: 0.7 K/UL (ref 0.3–1)
MONOCYTES NFR BLD: 7.7 % (ref 4–15)
NEUTROPHILS # BLD AUTO: 5.7 K/UL (ref 1.8–7.7)
NEUTROPHILS NFR BLD: 62.1 % (ref 38–73)
NRBC BLD-RTO: 0 /100 WBC
PHOSPHATE SERPL-MCNC: 3.2 MG/DL (ref 2.7–4.5)
PLATELET # BLD AUTO: 257 K/UL (ref 150–450)
PMV BLD AUTO: 10.9 FL (ref 9.2–12.9)
POTASSIUM SERPL-SCNC: 4.1 MMOL/L (ref 3.5–5.1)
RBC # BLD AUTO: 4.38 M/UL (ref 4–5.4)
SODIUM SERPL-SCNC: 143 MMOL/L (ref 136–145)
WBC # BLD AUTO: 9.23 K/UL (ref 3.9–12.7)

## 2022-06-13 PROCEDURE — 36415 COLL VENOUS BLD VENIPUNCTURE: CPT | Mod: PN | Performed by: HOSPITALIST

## 2022-06-13 PROCEDURE — 80069 RENAL FUNCTION PANEL: CPT | Performed by: HOSPITALIST

## 2022-06-13 PROCEDURE — 85025 COMPLETE CBC W/AUTO DIFF WBC: CPT | Performed by: HOSPITALIST

## 2022-06-14 ENCOUNTER — OFFICE VISIT (OUTPATIENT)
Dept: NEPHROLOGY | Facility: CLINIC | Age: 72
End: 2022-06-14
Payer: MEDICARE

## 2022-06-14 ENCOUNTER — LAB VISIT (OUTPATIENT)
Dept: LAB | Facility: HOSPITAL | Age: 72
End: 2022-06-14
Attending: HOSPITALIST
Payer: MEDICARE

## 2022-06-14 VITALS
HEART RATE: 67 BPM | HEIGHT: 62 IN | BODY MASS INDEX: 34.16 KG/M2 | OXYGEN SATURATION: 99 % | SYSTOLIC BLOOD PRESSURE: 160 MMHG | WEIGHT: 185.63 LBS | DIASTOLIC BLOOD PRESSURE: 72 MMHG

## 2022-06-14 DIAGNOSIS — I10 ESSENTIAL HYPERTENSION: ICD-10-CM

## 2022-06-14 DIAGNOSIS — R80.1 PERSISTENT PROTEINURIA: Primary | ICD-10-CM

## 2022-06-14 DIAGNOSIS — N18.31 STAGE 3A CHRONIC KIDNEY DISEASE: ICD-10-CM

## 2022-06-14 DIAGNOSIS — N39.0 UTI (URINARY TRACT INFECTION), UNCOMPLICATED: ICD-10-CM

## 2022-06-14 DIAGNOSIS — I27.20 PULMONARY HYPERTENSION: ICD-10-CM

## 2022-06-14 DIAGNOSIS — R80.1 PERSISTENT PROTEINURIA: ICD-10-CM

## 2022-06-14 DIAGNOSIS — E88.09 HYPOALBUMINEMIA DUE TO PROTEIN-CALORIE MALNUTRITION: ICD-10-CM

## 2022-06-14 DIAGNOSIS — R80.9 ASYMPTOMATIC PROTEINURIA: ICD-10-CM

## 2022-06-14 DIAGNOSIS — E46 HYPOALBUMINEMIA DUE TO PROTEIN-CALORIE MALNUTRITION: ICD-10-CM

## 2022-06-14 PROBLEM — N18.30 STAGE 3 CHRONIC KIDNEY DISEASE: Status: ACTIVE | Noted: 2022-06-14

## 2022-06-14 PROCEDURE — 99215 OFFICE O/P EST HI 40 MIN: CPT | Mod: S$PBB,,, | Performed by: HOSPITALIST

## 2022-06-14 PROCEDURE — 86039 ANTINUCLEAR ANTIBODIES (ANA): CPT | Performed by: HOSPITALIST

## 2022-06-14 PROCEDURE — 84165 PROTEIN E-PHORESIS SERUM: CPT | Mod: 26,,, | Performed by: PATHOLOGY

## 2022-06-14 PROCEDURE — 86235 NUCLEAR ANTIGEN ANTIBODY: CPT | Mod: 59 | Performed by: HOSPITALIST

## 2022-06-14 PROCEDURE — 86038 ANTINUCLEAR ANTIBODIES: CPT | Performed by: HOSPITALIST

## 2022-06-14 PROCEDURE — 86334 IMMUNOFIX E-PHORESIS SERUM: CPT | Mod: 26,,, | Performed by: PATHOLOGY

## 2022-06-14 PROCEDURE — 86334 IMMUNOFIX E-PHORESIS SERUM: CPT | Performed by: HOSPITALIST

## 2022-06-14 PROCEDURE — 99214 OFFICE O/P EST MOD 30 MIN: CPT | Mod: PBBFAC | Performed by: HOSPITALIST

## 2022-06-14 PROCEDURE — 86334 PATHOLOGIST INTERPRETATION IFE: ICD-10-PCS | Mod: 26,,, | Performed by: PATHOLOGY

## 2022-06-14 PROCEDURE — 99999 PR PBB SHADOW E&M-EST. PATIENT-LVL IV: CPT | Mod: PBBFAC,,, | Performed by: HOSPITALIST

## 2022-06-14 PROCEDURE — 99999 PR PBB SHADOW E&M-EST. PATIENT-LVL IV: ICD-10-PCS | Mod: PBBFAC,,, | Performed by: HOSPITALIST

## 2022-06-14 PROCEDURE — 84165 PATHOLOGIST INTERPRETATION SPE: ICD-10-PCS | Mod: 26,,, | Performed by: PATHOLOGY

## 2022-06-14 PROCEDURE — 99215 PR OFFICE/OUTPT VISIT, EST, LEVL V, 40-54 MIN: ICD-10-PCS | Mod: S$PBB,,, | Performed by: HOSPITALIST

## 2022-06-14 PROCEDURE — 84165 PROTEIN E-PHORESIS SERUM: CPT | Performed by: HOSPITALIST

## 2022-06-14 PROCEDURE — 83520 IMMUNOASSAY QUANT NOS NONAB: CPT | Performed by: HOSPITALIST

## 2022-06-14 RX ORDER — DAPAGLIFLOZIN 5 MG/1
5 TABLET, FILM COATED ORAL DAILY
Qty: 30 TABLET | Refills: 4 | Status: SHIPPED | OUTPATIENT
Start: 2022-06-14 | End: 2022-08-10

## 2022-06-14 NOTE — PROGRESS NOTES
REASON FOR CONSULT/CHIEF COMPLAIN: CKD stage 3 and Proteinuria     REFERRING PHYSICIAN: Williams Rossi Jr, MD      HISTORY OF PRESENT ILLNESS: 72 y.o. female who is new to me  has a past medical history of Acute pancreatitis, Angina pectoris, Anxiety, Arthritis, Back pain, Bronchitis, Cervical spondylosis with radiculopathy (7/10/2012), Chest pain, Chronic neck pain (7/26/2012), Colon polyps, Controlled type 2 diabetes mellitus with stage 2 chronic kidney disease, with long-term current use of insulin (1/24/2020), Coronary artery disease, Depression, Fibrocystic breast, GERD (gastroesophageal reflux disease), Glaucoma, Heart failure, Hyperlipidemia, Hypertension, Neck pain (7/10/2012), Obesity, JAM (obstructive sleep apnea), Pneumonia, Pneumonia due to other staphylococcus, Primary osteoarthritis of both knees, Psoriasis, Subacromial or subdeltoid bursitis (7/10/2012), Thyroid disease, Tobacco dependence, Trouble in sleeping, and Type 2 diabetes mellitus (12/10/2013). patient was referred here for abnormal renal function with CKD stage 3.     71 year old woman with medical history of hypertension presenting for follow up of proteinuria.  Patient reports blood pressure usually 130-140's systolic.  She reports adequate fluid intake, takes meloxicam only occasionally for joint pains.  She otherwise denies any fever, chest pain, shortness of breath, abdominal pain, diarrhea, dysuria/hematuria. Continues to have proteinuria       ROS:  General: negative for chills, or fatigue  ENT: No epistaxis or headaches  Hematological and Lymphatic: No bleeding problems or blood clots.  Endocrine: No skin changes or temperature intolerance  Respiratory: No cough, shortness of breath, or wheezing  Cardiovascular: No chest pain or dyspnea   Gastrointestinal: No abdominal pain, change in bowel habits  Genito-Urinary: No dysuria, trouble voiding, or hematuria  Musculoskeletal: ROS: negative for - joint pain, joint stiffness, joint  swelling, muscle pain or muscular weakness  Neurological: No new focal weakness, no numbness  Dermatological: No rash or ulcers.    PAST MEDICAL HISTORY:  Past Medical History:   Diagnosis Date    Acute pancreatitis     Angina pectoris     Anxiety     Arthritis     Back pain     Bronchitis     Cervical spondylosis with radiculopathy 7/10/2012    Chest pain     Chronic neck pain 7/26/2012    Colon polyps     Controlled type 2 diabetes mellitus with stage 2 chronic kidney disease, with long-term current use of insulin 1/24/2020    Coronary artery disease     Depression     Fibrocystic breast     GERD (gastroesophageal reflux disease)     Glaucoma     Heart failure     Hyperlipidemia     Hypertension     Neck pain 7/10/2012    Obesity     JAM (obstructive sleep apnea)     Pneumonia     Pneumonia due to other staphylococcus     Primary osteoarthritis of both knees     Psoriasis     Subacromial or subdeltoid bursitis 7/10/2012    Thyroid disease     Tobacco dependence     Trouble in sleeping     Type 2 diabetes mellitus 12/10/2013       PAST SURGICAL HISTORY:  Past Surgical History:   Procedure Laterality Date    BREAST BIOPSY Bilateral 1988    BREAST MASS EXCISION Right     benign    CATARACT EXTRACTION W/  INTRAOCULAR LENS IMPLANT Left 12/09/2020    PHACO IOL WITH I-STENT ()    CATARACT EXTRACTION W/  INTRAOCULAR LENS IMPLANT Right 10/21/2020    PHACO IOL WITH I-STENT x 2 ()    CHOLECYSTECTOMY      COLONOSCOPY N/A 5/22/2019    Procedure: COLONOSCOPY;  Surgeon: Darrin Calvin MD;  Location: [facility] ([address] [place] [address]);  Service: Endoscopy;  Laterality: N/A;  2nd floor - all other procedure done on 2, multiple comorbidities - ERW/   change in MD schedule, Pt notified and verbalized understanding, new arrival time 0800, Ins mailed to Pt with new arrival time - ERW1/8/19@1130    EPIDURAL STEROID INJECTION Bilateral 2/26/2021    Procedure: Injection, Steroid, Ischial  Bursa;  Surgeon: Yonatan Garsia Jr., MD;  Location: [facility] ENDO;  Service: Pain Management;  Laterality: Bilateral;  Bilateral Ischial Bursa Steroid Injections  Arrive @ 1230; No ATC; Check BG    HYSTERECTOMY  1980's    IMPLANTATION OF DEVICE FOR GLAUCOMA Right 10/21/2020    Procedure: INSERTION, DEVICE, FOR GLAUCOMA/ i Stent;  Surgeon: Melany Tse MD;  Location: [facility] OR [address] [place] [address];  Service: Ophthalmology;  Laterality: Right;    IMPLANTATION OF DEVICE FOR GLAUCOMA Left 12/9/2020    Procedure: INSERTION, DEVICE, FOR GLAUCOMA/I SENT;  Surgeon: Melany Tse MD;  Location: [facility] OR [address] [place] [address];  Service: Ophthalmology;  Laterality: Left;    INJECTION OF JOINT Bilateral 12/23/2021    Procedure: Injection, Joint---BILATERAL ISCHIAL BURSA STEROID INJECTION;  Surgeon: Yonatan Garsia Jr., MD;  [facility];  Service: Pain Management;  Laterality: Bilateral;    INTRAOCULAR PROSTHESES INSERTION Right 10/21/2020    Procedure: INSERTION, IOL PROSTHESIS;  Surgeon: Melany Tse MD;  Location: [address];  Service: Ophthalmology;  Laterality: Right;    INTRAOCULAR PROSTHESES INSERTION Left 12/9/2020    Procedure: INSERTION, IOL PROSTHESIS;  Surgeon: Melany Tse MD;  Location: [facility] OR [address] [place] [address];  Service: Ophthalmology;  Laterality: Left;    KNEE SURGERY Left 1-20-16    TKR    KNEE SURGERY Right 02/26/2018    TKR    L4-L5 fusion  2006    PHACOEMULSIFICATION OF CATARACT Right 10/21/2020    Procedure: PHACOEMULSIFICATION, CATARACT;  Surgeon: Melany Tse MD;  Location: [address];  Service: Ophthalmology;  Laterality: Right;    PHACOEMULSIFICATION OF CATARACT Left 12/9/2020    Procedure: PHACOEMULSIFICATION, CATARACT;  Surgeon: Melany Tse MD;  Location: [facility] OR [address] [place] [address];  Service: Ophthalmology;  Laterality: Left;       FAMILY HISTORY:   Family History   Problem Relation Age of Onset    Diabetes Mother     Hypertension Mother         CHF, angina     Angina Mother     Kidney disease Mother     Heart disease Mother         CHF    Hypertension Father         glaucoma, Alzhiemer's , supra pubic    Alzheimer's disease Father     Glaucoma Father     Kidney disease Brother         kidney transplant, HTN,DM    Diabetes Brother     Glaucoma Sister     Hypertension Sister     Hyperlipidemia Sister     Gout Son     Hypertension Son     Diabetes Son     Sleep apnea Sister     Hypertension Sister     Thyroid disease Sister     No Known Problems Sister     No Known Problems Sister     Hepatitis Brother     Amblyopia Neg Hx     Blindness Neg Hx     Melanoma Neg Hx     Macular degeneration Neg Hx     Retinal detachment Neg Hx     Strabismus Neg Hx        SOCIAL HISTORY:  Social History     Socioeconomic History    Marital status: Single   Tobacco Use    Smoking status: Current Every Day Smoker     Packs/day: 1.00     Years: 40.00     Pack years: 40.00     Types: Cigarettes    Smokeless tobacco: Current User   Substance and Sexual Activity    Alcohol use: No     Alcohol/week: 0.0 standard drinks    Drug use: No    Sexual activity: Yes     Partners: Male       ALLERGIES:  Review of patient's allergies indicates:   Allergen Reactions    Hydrocodone Shortness Of Breath    Iodinated contrast media Shortness Of Breath     Difficulty breathing    Adhesive Itching     Skin peeling    Morphine Hallucinations    Oxycodone Hallucinations    Sulfa (sulfonamide antibiotics) Hives and Itching       MEDICATIONS:    Current Outpatient Medications:     albuterol (PROVENTIL/VENTOLIN HFA) 90 mcg/actuation inhaler, USE 2 INHALATIONS EVERY 4 HOURS AS NEEDED FOR WHEEZING, RESCUE, Disp: 54 g, Rfl: 3    amLODIPine-benazepriL (LOTREL) 10-40 mg per capsule, TAKE 1 CAPSULE DAILY, Disp: 90 capsule, Rfl: 1    ammonium lactate 12 % Crea, Apply twice daily to affected parts both feet as needed., Disp: 140 g, Rfl: 11    atorvastatin (LIPITOR) 20 MG tablet, TAKE 1  TABLET DAILY, Disp: 90 tablet, Rfl: 3    carvediloL (COREG) 12.5 MG tablet, TAKE 1 TABLET TWICE A DAY WITH MEALS, Disp: 180 tablet, Rfl: 3    ciclopirox (PENLAC) 8 % Soln, Apply topically nightly., Disp: 6.6 mL, Rfl: 11    cyclobenzaprine (FLEXERIL) 10 MG tablet, Take 1 tablet (10 mg total) by mouth 3 (three) times daily as needed for Muscle spasms., Disp: 45 tablet, Rfl: 0    diphenhydrAMINE (BENADRYL) 25 mg capsule, Take 25 mg by mouth every 6 (six) hours as needed for Itching or Allergies., Disp: , Rfl:     gabapentin (NEURONTIN) 600 MG tablet, TAKE 1 TABLET THREE TIMES A DAY, Disp: 270 tablet, Rfl: 0    INULIN (FIBER GUMMIES ORAL), Take 1 each by mouth every morning. , Disp: , Rfl:     lancets (ACCU-CHEK MULTICLIX LANCET) Misc, Test blood sugar twice daily, Disp: 300 each, Rfl: 3    nicotine (NICODERM CQ) 21 mg/24 hr, Place 1 patch onto the skin once daily., Disp: 28 patch, Rfl: 0    nicotine, polacrilex, 2 mg lzmn, Take 1 each (2 mg total) by mouth as needed (Take 1 piece as needed. Maximum dose of 10 per day.)., Disp: 243 each, Rfl: 0    penicillin v potassium (VEETID) 500 MG tablet, Take 500 mg by mouth 3 (three) times daily., Disp: , Rfl:     simethicone (GAS-X ORAL), Take 1 tablet by mouth as needed. , Disp: , Rfl:     dapagliflozin (FARXIGA) 5 mg Tab tablet, Take 1 tablet (5 mg total) by mouth once daily., Disp: 30 tablet, Rfl: 4   Medication List with Changes/Refills   New Medications    DAPAGLIFLOZIN (FARXIGA) 5 MG TAB TABLET    Take 1 tablet (5 mg total) by mouth once daily.   Current Medications    ALBUTEROL (PROVENTIL/VENTOLIN HFA) 90 MCG/ACTUATION INHALER    USE 2 INHALATIONS EVERY 4 HOURS AS NEEDED FOR WHEEZING, RESCUE    AMLODIPINE-BENAZEPRIL (LOTREL) 10-40 MG PER CAPSULE    TAKE 1 CAPSULE DAILY    AMMONIUM LACTATE 12 % CREA    Apply twice daily to affected parts both feet as needed.    ATORVASTATIN (LIPITOR) 20 MG TABLET    TAKE 1 TABLET DAILY    CARVEDILOL (COREG) 12.5 MG TABLET     "TAKE 1 TABLET TWICE A DAY WITH MEALS    CICLOPIROX (PENLAC) 8 % SOLN    Apply topically nightly.    CYCLOBENZAPRINE (FLEXERIL) 10 MG TABLET    Take 1 tablet (10 mg total) by mouth 3 (three) times daily as needed for Muscle spasms.    DIPHENHYDRAMINE (BENADRYL) 25 MG CAPSULE    Take 25 mg by mouth every 6 (six) hours as needed for Itching or Allergies.    GABAPENTIN (NEURONTIN) 600 MG TABLET    TAKE 1 TABLET THREE TIMES A DAY    INULIN (FIBER GUMMIES ORAL)    Take 1 each by mouth every morning.     LANCETS (ACCU-CHEK MULTICLIX LANCET) MISC    Test blood sugar twice daily    NICOTINE (NICODERM CQ) 21 MG/24 HR    Place 1 patch onto the skin once daily.    NICOTINE, POLACRILEX, 2 MG LZMN    Take 1 each (2 mg total) by mouth as needed (Take 1 piece as needed. Maximum dose of 10 per day.).    PENICILLIN V POTASSIUM (VEETID) 500 MG TABLET    Take 500 mg by mouth 3 (three) times daily.    SIMETHICONE (GAS-X ORAL)    Take 1 tablet by mouth as needed.    Discontinued Medications    NAPROXEN (NAPROSYN) 500 MG TABLET    Take 1 tablet (500 mg total) by mouth 2 (two) times daily with meals.        PHYSICAL EXAM:  BP (!) 160/72   Pulse 67   Ht 5' 2" (1.575 m)   Wt 84.2 kg (185 lb 10 oz)   SpO2 99%   BMI 33.95 kg/m²     General: No distress, No fever or chills  Head: Normocephalic,atraumatic  Eyes: conjunctivae/corneas clear. PERRL, EOM's intact.  Nose: Nares normal. Mucosa normal. No drainage or sinus tenderness.  Neck: No adenopathy,no carotid bruit,no JVD  Lungs:Clear to auscultation bilaterally, No Crackles  Heart: Regular rate and rhythm, no murmur, gallops or rubs  Abdomen: Soft, no tenderness, bowel sounds normal  Extremities: Atraumatic, no edema in LE  Skin: Turgor normal. No rashes or ulcers  Neurologic: No focal weakness, oriented.          LABS:  Lab Results   Component Value Date    HGB 11.7 (L) 06/13/2022        Lab Results   Component Value Date    CREATININE 1.1 06/13/2022       Prot/Creat Ratio, Urine   Date " Value Ref Range Status   06/13/2022 2.71 (H) 0.00 - 0.20 Final   05/03/2022 3.88 (H) 0.00 - 0.20 Final   10/15/2020 1.47 (H) 0.00 - 0.20 Final       Lab Results   Component Value Date     06/13/2022    K 4.1 06/13/2022    CO2 25 06/13/2022       last PTH   Lab Results   Component Value Date    PTH 88.9 (H) 05/26/2017    CALCIUM 9.3 06/13/2022    PHOS 3.2 06/13/2022       Lab Results   Component Value Date    HGBA1C 5.8 (H) 04/29/2022       Lab Results   Component Value Date    LDLCALC 88.4 04/29/2022         Creatinine, Urine   Date Value Ref Range Status   06/13/2022 119.1 15.0 - 325.0 mg/dL Final   05/03/2022 106.6 15.0 - 325.0 mg/dL Final   04/29/2022 113.0 15.0 - 325.0 mg/dL Final       Protein, Urine   Date Value Ref Range Status   07/17/2007 42 (H) 0 - 10 mg/dl Final     Protein, Urine Random   Date Value Ref Range Status   06/13/2022 323 mg/dL Final   05/03/2022 414 mg/dL Final   10/15/2020 165 mg/dL Final     Comment:     The random urine reference ranges provided were established   for 24 hour urine collections.  No reference ranges exist for  random urine specimens.  Correlate clinically.         Prot/Creat Ratio, Urine   Date Value Ref Range Status   06/13/2022 2.71 (H) 0.00 - 0.20 Final   05/03/2022 3.88 (H) 0.00 - 0.20 Final   10/15/2020 1.47 (H) 0.00 - 0.20 Final         No images are attached to the encounter.       Problem List   Chronic kidney disease stage 3A   Long standing DM    Essential hypertension   Proteinuria   Hypoalbuminemia        ASSESSMENT and PLAN    CKD stage 3 A secondary to long standing DM  Baseline Creatinine is around 0.8 to 1.1   Most likely etiology for CKD is  DM, HTN,   Has elevated UPCR at 3 grams , progressively increasing from 1.5 grams to 3 grams   On ACE/ ARB for proteinuria and hypertension   Discussed options of SGLT2i, however her insurance was nt covering. Will prescribe Spironolactone.    Long standing Diabetes Mellitus with CKD    Continue current  medications      Essential Hypertension   - Added SGLT2i, However her insurance was nt able to cover. Prescribed Spironolactone 25 mg po daily    Nephrotic range proteinuria    Has proteinuria of 3 grams , improved to 2.7 grams    Has been long standing since 2015, progressively worsened to 3 grams    Will add SGLT2i along with losartan   Serological work up including KAITY, sFLC, SPEP, PLA2R, Hepatitis ordered   Will chivo renal ultrasound    Explained to patient that might need kidney biopsy with worsening proteinuria      Risk of progression to ESRD. ]Pt gives family h/o ESRD among her mother and her brother secondary to long standing DM. Her risk of progression is moderate .    35 minutes of total time spent on the encounter, which includes face to face time and non-face to face time preparing to see the patient (eg, review of tests), Obtaining and/or reviewing separately obtained history, documenting clinical information in the electronic or other health record, independently interpreting results (not separately reported) and communicating results to the patient/family/caregiver, or Care coordination (not separately reported).         RTC in 4  Months   Diagnosis and plan of care explained to the patient.  Pt Verbalized understanding. Answered all questions.  Thanks for allowing me to participate in the care of this patient.       Ninfa Beck MD  Nephrology  [facility].

## 2022-06-14 NOTE — PATIENT INSTRUCTIONS
Pls take all the medications as discussed .  Pls check your home blood pressure medications as discussed   Would encourage to get home blood pressure monitor for frequent home blood pressure checks . Please donot take NSAID's (Motrin, Advil, Aleve, Ibuprofen BC powder, Goody Powder, Diclofenac, Naproxen, Meloxicam/MOBIC, Celebrex etc), Ok to take baby aspirin.  The patient was educated in practicing a low salt diet.  Avoid high salt foods (olives, pickles, smoked meats, salted potato chips, etc.).   Do not add salt to your food at the table.   Use only small amounts of salt when cooking.  Please eat low potassium and low phosphate diet.  Please limit the amout of animal protein.

## 2022-06-15 ENCOUNTER — OFFICE VISIT (OUTPATIENT)
Dept: FAMILY MEDICINE | Facility: CLINIC | Age: 72
End: 2022-06-15
Payer: MEDICARE

## 2022-06-15 VITALS
HEIGHT: 62 IN | DIASTOLIC BLOOD PRESSURE: 68 MMHG | BODY MASS INDEX: 34.16 KG/M2 | WEIGHT: 185.63 LBS | OXYGEN SATURATION: 99 % | TEMPERATURE: 99 F | HEART RATE: 67 BPM | SYSTOLIC BLOOD PRESSURE: 138 MMHG

## 2022-06-15 DIAGNOSIS — L08.9 SKIN INFECTION: ICD-10-CM

## 2022-06-15 DIAGNOSIS — L02.91 ABSCESS: Primary | ICD-10-CM

## 2022-06-15 DIAGNOSIS — R80.1 PERSISTENT PROTEINURIA: Primary | ICD-10-CM

## 2022-06-15 DIAGNOSIS — N18.2 STAGE 2 CHRONIC KIDNEY DISEASE: Primary | ICD-10-CM

## 2022-06-15 LAB
ALBUMIN SERPL ELPH-MCNC: 2.89 G/DL (ref 3.35–5.55)
ALPHA1 GLOB SERPL ELPH-MCNC: 0.4 G/DL (ref 0.17–0.41)
ALPHA2 GLOB SERPL ELPH-MCNC: 1.18 G/DL (ref 0.43–0.99)
ANA PATTERN 1: NORMAL
ANA SER QL IF: POSITIVE
ANA TITR SER IF: NORMAL {TITER}
B-GLOBULIN SERPL ELPH-MCNC: 0.75 G/DL (ref 0.5–1.1)
GAMMA GLOB SERPL ELPH-MCNC: 0.68 G/DL (ref 0.67–1.58)
KAPPA LC SER QL IA: 2.46 MG/DL (ref 0.33–1.94)
KAPPA LC/LAMBDA SER IA: 0.78 (ref 0.26–1.65)
LAMBDA LC SER QL IA: 3.16 MG/DL (ref 0.57–2.63)
PROT SERPL-MCNC: 5.9 G/DL (ref 6–8.4)

## 2022-06-15 PROCEDURE — 99213 PR OFFICE/OUTPT VISIT, EST, LEVL III, 20-29 MIN: ICD-10-PCS | Mod: S$PBB,,, | Performed by: NURSE PRACTITIONER

## 2022-06-15 PROCEDURE — 99999 PR PBB SHADOW E&M-EST. PATIENT-LVL V: ICD-10-PCS | Mod: PBBFAC,,, | Performed by: NURSE PRACTITIONER

## 2022-06-15 PROCEDURE — 99213 OFFICE O/P EST LOW 20 MIN: CPT | Mod: S$PBB,,, | Performed by: NURSE PRACTITIONER

## 2022-06-15 PROCEDURE — 99999 PR PBB SHADOW E&M-EST. PATIENT-LVL V: CPT | Mod: PBBFAC,,, | Performed by: NURSE PRACTITIONER

## 2022-06-15 PROCEDURE — 99215 OFFICE O/P EST HI 40 MIN: CPT | Mod: PBBFAC,PO | Performed by: NURSE PRACTITIONER

## 2022-06-15 RX ORDER — SPIRONOLACTONE 25 MG/1
25 TABLET ORAL DAILY
Qty: 30 TABLET | Refills: 11 | Status: SHIPPED | OUTPATIENT
Start: 2022-06-15 | End: 2022-08-10

## 2022-06-15 RX ORDER — CLINDAMYCIN HYDROCHLORIDE 300 MG/1
300 CAPSULE ORAL 2 TIMES DAILY
Qty: 14 CAPSULE | Refills: 0 | Status: SHIPPED | OUTPATIENT
Start: 2022-06-15 | End: 2022-06-22

## 2022-06-15 RX ORDER — MUPIROCIN 20 MG/G
OINTMENT TOPICAL 3 TIMES DAILY
Qty: 1 G | Refills: 0 | Status: SHIPPED | OUTPATIENT
Start: 2022-06-15 | End: 2022-06-22

## 2022-06-15 NOTE — PROGRESS NOTES
Chief Complaint  Chief Complaint   Patient presents with    Recurrent Skin Infections     Left side of face, been there since Saturday.       HPI    HPI   Ms. Stephanie Sears is a 72 y.o. female with medical problems as listed below. The patient presents to clinic with c/o LEFT side facial boil since Saturday. She states that it has decreased in size. She has been doing warm compresses to the area and triple ointment. She has a lot of pain in the area. Denies any fever.       PAST MEDICAL HISTORY:  Past Medical History:   Diagnosis Date    Acute pancreatitis     Angina pectoris     Anxiety     Arthritis     Back pain     Bronchitis     Cervical spondylosis with radiculopathy 7/10/2012    Chest pain     Chronic neck pain 7/26/2012    Colon polyps     Controlled type 2 diabetes mellitus with stage 2 chronic kidney disease, with long-term current use of insulin 1/24/2020    Coronary artery disease     Depression     Fibrocystic breast     GERD (gastroesophageal reflux disease)     Glaucoma     Heart failure     Hyperlipidemia     Hypertension     Neck pain 7/10/2012    Obesity     JAM (obstructive sleep apnea)     Pneumonia     Pneumonia due to other staphylococcus     Primary osteoarthritis of both knees     Psoriasis     Subacromial or subdeltoid bursitis 7/10/2012    Thyroid disease     Tobacco dependence     Trouble in sleeping     Type 2 diabetes mellitus 12/10/2013    Type 2 diabetes mellitus with diabetic chronic kidney disease        PAST SURGICAL HISTORY:  Past Surgical History:   Procedure Laterality Date    BREAST BIOPSY Bilateral 1988    BREAST MASS EXCISION Right     benign    CATARACT EXTRACTION W/  INTRAOCULAR LENS IMPLANT Left 12/09/2020    PHACO IOL WITH I-STENT ()    CATARACT EXTRACTION W/  INTRAOCULAR LENS IMPLANT Right 10/21/2020    PHACO IOL WITH I-STENT x 2 ()    CHOLECYSTECTOMY      COLONOSCOPY N/A 5/22/2019    Procedure:  COLONOSCOPY;  Surgeon: Darrin Calvin MD;  Location: [facility] ENDO ([address] [place] [address]);  Service: Endoscopy;  Laterality: N/A;  2nd floor - all other procedure done on 2, multiple comorbidities - ERW/   change in MD schedule, Pt notified and verbalized understanding, new arrival time 0800, Ins mailed to Pt with new arrival time - ERW1/8/19@1130    EPIDURAL STEROID INJECTION Bilateral 2/26/2021    Procedure: Injection, Steroid, Ischial Bursa;  Surgeon: Yonatan Garsia Jr., MD;  Location: [facility] ENDO;  Service: Pain Management;  Laterality: Bilateral;  Bilateral Ischial Bursa Steroid Injections  Arrive @ 1230; No ATC; Check BG    HYSTERECTOMY  1980's    IMPLANTATION OF DEVICE FOR GLAUCOMA Right 10/21/2020    Procedure: INSERTION, DEVICE, FOR GLAUCOMA/ i Stent;  Surgeon: Melany Tse MD;  Location: [facility] OR [address] [place] [address];  Service: Ophthalmology;  Laterality: Right;    IMPLANTATION OF DEVICE FOR GLAUCOMA Left 12/9/2020    Procedure: INSERTION, DEVICE, FOR GLAUCOMA/I SENT;  Surgeon: Melany Tse MD;  Location: [address];  Service: Ophthalmology;  Laterality: Left;    INJECTION OF JOINT Bilateral 12/23/2021    Procedure: Injection, Joint---BILATERAL ISCHIAL BURSA STEROID INJECTION;  Surgeon: Yonatan Garsia Jr., MD;  Location: [facility];  Service: Pain Management;  Laterality: Bilateral;    INTRAOCULAR PROSTHESES INSERTION Right 10/21/2020    Procedure: INSERTION, IOL PROSTHESIS;  Surgeon: Melany Tse MD;  Location: [address];  Service: Ophthalmology;  Laterality: Right;    INTRAOCULAR PROSTHESES INSERTION Left 12/9/2020    Procedure: INSERTION, IOL PROSTHESIS;  Surgeon: Melany Tse MD;  Location: [address];  Service: Ophthalmology;  Laterality: Left;    KNEE SURGERY Left 1-20-16    TKR    KNEE SURGERY Right 02/26/2018    TKR    L4-L5 fusion  2006    PHACOEMULSIFICATION OF CATARACT Right 10/21/2020    Procedure: PHACOEMULSIFICATION, CATARACT;  Surgeon: Melany  MD Carmencita;  Location: [address];  Service: Ophthalmology;  Laterality: Right;    PHACOEMULSIFICATION OF CATARACT Left 12/9/2020    Procedure: PHACOEMULSIFICATION, CATARACT;  Surgeon: Melany Tse MD;  Location: [facility] OR [address] [place] [address];  Service: Ophthalmology;  Laterality: Left;       SOCIAL HISTORY:  Social History     Socioeconomic History    Marital status: Single   Tobacco Use    Smoking status: Current Every Day Smoker     Packs/day: 1.00     Years: 40.00     Pack years: 40.00     Types: Cigarettes    Smokeless tobacco: Current User   Substance and Sexual Activity    Alcohol use: No     Alcohol/week: 0.0 standard drinks    Drug use: No    Sexual activity: Yes     Partners: Male       FAMILY HISTORY:  Family History   Problem Relation Age of Onset    Diabetes Mother     Hypertension Mother         CHF, angina    Angina Mother     Kidney disease Mother     Heart disease Mother         CHF    Hypertension Father         glaucoma, Alzhiemer's , supra pubic    Alzheimer's disease Father     Glaucoma Father     Kidney disease Brother         kidney transplant, HTN,DM    Diabetes Brother     Glaucoma Sister     Hypertension Sister     Hyperlipidemia Sister     Gout Son     Hypertension Son     Diabetes Son     Sleep apnea Sister     Hypertension Sister     Thyroid disease Sister     No Known Problems Sister     No Known Problems Sister     Hepatitis Brother     Amblyopia Neg Hx     Blindness Neg Hx     Melanoma Neg Hx     Macular degeneration Neg Hx     Retinal detachment Neg Hx     Strabismus Neg Hx        ALLERGIES AND MEDICATIONS: updated and reviewed.  Review of patient's allergies indicates:   Allergen Reactions    Hydrocodone Shortness Of Breath    Iodinated contrast media Shortness Of Breath     Difficulty breathing    Adhesive Itching     Skin peeling    Morphine Hallucinations    Oxycodone Hallucinations    Sulfa (sulfonamide antibiotics) Hives and Itching      Current Outpatient Medications   Medication Sig Dispense Refill    albuterol (PROVENTIL/VENTOLIN HFA) 90 mcg/actuation inhaler USE 2 INHALATIONS EVERY 4 HOURS AS NEEDED FOR WHEEZING, RESCUE 54 g 3    amLODIPine-benazepriL (LOTREL) 10-40 mg per capsule TAKE 1 CAPSULE DAILY 90 capsule 1    atorvastatin (LIPITOR) 20 MG tablet TAKE 1 TABLET DAILY 90 tablet 3    carvediloL (COREG) 12.5 MG tablet TAKE 1 TABLET TWICE A DAY WITH MEALS 180 tablet 3    diphenhydrAMINE (BENADRYL) 25 mg capsule Take 25 mg by mouth every 6 (six) hours as needed for Itching or Allergies.      gabapentin (NEURONTIN) 600 MG tablet TAKE 1 TABLET THREE TIMES A  tablet 0    INULIN (FIBER GUMMIES ORAL) Take 1 each by mouth every morning.       lancets (ACCU-CHEK MULTICLIX LANCET) Misc Test blood sugar twice daily 300 each 3    nicotine (NICODERM CQ) 21 mg/24 hr Place 1 patch onto the skin once daily. 28 patch 0    nicotine, polacrilex, 2 mg lzmn Take 1 each (2 mg total) by mouth as needed (Take 1 piece as needed. Maximum dose of 10 per day.). 243 each 0    simethicone (GAS-X ORAL) Take 1 tablet by mouth as needed.       ammonium lactate 12 % Crea Apply twice daily to affected parts both feet as needed. (Patient not taking: Reported on 6/15/2022) 140 g 11    ciclopirox (PENLAC) 8 % Soln Apply topically nightly. (Patient not taking: Reported on 6/15/2022) 6.6 mL 11    clindamycin (CLEOCIN) 300 MG capsule Take 1 capsule (300 mg total) by mouth 2 (two) times a day. for 7 days 14 capsule 0    cyclobenzaprine (FLEXERIL) 10 MG tablet Take 1 tablet (10 mg total) by mouth 3 (three) times daily as needed for Muscle spasms. (Patient not taking: Reported on 6/15/2022) 45 tablet 0    dapagliflozin (FARXIGA) 5 mg Tab tablet Take 1 tablet (5 mg total) by mouth once daily. (Patient not taking: Reported on 6/15/2022) 30 tablet 4    mupirocin (BACTROBAN) 2 % ointment Apply topically 3 (three) times daily. for 7 days 1 g 0    penicillin v  "potassium (VEETID) 500 MG tablet Take 500 mg by mouth 3 (three) times daily.       No current facility-administered medications for this visit.       Patient Care Team:  Williams Rossi Jr., MD as PCP - General (Family Medicine)  Sandi Menon MD as Consulting Physician (Urology)  Franki Ordonez MA as Care Coordinator  Ronald Romero MD as Consulting Physician (Nephrology)  Tiffany Garrison MD as Consulting Physician (Orthopedic Surgery)  Sarah Oliver MD as Consulting Physician (Physical Medicine and Rehabilitation)  Dean Robins MD as Consulting Physician (Cardiology)  Melany Tse MD as Consulting Physician (Ophthalmology)  Melany Tse MD as Consulting Physician (Ophthalmology)  Yonatan Garsia Jr., MD as Consulting Physician (Pain Medicine)  Justin Nicholson MD as Consulting Physician (Pulmonary Disease)  Dean Robins MD as Consulting Physician (Cardiology)    ROS  Review of Systems   Constitutional: Negative for chills, fatigue, fever and unexpected weight change.   HENT: Negative for congestion, ear discharge, ear pain and postnasal drip.    Eyes: Negative for photophobia, pain and visual disturbance.   Respiratory: Negative for cough, shortness of breath and wheezing.    Cardiovascular: Negative for chest pain, palpitations and leg swelling.   Gastrointestinal: Negative for abdominal pain, constipation, diarrhea, nausea and vomiting.   Genitourinary: Negative for dysuria, frequency, urgency and vaginal discharge.   Musculoskeletal: Negative for back pain, joint swelling and neck stiffness.   Skin: Positive for rash.   Neurological: Negative for weakness and headaches.   Psychiatric/Behavioral: Negative for dysphoric mood and sleep disturbance. The patient is not nervous/anxious.            Physical Exam  Vitals:    06/15/22 1306   BP: 138/68   Pulse: 67   Temp: 98.5 °F (36.9 °C)   TempSrc: Oral   SpO2: 99%   Weight: 84.2 kg (185 lb 10 oz)   Height: 5' 2" (1.575 m) " "   Body mass index is 33.95 kg/m².  Weight: 84.2 kg (185 lb 10 oz)   Height: 5' 2" (157.5 cm)     Physical Exam  Constitutional:       Appearance: She is well-developed.   HENT:      Head: Normocephalic and atraumatic.      Right Ear: External ear normal.      Left Ear: External ear normal.      Nose: Nose normal.   Eyes:      Conjunctiva/sclera: Conjunctivae normal.      Pupils: Pupils are equal, round, and reactive to light.   Neck:      Thyroid: No thyromegaly.   Cardiovascular:      Rate and Rhythm: Normal rate.   Pulmonary:      Effort: Pulmonary effort is normal.   Abdominal:      Palpations: There is no hepatomegaly or splenomegaly.   Genitourinary:     Vagina: Normal.   Musculoskeletal:         General: Normal range of motion.      Cervical back: Normal range of motion.   Skin:     General: Skin is warm and dry.      Findings: Abscess present. No rash.             Comments: 2 cm abscess noted to the RIGHT chin area.   Neurological:      Mental Status: She is alert and oriented to person, place, and time.      Cranial Nerves: No cranial nerve deficit.   Psychiatric:         Behavior: Behavior normal.         Thought Content: Thought content normal.         Judgment: Judgment normal.       Health Maintenance       Date Due Completion Date    TETANUS VACCINE Never done ---    Shingles Vaccine (1 of 2) Never done ---    COVID-19 Vaccine (4 - Booster for Pfizer series) 04/20/2022 12/20/2021    Colorectal Cancer Screening 07/16/2022 7/16/2019    Hemoglobin A1c 10/29/2022 4/29/2022    Mammogram 11/23/2022 11/23/2021    Eye Exam 03/28/2023 3/28/2022    Diabetes Urine Screening 04/29/2023 4/29/2022    Lipid Panel 04/29/2023 4/29/2022    DEXA Scan 05/06/2023 5/6/2021    LDCT Lung Screen 05/09/2023 5/9/2022    Foot Exam 05/31/2023 5/31/2022    Override on 5/23/2022: Done    Override on 12/27/2018: Done    Low Dose Statin 06/14/2023 6/14/2022      Health maintenance reviewed at this time.    Assessment & " Plan  Abscess  -     clindamycin (CLEOCIN) 300 MG capsule; Take 1 capsule (300 mg total) by mouth 2 (two) times a day. for 7 days  Dispense: 14 capsule; Refill: 0  -     mupirocin (BACTROBAN) 2 % ointment; Apply topically 3 (three) times daily. for 7 days  Dispense: 1 g; Refill: 0    Skin infection  -     clindamycin (CLEOCIN) 300 MG capsule; Take 1 capsule (300 mg total) by mouth 2 (two) times a day. for 7 days  Dispense: 14 capsule; Refill: 0  -     mupirocin (BACTROBAN) 2 % ointment; Apply topically 3 (three) times daily. for 7 days  Dispense: 1 g; Refill: 0       Education on boils given to the patient. All questions and concerns addressed at this time.     Follow-up: Follow up if symptoms worsen or fail to improve.

## 2022-06-16 ENCOUNTER — HOSPITAL ENCOUNTER (OUTPATIENT)
Dept: RADIOLOGY | Facility: HOSPITAL | Age: 72
Discharge: HOME | End: 2022-06-16
Attending: HOSPITALIST
Payer: MEDICARE

## 2022-06-16 DIAGNOSIS — R80.1 PERSISTENT PROTEINURIA: ICD-10-CM

## 2022-06-16 PROCEDURE — 76770 US EXAM ABDO BACK WALL COMP: CPT | Mod: 26,,, | Performed by: RADIOLOGY

## 2022-06-16 PROCEDURE — 76770 US RETROPERITONEAL COMPLETE: ICD-10-PCS | Mod: 26,,, | Performed by: RADIOLOGY

## 2022-06-16 PROCEDURE — 76770 US EXAM ABDO BACK WALL COMP: CPT | Mod: TC

## 2022-06-17 LAB
ANTI SM ANTIBODY: 0.05 RATIO (ref 0–0.99)
ANTI SM/RNP ANTIBODY: 0.09 RATIO (ref 0–0.99)
ANTI-SM INTERPRETATION: NEGATIVE
ANTI-SM/RNP INTERPRETATION: NEGATIVE
ANTI-SSA ANTIBODY: 0.04 RATIO (ref 0–0.99)
ANTI-SSA INTERPRETATION: NEGATIVE
ANTI-SSB ANTIBODY: 0.05 RATIO (ref 0–0.99)
ANTI-SSB INTERPRETATION: NEGATIVE
DSDNA AB SER-ACNC: NORMAL [IU]/ML
INTERPRETATION SERPL IFE-IMP: NORMAL
PATHOLOGIST INTERPRETATION SPE: NORMAL
PHOSPHOLIPASE A2 RECEPTOR, ELISA: <2 RU/ML
PHOSPHOLIPASE A2 RECEPTOR, IFA: NEGATIVE

## 2022-06-20 ENCOUNTER — TELEPHONE (OUTPATIENT)
Dept: FAMILY MEDICINE | Facility: CLINIC | Age: 72
End: 2022-06-20
Payer: MEDICARE

## 2022-06-20 ENCOUNTER — TELEPHONE (OUTPATIENT)
Dept: PHYSICAL MEDICINE AND REHAB | Facility: CLINIC | Age: 72
End: 2022-06-20
Payer: MEDICARE

## 2022-06-20 LAB — PATHOLOGIST INTERPRETATION IFE: NORMAL

## 2022-06-20 NOTE — TELEPHONE ENCOUNTER
----- Message from Liberty Carrasquillo sent at 6/20/2022  8:16 AM CDT -----  Contact: Patient [phone]  Type: Patient Call Back    Who called: Patient     What is the request in detail: Waiting on a call back from last week, in regards to a boil on the side of her face. Was seen 06-15-22 by Zoya Booker and the medication that was prescribed isn't helping. Need to know what to do? Patient states that the boil is under her ear, really hurting, and now ear is hurting. States it's infected. Please call pt ASAP!     Would the patient rather a call back or a response via [facility]? Call back    Best call back number: [phone]

## 2022-06-20 NOTE — TELEPHONE ENCOUNTER
----- Message from Ev Zuniga MA sent at 6/15/2022  1:00 PM CDT -----  Left a message for a call back see note below.    ----- Message -----  From: Sarah Oliver MD  Sent: 6/15/2022  12:30 PM CDT  To: Ev Zuniga MA    Can you please call this patient, she is talking that I sent her some medication, that is expensive,   and I do not know which medication is that.   Thanks,   B

## 2022-06-20 NOTE — TELEPHONE ENCOUNTER
No available appointments. Patient advised to contact our [facility] at [phone]. verbalized understanding.

## 2022-06-21 ENCOUNTER — TELEPHONE (OUTPATIENT)
Dept: PULMONOLOGY | Facility: CLINIC | Age: 72
End: 2022-06-21
Payer: MEDICARE

## 2022-06-21 NOTE — TELEPHONE ENCOUNTER
Spoke with patient scheduled an appointment to have a PFT done.    TELMA Vazquez                  ----- Message from Ej Cardenas sent at 6/21/2022 10:58 AM CDT -----  Regarding: request to reschedule PFT test  Type: Patient Call Back    Who called:Stephanie     What is the request in detail: the patient is requesting to reschedule her PFT test. Please return call at earliest convenience.    Can the clinic reply by [facility]?no    Would the patient rather a call back or a response via [facility]? Call back    Best call back number:[phone]

## 2022-06-22 LAB
ANNOTATION COMMENT IMP: NORMAL
APOL1 RESULT: NORMAL
GENETICIST REVIEW: NORMAL
LAB TEST METHOD: NORMAL
PROVIDER SIGNING NAME: NORMAL
TEST PERFORMANCE INFO SPEC: NORMAL

## 2022-06-23 ENCOUNTER — TELEPHONE (OUTPATIENT)
Dept: FAMILY MEDICINE | Facility: CLINIC | Age: 72
End: 2022-06-23
Payer: MEDICARE

## 2022-06-23 NOTE — TELEPHONE ENCOUNTER
----- Message from Denny Veronica NP sent at 6/23/2022 11:21 AM CDT -----  Needs ov to evaluate    ----- Message -----  From: Jen Zuniga LPN  Sent: 6/17/2022   9:29 AM CDT  To: Williams Rossi Jr., MD    Please Advise    ----- Message -----  From: Jessenia Molina  Sent: 6/17/2022   9:15 AM CDT  To: Flo Kramer Staff    Type: Patient Call Back    Who called:self    What is the request in detail: Pt states she has been having serious leg cramps in sleep for a while. Pt states it happens randomly and always on left side. She said last night was the worst night and it happened to her entire leg, she could not move. Pt would like to know what type of doctor she should see.    Can the clinic reply by [facility]?no    Would the patient rather a call back or a response via [facility]? call    Best call back number:[phone]

## 2022-06-27 ENCOUNTER — HOSPITAL ENCOUNTER (OUTPATIENT)
Dept: RESPIRATORY THERAPY | Facility: HOSPITAL | Age: 72
Discharge: HOME | End: 2022-06-27
Attending: INTERNAL MEDICINE
Payer: MEDICARE

## 2022-06-27 VITALS — RESPIRATION RATE: 18 BRPM | HEART RATE: 55 BPM | OXYGEN SATURATION: 100 %

## 2022-06-27 DIAGNOSIS — Z72.0 TOBACCO ABUSE: ICD-10-CM

## 2022-06-27 DIAGNOSIS — I27.20 PULMONARY HYPERTENSION: ICD-10-CM

## 2022-06-27 PROCEDURE — 94729 DIFFUSING CAPACITY: CPT

## 2022-06-27 PROCEDURE — 94060 PR EVAL OF BRONCHOSPASM: ICD-10-PCS | Mod: 26,,, | Performed by: INTERNAL MEDICINE

## 2022-06-27 PROCEDURE — 94727 GAS DIL/WSHOT DETER LNG VOL: CPT

## 2022-06-27 PROCEDURE — 94010 BREATHING CAPACITY TEST: CPT

## 2022-06-27 PROCEDURE — 94729 PR C02/MEMBANE DIFFUSE CAPACITY: ICD-10-PCS | Mod: 26,,, | Performed by: INTERNAL MEDICINE

## 2022-06-27 PROCEDURE — 94729 DIFFUSING CAPACITY: CPT | Mod: 26,,, | Performed by: INTERNAL MEDICINE

## 2022-06-27 PROCEDURE — 94060 EVALUATION OF WHEEZING: CPT | Mod: 26,,, | Performed by: INTERNAL MEDICINE

## 2022-06-27 PROCEDURE — 94727 GAS DIL/WSHOT DETER LNG VOL: CPT | Mod: 26,,, | Performed by: INTERNAL MEDICINE

## 2022-06-27 PROCEDURE — 94727 PR PULM FUNCTION TEST BY GAS: ICD-10-PCS | Mod: 26,,, | Performed by: INTERNAL MEDICINE

## 2022-06-27 PROCEDURE — 25000242 PHARM REV CODE 250 ALT 637 W/ HCPCS: Performed by: INTERNAL MEDICINE

## 2022-06-27 RX ORDER — ALBUTEROL SULFATE 2.5 MG/.5ML
2.5 SOLUTION RESPIRATORY (INHALATION) ONCE
Status: COMPLETED | OUTPATIENT
Start: 2022-06-27 | End: 2022-06-27

## 2022-06-27 RX ADMIN — ALBUTEROL SULFATE 2.5 MG: 2.5 SOLUTION RESPIRATORY (INHALATION) at 11:06

## 2022-06-28 ENCOUNTER — CLINICAL SUPPORT (OUTPATIENT)
Dept: SMOKING CESSATION | Facility: CLINIC | Age: 72
End: 2022-06-28
Payer: COMMERCIAL

## 2022-06-28 ENCOUNTER — OFFICE VISIT (OUTPATIENT)
Dept: FAMILY MEDICINE | Facility: CLINIC | Age: 72
End: 2022-06-28
Payer: MEDICARE

## 2022-06-28 VITALS
OXYGEN SATURATION: 99 % | TEMPERATURE: 98 F | SYSTOLIC BLOOD PRESSURE: 130 MMHG | HEART RATE: 63 BPM | DIASTOLIC BLOOD PRESSURE: 60 MMHG

## 2022-06-28 DIAGNOSIS — Z72.0 TOBACCO ABUSE: ICD-10-CM

## 2022-06-28 DIAGNOSIS — E11.22 TYPE 2 DIABETES MELLITUS WITH STAGE 3 CHRONIC KIDNEY DISEASE, UNSPECIFIED WHETHER LONG TERM INSULIN USE, UNSPECIFIED WHETHER STAGE 3A OR 3B CKD: ICD-10-CM

## 2022-06-28 DIAGNOSIS — I10 ESSENTIAL HYPERTENSION: ICD-10-CM

## 2022-06-28 DIAGNOSIS — M17.0 PRIMARY OSTEOARTHRITIS OF BOTH KNEES: ICD-10-CM

## 2022-06-28 DIAGNOSIS — N18.30 TYPE 2 DIABETES MELLITUS WITH STAGE 3 CHRONIC KIDNEY DISEASE, UNSPECIFIED WHETHER LONG TERM INSULIN USE, UNSPECIFIED WHETHER STAGE 3A OR 3B CKD: ICD-10-CM

## 2022-06-28 DIAGNOSIS — L02.91 ABSCESS: Primary | ICD-10-CM

## 2022-06-28 DIAGNOSIS — I70.0 ATHEROSCLEROSIS OF AORTIC ARCH: ICD-10-CM

## 2022-06-28 DIAGNOSIS — K21.9 GASTROESOPHAGEAL REFLUX DISEASE, UNSPECIFIED WHETHER ESOPHAGITIS PRESENT: ICD-10-CM

## 2022-06-28 DIAGNOSIS — E66.9 CLASS 1 OBESITY WITH SERIOUS COMORBIDITY AND BODY MASS INDEX (BMI) OF 33.0 TO 33.9 IN ADULT, UNSPECIFIED OBESITY TYPE: ICD-10-CM

## 2022-06-28 DIAGNOSIS — E78.5 DYSLIPIDEMIA: ICD-10-CM

## 2022-06-28 DIAGNOSIS — F17.200 NICOTINE DEPENDENCE: Primary | ICD-10-CM

## 2022-06-28 LAB
BRPFT: NORMAL
DLCO ADJ PRE: 15.51 ML/(MIN*MMHG)
DLCO SINGLE BREATH LLN: 13.9
DLCO SINGLE BREATH PRE REF: 74.5 %
DLCO SINGLE BREATH REF: 19.63
DLCOC SBVA LLN: 2.73
DLCOC SBVA PRE REF: 98.4 %
DLCOC SBVA REF: 4.29
DLCOC SINGLE BREATH LLN: 13.9
DLCOC SINGLE BREATH PRE REF: 79 %
DLCOC SINGLE BREATH REF: 19.63
DLCOVA LLN: 2.73
DLCOVA PRE REF: 92.9 %
DLCOVA PRE: 3.99 ML/(MIN*MMHG*L)
DLCOVA REF: 4.29
DLVAADJ PRE: 4.23 ML/(MIN*MMHG*L)
ERVN2 LLN: -16449.4
ERVN2 PRE REF: 31.9 %
ERVN2 PRE: 0.19 L
ERVN2 REF: 0.6
FEF 25 75 CHG: 12 %
FEF 25 75 LLN: 0.55
FEF 25 75 POST REF: 37.8 %
FEF 25 75 PRE REF: 33.8 %
FEF 25 75 REF: 1.5
FET100 CHG: 4.6 %
FEV1 CHG: 2.4 %
FEV1 FVC CHG: -1.6 %
FEV1 FVC LLN: 65
FEV1 FVC POST REF: 81.4 %
FEV1 FVC PRE REF: 82.7 %
FEV1 FVC REF: 79
FEV1 LLN: 1.18
FEV1 POST REF: 77.1 %
FEV1 PRE REF: 75.3 %
FEV1 REF: 1.71
FRCN2 LLN: 1.77
FRCN2 PRE REF: 178.1 %
FRCN2 REF: 2.59
FVC CHG: 4 %
FVC LLN: 1.54
FVC POST REF: 94.2 %
FVC PRE REF: 90.5 %
FVC REF: 2.19
IVC PRE: 1.76 L
IVC SINGLE BREATH LLN: 1.54
IVC SINGLE BREATH PRE REF: 80.1 %
IVC SINGLE BREATH REF: 2.19
PEF CHG: 4.6 %
PEF LLN: 2.46
PEF POST REF: 118.2 %
PEF PRE REF: 113 %
PEF REF: 4.33
POST FEF 25 75: 0.57 L/S
POST FET 100: 12.23 SEC
POST FEV1 FVC: 63.92 %
POST FEV1: 1.32 L
POST FVC: 2.06 L
POST PEF: 5.11 L/S
PRE DLCO: 14.63 ML/(MIN*MMHG)
PRE FEF 25 75: 0.51 L/S
PRE FET 100: 11.7 SEC
PRE FEV1 FVC: 64.94 %
PRE FEV1: 1.29 L
PRE FRC N2: 4.61 L
PRE FVC: 1.99 L
PRE PEF: 4.89 L/S
RVN2 LLN: 1.42
RVN2 PRE REF: 221.7 %
RVN2 PRE: 4.42 L
RVN2 REF: 1.99
RVN2TLCN2 LLN: 33.85
RVN2TLCN2 PRE REF: 161.3 %
RVN2TLCN2 PRE: 70.05 %
RVN2TLCN2 REF: 43.44
TLCN2 LLN: 3.58
TLCN2 PRE REF: 138 %
TLCN2 PRE: 6.31 L
TLCN2 REF: 4.57
VA PRE: 3.67 L
VA SINGLE BREATH LLN: 4.42
VA SINGLE BREATH PRE REF: 83 %
VA SINGLE BREATH REF: 4.42
VCMAXN2 LLN: 1.54
VCMAXN2 PRE REF: 86.2 %
VCMAXN2 PRE: 1.89 L
VCMAXN2 REF: 2.19

## 2022-06-28 PROCEDURE — 99214 PR OFFICE/OUTPT VISIT, EST, LEVL IV, 30-39 MIN: ICD-10-PCS | Mod: S$PBB,,, | Performed by: NURSE PRACTITIONER

## 2022-06-28 PROCEDURE — 99402 PR PREVENT COUNSEL,INDIV,30 MIN: ICD-10-PCS | Mod: S$GLB,,,

## 2022-06-28 PROCEDURE — 99215 OFFICE O/P EST HI 40 MIN: CPT | Mod: PBBFAC,PO | Performed by: NURSE PRACTITIONER

## 2022-06-28 PROCEDURE — 99999 PR PBB SHADOW E&M-EST. PATIENT-LVL V: ICD-10-PCS | Mod: PBBFAC,,, | Performed by: NURSE PRACTITIONER

## 2022-06-28 PROCEDURE — 99402 PREV MED CNSL INDIV APPRX 30: CPT | Mod: S$GLB,,,

## 2022-06-28 PROCEDURE — 99214 OFFICE O/P EST MOD 30 MIN: CPT | Mod: S$PBB,,, | Performed by: NURSE PRACTITIONER

## 2022-06-28 PROCEDURE — 99999 PR PBB SHADOW E&M-EST. PATIENT-LVL I: ICD-10-PCS | Mod: PBBFAC,,,

## 2022-06-28 PROCEDURE — 99999 PR PBB SHADOW E&M-EST. PATIENT-LVL V: CPT | Mod: PBBFAC,,, | Performed by: NURSE PRACTITIONER

## 2022-06-28 PROCEDURE — 99999 PR PBB SHADOW E&M-EST. PATIENT-LVL I: CPT | Mod: PBBFAC,,,

## 2022-06-28 RX ORDER — IBUPROFEN 200 MG
1 TABLET ORAL DAILY
Qty: 28 PATCH | Refills: 0 | Status: SHIPPED | OUTPATIENT
Start: 2022-06-28 | End: 2023-01-27 | Stop reason: SDUPTHER

## 2022-06-28 NOTE — PROGRESS NOTES
Individual Follow-Up Form    6/28/2022    Quit Date: N/A    Clinical Status of Patient: Outpatient    Length of Service: 30 minutes    Continuing Medication: yes  Patches or Nicotine Lozenges    Other Medications:      Target Symptoms: Withdrawal and medication side effects. The following were  rated moderate (3) to severe (4) on TCRS:  · Moderate (3): none  · Severe (4): none    Comments: completion of TCRS (Tobacco Cessation Rating Scale) reviewed strategies, controlling environment, cues, triggers, new goals set. Introduced high risk situations with preparation interventions, caffeine similarities with withdrawal issues of habit and nicotine, Alcohol, Understanding urges, cravings, stress and relaxation. Open discussion with intervention discussion.  Patient states she is smoking about 9 cpd, but only smokes about 0.5 of each cigarette.  We discussed moving all ashtrays outside and only smoking outside.  We discussed the 7-10 second blood/brain barrier of smoking and Nicotine.  We discussed placing her cigarettes on top of the microwave or in a kitchen drawer and they do not leave that spot.  We discussed not phone or talking to anyone when smoking.  Patient remains on prescribed tobacco cessation medication regimen of 21 mg patch without any negative side effects at this time.  The patient will continue with therapy sessions and medication monitoring by CTTS. Prescribed medication management will be by physician.       Diagnosis: F17.200    Next Visit: Unknown, patient unwilling to schedule at this time.

## 2022-06-28 NOTE — PROGRESS NOTES
"  HPI     Chief Complaint:  Chief Complaint   Patient presents with    Mass     Left cheek       Stephanie Sears is a 72 y.o. female with multiple medical diagnoses as listed in the medical history and problem list that presents for abscess to left cheek.  Pt is new to me but is known to this clinic with her last appointment being 6/15/2022.        Skin abscess:  Pt was seen for this condition on 6/15/22. See encounter notes below:    HPI   Ms. Stephanie Sears is a 72 y.o. female with medical problems as listed below. The patient presents to clinic with c/o LEFT side facial boil since Saturday. She states that it has decreased in size. She has been doing warm compresses to the area and triple ointment. She has a lot of pain in the area. Denies any fever.     Assessment & Plan  Abscess  -     clindamycin (CLEOCIN) 300 MG capsule; Take 1 capsule (300 mg total) by mouth 2 (two) times a day. for 7 days  Dispense: 14 capsule; Refill: 0  -     mupirocin (BACTROBAN) 2 % ointment; Apply topically 3 (three) times daily. for 7 days  Dispense: 1 g; Refill: 0     Skin infection  -     clindamycin (CLEOCIN) 300 MG capsule; Take 1 capsule (300 mg total) by mouth 2 (two) times a day. for 7 days  Dispense: 14 capsule; Refill: 0  -     mupirocin (BACTROBAN) 2 % ointment; Apply topically 3 (three) times daily. for 7 days  Dispense: 1 g; Refill: 0       Patient reports symptoms improved somewhat after treatment with antibiotics.  States abscess is still present.  Denies redness, discharge, increased swelling or pain.  Patient specifically requesting to have abscess "cut out."      she is compliant with medications daily without any adverse side effects.    Patient Care Team:  Williams Rossi Jr., MD as PCP - General (Family Medicine)  Sandi Menon MD as Consulting Physician (Urology)  Franki Ordonez MA as Care Coordinator  Ronald Romero MD as Consulting Physician (Nephrology)  Tiffany Garrison MD as Consulting " Physician (Orthopedic Surgery)  Sarah Oliver MD as Consulting Physician (Physical Medicine and Rehabilitation)  Dean Robins MD as Consulting Physician (Cardiology)  Melany Tse MD as Consulting Physician (Ophthalmology)  Melany Tse MD as Consulting Physician (Ophthalmology)  Yonatan Garsia Jr., MD as Consulting Physician (Pain Medicine)  Justin Nicholson MD as Consulting Physician (Pulmonary Disease)  Dean Robins MD as Consulting Physician (Cardiology)    History     Past Medical History:  Past Medical History:   Diagnosis Date    Acute pancreatitis     Angina pectoris     Anxiety     Arthritis     Back pain     Bronchitis     Cervical spondylosis with radiculopathy 7/10/2012    Chest pain     Chronic neck pain 7/26/2012    Colon polyps     Controlled type 2 diabetes mellitus with stage 2 chronic kidney disease, with long-term current use of insulin 1/24/2020    Coronary artery disease     Depression     Fibrocystic breast     GERD (gastroesophageal reflux disease)     Glaucoma     Heart failure     Hyperlipidemia     Hypertension     Neck pain 7/10/2012    Obesity     JAM (obstructive sleep apnea)     Pneumonia     Pneumonia due to other staphylococcus     Primary osteoarthritis of both knees     Psoriasis     Subacromial or subdeltoid bursitis 7/10/2012    Thyroid disease     Tobacco dependence     Trouble in sleeping     Type 2 diabetes mellitus 12/10/2013    Type 2 diabetes mellitus with diabetic chronic kidney disease        Past Surgical History:  Past Surgical History:   Procedure Laterality Date    BREAST BIOPSY Bilateral 1988    BREAST MASS EXCISION Right     benign    CATARACT EXTRACTION W/  INTRAOCULAR LENS IMPLANT Left 12/09/2020    PHACO IOL WITH I-STENT ()    CATARACT EXTRACTION W/  INTRAOCULAR LENS IMPLANT Right 10/21/2020    PHACO IOL WITH I-STENT x 2 ()    CHOLECYSTECTOMY      COLONOSCOPY N/A 5/22/2019     Procedure: COLONOSCOPY;  Surgeon: Darrin Calvin MD;  Location: [facility] ENDO (2ND FLR);  Service: Endoscopy;  Laterality: N/A;  2nd floor - all other procedure done on 2, multiple comorbidities - ERW/   change in MD schedule, Pt notified and verbalized understanding, new arrival time 0800, Ins mailed to Pt with new arrival time - ERW1/8/19@1130    EPIDURAL STEROID INJECTION Bilateral 2/26/2021    Procedure: Injection, Steroid, Ischial Bursa;  Surgeon: Yonatan Garsia Jr., MD;  Location: [facility] ENDO;  Service: Pain Management;  Laterality: Bilateral;  Bilateral Ischial Bursa Steroid Injections  Arrive @ 1230; No ATC; Check BG    HYSTERECTOMY  1980's    IMPLANTATION OF DEVICE FOR GLAUCOMA Right 10/21/2020    Procedure: INSERTION, DEVICE, FOR GLAUCOMA/ i Stent;  Surgeon: Melany Tse MD;  Location: [facility] OR [address] [place] [address];  Service: Ophthalmology;  Laterality: Right;    IMPLANTATION OF DEVICE FOR GLAUCOMA Left 12/9/2020    Procedure: INSERTION, DEVICE, FOR GLAUCOMA/I SENT;  Surgeon: Melany Tse MD;  Location: [facility] OR [address] [place] [address];  Service: Ophthalmology;  Laterality: Left;    INJECTION OF JOINT Bilateral 12/23/2021    Procedure: Injection, Joint---BILATERAL ISCHIAL BURSA STEROID INJECTION;  Surgeon: Yonatan Garsia Jr., MD;  Location: [facility] PAIN MGMT;  Service: Pain Management;  Laterality: Bilateral;    INTRAOCULAR PROSTHESES INSERTION Right 10/21/2020    Procedure: INSERTION, IOL PROSTHESIS;  Surgeon: Melany Tse MD;  Location: [address];  Service: Ophthalmology;  Laterality: Right;    INTRAOCULAR PROSTHESES INSERTION Left 12/9/2020    Procedure: INSERTION, IOL PROSTHESIS;  Surgeon: Melany Tse MD;  Location: [address];  Service: Ophthalmology;  Laterality: Left;    KNEE SURGERY Left 1-20-16    TKR    KNEE SURGERY Right 02/26/2018    TKR    L4-L5 fusion  2006    PHACOEMULSIFICATION OF CATARACT Right 10/21/2020    Procedure: PHACOEMULSIFICATION, CATARACT;  Surgeon:  Melany Tse MD;  Location: [address];  Service: Ophthalmology;  Laterality: Right;    PHACOEMULSIFICATION OF CATARACT Left 12/9/2020    Procedure: PHACOEMULSIFICATION, CATARACT;  Surgeon: Melany Tse MD;  Location: [facility] OR [address] [place] [address];  Service: Ophthalmology;  Laterality: Left;       Social History:  Social History     Socioeconomic History    Marital status: Single   Tobacco Use    Smoking status: Current Every Day Smoker     Packs/day: 1.00     Years: 40.00     Pack years: 40.00     Types: Cigarettes    Smokeless tobacco: Current User   Substance and Sexual Activity    Alcohol use: No     Alcohol/week: 0.0 standard drinks    Drug use: No    Sexual activity: Yes     Partners: Male       Family History:  Family History   Problem Relation Age of Onset    Diabetes Mother     Hypertension Mother         CHF, angina    Angina Mother     Kidney disease Mother     Heart disease Mother         CHF    Hypertension Father         glaucoma, Alzhiemer's , supra pubic    Alzheimer's disease Father     Glaucoma Father     Kidney disease Brother         kidney transplant, HTN,DM    Diabetes Brother     Glaucoma Sister     Hypertension Sister     Hyperlipidemia Sister     Gout Son     Hypertension Son     Diabetes Son     Sleep apnea Sister     Hypertension Sister     Thyroid disease Sister     No Known Problems Sister     No Known Problems Sister     Hepatitis Brother     Amblyopia Neg Hx     Blindness Neg Hx     Melanoma Neg Hx     Macular degeneration Neg Hx     Retinal detachment Neg Hx     Strabismus Neg Hx        Allergies and Medications: (updated and reviewed)  Review of patient's allergies indicates:   Allergen Reactions    Hydrocodone Shortness Of Breath    Iodinated contrast media Shortness Of Breath     Difficulty breathing    Adhesive Itching     Skin peeling    Morphine Hallucinations    Oxycodone Hallucinations    Sulfa (sulfonamide antibiotics) Hives and  Itching     Current Outpatient Medications   Medication Sig Dispense Refill    albuterol (PROVENTIL/VENTOLIN HFA) 90 mcg/actuation inhaler USE 2 INHALATIONS EVERY 4 HOURS AS NEEDED FOR WHEEZING, RESCUE 54 g 3    amLODIPine-benazepriL (LOTREL) 10-40 mg per capsule TAKE 1 CAPSULE DAILY 90 capsule 1    atorvastatin (LIPITOR) 20 MG tablet TAKE 1 TABLET DAILY 90 tablet 3    carvediloL (COREG) 12.5 MG tablet TAKE 1 TABLET TWICE A DAY WITH MEALS 180 tablet 3    diphenhydrAMINE (BENADRYL) 25 mg capsule Take 25 mg by mouth every 6 (six) hours as needed for Itching or Allergies.      gabapentin (NEURONTIN) 600 MG tablet TAKE 1 TABLET THREE TIMES A  tablet 0    INULIN (FIBER GUMMIES ORAL) Take 1 each by mouth every morning.       lancets (ACCU-CHEK MULTICLIX LANCET) Misc Test blood sugar twice daily 300 each 3    nicotine (NICODERM CQ) 21 mg/24 hr Place 1 patch onto the skin once daily. 28 patch 0    nicotine, polacrilex, 2 mg lzmn Take 1 each (2 mg total) by mouth as needed (Take 1 piece as needed. Maximum dose of 10 per day.). 243 each 0    simethicone (GAS-X ORAL) Take 1 tablet by mouth as needed.       spironolactone (ALDACTONE) 25 MG tablet Take 1 tablet (25 mg total) by mouth once daily. 30 tablet 11    ammonium lactate 12 % Crea Apply twice daily to affected parts both feet as needed. (Patient not taking: No sig reported) 140 g 11    ciclopirox (PENLAC) 8 % Soln Apply topically nightly. (Patient not taking: No sig reported) 6.6 mL 11    cyclobenzaprine (FLEXERIL) 10 MG tablet Take 1 tablet (10 mg total) by mouth 3 (three) times daily as needed for Muscle spasms. (Patient not taking: No sig reported) 45 tablet 0    dapagliflozin (FARXIGA) 5 mg Tab tablet Take 1 tablet (5 mg total) by mouth once daily. (Patient not taking: No sig reported) 30 tablet 4     No current facility-administered medications for this visit.       Exam     Review of Systems:  (as noted above)  Review of Systems    Constitutional: Negative for diaphoresis and fever.   HENT: Negative for trouble swallowing and voice change.    Eyes: Negative for visual disturbance.   Respiratory: Negative for chest tightness, shortness of breath and wheezing.    Cardiovascular: Negative for chest pain and palpitations.   Gastrointestinal: Negative for anal bleeding and blood in stool.   Endocrine: Negative for polydipsia and polyphagia.   Genitourinary: Negative for decreased urine volume, hematuria and vaginal pain.   Musculoskeletal: Negative for neck pain.   Skin: Positive for wound. Negative for rash.   Neurological: Negative for seizures and speech difficulty.   Psychiatric/Behavioral: Negative for confusion, decreased concentration, self-injury and suicidal ideas.       Physical Exam:   Physical Exam  Constitutional:       General: She is not in acute distress.     Appearance: She is not ill-appearing or diaphoretic.   HENT:      Head: Normocephalic and atraumatic.   Eyes:      General: No scleral icterus.     Pupils: Pupils are equal, round, and reactive to light.   Neck:      Vascular: No carotid bruit.   Cardiovascular:      Rate and Rhythm: Normal rate and regular rhythm.      Pulses: Normal pulses.      Heart sounds: No murmur heard.    No friction rub. No gallop.   Pulmonary:      Effort: No respiratory distress.      Breath sounds: No wheezing.   Chest:      Chest wall: No tenderness.   Musculoskeletal:         General: No signs of injury.      Cervical back: No rigidity or tenderness.   Lymphadenopathy:      Cervical: No cervical adenopathy.   Skin:     Capillary Refill: Capillary refill takes 2 to 3 seconds.      Findings: Lesion present.      Comments: See media     Neurological:      Mental Status: She is alert.      Cranial Nerves: No cranial nerve deficit.      Sensory: No sensory deficit.      Motor: No weakness.   Psychiatric:         Mood and Affect: Mood normal.                 Vitals:    06/28/22 1539   BP: 130/60   BP  Location: Right arm   Pulse: 63   Temp: 98.4 °F (36.9 °C)   TempSrc: Oral   SpO2: 99%      There is no height or weight on file to calculate BMI.    Assessment & Plan   (all problems are new to me)      Problem List Items Addressed This Visit        Cardiac/Vascular    Atherosclerosis of aortic arch    Overview     Noted on CXR from 16.           Current Assessment & Plan     -assessed and addressed all modifiable risk factors.  Continue with appropriate management to prevent complications with regards to lipid and BP monitoring.             Dyslipidemia    Current Assessment & Plan     discussed ways to lower triglycerides such as cutting simple sugars out of diet (white breads, candies, cookies, cakes, etc.) and reducing/eliminating intake of highly processed trans fatty acids.   Exercise 30 minutes a day for 4-5 days a week.   Eat more fiber.               Essential hypertension    Overview     bp log reviewed.  bp spiked in the am.  cpap log d/w patient.  Patient is reassured that jarrett is being optimized.  Recommend follow up with pcp for bp adjustment.             Current Assessment & Plan       -continue current medication regimen  -DASH diet, regular cardiovascular exercises, portion control  - ?weight loss  -f/u with BP logs in 2 weeks if BP is not consistently <140/90                  Endocrine    Obesity    Overview     BMI= 37.4 on 2014           Current Assessment & Plan     We discussed weight issues and safe, effective ways of losing pounds, includin) diet:  low carbohydrate, low fat diet, stay away from fast food, fried and processed food, use whole grain, lot of fruits and vegetables, use healthy fat such as avocado, nuts and olive oil in reasonable quantity, stay away from sodas. Regular meals with lean proteins.  2) physical activity: ideally 150 min a week, with cardiovascular and resistance activity.  Patient was encouraged to set realistic attainable goals for weight loss, and we  will follow up periodically.             Type 2 diabetes mellitus with diabetic chronic kidney disease    Current Assessment & Plan     -discussed with patient about routine diabetic care that includes but are not limited to regular eye exams, skin care, daily foot exam, proper nutrition, regular BG monitoring at home (fasting and mealtime) and medication compliance in a diabetic.  Target morning BS  and meal-time BS <180                GI    GERD (gastroesophageal reflux disease)    Overview     Multiple regurgitation events under anesthesia, recommending ETT for future general anesthetics           Current Assessment & Plan     -stable, discussed with patient about avoiding potential dietary triggers  -avoid spicy/greasy/sour/acidic foods, as well as tea/coffee/chocolate if possible  -Tylenol as needed for pain, avoid NSAIDs  -Keep food diary                  Orthopedic    Primary osteoarthritis of both knees    Current Assessment & Plan     Reports knee pain has mostly improved.               Other    Tobacco abuse    Overview     encourage tobacco cessation.  Patient is enrolled in smoking cessation.  Nicotine patch           Current Assessment & Plan     -Counseled patient to quit tobacco usage, and advised on several options to quit and the benefits of quitting, which include but are not limited to: decreasing the risk of cancer, HTN, CVD, respiratory complications, among other diseases.  -advised smoking cessation               Relevant Orders    Ambulatory referral/consult to Smoking Cessation Program      Other Visit Diagnoses     Abscess    -  Primary    Relevant Orders    Ambulatory referral/consult to General Surgery    See media above.  Patient reports mild improvement after use of antibiotics.  Denies new concerning symptoms.  Denies increase in abscess size, pain, discharge or fever.  Mild tenderness to area on palpation.  No discharge easily expressed.    Refer to general surgery for possible  incision and drainage.    Discussed DDx, condition, and treatment     ED precautions given.   Notify provider if symptoms do not resolve or increase in severity.    Patient verbalizes understanding and agrees with plan of care.            --------------------------------------------    Health Maintenance:  Health Maintenance       Date Due Completion Date    TETANUS VACCINE Never done ---    Shingles Vaccine (1 of 2) Never done ---    COVID-19 Vaccine (4 - Booster for Pfizer series) 04/20/2022 12/20/2021    Colorectal Cancer Screening 07/16/2022 7/16/2019    Hemoglobin A1c 10/29/2022 4/29/2022    Mammogram 11/23/2022 11/23/2021    Eye Exam 03/28/2023 3/28/2022    Diabetes Urine Screening 04/29/2023 4/29/2022    Lipid Panel 04/29/2023 4/29/2022    DEXA Scan 05/06/2023 5/6/2021    LDCT Lung Screen 05/09/2023 5/9/2022    Foot Exam 05/31/2023 5/31/2022    Override on 5/23/2022: Done    Override on 12/27/2018: Done    Low Dose Statin 06/15/2023 6/15/2022          Health maintenance reviewed. Vaccines offered patient declined at this time     Follow Up:  Follow up in about 2 weeks (around 7/12/2022), or if symptoms worsen or fail to improve.      The patient expressed understanding and no barriers to adherence were identified.      - The patient indicates understanding of these issues and agrees with the plan. Brief care plan is updated and reviewed with the patient as applicable.      - The patient is given an After Visit Summary that lists all medications with directions, allergies, education, orders placed during this encounter and follow-up instructions.      - I have reviewed the patient's medical information including past medical, family, and social history sections including the medications and allergies.      - We discussed the patient's current medications.     This note was created by combination of typed  and MModal dictation.  Transcription errors may be present.  If there are any questions, please  contact me.       Ronald Villatoro NP

## 2022-06-30 ENCOUNTER — EXTERNAL CHRONIC CARE MANAGEMENT (OUTPATIENT)
Dept: PRIMARY CARE CLINIC | Facility: CLINIC | Age: 72
End: 2022-06-30
Payer: MEDICARE

## 2022-06-30 PROCEDURE — 99439 CHRNC CARE MGMT STAF EA ADDL: CPT | Mod: PBBFAC,PN | Performed by: FAMILY MEDICINE

## 2022-06-30 PROCEDURE — 99490 PR CHRONIC CARE MGMT, 1ST 20 MIN: ICD-10-PCS | Mod: S$PBB,,, | Performed by: FAMILY MEDICINE

## 2022-06-30 PROCEDURE — 99439 CHRNC CARE MGMT STAF EA ADDL: CPT | Mod: S$PBB,,, | Performed by: FAMILY MEDICINE

## 2022-06-30 PROCEDURE — 99439 PR CHRONIC CARE MGMT, EA ADDTL 20 MIN: ICD-10-PCS | Mod: S$PBB,,, | Performed by: FAMILY MEDICINE

## 2022-06-30 PROCEDURE — 99490 CHRNC CARE MGMT STAFF 1ST 20: CPT | Mod: S$PBB,,, | Performed by: FAMILY MEDICINE

## 2022-06-30 PROCEDURE — 99490 CHRNC CARE MGMT STAFF 1ST 20: CPT | Mod: PBBFAC,PN | Performed by: FAMILY MEDICINE

## 2022-07-09 RX ORDER — ATORVASTATIN CALCIUM 20 MG/1
20 TABLET, FILM COATED ORAL DAILY
COMMUNITY
End: 2022-08-10 | Stop reason: SDUPTHER

## 2022-07-09 RX ORDER — ALBUTEROL SULFATE 90 UG/1
2 AEROSOL, METERED RESPIRATORY (INHALATION) EVERY 4 HOURS PRN
COMMUNITY
End: 2023-11-16

## 2022-07-10 NOTE — TELEPHONE ENCOUNTER
Albuterol and atorvastatin orders discontinued due to cosign declined by Dr. Rossi. Entered patient-reported order on med list for placeholder.

## 2022-07-13 ENCOUNTER — TELEPHONE (OUTPATIENT)
Dept: SMOKING CESSATION | Facility: CLINIC | Age: 72
End: 2022-07-13
Payer: MEDICARE

## 2022-07-13 NOTE — TELEPHONE ENCOUNTER
Smoking Cessation counselor attempted to contact patient regarding her smoking progress, but patient did not answer.  Counselor left a message requesting a return call.      Erica Cherry RRT,MSW,LMSW,TTS  [phone]

## 2022-07-14 ENCOUNTER — TELEPHONE (OUTPATIENT)
Dept: SMOKING CESSATION | Facility: CLINIC | Age: 72
End: 2022-07-14
Payer: MEDICARE

## 2022-07-14 NOTE — TELEPHONE ENCOUNTER
Smoking Cessation counselor attempted to contact patient regarding her smoking progress, but patient did not answer.  Counselor left a message requesting a return call.      Erica Cherry RRT,MSW,LMSW,TTS  [phone]

## 2022-07-21 ENCOUNTER — CLINICAL SUPPORT (OUTPATIENT)
Dept: SMOKING CESSATION | Facility: CLINIC | Age: 72
End: 2022-07-21
Payer: COMMERCIAL

## 2022-07-21 DIAGNOSIS — F17.200 NICOTINE DEPENDENCE: Primary | ICD-10-CM

## 2022-07-21 PROCEDURE — 99999 PR PBB SHADOW E&M-EST. PATIENT-LVL I: ICD-10-PCS | Mod: PBBFAC,,,

## 2022-07-21 PROCEDURE — 99999 PR PBB SHADOW E&M-EST. PATIENT-LVL I: CPT | Mod: PBBFAC,,,

## 2022-07-21 PROCEDURE — 99407 PR TOBACCO USE CESSATION INTENSIVE >10 MINUTES: ICD-10-PCS | Mod: S$GLB,,,

## 2022-07-21 PROCEDURE — 99407 BEHAV CHNG SMOKING > 10 MIN: CPT | Mod: S$GLB,,,

## 2022-07-21 NOTE — PROGRESS NOTES
Called pt to f/u on her 3 and 6 month smoking cessation quit status. Pt stated she is still smoking, but is still in program and using patches and lozenges to cut back. Informed her of benefit period, phone follow ups, and contact information. Will complete smart form for 3 and 6 months and will continue to follow up quit #3 episode.

## 2022-07-25 ENCOUNTER — CLINICAL SUPPORT (OUTPATIENT)
Dept: SMOKING CESSATION | Facility: CLINIC | Age: 72
End: 2022-07-25
Payer: COMMERCIAL

## 2022-07-25 DIAGNOSIS — F17.200 NICOTINE DEPENDENCE: Primary | ICD-10-CM

## 2022-07-25 PROCEDURE — 99402 PREV MED CNSL INDIV APPRX 30: CPT | Mod: S$GLB,,,

## 2022-07-25 PROCEDURE — 99402 PR PREVENT COUNSEL,INDIV,30 MIN: ICD-10-PCS | Mod: S$GLB,,,

## 2022-07-25 PROCEDURE — 99999 PR PBB SHADOW E&M-EST. PATIENT-LVL II: CPT | Mod: PBBFAC,,,

## 2022-07-25 PROCEDURE — 99999 PR PBB SHADOW E&M-EST. PATIENT-LVL II: ICD-10-PCS | Mod: PBBFAC,,,

## 2022-07-25 NOTE — PROGRESS NOTES
Individual Follow-Up Form    7/25/2022    Quit Date: TBD    Clinical Status of Patient: Outpatient    Length of Service: 30 minutes    Continuing Medication: yes  Patches or Nicotine Lozenges    Other Medications: None     Target Symptoms: Withdrawal and medication side effects. The following were  rated moderate (3) to severe (4) on TCRS:  · Moderate (3): Crave and Desire  · Severe (4): None    Comments: Counselor spoke with patient for telephonic visit, name and date of birth verified as two patient identifiers.   Patient reported she is still smoking, and she is still inconsistently using 21 mg nicotine patch in conjunction with 2 mg nicotine lozenges without any negative side effects reported at this time.  Counselor discussed consistent usage of NRT, and patient also reported she is planning to rid her house of smoke contained items to aid with behavorial changes.  Counselor congratulated patient on her continual effort to work towards behavorial modification.  Follow-up visit scheduled in two weeks.  Counselor will remain available should any further needs arise.      Diagnosis: F17.200    Next Visit: 2 weeks

## 2022-07-26 ENCOUNTER — OFFICE VISIT (OUTPATIENT)
Dept: OPHTHALMOLOGY | Facility: CLINIC | Age: 72
End: 2022-07-26
Payer: MEDICARE

## 2022-07-26 ENCOUNTER — CLINICAL SUPPORT (OUTPATIENT)
Dept: OPHTHALMOLOGY | Facility: CLINIC | Age: 72
End: 2022-07-26
Payer: MEDICARE

## 2022-07-26 DIAGNOSIS — H40.1131 PRIMARY OPEN-ANGLE GLAUCOMA, BILATERAL, MILD STAGE: Primary | ICD-10-CM

## 2022-07-26 DIAGNOSIS — Z96.1 PSEUDOPHAKIA, BOTH EYES: ICD-10-CM

## 2022-07-26 DIAGNOSIS — H35.372 EPIRETINAL MEMBRANE (ERM) OF LEFT EYE: ICD-10-CM

## 2022-07-26 PROCEDURE — 92083 EXTENDED VISUAL FIELD XM: CPT | Mod: PBBFAC | Performed by: OPHTHALMOLOGY

## 2022-07-26 PROCEDURE — 99999 PR PBB SHADOW E&M-EST. PATIENT-LVL III: ICD-10-PCS | Mod: PBBFAC,,, | Performed by: OPHTHALMOLOGY

## 2022-07-26 PROCEDURE — 92133 CPTRZD OPH DX IMG PST SGM ON: CPT | Mod: PBBFAC | Performed by: OPHTHALMOLOGY

## 2022-07-26 PROCEDURE — 92133 POSTERIOR SEGMENT OCT OPTIC NERVE(OCULAR COHERENCE TOMOGRAPHY) - OU - BOTH EYES: ICD-10-PCS | Mod: 26,S$PBB,, | Performed by: OPHTHALMOLOGY

## 2022-07-26 PROCEDURE — 99999 PR PBB SHADOW E&M-EST. PATIENT-LVL III: CPT | Mod: PBBFAC,,, | Performed by: OPHTHALMOLOGY

## 2022-07-26 PROCEDURE — 99213 OFFICE O/P EST LOW 20 MIN: CPT | Mod: PBBFAC | Performed by: OPHTHALMOLOGY

## 2022-07-26 PROCEDURE — 92014 COMPRE OPH EXAM EST PT 1/>: CPT | Mod: S$PBB,,, | Performed by: OPHTHALMOLOGY

## 2022-07-26 PROCEDURE — 92083 HUMPHREY VISUAL FIELD - OU - BOTH EYES: ICD-10-PCS | Mod: 26,S$PBB,, | Performed by: OPHTHALMOLOGY

## 2022-07-26 PROCEDURE — 92014 PR EYE EXAM, EST PATIENT,COMPREHESV: ICD-10-PCS | Mod: S$PBB,,, | Performed by: OPHTHALMOLOGY

## 2022-07-26 NOTE — PROGRESS NOTES
"        HPI     DLS: 3/28/2022    Pt here for HVF review/OCT;  Pt states eyes do get dry. Pt states she does sleep with a Cpap machine.     Meds;  AT's prn - 1-2 x day     1  Preperimetric POAG vs OHT   2. PVD OD   3. PCIOL OU   4. Blepharitis / MGD   5. RAGHU Allergy   6. Myopia with Astigmatism and Presbyopia     Last edited by Angelic Saravia on 7/26/2022  9:10 AM. (History)                Assessment /Plan     For exam results, see Encounter Report.    Primary open-angle glaucoma, bilateral, mild stage  -     Posterior Segment OCT Optic Nerve- Both eyes  -     Tan Visual Field - OU - Extended - Both Eyes    Epiretinal membrane (ERM) of left eye    Pseudophakia, both eyes          1. Pre-perimetric mild POAG   Vs OHT  -Followed at [facility] since 1991   -First HVF 1999   -First photos 1991   - Intolerant to all gtts - they aggravate his blepharitis-? RAGHU allergy   -IOP "OK" off gtts and s/p ALT ou and SLT od - 2008    Family history neg   Glaucoma meds none (( off gtts post SLT ou -))   H/O adverse rxn to glaucoma drops Intolerant to all gtts - 2/2 aggravates blepharitis- ? RAGHU allergy   LASERS ALT ou -? Date / SLT OD 7/17/08 - good response   GLAUCOMA SURGERIES - I stent x 2 - OD  - 10/21/2020 // I stent 12/9/2020 - OS   OTHER EYE SURGERIES - phaco/IOL od - 10/21/2020 - PCB00 14.0  // oS 12/9/2020 - pcb00 15.0   CDR 0.6/0.3   Tbase 19-26 / 16-21   Tmax 26/21   Ttarget ?   HVF 15 test - 1999 to 2021 - Full ou   Gonio +3 ou   /588   OCT 6 test 2005 to 2021 -  RNFL - OD: bord TI (?prog)  // OS:NL  HRT 9 test 2004 to 2020 -MR -  MR -  Dec T, border NI od // full os /// CDR 0.619 od // 0.508 os - but unreliable OD  Disc photos 1991, 1996, 2003 - slides // 2012 , 2016, 2020   - OIS     - Ttoday   19/16  - Test done today  IOP / HVF / DFE / OCT     S/P phaco / IOL / I-stent os - 12/10/2020    Post -op phaco.IOL - istent - x 2  OD - PCB00 - 14.0 - 10/21/2020      2. Posterior Vitreous Detachment OD - 11/2008   - RD " PRECAUTIONS    3. Eyelid inflammation / Blepharitis / MGD    4. Allergic Conjunctivitis - RAGHU allergy     5. PCO    Not vis sign yet     6. Myopia/Astigmatism/presbyopia     Plan   POAG - mild -pre-perimetric glaucoma - intolerant to all gtts   IOP ok s/p ALT ou - years ago and s/p SLT OD 2008 ( no SLT os)  Continue to monitor HVF/DFE/OCT/HRT/photos/IOP   If increase IOP or progression on VF testing consider repeat SLT OD and primary SLT os      Pt was a myope - sph eq is -5.25 od and -4.25 os - pre- phac0 -- set for distance post op     IOL OD -pt want to have IOL for distance vision - is no longer using a computer much - has retired   PCB00  14.0  (-0.7 to -0.81)  AC IOL 11.5    IOL OS   PCB00 15.0 ( -0.35 to -0.59)  AC IOL - 12.5       S/P  cataract surgery  - phaco/IOL w/ I-stent x 2 OD - 10/21/2020 - PCB00 - 14.0   POY 1.5  - phaco/IOL - I stent  x 2   Doing well      Phaco / IOL / I-stent x 2   OS Date: 12/9/2020 - PCB00 15.0   POY 1.5  - phaco/IOL   Doing well -    OCT macula - NO CME     Rx for bifocals  Given - or ok to use no distance correction and OTC readers - re-printed 3/28/2022  Pt mostly uses the drugstore reading glasses     If IOP too high off gtts and post I-stent - can re-start gtts (H/O intol to all galucoma gtts in past - ? RAGHU intol)   ?? If could try travatan Z again in future again - non RAGHU   ?? If a candidate for repeat SLT's - can avoid the I-stent areas     4 - 6  months with IOP check // gonio

## 2022-07-27 NOTE — PROGRESS NOTES
Hvf done ou. Rel/fix good od poor os coop. Good ou/ chart checked for latex allergy/pirate patch was used for testing./ -.75 + 1.00 x 180/od -1.25 + 1.00 x 176/os-[facility]

## 2022-07-31 ENCOUNTER — EXTERNAL CHRONIC CARE MANAGEMENT (OUTPATIENT)
Dept: PRIMARY CARE CLINIC | Facility: CLINIC | Age: 72
End: 2022-07-31
Payer: MEDICARE

## 2022-07-31 PROCEDURE — 99439 CHRNC CARE MGMT STAF EA ADDL: CPT | Mod: PBBFAC,PN | Performed by: FAMILY MEDICINE

## 2022-07-31 PROCEDURE — 99490 CHRNC CARE MGMT STAFF 1ST 20: CPT | Mod: PBBFAC,25,PN | Performed by: FAMILY MEDICINE

## 2022-07-31 PROCEDURE — 99439 PR CHRONIC CARE MGMT, EA ADDTL 20 MIN: ICD-10-PCS | Mod: S$PBB,,, | Performed by: FAMILY MEDICINE

## 2022-07-31 PROCEDURE — 99439 CHRNC CARE MGMT STAF EA ADDL: CPT | Mod: S$PBB,,, | Performed by: FAMILY MEDICINE

## 2022-07-31 PROCEDURE — 99490 PR CHRONIC CARE MGMT, 1ST 20 MIN: ICD-10-PCS | Mod: S$PBB,,, | Performed by: FAMILY MEDICINE

## 2022-07-31 PROCEDURE — 99490 CHRNC CARE MGMT STAFF 1ST 20: CPT | Mod: S$PBB,,, | Performed by: FAMILY MEDICINE

## 2022-08-01 ENCOUNTER — TELEPHONE (OUTPATIENT)
Dept: ADMINISTRATIVE | Facility: CLINIC | Age: 72
End: 2022-08-01
Payer: MEDICARE

## 2022-08-01 ENCOUNTER — PES CALL (OUTPATIENT)
Dept: ADMINISTRATIVE | Facility: CLINIC | Age: 72
End: 2022-08-01
Payer: MEDICARE

## 2022-08-01 ENCOUNTER — LAB VISIT (OUTPATIENT)
Dept: LAB | Facility: HOSPITAL | Age: 72
End: 2022-08-01
Attending: FAMILY MEDICINE
Payer: MEDICARE

## 2022-08-01 DIAGNOSIS — E11.51 TYPE II DIABETES MELLITUS WITH PERIPHERAL CIRCULATORY DISORDER: ICD-10-CM

## 2022-08-01 LAB
ALBUMIN SERPL BCP-MCNC: 3.1 G/DL (ref 3.5–5.2)
ALP SERPL-CCNC: 119 U/L (ref 55–135)
ALT SERPL W/O P-5'-P-CCNC: 21 U/L (ref 10–44)
ANION GAP SERPL CALC-SCNC: 5 MMOL/L (ref 8–16)
AST SERPL-CCNC: 17 U/L (ref 10–40)
BILIRUB SERPL-MCNC: 0.4 MG/DL (ref 0.1–1)
BUN SERPL-MCNC: 9 MG/DL (ref 8–23)
CALCIUM SERPL-MCNC: 9.5 MG/DL (ref 8.7–10.5)
CHLORIDE SERPL-SCNC: 109 MMOL/L (ref 95–110)
CO2 SERPL-SCNC: 26 MMOL/L (ref 23–29)
CREAT SERPL-MCNC: 0.9 MG/DL (ref 0.5–1.4)
EST. GFR  (NO RACE VARIABLE): >60 ML/MIN/1.73 M^2
GLUCOSE SERPL-MCNC: 94 MG/DL (ref 70–110)
POTASSIUM SERPL-SCNC: 4.1 MMOL/L (ref 3.5–5.1)
PROT SERPL-MCNC: 6.4 G/DL (ref 6–8.4)
SODIUM SERPL-SCNC: 140 MMOL/L (ref 136–145)

## 2022-08-01 PROCEDURE — 83036 HEMOGLOBIN GLYCOSYLATED A1C: CPT | Performed by: FAMILY MEDICINE

## 2022-08-01 PROCEDURE — 36415 COLL VENOUS BLD VENIPUNCTURE: CPT | Mod: PN | Performed by: FAMILY MEDICINE

## 2022-08-01 PROCEDURE — 80053 COMPREHEN METABOLIC PANEL: CPT | Performed by: FAMILY MEDICINE

## 2022-08-01 NOTE — TELEPHONE ENCOUNTER
Pt called in and stated she was returning my call in regards to confirming her appt for 8/2/22; pt stated she can not go to the [facility] location and would like appt rescheduled to the [facility] location; appt rescheduled to 8/2/22 at 1:30pm at the [facility] location

## 2022-08-01 NOTE — TELEPHONE ENCOUNTER
Called pt, no answer; left message informing pt I was calling to remind pt of her in office EAWV on 8/2/22 and to return my call if she had any questions; left my name and number

## 2022-08-02 LAB
ESTIMATED AVG GLUCOSE: 126 MG/DL (ref 68–131)
HBA1C MFR BLD: 6 % (ref 4–5.6)

## 2022-08-09 ENCOUNTER — TELEPHONE (OUTPATIENT)
Dept: ADMINISTRATIVE | Facility: CLINIC | Age: 72
End: 2022-08-09
Payer: MEDICARE
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
## 2022-08-09 NOTE — TELEPHONE ENCOUNTER
Called pt, informed pt I was calling to remind pt of her in office EAWV on 8/11/22; clinic location provided to patient; pt confirmed appointment

## 2022-08-10 ENCOUNTER — OFFICE VISIT (OUTPATIENT)
Dept: FAMILY MEDICINE | Facility: CLINIC | Age: 72
End: 2022-08-10
Payer: MEDICARE

## 2022-08-10 VITALS
WEIGHT: 183 LBS | HEIGHT: 62 IN | BODY MASS INDEX: 33.68 KG/M2 | OXYGEN SATURATION: 99 % | HEART RATE: 67 BPM | TEMPERATURE: 99 F | SYSTOLIC BLOOD PRESSURE: 144 MMHG | DIASTOLIC BLOOD PRESSURE: 62 MMHG

## 2022-08-10 DIAGNOSIS — I70.0 ATHEROSCLEROSIS OF AORTIC ARCH: ICD-10-CM

## 2022-08-10 DIAGNOSIS — N18.30 TYPE 2 DIABETES MELLITUS WITH STAGE 3 CHRONIC KIDNEY DISEASE, UNSPECIFIED WHETHER LONG TERM INSULIN USE, UNSPECIFIED WHETHER STAGE 3A OR 3B CKD: ICD-10-CM

## 2022-08-10 DIAGNOSIS — I10 ESSENTIAL HYPERTENSION: Primary | ICD-10-CM

## 2022-08-10 DIAGNOSIS — M54.2 CHRONIC NECK PAIN: ICD-10-CM

## 2022-08-10 DIAGNOSIS — Z91.89 ENCOUNTER FOR SCREENING FOR COLORECTAL CANCER IN HIGH RISK PATIENT: ICD-10-CM

## 2022-08-10 DIAGNOSIS — Z72.0 TOBACCO ABUSE: ICD-10-CM

## 2022-08-10 DIAGNOSIS — G89.29 CHRONIC NECK PAIN: ICD-10-CM

## 2022-08-10 DIAGNOSIS — Z12.12 ENCOUNTER FOR SCREENING FOR COLORECTAL CANCER IN HIGH RISK PATIENT: ICD-10-CM

## 2022-08-10 DIAGNOSIS — Z12.11 ENCOUNTER FOR SCREENING FOR COLORECTAL CANCER IN HIGH RISK PATIENT: ICD-10-CM

## 2022-08-10 DIAGNOSIS — E11.22 TYPE 2 DIABETES MELLITUS WITH STAGE 3 CHRONIC KIDNEY DISEASE, UNSPECIFIED WHETHER LONG TERM INSULIN USE, UNSPECIFIED WHETHER STAGE 3A OR 3B CKD: ICD-10-CM

## 2022-08-10 DIAGNOSIS — E78.5 DYSLIPIDEMIA: ICD-10-CM

## 2022-08-10 PROCEDURE — 99214 OFFICE O/P EST MOD 30 MIN: CPT | Mod: S$PBB,,, | Performed by: FAMILY MEDICINE

## 2022-08-10 PROCEDURE — 99214 PR OFFICE/OUTPT VISIT, EST, LEVL IV, 30-39 MIN: ICD-10-PCS | Mod: S$PBB,,, | Performed by: FAMILY MEDICINE

## 2022-08-10 PROCEDURE — 99999 PR PBB SHADOW E&M-EST. PATIENT-LVL V: CPT | Mod: PBBFAC,,, | Performed by: FAMILY MEDICINE

## 2022-08-10 PROCEDURE — 99999 PR PBB SHADOW E&M-EST. PATIENT-LVL V: ICD-10-PCS | Mod: PBBFAC,,, | Performed by: FAMILY MEDICINE

## 2022-08-10 PROCEDURE — 99215 OFFICE O/P EST HI 40 MIN: CPT | Mod: PBBFAC,PN | Performed by: FAMILY MEDICINE

## 2022-08-10 RX ORDER — GABAPENTIN 600 MG/1
600 TABLET ORAL 3 TIMES DAILY
Qty: 270 TABLET | Refills: 3 | Status: SHIPPED | OUTPATIENT
Start: 2022-08-10 | End: 2023-02-14 | Stop reason: SDUPTHER

## 2022-08-10 RX ORDER — CARVEDILOL 12.5 MG/1
12.5 TABLET ORAL 2 TIMES DAILY WITH MEALS
Qty: 180 TABLET | Refills: 3 | Status: SHIPPED | OUTPATIENT
Start: 2022-08-10 | End: 2023-10-28

## 2022-08-10 RX ORDER — ATORVASTATIN CALCIUM 20 MG/1
20 TABLET, FILM COATED ORAL DAILY
Qty: 90 TABLET | Refills: 3 | Status: SHIPPED | OUTPATIENT
Start: 2022-08-10 | End: 2022-11-15

## 2022-08-10 NOTE — PROGRESS NOTES
Subjective:       Patient ID: Stephanie Sears is a 72 y.o. female.    Chief Complaint: Diabetes, Hypertension, and Hyperlipidemia    Diabetes  She presents for her follow-up diabetic visit. She has type 2 diabetes mellitus. There are no hypoglycemic associated symptoms. Pertinent negatives for hypoglycemia include no headaches or sweats. Pertinent negatives for diabetes include no blurred vision and no chest pain. Symptoms are stable. Risk factors for coronary artery disease include post-menopausal, obesity, dyslipidemia and hypertension. Current diabetic treatment includes diet. There is no compliance with monitoring of blood glucose. Eye exam is current.   Hypertension  This is a chronic problem. The problem is uncontrolled. Pertinent negatives include no anxiety, blurred vision, chest pain, headaches, neck pain, orthopnea, palpitations, PND, shortness of breath or sweats. The current treatment provides moderate improvement. There are no compliance problems.    Hyperlipidemia  Pertinent negatives include no chest pain or shortness of breath.     Review of Systems   Eyes: Negative for blurred vision.   Respiratory: Negative for shortness of breath.    Cardiovascular: Negative for chest pain, palpitations, orthopnea and PND.   Musculoskeletal: Negative for neck pain.   Neurological: Negative for headaches.         Objective:      Physical Exam  Vitals reviewed.   Constitutional:       General: She is not in acute distress.     Appearance: She is well-developed. She is not diaphoretic.   HENT:      Head: Normocephalic and atraumatic.      Right Ear: Tympanic membrane, ear canal and external ear normal.      Left Ear: Tympanic membrane, ear canal and external ear normal.      Nose: Nose normal.   Eyes:      General:         Right eye: No discharge.         Left eye: No discharge.      Conjunctiva/sclera: Conjunctivae normal.      Pupils: Pupils are equal, round, and reactive to light.   Neck:      Thyroid: No  thyromegaly.      Trachea: No tracheal deviation.   Cardiovascular:      Rate and Rhythm: Normal rate and regular rhythm.      Pulses:           Radial pulses are 2+ on the right side and 2+ on the left side.      Heart sounds: Normal heart sounds, S1 normal and S2 normal. No murmur heard.  Pulmonary:      Effort: Pulmonary effort is normal. No respiratory distress.      Breath sounds: Normal breath sounds. No wheezing, rhonchi or rales.   Abdominal:      General: Bowel sounds are normal. There is no distension.      Palpations: Abdomen is soft. Abdomen is not rigid. There is no mass.      Tenderness: There is no abdominal tenderness. There is no guarding.   Musculoskeletal:      Cervical back: Normal range of motion and neck supple.   Lymphadenopathy:      Cervical: No cervical adenopathy.   Skin:     General: Skin is warm and dry.      Capillary Refill: Capillary refill takes less than 2 seconds.      Findings: No rash.   Neurological:      Mental Status: She is alert and oriented to person, place, and time.   Psychiatric:         Behavior: Behavior normal.         Lab Visit on 08/01/2022   Component Date Value Ref Range Status    Sodium 08/01/2022 140  136 - 145 mmol/L Final    Potassium 08/01/2022 4.1  3.5 - 5.1 mmol/L Final    Chloride 08/01/2022 109  95 - 110 mmol/L Final    CO2 08/01/2022 26  23 - 29 mmol/L Final    Glucose 08/01/2022 94  70 - 110 mg/dL Final    BUN 08/01/2022 9  8 - 23 mg/dL Final    Creatinine 08/01/2022 0.9  0.5 - 1.4 mg/dL Final    Calcium 08/01/2022 9.5  8.7 - 10.5 mg/dL Final    Total Protein 08/01/2022 6.4  6.0 - 8.4 g/dL Final    Albumin 08/01/2022 3.1 (A) 3.5 - 5.2 g/dL Final    Total Bilirubin 08/01/2022 0.4  0.1 - 1.0 mg/dL Final    Comment: For infants and newborns, interpretation of results should be based  on gestational age, weight and in agreement with clinical  observations.    Premature Infant recommended reference ranges:  Up to 24 hours.............<8.0  mg/dL  Up to 48 hours............<12.0 mg/dL  3-5 days..................<15.0 mg/dL  6-29 days.................<15.0 mg/dL      Alkaline Phosphatase 08/01/2022 119  55 - 135 U/L Final    AST 08/01/2022 17  10 - 40 U/L Final    ALT 08/01/2022 21  10 - 44 U/L Final    Anion Gap 08/01/2022 5 (A) 8 - 16 mmol/L Final    eGFR 08/01/2022 >60  >60 mL/min/1.73 m^2 Final    Hemoglobin A1C 08/01/2022 6.0 (A) 4.0 - 5.6 % Final    Comment: ADA Screening Guidelines:  5.7-6.4%  Consistent with prediabetes  >or=6.5%  Consistent with diabetes    High levels of fetal hemoglobin interfere with the HbA1C  assay. Heterozygous hemoglobin variants (HbS, HgC, etc)do  not significantly interfere with this assay.   However, presence of multiple variants may affect accuracy.      Estimated Avg Glucose 08/01/2022 126  68 - 131 mg/dL Final       Assessment:       Problem List Items Addressed This Visit        Cardiac/Vascular    Essential hypertension - Primary    Relevant Medications    carvediloL (COREG) 12.5 MG tablet    Other Relevant Orders    Comprehensive Metabolic Panel    Lipid Panel    CBC Auto Differential    Hemoglobin A1C    Dyslipidemia    Relevant Medications    atorvastatin (LIPITOR) 20 MG tablet    Other Relevant Orders    Comprehensive Metabolic Panel    Lipid Panel    CBC Auto Differential    Hemoglobin A1C    Atherosclerosis of aortic arch    Relevant Medications    atorvastatin (LIPITOR) 20 MG tablet       Endocrine    Type 2 diabetes mellitus with diabetic chronic kidney disease    Relevant Orders    Comprehensive Metabolic Panel    Lipid Panel    CBC Auto Differential    Hemoglobin A1C       Other    Tobacco abuse      Other Visit Diagnoses     Encounter for screening for colorectal cancer in high risk patient        Relevant Orders    Ambulatory referral/consult to Endo Procedure     Chronic neck pain        Relevant Medications    gabapentin (NEURONTIN) 600 MG tablet          Plan:       Stephanie was seen  today for diabetes, hypertension and hyperlipidemia.    Diagnoses and all orders for this visit:    Essential hypertension  -     carvediloL (COREG) 12.5 MG tablet; Take 1 tablet (12.5 mg total) by mouth 2 (two) times daily with meals.  -     Comprehensive Metabolic Panel; Future  -     Lipid Panel; Future  -     CBC Auto Differential; Future  -     Hemoglobin A1C; Future  Discussed medication compliance  Recheck BP in next 4 weeks    Dyslipidemia  -     atorvastatin (LIPITOR) 20 MG tablet; Take 1 tablet (20 mg total) by mouth once daily.  -     Comprehensive Metabolic Panel; Future  -     Lipid Panel; Future  -     CBC Auto Differential; Future  -     Hemoglobin A1C; Future  Continue atorvastatin    Atherosclerosis of aortic arch  -     atorvastatin (LIPITOR) 20 MG tablet; Take 1 tablet (20 mg total) by mouth once daily.  Continue atorvastatin    Tobacco abuse  Importance of smoking cessation discussed with patient. 3 minutes spent counseling patient on risks of smoking, benefits of stopping and ways of stopping. Patient not ready to quit.    Encounter for screening for colorectal cancer in high risk patient  -     Ambulatory referral/consult to Endo Procedure ; Future  Referral for colon cancer screening placed    Chronic neck pain  -     gabapentin (NEURONTIN) 600 MG tablet; Take 1 tablet (600 mg total) by mouth 3 (three) times daily.  Continue gabapentin    Type 2 diabetes mellitus with stage 3 chronic kidney disease, unspecified whether long term insulin use, unspecified whether stage 3a or 3b CKD  -     Comprehensive Metabolic Panel; Future  -     Lipid Panel; Future  -     CBC Auto Differential; Future  -     Hemoglobin A1C; Future  Continue diet control

## 2022-08-17 ENCOUNTER — TELEPHONE (OUTPATIENT)
Dept: ENDOSCOPY | Facility: HOSPITAL | Age: 72
End: 2022-08-17
Payer: MEDICARE

## 2022-08-17 NOTE — TELEPHONE ENCOUNTER
----- Message from Marj Amaral sent at 8/17/2022 12:31 PM CDT -----  Regarding: appt  Contact: pt @ [phone] or   Pt calling to speak with someone in Dr. Calvin's office regarding whether she needs to schedule her colonoscopy or just schedule an appt with Dr. Calvin. Please call.

## 2022-08-17 NOTE — TELEPHONE ENCOUNTER
----- Message from Zuleyka Wyman sent at 8/17/2022  2:18 PM CDT -----  Contact: pt  Pt requesting a callback to get appt scheduled follow up           Confirmed contact below:  Contact Name:Stephanie Orion  Phone Number: [phone]

## 2022-08-18 DIAGNOSIS — Z12.11 SPECIAL SCREENING FOR MALIGNANT NEOPLASMS, COLON: Primary | ICD-10-CM

## 2022-08-18 RX ORDER — POLYETHYLENE GLYCOL 3350, SODIUM SULFATE ANHYDROUS, SODIUM BICARBONATE, SODIUM CHLORIDE, POTASSIUM CHLORIDE 236; 22.74; 6.74; 5.86; 2.97 G/4L; G/4L; G/4L; G/4L; G/4L
4 POWDER, FOR SOLUTION ORAL ONCE
Qty: 4000 ML | Refills: 0 | Status: SHIPPED | OUTPATIENT
Start: 2022-08-18 | End: 2022-08-19

## 2022-08-22 ENCOUNTER — TELEPHONE (OUTPATIENT)
Dept: ENDOSCOPY | Facility: HOSPITAL | Age: 72
End: 2022-08-22
Payer: MEDICARE

## 2022-08-22 DIAGNOSIS — Z12.11 SPECIAL SCREENING FOR MALIGNANT NEOPLASMS, COLON: Primary | ICD-10-CM

## 2022-08-22 RX ORDER — SODIUM, POTASSIUM,MAG SULFATES 17.5-3.13G
1 SOLUTION, RECONSTITUTED, ORAL ORAL DAILY
Qty: 1 KIT | Refills: 0 | Status: SHIPPED | OUTPATIENT
Start: 2022-08-22 | End: 2022-08-24

## 2022-08-22 NOTE — TELEPHONE ENCOUNTER
----- Message from Darrin Calvin MD sent at 8/22/2022 10:55 AM CDT -----  I should be able to complete this with the slim pediatric colonoscope, but should have the single-balloon available in case needed.  Will you please specify this in the case request that her PCP placed.      ----- Message -----  From: Paige Anaya RN  Sent: 8/18/2022  11:28 AM CDT  To: MD Dr Nikunj Bella-    This pt is calling to schedule her follow up colonoscopy.  Please advise if this is another balloon or regular colonoscopy.  Thank you.    Paige

## 2022-08-31 ENCOUNTER — EXTERNAL CHRONIC CARE MANAGEMENT (OUTPATIENT)
Dept: PRIMARY CARE CLINIC | Facility: CLINIC | Age: 72
End: 2022-08-31
Payer: MEDICARE

## 2022-08-31 PROCEDURE — 99490 CHRNC CARE MGMT STAFF 1ST 20: CPT | Mod: S$PBB,,, | Performed by: FAMILY MEDICINE

## 2022-08-31 PROCEDURE — 99490 CHRNC CARE MGMT STAFF 1ST 20: CPT | Mod: PBBFAC,25,PN | Performed by: FAMILY MEDICINE

## 2022-08-31 PROCEDURE — 99490 PR CHRONIC CARE MGMT, 1ST 20 MIN: ICD-10-PCS | Mod: S$PBB,,, | Performed by: FAMILY MEDICINE

## 2022-08-31 PROCEDURE — 99439 CHRNC CARE MGMT STAF EA ADDL: CPT | Mod: PBBFAC,27,PN | Performed by: FAMILY MEDICINE

## 2022-08-31 PROCEDURE — 99439 PR CHRONIC CARE MGMT, EA ADDTL 20 MIN: ICD-10-PCS | Mod: S$PBB,,, | Performed by: FAMILY MEDICINE

## 2022-08-31 PROCEDURE — 99439 CHRNC CARE MGMT STAF EA ADDL: CPT | Mod: S$PBB,,, | Performed by: FAMILY MEDICINE

## 2022-09-12 ENCOUNTER — TELEPHONE (OUTPATIENT)
Dept: ADMINISTRATIVE | Facility: CLINIC | Age: 72
End: 2022-09-12
Payer: MEDICARE

## 2022-09-12 NOTE — TELEPHONE ENCOUNTER
Called pt; no answer; could not leave a message due to line kept ringing; I was calling to confirm pt's in office EAWV appt on 9/14/22

## 2022-09-14 ENCOUNTER — OFFICE VISIT (OUTPATIENT)
Dept: FAMILY MEDICINE | Facility: CLINIC | Age: 72
End: 2022-09-14
Payer: MEDICARE

## 2022-09-14 ENCOUNTER — LAB VISIT (OUTPATIENT)
Dept: LAB | Facility: HOSPITAL | Age: 72
End: 2022-09-14
Attending: HOSPITALIST
Payer: MEDICARE

## 2022-09-14 DIAGNOSIS — R80.1 PERSISTENT PROTEINURIA: ICD-10-CM

## 2022-09-14 LAB
BACTERIA #/AREA URNS HPF: ABNORMAL /HPF
BILIRUB UR QL STRIP: NEGATIVE
CLARITY UR: CLEAR
COLOR UR: YELLOW
CREAT UR-MCNC: 107.7 MG/DL (ref 15–325)
GLUCOSE UR QL STRIP: NEGATIVE
HGB UR QL STRIP: NEGATIVE
HYALINE CASTS #/AREA URNS LPF: 0 /LPF
KETONES UR QL STRIP: NEGATIVE
LEUKOCYTE ESTERASE UR QL STRIP: ABNORMAL
MICROSCOPIC COMMENT: ABNORMAL
NITRITE UR QL STRIP: NEGATIVE
PH UR STRIP: 6 [PH] (ref 5–8)
PROT UR QL STRIP: ABNORMAL
PROT UR-MCNC: 232 MG/DL
PROT/CREAT UR: 2.15 MG/G{CREAT} (ref 0–0.2)
RBC #/AREA URNS HPF: 0 /HPF (ref 0–4)
SP GR UR STRIP: 1.02 (ref 1–1.03)
SQUAMOUS #/AREA URNS HPF: 4 /HPF
URN SPEC COLLECT METH UR: ABNORMAL
UROBILINOGEN UR STRIP-ACNC: NEGATIVE EU/DL
WBC #/AREA URNS HPF: 14 /HPF (ref 0–5)

## 2022-09-14 PROCEDURE — 81000 URINALYSIS NONAUTO W/SCOPE: CPT | Performed by: HOSPITALIST

## 2022-09-14 PROCEDURE — 84156 ASSAY OF PROTEIN URINE: CPT | Performed by: HOSPITALIST

## 2022-09-14 NOTE — PATIENT INSTRUCTIONS
Counseling and Referral of Other Preventative  (Italic type indicates deductible and co-insurance are waived)    Patient Name: Stephanie Sears  Today's Date: 9/14/2022    Health Maintenance       Date Due Completion Date    TETANUS VACCINE Never done ---    Shingles Vaccine (1 of 2) Never done ---    COVID-19 Vaccine (4 - Booster for Pfizer series) 04/20/2022 12/20/2021    Colorectal Cancer Screening 07/16/2022 7/16/2019    Influenza Vaccine (1) 09/01/2022 2/3/2022    Mammogram 11/23/2022 11/23/2021    Hemoglobin A1c 02/01/2023 8/1/2022    Diabetes Urine Screening 04/29/2023 4/29/2022    Lipid Panel 04/29/2023 4/29/2022    DEXA Scan 05/06/2023 5/6/2021    LDCT Lung Screen 05/09/2023 5/9/2022    Foot Exam 05/31/2023 5/31/2022    Override on 5/23/2022: Done    Override on 12/27/2018: Done    Eye Exam 07/26/2023 7/26/2022    High Dose Statin 08/10/2023 8/10/2022        No orders of the defined types were placed in this encounter.    The following information is provided to all patients.  This information is to help you find resources for any of the problems found today that may be affecting your health:                Living healthy guide: www.[facility].[place].gov      Understanding Diabetes: www.diabetes.org      Eating healthy: www.cdc.gov/healthyweight      CDC home safety checklist: www.cdc.gov/steadi/patient.html      Agency on Aging: www.goea.[place].gov      Alcoholics anonymous (AA): www.aa.org      Physical Activity: www.kirill.nih.gov/po0bwzj      Tobacco use: www.quitwithusla.org

## 2022-09-20 ENCOUNTER — PATIENT OUTREACH (OUTPATIENT)
Dept: ADMINISTRATIVE | Facility: HOSPITAL | Age: 72
End: 2022-09-20
Payer: MEDICARE

## 2022-09-20 ENCOUNTER — OFFICE VISIT (OUTPATIENT)
Dept: NEPHROLOGY | Facility: CLINIC | Age: 72
End: 2022-09-20
Payer: MEDICARE

## 2022-09-20 VITALS
SYSTOLIC BLOOD PRESSURE: 154 MMHG | BODY MASS INDEX: 32.26 KG/M2 | WEIGHT: 176.38 LBS | HEART RATE: 70 BPM | DIASTOLIC BLOOD PRESSURE: 70 MMHG | OXYGEN SATURATION: 100 %

## 2022-09-20 DIAGNOSIS — R80.9 ASYMPTOMATIC PROTEINURIA: ICD-10-CM

## 2022-09-20 DIAGNOSIS — I27.20 PULMONARY HYPERTENSION: ICD-10-CM

## 2022-09-20 DIAGNOSIS — I51.89 DIASTOLIC DYSFUNCTION: ICD-10-CM

## 2022-09-20 DIAGNOSIS — N18.2 STAGE 2 CHRONIC KIDNEY DISEASE: ICD-10-CM

## 2022-09-20 DIAGNOSIS — I10 ESSENTIAL HYPERTENSION: Primary | ICD-10-CM

## 2022-09-20 DIAGNOSIS — R80.1 PERSISTENT PROTEINURIA: ICD-10-CM

## 2022-09-20 PROCEDURE — 99999 PR PBB SHADOW E&M-EST. PATIENT-LVL III: CPT | Mod: PBBFAC,,, | Performed by: HOSPITALIST

## 2022-09-20 PROCEDURE — 99999 PR PBB SHADOW E&M-EST. PATIENT-LVL III: ICD-10-PCS | Mod: PBBFAC,,, | Performed by: HOSPITALIST

## 2022-09-20 PROCEDURE — 99213 OFFICE O/P EST LOW 20 MIN: CPT | Mod: PBBFAC | Performed by: HOSPITALIST

## 2022-09-20 PROCEDURE — 99214 PR OFFICE/OUTPT VISIT, EST, LEVL IV, 30-39 MIN: ICD-10-PCS | Mod: S$PBB,,, | Performed by: HOSPITALIST

## 2022-09-20 PROCEDURE — 99214 OFFICE O/P EST MOD 30 MIN: CPT | Mod: S$PBB,,, | Performed by: HOSPITALIST

## 2022-09-20 RX ORDER — CHLORTHALIDONE 25 MG/1
25 TABLET ORAL EVERY OTHER DAY
Qty: 15 TABLET | Refills: 11 | Status: SHIPPED | OUTPATIENT
Start: 2022-09-20 | End: 2022-11-15 | Stop reason: SDUPTHER

## 2022-09-20 NOTE — PROGRESS NOTES
REASON FOR CONSULT/CHIEF COMPLAIN: CKD stage 3 and Proteinuria     REFERRING PHYSICIAN: Williams Rossi Jr, MD      HISTORY OF PRESENT ILLNESS: 72 y.o. female who is established to me  has a past medical history of Acute pancreatitis, Angina pectoris, Anxiety, Arthritis, Back pain, Bronchitis, Cervical spondylosis with radiculopathy (7/10/2012), Chest pain, Chronic neck pain (7/26/2012), Colon polyps, Controlled type 2 diabetes mellitus with stage 2 chronic kidney disease, with long-term current use of insulin (1/24/2020), Coronary artery disease, Depression, Fibrocystic breast, GERD (gastroesophageal reflux disease), Glaucoma, Heart failure, Hyperlipidemia, Hypertension, Neck pain (7/10/2012), Obesity, JAM (obstructive sleep apnea), Pneumonia, Pneumonia due to other staphylococcus, Primary osteoarthritis of both knees, Psoriasis, Subacromial or subdeltoid bursitis (7/10/2012), Thyroid disease, Tobacco dependence, Trouble in sleeping, Type 2 diabetes mellitus (12/10/2013), and Type 2 diabetes mellitus with diabetic chronic kidney disease. patient was referred here for abnormal renal function with CKD stage 3.     71 year old woman with medical history of hypertension presenting for follow up of proteinuria.  Patient reports blood pressure usually 130-140's systolic.  She reports adequate fluid intake, takes meloxicam only occasionally for joint pains.  She otherwise denies any fever, chest pain, shortness of breath, abdominal pain, diarrhea, dysuria/hematuria. Continues to have proteinuria      Interval H/o 9/20   Was prescribed SGLT2i, however had issues with insurance  and could nt afford it. Could nt tolerate Spironolactone as it made her sick. Blood pressures not routinely checked at home. Current blood pressure at clinic at 150/90.      ROS:  General: negative for chills, or fatigue  ENT: No epistaxis or headaches  Hematological and Lymphatic: No bleeding problems or blood clots.  Endocrine: No skin changes or  temperature intolerance  Respiratory: No cough, shortness of breath, or wheezing  Cardiovascular: No chest pain or dyspnea   Gastrointestinal: No abdominal pain, change in bowel habits  Genito-Urinary: No dysuria, trouble voiding, or hematuria  Musculoskeletal: ROS: negative for - joint pain, joint stiffness, joint swelling, muscle pain or muscular weakness  Neurological: No new focal weakness, no numbness  Dermatological: No rash or ulcers.    PAST MEDICAL HISTORY:  Past Medical History:   Diagnosis Date    Acute pancreatitis     Angina pectoris     Anxiety     Arthritis     Back pain     Bronchitis     Cervical spondylosis with radiculopathy 7/10/2012    Chest pain     Chronic neck pain 7/26/2012    Colon polyps     Controlled type 2 diabetes mellitus with stage 2 chronic kidney disease, with long-term current use of insulin 1/24/2020    Coronary artery disease     Depression     Fibrocystic breast     GERD (gastroesophageal reflux disease)     Glaucoma     Heart failure     Hyperlipidemia     Hypertension     Neck pain 7/10/2012    Obesity     JAM (obstructive sleep apnea)     Pneumonia     Pneumonia due to other staphylococcus     Primary osteoarthritis of both knees     Psoriasis     Subacromial or subdeltoid bursitis 7/10/2012    Thyroid disease     Tobacco dependence     Trouble in sleeping     Type 2 diabetes mellitus 12/10/2013    Type 2 diabetes mellitus with diabetic chronic kidney disease        PAST SURGICAL HISTORY:  Past Surgical History:   Procedure Laterality Date    BREAST BIOPSY Bilateral 1988    BREAST MASS EXCISION Right     benign    CATARACT EXTRACTION W/  INTRAOCULAR LENS IMPLANT Left 12/09/2020    PHACO IOL WITH I-STENT ()    CATARACT EXTRACTION W/  INTRAOCULAR LENS IMPLANT Right 10/21/2020    PHACO IOL WITH I-STENT x 2 ()    CHOLECYSTECTOMY      COLONOSCOPY N/A 5/22/2019    Procedure: COLONOSCOPY;  Surgeon: Darrin Calvin MD;  Location: [facility] (2ND FLR);   Service: Endoscopy;  Laterality: N/A;  2nd floor - all other procedure done on 2, multiple comorbidities - ERW/   change in MD schedule, Pt notified and verbalized understanding, new arrival time 0800, Ins mailed to Pt with new arrival time - ERW1/8/19@1130    EPIDURAL STEROID INJECTION Bilateral 2/26/2021    Procedure: Injection, Steroid, Ischial Bursa;  Surgeon: Yonatan Garsia Jr., MD;  Location: [facility] ENDO;  Service: Pain Management;  Laterality: Bilateral;  Bilateral Ischial Bursa Steroid Injections  Arrive @ 1230; No ATC; Check BG    HYSTERECTOMY  1980's    IMPLANTATION OF DEVICE FOR GLAUCOMA Right 10/21/2020    Procedure: INSERTION, DEVICE, FOR GLAUCOMA/ i Stent;  Surgeon: Melany Tse MD;  Location: [address];  Service: Ophthalmology;  Laterality: Right;    IMPLANTATION OF DEVICE FOR GLAUCOMA Left 12/9/2020    Procedure: INSERTION, DEVICE, FOR GLAUCOMA/I SENT;  Surgeon: Melany Tse MD;  Location: [address];  Service: Ophthalmology;  Laterality: Left;    INJECTION OF JOINT Bilateral 12/23/2021    Procedure: Injection, Joint---BILATERAL ISCHIAL BURSA STEROID INJECTION;  Surgeon: Yonatan Garsia Jr., MD;  Location: [facility] PAIN MG[facility];  Service: Pain Management;  Laterality: Bilateral;    INTRAOCULAR PROSTHESES INSERTION Right 10/21/2020    Procedure: INSERTION, IOL PROSTHESIS;  Surgeon: Melany Tse MD;  Location: [address];  Service: Ophthalmology;  Laterality: Right;    INTRAOCULAR PROSTHESES INSERTION Left 12/9/2020    Procedure: INSERTION, IOL PROSTHESIS;  Surgeon: Melany Tse MD;  Location: [address];  Service: Ophthalmology;  Laterality: Left;    KNEE SURGERY Left 1-20-16    TKR    KNEE SURGERY Right 02/26/2018    TKR    L4-L5 fusion  2006    PHACOEMULSIFICATION OF CATARACT Right 10/21/2020    Procedure: PHACOEMULSIFICATION, CATARACT;  Surgeon: Melany Tse MD;  Location: [address];  Service: Ophthalmology;  Laterality: Right;     PHACOEMULSIFICATION OF CATARACT Left 12/9/2020    Procedure: PHACOEMULSIFICATION, CATARACT;  Surgeon: Melany Tse MD;  Location: [facility] OR [address] [place] [address];  Service: Ophthalmology;  Laterality: Left;       FAMILY HISTORY:   Family History   Problem Relation Age of Onset    Diabetes Mother     Hypertension Mother         CHF, angina    Angina Mother     Kidney disease Mother     Heart disease Mother         CHF    Hypertension Father         glaucoma, Alzhiemer's , supra pubic    Alzheimer's disease Father     Glaucoma Father     Kidney disease Brother         kidney transplant, HTN,DM    Diabetes Brother     Glaucoma Sister     Hypertension Sister     Hyperlipidemia Sister     Gout Son     Hypertension Son     Diabetes Son     Sleep apnea Sister     Hypertension Sister     Thyroid disease Sister     No Known Problems Sister     No Known Problems Sister     Hepatitis Brother     Amblyopia Neg Hx     Blindness Neg Hx     Melanoma Neg Hx     Macular degeneration Neg Hx     Retinal detachment Neg Hx     Strabismus Neg Hx        SOCIAL HISTORY:  Social History     Socioeconomic History    Marital status: Single   Tobacco Use    Smoking status: Every Day     Packs/day: 1.00     Years: 40.00     Pack years: 40.00     Types: Cigarettes    Smokeless tobacco: Current   Substance and Sexual Activity    Alcohol use: No     Alcohol/week: 0.0 standard drinks    Drug use: No    Sexual activity: Yes     Partners: Male       ALLERGIES:  Review of patient's allergies indicates:   Allergen Reactions    Hydrocodone Shortness Of Breath    Iodinated contrast media Shortness Of Breath     Difficulty breathing    Adhesive Itching     Skin peeling    Morphine Hallucinations    Oxycodone Hallucinations    Sulfa (sulfonamide antibiotics) Hives and Itching       MEDICATIONS:    Current Outpatient Medications:     albuterol (PROVENTIL/VENTOLIN HFA) 90 mcg/actuation inhaler, Inhale 2 puffs into the lungs every 4 (four) hours as needed for  Wheezing. Rescue, Disp: , Rfl:     amLODIPine-benazepriL (LOTREL) 10-40 mg per capsule, TAKE 1 CAPSULE DAILY, Disp: 90 capsule, Rfl: 1    ammonium lactate 12 % Crea, Apply twice daily to affected parts both feet as needed., Disp: 140 g, Rfl: 11    atorvastatin (LIPITOR) 20 MG tablet, Take 1 tablet (20 mg total) by mouth once daily., Disp: 90 tablet, Rfl: 3    carvediloL (COREG) 12.5 MG tablet, Take 1 tablet (12.5 mg total) by mouth 2 (two) times daily with meals., Disp: 180 tablet, Rfl: 3    diphenhydrAMINE (BENADRYL) 25 mg capsule, Take 25 mg by mouth every 6 (six) hours as needed for Itching or Allergies., Disp: , Rfl:     gabapentin (NEURONTIN) 600 MG tablet, Take 1 tablet (600 mg total) by mouth 3 (three) times daily., Disp: 270 tablet, Rfl: 3    INULIN (FIBER GUMMIES ORAL), Take 1 each by mouth every morning. , Disp: , Rfl:     lancets (ACCU-CHEK MULTICLIX LANCET) Misc, Test blood sugar twice daily, Disp: 300 each, Rfl: 3    nicotine (NICODERM CQ) 21 mg/24 hr, Place 1 patch onto the skin once daily., Disp: 28 patch, Rfl: 0    nicotine, polacrilex, 2 mg lzmn, Take 1 each (2 mg total) by mouth as needed (Take 1 piece as needed. Maximum dose of 10 per day.)., Disp: 243 each, Rfl: 0    chlorthalidone (HYGROTEN) 25 MG Tab, Take 1 tablet (25 mg total) by mouth every other day., Disp: 15 tablet, Rfl: 11    ciclopirox (PENLAC) 8 % Soln, Apply topically nightly. (Patient not taking: Reported on 9/20/2022), Disp: 6.6 mL, Rfl: 11    simethicone (GAS-X ORAL), Take 1 tablet by mouth as needed. , Disp: , Rfl:    Medication List with Changes/Refills   New Medications    CHLORTHALIDONE (HYGROTEN) 25 MG TAB    Take 1 tablet (25 mg total) by mouth every other day.   Current Medications    ALBUTEROL (PROVENTIL/VENTOLIN HFA) 90 MCG/ACTUATION INHALER    Inhale 2 puffs into the lungs every 4 (four) hours as needed for Wheezing. Rescue    AMLODIPINE-BENAZEPRIL (LOTREL) 10-40 MG PER CAPSULE    TAKE 1 CAPSULE DAILY    AMMONIUM LACTATE  12 % CREA    Apply twice daily to affected parts both feet as needed.    ATORVASTATIN (LIPITOR) 20 MG TABLET    Take 1 tablet (20 mg total) by mouth once daily.    CARVEDILOL (COREG) 12.5 MG TABLET    Take 1 tablet (12.5 mg total) by mouth 2 (two) times daily with meals.    CICLOPIROX (PENLAC) 8 % SOLN    Apply topically nightly.    DIPHENHYDRAMINE (BENADRYL) 25 MG CAPSULE    Take 25 mg by mouth every 6 (six) hours as needed for Itching or Allergies.    GABAPENTIN (NEURONTIN) 600 MG TABLET    Take 1 tablet (600 mg total) by mouth 3 (three) times daily.    INULIN (FIBER GUMMIES ORAL)    Take 1 each by mouth every morning.     LANCETS (ACCU-CHEK MULTICLIX LANCET) MISC    Test blood sugar twice daily    NICOTINE (NICODERM CQ) 21 MG/24 HR    Place 1 patch onto the skin once daily.    NICOTINE, POLACRILEX, 2 MG LZMN    Take 1 each (2 mg total) by mouth as needed (Take 1 piece as needed. Maximum dose of 10 per day.).    SIMETHICONE (GAS-X ORAL)    Take 1 tablet by mouth as needed.         PHYSICAL EXAM:  BP (!) 154/70   Pulse 70   Wt 80 kg (176 lb 5.9 oz)   SpO2 100%   BMI 32.26 kg/m²     General: No distress, No fever or chills  Head: Normocephalic,atraumatic  Eyes: conjunctivae/corneas clear. PERRL, EOM's intact.  Nose: Nares normal. Mucosa normal. No drainage or sinus tenderness.  Neck: No adenopathy,no carotid bruit,no JVD  Lungs:Clear to auscultation bilaterally, No Crackles  Heart: Regular rate and rhythm, no murmur, gallops or rubs  Abdomen: Soft, no tenderness, bowel sounds normal  Extremities: Atraumatic, no edema in LE  Skin: Turgor normal. No rashes or ulcers  Neurologic: No focal weakness, oriented.          LABS:  Lab Results   Component Value Date    HGB 11.2 (L) 09/14/2022        Lab Results   Component Value Date    CREATININE 1.0 09/14/2022       Prot/Creat Ratio, Urine   Date Value Ref Range Status   09/14/2022 2.15 (H) 0.00 - 0.20 Final   06/13/2022 2.71 (H) 0.00 - 0.20 Final   05/03/2022 3.88 (H)  0.00 - 0.20 Final       Lab Results   Component Value Date     09/14/2022    K 4.2 09/14/2022    CO2 24 09/14/2022       last PTH   Lab Results   Component Value Date    PTH 88.9 (H) 05/26/2017    CALCIUM 9.5 09/14/2022    PHOS 3.8 09/14/2022       Lab Results   Component Value Date    HGBA1C 6.0 (H) 08/01/2022       Lab Results   Component Value Date    LDLCALC 88.4 04/29/2022         Creatinine, Urine   Date Value Ref Range Status   09/14/2022 107.7 15.0 - 325.0 mg/dL Final   06/13/2022 119.1 15.0 - 325.0 mg/dL Final   05/03/2022 106.6 15.0 - 325.0 mg/dL Final       Protein, Urine   Date Value Ref Range Status   07/17/2007 42 (H) 0 - 10 mg/dl Final     Protein, Urine Random   Date Value Ref Range Status   09/14/2022 232 mg/dL Final   06/13/2022 323 mg/dL Final   05/03/2022 414 mg/dL Final       Prot/Creat Ratio, Urine   Date Value Ref Range Status   09/14/2022 2.15 (H) 0.00 - 0.20 Final   06/13/2022 2.71 (H) 0.00 - 0.20 Final   05/03/2022 3.88 (H) 0.00 - 0.20 Final         No images are attached to the encounter.       Problem List   Chronic kidney disease stage 2   Long standing DM    Essential hypertension   Proteinuria   Hypoalbuminemia        ASSESSMENT and PLAN    CKD stage 2 , eGFR > 60ml/min secondary to long standing DM  Baseline Creatinine is around 0.8 to 1.1  and current creatinine at 1 mg/dl  Most likely etiology for CKD is  DM, HTN,    UPCR trending down from 3.8 grams to 2.1 grams this visit   On ACE/ ARB for proteinuria and hypertension   Discussed options of SGLT2i, however her insurance was nt covering.    Patient couldn't tolerate Spironolactone. Would prescribe Chlorthalidone 25 mg every other day to help with blood pressures and proteinuria.    Long standing Diabetes Mellitus with CKD    Continue current medications      Essential Hypertension   - Added SGLT2i, However her insurance was nt able to cover.  Patient couldn't tolerate Spironolactone. Would prescribe Chlorthalidone 25 mg every  other day to help with blood pressures and proteinuria.     Nephrotic range proteinuria    Has proteinuria of 3 grams , improved to 2.1 grams    Has been long standing since 2015, progressively worsened to 3 grams    Serological work up including KAITY, sFLC, SPEP, PLA2R, Hepatitis negative    Kidney Ultrasound reviewed and with out hydronephrosis or obstruction   Proteinuria improving and serum albumin ok at 3.1 mg/dl   Explained to patient that might need kidney biopsy with worsening proteinuria      Risk of progression to ESRD. ]Pt gives family h/o ESRD among her mother and her brother secondary to long standing DM. Her risk of progression is moderate .    46 minutes of total time spent on the encounter, which includes face to face time and non-face to face time preparing to see the patient (eg, review of tests), Obtaining and/or reviewing separately obtained history, documenting clinical information in the electronic or other health record, independently interpreting results (not separately reported) and communicating results to the patient/family/caregiver, or Care coordination (not separately reported).         RTC in 4 Months   Diagnosis and plan of care explained to the patient.  Pt Verbalized understanding. Answered all questions.  Thanks for allowing me to participate in the care of this patient.       Ninfa Beck MD  Nephrology  [facility].

## 2022-09-26 ENCOUNTER — ANESTHESIA (OUTPATIENT)
Dept: ENDOSCOPY | Facility: HOSPITAL | Age: 72
End: 2022-09-26
Payer: MEDICARE

## 2022-09-26 ENCOUNTER — ANESTHESIA EVENT (OUTPATIENT)
Dept: ENDOSCOPY | Facility: HOSPITAL | Age: 72
End: 2022-09-26
Payer: MEDICARE

## 2022-09-26 ENCOUNTER — HOSPITAL ENCOUNTER (OUTPATIENT)
Facility: HOSPITAL | Age: 72
Discharge: HOME | End: 2022-09-26
Attending: INTERNAL MEDICINE | Admitting: INTERNAL MEDICINE
Payer: MEDICARE

## 2022-09-26 VITALS
TEMPERATURE: 98 F | WEIGHT: 178 LBS | HEIGHT: 62 IN | OXYGEN SATURATION: 97 % | BODY MASS INDEX: 32.76 KG/M2 | RESPIRATION RATE: 18 BRPM | HEART RATE: 72 BPM | DIASTOLIC BLOOD PRESSURE: 73 MMHG | SYSTOLIC BLOOD PRESSURE: 172 MMHG

## 2022-09-26 DIAGNOSIS — Z86.010 HISTORY OF COLON POLYPS: Primary | ICD-10-CM

## 2022-09-26 LAB
POCT GLUCOSE: 101 MG/DL (ref 70–110)
POCT GLUCOSE: 114 MG/DL (ref 70–110)

## 2022-09-26 PROCEDURE — 37000008 HC ANESTHESIA 1ST 15 MINUTES: Performed by: INTERNAL MEDICINE

## 2022-09-26 PROCEDURE — 82962 GLUCOSE BLOOD TEST: CPT | Performed by: INTERNAL MEDICINE

## 2022-09-26 PROCEDURE — 25000003 PHARM REV CODE 250: Performed by: NURSE ANESTHETIST, CERTIFIED REGISTERED

## 2022-09-26 PROCEDURE — 88305 TISSUE EXAM BY PATHOLOGIST: CPT | Mod: 59 | Performed by: PATHOLOGY

## 2022-09-26 PROCEDURE — 27201012 HC FORCEPS, HOT/COLD, DISP: Performed by: INTERNAL MEDICINE

## 2022-09-26 PROCEDURE — 88305 TISSUE EXAM BY PATHOLOGIST: CPT | Mod: 26,,, | Performed by: PATHOLOGY

## 2022-09-26 PROCEDURE — 37000009 HC ANESTHESIA EA ADD 15 MINS: Performed by: INTERNAL MEDICINE

## 2022-09-26 PROCEDURE — D9220A PRA ANESTHESIA: ICD-10-PCS | Mod: PT,CRNA,, | Performed by: NURSE ANESTHETIST, CERTIFIED REGISTERED

## 2022-09-26 PROCEDURE — 25000003 PHARM REV CODE 250: Performed by: INTERNAL MEDICINE

## 2022-09-26 PROCEDURE — 45380 COLONOSCOPY AND BIOPSY: CPT | Mod: PT | Performed by: INTERNAL MEDICINE

## 2022-09-26 PROCEDURE — D9220A PRA ANESTHESIA: Mod: PT,CRNA,, | Performed by: NURSE ANESTHETIST, CERTIFIED REGISTERED

## 2022-09-26 PROCEDURE — D9220A PRA ANESTHESIA: ICD-10-PCS | Mod: PT,ANES,, | Performed by: ANESTHESIOLOGY

## 2022-09-26 PROCEDURE — 82962 GLUCOSE BLOOD TEST: CPT | Mod: 91 | Performed by: INTERNAL MEDICINE

## 2022-09-26 PROCEDURE — 88305 TISSUE EXAM BY PATHOLOGIST: ICD-10-PCS | Mod: 26,,, | Performed by: PATHOLOGY

## 2022-09-26 PROCEDURE — 63600175 PHARM REV CODE 636 W HCPCS: Performed by: NURSE ANESTHETIST, CERTIFIED REGISTERED

## 2022-09-26 PROCEDURE — D9220A PRA ANESTHESIA: Mod: PT,ANES,, | Performed by: ANESTHESIOLOGY

## 2022-09-26 PROCEDURE — 45380 PR COLONOSCOPY,BIOPSY: ICD-10-PCS | Mod: PT,,, | Performed by: INTERNAL MEDICINE

## 2022-09-26 PROCEDURE — 45380 COLONOSCOPY AND BIOPSY: CPT | Mod: PT,,, | Performed by: INTERNAL MEDICINE

## 2022-09-26 RX ORDER — PROPOFOL 10 MG/ML
VIAL (ML) INTRAVENOUS
Status: DISCONTINUED | OUTPATIENT
Start: 2022-09-26 | End: 2022-09-26

## 2022-09-26 RX ORDER — PROCHLORPERAZINE EDISYLATE 5 MG/ML
5 INJECTION INTRAMUSCULAR; INTRAVENOUS EVERY 30 MIN PRN
Status: DISCONTINUED | OUTPATIENT
Start: 2022-09-26 | End: 2022-09-26 | Stop reason: HOSPADM

## 2022-09-26 RX ORDER — PROPOFOL 10 MG/ML
VIAL (ML) INTRAVENOUS CONTINUOUS PRN
Status: DISCONTINUED | OUTPATIENT
Start: 2022-09-26 | End: 2022-09-26

## 2022-09-26 RX ORDER — SODIUM CHLORIDE 0.9 % (FLUSH) 0.9 %
3 SYRINGE (ML) INJECTION
Status: DISCONTINUED | OUTPATIENT
Start: 2022-09-26 | End: 2022-09-26 | Stop reason: HOSPADM

## 2022-09-26 RX ORDER — LIDOCAINE HYDROCHLORIDE 20 MG/ML
INJECTION, SOLUTION EPIDURAL; INFILTRATION; INTRACAUDAL; PERINEURAL
Status: DISCONTINUED | OUTPATIENT
Start: 2022-09-26 | End: 2022-09-26

## 2022-09-26 RX ORDER — SODIUM CHLORIDE 9 MG/ML
INJECTION, SOLUTION INTRAVENOUS CONTINUOUS
Status: DISCONTINUED | OUTPATIENT
Start: 2022-09-26 | End: 2022-09-26 | Stop reason: HOSPADM

## 2022-09-26 RX ADMIN — PROPOFOL 70 MG: 10 INJECTION, EMULSION INTRAVENOUS at 08:09

## 2022-09-26 RX ADMIN — Medication 150 MCG/KG/MIN: at 08:09

## 2022-09-26 RX ADMIN — SODIUM CHLORIDE: 0.9 INJECTION, SOLUTION INTRAVENOUS at 07:09

## 2022-09-26 RX ADMIN — LIDOCAINE HYDROCHLORIDE 50 MG: 20 INJECTION, SOLUTION EPIDURAL; INFILTRATION; INTRACAUDAL; PERINEURAL at 08:09

## 2022-09-26 NOTE — PROVATION PATIENT INSTRUCTIONS
Discharge Summary/Instructions after an Endoscopic Procedure  Patient Name: Stephanie Sears  Patient MRN: [id number]  Patient YOB: 1950  Monday, September 26, 2022  Darrin Calvin MD  Dear patient,  As a result of recent federal legislation (The Federal Cures Act), you may   receive lab or pathology results from your procedure in your [facility]   account before your physician is able to contact you. Your physician or   their representative will relay the results to you with their   recommendations at their soonest availability.  Thank you,  RESTRICTIONS:  During your procedure today, you received medications for sedation.  These   medications may affect your judgment, balance and coordination.  Therefore,   for 24 hours, you have the following restrictions:   - DO NOT drive a car, operate machinery, make legal/financial decisions,   sign important papers or drink alcohol.    ACTIVITY:  Today: no heavy lifting, straining or running due to procedural   sedation/anesthesia.  The following day: return to full activity including work.  DIET:  Eat and drink normally unless instructed otherwise.     TREATMENT FOR COMMON SIDE EFFECTS:  - Mild abdominal pain, nausea, belching, bloating or excessive gas:  rest,   eat lightly and use a heating pad.  - Sore Throat: treat with throat lozenges and/or gargle with warm salt   water.  - Because air was used during the procedure, expelling large amounts of air   from your rectum or belching is normal.  - If a bowel prep was taken, you may not have a bowel movement for 1-3 days.    This is normal.  SYMPTOMS TO WATCH FOR AND REPORT TO YOUR PHYSICIAN:  1. Abdominal pain or bloating, other than gas cramps.  2. Chest pain.  3. Back pain.  4. Signs of infection such as: chills or fever occurring within 24 hours   after the procedure.  5. Rectal bleeding, which would show as bright red, maroon, or black stools.   (A tablespoon of blood from the rectum is not serious, especially  if   hemorrhoids are present.)  6. Vomiting.  7. Weakness or dizziness.  GO DIRECTLY TO THE NEAREST EMERGENCY ROOM IF YOU HAVE ANY OF THE FOLLOWING:      Difficulty breathing              Chills and/or fever over 101 F   Persistent vomiting and/or vomiting blood   Severe abdominal pain   Severe chest pain   Black, tarry stools   Bleeding- more than one tablespoon   Any other symptom or condition that you feel may need urgent attention  Your doctor recommends these additional instructions:  If any biopsies were taken, your doctors clinic will contact you in 1 to 2   weeks with any results.  - Discharge patient to home.   - Patient has a contact number available for emergencies.  The signs and   symptoms of potential delayed complications were discussed with the   patient.  Return to normal activities tomorrow.  Written discharge   instructions were provided to the patient.   - Resume previous diet.   - Continue present medications.   - Await pathology results.   - Repeat colonoscopy in 5 years for surveillance.  For questions, problems or results please call your physician - Darrin Calvin MD at Work:  [phone].  [facility], EMERGENCY ROOM PHONE NUMBER: [phone]  IF A COMPLICATION OR EMERGENCY SITUATION ARISES AND YOU ARE UNABLE TO REACH   YOUR PHYSICIAN - GO DIRECTLY TO THE EMERGENCY ROOM.  Darrin Calvin MD  9/26/2022 8:52:05 AM  This report has been verified and signed electronically.  Dear patient,  As a result of recent federal legislation (The Federal Cures Act), you may   receive lab or pathology results from your procedure in your [facility]   account before your physician is able to contact you. Your physician or   their representative will relay the results to you with their   recommendations at their soonest availability.  Thank you,  PROVATION

## 2022-09-26 NOTE — ANESTHESIA POSTPROCEDURE EVALUATION
Anesthesia Post Evaluation    Patient: Stephanie Sears    Procedure(s) Performed: Procedure(s) (LRB):  COLONOSCOPY (N/A)    Final Anesthesia Type: general      Patient location during evaluation: PACU  Patient participation: Yes- Able to Participate  Level of consciousness: awake and alert and awake  Post-procedure vital signs: reviewed and stable  Pain management: adequate  Airway patency: patent    PONV status at discharge: No PONV  Anesthetic complications: no      Cardiovascular status: blood pressure returned to baseline  Respiratory status: unassisted and spontaneous ventilation  Hydration status: euvolemic  Follow-up not needed.          Vitals Value Taken Time   /78 09/26/22 0917   Temp 36.3 °C (97.3 °F) 09/26/22 0856   Pulse 67 09/26/22 0926   Resp 16 09/26/22 0900   SpO2 94 % 09/26/22 0926   Vitals shown include unvalidated device data.      No case tracking events are documented in the log.      Pain/Som Score: Som Score: 9 (9/26/2022  8:56 AM)

## 2022-09-26 NOTE — PROGRESS NOTES
Pt discharged to home . Pt IV removed. Pt has all discharge instructions and personal belongings.  Tolerating clear liquids well. No further questions. Adequate for discharge.

## 2022-09-26 NOTE — ANESTHESIA PREPROCEDURE EVALUATION
09/26/2022  Stephanie Sears is a 72 y.o., female.    Patient Active Problem List   Diagnosis    GERD (gastroesophageal reflux disease)    Primary osteoarthritis of both knees    Cervical spondylosis with radiculopathy    Open angle with borderline findings, low risk - Both Eyes    Nontoxic uninodular goiter    Myopia with astigmatism and presbyopia - Both Eyes    Diet-controlled diabetes mellitus    Asymptomatic proteinuria    Lumbar spondylosis    Osteoarthritis of left hip, mild    DDD (degenerative disc disease), cervical    Obesity    Essential hypertension    Severe obesity (BMI 35.0-39.9) with comorbidity    Tobacco abuse    H/O scarlet fever    Aortic valve sclerosis    Pulmonary hypertension    Diastolic dysfunction    Status post total knee replacement, left 1/20/2016    CMC arthritis    Chronic midline thoracic back pain    Primary osteoarthritis of right knee    Hand pain, left    JAM (obstructive sleep apnea)    Personal history of spine surgery    Fatty liver    UTI (urinary tract infection)    Status post total right knee replacement 2/26/2018    Dyslipidemia    Goals of care, counseling/discussion    Bilateral carotid bruits    Atherosclerosis of aortic arch    Insomnia    Lichen planus    History of colon polyps    Acute pancreatitis without infection or necrosis    Arthritis of carpometacarpal (CMC) joint of left thumb    Greater trochanteric bursitis of both hips    Type 2 diabetes mellitus with diabetic chronic kidney disease    Hypoalbuminemia due to protein-calorie malnutrition    Nuclear sclerotic cataract of right eye    Primary open-angle glaucoma, bilateral, mild stage    S/P lumbar fusion    DDD (degenerative disc disease), lumbar    Postoperative care for cataract    Bursitis, ischial, unspecified laterality    Ischial bursitis     Dyspnea on exertion    Stage 3 chronic kidney disease    Stage 2 chronic kidney disease    Persistent proteinuria           Pre-op Assessment          Review of Systems      Physical Exam  General: Well nourished    Airway:  Mallampati: I / I  Mouth Opening: Normal  TM Distance: Normal  Tongue: Normal  Neck ROM: Normal ROM    Dental:  Intact        Anesthesia Plan  Type of Anesthesia, risks & benefits discussed:    Anesthesia Type: Gen Natural Airway  Intra-op Monitoring Plan: Standard ASA Monitors  Post Op Pain Control Plan: multimodal analgesia and IV/PO Opioids PRN  Induction:  IV  Airway Plan: Direct, Post-Induction  Informed Consent: Informed consent signed with the Patient and all parties understand the risks and agree with anesthesia plan.  All questions answered. Patient consented to blood products? Yes  ASA Score: 4  Day of Surgery Review of History & Physical: H&P Update referred to the surgeon/provider.    Ready For Surgery From Anesthesia Perspective.     .    ECHO 1/16  CONCLUSIONS     1 - Normal left ventricular systolic function (EF 60-65%).     2 - Left ventricular diastolic dysfunction.     3 - Normal right ventricular systolic function .     4 - Mild left atrial enlargement.     5 - Mild tricuspid regurgitation.     6 - The estimated PA systolic pressure is 39 mmHg.     7 - Mild aortic stenosis, JOHN = 1.46 cm2, peak velocity = 2.7 m/s, mean gradient = 17.0 mmHg.

## 2022-09-26 NOTE — H&P
Short Stay Endoscopy History and Physical      Procedure - Colonoscopy  ASA - per anesthesia  Mallampati - per anesthesia  History of Anesthesia problems - no  Family history Anesthesia problems - no   Plan of anesthesia - MAC    HPI:  This is a 72 y.o. female here for surveillance of colon polyps with history of difficult colonoscopy.       ROS:  Constitutional: No fevers, chills  CV: No chest pain  Pulm: No cough, No shortness of breath  GI: see HPI    Medical History:  has a past medical history of Acute pancreatitis, Angina pectoris, Anxiety, Arthritis, Asthma, Back pain, Bronchitis, Cervical spondylosis with radiculopathy (07/10/2012), Chest pain, Chronic neck pain (07/26/2012), Colon polyps, Controlled type 2 diabetes mellitus with stage 2 chronic kidney disease, with long-term current use of insulin (01/24/2020), COPD (chronic obstructive pulmonary disease), Coronary artery disease, Depression, Fibrocystic breast, General anesthetics causing adverse effect in therapeutic use, GERD (gastroesophageal reflux disease), Glaucoma, Heart failure, Hyperlipidemia, Hypertension, Neck pain (07/10/2012), Obesity, JAM (obstructive sleep apnea), Pneumonia, Pneumonia due to other staphylococcus, Primary osteoarthritis of both knees, Psoriasis, Subacromial or subdeltoid bursitis (07/10/2012), Thyroid disease, Tobacco dependence, Trouble in sleeping, Type 2 diabetes mellitus (12/10/2013), and Type 2 diabetes mellitus with diabetic chronic kidney disease.    Surgical History:  has a past surgical history that includes L4-L5 fusion (2006); Hysterectomy (1980's); Cholecystectomy; Breast mass excision (Right); Knee surgery (Left, 01/20/2016); Knee surgery (Right, 02/26/2018); Colonoscopy (N/A, 05/22/2019); Phacoemulsification of cataract (Right, 10/21/2020); Intraocular prosthesis insertion (Right, 10/21/2020); Implantation of device for glaucoma (Right, 10/21/2020); Breast biopsy (Bilateral, 1988); Phacoemulsification of  "cataract (Left, 12/09/2020); Intraocular prosthesis insertion (Left, 12/09/2020); Implantation of device for glaucoma (Left, 12/09/2020); Epidural steroid injection (Bilateral, 02/26/2021); Injection of joint (Bilateral, 12/23/2021); Cataract extraction w/  intraocular lens implant (Left, 12/09/2020); Cataract extraction w/  intraocular lens implant (Right, 10/21/2020); Esophagogastroduodenoscopy; and Appendectomy.    Family History: family history includes Alzheimer's disease in her father; Angina in her mother; Cancer in her paternal aunt; Diabetes in her brother, mother, and son; Glaucoma in her father and sister; Gout in her son; Heart disease in her mother; Hepatitis in her brother; Hyperlipidemia in her sister; Hypertension in her father, mother, sister, sister, and son; Kidney disease in her brother and mother; No Known Problems in her sister and sister; Sleep apnea in her sister; Thyroid disease in her sister.    Social History:  reports that she has been smoking cigarettes. She has a 40.00 pack-year smoking history. She uses smokeless tobacco. She reports that she does not drink alcohol and does not use drugs.    Review of patient's allergies indicates:   Allergen Reactions    Hydrocodone Shortness Of Breath    Iodinated contrast media Shortness Of Breath     Difficulty breathing    Adhesive Itching     Skin peeling    Latex, natural rubber Other (See Comments)     "Takes skin off"    Morphine Hallucinations    Oxycodone Hallucinations    Sulfa (sulfonamide antibiotics) Hives and Itching       Medications:   Medications Prior to Admission   Medication Sig Dispense Refill Last Dose    albuterol (PROVENTIL/VENTOLIN HFA) 90 mcg/actuation inhaler Inhale 2 puffs into the lungs every 4 (four) hours as needed for Wheezing. Rescue   Past Week    amLODIPine-benazepriL (LOTREL) 10-40 mg per capsule TAKE 1 CAPSULE DAILY 90 capsule 1 9/25/2022    atorvastatin (LIPITOR) 20 MG tablet Take 1 tablet (20 mg total) by mouth " once daily. 90 tablet 3 9/25/2022    carvediloL (COREG) 12.5 MG tablet Take 1 tablet (12.5 mg total) by mouth 2 (two) times daily with meals. 180 tablet 3 9/25/2022    gabapentin (NEURONTIN) 600 MG tablet Take 1 tablet (600 mg total) by mouth 3 (three) times daily. 270 tablet 3 9/25/2022    INULIN (FIBER GUMMIES ORAL) Take 1 each by mouth every morning.    Past Week    nicotine (NICODERM CQ) 21 mg/24 hr Place 1 patch onto the skin once daily. 28 patch 0 Past Month    nicotine, polacrilex, 2 mg lzmn Take 1 each (2 mg total) by mouth as needed (Take 1 piece as needed. Maximum dose of 10 per day.). 243 each 0 Past Month    simethicone (GAS-X ORAL) Take 1 tablet by mouth as needed.    Past Week    ammonium lactate 12 % Crea Apply twice daily to affected parts both feet as needed. 140 g 11     chlorthalidone (HYGROTEN) 25 MG Tab Take 1 tablet (25 mg total) by mouth every other day. 15 tablet 11     ciclopirox (PENLAC) 8 % Soln Apply topically nightly. (Patient not taking: No sig reported) 6.6 mL 11     diphenhydrAMINE (BENADRYL) 25 mg capsule Take 25 mg by mouth every 6 (six) hours as needed for Itching or Allergies.   More than a month    lancets (ACCU-CHEK MULTICLIX LANCET) Misc Test blood sugar twice daily 300 each 3          Physical Exam:    Vital Signs:   Vitals:    09/26/22 0747   BP: (!) 194/86   Pulse:    Resp:    Temp:        General Appearance: Well appearing in no acute distress  Eyes:    No scleral icterus  Lungs: CTA bilaterally  Heart:  reg rate and rhythm   Abdomen: Soft, non tender, non distended with positive bowel sounds      Labs:  Lab Results   Component Value Date    WBC 6.75 09/14/2022    HGB 11.2 (L) 09/14/2022    HCT 34.5 (L) 09/14/2022    MCV 80 (L) 09/14/2022     09/14/2022        BMP  Lab Results   Component Value Date     09/14/2022    K 4.2 09/14/2022     09/14/2022    CO2 24 09/14/2022    BUN 21 09/14/2022    CREATININE 1.0 09/14/2022    CALCIUM 9.5 09/14/2022    ANIONGAP  6 (L) 09/14/2022    ESTGFRAFRICA 58 (A) 06/13/2022    EGFRNONAA 50 (A) 06/13/2022     Lab Results   Component Value Date    INR 0.9 02/09/2018    INR 0.9 01/21/2016    INR 0.9 01/12/2016          Assessment:  72 y.o. female with history of colon polyps and difficult colonoscopy.     Plan:  Proceed with colonoscopy today, device-assisted as needed.  I have explained the risks and benefits of endoscopy procedures to the patient including but not limited to bleeding, perforation, infection, and death.  All questions and answered.        Darrin Calvin MD

## 2022-09-26 NOTE — TRANSFER OF CARE
"Anesthesia Transfer of Care Note    Patient: Stephanie Sears    Procedure(s) Performed: Procedure(s) (LRB):  COLONOSCOPY (N/A)    Patient location: PACU    Anesthesia Type: general    Transport from OR: Transported from OR on room air with adequate spontaneous ventilation    Post pain: adequate analgesia    Post assessment: no apparent anesthetic complications and tolerated procedure well    Post vital signs: stable    Level of consciousness: sedated and responds to stimulation    Nausea/Vomiting: no nausea/vomiting    Complications: none    Transfer of care protocol was followed      Last vitals:   Visit Vitals  BP (!) 194/86   Pulse 72   Temp 36.9 °C (98.4 °F) (Temporal)   Resp 18   Ht 5' 2" (1.575 m)   Wt 80.7 kg (178 lb)   SpO2 97%   Breastfeeding No   BMI 32.56 kg/m²     "

## 2022-09-30 ENCOUNTER — EXTERNAL CHRONIC CARE MANAGEMENT (OUTPATIENT)
Dept: PRIMARY CARE CLINIC | Facility: CLINIC | Age: 72
End: 2022-09-30
Payer: MEDICARE

## 2022-09-30 LAB
FINAL PATHOLOGIC DIAGNOSIS: NORMAL
GROSS: NORMAL
Lab: NORMAL

## 2022-09-30 PROCEDURE — 99490 PR CHRONIC CARE MGMT, 1ST 20 MIN: ICD-10-PCS | Mod: S$PBB,,, | Performed by: FAMILY MEDICINE

## 2022-09-30 PROCEDURE — 99490 CHRNC CARE MGMT STAFF 1ST 20: CPT | Mod: S$PBB,,, | Performed by: FAMILY MEDICINE

## 2022-09-30 PROCEDURE — 99490 CHRNC CARE MGMT STAFF 1ST 20: CPT | Mod: PBBFAC,PN | Performed by: FAMILY MEDICINE

## 2022-10-29 ENCOUNTER — IMMUNIZATION (OUTPATIENT)
Dept: OBSTETRICS AND GYNECOLOGY | Facility: CLINIC | Age: 72
End: 2022-10-29
Payer: MEDICARE

## 2022-10-29 DIAGNOSIS — Z23 FLU VACCINE NEED: Primary | ICD-10-CM

## 2022-10-29 PROCEDURE — G0008 ADMIN INFLUENZA VIRUS VAC: HCPCS | Mod: PBBFAC

## 2022-10-31 ENCOUNTER — EXTERNAL CHRONIC CARE MANAGEMENT (OUTPATIENT)
Dept: PRIMARY CARE CLINIC | Facility: CLINIC | Age: 72
End: 2022-10-31
Payer: MEDICARE

## 2022-10-31 ENCOUNTER — PES CALL (OUTPATIENT)
Dept: ADMINISTRATIVE | Facility: CLINIC | Age: 72
End: 2022-10-31
Payer: MEDICARE

## 2022-10-31 PROCEDURE — 99490 PR CHRONIC CARE MGMT, 1ST 20 MIN: ICD-10-PCS | Mod: S$PBB,,, | Performed by: FAMILY MEDICINE

## 2022-10-31 PROCEDURE — 99490 CHRNC CARE MGMT STAFF 1ST 20: CPT | Mod: PBBFAC,PN | Performed by: FAMILY MEDICINE

## 2022-10-31 PROCEDURE — 99490 CHRNC CARE MGMT STAFF 1ST 20: CPT | Mod: S$PBB,,, | Performed by: FAMILY MEDICINE

## 2022-11-10 NOTE — PROGRESS NOTES
Chart has been dictated using voice recognition software.  It is not been reviewed carefully for any transcriptional errors due to this technology.   Subjective:   Patient ID:  Stephanie Sears is a 72 y.o. female who presents for follow-up of No chief complaint on file.      HPI:  Patient with history of diastolic dysfunction, mild aortic stenosis, GERD, GI distress, hypertension, dyslipidemia, type 2 diabetes, s/p bilateral knee replacements, and thyroid disease.    Patient denies any chest discomfort on exertion or at rest.  Patient denies any dyspnea at rest or on exertion, orthopnea, PND, or edema.  Patient denies any palpitations, lightheadedness, or syncope. Patient is still smoking despite being in the [facility] cessation program.        Past Medical History:   Diagnosis Date    Acute pancreatitis     Angina pectoris     Anxiety     Arthritis     Asthma     as a child    Back pain     Bronchitis     Cervical spondylosis with radiculopathy 07/10/2012    Chest pain     Chronic neck pain 07/26/2012    Colon polyps     Controlled type 2 diabetes mellitus with stage 2 chronic kidney disease, with long-term current use of insulin 01/24/2020    COPD (chronic obstructive pulmonary disease)     Coronary artery disease     Depression     Fibrocystic breast     General anesthetics causing adverse effect in therapeutic use     trouble coming out of anes/ had the shakes    GERD (gastroesophageal reflux disease)     Glaucoma     Heart failure     Hyperlipidemia     Hypertension     Neck pain 07/10/2012    Obesity     JAM (obstructive sleep apnea)     Pneumonia     Pneumonia due to other staphylococcus     Primary osteoarthritis of both knees     Psoriasis     Subacromial or subdeltoid bursitis 07/10/2012    Thyroid disease     Tobacco dependence     Trouble in sleeping     Type 2 diabetes mellitus 12/10/2013    Type 2 diabetes mellitus with diabetic chronic kidney disease        Outpatient Medications Prior to Visit    Medication Sig Dispense Refill    albuterol (PROVENTIL/VENTOLIN HFA) 90 mcg/actuation inhaler Inhale 2 puffs into the lungs every 4 (four) hours as needed for Wheezing. Rescue      amLODIPine-benazepriL (LOTREL) 10-40 mg per capsule TAKE 1 CAPSULE DAILY 90 capsule 1    atorvastatin (LIPITOR) 20 MG tablet Take 1 tablet (20 mg total) by mouth once daily. 90 tablet 3    carvediloL (COREG) 12.5 MG tablet Take 1 tablet (12.5 mg total) by mouth 2 (two) times daily with meals. 180 tablet 3    chlorthalidone (HYGROTEN) 25 MG Tab Take 1 tablet (25 mg total) by mouth every other day. 15 tablet 11    diphenhydrAMINE (BENADRYL) 25 mg capsule Take 25 mg by mouth every 6 (six) hours as needed for Itching or Allergies.      gabapentin (NEURONTIN) 600 MG tablet Take 1 tablet (600 mg total) by mouth 3 (three) times daily. 270 tablet 3    INULIN (FIBER GUMMIES ORAL) Take 1 each by mouth every morning.       lancets (ACCU-CHEK MULTICLIX LANCET) Misc Test blood sugar twice daily 300 each 3    nicotine (NICODERM CQ) 21 mg/24 hr Place 1 patch onto the skin once daily. 28 patch 0    nicotine, polacrilex, 2 mg lzmn Take 1 each (2 mg total) by mouth as needed (Take 1 piece as needed. Maximum dose of 10 per day.). 243 each 0    simethicone (GAS-X ORAL) Take 1 tablet by mouth as needed.        No facility-administered medications prior to visit.       Review of Systems   Constitutional: Positive for weight loss (30 pounds in past year).   HENT:  Negative for nosebleeds.    Respiratory:  Negative for hemoptysis.    Hematologic/Lymphatic: Negative for bleeding problem. Does not bruise/bleed easily.   Musculoskeletal:  Positive for muscle cramps (fingers and toes).   Gastrointestinal:  Negative for hematemesis and hematochezia.   Genitourinary:  Negative for hematuria.   Neurological:  Negative for focal weakness and numbness.    Objective:   Physical Exam  Constitutional:       Appearance: She is well-developed. She is obese.      Comments:  "/66 (BP Location: Left arm, Patient Position: Sitting, BP Method: Medium (Automatic))   Pulse 63   Ht 5' 2" (1.575 m)   Wt 81.3 kg (179 lb 3.7 oz)   BMI 32.78 kg/m²      Neck:      Vascular: Carotid bruit (left) present. No JVD.   Cardiovascular:      Rate and Rhythm: Normal rate and regular rhythm.      Pulses: Intact distal pulses.      Heart sounds: Murmur heard.   High-pitched scratchy early systolic murmur is present with a grade of 2/6 at the upper right sternal border radiating to the apex.     No friction rub. No gallop.   Pulmonary:      Effort: Pulmonary effort is normal.      Breath sounds: Normal breath sounds. No wheezing or rales.   Abdominal:      General: Bowel sounds are normal. There is no abdominal bruit.      Palpations: Abdomen is soft. There is no hepatomegaly.      Tenderness: There is no abdominal tenderness.   Musculoskeletal:      Cervical back: Neck supple.      Right lower leg: No edema.      Left lower leg: No edema.   Skin:     Nails: There is no clubbing.   Neurological:      Mental Status: She is alert and oriented to person, place, and time.         Lab Results   Component Value Date     09/14/2022    K 4.2 09/14/2022    BUN 21 09/14/2022    CREATININE 1.0 09/14/2022    GLU 83 09/14/2022    HGBA1C 6.0 (H) 08/01/2022    CHOL 149 04/29/2022    HDL 39 (L) 04/29/2022    LDLCALC 88.4 04/29/2022    TRIG 108 04/29/2022    CHOLHDL 26.2 04/29/2022    HGB 11.2 (L) 09/14/2022    HCT 34.5 (L) 09/14/2022     09/14/2022    INR 0.9 02/09/2018       Assessment:     1. Essential hypertension    2. Aortic valve sclerosis    3. Gastroesophageal reflux disease, unspecified whether esophagitis present    4. JAM (obstructive sleep apnea)    5. Tobacco abuse    6. Dyslipidemia    7. Severe obesity (BMI 35.0-39.9) with comorbidity    8. Status post total knee replacement, left 1/20/2016    9. Bilateral carotid bruits    10. Status post total right knee replacement 2/26/2018    11. " Lichen planus      Patient has no symptoms of cardiac ischemia, heart failure, or significant arrhythmias.  Given the patient has left carotid bruit, her LDL cholesterol should be less than 70 and her blood pressure be less than 130 systolic.  She also needs to stop smoking and this was discussed with her.  As the patient was started on atorvastatin over the summer, a repeat lipid profile after 12 hour fast will be obtained.  Patient should also have repeat carotid ultrasound to see if there is any increase in the stenosis of her carotid arteries.  Finally, the patient has been asked to get her blood pressure twice a day for the next week to determine if she needs more intensive antihypertensive therapy.    All other cardiovascular HCC diagnoses not discussed above are currently stable and do not need a change in management at this time.  All non-cardiovascular HCC diagnoses are not contributing to any cardiovascular problem at this time, unless mentioned above in the HPI and/or Assessment, and will be managed by the primary and/or appropriate specialty care providers.      Unless there are intervening problems, patient should return for re-evaluation in 8 months.       Diagnoses and all orders for this visit:    Essential hypertension    Aortic valve sclerosis    Gastroesophageal reflux disease, unspecified whether esophagitis present    JAM (obstructive sleep apnea)    Tobacco abuse    Dyslipidemia    Severe obesity (BMI 35.0-39.9) with comorbidity    Status post total knee replacement, left 1/20/2016    Bilateral carotid bruits    Status post total right knee replacement 2/26/2018    Lichen planus          Dean Robins MD  Consultative Cardiology

## 2022-11-11 ENCOUNTER — OFFICE VISIT (OUTPATIENT)
Dept: CARDIOLOGY | Facility: CLINIC | Age: 72
End: 2022-11-11
Payer: MEDICARE

## 2022-11-11 VITALS
SYSTOLIC BLOOD PRESSURE: 137 MMHG | HEART RATE: 63 BPM | WEIGHT: 179.25 LBS | HEIGHT: 62 IN | BODY MASS INDEX: 32.99 KG/M2 | DIASTOLIC BLOOD PRESSURE: 66 MMHG

## 2022-11-11 DIAGNOSIS — I10 ESSENTIAL HYPERTENSION: ICD-10-CM

## 2022-11-11 DIAGNOSIS — Z96.651 STATUS POST TOTAL RIGHT KNEE REPLACEMENT: ICD-10-CM

## 2022-11-11 DIAGNOSIS — E78.5 DYSLIPIDEMIA: ICD-10-CM

## 2022-11-11 DIAGNOSIS — I10 ESSENTIAL HYPERTENSION: Primary | ICD-10-CM

## 2022-11-11 DIAGNOSIS — E66.01 SEVERE OBESITY (BMI 35.0-39.9) WITH COMORBIDITY: ICD-10-CM

## 2022-11-11 DIAGNOSIS — K21.9 GASTROESOPHAGEAL REFLUX DISEASE, UNSPECIFIED WHETHER ESOPHAGITIS PRESENT: ICD-10-CM

## 2022-11-11 DIAGNOSIS — I35.8 AORTIC VALVE SCLEROSIS: ICD-10-CM

## 2022-11-11 DIAGNOSIS — Z72.0 TOBACCO ABUSE: ICD-10-CM

## 2022-11-11 DIAGNOSIS — L43.9 LICHEN PLANUS: ICD-10-CM

## 2022-11-11 DIAGNOSIS — R09.89 BILATERAL CAROTID BRUITS: ICD-10-CM

## 2022-11-11 DIAGNOSIS — G47.33 OSA (OBSTRUCTIVE SLEEP APNEA): ICD-10-CM

## 2022-11-11 DIAGNOSIS — Z96.652 STATUS POST TOTAL KNEE REPLACEMENT, LEFT: ICD-10-CM

## 2022-11-11 PROCEDURE — 99999 PR PBB SHADOW E&M-EST. PATIENT-LVL V: CPT | Mod: PBBFAC,,, | Performed by: INTERNAL MEDICINE

## 2022-11-11 PROCEDURE — 99214 PR OFFICE/OUTPT VISIT, EST, LEVL IV, 30-39 MIN: ICD-10-PCS | Mod: S$PBB,,, | Performed by: INTERNAL MEDICINE

## 2022-11-11 PROCEDURE — 99999 PR PBB SHADOW E&M-EST. PATIENT-LVL V: ICD-10-PCS | Mod: PBBFAC,,, | Performed by: INTERNAL MEDICINE

## 2022-11-11 PROCEDURE — 99215 OFFICE O/P EST HI 40 MIN: CPT | Mod: PBBFAC | Performed by: INTERNAL MEDICINE

## 2022-11-11 PROCEDURE — 99214 OFFICE O/P EST MOD 30 MIN: CPT | Mod: S$PBB,,, | Performed by: INTERNAL MEDICINE

## 2022-11-11 RX ORDER — AMLODIPINE AND BENAZEPRIL HYDROCHLORIDE 10; 40 MG/1; MG/1
CAPSULE ORAL
Qty: 90 CAPSULE | Refills: 0 | Status: SHIPPED | OUTPATIENT
Start: 2022-11-11 | End: 2023-02-09

## 2022-11-11 NOTE — TELEPHONE ENCOUNTER
Refill Routing Note   Medication(s) are not appropriate for processing by [facility] for the following reason(s):      - Required vitals are abnormal    [facility] action(s):  Defer          Medication reconciliation completed: No     Appointments  past 12m or future 3m with PCP    Date Provider   Last Visit   8/10/2022 Williams Rossi Jr., MD   Next Visit   2/14/2023 Williams Rossi Jr., MD   ED visits in past 90 days: 0        Note composed:7:02 AM 11/11/2022

## 2022-11-11 NOTE — PATIENT INSTRUCTIONS
Take your blood pressure each morning and evening for 1 week.  Record the blood pressure, pulse, date and time (AM or PM) and then send the the results all together at 1 time to Dr. Robins.     You need to get your cholesterol checked after a 12 hour fast.   No food or drink other than water, and any medication that needs to be taken, for 12 hours prior to the blood test.  You can eat as soon as you want after the sample blood sample is taken.

## 2022-11-11 NOTE — TELEPHONE ENCOUNTER
No new care gaps identified.  [facility] Embedded Care Gaps. Reference number: [id number]. 11/11/2022   12:16:13 AM CST

## 2022-11-15 ENCOUNTER — LAB VISIT (OUTPATIENT)
Dept: LAB | Facility: HOSPITAL | Age: 72
End: 2022-11-15
Attending: INTERNAL MEDICINE
Payer: MEDICARE

## 2022-11-15 DIAGNOSIS — I70.0 ATHEROSCLEROSIS OF AORTIC ARCH: ICD-10-CM

## 2022-11-15 DIAGNOSIS — E78.5 DYSLIPIDEMIA: Primary | ICD-10-CM

## 2022-11-15 DIAGNOSIS — E78.5 DYSLIPIDEMIA: ICD-10-CM

## 2022-11-15 LAB
CHOLEST SERPL-MCNC: 141 MG/DL (ref 120–199)
CHOLEST/HDLC SERPL: 3.7 {RATIO} (ref 2–5)
HDLC SERPL-MCNC: 38 MG/DL (ref 40–75)
HDLC SERPL: 27 % (ref 20–50)
LDLC SERPL CALC-MCNC: 84.6 MG/DL (ref 63–159)
NONHDLC SERPL-MCNC: 103 MG/DL
TRIGL SERPL-MCNC: 92 MG/DL (ref 30–150)

## 2022-11-15 PROCEDURE — 80061 LIPID PANEL: CPT | Performed by: INTERNAL MEDICINE

## 2022-11-15 PROCEDURE — 36415 COLL VENOUS BLD VENIPUNCTURE: CPT | Mod: PN | Performed by: INTERNAL MEDICINE

## 2022-11-15 RX ORDER — CHLORTHALIDONE 25 MG/1
25 TABLET ORAL EVERY OTHER DAY
Qty: 15 TABLET | Refills: 11 | Status: SHIPPED | OUTPATIENT
Start: 2022-11-15 | End: 2023-02-07 | Stop reason: SDUPTHER

## 2022-11-15 RX ORDER — ATORVASTATIN CALCIUM 20 MG/1
40 TABLET, FILM COATED ORAL DAILY
Qty: 180 TABLET | Refills: 3 | Status: SHIPPED | OUTPATIENT
Start: 2022-11-15 | End: 2023-02-14 | Stop reason: SDUPTHER

## 2022-11-17 ENCOUNTER — HOSPITAL ENCOUNTER (OUTPATIENT)
Dept: CARDIOLOGY | Facility: HOSPITAL | Age: 72
Discharge: HOME | End: 2022-11-17
Attending: INTERNAL MEDICINE
Payer: MEDICARE

## 2022-11-17 DIAGNOSIS — R09.89 BILATERAL CAROTID BRUITS: ICD-10-CM

## 2022-11-17 LAB
LEFT ARM DIASTOLIC BLOOD PRESSURE: 76 MMHG
LEFT ARM SYSTOLIC BLOOD PRESSURE: 136 MMHG
LEFT CBA DIAS: 12 CM/S
LEFT CBA SYS: 42 CM/S
LEFT CCA DIST DIAS: 9 CM/S
LEFT CCA DIST SYS: 45 CM/S
LEFT CCA MID DIAS: 15 CM/S
LEFT CCA MID SYS: 55 CM/S
LEFT CCA PROX DIAS: 13 CM/S
LEFT CCA PROX SYS: 61 CM/S
LEFT ECA DIAS: 15 CM/S
LEFT ECA SYS: 17 CM/S
LEFT ICA DIST DIAS: 17 CM/S
LEFT ICA DIST SYS: 59 CM/S
LEFT ICA MID DIAS: 12 CM/S
LEFT ICA MID SYS: 37 CM/S
LEFT ICA PROX DIAS: 11 CM/S
LEFT ICA PROX SYS: 33 CM/S
LEFT VERTEBRAL DIAS: 17 CM/S
LEFT VERTEBRAL SYS: 42 CM/S
OHS CV CAROTID RIGHT ICA EDV HIGHEST: 23
OHS CV CAROTID ULTRASOUND LEFT ICA/CCA RATIO: 1.31
OHS CV CAROTID ULTRASOUND RIGHT ICA/CCA RATIO: 1.78
OHS CV PV CAROTID LEFT HIGHEST CCA: 61
OHS CV PV CAROTID LEFT HIGHEST ICA: 59
OHS CV PV CAROTID RIGHT HIGHEST CCA: 55
OHS CV PV CAROTID RIGHT HIGHEST ICA: 71
OHS CV US CAROTID LEFT HIGHEST EDV: 17
RIGHT ARM DIASTOLIC BLOOD PRESSURE: 71 MMHG
RIGHT ARM SYSTOLIC BLOOD PRESSURE: 127 MMHG
RIGHT CBA DIAS: 14 CM/S
RIGHT CBA SYS: 63 CM/S
RIGHT CCA DIST DIAS: 8 CM/S
RIGHT CCA DIST SYS: 40 CM/S
RIGHT CCA MID DIAS: 10 CM/S
RIGHT CCA MID SYS: 47 CM/S
RIGHT CCA PROX DIAS: 17 CM/S
RIGHT CCA PROX SYS: 55 CM/S
RIGHT ECA DIAS: 11 CM/S
RIGHT ECA SYS: 95 CM/S
RIGHT ICA DIST DIAS: 23 CM/S
RIGHT ICA DIST SYS: 71 CM/S
RIGHT ICA MID DIAS: 15 CM/S
RIGHT ICA MID SYS: 37 CM/S
RIGHT ICA PROX DIAS: 12 CM/S
RIGHT ICA PROX SYS: 40 CM/S
RIGHT VERTEBRAL DIAS: 16 CM/S
RIGHT VERTEBRAL SYS: 56 CM/S

## 2022-11-17 PROCEDURE — 93880 CV US DOPPLER CAROTID (CUPID ONLY): ICD-10-PCS | Mod: 26,,, | Performed by: INTERNAL MEDICINE

## 2022-11-17 PROCEDURE — 93880 EXTRACRANIAL BILAT STUDY: CPT | Mod: 26,,, | Performed by: INTERNAL MEDICINE

## 2022-11-17 PROCEDURE — 93880 EXTRACRANIAL BILAT STUDY: CPT

## 2022-11-28 ENCOUNTER — OFFICE VISIT (OUTPATIENT)
Dept: OPHTHALMOLOGY | Facility: CLINIC | Age: 72
End: 2022-11-28
Payer: MEDICARE

## 2022-11-28 DIAGNOSIS — Z98.83 STATUS POST GLAUCOMA SURGERY: ICD-10-CM

## 2022-11-28 DIAGNOSIS — Z96.1 PSEUDOPHAKIA, BOTH EYES: ICD-10-CM

## 2022-11-28 DIAGNOSIS — H40.1131 PRIMARY OPEN-ANGLE GLAUCOMA, BILATERAL, MILD STAGE: Primary | ICD-10-CM

## 2022-11-28 DIAGNOSIS — H35.372 EPIRETINAL MEMBRANE (ERM) OF LEFT EYE: ICD-10-CM

## 2022-11-28 PROCEDURE — 92012 INTRM OPH EXAM EST PATIENT: CPT | Mod: S$PBB,,, | Performed by: OPHTHALMOLOGY

## 2022-11-28 PROCEDURE — 99999 PR PBB SHADOW E&M-EST. PATIENT-LVL III: ICD-10-PCS | Mod: PBBFAC,,, | Performed by: OPHTHALMOLOGY

## 2022-11-28 PROCEDURE — 99213 OFFICE O/P EST LOW 20 MIN: CPT | Mod: PBBFAC | Performed by: OPHTHALMOLOGY

## 2022-11-28 PROCEDURE — 92012 PR EYE EXAM, EST PATIENT,INTERMED: ICD-10-PCS | Mod: S$PBB,,, | Performed by: OPHTHALMOLOGY

## 2022-11-28 PROCEDURE — 92020 GONIOSCOPY: CPT | Mod: PBBFAC | Performed by: OPHTHALMOLOGY

## 2022-11-28 PROCEDURE — 99999 PR PBB SHADOW E&M-EST. PATIENT-LVL III: CPT | Mod: PBBFAC,,, | Performed by: OPHTHALMOLOGY

## 2022-11-28 PROCEDURE — 92020 PR SPECIAL EYE EVAL,GONIOSCOPY: ICD-10-PCS | Mod: S$PBB,,, | Performed by: OPHTHALMOLOGY

## 2022-11-28 PROCEDURE — 92020 GONIOSCOPY: CPT | Mod: S$PBB,,, | Performed by: OPHTHALMOLOGY

## 2022-11-28 NOTE — PROGRESS NOTES
"  HPI    DLS: 7/26/2022    Pt here for 4 Month Check;  Pt states no eye pain but seems like she can't see well reading with   current glasses.     Meds:  AT's PRN OU    1  Preperimetric POAG vs OHT   2. PVD OD   3. PCIOL OU   4. Blepharitis / MGD   5. RAGHU Allergy   6. S/P SLT OU   7. S/P istent OU     Last edited by Melany Tse MD on 11/28/2022  9:44 AM.            Assessment /Plan     For exam results, see Encounter Report.    Primary open-angle glaucoma, bilateral, mild stage    Epiretinal membrane (ERM) of left eye    Pseudophakia, both eyes          1. Pre-perimetric mild POAG   Vs OHT  -Followed at [facility] since 1991   -First HVF 1999   -First photos 1991   - Intolerant to all gtts - they aggravate his blepharitis-? RAGHU allergy   -IOP "OK" off gtts and s/p ALT ou and SLT od - 2008    Family history neg   Glaucoma meds none (( off gtts post SLT ou -))   H/O adverse rxn to glaucoma drops Intolerant to all gtts - 2/2 aggravates blepharitis- ? RAGHU allergy   LASERS ALT ou -? Date / SLT OD 7/17/08 - good response   GLAUCOMA SURGERIES - I stent x 2 - OD  - 10/21/2020 // I stent 12/9/2020 - OS   OTHER EYE SURGERIES - phaco/IOL od - 10/21/2020 - PCB00 14.0  // oS 12/9/2020 - pcb00 15.0   CDR 0.6/0.3   Tbase 19-26 / 16-21   Tmax 26/21   Ttarget ?   HVF 15 test - 1999 to 2021 - Full ou   Gonio +3 ou   /588   OCT 6 test 2005 to 2021 -  RNFL - OD: bord TI (?prog)  // OS:NL  HRT 9 test 2004 to 2020 -MR -  MR -  Dec T, border NI od // full os /// CDR 0.619 od // 0.508 os - but unreliable OD  Disc photos 1991, 1996, 2003 - slides // 2012 , 2016, 2020   - OIS     - Ttoday   20/17   - Test done today   IOP // gonio       2. Posterior Vitreous Detachment OD - 11/2008   - RD PRECAUTIONS    3. Eyelid inflammation / Blepharitis / MGD    4. Allergic Conjunctivitis - RAGHU allergy     5. PCO    Not vis sign yet     6. PC IOL OU (w/ istents)    OD 10/21/202 - PCB00 14.0   OS 12/9/2020 - PCB00 15.0     Plan   POAG - mild " -pre-perimetric glaucoma - intolerant to all gtts   IOP ok s/p ALT ou - years ago and s/p SLT OD 2008 ( no SLT os)  Continue to monitor HVF/DFE/OCT/HRT/photos/IOP   If increase IOP or progression on VF testing consider repeat SLT OD and primary SLT os      Pt was a myope - sph eq is -5.25 od and -4.25 os - pre- phac0 -- set for distance post op    OCT macula - NO CME     Rx for bifocals  Given - or ok to use no distance correction and OTC readers - re-printed 3/28/2022  Pt mostly uses the drugstore reading glasses     If IOP too high off gtts and post I-stent - can try a SLT - OS and a repeat SLT od (( H/O ALT's many years ago)  or can re-try some gtts (H/O intol to all galucoma gtts in past - ? RAGHU intol) - would skip the istant area  ?? If could try travatan Z again in future again - non RAGHU // or ?? Try alphagan P     4 - months with IOP

## 2022-11-30 ENCOUNTER — PES CALL (OUTPATIENT)
Dept: ADMINISTRATIVE | Facility: CLINIC | Age: 72
End: 2022-11-30
Payer: MEDICARE

## 2022-11-30 ENCOUNTER — EXTERNAL CHRONIC CARE MANAGEMENT (OUTPATIENT)
Dept: PRIMARY CARE CLINIC | Facility: CLINIC | Age: 72
End: 2022-11-30
Payer: MEDICARE

## 2022-11-30 PROCEDURE — 99490 CHRNC CARE MGMT STAFF 1ST 20: CPT | Mod: PBBFAC,PN | Performed by: FAMILY MEDICINE

## 2022-11-30 PROCEDURE — 99490 PR CHRONIC CARE MGMT, 1ST 20 MIN: ICD-10-PCS | Mod: S$PBB,,, | Performed by: FAMILY MEDICINE

## 2022-11-30 PROCEDURE — 99490 CHRNC CARE MGMT STAFF 1ST 20: CPT | Mod: S$PBB,,, | Performed by: FAMILY MEDICINE

## 2022-12-16 ENCOUNTER — TELEPHONE (OUTPATIENT)
Dept: FAMILY MEDICINE | Facility: CLINIC | Age: 72
End: 2022-12-16
Payer: MEDICARE

## 2022-12-16 DIAGNOSIS — Z12.31 ENCOUNTER FOR SCREENING MAMMOGRAM FOR BREAST CANCER: Primary | ICD-10-CM

## 2022-12-16 NOTE — TELEPHONE ENCOUNTER
----- Message from Roxann Malaika sent at 12/16/2022 11:11 AM CST -----  Regarding: Requesting Mammo  .Type:  Mammogram    Caller is requesting to schedule their annual mammogram appointment.  Order is not listed in EPIC.  Please enter order and contact patient to schedule    Name of Caller:  self    Where would they like the mammogram performed? Early morning - [facility]    Would the patient rather a call back or a response via [facility]? Call     Best Call Back Number:  [phone]

## 2022-12-20 ENCOUNTER — TELEPHONE (OUTPATIENT)
Dept: NEPHROLOGY | Facility: CLINIC | Age: 72
End: 2022-12-20
Payer: MEDICARE

## 2022-12-29 ENCOUNTER — TELEPHONE (OUTPATIENT)
Dept: NEPHROLOGY | Facility: CLINIC | Age: 72
End: 2022-12-29
Payer: MEDICARE

## 2022-12-29 DIAGNOSIS — N18.2 STAGE 2 CHRONIC KIDNEY DISEASE: Primary | ICD-10-CM

## 2022-12-29 NOTE — TELEPHONE ENCOUNTER
----- Message from Patricia Ho sent at 12/29/2022  8:20 AM CST -----  Contact: pt  Pt requesting call back RE: would like to schedule appt from [facility] for jan, nothing available in epic please call      Confirmed contact below:  Contact Name:Stephanie Orion  Phone Number: [phone]

## 2022-12-31 ENCOUNTER — EXTERNAL CHRONIC CARE MANAGEMENT (OUTPATIENT)
Dept: PRIMARY CARE CLINIC | Facility: CLINIC | Age: 72
End: 2022-12-31
Payer: MEDICARE

## 2022-12-31 PROCEDURE — 99490 CHRNC CARE MGMT STAFF 1ST 20: CPT | Mod: S$PBB,,, | Performed by: FAMILY MEDICINE

## 2022-12-31 PROCEDURE — 99490 CHRNC CARE MGMT STAFF 1ST 20: CPT | Mod: PBBFAC,PN | Performed by: FAMILY MEDICINE

## 2022-12-31 PROCEDURE — 99490 PR CHRONIC CARE MGMT, 1ST 20 MIN: ICD-10-PCS | Mod: S$PBB,,, | Performed by: FAMILY MEDICINE

## 2023-01-12 ENCOUNTER — CLINICAL SUPPORT (OUTPATIENT)
Dept: SMOKING CESSATION | Facility: CLINIC | Age: 73
End: 2023-01-12
Payer: MEDICARE

## 2023-01-12 DIAGNOSIS — F17.200 NICOTINE DEPENDENCE: Primary | ICD-10-CM

## 2023-01-12 PROCEDURE — 99407 BEHAV CHNG SMOKING > 10 MIN: CPT | Mod: S$GLB,,,

## 2023-01-12 PROCEDURE — 99999 PR PBB SHADOW E&M-EST. PATIENT-LVL I: ICD-10-PCS | Mod: PBBFAC,,,

## 2023-01-12 PROCEDURE — 99407 PR TOBACCO USE CESSATION INTENSIVE >10 MINUTES: ICD-10-PCS | Mod: S$GLB,,,

## 2023-01-12 PROCEDURE — 99999 PR PBB SHADOW E&M-EST. PATIENT-LVL I: CPT | Mod: PBBFAC,,,

## 2023-01-12 NOTE — PROGRESS NOTES
Spoke with patient today in regard to smoking cessation progress for 12 month phone follow up on quit 6. Patient not tobacco free at this time. Patient has scheduled an appointment to return to the program for Quit attempt #7. Informed patient of benefit period, future follow ups, and contact information if any further help or support is needed. Will complete smart form and resolve Quit attempt #6.

## 2023-01-13 ENCOUNTER — CLINICAL SUPPORT (OUTPATIENT)
Dept: SMOKING CESSATION | Facility: CLINIC | Age: 73
End: 2023-01-13
Payer: COMMERCIAL

## 2023-01-13 DIAGNOSIS — F17.200 NICOTINE DEPENDENCE: Primary | ICD-10-CM

## 2023-01-13 PROCEDURE — 99404 PREV MED CNSL INDIV APPRX 60: CPT | Mod: S$GLB,,,

## 2023-01-13 PROCEDURE — 99999 PR PBB SHADOW E&M-EST. PATIENT-LVL I: ICD-10-PCS | Mod: PBBFAC,,,

## 2023-01-13 PROCEDURE — 99404 PR PREVENT COUNSEL,INDIV,60 MIN: ICD-10-PCS | Mod: S$GLB,,,

## 2023-01-13 PROCEDURE — 99999 PR PBB SHADOW E&M-EST. PATIENT-LVL I: CPT | Mod: PBBFAC,,,

## 2023-01-13 RX ORDER — BUPROPION HYDROCHLORIDE 150 MG/1
150 TABLET, EXTENDED RELEASE ORAL 2 TIMES DAILY
Qty: 60 TABLET | Refills: 0 | Status: SHIPPED | OUTPATIENT
Start: 2023-01-13 | End: 2023-03-10

## 2023-01-13 NOTE — PROGRESS NOTES
Patient presented to clinic for initial intake visit, name and date of birth verified as two patient identifiers.  Patient's FTND score of 3 is indicative of a moderate level of nicotine dependence, and her CESD score of 0 is perceived as no mental distress noted.  Patient reported she currently smokes 11-15 cpd.  Counselor discussed nicotine replacement options and packet 1 with patient.  Counselor outlined cues and triggers, differentiating between urge and habit, behavior modification, initiating a smoking journal, waiting 15 minutes prior to smoking, and engaging in positive outlets such as playing games on her phone in lieu of smoking.  Patient will begin biweekly smoking cessation counseling sessions, and she will also begin NRT with 150 mg Wellbutrin SR in conjunction with 2 mg nicotine lozenges PRN.  Counselor allotted time for questions, and she provided patient with her contact information.  Follow-up visit scheduled in two weeks.  Counselor will remain available should any further needs arise.

## 2023-01-18 ENCOUNTER — PATIENT MESSAGE (OUTPATIENT)
Dept: ADMINISTRATIVE | Facility: HOSPITAL | Age: 73
End: 2023-01-18
Payer: MEDICARE

## 2023-01-27 ENCOUNTER — CLINICAL SUPPORT (OUTPATIENT)
Dept: SMOKING CESSATION | Facility: CLINIC | Age: 73
End: 2023-01-27
Payer: COMMERCIAL

## 2023-01-27 DIAGNOSIS — F17.200 NICOTINE DEPENDENCE: ICD-10-CM

## 2023-01-27 PROCEDURE — 99999 PR PBB SHADOW E&M-EST. PATIENT-LVL II: ICD-10-PCS | Mod: PBBFAC,,,

## 2023-01-27 PROCEDURE — 99404 PREV MED CNSL INDIV APPRX 60: CPT | Mod: ,,,

## 2023-01-27 PROCEDURE — 99404 PR PREVENT COUNSEL,INDIV,60 MIN: ICD-10-PCS | Mod: ,,,

## 2023-01-27 PROCEDURE — 99999 PR PBB SHADOW E&M-EST. PATIENT-LVL II: CPT | Mod: PBBFAC,,,

## 2023-01-27 RX ORDER — IBUPROFEN 200 MG
1 TABLET ORAL DAILY
Qty: 28 PATCH | Refills: 0 | Status: SHIPPED | OUTPATIENT
Start: 2023-01-27 | End: 2023-02-24 | Stop reason: SDUPTHER

## 2023-01-27 NOTE — PROGRESS NOTES
Individual Follow-Up Form    1/27/2023    Quit Date: TBD     Clinical Status of Patient: Outpatient    Length of Service: 60 minutes    Continuing Medication: yes  Wellbutrin or Patches    Other Medications: None      Target Symptoms: Withdrawal and medication side effects. The following were  rated moderate (3) to severe (4) on TCRS:  Moderate (3): Crave and Desire   Severe (4): None     Comments: Patient presented to clinic for follow-up visit, name and date of birth verified as two patient identifiers.   Patient reported she is still smoking about 5 CPD, and she did not start using using 150 mg Wellbutrin SR because she did not receive the medication.  Patient also reported she lights more cigarettes than she actually smokes, she also stated she allows most of them to burn out in her ashtray.  Counselor congratulated patient on her progress thus far.  Counselor also reviewed packet 2 with patient outlining the benefits on one's health once she achieves tobacco free status.  Counselor also reiterated the purpose and benefits of using NRT consistently and she also discussed adding 21 mg nicotine patch to patient's NRT regimen.  Counselor also contacted [facility] to inquire about patient's Wellbutrin prescription.  Jessica reported patient's trust information was not updated, but she reprocessed patient's insurance information, and patient will receive prescription early next week.  Counselor communicated information received to patient and she verbalized understanding.  Counselor also added 21 mg nicotine patch to patient's current NRT regimen.  Follow-up visit scheduled in two weeks.  Counselor will remain available should any further needs arise.      Diagnosis: F17.200    Next Visit: 2 weeks

## 2023-01-31 ENCOUNTER — EXTERNAL CHRONIC CARE MANAGEMENT (OUTPATIENT)
Dept: PRIMARY CARE CLINIC | Facility: CLINIC | Age: 73
End: 2023-01-31
Payer: MEDICARE

## 2023-01-31 ENCOUNTER — CLINICAL SUPPORT (OUTPATIENT)
Dept: SMOKING CESSATION | Facility: CLINIC | Age: 73
End: 2023-01-31
Payer: COMMERCIAL

## 2023-01-31 DIAGNOSIS — F17.200 NICOTINE DEPENDENCE: Primary | ICD-10-CM

## 2023-01-31 PROCEDURE — 99999 PR PBB SHADOW E&M-EST. PATIENT-LVL I: ICD-10-PCS | Mod: PBBFAC,,,

## 2023-01-31 PROCEDURE — 99490 CHRNC CARE MGMT STAFF 1ST 20: CPT | Mod: S$PBB,,, | Performed by: FAMILY MEDICINE

## 2023-01-31 PROCEDURE — 99490 CHRNC CARE MGMT STAFF 1ST 20: CPT | Mod: PBBFAC,PN | Performed by: FAMILY MEDICINE

## 2023-01-31 PROCEDURE — 99402 PR PREVENT COUNSEL,INDIV,30 MIN: ICD-10-PCS | Mod: S$GLB,,,

## 2023-01-31 PROCEDURE — 99439 CHRNC CARE MGMT STAF EA ADDL: CPT | Mod: PBBFAC,PN,27 | Performed by: FAMILY MEDICINE

## 2023-01-31 PROCEDURE — 99490 PR CHRONIC CARE MGMT, 1ST 20 MIN: ICD-10-PCS | Mod: S$PBB,,, | Performed by: FAMILY MEDICINE

## 2023-01-31 PROCEDURE — 99439 CHRNC CARE MGMT STAF EA ADDL: CPT | Mod: S$PBB,,, | Performed by: FAMILY MEDICINE

## 2023-01-31 PROCEDURE — 99999 PR PBB SHADOW E&M-EST. PATIENT-LVL I: CPT | Mod: PBBFAC,,,

## 2023-01-31 PROCEDURE — 99439 PR CHRONIC CARE MGMT, EA ADDTL 20 MIN: ICD-10-PCS | Mod: S$PBB,,, | Performed by: FAMILY MEDICINE

## 2023-01-31 PROCEDURE — 99402 PREV MED CNSL INDIV APPRX 30: CPT | Mod: S$GLB,,,

## 2023-01-31 NOTE — PROGRESS NOTES
Individual Follow-Up Form    1/31/2023    Quit Date: TBD    Clinical Status of Patient: Outpatient    Length of Service: 30 minutes    Continuing Medication: yes  Wellbutrin or Patches    Other Medications: None      Target Symptoms: Withdrawal and medication side effects. The following were  rated moderate (3) to severe (4) on TCRS:  Moderate (3): Crave and Desire   Severe (4): None     Comments: Counselor spoke with patient for telephonic visit, name and date of birth verified as two patient identifiers.   Patient reported she is still smoking,but she cut back on smoking lately.  Patient also reported she started using 21 mg nicotine patch without any negative side effects reported at this time, and she also stated she is going to start 150 Wellbutrin SR soon.  Patient also stated she wanted to discuss the side effects of Wellbutrin.  Counselor congratulated patient on her progress thus far, and she also reviewed medication side effects with patient.  Patient verbalized understanding.  Follow-up visit scheduled in one week.  Counselor will remain available should any further needs arise.      Diagnosis: F17.200    Next Visit: 1 week

## 2023-02-03 ENCOUNTER — LAB VISIT (OUTPATIENT)
Dept: LAB | Facility: HOSPITAL | Age: 73
End: 2023-02-03
Attending: FAMILY MEDICINE
Payer: MEDICARE

## 2023-02-03 DIAGNOSIS — E11.22 TYPE 2 DIABETES MELLITUS WITH STAGE 3 CHRONIC KIDNEY DISEASE, UNSPECIFIED WHETHER LONG TERM INSULIN USE, UNSPECIFIED WHETHER STAGE 3A OR 3B CKD: ICD-10-CM

## 2023-02-03 DIAGNOSIS — I10 ESSENTIAL HYPERTENSION: ICD-10-CM

## 2023-02-03 DIAGNOSIS — E78.5 DYSLIPIDEMIA: ICD-10-CM

## 2023-02-03 DIAGNOSIS — N18.2 STAGE 2 CHRONIC KIDNEY DISEASE: ICD-10-CM

## 2023-02-03 DIAGNOSIS — N18.30 TYPE 2 DIABETES MELLITUS WITH STAGE 3 CHRONIC KIDNEY DISEASE, UNSPECIFIED WHETHER LONG TERM INSULIN USE, UNSPECIFIED WHETHER STAGE 3A OR 3B CKD: ICD-10-CM

## 2023-02-03 LAB
ALBUMIN SERPL BCP-MCNC: 3.2 G/DL (ref 3.5–5.2)
ALBUMIN SERPL BCP-MCNC: 3.2 G/DL (ref 3.5–5.2)
ALP SERPL-CCNC: 117 U/L (ref 55–135)
ALT SERPL W/O P-5'-P-CCNC: 19 U/L (ref 10–44)
ANION GAP SERPL CALC-SCNC: 8 MMOL/L (ref 8–16)
ANION GAP SERPL CALC-SCNC: 8 MMOL/L (ref 8–16)
AST SERPL-CCNC: 18 U/L (ref 10–40)
BASOPHILS # BLD AUTO: 0.02 K/UL (ref 0–0.2)
BASOPHILS NFR BLD: 0.3 % (ref 0–1.9)
BILIRUB SERPL-MCNC: 0.4 MG/DL (ref 0.1–1)
BUN SERPL-MCNC: 22 MG/DL (ref 8–23)
BUN SERPL-MCNC: 22 MG/DL (ref 8–23)
CALCIUM SERPL-MCNC: 9.2 MG/DL (ref 8.7–10.5)
CALCIUM SERPL-MCNC: 9.2 MG/DL (ref 8.7–10.5)
CHLORIDE SERPL-SCNC: 107 MMOL/L (ref 95–110)
CHLORIDE SERPL-SCNC: 107 MMOL/L (ref 95–110)
CHOLEST SERPL-MCNC: 135 MG/DL (ref 120–199)
CHOLEST/HDLC SERPL: 3.5 {RATIO} (ref 2–5)
CO2 SERPL-SCNC: 26 MMOL/L (ref 23–29)
CO2 SERPL-SCNC: 26 MMOL/L (ref 23–29)
CREAT SERPL-MCNC: 1.2 MG/DL (ref 0.5–1.4)
CREAT SERPL-MCNC: 1.2 MG/DL (ref 0.5–1.4)
DIFFERENTIAL METHOD: ABNORMAL
EOSINOPHIL # BLD AUTO: 0.4 K/UL (ref 0–0.5)
EOSINOPHIL NFR BLD: 5.9 % (ref 0–8)
ERYTHROCYTE [DISTWIDTH] IN BLOOD BY AUTOMATED COUNT: 16.2 % (ref 11.5–14.5)
EST. GFR  (NO RACE VARIABLE): 48 ML/MIN/1.73 M^2
EST. GFR  (NO RACE VARIABLE): 48 ML/MIN/1.73 M^2
GLUCOSE SERPL-MCNC: 95 MG/DL (ref 70–110)
GLUCOSE SERPL-MCNC: 95 MG/DL (ref 70–110)
HCT VFR BLD AUTO: 36.3 % (ref 37–48.5)
HDLC SERPL-MCNC: 39 MG/DL (ref 40–75)
HDLC SERPL: 28.9 % (ref 20–50)
HGB BLD-MCNC: 11.7 G/DL (ref 12–16)
IMM GRANULOCYTES # BLD AUTO: 0.01 K/UL (ref 0–0.04)
IMM GRANULOCYTES NFR BLD AUTO: 0.2 % (ref 0–0.5)
LDLC SERPL CALC-MCNC: 79.2 MG/DL (ref 63–159)
LYMPHOCYTES # BLD AUTO: 2.4 K/UL (ref 1–4.8)
LYMPHOCYTES NFR BLD: 39.9 % (ref 18–48)
MCH RBC QN AUTO: 25.9 PG (ref 27–31)
MCHC RBC AUTO-ENTMCNC: 32.2 G/DL (ref 32–36)
MCV RBC AUTO: 81 FL (ref 82–98)
MONOCYTES # BLD AUTO: 0.6 K/UL (ref 0.3–1)
MONOCYTES NFR BLD: 9.2 % (ref 4–15)
NEUTROPHILS # BLD AUTO: 2.7 K/UL (ref 1.8–7.7)
NEUTROPHILS NFR BLD: 44.5 % (ref 38–73)
NONHDLC SERPL-MCNC: 96 MG/DL
NRBC BLD-RTO: 0 /100 WBC
PHOSPHATE SERPL-MCNC: 4.1 MG/DL (ref 2.7–4.5)
PLATELET # BLD AUTO: 235 K/UL (ref 150–450)
PMV BLD AUTO: 11 FL (ref 9.2–12.9)
POTASSIUM SERPL-SCNC: 4.7 MMOL/L (ref 3.5–5.1)
POTASSIUM SERPL-SCNC: 4.7 MMOL/L (ref 3.5–5.1)
PROT SERPL-MCNC: 6.6 G/DL (ref 6–8.4)
RBC # BLD AUTO: 4.51 M/UL (ref 4–5.4)
SODIUM SERPL-SCNC: 141 MMOL/L (ref 136–145)
SODIUM SERPL-SCNC: 141 MMOL/L (ref 136–145)
TRIGL SERPL-MCNC: 84 MG/DL (ref 30–150)
WBC # BLD AUTO: 6.07 K/UL (ref 3.9–12.7)

## 2023-02-03 PROCEDURE — 85025 COMPLETE CBC W/AUTO DIFF WBC: CPT | Performed by: FAMILY MEDICINE

## 2023-02-03 PROCEDURE — 83036 HEMOGLOBIN GLYCOSYLATED A1C: CPT | Performed by: FAMILY MEDICINE

## 2023-02-03 PROCEDURE — 80061 LIPID PANEL: CPT | Performed by: FAMILY MEDICINE

## 2023-02-03 PROCEDURE — 84100 ASSAY OF PHOSPHORUS: CPT | Performed by: HOSPITALIST

## 2023-02-03 PROCEDURE — 36415 COLL VENOUS BLD VENIPUNCTURE: CPT | Mod: PN | Performed by: FAMILY MEDICINE

## 2023-02-03 PROCEDURE — 80053 COMPREHEN METABOLIC PANEL: CPT | Performed by: FAMILY MEDICINE

## 2023-02-04 LAB
ESTIMATED AVG GLUCOSE: 120 MG/DL (ref 68–131)
HBA1C MFR BLD: 5.8 % (ref 4–5.6)

## 2023-02-07 ENCOUNTER — LAB VISIT (OUTPATIENT)
Dept: LAB | Facility: HOSPITAL | Age: 73
End: 2023-02-07
Payer: MEDICARE

## 2023-02-07 ENCOUNTER — OFFICE VISIT (OUTPATIENT)
Dept: NEPHROLOGY | Facility: CLINIC | Age: 73
End: 2023-02-07
Payer: MEDICARE

## 2023-02-07 VITALS
WEIGHT: 181.88 LBS | HEIGHT: 62 IN | HEART RATE: 65 BPM | DIASTOLIC BLOOD PRESSURE: 75 MMHG | SYSTOLIC BLOOD PRESSURE: 140 MMHG | OXYGEN SATURATION: 98 % | BODY MASS INDEX: 33.47 KG/M2

## 2023-02-07 DIAGNOSIS — E66.01 SEVERE OBESITY (BMI 35.0-39.9) WITH COMORBIDITY: ICD-10-CM

## 2023-02-07 DIAGNOSIS — N18.2 STAGE 2 CHRONIC KIDNEY DISEASE: ICD-10-CM

## 2023-02-07 DIAGNOSIS — N18.30 TYPE 2 DIABETES MELLITUS WITH STAGE 3 CHRONIC KIDNEY DISEASE, UNSPECIFIED WHETHER LONG TERM INSULIN USE, UNSPECIFIED WHETHER STAGE 3A OR 3B CKD: ICD-10-CM

## 2023-02-07 DIAGNOSIS — E78.5 DYSLIPIDEMIA: ICD-10-CM

## 2023-02-07 DIAGNOSIS — I10 ESSENTIAL HYPERTENSION: Primary | ICD-10-CM

## 2023-02-07 DIAGNOSIS — R80.1 PERSISTENT PROTEINURIA: ICD-10-CM

## 2023-02-07 DIAGNOSIS — I51.89 DIASTOLIC DYSFUNCTION: ICD-10-CM

## 2023-02-07 DIAGNOSIS — N18.31 STAGE 3A CHRONIC KIDNEY DISEASE: ICD-10-CM

## 2023-02-07 DIAGNOSIS — R80.9 ASYMPTOMATIC PROTEINURIA: ICD-10-CM

## 2023-02-07 DIAGNOSIS — E11.22 TYPE 2 DIABETES MELLITUS WITH STAGE 3 CHRONIC KIDNEY DISEASE, UNSPECIFIED WHETHER LONG TERM INSULIN USE, UNSPECIFIED WHETHER STAGE 3A OR 3B CKD: ICD-10-CM

## 2023-02-07 DIAGNOSIS — E11.9 DIET-CONTROLLED DIABETES MELLITUS: ICD-10-CM

## 2023-02-07 LAB
BASOPHILS # BLD AUTO: 0.03 K/UL (ref 0–0.2)
BASOPHILS NFR BLD: 0.5 % (ref 0–1.9)
CHOLEST SERPL-MCNC: 141 MG/DL (ref 120–199)
CHOLEST/HDLC SERPL: 3.4 {RATIO} (ref 2–5)
DIFFERENTIAL METHOD: ABNORMAL
EOSINOPHIL # BLD AUTO: 0.4 K/UL (ref 0–0.5)
EOSINOPHIL NFR BLD: 6.2 % (ref 0–8)
ERYTHROCYTE [DISTWIDTH] IN BLOOD BY AUTOMATED COUNT: 16.1 % (ref 11.5–14.5)
HCT VFR BLD AUTO: 35.9 % (ref 37–48.5)
HDLC SERPL-MCNC: 41 MG/DL (ref 40–75)
HDLC SERPL: 29.1 % (ref 20–50)
HGB BLD-MCNC: 11.5 G/DL (ref 12–16)
IMM GRANULOCYTES # BLD AUTO: 0.01 K/UL (ref 0–0.04)
IMM GRANULOCYTES NFR BLD AUTO: 0.2 % (ref 0–0.5)
LDLC SERPL CALC-MCNC: 82.4 MG/DL (ref 63–159)
LYMPHOCYTES # BLD AUTO: 2.6 K/UL (ref 1–4.8)
LYMPHOCYTES NFR BLD: 43.4 % (ref 18–48)
MCH RBC QN AUTO: 26 PG (ref 27–31)
MCHC RBC AUTO-ENTMCNC: 32 G/DL (ref 32–36)
MCV RBC AUTO: 81 FL (ref 82–98)
MONOCYTES # BLD AUTO: 0.5 K/UL (ref 0.3–1)
MONOCYTES NFR BLD: 8.9 % (ref 4–15)
NEUTROPHILS # BLD AUTO: 2.4 K/UL (ref 1.8–7.7)
NEUTROPHILS NFR BLD: 40.8 % (ref 38–73)
NONHDLC SERPL-MCNC: 100 MG/DL
NRBC BLD-RTO: 0 /100 WBC
PLATELET # BLD AUTO: 239 K/UL (ref 150–450)
PMV BLD AUTO: 10.8 FL (ref 9.2–12.9)
RBC # BLD AUTO: 4.42 M/UL (ref 4–5.4)
TRIGL SERPL-MCNC: 88 MG/DL (ref 30–150)
WBC # BLD AUTO: 5.95 K/UL (ref 3.9–12.7)

## 2023-02-07 PROCEDURE — 99214 PR OFFICE/OUTPT VISIT, EST, LEVL IV, 30-39 MIN: ICD-10-PCS | Mod: S$PBB,,, | Performed by: HOSPITALIST

## 2023-02-07 PROCEDURE — 80061 LIPID PANEL: CPT | Performed by: INTERNAL MEDICINE

## 2023-02-07 PROCEDURE — 99999 PR PBB SHADOW E&M-EST. PATIENT-LVL II: CPT | Mod: PBBFAC,,, | Performed by: HOSPITALIST

## 2023-02-07 PROCEDURE — 99214 OFFICE O/P EST MOD 30 MIN: CPT | Mod: S$PBB,,, | Performed by: HOSPITALIST

## 2023-02-07 PROCEDURE — 36415 COLL VENOUS BLD VENIPUNCTURE: CPT | Mod: PN | Performed by: INTERNAL MEDICINE

## 2023-02-07 PROCEDURE — 99999 PR PBB SHADOW E&M-EST. PATIENT-LVL II: ICD-10-PCS | Mod: PBBFAC,,, | Performed by: HOSPITALIST

## 2023-02-07 PROCEDURE — 85025 COMPLETE CBC W/AUTO DIFF WBC: CPT | Performed by: HOSPITALIST

## 2023-02-07 PROCEDURE — 99212 OFFICE O/P EST SF 10 MIN: CPT | Mod: PBBFAC | Performed by: HOSPITALIST

## 2023-02-07 RX ORDER — CHLORTHALIDONE 25 MG/1
25 TABLET ORAL EVERY OTHER DAY
Qty: 15 TABLET | Refills: 11 | Status: SHIPPED | OUTPATIENT
Start: 2023-02-07 | End: 2024-01-17

## 2023-02-07 NOTE — PROGRESS NOTES
REASON FOR CONSULT/CHIEF COMPLAIN: CKD stage 3 and Proteinuria     REFERRING PHYSICIAN: Williams Rossi Jr, MD      HISTORY OF PRESENT ILLNESS: 73 y.o. female who is established to me  has a past medical history of Acute pancreatitis, Angina pectoris, Anxiety, Arthritis, Asthma, Back pain, Bronchitis, Cervical spondylosis with radiculopathy (07/10/2012), Chest pain, Chronic neck pain (07/26/2012), Colon polyps, Controlled type 2 diabetes mellitus with stage 2 chronic kidney disease, with long-term current use of insulin (01/24/2020), COPD (chronic obstructive pulmonary disease), Coronary artery disease, Depression, Fibrocystic breast, General anesthetics causing adverse effect in therapeutic use, GERD (gastroesophageal reflux disease), Glaucoma, Heart failure, Hyperlipidemia, Hypertension, Neck pain (07/10/2012), Obesity, JAM (obstructive sleep apnea), Pneumonia, Pneumonia due to other staphylococcus, Primary osteoarthritis of both knees, Psoriasis, Subacromial or subdeltoid bursitis (07/10/2012), Thyroid disease, Tobacco dependence, Trouble in sleeping, Type 2 diabetes mellitus (12/10/2013), and Type 2 diabetes mellitus with diabetic chronic kidney disease. patient was referred here for abnormal renal function with CKD stage 3.     71 year old woman with medical history of hypertension presenting for follow up of proteinuria.  Patient reports blood pressure usually 130-140's systolic.  She reports adequate fluid intake, takes meloxicam only occasionally for joint pains.  She otherwise denies any fever, chest pain, shortness of breath, abdominal pain, diarrhea, dysuria/hematuria. Continues to have proteinuria      Interval H/o 2/7/2023   Was prescribed SGLT2i, however had issues with insurance  and could nt afford it. Could nt tolerate Spironolactone as it made her sick. Blood pressures not routinely checked at home. Current blood pressure at clinic at 150/90. Was nt taking Chlorthalidone as supposed to as she ran  out. Continues to smoke, however in a program now  has significantly cut down on smoking.      ROS:  General: negative for chills, or fatigue  ENT: No epistaxis or headaches  Hematological and Lymphatic: No bleeding problems or blood clots.  Endocrine: No skin changes or temperature intolerance  Respiratory: No cough, shortness of breath, or wheezing  Cardiovascular: No chest pain or dyspnea   Gastrointestinal: No abdominal pain, change in bowel habits  Genito-Urinary: No dysuria, trouble voiding, or hematuria  Musculoskeletal: ROS: negative for - joint pain, joint stiffness, joint swelling, muscle pain or muscular weakness  Neurological: No new focal weakness, no numbness  Dermatological: No rash or ulcers.    PAST MEDICAL HISTORY:  Past Medical History:   Diagnosis Date    Acute pancreatitis     Angina pectoris     Anxiety     Arthritis     Asthma     as a child    Back pain     Bronchitis     Cervical spondylosis with radiculopathy 07/10/2012    Chest pain     Chronic neck pain 07/26/2012    Colon polyps     Controlled type 2 diabetes mellitus with stage 2 chronic kidney disease, with long-term current use of insulin 01/24/2020    COPD (chronic obstructive pulmonary disease)     Coronary artery disease     Depression     Fibrocystic breast     General anesthetics causing adverse effect in therapeutic use     trouble coming out of anes/ had the shakes    GERD (gastroesophageal reflux disease)     Glaucoma     Heart failure     Hyperlipidemia     Hypertension     Neck pain 07/10/2012    Obesity     JAM (obstructive sleep apnea)     Pneumonia     Pneumonia due to other staphylococcus     Primary osteoarthritis of both knees     Psoriasis     Subacromial or subdeltoid bursitis 07/10/2012    Thyroid disease     Tobacco dependence     Trouble in sleeping     Type 2 diabetes mellitus 12/10/2013    Type 2 diabetes mellitus with diabetic chronic kidney disease        PAST SURGICAL HISTORY:  Past Surgical History:    Procedure Laterality Date    APPENDECTOMY      BREAST BIOPSY Bilateral 1988    BREAST MASS EXCISION Right     benign    CATARACT EXTRACTION W/  INTRAOCULAR LENS IMPLANT Left 12/09/2020    PHACO IOL WITH I-STENT ()    CATARACT EXTRACTION W/  INTRAOCULAR LENS IMPLANT Right 10/21/2020    PHACO IOL WITH I-STENT x 2 ()    CHOLECYSTECTOMY      COLONOSCOPY N/A 05/22/2019    Procedure: COLONOSCOPY;  Surgeon: Darrin Calvin MD;  Location: [facility] ([address] [place] [address]);  Service: Endoscopy;  Laterality: N/A;  2nd floor - all other procedure done on 2, multiple comorbidities - ERW/   change in MD schedule, Pt notified and verbalized understanding, new arrival time 0800, Ins mailed to Pt with new arrival time - ERW1/8/19@1130    COLONOSCOPY N/A 9/26/2022    Procedure: COLONOSCOPY;  Surgeon: Darrin Calvin MD;  Location: [facility] ([address] [place] [address]);  Service: Endoscopy;  Laterality: N/A;  2nd floor d/t previous anesthesia complications  vaccinated  inst mailed  clear liquids 4 hr prior-AM prep-LW  I should be able to complete this with the slim pediatric colonoscope, but should have the single-balloon available in case needed.    EPIDURAL STEROID INJECTION Bilateral 02/26/2021    Procedure: Injection, Steroid, Ischial Bursa;  Surgeon: Yonatan Garsia Jr., MD;  Location: [facility];  Service: Pain Management;  Laterality: Bilateral;  Bilateral Ischial Bursa Steroid Injections  Arrive @ 1230; No ATC; Check BG    ESOPHAGOGASTRODUODENOSCOPY      HYSTERECTOMY  1980's    IMPLANTATION OF DEVICE FOR GLAUCOMA Right 10/21/2020    Procedure: INSERTION, DEVICE, FOR GLAUCOMA/ i Stent;  Surgeon: Melany Tse MD;  Location: [address];  Service: Ophthalmology;  Laterality: Right;    IMPLANTATION OF DEVICE FOR GLAUCOMA Left 12/09/2020    Procedure: INSERTION, DEVICE, FOR GLAUCOMA/I SENT;  Surgeon: Melany Tse MD;  Location: [facility] OR [address] [place] [address];  Service: Ophthalmology;  Laterality: Left;    INJECTION OF JOINT  Bilateral 12/23/2021    Procedure: Injection, Joint---BILATERAL ISCHIAL BURSA STEROID INJECTION;  Surgeon: Yonatan Garsia Jr., MD;  Location: [facility];  Service: Pain Management;  Laterality: Bilateral;    INTRAOCULAR PROSTHESES INSERTION Right 10/21/2020    Procedure: INSERTION, IOL PROSTHESIS;  Surgeon: Melany Tse MD;  Location: [facility] OR [address] [place] [address];  Service: Ophthalmology;  Laterality: Right;    INTRAOCULAR PROSTHESES INSERTION Left 12/09/2020    Procedure: INSERTION, IOL PROSTHESIS;  Surgeon: Melany Tse MD;  Location: [facility] OR [address] [place] [address];  Service: Ophthalmology;  Laterality: Left;    KNEE SURGERY Left 01/20/2016    TKR    KNEE SURGERY Right 02/26/2018    TKR    L4-L5 fusion  2006    PHACOEMULSIFICATION OF CATARACT Right 10/21/2020    Procedure: PHACOEMULSIFICATION, CATARACT;  Surgeon: Melany Tse MD;  Location: [address];  Service: Ophthalmology;  Laterality: Right;    PHACOEMULSIFICATION OF CATARACT Left 12/09/2020    Procedure: PHACOEMULSIFICATION, CATARACT;  Surgeon: Melany Tse MD;  Location: [address];  Service: Ophthalmology;  Laterality: Left;       FAMILY HISTORY:   Family History   Problem Relation Age of Onset    Diabetes Mother     Hypertension Mother         CHF, angina    Angina Mother     Kidney disease Mother     Heart disease Mother         CHF    Hypertension Father         glaucoma, Alzhiemer's , supra pubic    Alzheimer's disease Father     Glaucoma Father     Glaucoma Sister     Hypertension Sister     Hyperlipidemia Sister     Sleep apnea Sister     Hypertension Sister     Thyroid disease Sister     No Known Problems Sister     No Known Problems Sister     Kidney disease Brother         kidney transplant, HTN,DM    Diabetes Brother     Hepatitis Brother     Gout Son     Hypertension Son     Diabetes Son     Cancer Paternal Aunt         colon ca    Amblyopia Neg Hx     Blindness Neg Hx     Melanoma Neg Hx     Macular degeneration Neg Hx   "   Retinal detachment Neg Hx     Strabismus Neg Hx        SOCIAL HISTORY:  Social History     Socioeconomic History    Marital status: Single   Tobacco Use    Smoking status: Every Day     Packs/day: 1.00     Years: 40.00     Pack years: 40.00     Types: Cigarettes    Smokeless tobacco: Current   Substance and Sexual Activity    Alcohol use: No     Alcohol/week: 0.0 standard drinks    Drug use: No    Sexual activity: Yes     Partners: Male       ALLERGIES:  Review of patient's allergies indicates:   Allergen Reactions    Hydrocodone Shortness Of Breath    Iodinated contrast media Shortness Of Breath     Difficulty breathing    Adhesive Itching     Skin peeling    Latex, natural rubber Other (See Comments)     "Takes skin off"    Morphine Hallucinations    Oxycodone Hallucinations    Sulfa (sulfonamide antibiotics) Hives and Itching       MEDICATIONS:    Current Outpatient Medications:     albuterol (PROVENTIL/VENTOLIN HFA) 90 mcg/actuation inhaler, Inhale 2 puffs into the lungs every 4 (four) hours as needed for Wheezing. Rescue, Disp: , Rfl:     amLODIPine-benazepriL (LOTREL) 10-40 mg per capsule, TAKE 1 CAPSULE DAILY, Disp: 90 capsule, Rfl: 0    atorvastatin (LIPITOR) 20 MG tablet, Take 2 tablets (40 mg total) by mouth once daily., Disp: 180 tablet, Rfl: 3    buPROPion (WELLBUTRIN SR) 150 MG TBSR 12 hr tablet, Take 1 tablet (150 mg total) by mouth 2 (two) times daily., Disp: 60 tablet, Rfl: 0    carvediloL (COREG) 12.5 MG tablet, Take 1 tablet (12.5 mg total) by mouth 2 (two) times daily with meals., Disp: 180 tablet, Rfl: 3    diphenhydrAMINE (BENADRYL) 25 mg capsule, Take 25 mg by mouth every 6 (six) hours as needed for Itching or Allergies., Disp: , Rfl:     gabapentin (NEURONTIN) 600 MG tablet, Take 1 tablet (600 mg total) by mouth 3 (three) times daily., Disp: 270 tablet, Rfl: 3    INULIN (FIBER GUMMIES ORAL), Take 1 each by mouth every morning. , Disp: , Rfl:     lancets (ACCU-CHEK MULTICLIX LANCET) Misc, " Test blood sugar twice daily, Disp: 300 each, Rfl: 3    nicotine (NICODERM CQ) 21 mg/24 hr, Place 1 patch onto the skin once daily., Disp: 28 patch, Rfl: 0    nicotine, polacrilex, 2 mg lzmn, Take 1 each (2 mg total) by mouth as needed (Take 1 piece as needed. Maximum dose of 10 per day.)., Disp: 243 each, Rfl: 0    simethicone (GAS-X ORAL), Take 1 tablet by mouth as needed. , Disp: , Rfl:     chlorthalidone (HYGROTEN) 25 MG Tab, Take 1 tablet (25 mg total) by mouth every other day., Disp: 15 tablet, Rfl: 11   Medication List with Changes/Refills   Current Medications    ALBUTEROL (PROVENTIL/VENTOLIN HFA) 90 MCG/ACTUATION INHALER    Inhale 2 puffs into the lungs every 4 (four) hours as needed for Wheezing. Rescue    AMLODIPINE-BENAZEPRIL (LOTREL) 10-40 MG PER CAPSULE    TAKE 1 CAPSULE DAILY    ATORVASTATIN (LIPITOR) 20 MG TABLET    Take 2 tablets (40 mg total) by mouth once daily.    BUPROPION (WELLBUTRIN SR) 150 MG TBSR 12 HR TABLET    Take 1 tablet (150 mg total) by mouth 2 (two) times daily.    CARVEDILOL (COREG) 12.5 MG TABLET    Take 1 tablet (12.5 mg total) by mouth 2 (two) times daily with meals.    DIPHENHYDRAMINE (BENADRYL) 25 MG CAPSULE    Take 25 mg by mouth every 6 (six) hours as needed for Itching or Allergies.    GABAPENTIN (NEURONTIN) 600 MG TABLET    Take 1 tablet (600 mg total) by mouth 3 (three) times daily.    INULIN (FIBER GUMMIES ORAL)    Take 1 each by mouth every morning.     LANCETS (ACCU-CHEK MULTICLIX LANCET) MISC    Test blood sugar twice daily    NICOTINE (NICODERM CQ) 21 MG/24 HR    Place 1 patch onto the skin once daily.    NICOTINE, POLACRILEX, 2 MG LZMN    Take 1 each (2 mg total) by mouth as needed (Take 1 piece as needed. Maximum dose of 10 per day.).    SIMETHICONE (GAS-X ORAL)    Take 1 tablet by mouth as needed.    Changed and/or Refilled Medications    Modified Medication Previous Medication    CHLORTHALIDONE (HYGROTEN) 25 MG TAB chlorthalidone (HYGROTEN) 25 MG Tab       Take 1  "tablet (25 mg total) by mouth every other day.    Take 1 tablet (25 mg total) by mouth every other day.        PHYSICAL EXAM:  BP (!) 140/75   Pulse 65   Ht 5' 2" (1.575 m)   Wt 82.5 kg (181 lb 14.1 oz)   SpO2 98%   BMI 33.27 kg/m²     General: No distress, No fever or chills  Head: Normocephalic,atraumatic  Eyes: conjunctivae/corneas clear. PERRL, EOM's intact.  Nose: Nares normal. Mucosa normal. No drainage or sinus tenderness.  Neck: No adenopathy,no carotid bruit,no JVD  Lungs:Clear to auscultation bilaterally, No Crackles  Heart: Regular rate and rhythm, no murmur, gallops or rubs  Abdomen: Soft, no tenderness, bowel sounds normal  Extremities: Atraumatic, no edema in LE  Skin: Turgor normal. No rashes or ulcers  Neurologic: No focal weakness, oriented.          LABS:  Lab Results   Component Value Date    HGB 11.5 (L) 02/07/2023        Lab Results   Component Value Date    CREATININE 1.2 02/03/2023    CREATININE 1.2 02/03/2023       Prot/Creat Ratio, Urine   Date Value Ref Range Status   02/03/2023 1.37 (H) 0.00 - 0.20 Final   09/14/2022 2.15 (H) 0.00 - 0.20 Final   06/13/2022 2.71 (H) 0.00 - 0.20 Final       Lab Results   Component Value Date     02/03/2023     02/03/2023    K 4.7 02/03/2023    K 4.7 02/03/2023    CO2 26 02/03/2023    CO2 26 02/03/2023       last PTH   Lab Results   Component Value Date    PTH 88.9 (H) 05/26/2017    CALCIUM 9.2 02/03/2023    CALCIUM 9.2 02/03/2023    PHOS 4.1 02/03/2023       Lab Results   Component Value Date    HGBA1C 5.8 (H) 02/03/2023       Lab Results   Component Value Date    LDLCALC 82.4 02/07/2023         Creatinine, Urine   Date Value Ref Range Status   02/03/2023 65.7 15.0 - 325.0 mg/dL Final   09/14/2022 107.7 15.0 - 325.0 mg/dL Final   06/13/2022 119.1 15.0 - 325.0 mg/dL Final       Protein, Urine   Date Value Ref Range Status   07/17/2007 42 (H) 0 - 10 mg/dl Final     Protein, Urine Random   Date Value Ref Range Status   02/03/2023 90 mg/dL Final "   09/14/2022 232 mg/dL Final   06/13/2022 323 mg/dL Final       Prot/Creat Ratio, Urine   Date Value Ref Range Status   02/03/2023 1.37 (H) 0.00 - 0.20 Final   09/14/2022 2.15 (H) 0.00 - 0.20 Final   06/13/2022 2.71 (H) 0.00 - 0.20 Final         No images are attached to the encounter.       Problem List   Chronic kidney disease stage 2   Long standing DM    Essential hypertension   Proteinuria   Hypoalbuminemia        ASSESSMENT and PLAN    CKD stage 2 , eGFR > 60ml/min secondary to long standing DM  Baseline creatinine is around 0.8 to 1.1  and current creatinine at 1.2 mg/dl  Most likely etiology for CKD is  DM, HTN,    UPCR trending down from 3.8 grams to 2.1  to 1.3 grams this visit   On ACE/ ARB for proteinuria and hypertension   Discussed options of SGLT2i, however her insurance was nt covering.    Patient couldn't tolerate Spironolactone. Would prescribe Chlorthalidone 25 mg every other day to help with blood pressures and proteinuria however she stopped taking one month ago  -Discussed the importance of adequate blood pressure control and taking Chlorthalidone.    Long standing Diabetes Mellitus with CKD    Continue current medications      Essential Hypertension   - Added SGLT2i, However her insurance was nt able to cover.  Patient couldn't tolerate Spironolactone. Would prescribe Chlorthalidone 25 mg every other day to help with blood pressures and proteinuria.     Nephrotic range proteinuria    Has proteinuria of 3 grams , improved to 1.3 grams    Has been long standing since 2015, progressively worsened to 3 grams    Serological work up including KAITY, sFLC, SPEP, PLA2R, Hepatitis negative    Kidney Ultrasound reviewed and with out hydronephrosis or obstruction   Proteinuria improving and serum albumin ok at 1.3 g/dl   Explained to patient that might need kidney biopsy with worsening proteinuria      Risk of progression to ESRD. ]Pt gives family h/o ESRD among her mother and her brother secondary to long  standing DM. Her risk of progression is moderate .    46 minutes of total time spent on the encounter, which includes face to face time and non-face to face time preparing to see the patient (eg, review of tests), Obtaining and/or reviewing separately obtained history, documenting clinical information in the electronic or other health record, independently interpreting results (not separately reported) and communicating results to the patient/family/caregiver, or Care coordination (not separately reported).         RTC in 4 Months   Diagnosis and plan of care explained to the patient.  Pt Verbalized understanding. Answered all questions.  Thanks for allowing me to participate in the care of this patient.       Ninfa Beck MD  Nephrology  [facility].

## 2023-02-10 ENCOUNTER — CLINICAL SUPPORT (OUTPATIENT)
Dept: SMOKING CESSATION | Facility: CLINIC | Age: 73
End: 2023-02-10
Payer: COMMERCIAL

## 2023-02-10 DIAGNOSIS — F17.200 NICOTINE DEPENDENCE: Primary | ICD-10-CM

## 2023-02-10 PROCEDURE — 99403 PR PREVENT COUNSEL,INDIV,45 MIN: ICD-10-PCS | Mod: S$GLB,,,

## 2023-02-10 PROCEDURE — 99403 PREV MED CNSL INDIV APPRX 45: CPT | Mod: S$GLB,,,

## 2023-02-10 PROCEDURE — 99999 PR PBB SHADOW E&M-EST. PATIENT-LVL II: CPT | Mod: PBBFAC,,,

## 2023-02-10 PROCEDURE — 99999 PR PBB SHADOW E&M-EST. PATIENT-LVL II: ICD-10-PCS | Mod: PBBFAC,,,

## 2023-02-10 NOTE — PROGRESS NOTES
Individual Follow-Up Form    2/10/2023    Quit Date: TBD    Clinical Status of Patient: Outpatient    Length of Service: 45 minutes    Continuing Medication: yes  Wellbutrin or Patches    Other Medications: None      Target Symptoms: Withdrawal and medication side effects. The following were  rated moderate (3) to severe (4) on TCRS:  Moderate (3): Crave and Desire   Severe (4): None     Comments: Counselor spoke with patient for telephonic visit, name and date of birth verified as two patient identifiers.   Patient reported she is still doing well with rate reduction. Patient also reported she started using 150 mg Wellbutrin SR without any negative side effects reported at this time, and she is still using 21 mg nicotine patch.  Patient stated she does have some skin irritation, but it is not intolerable.  Counselor discussed rotating site placement while using the patches, and she also discussed patient utilizing OTC cortisone on the site if irritation persists.  this Counselor congratulated patient on her progress thus far, and she also discussed further behavior modification strategies with patient.  Follow-up visit scheduled in two weeks.  Counselor will remain available should any further needs arise.      Diagnosis: F17.200    Next Visit: 2 weeks

## 2023-02-14 ENCOUNTER — HOSPITAL ENCOUNTER (OUTPATIENT)
Dept: RADIOLOGY | Facility: HOSPITAL | Age: 73
Discharge: HOME | End: 2023-02-14
Attending: FAMILY MEDICINE
Payer: MEDICARE

## 2023-02-14 ENCOUNTER — OFFICE VISIT (OUTPATIENT)
Dept: FAMILY MEDICINE | Facility: CLINIC | Age: 73
End: 2023-02-14
Payer: MEDICARE

## 2023-02-14 VITALS
TEMPERATURE: 98 F | BODY MASS INDEX: 33.84 KG/M2 | HEIGHT: 62 IN | WEIGHT: 183.88 LBS | OXYGEN SATURATION: 98 % | SYSTOLIC BLOOD PRESSURE: 144 MMHG | DIASTOLIC BLOOD PRESSURE: 64 MMHG | HEART RATE: 57 BPM

## 2023-02-14 DIAGNOSIS — E78.5 DYSLIPIDEMIA: Chronic | ICD-10-CM

## 2023-02-14 DIAGNOSIS — Z12.31 ENCOUNTER FOR SCREENING MAMMOGRAM FOR BREAST CANCER: ICD-10-CM

## 2023-02-14 DIAGNOSIS — J43.8 OTHER EMPHYSEMA: Chronic | ICD-10-CM

## 2023-02-14 DIAGNOSIS — I15.2 HYPERTENSION ASSOCIATED WITH TYPE 2 DIABETES MELLITUS: Chronic | ICD-10-CM

## 2023-02-14 DIAGNOSIS — M48.02 NEUROFORAMINAL STENOSIS OF CERVICAL SPINE: ICD-10-CM

## 2023-02-14 DIAGNOSIS — I70.0 ATHEROSCLEROSIS OF AORTIC ARCH: Chronic | ICD-10-CM

## 2023-02-14 DIAGNOSIS — E11.59 HYPERTENSION ASSOCIATED WITH TYPE 2 DIABETES MELLITUS: Chronic | ICD-10-CM

## 2023-02-14 DIAGNOSIS — M47.22 CERVICAL SPONDYLOSIS WITH RADICULOPATHY: Primary | ICD-10-CM

## 2023-02-14 DIAGNOSIS — I10 ESSENTIAL HYPERTENSION: Chronic | ICD-10-CM

## 2023-02-14 DIAGNOSIS — N18.31 STAGE 3A CHRONIC KIDNEY DISEASE: Chronic | ICD-10-CM

## 2023-02-14 DIAGNOSIS — I27.20 PULMONARY HYPERTENSION: Chronic | ICD-10-CM

## 2023-02-14 PROBLEM — R06.09 DYSPNEA ON EXERTION: Status: RESOLVED | Noted: 2022-05-11 | Resolved: 2023-02-14

## 2023-02-14 PROBLEM — Z96.651 STATUS POST TOTAL RIGHT KNEE REPLACEMENT: Status: RESOLVED | Noted: 2018-03-13 | Resolved: 2023-02-14

## 2023-02-14 PROBLEM — M70.61 GREATER TROCHANTERIC BURSITIS OF BOTH HIPS: Status: RESOLVED | Noted: 2019-12-02 | Resolved: 2023-02-14

## 2023-02-14 PROBLEM — N39.0 UTI (URINARY TRACT INFECTION): Status: RESOLVED | Noted: 2018-02-09 | Resolved: 2023-02-14

## 2023-02-14 PROBLEM — M70.70 ISCHIAL BURSITIS: Status: RESOLVED | Noted: 2021-02-26 | Resolved: 2023-02-14

## 2023-02-14 PROBLEM — H25.11 NUCLEAR SCLEROTIC CATARACT OF RIGHT EYE: Status: RESOLVED | Noted: 2020-10-21 | Resolved: 2023-02-14

## 2023-02-14 PROBLEM — E88.09 HYPOALBUMINEMIA DUE TO PROTEIN-CALORIE MALNUTRITION: Status: RESOLVED | Noted: 2020-01-24 | Resolved: 2023-02-14

## 2023-02-14 PROBLEM — M18.12 ARTHRITIS OF CARPOMETACARPAL (CMC) JOINT OF LEFT THUMB: Status: RESOLVED | Noted: 2019-12-02 | Resolved: 2023-02-14

## 2023-02-14 PROBLEM — M79.642 HAND PAIN, LEFT: Status: RESOLVED | Noted: 2017-09-15 | Resolved: 2023-02-14

## 2023-02-14 PROBLEM — N18.2 STAGE 2 CHRONIC KIDNEY DISEASE: Status: RESOLVED | Noted: 2022-09-20 | Resolved: 2023-02-14

## 2023-02-14 PROBLEM — M70.62 GREATER TROCHANTERIC BURSITIS OF BOTH HIPS: Status: RESOLVED | Noted: 2019-12-02 | Resolved: 2023-02-14

## 2023-02-14 PROBLEM — R80.1 PERSISTENT PROTEINURIA: Status: RESOLVED | Noted: 2022-09-20 | Resolved: 2023-02-14

## 2023-02-14 PROBLEM — Z48.810 POSTOPERATIVE CARE FOR CATARACT: Status: RESOLVED | Noted: 2020-12-09 | Resolved: 2023-02-14

## 2023-02-14 PROBLEM — Z98.49 POSTOPERATIVE CARE FOR CATARACT: Status: RESOLVED | Noted: 2020-12-09 | Resolved: 2023-02-14

## 2023-02-14 PROBLEM — K85.90 ACUTE PANCREATITIS WITHOUT INFECTION OR NECROSIS: Status: RESOLVED | Noted: 2019-10-17 | Resolved: 2023-02-14

## 2023-02-14 PROBLEM — M17.11 PRIMARY OSTEOARTHRITIS OF RIGHT KNEE: Status: RESOLVED | Noted: 2017-09-15 | Resolved: 2023-02-14

## 2023-02-14 PROBLEM — G47.00 INSOMNIA: Status: RESOLVED | Noted: 2019-03-11 | Resolved: 2023-02-14

## 2023-02-14 PROBLEM — M70.70: Status: RESOLVED | Noted: 2020-12-21 | Resolved: 2023-02-14

## 2023-02-14 PROBLEM — E46 HYPOALBUMINEMIA DUE TO PROTEIN-CALORIE MALNUTRITION: Status: RESOLVED | Noted: 2020-01-24 | Resolved: 2023-02-14

## 2023-02-14 PROBLEM — Z71.89 GOALS OF CARE, COUNSELING/DISCUSSION: Status: RESOLVED | Noted: 2018-07-05 | Resolved: 2023-02-14

## 2023-02-14 PROCEDURE — 99214 OFFICE O/P EST MOD 30 MIN: CPT | Mod: S$PBB,,, | Performed by: FAMILY MEDICINE

## 2023-02-14 PROCEDURE — 99999 PR PBB SHADOW E&M-EST. PATIENT-LVL V: CPT | Mod: PBBFAC,,, | Performed by: FAMILY MEDICINE

## 2023-02-14 PROCEDURE — 99215 OFFICE O/P EST HI 40 MIN: CPT | Mod: PBBFAC,PN | Performed by: FAMILY MEDICINE

## 2023-02-14 PROCEDURE — 77063 MAMMO DIGITAL SCREENING BILAT WITH TOMO: ICD-10-PCS | Mod: 26,,, | Performed by: RADIOLOGY

## 2023-02-14 PROCEDURE — 99999 PR PBB SHADOW E&M-EST. PATIENT-LVL V: ICD-10-PCS | Mod: PBBFAC,,, | Performed by: FAMILY MEDICINE

## 2023-02-14 PROCEDURE — 77067 SCR MAMMO BI INCL CAD: CPT | Mod: 26,,, | Performed by: RADIOLOGY

## 2023-02-14 PROCEDURE — 77063 BREAST TOMOSYNTHESIS BI: CPT | Mod: 26,,, | Performed by: RADIOLOGY

## 2023-02-14 PROCEDURE — 99214 PR OFFICE/OUTPT VISIT, EST, LEVL IV, 30-39 MIN: ICD-10-PCS | Mod: S$PBB,,, | Performed by: FAMILY MEDICINE

## 2023-02-14 PROCEDURE — 77067 SCR MAMMO BI INCL CAD: CPT | Mod: TC

## 2023-02-14 PROCEDURE — 77067 MAMMO DIGITAL SCREENING BILAT WITH TOMO: ICD-10-PCS | Mod: 26,,, | Performed by: RADIOLOGY

## 2023-02-14 RX ORDER — ATORVASTATIN CALCIUM 40 MG/1
40 TABLET, FILM COATED ORAL DAILY
Qty: 90 TABLET | Refills: 3 | Status: SHIPPED | OUTPATIENT
Start: 2023-02-14 | End: 2024-01-23

## 2023-02-14 RX ORDER — AMLODIPINE AND BENAZEPRIL HYDROCHLORIDE 10; 40 MG/1; MG/1
1 CAPSULE ORAL DAILY
Qty: 90 CAPSULE | Refills: 3 | Status: SHIPPED | OUTPATIENT
Start: 2023-02-14

## 2023-02-14 RX ORDER — TIZANIDINE 4 MG/1
4 TABLET ORAL 2 TIMES DAILY
Qty: 30 TABLET | Refills: 1 | Status: SHIPPED | OUTPATIENT
Start: 2023-02-14 | End: 2023-03-01

## 2023-02-14 RX ORDER — GABAPENTIN 600 MG/1
600 TABLET ORAL 2 TIMES DAILY
Qty: 180 TABLET | Refills: 3 | Status: SHIPPED | OUTPATIENT
Start: 2023-02-14

## 2023-02-14 NOTE — PROGRESS NOTES
Subjective:       Patient ID: Stephanie Sears is a 73 y.o. female.    Chief Complaint: Hypertension    Hypertension  This is a chronic problem. The current episode started more than 1 year ago. Associated symptoms include neck pain. Pertinent negatives include no blurred vision, chest pain, headaches, orthopnea, palpitations, peripheral edema, shortness of breath or sweats. Risk factors for coronary artery disease include diabetes mellitus, obesity, dyslipidemia, post-menopausal state, smoking/tobacco exposure and sedentary lifestyle.   Diabetes  She presents for her follow-up diabetic visit. She has type 2 diabetes mellitus. Pertinent negatives for hypoglycemia include no confusion, dizziness, headaches, hunger, mood changes, nervousness/anxiousness, pallor, seizures, sleepiness, speech difficulty, sweats or tremors. Associated symptoms include weakness. Pertinent negatives for diabetes include no blurred vision and no chest pain. Diabetic complications include nephropathy. Risk factors for coronary artery disease include hypertension, tobacco exposure, diabetes mellitus, dyslipidemia, obesity and post-menopausal. She never participates in exercise. Home blood sugar record trend: not checking. An ACE inhibitor/angiotensin II receptor blocker is being taken. Eye exam is current.   Neck Pain   The current episode started more than 1 year ago (exacerbation in the last few weeks). The problem occurs daily. The problem has been waxing and waning. The pain is associated with nothing. The pain is present in the occipital region (with radiation to the hands). The pain is severe. The symptoms are aggravated by twisting, position and bending. The pain is Same all the time. Stiffness is present At night. Associated symptoms include numbness, tingling and weakness. Pertinent negatives include no chest pain, headaches, pain with swallowing or trouble swallowing.   Review of Systems   HENT:  Negative for trouble swallowing.     Eyes:  Negative for blurred vision.   Respiratory:  Negative for shortness of breath.    Cardiovascular:  Negative for chest pain, palpitations and orthopnea.   Musculoskeletal:  Positive for neck pain.   Skin:  Negative for pallor.   Neurological:  Positive for tingling, weakness and numbness. Negative for dizziness, tremors, seizures, speech difficulty and headaches.   Psychiatric/Behavioral:  Negative for confusion. The patient is not nervous/anxious.      Objective:    Physical Exam  Vitals reviewed.   Constitutional:       General: She is not in acute distress.     Appearance: She is well-developed. She is not diaphoretic.   HENT:      Head: Normocephalic and atraumatic.      Right Ear: Tympanic membrane, ear canal and external ear normal.      Left Ear: Tympanic membrane, ear canal and external ear normal.      Nose: Nose normal.   Eyes:      General:         Right eye: No discharge.         Left eye: No discharge.      Conjunctiva/sclera: Conjunctivae normal.      Pupils: Pupils are equal, round, and reactive to light.   Neck:      Thyroid: No thyromegaly.      Trachea: No tracheal deviation.   Cardiovascular:      Rate and Rhythm: Normal rate and regular rhythm.      Pulses:           Radial pulses are 2+ on the right side and 2+ on the left side.      Heart sounds: Normal heart sounds, S1 normal and S2 normal. No murmur heard.  Pulmonary:      Effort: Pulmonary effort is normal. No respiratory distress.      Breath sounds: Normal breath sounds. No wheezing, rhonchi or rales.   Abdominal:      General: Bowel sounds are normal. There is no distension.      Palpations: Abdomen is soft. Abdomen is not rigid. There is no mass.      Tenderness: There is no abdominal tenderness. There is no guarding.   Musculoskeletal:      Cervical back: Normal range of motion and neck supple.   Lymphadenopathy:      Cervical: No cervical adenopathy.   Skin:     General: Skin is warm and dry.      Capillary Refill: Capillary  refill takes less than 2 seconds.      Findings: No rash.   Neurological:      Mental Status: She is alert and oriented to person, place, and time.   Psychiatric:         Behavior: Behavior normal.          Lab Visit on 02/07/2023   Component Date Value Ref Range Status    Cholesterol 02/07/2023 141  120 - 199 mg/dL Final    Comment: The National Cholesterol Education Program (NCEP) has set the  following guidelines (reference ranges) for Cholesterol:  Optimal.....................<200 mg/dL  Borderline High.............200-239 mg/dL  High........................> or = 240 mg/dL      Triglycerides 02/07/2023 88  30 - 150 mg/dL Final    Comment: The National Cholesterol Education Program (NCEP) has set the  following guidelines (reference values) for triglycerides:  Normal......................<150 mg/dL  Borderline High.............150-199 mg/dL  High........................200-499 mg/dL      HDL 02/07/2023 41  40 - 75 mg/dL Final    Comment: The National Cholesterol Education Program (NCEP) has set the  following guidelines (reference values) for HDL Cholesterol:  Low...............<40 mg/dL  Optimal...........>60 mg/dL      LDL Cholesterol 02/07/2023 82.4  63.0 - 159.0 mg/dL Final    Comment: The National Cholesterol Education Program (NCEP) has set the  following guidelines (reference values) for LDL Cholesterol:  Optimal.......................<130 mg/dL  Borderline High...............130-159 mg/dL  High..........................160-189 mg/dL  Very High.....................>190 mg/dL      HDL/Cholesterol Ratio 02/07/2023 29.1  20.0 - 50.0 % Final    Total Cholesterol/HDL Ratio 02/07/2023 3.4  2.0 - 5.0 Final    Non-HDL Cholesterol 02/07/2023 100  mg/dL Final    Comment: Risk category and Non-HDL cholesterol goals:  Coronary heart disease (CHD)or equivalent (10-year risk of CHD >20%):  Non-HDL cholesterol goal     <130 mg/dL  Two or more CHD risk factors and 10-year risk of CHD <= 20%:  Non-HDL cholesterol goal      <160 mg/dL  0 to 1 CHD risk factor:  Non-HDL cholesterol goal     <190 mg/dL      WBC 02/07/2023 5.95  3.90 - 12.70 K/uL Final    RBC 02/07/2023 4.42  4.00 - 5.40 M/uL Final    Hemoglobin 02/07/2023 11.5 (L)  12.0 - 16.0 g/dL Final    Hematocrit 02/07/2023 35.9 (L)  37.0 - 48.5 % Final    MCV 02/07/2023 81 (L)  82 - 98 fL Final    MCH 02/07/2023 26.0 (L)  27.0 - 31.0 pg Final    MCHC 02/07/2023 32.0  32.0 - 36.0 g/dL Final    RDW 02/07/2023 16.1 (H)  11.5 - 14.5 % Final    Platelets 02/07/2023 239  150 - 450 K/uL Final    MPV 02/07/2023 10.8  9.2 - 12.9 fL Final    Immature Granulocytes 02/07/2023 0.2  0.0 - 0.5 % Final    Gran # (ANC) 02/07/2023 2.4  1.8 - 7.7 K/uL Final    Immature Grans (Abs) 02/07/2023 0.01  0.00 - 0.04 K/uL Final    Comment: Mild elevation in immature granulocytes is non specific and   can be seen in a variety of conditions including stress response,   acute inflammation, trauma and pregnancy. Correlation with other   laboratory and clinical findings is essential.      Lymph # 02/07/2023 2.6  1.0 - 4.8 K/uL Final    Mono # 02/07/2023 0.5  0.3 - 1.0 K/uL Final    Eos # 02/07/2023 0.4  0.0 - 0.5 K/uL Final    Baso # 02/07/2023 0.03  0.00 - 0.20 K/uL Final    nRBC 02/07/2023 0  0 /100 WBC Final    Gran % 02/07/2023 40.8  38.0 - 73.0 % Final    Lymph % 02/07/2023 43.4  18.0 - 48.0 % Final    Mono % 02/07/2023 8.9  4.0 - 15.0 % Final    Eosinophil % 02/07/2023 6.2  0.0 - 8.0 % Final    Basophil % 02/07/2023 0.5  0.0 - 1.9 % Final    Differential Method 02/07/2023 Automated   Final   Lab Visit on 02/03/2023   Component Date Value Ref Range Status    Sodium 02/03/2023 141  136 - 145 mmol/L Final    Potassium 02/03/2023 4.7  3.5 - 5.1 mmol/L Final    Chloride 02/03/2023 107  95 - 110 mmol/L Final    CO2 02/03/2023 26  23 - 29 mmol/L Final    Glucose 02/03/2023 95  70 - 110 mg/dL Final    BUN 02/03/2023 22  8 - 23 mg/dL Final    Creatinine 02/03/2023 1.2  0.5 - 1.4 mg/dL Final    Calcium 02/03/2023 9.2   8.7 - 10.5 mg/dL Final    Total Protein 02/03/2023 6.6  6.0 - 8.4 g/dL Final    Albumin 02/03/2023 3.2 (L)  3.5 - 5.2 g/dL Final    Total Bilirubin 02/03/2023 0.4  0.1 - 1.0 mg/dL Final    Comment: For infants and newborns, interpretation of results should be based  on gestational age, weight and in agreement with clinical  observations.    Premature Infant recommended reference ranges:  Up to 24 hours.............<8.0 mg/dL  Up to 48 hours............<12.0 mg/dL  3-5 days..................<15.0 mg/dL  6-29 days.................<15.0 mg/dL      Alkaline Phosphatase 02/03/2023 117  55 - 135 U/L Final    AST 02/03/2023 18  10 - 40 U/L Final    ALT 02/03/2023 19  10 - 44 U/L Final    Anion Gap 02/03/2023 8  8 - 16 mmol/L Final    eGFR 02/03/2023 48 (A)  >60 mL/min/1.73 m^2 Final    Cholesterol 02/03/2023 135  120 - 199 mg/dL Final    Comment: The National Cholesterol Education Program (NCEP) has set the  following guidelines (reference ranges) for Cholesterol:  Optimal.....................<200 mg/dL  Borderline High.............200-239 mg/dL  High........................> or = 240 mg/dL      Triglycerides 02/03/2023 84  30 - 150 mg/dL Final    Comment: The National Cholesterol Education Program (NCEP) has set the  following guidelines (reference values) for triglycerides:  Normal......................<150 mg/dL  Borderline High.............150-199 mg/dL  High........................200-499 mg/dL      HDL 02/03/2023 39 (L)  40 - 75 mg/dL Final    Comment: The National Cholesterol Education Program (NCEP) has set the  following guidelines (reference values) for HDL Cholesterol:  Low...............<40 mg/dL  Optimal...........>60 mg/dL      LDL Cholesterol 02/03/2023 79.2  63.0 - 159.0 mg/dL Final    Comment: The National Cholesterol Education Program (NCEP) has set the  following guidelines (reference values) for LDL Cholesterol:  Optimal.......................<130 mg/dL  Borderline High...............130-159  mg/dL  High..........................160-189 mg/dL  Very High.....................>190 mg/dL      HDL/Cholesterol Ratio 02/03/2023 28.9  20.0 - 50.0 % Final    Total Cholesterol/HDL Ratio 02/03/2023 3.5  2.0 - 5.0 Final    Non-HDL Cholesterol 02/03/2023 96  mg/dL Final    Comment: Risk category and Non-HDL cholesterol goals:  Coronary heart disease (CHD)or equivalent (10-year risk of CHD >20%):  Non-HDL cholesterol goal     <130 mg/dL  Two or more CHD risk factors and 10-year risk of CHD <= 20%:  Non-HDL cholesterol goal     <160 mg/dL  0 to 1 CHD risk factor:  Non-HDL cholesterol goal     <190 mg/dL      WBC 02/03/2023 6.07  3.90 - 12.70 K/uL Final    RBC 02/03/2023 4.51  4.00 - 5.40 M/uL Final    Hemoglobin 02/03/2023 11.7 (L)  12.0 - 16.0 g/dL Final    Hematocrit 02/03/2023 36.3 (L)  37.0 - 48.5 % Final    MCV 02/03/2023 81 (L)  82 - 98 fL Final    MCH 02/03/2023 25.9 (L)  27.0 - 31.0 pg Final    MCHC 02/03/2023 32.2  32.0 - 36.0 g/dL Final    RDW 02/03/2023 16.2 (H)  11.5 - 14.5 % Final    Platelets 02/03/2023 235  150 - 450 K/uL Final    MPV 02/03/2023 11.0  9.2 - 12.9 fL Final    Immature Granulocytes 02/03/2023 0.2  0.0 - 0.5 % Final    Gran # (ANC) 02/03/2023 2.7  1.8 - 7.7 K/uL Final    Immature Grans (Abs) 02/03/2023 0.01  0.00 - 0.04 K/uL Final    Comment: Mild elevation in immature granulocytes is non specific and   can be seen in a variety of conditions including stress response,   acute inflammation, trauma and pregnancy. Correlation with other   laboratory and clinical findings is essential.      Lymph # 02/03/2023 2.4  1.0 - 4.8 K/uL Final    Mono # 02/03/2023 0.6  0.3 - 1.0 K/uL Final    Eos # 02/03/2023 0.4  0.0 - 0.5 K/uL Final    Baso # 02/03/2023 0.02  0.00 - 0.20 K/uL Final    nRBC 02/03/2023 0  0 /100 WBC Final    Gran % 02/03/2023 44.5  38.0 - 73.0 % Final    Lymph % 02/03/2023 39.9  18.0 - 48.0 % Final    Mono % 02/03/2023 9.2  4.0 - 15.0 % Final    Eosinophil % 02/03/2023 5.9  0.0 - 8.0 %  Final    Basophil % 02/03/2023 0.3  0.0 - 1.9 % Final    Differential Method 02/03/2023 Automated   Final    Hemoglobin A1C 02/03/2023 5.8 (H)  4.0 - 5.6 % Final    Comment: ADA Screening Guidelines:  5.7-6.4%  Consistent with prediabetes  >or=6.5%  Consistent with diabetes    High levels of fetal hemoglobin interfere with the HbA1C  assay. Heterozygous hemoglobin variants (HbS, HgC, etc)do  not significantly interfere with this assay.   However, presence of multiple variants may affect accuracy.      Estimated Avg Glucose 02/03/2023 120  68 - 131 mg/dL Final    Glucose 02/03/2023 95  70 - 110 mg/dL Final    Sodium 02/03/2023 141  136 - 145 mmol/L Final    Potassium 02/03/2023 4.7  3.5 - 5.1 mmol/L Final    Chloride 02/03/2023 107  95 - 110 mmol/L Final    CO2 02/03/2023 26  23 - 29 mmol/L Final    BUN 02/03/2023 22  8 - 23 mg/dL Final    Calcium 02/03/2023 9.2  8.7 - 10.5 mg/dL Final    Creatinine 02/03/2023 1.2  0.5 - 1.4 mg/dL Final    Albumin 02/03/2023 3.2 (L)  3.5 - 5.2 g/dL Final    Phosphorus 02/03/2023 4.1  2.7 - 4.5 mg/dL Final    eGFR 02/03/2023 48 (A)  >60 mL/min/1.73 m^2 Final    Anion Gap 02/03/2023 8  8 - 16 mmol/L Final   Lab Visit on 02/03/2023   Component Date Value Ref Range Status    Specimen UA 02/03/2023 Urine, Clean Catch   Final    Color, UA 02/03/2023 Yellow  Yellow, Straw, Tete Final    Appearance, UA 02/03/2023 Clear  Clear Final    pH, UA 02/03/2023 6.0  5.0 - 8.0 Final    Specific Gravity, UA 02/03/2023 1.015  1.005 - 1.030 Final    Protein, UA 02/03/2023 1+ (A)  Negative Final    Comment: Recommend a 24 hour urine protein or a urine   protein/creatinine ratio if globulin induced proteinuria is  clinically suspected.      Glucose, UA 02/03/2023 Negative  Negative Final    Ketones, UA 02/03/2023 Negative  Negative Final    Bilirubin (UA) 02/03/2023 Negative  Negative Final    Occult Blood UA 02/03/2023 Negative  Negative Final    Nitrite, UA 02/03/2023 Negative  Negative Final     Urobilinogen, UA 02/03/2023 Negative  <2.0 EU/dL Final    Leukocytes, UA 02/03/2023 Negative  Negative Final    Protein, Urine Random 02/03/2023 90  mg/dL Final    Creatinine, Urine 02/03/2023 65.7  15.0 - 325.0 mg/dL Final    Prot/Creat Ratio, Urine 02/03/2023 1.37 (H)  0.00 - 0.20 Final    RBC, UA 02/03/2023 1  0 - 4 /hpf Final    WBC, UA 02/03/2023 6 (H)  0 - 5 /hpf Final    Bacteria 02/03/2023 None  None-Occ /hpf Final    Squam Epithel, UA 02/03/2023 2  /hpf Final    Hyaline Casts, UA 02/03/2023 0  0-1/lpf /lpf Final    Microscopic Comment 02/03/2023 SEE COMMENT   Final    Comment: Other formed elements not mentioned in the report are not   present in the microscopic examination.            Assessment:       1. Cervical spondylosis with radiculopathy    2. Neuroforaminal stenosis of cervical spine    3. Diabetes Mellitus 2 with Hypertension    4. Essential hypertension    5. Dyslipidemia    6. Atherosclerosis of aortic arch    7. Pulmonary hypertension    8. Other emphysema    9. Stage 3a chronic kidney disease          Plan:       1. Cervical spondylosis with radiculopathy  Overview:  MRI Cervical Spine 9/22/2017:  Severe bilateral neuroforaminal narrowing at C6-7.  No more than mild spinal canal stenosis.  Active degenerative change of the left facet at C3-4.  Stable anterior deviation of the posterior aspect of the spinal cord at the T2 vertebral body level, unchanged since 2013.  The edema in the interspinous ligament at T2 with extension subcutaneous fat, a finding of uncertain significance.  Thyroid nodule previously characterized on thyroid ultrasound 7/8/2013.    Assessment & Plan:  Advised patient on course of physical therapy, and prescribed trial of tizanidine, in addition to her gabapentin  If not improving with this, will refer to pain management.    Orders:  -     Ambulatory referral/consult to Physical/Occupational Therapy; Future; Expected date: 02/21/2023  -     gabapentin (NEURONTIN) 600 MG  tablet; Take 1 tablet (600 mg total) by mouth 2 (two) times daily.  Dispense: 180 tablet; Refill: 3  -     tiZANidine (ZANAFLEX) 4 MG tablet; Take 1 tablet (4 mg total) by mouth 2 (two) times a day. for 15 days  Dispense: 30 tablet; Refill: 1    2. Neuroforaminal stenosis of cervical spine  -     Ambulatory referral/consult to Physical/Occupational Therapy; Future; Expected date: 02/21/2023  -     gabapentin (NEURONTIN) 600 MG tablet; Take 1 tablet (600 mg total) by mouth 2 (two) times daily.  Dispense: 180 tablet; Refill: 3  -     tiZANidine (ZANAFLEX) 4 MG tablet; Take 1 tablet (4 mg total) by mouth 2 (two) times a day. for 15 days  Dispense: 30 tablet; Refill: 1    3. Diabetes Mellitus 2 with Hypertension  Assessment & Plan:  A1c at goal.  Currently not on medication.    Orders:  -     CBC auto differential; Future; Expected date: 02/14/2023  -     Comprehensive metabolic panel; Future; Expected date: 02/14/2023  -     Hemoglobin A1c; Future; Expected date: 02/14/2023  -     Lipid panel; Future; Expected date: 02/14/2023    4. Essential hypertension  Assessment & Plan:  Blood pressure not controlled.    Reviewed with patient importance of blood pressure control.    Continue Lotrel and Coreg both twice a day as well as daily chlorthalidone.  Compliance with regimen stressed.  Recheck blood pressure in the next few weeks.    Orders:  -     amLODIPine-benazepriL (LOTREL) 10-40 mg per capsule; Take 1 capsule by mouth once daily.  Dispense: 90 capsule; Refill: 3    5. Dyslipidemia  Assessment & Plan:  Continue atorvastatin.    Orders:  -     atorvastatin (LIPITOR) 40 MG tablet; Take 1 tablet (40 mg total) by mouth once daily.  Dispense: 90 tablet; Refill: 3    6. Atherosclerosis of aortic arch  Overview:  Noted on CXR from 1/21/16.    Assessment & Plan:  Continue atorvastatin.    Orders:  -     atorvastatin (LIPITOR) 40 MG tablet; Take 1 tablet (40 mg total) by mouth once daily.  Dispense: 90 tablet; Refill: 3    7.  Pulmonary hypertension  Overview:  -pasp of 39.  -group 2 with diastolic dysfunction    Assessment & Plan:  Continue medical management.  Once again stressed importance of smoking cessation.      8. Other emphysema  Overview:  5-9-2022  There are mild emphysematous changes within the lung apices.    Assessment & Plan:  Patient is still smoking.  She is attending smoking cessation.      9. Stage 3a chronic kidney disease  Assessment & Plan:  Renal function stable.    Advised good hydration.    Importance of blood pressure control and sugar control discussed.

## 2023-02-15 ENCOUNTER — TELEPHONE (OUTPATIENT)
Dept: FAMILY MEDICINE | Facility: CLINIC | Age: 73
End: 2023-02-15
Payer: MEDICARE

## 2023-02-15 NOTE — TELEPHONE ENCOUNTER
----- Message from Will Wakefield sent at 2/15/2023 11:43 AM CST -----  Type: Patient Call Back    Who called: Self     What is the request in detail: PT stated she want to go to [facility] physical therapy where she been going for the last 20 years she stated ... and not [facility] asking if the referral can be sent over to [facility] for her     Can the clinic reply by [facility]? No     Would the patient rather a call back or a response via [facility]? CALL     Best call back number: [phone]

## 2023-02-16 ENCOUNTER — TELEPHONE (OUTPATIENT)
Dept: FAMILY MEDICINE | Facility: CLINIC | Age: 73
End: 2023-02-16
Payer: MEDICARE

## 2023-02-16 NOTE — TELEPHONE ENCOUNTER
Patient's request:  PT stated she want to go to [facility] physical therapy where she been going for the last 20 years she stated ... and not [facility] asking if the referral can be sent over to [facility] for her     She recently saw you on 2/14/2023 - will a new referral need to be placed if she is requesting [facility] physical therapy?

## 2023-02-16 NOTE — TELEPHONE ENCOUNTER
----- Message from Le Reyes sent at 2/16/2023  8:12 AM CST -----  Regarding: Self [phone]  Type:  Patient Returning Call    Who Called:  Self     Who Left Message for Patient:  Clara Belcher MA     Does the patient know what this is regarding?: yes     Would the patient rather a call back or a response via [facility]?  Call back     Best Call Back Number: [phone]     Additional Information:     Thank you.

## 2023-02-23 ENCOUNTER — TELEPHONE (OUTPATIENT)
Dept: PULMONOLOGY | Facility: CLINIC | Age: 73
End: 2023-02-23
Payer: MEDICARE

## 2023-02-23 NOTE — TELEPHONE ENCOUNTER
Spoke with patient scheduled an appointment to see provider.    ENEIDA Vazquez                   ----- Message from George Velasco MA sent at 2/23/2023  9:50 AM CST -----  Anuradha Anderson V Staff  Caller: Unspecified (Today,  9:47 AM)  Name of Who is Calling: BRIANNA KOCH [[id number]]             What is the request in detail: Patient is requesting call back about cpap machine               Can the clinic reply by [facility]: no               What Number to Call Back if not in [facility]: [phone]

## 2023-02-24 ENCOUNTER — CLINICAL SUPPORT (OUTPATIENT)
Dept: SMOKING CESSATION | Facility: CLINIC | Age: 73
End: 2023-02-24
Payer: COMMERCIAL

## 2023-02-24 DIAGNOSIS — F17.200 NICOTINE DEPENDENCE: Primary | ICD-10-CM

## 2023-02-24 PROCEDURE — 99404 PR PREVENT COUNSEL,INDIV,60 MIN: ICD-10-PCS | Mod: S$GLB,,,

## 2023-02-24 PROCEDURE — 99404 PREV MED CNSL INDIV APPRX 60: CPT | Mod: S$GLB,,,

## 2023-02-24 PROCEDURE — 99999 PR PBB SHADOW E&M-EST. PATIENT-LVL II: ICD-10-PCS | Mod: PBBFAC,,,

## 2023-02-24 PROCEDURE — 99999 PR PBB SHADOW E&M-EST. PATIENT-LVL II: CPT | Mod: PBBFAC,,,

## 2023-02-24 RX ORDER — IBUPROFEN 200 MG
1 TABLET ORAL DAILY
Qty: 28 PATCH | Refills: 0 | Status: SHIPPED | OUTPATIENT
Start: 2023-02-24 | End: 2023-03-24 | Stop reason: SDUPTHER

## 2023-02-24 NOTE — PROGRESS NOTES
Individual Follow-Up Form    2/24/2023    Quit Date: TBD    Clinical Status of Patient: Outpatient    Length of Service: 60 minutes    Continuing Medication: yes  Patches    Other Medications: None     Target Symptoms: Withdrawal and medication side effects. The following were  rated moderate (3) to severe (4) on TCRS:  Moderate (3): Crave and Desire   Severe (4): None     Comments: Counselor spoke with patient for telephonic visit, name and date of birth verified as two patient identifiers.  Patient reported she stopped using 150 mg Wellbutrin SR because the medication started making her feel bad.  Patient also stated she remains using 21 mg nicotine patch without any negative side effects reported at this time.  Counselor congratulated patient on her progress thus far.  Counselor also discussed goal setting, further rate reduction, and further behavior modification strategies with patient.  Follow-up visit scheduled in two weeks.  Counselor will remain available should any further needs arise.      Diagnosis: F17.200    Next Visit: 2 weeks

## 2023-02-27 ENCOUNTER — TELEPHONE (OUTPATIENT)
Dept: PULMONOLOGY | Facility: HOSPITAL | Age: 73
End: 2023-02-27
Payer: MEDICARE

## 2023-02-28 ENCOUNTER — EXTERNAL CHRONIC CARE MANAGEMENT (OUTPATIENT)
Dept: PRIMARY CARE CLINIC | Facility: CLINIC | Age: 73
End: 2023-02-28
Payer: MEDICARE

## 2023-02-28 PROCEDURE — 99439 PR CHRONIC CARE MGMT, EA ADDTL 20 MIN: ICD-10-PCS | Mod: S$PBB,,, | Performed by: FAMILY MEDICINE

## 2023-02-28 PROCEDURE — 99439 CHRNC CARE MGMT STAF EA ADDL: CPT | Mod: PBBFAC,27,PN | Performed by: FAMILY MEDICINE

## 2023-02-28 PROCEDURE — 99490 CHRNC CARE MGMT STAFF 1ST 20: CPT | Mod: S$PBB,,, | Performed by: FAMILY MEDICINE

## 2023-02-28 PROCEDURE — 99439 CHRNC CARE MGMT STAF EA ADDL: CPT | Mod: S$PBB,,, | Performed by: FAMILY MEDICINE

## 2023-02-28 PROCEDURE — 99490 PR CHRONIC CARE MGMT, 1ST 20 MIN: ICD-10-PCS | Mod: S$PBB,,, | Performed by: FAMILY MEDICINE

## 2023-02-28 PROCEDURE — 99490 CHRNC CARE MGMT STAFF 1ST 20: CPT | Mod: PBBFAC,25,PN | Performed by: FAMILY MEDICINE

## 2023-02-28 NOTE — TELEPHONE ENCOUNTER
Left message that I put her in for 03/08/2023 at 8am to see Dr. Nicholson.    ENEIDA Vazquez  Pulm/Sleep [facility]  [phone]                     ----- Message from George Velasco MA sent at 2/27/2023  8:32 AM CST -----  Marilee Anderson V Staff  Caller: STEPHANIE KOCH [[id number]] (Today,  8:29 AM)  Type:  Sooner Apoointment Request     Caller is requesting a sooner appointment.  Caller declined first available appointment listed below.  Caller will not accept being placed on the waitlist and is requesting a message be sent to doctor.   Name of Caller:Ms. Stephanie Koch     When is the first available appointment?5/24   Symptoms:she states she is having issues sleeping and cant wait until MAY   Would the patient rather a call back or a response via [facility]? Call back   Best Call Back Number:Home Phone      [phone]   Work Phone      Not on file.   Mobile          [phone]       Additional Information:  She states with her new machine she is having issues

## 2023-03-05 PROBLEM — I70.0 ATHEROSCLEROSIS OF AORTIC ARCH: Chronic | Status: ACTIVE | Noted: 2019-01-22

## 2023-03-05 PROBLEM — E11.59 HYPERTENSION ASSOCIATED WITH TYPE 2 DIABETES MELLITUS: Chronic | Status: ACTIVE | Noted: 2020-01-24

## 2023-03-05 PROBLEM — E78.5 DYSLIPIDEMIA: Chronic | Status: ACTIVE | Noted: 2018-07-05

## 2023-03-05 PROBLEM — J43.8 OTHER EMPHYSEMA: Chronic | Status: ACTIVE | Noted: 2023-03-05

## 2023-03-05 PROBLEM — I15.2 HYPERTENSION ASSOCIATED WITH TYPE 2 DIABETES MELLITUS: Chronic | Status: ACTIVE | Noted: 2020-01-24

## 2023-03-05 PROBLEM — N18.31 STAGE 3A CHRONIC KIDNEY DISEASE: Chronic | Status: ACTIVE | Noted: 2022-06-14

## 2023-03-05 NOTE — ASSESSMENT & PLAN NOTE
Renal function stable.    Advised good hydration.    Importance of blood pressure control and sugar control discussed.

## 2023-03-06 NOTE — ASSESSMENT & PLAN NOTE
Blood pressure not controlled.    Reviewed with patient importance of blood pressure control.    Continue Lotrel and Coreg both twice a day as well as daily chlorthalidone.  Compliance with regimen stressed.  Recheck blood pressure in the next few weeks.

## 2023-03-06 NOTE — ASSESSMENT & PLAN NOTE
Advised patient on course of physical therapy, and prescribed trial of tizanidine, in addition to her gabapentin  If not improving with this, will refer to pain management.

## 2023-03-07 ENCOUNTER — OFFICE VISIT (OUTPATIENT)
Dept: PODIATRY | Facility: CLINIC | Age: 73
End: 2023-03-07
Payer: MEDICARE

## 2023-03-07 DIAGNOSIS — L84 CORN OR CALLUS: Primary | ICD-10-CM

## 2023-03-07 DIAGNOSIS — B35.1 ONYCHOMYCOSIS DUE TO DERMATOPHYTE: ICD-10-CM

## 2023-03-07 PROCEDURE — 99999 PR PBB SHADOW E&M-EST. PATIENT-LVL I: ICD-10-PCS | Mod: PBBFAC,,, | Performed by: PODIATRIST

## 2023-03-07 PROCEDURE — 99999 PR PBB SHADOW E&M-EST. PATIENT-LVL I: CPT | Mod: PBBFAC,,, | Performed by: PODIATRIST

## 2023-03-07 PROCEDURE — 99211 OFF/OP EST MAY X REQ PHY/QHP: CPT | Mod: PBBFAC,PO | Performed by: PODIATRIST

## 2023-03-07 PROCEDURE — 99499 NO LOS: ICD-10-PCS | Mod: ,,, | Performed by: PODIATRIST

## 2023-03-07 PROCEDURE — 99499 UNLISTED E&M SERVICE: CPT | Mod: ,,, | Performed by: PODIATRIST

## 2023-03-07 PROCEDURE — 17999 UNLISTD PX SKN MUC MEMB SUBQ: CPT | Mod: CSM,S$GLB,, | Performed by: PODIATRIST

## 2023-03-07 PROCEDURE — 17999 PR NON-COVERED FOOT CARE: ICD-10-PCS | Mod: CSM,S$GLB,, | Performed by: PODIATRIST

## 2023-03-08 ENCOUNTER — OFFICE VISIT (OUTPATIENT)
Dept: PULMONOLOGY | Facility: CLINIC | Age: 73
End: 2023-03-08
Payer: MEDICARE

## 2023-03-08 VITALS
DIASTOLIC BLOOD PRESSURE: 71 MMHG | SYSTOLIC BLOOD PRESSURE: 131 MMHG | WEIGHT: 181.44 LBS | HEART RATE: 64 BPM | HEIGHT: 62 IN | OXYGEN SATURATION: 97 % | BODY MASS INDEX: 33.39 KG/M2

## 2023-03-08 DIAGNOSIS — J43.2 CENTRILOBULAR EMPHYSEMA: ICD-10-CM

## 2023-03-08 DIAGNOSIS — R06.09 DYSPNEA ON EXERTION: ICD-10-CM

## 2023-03-08 DIAGNOSIS — Z72.0 TOBACCO ABUSE: ICD-10-CM

## 2023-03-08 DIAGNOSIS — G47.33 OSA ON CPAP: ICD-10-CM

## 2023-03-08 DIAGNOSIS — G47.09 OTHER INSOMNIA: ICD-10-CM

## 2023-03-08 PROBLEM — J44.9 COPD (CHRONIC OBSTRUCTIVE PULMONARY DISEASE): Status: ACTIVE | Noted: 2023-03-05

## 2023-03-08 PROCEDURE — 99214 OFFICE O/P EST MOD 30 MIN: CPT | Mod: PBBFAC | Performed by: INTERNAL MEDICINE

## 2023-03-08 PROCEDURE — 99214 OFFICE O/P EST MOD 30 MIN: CPT | Mod: S$PBB,,, | Performed by: INTERNAL MEDICINE

## 2023-03-08 PROCEDURE — 99214 PR OFFICE/OUTPT VISIT, EST, LEVL IV, 30-39 MIN: ICD-10-PCS | Mod: S$PBB,,, | Performed by: INTERNAL MEDICINE

## 2023-03-08 PROCEDURE — 99999 PR PBB SHADOW E&M-EST. PATIENT-LVL IV: CPT | Mod: PBBFAC,,, | Performed by: INTERNAL MEDICINE

## 2023-03-08 PROCEDURE — 99999 PR PBB SHADOW E&M-EST. PATIENT-LVL IV: ICD-10-PCS | Mod: PBBFAC,,, | Performed by: INTERNAL MEDICINE

## 2023-03-08 RX ORDER — TIOTROPIUM BROMIDE INHALATION SPRAY 3.12 UG/1
5 SPRAY, METERED RESPIRATORY (INHALATION) DAILY
Qty: 4 G | Refills: 4 | Status: SHIPPED | OUTPATIENT
Start: 2023-03-08 | End: 2023-03-09 | Stop reason: SDUPTHER

## 2023-03-08 NOTE — PATIENT INSTRUCTIONS
Stimulus control  1. Go to bed only when sleepy AND after 10:30 pm  2. Do not watch television, read, eat, or worry while in bed. Use bed only for sleep and sex.   3. Get out of bed if unable to fall asleep within twenty minutes and go to another room.  Do something different like reading a book.  Dont engage in stimulating activity.  Return to bed only when you are sleepy.  Repeat this step as many times as necessary throughout the night.   4. Set an alarm clock to wake up at a fixed time each morning including weekends.  Wake up at 6:30 AM.  5. Do not take a nap during the day.

## 2023-03-08 NOTE — PROGRESS NOTES
Stephanie Sears  was seen as a follow up.      CHIEF COMPLAINT:    Chief Complaint   Patient presents with    Apnea       HISTORY OF PRESENT ILLNESS: Stephanie Sears is a 73 y.o. female is here for sleep evaluation.   Patient was diagnosed with jarrett in 8/17.  Patient was seen by KRZYSZTOF Olson.  Patient was started on cpap therapy 10 cm H20 with maykel salazar.  Patient was doing well with cpap for the first 7-8 months.  Our first encounter was 8/18.  Since 6/18, patient with 1-2 awakenings per night.  Can take up to 1 hour to go back to sleep.  Feeling rested upon awake.  +dry mouth upon awake.  During our initial visit, patient endorsed stress a/w her boyfriend x 40 years moved back to his home in [place].     Chronic knee pain s/p knee replacement.  Better since knee replacement.  Chronic knee pain.      [place] Sleepiness Scale score during initial sleep evaluation was 8 (with cpap).  Used to be 16 without cpap.    S/p psg 8/11/2017 with ahi of 6.  patient was set up with cpap 10 cm H20.       Her boyfriend/common law  had moved back from [place] in 2021.  Unfortunately, he suffered from massive stroke prior to moving back.  Patient has been his primary care taker.  Patient has gotten dreamstation 2 from [employer] around 6/22.  Using with Dreamstation 2 nightly.  Patient has had difficulty with sleep maintenance.  Often wake up 1-2 hours after sleep initiation for bathroom.  Have difficulty going back to sleep.  No GERD symptoms.        SLEEP ROUTINE:  Activity the hour prior to sleep: watch tv     Bed partner:  boyfriend  Time to bed: 10:30 PM   Lights off:  Gonzales light is on, TV is on   Sleep onset latency:  Watch tv until fall asleep; 20-30 minutes after putting cpap mask on.          Disruptions or awakenings:    wake up after 1-2 hours of sleep (20-30 minutes to go back to sleep)    Wakeup time:      6-6:30 am   Perceived sleep quality:  rested       Daytime naps:      none  Weekend sleep routine:       same  Caffeine use: 2 cups of coffee in morning  exercise habit:   none      PAST MEDICAL HISTORY:    Active Ambulatory Problems     Diagnosis Date Noted    GERD (gastroesophageal reflux disease)     Primary osteoarthritis of both knees     Cervical spondylosis with radiculopathy 07/10/2012    Nontoxic uninodular goiter 12/13/2010    Lumbar spondylosis 04/17/2014    Osteoarthritis of left hip, mild 04/17/2014    DDD (degenerative disc disease), cervical 08/21/2014    Obesity 02/23/2015    Essential hypertension 01/07/2016    Tobacco abuse 01/12/2016    H/O scarlet fever 01/12/2016    Aortic valve sclerosis 01/12/2016    Pulmonary hypertension 01/12/2016    Diastolic dysfunction 01/14/2016    Status post total knee replacement, left 1/20/2016 02/02/2016    Chronic midline thoracic back pain 09/15/2017    JAM on CPAP 12/06/2017    Personal history of spine surgery 02/09/2018    Fatty liver 02/09/2018    Dyslipidemia 07/05/2018    Bilateral carotid bruits 07/05/2018    Atherosclerosis of aortic arch 01/22/2019    Other insomnia 03/11/2019    Lichen planus 03/27/2019    History of colon polyps 05/22/2019    Diabetes Mellitus 2 with Hypertension 01/24/2020    Primary open-angle glaucoma, bilateral, mild stage 10/21/2020    S/P lumbar fusion 12/07/2020    DDD (degenerative disc disease), lumbar 12/07/2020    Dyspnea on exertion 05/11/2022    Stage 3a chronic kidney disease 06/14/2022    Neuroforaminal stenosis of cervical spine 02/14/2023    COPD (chronic obstructive pulmonary disease) 03/05/2023     Resolved Ambulatory Problems     Diagnosis Date Noted    Open angle with borderline findings, low risk - Both Eyes 10/18/2012    Posterior vitreous detachment - Right Eye 10/18/2012    Nuclear sclerosis - Both Eyes 08/12/2013    Myopia with astigmatism and presbyopia - Both Eyes 08/12/2013    Carpal tunnel syndrome, bilateral 08/13/2013    Diet-controlled diabetes mellitus 12/10/2013    Asymptomatic proteinuria 12/10/2013     Chronic LBP 06/17/2014    Borderline glaucoma with ocular hypertension 07/21/2014    Dermatitis 01/07/2016    Severe obesity (BMI 35.0-39.9) with comorbidity 01/12/2016    Cardiac murmur 01/12/2016    At risk for aspiration 01/12/2016    Mild aortic stenosis 01/14/2016    Primary osteoarthritis of left knee 01/20/2016    Annual physical exam 10/18/2016    Encounter for gynecological examination without abnormal finding 10/18/2016    Status post hysterectomy 10/18/2016    Menopausal state 10/18/2016    Hematuria, unspecified 10/18/2016    DM (diabetes mellitus screen) 10/18/2016    Hematuria, gross 10/31/2016    CMC arthritis 12/07/2016    Primary osteoarthritis of right knee 09/15/2017    Hand pain, left 09/15/2017    UTI (urinary tract infection) 02/09/2018    Status post total right knee replacement 2/26/2018 03/13/2018    Goals of care, counseling/discussion 07/05/2018    Acute pancreatitis without infection or necrosis 10/17/2019    Arthritis of carpometacarpal (CMC) joint of left thumb 12/02/2019    Greater trochanteric bursitis of both hips 12/02/2019    Hypoalbuminemia due to protein-calorie malnutrition 01/24/2020    Nuclear sclerotic cataract of right eye 10/21/2020    Postoperative care for cataract 12/09/2020    Bursitis, ischial, unspecified laterality 12/21/2020    Ischial bursitis 02/26/2021    Stage 2 chronic kidney disease 09/20/2022    Persistent proteinuria 09/20/2022     Past Medical History:   Diagnosis Date    Acute pancreatitis     Angina pectoris     Anxiety     Arthritis     Asthma     Back pain     Bronchitis     Chest pain     Chronic neck pain 07/26/2012    Colon polyps     Controlled type 2 diabetes mellitus with stage 2 chronic kidney disease, with long-term current use of insulin 01/24/2020    Coronary artery disease     Depression     Fibrocystic breast     General anesthetics causing adverse effect in therapeutic use     Glaucoma     Heart failure     Hyperlipidemia     Hypertension      Neck pain 07/10/2012    JAM (obstructive sleep apnea)     Pneumonia     Pneumonia due to other staphylococcus     Psoriasis     Subacromial or subdeltoid bursitis 07/10/2012    Thyroid disease     Tobacco dependence     Trouble in sleeping     Type 2 diabetes mellitus 12/10/2013    Type 2 diabetes mellitus with diabetic chronic kidney disease                 PAST SURGICAL HISTORY:    Past Surgical History:   Procedure Laterality Date    APPENDECTOMY      BREAST BIOPSY Bilateral 1988    BREAST MASS EXCISION Right     benign    CATARACT EXTRACTION W/  INTRAOCULAR LENS IMPLANT Left 12/09/2020    PHACO IOL WITH I-STENT ()    CATARACT EXTRACTION W/  INTRAOCULAR LENS IMPLANT Right 10/21/2020    PHACO IOL WITH I-STENT x 2 ()    CHOLECYSTECTOMY      COLONOSCOPY N/A 05/22/2019    Procedure: COLONOSCOPY;  Surgeon: Darrin Calvin MD;  Location: [facility] ([address] [place] [address]);  Service: Endoscopy;  Laterality: N/A;  2nd floor - all other procedure done on 2, multiple comorbidities - ERW/   change in MD schedule, Pt notified and verbalized understanding, new arrival time 0800, Ins mailed to Pt with new arrival time - ERW1/8/19@1130    COLONOSCOPY N/A 9/26/2022    Procedure: COLONOSCOPY;  Surgeon: Darrin Calvin MD;  Location: [facility] ([address] [place] [address]);  Service: Endoscopy;  Laterality: N/A;  2nd floor d/t previous anesthesia complications  vaccinated  inst mailed  clear liquids 4 hr prior-AM prep-LW  I should be able to complete this with the slim pediatric colonoscope, but should have the single-balloon available in case needed.    EPIDURAL STEROID INJECTION Bilateral 02/26/2021    Procedure: Injection, Steroid, Ischial Bursa;  Surgeon: Yonatan Garsia Jr., MD;  Location: [facility];  Service: Pain Management;  Laterality: Bilateral;  Bilateral Ischial Bursa Steroid Injections  Arrive @ 1230; No ATC; Check BG    ESOPHAGOGASTRODUODENOSCOPY      HYSTERECTOMY  1980's    IMPLANTATION OF DEVICE FOR GLAUCOMA Right  10/21/2020    Procedure: INSERTION, DEVICE, FOR GLAUCOMA/ i Stent;  Surgeon: Melany Tse MD;  Location: [address];  Service: Ophthalmology;  Laterality: Right;    IMPLANTATION OF DEVICE FOR GLAUCOMA Left 12/09/2020    Procedure: INSERTION, DEVICE, FOR GLAUCOMA/I SENT;  Surgeon: Melany Tse MD;  Location: [address];  Service: Ophthalmology;  Laterality: Left;    INJECTION OF JOINT Bilateral 12/23/2021    Procedure: Injection, Joint---BILATERAL ISCHIAL BURSA STEROID INJECTION;  Surgeon: Yonatan Garsia Jr., MD;  Location: [facility];  Service: Pain Management;  Laterality: Bilateral;    INTRAOCULAR PROSTHESES INSERTION Right 10/21/2020    Procedure: INSERTION, IOL PROSTHESIS;  Surgeon: Melany Tse MD;  Location: [facility] OR [address] [place] [address];  Service: Ophthalmology;  Laterality: Right;    INTRAOCULAR PROSTHESES INSERTION Left 12/09/2020    Procedure: INSERTION, IOL PROSTHESIS;  Surgeon: Melany Tse MD;  Location: [address];  Service: Ophthalmology;  Laterality: Left;    KNEE SURGERY Left 01/20/2016    TKR    KNEE SURGERY Right 02/26/2018    TKR    L4-L5 fusion  2006    PHACOEMULSIFICATION OF CATARACT Right 10/21/2020    Procedure: PHACOEMULSIFICATION, CATARACT;  Surgeon: Melany Tse MD;  Location: [address];  Service: Ophthalmology;  Laterality: Right;    PHACOEMULSIFICATION OF CATARACT Left 12/09/2020    Procedure: PHACOEMULSIFICATION, CATARACT;  Surgeon: Melany Tse MD;  Location: [address];  Service: Ophthalmology;  Laterality: Left;         FAMILY HISTORY:                Family History   Problem Relation Age of Onset    Diabetes Mother     Hypertension Mother         CHF, angina    Angina Mother     Kidney disease Mother     Heart disease Mother         CHF    Hypertension Father         glaucoma, Alzhiemer's , supra pubic    Alzheimer's disease Father     Glaucoma Father     Glaucoma Sister     Hypertension Sister     Hyperlipidemia  Sister     Sleep apnea Sister     Hypertension Sister     Thyroid disease Sister     No Known Problems Sister     Breast cancer Sister 54    Cancer Paternal Aunt         colon ca    Kidney disease Brother         kidney transplant, HTN,DM    Diabetes Brother     Hepatitis Brother     Gout Son     Hypertension Son     Diabetes Son     Amblyopia Neg Hx     Blindness Neg Hx     Melanoma Neg Hx     Macular degeneration Neg Hx     Retinal detachment Neg Hx     Strabismus Neg Hx        SOCIAL HISTORY:          Tobacco:   Social History     Tobacco Use   Smoking Status Every Day    Packs/day: 0.25    Years: 40.00    Pack years: 10.00    Types: Cigarettes   Smokeless Tobacco Current       alcohol use:    Social History     Substance and Sexual Activity   Alcohol Use No    Alcohol/week: 0.0 standard drinks                 Occupation: former     ALLERGIES:    Review of patient's allergies indicates:   Allergen Reactions    Hydrocodone Shortness Of Breath    Iodinated contrast- oral and iv dye Shortness Of Breath     Difficulty breathing    Adhesive Itching    Oxycodone      Hallucinations    Sulfa (sulfonamide antibiotics) Hives and Itching       CURRENT MEDICATIONS:    Current Outpatient Medications   Medication Sig Dispense Refill    albuterol (PROVENTIL/VENTOLIN HFA) 90 mcg/actuation inhaler Inhale 2 puffs into the lungs every 4 (four) hours as needed for Wheezing. Rescue      amLODIPine-benazepriL (LOTREL) 10-40 mg per capsule Take 1 capsule by mouth once daily. 90 capsule 3    atorvastatin (LIPITOR) 40 MG tablet Take 1 tablet (40 mg total) by mouth once daily. 90 tablet 3    carvediloL (COREG) 12.5 MG tablet Take 1 tablet (12.5 mg total) by mouth 2 (two) times daily with meals. 180 tablet 3    chlorthalidone (HYGROTEN) 25 MG Tab Take 1 tablet (25 mg total) by mouth every other day. 15 tablet 11    gabapentin (NEURONTIN) 600 MG tablet Take 1 tablet (600 mg total) by mouth 2 (two) times daily.  "180 tablet 3    INULIN (FIBER GUMMIES ORAL) Take 1 each by mouth every morning.       lancets (ACCU-CHEK MULTICLIX LANCET) Misc Test blood sugar twice daily 300 each 3    nicotine (NICODERM CQ) 21 mg/24 hr Place 1 patch onto the skin once daily. 28 patch 0    nicotine, polacrilex, 2 mg lzmn Take 1 each (2 mg total) by mouth as needed (Take 1 piece as needed. Maximum dose of 10 per day.). 243 each 0    simethicone (GAS-X ORAL) Take 1 tablet by mouth as needed.       buPROPion (WELLBUTRIN SR) 150 MG TBSR 12 hr tablet Take 1 tablet (150 mg total) by mouth 2 (two) times daily. (Patient not taking: Reported on 2/24/2023) 60 tablet 0    tiotropium bromide (SPIRIVA RESPIMAT) 2.5 mcg/actuation inhaler Inhale 2 puffs into the lungs once daily. Controller 4 g 4     No current facility-administered medications for this visit.                  REVIEW OF SYSTEMS:     Sleep related symptoms as per HPI.  CONST:Denies weight gain; loosing weight    HEENT: no sinus congestion  PULM: + dyspnea x 2 block  CARD:  Denies palpitations   GI:  +occasional acid reflux  : Denies polyuria  NEURO: Denies headaches  PSYCH: Denies mood disturbance  HEME: Denies anemia   Otherwise, a balance of systems reviewed is negative.          PHYSICAL EXAM:  Vitals:    03/08/23 0805   BP: 131/71   Pulse: 64   SpO2: 97%   Weight: 82.3 kg (181 lb 7 oz)   Height: 5' 2" (1.575 m)   PainSc: 0-No pain     Body mass index is 33.19 kg/m².     GENERAL: Normal development, well groomed  HEENT:  Conjunctivae are non-erythematous; Pupils equal, round, and reactive to light; Nose is symmetrical; Nasal mucosa is pink and moist; Septum is midline; Inferior turbinates are normal; Nasal airflow is normal; Posterior pharynx is pink; Modified Mallampati: 4; Posterior palate is normal; Tonsils +1; Uvula is normal and pink;Tongue is normal; Dentition is fair; No TMJ tenderness; Jaw opening and protrusion without click and without discomfort.  NECK: Supple. Neck circumference " is 13 inches. No thyromegaly. No palpable nodes.     SKIN: On face and neck: No abrasions, no rashes, no lesions.  No subcutaneous nodules are palpable.  RESPIRATORY: Chest is clear to auscultation.  Normal chest expansion and non-labored breathing at rest.  CARDIOVASCULAR: Normal S1, S2.  No murmurs, gallops or rubs. No carotid bruits bilaterally.  EXTREMITIES: No edema. No clubbing. No cyanosis. Station normal. Gait normal.        NEURO/PSYCH: Oriented to time, place and person. Normal attention span and concentration. Affect is full. Mood is normal.                                              DATA   PSG 8/11/17: AHI was 6.0 with an oxygen aye of 86.0%. The RERA index was 4.8 and RDI was 10.8 events per hour.   9/6/17 Ttiration study, effective supine REM cpap 10cm    PFT 6/27/22 Ratio of 65%; FVC 1.96 L (91%); FEV1 1.29 L (75%); TLC 6.3 L (138%); dlco 15 (75%) ; lung volume is unreliable with RV >220%    Echo 1/13/16  CONCLUSIONS     1 - Normal left ventricular systolic function (EF 60-65%).     2 - Left ventricular diastolic dysfunction.     3 - Normal right ventricular systolic function .     4 - Mild left atrial enlargement.     5 - Mild tricuspid regurgitation.     6 - The estimated PA systolic pressure is 39 mmHg.     7 - Mild aortic stenosis, JOHN = 1.46 cm2, peak velocity = 2.7 m/s, mean gradient = 17.0 mmHg.    Lab Results   Component Value Date    TSH 0.708 05/29/2019     ASSESSMENT  Problem List Items Addressed This Visit       COPD (chronic obstructive pulmonary disease)    Overview     5-9-2022  There are mild emphysematous changes within the lung apices.  -mild obstruction with ration of 65 and fev1 75%  -encourage smoking cessation  -currently on albuterol.    -will start lama         Relevant Medications    tiotropium bromide (SPIRIVA RESPIMAT) 2.5 mcg/actuation inhaler    Dyspnea on exertion    Overview     diastolic dysfunction along with back and knee pain + copd         JAM on CPAP     Overview     -ahi of 6, rdi of 10.8.    -currently with Dream station 2  -Doing well with cpap of 10 cm H20.  92%>4 hours.  Residual ahi of 2.2.  Patient is benefit from cpap           Other insomnia    Overview     -difficulty with sleep maintenance.  Poor sleep sleep hygiene + nocturia + psychophysiologic in etiology.     -avoid tv in bed.  Stimulus control d/w patient.           Tobacco abuse    Overview       Patient is enrolled in smoking cessation.  Nicotine patch               Education: During our discussion today, we talked about the etiology of obstructive sleep apnea as well as the potential ramifications of untreated sleep apnea, which could include daytime sleepiness, hypertension, heart disease and/or stroke.     Precautions: The patient was advised to abstain from driving should they feel sleepy or drowsy.       Patient will No follow-ups on file. with md/np.    25 minutes of total time spent on the encounter, which includes face to face time and non-face to face time preparing to see the patient (eg, review of tests), Obtaining and/or reviewing separately obtained history, documenting clinical information in the electronic or other health record, independently interpreting results (not separately reported) and communicating results to the patient/family/caregiver, or Care coordination (not separately reported).    .

## 2023-03-08 NOTE — PROGRESS NOTES
Pt presents for routine non-covered foot care. Based on chart review this patient does not qualify for nail care (Patients who qualify for nail care are usually as follows: diabetic with neurological manifestations, PVD, pernicious anemia, or CKD with appropriate modifiers that indicate high amputation risk).     Pt. does not have high risk feet. Pedal pulses are palpable and sensation intact. Nails are elongated, thickened Bilaterally. Multiple forefoot calluses      The primary encounter diagnosis was Corn or callus. A diagnosis of Onychomycosis due to dermatophyte was also pertinent to this visit.     Nails were reduced Bilaterally.  After cleansing the  area w/ alcohol prep pad the above mentioned hyperkeratosis was trimmed utilizing No 15 scapel, to a smooth base with out incident to calluses. Patient tolerated this  well and reported comfort      Patient tolerated well and related relief. RTC for annual diabetic foot exam or. as PROC B

## 2023-03-09 DIAGNOSIS — J43.2 CENTRILOBULAR EMPHYSEMA: ICD-10-CM

## 2023-03-09 NOTE — TELEPHONE ENCOUNTER
Message sent to Dr. Nicholson.    ENEIDA Vazquez  Pulm/Sleep [facility] [place]  [phone]                 ----- Message from Shani Juares sent at 3/9/2023 12:02 PM CST -----  Regarding: Refill Request  Type: RX Refill Request      Who Called: Self       Refill or New Rx: refill      RX Name and Strength: tiotropium bromide (SPIRIVA RESPIMAT) 2.5 mcg/actuation inhaler      Preferred Pharmacy with phone number:    [id number] - [place] [address] AT [address]  [place] [address]  Phone: [phone] Fax: [phone]        Would the patient rather a call back or a response via [facility]? Call       Best Call Back Number:  [phone]

## 2023-03-09 NOTE — PROGRESS NOTES
"HPI     Glaucoma     Additional comments: 4 month ck today and pt states no changes since last   exam            Comments    DLS: 11/28/22    1  Preperimetric POAG vs OHT   2. PVD OD   3. PCIOL OU   4. Blepharitis / MGD   5. RAGHU Allergy   6. PCO OU  7. S/P ALT ou (many decades ago)   8. S/P SLT OD 2008  9. S/P istents ou     MEDS:  AT's PRN OU    Intolerant to all glaucoma drops           Last edited by Melany Tse MD on 3/13/2023  9:24 AM.              Assessment /Plan     For exam results, see Encounter Report.    Primary open-angle glaucoma, bilateral, mild stage    Epiretinal membrane (ERM) of left eye    Pseudophakia, both eyes    Status post glaucoma surgery        1. Pre-perimetric mild POAG   Vs OHT  -Followed at [facility] since 1991   -First HVF 1999   -First photos 1991   - Intolerant to all gtts - they aggravate his blepharitis-? RAGHU allergy   -IOP "OK" off gtts and s/p ALT ou and SLT od - 2008    Family history neg   Glaucoma meds none (( off gtts post SLT ou -))   H/O adverse rxn to glaucoma drops Intolerant to all gtts - 2/2 aggravates blepharitis- ? RAGHU allergy   LASERS ALT ou -? Date / SLT OD 7/17/08 - good response   GLAUCOMA SURGERIES - I stent x 2 - OD  - 10/21/2020 // I stent 12/9/2020 - OS   OTHER EYE SURGERIES - phaco/IOL od - 10/21/2020 - PCB00 14.0  // oS 12/9/2020 - pcb00 15.0   CDR 0.6/0.3   Tbase 19-26 / 16-21   Tmax 26/21   Ttarget ?   HVF 15 test - 1999 to 2021 - Full ou   Gonio +3 ou   /588   OCT 6 test 2005 to 2021 -  RNFL - OD: bord TI (?prog)  // OS:NL  HRT 9 test 2004 to 2020 -MR -  MR -  Dec T, border NI od // full os /// CDR 0.619 od // 0.508 os - but unreliable OD  Disc photos 1991, 1996, 2003 - slides // 2012 , 2016, 2020   - OIS     - Ttoday   18/14  - Test done today   IOP       2. Posterior Vitreous Detachment OD - 11/2008   - RD PRECAUTIONS    3. Eyelid inflammation / Blepharitis / MGD    4. Allergic Conjunctivitis - RAGHU allergy     5. PCO    Not vis sign yet "     6. PC IOL OU (w/ istents)    OD 10/21/202 - PCB00 14.0   OS 12/9/2020 - PCB00 15.0     Plan   POAG - mild -pre-perimetric glaucoma - intolerant to all gtts   IOP ok s/p ALT ou - years ago and s/p SLT OD 2008 ( no SLT os)  Continue to monitor HVF/DFE/OCT/HRT/photos/IOP   If increase IOP or progression on VF testing consider repeat SLT OD and primary SLT os      Pt was a myope - sph eq is -5.25 od and -4.25 os - pre- phac0 -- set for distance post op    OCT macula - NO CME     Rx for bifocals  Given - or ok to use no distance correction and OTC readers - re-printed 3/28/2022  Pt mostly uses the drugstore reading glasses     If IOP too high off gtts and post I-stent - can try a SLT - OS and a repeat SLT od (( H/O ALT's many years ago)  or can re-try some gtts (H/O intol to all galucoma gtts in past - ? RAGHU intol) - would skip the istant area   ?? If could try travatan Z again in future again - non RAGHU // or ?? Try alphagan P     4 - months with  HVF / DFE / OCT

## 2023-03-10 ENCOUNTER — CLINICAL SUPPORT (OUTPATIENT)
Dept: SMOKING CESSATION | Facility: CLINIC | Age: 73
End: 2023-03-10
Payer: COMMERCIAL

## 2023-03-10 DIAGNOSIS — F17.200 NICOTINE DEPENDENCE: Primary | ICD-10-CM

## 2023-03-10 PROCEDURE — 99404 PR PREVENT COUNSEL,INDIV,60 MIN: ICD-10-PCS | Mod: S$GLB,,,

## 2023-03-10 PROCEDURE — 99404 PREV MED CNSL INDIV APPRX 60: CPT | Mod: S$GLB,,,

## 2023-03-10 PROCEDURE — 99999 PR PBB SHADOW E&M-EST. PATIENT-LVL II: ICD-10-PCS | Mod: PBBFAC,,,

## 2023-03-10 PROCEDURE — 99999 PR PBB SHADOW E&M-EST. PATIENT-LVL II: CPT | Mod: PBBFAC,,,

## 2023-03-10 RX ORDER — TIOTROPIUM BROMIDE INHALATION SPRAY 3.12 UG/1
5 SPRAY, METERED RESPIRATORY (INHALATION) DAILY
Qty: 4 G | Refills: 4 | Status: SHIPPED | OUTPATIENT
Start: 2023-03-10 | End: 2023-08-17

## 2023-03-10 NOTE — PROGRESS NOTES
Individual Follow-Up Form    3/10/2023    Quit Date: TBD    Clinical Status of Patient: Outpatient    Length of Service: 60 minutes    Continuing Medication: yes  Patches    Other Medications: None      Target Symptoms: Withdrawal and medication side effects. The following were  rated moderate (3) to severe (4) on TCRS:  Moderate (3): Crave and Desire   Severe (4): None     Comments:  Counselor spoke with patient for telephonic visit, name and date of birth verified as two patient identifiers.  Patient reported she is still smoking about 12 cpd, but some of those cigarettes burn out before she can smoke them.  Patient also reported she remains using 21 mg nicotine patch without any negative side effects reported at this time.  Counselor congratulated patient on her progress thus far.  Counselor also discussed further rate reduction, further behavior modification strategies, goal setting, and selecting a quit date with patient.  Patient will attempt a rate reduction of 10 cpd or less.  Follow-up visit scheduled in two weeks. Counselor will remain available should any further needs arise.      Diagnosis: F17.200    Next Visit: 2 weeks

## 2023-03-13 ENCOUNTER — OFFICE VISIT (OUTPATIENT)
Dept: OPHTHALMOLOGY | Facility: CLINIC | Age: 73
End: 2023-03-13
Payer: MEDICARE

## 2023-03-13 DIAGNOSIS — Z96.1 PSEUDOPHAKIA, BOTH EYES: ICD-10-CM

## 2023-03-13 DIAGNOSIS — Z98.83 STATUS POST GLAUCOMA SURGERY: ICD-10-CM

## 2023-03-13 DIAGNOSIS — H40.1131 PRIMARY OPEN-ANGLE GLAUCOMA, BILATERAL, MILD STAGE: Primary | ICD-10-CM

## 2023-03-13 DIAGNOSIS — H35.372 EPIRETINAL MEMBRANE (ERM) OF LEFT EYE: ICD-10-CM

## 2023-03-13 PROCEDURE — 99214 PR OFFICE/OUTPT VISIT, EST, LEVL IV, 30-39 MIN: ICD-10-PCS | Mod: S$PBB,,, | Performed by: OPHTHALMOLOGY

## 2023-03-13 PROCEDURE — 99214 OFFICE O/P EST MOD 30 MIN: CPT | Mod: S$PBB,,, | Performed by: OPHTHALMOLOGY

## 2023-03-13 PROCEDURE — 99999 PR PBB SHADOW E&M-EST. PATIENT-LVL III: CPT | Mod: PBBFAC,,, | Performed by: OPHTHALMOLOGY

## 2023-03-13 PROCEDURE — 99213 OFFICE O/P EST LOW 20 MIN: CPT | Mod: PBBFAC | Performed by: OPHTHALMOLOGY

## 2023-03-13 PROCEDURE — 99999 PR PBB SHADOW E&M-EST. PATIENT-LVL III: ICD-10-PCS | Mod: PBBFAC,,, | Performed by: OPHTHALMOLOGY

## 2023-03-24 ENCOUNTER — CLINICAL SUPPORT (OUTPATIENT)
Dept: SMOKING CESSATION | Facility: CLINIC | Age: 73
End: 2023-03-24
Payer: COMMERCIAL

## 2023-03-24 DIAGNOSIS — F17.200 NICOTINE DEPENDENCE: Primary | ICD-10-CM

## 2023-03-24 PROCEDURE — 99402 PREV MED CNSL INDIV APPRX 30: CPT | Mod: S$GLB,,,

## 2023-03-24 PROCEDURE — 99999 PR PBB SHADOW E&M-EST. PATIENT-LVL II: CPT | Mod: PBBFAC,,,

## 2023-03-24 PROCEDURE — 99402 PR PREVENT COUNSEL,INDIV,30 MIN: ICD-10-PCS | Mod: S$GLB,,,

## 2023-03-24 PROCEDURE — 99999 PR PBB SHADOW E&M-EST. PATIENT-LVL II: ICD-10-PCS | Mod: PBBFAC,,,

## 2023-03-24 RX ORDER — IBUPROFEN 200 MG
1 TABLET ORAL DAILY
Qty: 28 PATCH | Refills: 0 | Status: SHIPPED | OUTPATIENT
Start: 2023-03-24 | End: 2023-04-21

## 2023-03-24 NOTE — PROGRESS NOTES
Individual Follow-Up Form    3/24/2023    Quit Date: TBD     Clinical Status of Patient: Outpatient    Length of Service: 30 minutes    Continuing Medication: yes  Patches    Other Medications: None      Target Symptoms: Withdrawal and medication side effects. The following were  rated moderate (3) to severe (4) on TCRS:  Moderate (3): Crave and Desire   Severe (4): None     Comments: Counselor spoke with patient for telephonic visit, name and date of birth verified as two patient identifiers.  Patient stated she was unable to come into the clinic today because she was not feeling well.  Patient reported she is still smoking about the same rate, and she is still using 21 mg nicotine patch without any negative side effects reported at this time.  Counselor congratulated patient on her progress thus far.  Counselor also discussed consistency with working towards goals and further behavior modification strategies.  Patient will re attempt a rate reduction of 10 cpd or less.  Follow-up visit scheduled in one week. Counselor will remain available should any further needs arise.      Diagnosis: F17.200    Next Visit: 1 week

## 2023-03-31 ENCOUNTER — CLINICAL SUPPORT (OUTPATIENT)
Dept: SMOKING CESSATION | Facility: CLINIC | Age: 73
End: 2023-03-31
Payer: COMMERCIAL

## 2023-03-31 ENCOUNTER — EXTERNAL CHRONIC CARE MANAGEMENT (OUTPATIENT)
Dept: PRIMARY CARE CLINIC | Facility: CLINIC | Age: 73
End: 2023-03-31
Payer: MEDICARE

## 2023-03-31 DIAGNOSIS — F17.200 NICOTINE DEPENDENCE: Primary | ICD-10-CM

## 2023-03-31 PROCEDURE — 99489 CPLX CHRNC CARE EA ADDL 30: CPT | Mod: PBBFAC,PN | Performed by: FAMILY MEDICINE

## 2023-03-31 PROCEDURE — 99489 PR COMPLX CHRON CARE MGMT, EA ADDTL 30 MIN, PER MONTH: ICD-10-PCS | Mod: S$PBB,,, | Performed by: FAMILY MEDICINE

## 2023-03-31 PROCEDURE — 99999 PR PBB SHADOW E&M-EST. PATIENT-LVL II: ICD-10-PCS | Mod: PBBFAC,,,

## 2023-03-31 PROCEDURE — 99489 CPLX CHRNC CARE EA ADDL 30: CPT | Mod: S$PBB,,, | Performed by: FAMILY MEDICINE

## 2023-03-31 PROCEDURE — 99403 PR PREVENT COUNSEL,INDIV,45 MIN: ICD-10-PCS | Mod: S$GLB,,,

## 2023-03-31 PROCEDURE — 99487 PR COMPLX CHRON CARE MGMT, 1ST HR, PER MONTH: ICD-10-PCS | Mod: S$PBB,,, | Performed by: FAMILY MEDICINE

## 2023-03-31 PROCEDURE — 99487 CPLX CHRNC CARE 1ST 60 MIN: CPT | Mod: PBBFAC,PN | Performed by: FAMILY MEDICINE

## 2023-03-31 PROCEDURE — 99403 PREV MED CNSL INDIV APPRX 45: CPT | Mod: S$GLB,,,

## 2023-03-31 PROCEDURE — 99487 CPLX CHRNC CARE 1ST 60 MIN: CPT | Mod: S$PBB,,, | Performed by: FAMILY MEDICINE

## 2023-03-31 PROCEDURE — 99999 PR PBB SHADOW E&M-EST. PATIENT-LVL II: CPT | Mod: PBBFAC,,,

## 2023-03-31 NOTE — PROGRESS NOTES
Individual Follow-Up Form    3/31/2023    Quit Date: TBD    Clinical Status of Patient: Outpatient    Length of Service: 45 minutes    Continuing Medication: yes  Patches    Other Medications: None      Target Symptoms: Withdrawal and medication side effects. The following were  rated moderate (3) to severe (4) on TCRS:  Moderate (3): Crave and Desire   Severe (4): None     Comments: Counselor spoke with patient for telephonic visit, name and date of birth verified as two patient identifiers.  Patient stated she was unable to come into the clinic today because she had an emergency with her car.  Patient reported she is smoking about 8-10 cpd, and she is still using 21 mg nicotine patch without any negative side effects reported at this time.  Patient also reported she has not been feeling well, and as a result of not feeling well she is smoking less.  Counselor congratulated patient on her progress thus far.  Counselor also discussed patient selecting a quit date, consistency with medication, and continuing to work on further behavior modification strategies.  Follow-up visit scheduled in two weeks. Counselor will remain available should any further needs arise.      Diagnosis: F17.200    Next Visit: 2 weeks  
Blood pressure under control

## 2023-04-14 ENCOUNTER — CLINICAL SUPPORT (OUTPATIENT)
Dept: SMOKING CESSATION | Facility: CLINIC | Age: 73
End: 2023-04-14
Payer: COMMERCIAL

## 2023-04-14 DIAGNOSIS — F17.200 NICOTINE DEPENDENCE: Primary | ICD-10-CM

## 2023-04-14 PROCEDURE — 99404 PR PREVENT COUNSEL,INDIV,60 MIN: ICD-10-PCS | Mod: S$GLB,,,

## 2023-04-14 PROCEDURE — 99999 PR PBB SHADOW E&M-EST. PATIENT-LVL I: CPT | Mod: PBBFAC,,,

## 2023-04-14 PROCEDURE — 99999 PR PBB SHADOW E&M-EST. PATIENT-LVL I: ICD-10-PCS | Mod: PBBFAC,,,

## 2023-04-14 PROCEDURE — 99404 PREV MED CNSL INDIV APPRX 60: CPT | Mod: S$GLB,,,

## 2023-04-14 NOTE — PROGRESS NOTES
Individual Follow-Up Form    4/14/2023    Quit Date: Tentatively 5/14/2023    Clinical Status of Patient: Outpatient    Length of Service: 60 minutes    Continuing Medication: yes  Patches    Other Medications: None      Target Symptoms: Withdrawal and medication side effects. The following were  rated moderate (3) to severe (4) on TCRS:  Moderate (3): Crave and Desire   Severe (4): None     Comments: Patient presented to clinic visit, name and date of birth verified as two patient identifiers.  Patient reported she is doing a lot better with rate reduction, and she is choosing May 14,2023 as her tentative quit date.  Patient also reported she is smoking about 10 cpd, and she is still using 21 mg nicotine patch without any negative side effects reported at this time. Counselor congratulated patient on her progress thus far and on selecting a quit date.  Counselor also discussed patient continuing to work towards further rate reduction, continuing to work on further behavior modification strategies, and eliminating known triggers to aid with her quitting.  Follow-up visit scheduled in two weeks. Counselor will remain available should any further needs arise.      Diagnosis: F17.200    Next Visit: 2 weeks

## 2023-04-17 ENCOUNTER — CLINICAL SUPPORT (OUTPATIENT)
Dept: SMOKING CESSATION | Facility: CLINIC | Age: 73
End: 2023-04-17
Payer: COMMERCIAL

## 2023-04-17 DIAGNOSIS — F17.200 NICOTINE DEPENDENCE: Primary | ICD-10-CM

## 2023-04-17 PROCEDURE — 99407 BEHAV CHNG SMOKING > 10 MIN: CPT | Mod: S$GLB,,,

## 2023-04-17 PROCEDURE — 99999 PR PBB SHADOW E&M-EST. PATIENT-LVL I: CPT | Mod: PBBFAC,,,

## 2023-04-17 PROCEDURE — 99999 PR PBB SHADOW E&M-EST. PATIENT-LVL I: ICD-10-PCS | Mod: PBBFAC,,,

## 2023-04-17 PROCEDURE — 99407 PR TOBACCO USE CESSATION INTENSIVE >10 MINUTES: ICD-10-PCS | Mod: S$GLB,,,

## 2023-04-17 NOTE — PROGRESS NOTES
Spoke with patient today in regard to smoking cessation progress for 3 month telephone follow up, she states not tobacco free. Patient is currently enrolled in the program and using the nicotine patch along with lozenges to help aid in her quit attempt. Informed patient of benefit period, future follow ups, and contact information if any further help or support is needed. Will complete smart form for 3 month follow up on Quit attempt #7.

## 2023-04-25 ENCOUNTER — OFFICE VISIT (OUTPATIENT)
Dept: URGENT CARE | Facility: CLINIC | Age: 73
End: 2023-04-25
Payer: MEDICARE

## 2023-04-25 VITALS
OXYGEN SATURATION: 95 % | SYSTOLIC BLOOD PRESSURE: 167 MMHG | TEMPERATURE: 98 F | WEIGHT: 181 LBS | BODY MASS INDEX: 33.31 KG/M2 | DIASTOLIC BLOOD PRESSURE: 83 MMHG | HEART RATE: 64 BPM | RESPIRATION RATE: 16 BRPM | HEIGHT: 62 IN

## 2023-04-25 DIAGNOSIS — S30.810A ABRASION OF LOWER BACK, INITIAL ENCOUNTER: Primary | ICD-10-CM

## 2023-04-25 DIAGNOSIS — J02.9 SORE THROAT: ICD-10-CM

## 2023-04-25 PROCEDURE — 99213 PR OFFICE/OUTPT VISIT, EST, LEVL III, 20-29 MIN: ICD-10-PCS | Mod: S$GLB,,,

## 2023-04-25 PROCEDURE — 99213 OFFICE O/P EST LOW 20 MIN: CPT | Mod: S$GLB,,,

## 2023-04-25 RX ORDER — MUPIROCIN 20 MG/G
OINTMENT TOPICAL 3 TIMES DAILY
Qty: 22 G | Refills: 0 | Status: SHIPPED | OUTPATIENT
Start: 2023-04-25 | End: 2023-08-17 | Stop reason: ALTCHOICE

## 2023-04-25 NOTE — PROGRESS NOTES
"Subjective:      Patient ID: Stephanie Sears is a 73 y.o. female.    Vitals:  height is 5' 2" (1.575 m) and weight is 82.1 kg (181 lb). Her temperature is 97.7 °F (36.5 °C). Her blood pressure is 167/83 (abnormal) and her pulse is 64. Her respiration is 16 and oxygen saturation is 95%.     Chief Complaint: Sore Throat    Patient is a 73-year-old female with extensive medical history below who presents with 2 complaints today.  She is complaining of a painful rash to her midline lower back that she noticed after completing physical therapy 4 days ago.  States that she was doing exercises on her back.  She is concerned and wants to make sure that it is not shingles.  She is also complaining of a sore throat that started today.  However, she attributes this to her CPAP because sometimes she forgets to turn the humidifier on which will cause dry mouth and sore throat.  She denies any other symptoms at this time, including fever, chills, coughing, ear pain, congestion, runny nose, headache, myalgias, nausea, vomiting.    Sore Throat   This is a new problem. The current episode started today. The problem has been unchanged. There has been no fever. The pain is at a severity of 2/10. Pertinent negatives include no abdominal pain, congestion, coughing, diarrhea, drooling, ear discharge, ear pain, headaches, hoarse voice, plugged ear sensation, neck pain, shortness of breath, stridor, swollen glands, trouble swallowing or vomiting. She has tried nothing for the symptoms. The treatment provided no relief.     Past Medical History:   Diagnosis Date    Acute pancreatitis     Angina pectoris     Anxiety     Arthritis     Asthma     as a child    Back pain     Bronchitis     Cervical spondylosis with radiculopathy 07/10/2012    Chest pain     Chronic neck pain 07/26/2012    Colon polyps     Controlled type 2 diabetes mellitus with stage 2 chronic kidney disease, with long-term current use of insulin 01/24/2020    COPD (chronic " obstructive pulmonary disease)     Coronary artery disease     Depression     Fibrocystic breast     General anesthetics causing adverse effect in therapeutic use     trouble coming out of anes/ had the shakes    GERD (gastroesophageal reflux disease)     Glaucoma     Heart failure     Hyperlipidemia     Hypertension     Neck pain 07/10/2012    Obesity     JAM (obstructive sleep apnea)     Pneumonia     Pneumonia due to other staphylococcus     Primary osteoarthritis of both knees     Psoriasis     Subacromial or subdeltoid bursitis 07/10/2012    Thyroid disease     Tobacco dependence     Trouble in sleeping     Type 2 diabetes mellitus 12/10/2013    Type 2 diabetes mellitus with diabetic chronic kidney disease        Constitution: Negative for chills and fever.   HENT:  Positive for sore throat. Negative for ear pain, ear discharge, drooling, congestion and trouble swallowing.    Neck: Negative for neck pain and neck stiffness.   Cardiovascular:  Negative for chest pain.   Respiratory:  Negative for cough, shortness of breath and stridor.    Gastrointestinal:  Negative for abdominal pain, nausea, vomiting and diarrhea.   Musculoskeletal:  Negative for muscle ache.   Skin:  Positive for rash.   Allergic/Immunologic: Negative for itching.   Neurological:  Negative for dizziness and headaches.    Objective:     Physical Exam   Constitutional: She is oriented to person, place, and time. She appears well-developed.   HENT:   Head: Normocephalic and atraumatic.   Ears:   Right Ear: External ear and ear canal normal. impacted cerumen  Left Ear: External ear and ear canal normal. impacted cerumen  Nose: Nose normal.   Mouth/Throat: Oropharynx is clear and moist. Mucous membranes are moist. No oropharyngeal exudate or posterior oropharyngeal erythema. Oropharynx is clear.   Eyes: Conjunctivae, EOM and lids are normal. Pupils are equal, round, and reactive to light.   Neck: Trachea normal and phonation normal. Neck supple.    Cardiovascular: Normal rate, regular rhythm, normal heart sounds and normal pulses.   Pulmonary/Chest: Effort normal and breath sounds normal. No respiratory distress.   Musculoskeletal: Normal range of motion.         General: Normal range of motion.   Neurological: She is alert and oriented to person, place, and time.   Skin: Skin is warm, dry and intact. Capillary refill takes less than 2 seconds.        Psychiatric: Her speech is normal and behavior is normal. Judgment and thought content normal.   Nursing note and vitals reviewed.    Assessment:     1. Abrasion of lower back, initial encounter    2. Sore throat        Plan:       Abrasion of lower back, initial encounter  -     mupirocin (BACTROBAN) 2 % ointment; Apply topically 3 (three) times daily.  Dispense: 22 g; Refill: 0    Sore throat              Patient Instructions   - Apply antibiotic ointment to your abrasion.    - Follow up with your PCP or specialty clinic as directed in the next 1-2 weeks if not improved or as needed.  You can call [phone] to schedule an appointment with the appropriate provider.    - Go to the ER or seek medical attention immediately if you develop new or worsening symptoms.    - You must understand that you have received an Urgent Care treatment only and that you may be released before all of your medical problems are known or treated.   - You, the patient, will arrange for follow up care as instructed.   - If your condition worsens or fails to improve we recommend that you receive another evaluation at the ER immediately or contact your PCP to discuss your concerns or return here.

## 2023-04-25 NOTE — PATIENT INSTRUCTIONS
- Apply antibiotic ointment to your abrasion.    - Follow up with your PCP or specialty clinic as directed in the next 1-2 weeks if not improved or as needed.  You can call [phone] to schedule an appointment with the appropriate provider.    - Go to the ER or seek medical attention immediately if you develop new or worsening symptoms.    - You must understand that you have received an Urgent Care treatment only and that you may be released before all of your medical problems are known or treated.   - You, the patient, will arrange for follow up care as instructed.   - If your condition worsens or fails to improve we recommend that you receive another evaluation at the ER immediately or contact your PCP to discuss your concerns or return here.

## 2023-04-27 ENCOUNTER — CLINICAL SUPPORT (OUTPATIENT)
Dept: SMOKING CESSATION | Facility: CLINIC | Age: 73
End: 2023-04-27
Payer: COMMERCIAL

## 2023-04-27 DIAGNOSIS — F17.200 NICOTINE DEPENDENCE: Primary | ICD-10-CM

## 2023-04-27 PROCEDURE — 99999 PR PBB SHADOW E&M-EST. PATIENT-LVL II: CPT | Mod: PBBFAC,,,

## 2023-04-27 PROCEDURE — 90853 PR GROUP PSYCHOTHERAPY: ICD-10-PCS | Mod: S$GLB,,,

## 2023-04-27 PROCEDURE — 90853 GROUP PSYCHOTHERAPY: CPT | Mod: S$GLB,,,

## 2023-04-27 PROCEDURE — 99999 PR PBB SHADOW E&M-EST. PATIENT-LVL II: ICD-10-PCS | Mod: PBBFAC,,,

## 2023-04-27 NOTE — PROGRESS NOTES
Site: [facility] [place]  Date:  4/27/2023  Clinical Status of Patient: Outpatient   Length of Service and Code: 60 minutes - 34000   Number in Attendance: 2  Group Activities/Focus of Group:  orientation, client introductions, completion of TCRS (Tobacco Cessation Rating Scale) learned addiction model, cues/triggers, personal reasons for quitting, medications, goals, quit date    Target symptoms:  withdrawal and medication side effects             The following were rated moderate (3) to severe (4) on TCRS:       Moderate 3: Crave and Desire      Severe 4:   None   Patient's Response to Intervention: Counselor spoke with patient telephonically for follow-up visit, name and date of birth verified as two patient identifiers.  Patient stated she is unable to come into the clinic tomorrow due to a scheduling conflict with another doctor's appointment on tomorrow.  Patient also reported she is still smoking about 10 cpd, and she is still using 21 mg nicotine patch in conjunction with 2 mg nicotine lozenges without any negative side effects reported at this time. Patient also reported she is doing better with changing her routine, and she is opting to use her lozenges more in lieu of smoking.  Counselor congratulated patient on her progress thus far.  Counselor also discussed patient continuing to work towards rate  reduction and further behavior modification strategies.  Follow-up visit scheduled in two weeks. Counselor will remain available should any further needs arise.    Progress Toward Goals and Other Mental Status Changes: Patient is progressing without any mental status changes noted.   Diagnosis: Z72.0  Plan: The patient will continue with group therapy sessions and medication regimen prescribed with management by physician or Cessation Clinic Provider. Patient will inform Smoking Clinic Cessation Counselor of symptoms as rated high on TCRS.  Return to Clinic: 2 weeks

## 2023-04-30 ENCOUNTER — EXTERNAL CHRONIC CARE MANAGEMENT (OUTPATIENT)
Dept: PRIMARY CARE CLINIC | Facility: CLINIC | Age: 73
End: 2023-04-30
Payer: MEDICARE

## 2023-04-30 PROCEDURE — 99439 CHRNC CARE MGMT STAF EA ADDL: CPT | Mod: S$PBB,,, | Performed by: FAMILY MEDICINE

## 2023-04-30 PROCEDURE — 99439 PR CHRONIC CARE MGMT, EA ADDTL 20 MIN: ICD-10-PCS | Mod: S$PBB,,, | Performed by: FAMILY MEDICINE

## 2023-04-30 PROCEDURE — 99490 CHRNC CARE MGMT STAFF 1ST 20: CPT | Mod: PBBFAC,PN | Performed by: FAMILY MEDICINE

## 2023-04-30 PROCEDURE — 99439 CHRNC CARE MGMT STAF EA ADDL: CPT | Mod: PBBFAC,27,PN | Performed by: FAMILY MEDICINE

## 2023-04-30 PROCEDURE — 99490 CHRNC CARE MGMT STAFF 1ST 20: CPT | Mod: S$PBB,,, | Performed by: FAMILY MEDICINE

## 2023-04-30 PROCEDURE — 99490 PR CHRONIC CARE MGMT, 1ST 20 MIN: ICD-10-PCS | Mod: S$PBB,,, | Performed by: FAMILY MEDICINE

## 2023-05-01 NOTE — PROGRESS NOTES
Health Maintenance Due   Topic Date Due    TETANUS VACCINE  01/10/1968    Shingles Vaccine (1 of 2) 01/10/2000    Foot Exam  12/27/2019    DEXA SCAN  01/24/2020    LDCT Lung Screen  06/07/2020    Hemoglobin A1c  07/24/2020     Updates were requested from care everywhere.  Chart was reviewed for overdue Proactive [facility] Encounters (JULI[facility]N) topics (CRS, Breast Cancer Screening, Eye exam)  Health Maintenance has been updated.  LINKS immunization registry triggered.  Immunizations were reconciled.    
patient

## 2023-05-09 ENCOUNTER — OFFICE VISIT (OUTPATIENT)
Dept: PODIATRY | Facility: CLINIC | Age: 73
End: 2023-05-09
Payer: MEDICARE

## 2023-05-09 VITALS — WEIGHT: 181 LBS | BODY MASS INDEX: 33.31 KG/M2 | HEIGHT: 62 IN

## 2023-05-09 DIAGNOSIS — L84 CORN OR CALLUS: ICD-10-CM

## 2023-05-09 DIAGNOSIS — E11.51 TYPE II DIABETES MELLITUS WITH PERIPHERAL CIRCULATORY DISORDER: Primary | ICD-10-CM

## 2023-05-09 DIAGNOSIS — E11.9 COMPREHENSIVE DIABETIC FOOT EXAMINATION, TYPE 2 DM, ENCOUNTER FOR: ICD-10-CM

## 2023-05-09 DIAGNOSIS — M20.5X2 HALLUX LIMITUS, ACQUIRED, LEFT: ICD-10-CM

## 2023-05-09 DIAGNOSIS — M20.5X1 HALLUX LIMITUS, ACQUIRED, RIGHT: ICD-10-CM

## 2023-05-09 DIAGNOSIS — M20.40 HAMMER TOE, UNSPECIFIED LATERALITY: ICD-10-CM

## 2023-05-09 PROCEDURE — 99999 PR PBB SHADOW E&M-EST. PATIENT-LVL IV: ICD-10-PCS | Mod: PBBFAC,,, | Performed by: PODIATRIST

## 2023-05-09 PROCEDURE — 99999 PR PBB SHADOW E&M-EST. PATIENT-LVL IV: CPT | Mod: PBBFAC,,, | Performed by: PODIATRIST

## 2023-05-09 PROCEDURE — 99214 PR OFFICE/OUTPT VISIT, EST, LEVL IV, 30-39 MIN: ICD-10-PCS | Mod: S$PBB,,, | Performed by: PODIATRIST

## 2023-05-09 PROCEDURE — 99214 OFFICE O/P EST MOD 30 MIN: CPT | Mod: S$PBB,,, | Performed by: PODIATRIST

## 2023-05-09 PROCEDURE — 99214 OFFICE O/P EST MOD 30 MIN: CPT | Mod: PBBFAC,PO | Performed by: PODIATRIST

## 2023-05-10 NOTE — PROGRESS NOTES
Subjective:      Patient ID: Stephanie Sears is a 73 y.o. female.    Chief Complaint: Diabetes Mellitus (Williams Rossi Jr., MD at 2/14/2023) and Diabetic Foot Exam    Stephanie is a 73 y.o. female who presents to the clinic for evaluation and treatment of high risk feet. Stephanie has a past medical history of Acute pancreatitis, Angina pectoris, Anxiety, Arthritis, Asthma, Back pain, Bronchitis, Cervical spondylosis with radiculopathy (07/10/2012), Chest pain, Chronic neck pain (07/26/2012), Colon polyps, Controlled type 2 diabetes mellitus with stage 2 chronic kidney disease, with long-term current use of insulin (01/24/2020), COPD (chronic obstructive pulmonary disease), Coronary artery disease, Depression, Fibrocystic breast, General anesthetics causing adverse effect in therapeutic use, GERD (gastroesophageal reflux disease), Glaucoma, Heart failure, Hyperlipidemia, Hypertension, Neck pain (07/10/2012), Obesity, JAM (obstructive sleep apnea), Pneumonia, Pneumonia due to other staphylococcus, Primary osteoarthritis of both knees, Psoriasis, Subacromial or subdeltoid bursitis (07/10/2012), Thyroid disease, Tobacco dependence, Trouble in sleeping, Type 2 diabetes mellitus (12/10/2013), and Type 2 diabetes mellitus with diabetic chronic kidney disease. The patient has no new major complaints with feet, she has corns and calluses which she presents for proc B visits. Chief concern is how to care for feet as a diabetic.    PCP: Williams Rossi Jr, MD    Date Last Seen by PCP:   Chief Complaint   Patient presents with    Diabetes Mellitus     Williams Rossi Jr., MD at 2/14/2023    Diabetic Foot Exam       Current shoe gear:  [place]    Hemoglobin A1C   Date Value Ref Range Status   02/03/2023 5.8 (H) 4.0 - 5.6 % Final     Comment:     ADA Screening Guidelines:  5.7-6.4%  Consistent with prediabetes  >or=6.5%  Consistent with diabetes    High levels of fetal hemoglobin interfere with the HbA1C  assay. Heterozygous  "hemoglobin variants (HbS, HgC, etc)do  not significantly interfere with this assay.   However, presence of multiple variants may affect accuracy.     08/01/2022 6.0 (H) 4.0 - 5.6 % Final     Comment:     ADA Screening Guidelines:  5.7-6.4%  Consistent with prediabetes  >or=6.5%  Consistent with diabetes    High levels of fetal hemoglobin interfere with the HbA1C  assay. Heterozygous hemoglobin variants (HbS, HgC, etc)do  not significantly interfere with this assay.   However, presence of multiple variants may affect accuracy.     04/29/2022 5.8 (H) 4.0 - 5.6 % Final     Comment:     ADA Screening Guidelines:  5.7-6.4%  Consistent with prediabetes  >or=6.5%  Consistent with diabetes    High levels of fetal hemoglobin interfere with the HbA1C  assay. Heterozygous hemoglobin variants (HbS, HgC, etc)do  not significantly interfere with this assay.   However, presence of multiple variants may affect accuracy.         Review of Systems   Constitutional: Negative for chills, diaphoresis, fever, malaise/fatigue and night sweats.   Cardiovascular:  Negative for claudication, cyanosis, leg swelling and syncope.   Skin:  Positive for nail changes and suspicious lesions. Negative for color change, dry skin, rash and unusual hair distribution.   Musculoskeletal:  Negative for falls, joint pain, joint swelling, muscle cramps, muscle weakness and stiffness.   Gastrointestinal:  Negative for constipation, diarrhea, nausea and vomiting.   Neurological:  Positive for sensory change. Negative for brief paralysis, disturbances in coordination, focal weakness, numbness, paresthesias and tremors.         Objective:       Vitals:    05/09/23 0841   Weight: 82.1 kg (181 lb)   Height: 5' 2" (1.575 m)   PainSc: 0-No pain       Physical Exam  Constitutional:       General: She is not in acute distress.     Appearance: She is well-developed. She is not diaphoretic.   Cardiovascular:      Pulses:           Popliteal pulses are 2+ on the right " side and 2+ on the left side.        Dorsalis pedis pulses are 2+ on the right side and 2+ on the left side.        Posterior tibial pulses are 2+ on the right side and 2+ on the left side.      Comments: Capillary refill 3 seconds all toes/distal feet, all toes/both feet warm to touch.      Negative lymphadenopathy bilateral popliteal fossa and tarsal tunnel.      Negavie lower extremity edema bilateral.    Musculoskeletal:      Right ankle: No swelling, deformity, ecchymosis or lacerations. Normal range of motion. Normal pulse.      Right Achilles Tendon: Normal. No defects. Patrick's test negative.      Comments: Patient has hammertoes of digits   2-4 bilateral                partially reducible without symptom today.'    Otherwise, Normal angle, base, station of gait. All ten toes without clubbing, cyanosis, or signs of ischemia.  No pain to palpation bilateral lower extremities.  Range of motion, stability, muscle strength, and muscle tone normal bilateral feet and legs.    Lymphadenopathy:      Lower Body: No right inguinal adenopathy. No left inguinal adenopathy.      Comments: Negative lymphadenopathy bilateral popliteal fossa and tarsal tunnel.    Negative lymphangitic streaking bilateral feet/ankles/legs.   Skin:     General: Skin is warm and dry.      Capillary Refill: Capillary refill takes 2 to 3 seconds.      Coloration: Skin is not pale.      Findings: No abrasion, bruising, burn, ecchymosis, erythema, laceration, lesion or rash.      Nails: There is no clubbing.      Comments: Focal hyperkeratotic lesion consisting entirely of hyperkeratotic tissue without open skin, drainage, pus, fluctuance, malodor, or signs of infection dorsal 2nd pipj bilateral.    Otehrwise, Skin is normal age and health appropriate color, turgor, texture, and temperature bilateral lower extremities without ulceration, hyperpigmentation, discoloration, masses nodules or cords palpated.  No ecchymosis, erythema, edema, or  cardinal signs of infection bilateral lower extremities.    Toenails 1st, 2nd, 3rd, 4th, 5th  bilateral are hypertrophic thickened 2-3 mm, dystrophic, discolored tanish brown with tan, gray crumbly subungual debris.  Long, not tender to distal nail plate pressure, without periungual skin abnormality of each.        Neurological:      Mental Status: She is alert and oriented to person, place, and time.      Sensory: Sensory deficit present.      Motor: No tremor, atrophy or abnormal muscle tone.      Gait: Gait normal.      Deep Tendon Reflexes:      Reflex Scores:       Patellar reflexes are 2+ on the right side and 2+ on the left side.       Achilles reflexes are 2+ on the right side and 2+ on the left side.     Comments: Decreased/absent vibratory sensation bilateral feet to 128Hz tuning fork.    Otherwise, Negative tinel sign to percussion sural, superficial peroneal, deep peroneal, saphenous, and posterior tibial nerves right and left ankles and feet.    Psychiatric:         Behavior: Behavior is cooperative.             Assessment:       Encounter Diagnoses   Name Primary?    Type II diabetes mellitus with peripheral circulatory disorder Yes    Hammer toe, unspecified laterality     Hallux limitus, acquired, right     Hallux limitus, acquired, left     Corn or callus     Comprehensive diabetic foot examination, type 2 DM, encounter for          Plan:       Stephanie was seen today for diabetes mellitus and diabetic foot exam.    Diagnoses and all orders for this visit:    Type II diabetes mellitus with peripheral circulatory disorder  -     DIABETIC SHOES FOR HOME USE    Hammer toe, unspecified laterality  -     DIABETIC SHOES FOR HOME USE    Hallux limitus, acquired, right  -     DIABETIC SHOES FOR HOME USE    Hallux limitus, acquired, left  -     DIABETIC SHOES FOR HOME USE    Corn or callus  -     DIABETIC SHOES FOR HOME USE    Comprehensive diabetic foot examination, type 2 DM, encounter for      I counseled  the patient on her conditions, their implications and medical management.    Education about the diabetic foot, neuropathy, and prevention of limb loss.    Shoe inspection. Diabetic Foot Education. Patient reminded of the importance of good nutrition/healthy diet/weight management and blood sugar control to help prevent podiatric complications of diabetes. Patient instructed on proper foot hygeine. Wear comfortable, proper fitting shoes. Wash feet daily. Dry well. After drying, apply moisturizer to feet (no lotion to webspaces). Inspect feet daily for skin breaks, blisters, swelling, or redness. Wear cotton socks (preferably white)  Change socks every day. Do NOT walk barefoot. Do NOT use heating pads or hot water soaks. We discussed wearing proper shoe gear, daily foot inspections, never walking without protective shoe gear.     Discussed edema control and the importance of daily moisturizer to the feet such as Gold bonds diabetic foot cream    Recommend applying vicks vaporub to thick abnormal toenails daily x 6 months to treat fungal nail infection.    Counseled patient on the effects of smoking on healing. She verbalizes understanding that smoking and/or history of smoking has an adverse effect on circulation an can increase the chances of delayed healing and this prolonged exposure leads to infection or progression of infection.    she will continue to monitor the areas daily, inspect her feet, wear protective shoe gear when ambulatory, moisturizer to maintain skin integrity and follow in this office in approximately 12 months, sooner p.r.n. proc B          No follow-ups on file.

## 2023-05-11 ENCOUNTER — CLINICAL SUPPORT (OUTPATIENT)
Dept: SMOKING CESSATION | Facility: CLINIC | Age: 73
End: 2023-05-11
Payer: COMMERCIAL

## 2023-05-11 DIAGNOSIS — F17.200 NICOTINE DEPENDENCE: Primary | ICD-10-CM

## 2023-05-11 PROCEDURE — 99999 PR PBB SHADOW E&M-EST. PATIENT-LVL II: CPT | Mod: PBBFAC,,,

## 2023-05-11 PROCEDURE — 90853 PR GROUP PSYCHOTHERAPY: ICD-10-PCS | Mod: S$GLB,,,

## 2023-05-11 PROCEDURE — 90853 GROUP PSYCHOTHERAPY: CPT | Mod: S$GLB,,,

## 2023-05-11 PROCEDURE — 99999 PR PBB SHADOW E&M-EST. PATIENT-LVL II: ICD-10-PCS | Mod: PBBFAC,,,

## 2023-05-11 NOTE — PROGRESS NOTES
[place] [facility] [place]  Date:  5/11/2023  Clinical Status of Patient: Outpatient   Length of Service and Code: 60 minutes - 16248   Number in Attendance: 2  Group Activities/Focus of Group:  orientation, client introductions, completion of TCRS (Tobacco Cessation Rating Scale) learned addiction model, cues/triggers, personal reasons for quitting, medications, goals, quit date  Target symptoms:  withdrawal and medication side effects             The following were rated moderate (3) to severe (4) on TCRS:       Moderate 3: Crave and Desire      Severe 4:   None   Patient's Response to Intervention: Patient presented to clinic for follow-up visit, name and date of birth verified as two patient identifiers.  Patient reported she is still smoking 10 cpd, and she is still using 21 mg nicotine patch in conjunction with 2 mg nicotine lozenges without any negative side effects reported at this time. Patient also reported she is incorporating more lozenge usage daily in lieu of smoking.  Counselor congratulated patient on her progress thus far.  Counselor also discussed patient working on further rate reduction and working on further behavior modification strategies.  Follow-up visit scheduled in two weeks. Counselor will remain available should any further needs arise.    Progress Toward Goals and Other Mental Status Changes: Patient is progressing without any mental status changes noted.   Diagnosis: Z17.200  Plan: The patient will continue with group therapy sessions and medication regimen prescribed with management by physician or Cessation Clinic Provider. Patient will inform Smoking Clinic Cessation Counselor of symptoms as rated high on TCRS.  Return to Clinic: 2 weeks

## 2023-05-31 ENCOUNTER — EXTERNAL CHRONIC CARE MANAGEMENT (OUTPATIENT)
Dept: PRIMARY CARE CLINIC | Facility: CLINIC | Age: 73
End: 2023-05-31
Payer: MEDICARE

## 2023-05-31 PROCEDURE — 99490 PR CHRONIC CARE MGMT, 1ST 20 MIN: ICD-10-PCS | Mod: S$PBB,,, | Performed by: FAMILY MEDICINE

## 2023-05-31 PROCEDURE — 99490 CHRNC CARE MGMT STAFF 1ST 20: CPT | Mod: S$PBB,,, | Performed by: FAMILY MEDICINE

## 2023-05-31 PROCEDURE — 99439 CHRNC CARE MGMT STAF EA ADDL: CPT | Mod: PBBFAC,27,PN | Performed by: FAMILY MEDICINE

## 2023-05-31 PROCEDURE — 99439 CHRNC CARE MGMT STAF EA ADDL: CPT | Mod: S$PBB,,, | Performed by: FAMILY MEDICINE

## 2023-05-31 PROCEDURE — 99490 CHRNC CARE MGMT STAFF 1ST 20: CPT | Mod: PBBFAC,25,PN | Performed by: FAMILY MEDICINE

## 2023-05-31 PROCEDURE — 99439 PR CHRONIC CARE MGMT, EA ADDTL 20 MIN: ICD-10-PCS | Mod: S$PBB,,, | Performed by: FAMILY MEDICINE

## 2023-06-01 ENCOUNTER — CLINICAL SUPPORT (OUTPATIENT)
Dept: SMOKING CESSATION | Facility: CLINIC | Age: 73
End: 2023-06-01
Payer: COMMERCIAL

## 2023-06-01 DIAGNOSIS — I15.2 HYPERTENSION ASSOCIATED WITH TYPE 2 DIABETES MELLITUS: Primary | Chronic | ICD-10-CM

## 2023-06-01 DIAGNOSIS — F17.200 NICOTINE DEPENDENCE: Primary | ICD-10-CM

## 2023-06-01 DIAGNOSIS — E11.59 HYPERTENSION ASSOCIATED WITH TYPE 2 DIABETES MELLITUS: Primary | Chronic | ICD-10-CM

## 2023-06-01 PROCEDURE — 99403 PREV MED CNSL INDIV APPRX 45: CPT | Mod: S$GLB,,,

## 2023-06-01 PROCEDURE — 99403 PR PREVENT COUNSEL,INDIV,45 MIN: ICD-10-PCS | Mod: S$GLB,,,

## 2023-06-01 PROCEDURE — 99999 PR PBB SHADOW E&M-EST. PATIENT-LVL I: CPT | Mod: PBBFAC,,,

## 2023-06-01 PROCEDURE — 99999 PR PBB SHADOW E&M-EST. PATIENT-LVL I: ICD-10-PCS | Mod: PBBFAC,,,

## 2023-06-01 NOTE — TELEPHONE ENCOUNTER
No care due was identified.  [facility] Embedded Care Due Messages. Reference number: [id number].   6/01/2023 4:11:00 PM CDT

## 2023-06-01 NOTE — TELEPHONE ENCOUNTER
----- Message from Shani Juares sent at 6/1/2023  3:34 PM CDT -----  Regarding: Refill Request  Type: RX Refill Request      Who Called: Self       Refill or New Rx: refill       RX Name and Strength: glucose monitor   [facility] on [address] [place] [address]-  [phone]      Would the patient rather a call back or a response via [facility]? Call       Best Call Back Number: .[phone]

## 2023-06-01 NOTE — PROGRESS NOTES
Individual Follow-Up Form    2023    Quit Date: TBD    Clinical Status of Patient: Outpatient    Length of Service: 45 minutes    Continuing Medication: yes  Patches or Nicotine Lozenges    Other Medications: None      Target Symptoms: Withdrawal and medication side effects. The following were  rated moderate (3) to severe (4) on TCRS:  Moderate (3): Crave and Desire  Severe (4): None     Comments: Counselor spoke with patient telephonically for follow-up visit, name and date of birth verified as two patient identifiers.  Patient stated she missed her last clinic appointment due due to a  she had to attend.  Patient also reported she is still smoking about 10 cpd, and she is still using 21 mg nicotine patch in conjunction with 2 mg nicotine lozenges without any negative side effects reported at this time. Patient also reported she has a lot going on, but she is maintaining 10 cpd.  Counselor congratulated patient on her progress thus far.  Counselor also discussed patient selecting a quit date, continuing to work towards rate reduction, further behavior modification strategies.  Follow-up visit scheduled in two weeks. Counselor will remain available should any further needs arise.     Diagnosis: F17.200    Next Visit: 2 weeks

## 2023-06-02 RX ORDER — INSULIN PUMP SYRINGE, 3 ML
EACH MISCELLANEOUS
Qty: 1 EACH | Refills: 0 | Status: SHIPPED | OUTPATIENT
Start: 2023-06-02 | End: 2024-05-31

## 2023-06-09 ENCOUNTER — CLINICAL SUPPORT (OUTPATIENT)
Dept: SMOKING CESSATION | Facility: CLINIC | Age: 73
End: 2023-06-09
Payer: COMMERCIAL

## 2023-06-09 DIAGNOSIS — F17.200 NICOTINE DEPENDENCE: Primary | ICD-10-CM

## 2023-06-09 PROCEDURE — 99404 PREV MED CNSL INDIV APPRX 60: CPT | Mod: S$GLB,,,

## 2023-06-09 PROCEDURE — 99999 PR PBB SHADOW E&M-EST. PATIENT-LVL II: CPT | Mod: PBBFAC,,,

## 2023-06-09 PROCEDURE — 99999 PR PBB SHADOW E&M-EST. PATIENT-LVL II: ICD-10-PCS | Mod: PBBFAC,,,

## 2023-06-09 PROCEDURE — 99404 PR PREVENT COUNSEL,INDIV,60 MIN: ICD-10-PCS | Mod: S$GLB,,,

## 2023-06-09 NOTE — PROGRESS NOTES
Individual Follow-Up Form    6/9/2023    Quit Date: TBD    Clinical Status of Patient: Outpatient    Length of Service: 60 minutes    Continuing Medication: yes  Patches or Nicotine Lozenges    Other Medications: None      Target Symptoms: Withdrawal and medication side effects. The following were  rated moderate (3) to severe (4) on TCRS:  Moderate (3): Crave and Desire   Severe (4): None     Comments: Patient presented to clinic for follow-up visit, name and date of birth verified as two patient identifiers.  Patient also reported she is still smoking, and she is still using 21 mg nicotine patch in conjunction with 2 mg nicotine lozenges without any negative side effects reported at this time. Patient stated she is back on track.  Counselor congratulated patient on her progress thus far.  Counselor also discussed continuing to work towards rate reduction and working towards further behavior modification strategies.  Follow-up visit scheduled in two weeks. Counselor will remain available should any further needs arise.     Diagnosis: F17.200    Next Visit: 2 weeks

## 2023-06-22 ENCOUNTER — CLINICAL SUPPORT (OUTPATIENT)
Dept: SMOKING CESSATION | Facility: CLINIC | Age: 73
End: 2023-06-22
Payer: COMMERCIAL

## 2023-06-22 DIAGNOSIS — F17.200 NICOTINE DEPENDENCE: Primary | ICD-10-CM

## 2023-06-22 PROCEDURE — 99999 PR PBB SHADOW E&M-EST. PATIENT-LVL II: ICD-10-PCS | Mod: PBBFAC,,,

## 2023-06-22 PROCEDURE — 99999 PR PBB SHADOW E&M-EST. PATIENT-LVL II: CPT | Mod: PBBFAC,,,

## 2023-06-22 PROCEDURE — 99402 PR PREVENT COUNSEL,INDIV,30 MIN: ICD-10-PCS | Mod: S$GLB,,,

## 2023-06-22 PROCEDURE — 99402 PREV MED CNSL INDIV APPRX 30: CPT | Mod: S$GLB,,,

## 2023-06-22 NOTE — PROGRESS NOTES
Individual Follow-Up Form    6/22/2023    Quit Date: TBD    Clinical Status of Patient: Outpatient    Length of Service: 30 minutes    Continuing Medication: yes  Patches or Nicotine Lozenges    Other Medications: None      Target Symptoms: Withdrawal and medication side effects. The following were  rated moderate (3) to severe (4) on TCRS:  Moderate (3): Crave and Desire   Severe (4): None    Comments: Counselor spoke with patient telephonically for follow-up visit, name and date of birth verified as two patient identifiers.  Patient also reported she is still smoking about the same, and she is still using 21 mg nicotine patch in conjunction with 2 mg nicotine lozenges without any negative side effects reported at this time.  Counselor congratulated patient on her progress thus far.  Counselor also discussed goal setting, continuing to work towards rate reduction and working towards further behavior modification strategies.  Follow-up visit scheduled in two weeks. Counselor will remain available should any further needs arise.     Diagnosis: F17.200    Next Visit: 2 weeks

## 2023-06-26 ENCOUNTER — TELEPHONE (OUTPATIENT)
Dept: FAMILY MEDICINE | Facility: CLINIC | Age: 73
End: 2023-06-26

## 2023-06-26 NOTE — TELEPHONE ENCOUNTER
Rt pt call , says she just came from [facility] and was told a new script was needed for her dm shoes .      Contacted [facility] ,  said py came without an apt with  old script dm show script signed by podiatry  , needs new script signed by Dr. Rossi and a1c labs sent over. Also needs to schedule a apt with them . Explained Dr. Rossi is out on vacation , asked if another provider could sign off . Said no due to insurance pcp has to .     Called pt back and explained

## 2023-06-26 NOTE — TELEPHONE ENCOUNTER
----- Message from Marilee Williams sent at 6/26/2023 10:13 AM CDT -----  .Type: Patient Call Back    Who called:self     What is the request in detail:patient states she needs Dr to sign off on order for diabetic shoes     Can the clinic reply by [facility]? call    Would the patient rather a call back or a response via [facility]? call    Best call back [phone]     Additional Information:[phone]

## 2023-06-27 ENCOUNTER — TELEPHONE (OUTPATIENT)
Dept: FAMILY MEDICINE | Facility: CLINIC | Age: 73
End: 2023-06-27
Payer: MEDICARE

## 2023-06-27 DIAGNOSIS — Z78.0 POSTMENOPAUSAL: Primary | ICD-10-CM

## 2023-06-30 ENCOUNTER — TELEPHONE (OUTPATIENT)
Dept: PAIN MEDICINE | Facility: CLINIC | Age: 73
End: 2023-06-30
Payer: MEDICARE

## 2023-06-30 ENCOUNTER — EXTERNAL CHRONIC CARE MANAGEMENT (OUTPATIENT)
Dept: PRIMARY CARE CLINIC | Facility: CLINIC | Age: 73
End: 2023-06-30
Payer: MEDICARE

## 2023-06-30 PROCEDURE — 99487 CPLX CHRNC CARE 1ST 60 MIN: CPT | Mod: S$PBB,,, | Performed by: FAMILY MEDICINE

## 2023-06-30 PROCEDURE — 99489 PR COMPLX CHRON CARE MGMT, EA ADDTL 30 MIN, PER MONTH: ICD-10-PCS | Mod: S$PBB,,, | Performed by: FAMILY MEDICINE

## 2023-06-30 PROCEDURE — 99489 CPLX CHRNC CARE EA ADDL 30: CPT | Mod: PBBFAC,27,PN | Performed by: FAMILY MEDICINE

## 2023-06-30 PROCEDURE — 99489 CPLX CHRNC CARE EA ADDL 30: CPT | Mod: S$PBB,,, | Performed by: FAMILY MEDICINE

## 2023-06-30 PROCEDURE — 99487 CPLX CHRNC CARE 1ST 60 MIN: CPT | Mod: PBBFAC,25,PN | Performed by: FAMILY MEDICINE

## 2023-06-30 PROCEDURE — 99487 PR COMPLX CHRON CARE MGMT, 1ST HR, PER MONTH: ICD-10-PCS | Mod: S$PBB,,, | Performed by: FAMILY MEDICINE

## 2023-06-30 NOTE — TELEPHONE ENCOUNTER
Pt scheduled for a f/u on 7/11/23 at 8:15am.  She requested a letter be mailed to her.  Will send out on Monday once I return to clinic.

## 2023-06-30 NOTE — TELEPHONE ENCOUNTER
----- Message from Bentley Mike LPN sent at 6/30/2023  1:34 PM CDT -----  Regarding: FW: Schedule Appt  Contact: Stephanie Mosley, reach out to patient to schedule an appointment.  ----- Message -----  From: Keeley Patrick  Sent: 6/30/2023  12:05 PM CDT  To: Ady Tam [facility] Staff  Subject: Schedule Appt                                    Pt calling to schedule annual appointment. Would like the [facility] location. Please advise. Requesting a call back.      [phone] (home)

## 2023-07-05 DIAGNOSIS — E11.9 TYPE 2 DIABETES MELLITUS WITHOUT COMPLICATION: ICD-10-CM

## 2023-07-10 NOTE — PROGRESS NOTES
"  HPI    DLS: 3/13/2023    Pt here for HVF review/OCT;  Pt states no eye pain or discomfort.     Meds;  AT's PRN OU    1  Preperimetric POAG vs OHT   2. PVD OD   3. PCIOL OU   4. Blepharitis / MGD   5. RAGHU Allergy   6. PCO OU   7. S/P ALT ou (many decades ago)   8. S/P SLT OD 2008   9. S/P istents ou      Last edited by Angelic Saravia on 7/13/2023  1:26 PM.            Assessment /Plan     For exam results, see Encounter Report.    Primary open-angle glaucoma, bilateral, mild stage    Epiretinal membrane (ERM) of left eye    Pseudophakia, both eyes    Status post glaucoma surgery            1. Pre-perimetric mild POAG   Vs OHT  -Followed at [facility] since 1991   -First HVF 1999   -First photos 1991   - Intolerant to all gtts - they aggravate his blepharitis-? RAGHU allergy   -IOP "OK" off gtts and s/p ALT ou and SLT od - 2008    Family history neg   Glaucoma meds none (( off gtts post SLT ou -))   H/O adverse rxn to glaucoma drops Intolerant to all gtts - 2/2 aggravates blepharitis- ? RAGHU allergy   LASERS ALT ou -? Date / SLT OD 7/17/08 - good response   GLAUCOMA SURGERIES - I stent x 2 - OD  - 10/21/2020 // I stent 12/9/2020 - OS   OTHER EYE SURGERIES - phaco/IOL od - 10/21/2020 - PCB00 14.0  // oS 12/9/2020 - pcb00 15.0   CDR 0.6/0.3   Tbase 19-26 / 16-21   Tmax 26/21   Ttarget ?   HVF 15 test - 1999 to 2021 - Full ou   Gonio +3 ou   /588   OCT 6 test 2005 to 2021 -  RNFL - OD: bord TI (?prog)  // OS:NL  HRT 9 test 2004 to 2020 -MR -  MR -  Dec T, border NI od // full os /// CDR 0.619 od // 0.508 os - but unreliable OD  Disc photos 1991, 1996, 2003 - slides // 2012 , 2016, 2020   - OIS     - Ttoday   17/16  - Test done today   IOP // HVF // OCT / DFE       2. Posterior Vitreous Detachment OD - 11/2008   - RD PRECAUTIONS    3. Eyelid inflammation / Blepharitis / MGD    4. Allergic Conjunctivitis - RAGHU allergy     5. PCO    Not vis sign yet     6. PC IOL OU (w/ istents)    OD 10/21/202 - PCB00 14.0   OS 12/9/2020 - " PCB00 15.0     Plan   POAG - mild -pre-perimetric glaucoma - intolerant to all gtts   IOP ok s/p ALT ou - years ago and s/p SLT OD 2008 ( no SLT os)  Continue to monitor HVF/DFE/OCT/HRT/photos/IOP   If increase IOP or progression on VF testing consider repeat SLT OD and primary SLT os      Pt was a myope - sph eq is -5.25 od and -4.25 os - pre- phac0 -- set for distance post op    OCT macula - NO CME     Rx for bifocals  Given - or ok to use no distance correction and OTC readers - re-printed 3/28/2022  Pt mostly uses the [employer] reading glasses     Photo file updated 7/13/2023    If IOP too high off gtts and post I-stent - can try a SLT - OS and a repeat SLT od - can skip the istent areas // (( H/O ALT's many years ago)    - or can re-try some gtts (H/O intol to all galucoma gtts in past - ? RAGHU intol) -  ?? If could try travatan Z again in future again - non RAGHU // or ?? Try alphagan P     F/U 4 months with IOP and gonio - consider repeat slt as ? VFprog os

## 2023-07-11 ENCOUNTER — OFFICE VISIT (OUTPATIENT)
Dept: PAIN MEDICINE | Facility: CLINIC | Age: 73
End: 2023-07-11
Payer: MEDICARE

## 2023-07-11 VITALS
SYSTOLIC BLOOD PRESSURE: 145 MMHG | OXYGEN SATURATION: 100 % | RESPIRATION RATE: 20 BRPM | HEART RATE: 53 BPM | DIASTOLIC BLOOD PRESSURE: 67 MMHG

## 2023-07-11 DIAGNOSIS — M54.16 LUMBAR RADICULOPATHY: ICD-10-CM

## 2023-07-11 DIAGNOSIS — Z98.1 S/P LUMBAR FUSION: ICD-10-CM

## 2023-07-11 DIAGNOSIS — M47.816 LUMBAR SPONDYLOSIS: Primary | ICD-10-CM

## 2023-07-11 DIAGNOSIS — M51.36 DDD (DEGENERATIVE DISC DISEASE), LUMBAR: ICD-10-CM

## 2023-07-11 PROCEDURE — 99214 OFFICE O/P EST MOD 30 MIN: CPT | Mod: PBBFAC,PN | Performed by: PAIN MEDICINE

## 2023-07-11 PROCEDURE — 99999 PR PBB SHADOW E&M-EST. PATIENT-LVL IV: ICD-10-PCS | Mod: PBBFAC,,, | Performed by: PAIN MEDICINE

## 2023-07-11 PROCEDURE — 99214 OFFICE O/P EST MOD 30 MIN: CPT | Mod: S$PBB,,, | Performed by: PAIN MEDICINE

## 2023-07-11 PROCEDURE — 99214 PR OFFICE/OUTPT VISIT, EST, LEVL IV, 30-39 MIN: ICD-10-PCS | Mod: S$PBB,,, | Performed by: PAIN MEDICINE

## 2023-07-11 PROCEDURE — 99999 PR PBB SHADOW E&M-EST. PATIENT-LVL IV: CPT | Mod: PBBFAC,,, | Performed by: PAIN MEDICINE

## 2023-07-11 NOTE — PROGRESS NOTES
Subjective:     Patient ID: Stephanie Sears is a 73 y.o. female    Chief Complaint: Low-back Pain (Left sided stabbing pain going down leg)      Referred by: No ref. provider found      HPI:    Interval History (7/11/23):  She returns today for follow up.  Juliana reports that her left sided low back and lower extremity pain has returned.  Denies any changes in the quality or location of his pain.  Denies any new or worsening symptoms.  States that she was in physical therapy earlier this year and was finding this very helpful.  She would like to see if additional physical therapy can be done.      Interval History (5/18/22):  She returns today for follow up.  She reports that physical therapy has been helpful for the low back pain.  She states the low back is currently doing well and she is happy with his progress.  Today, she mainly complains of left lateral hip pain consistent with trochanteric bursitis.  Has had injections for this in the past that have helped.  She would like repeat injection today.      Interval History (3/23/22):  She returns today for follow up.  She reports that she has completed physical therapy for her neck pain.  She states this issue was doing well.  Today she wishes discuss her low back pain.  This pain is located the midline lower lumbar/lumbosacral region.  The pain will radiate to the bilateral lumbar paraspinal regions and bilateral posterior thighs.  The pain does not cross the knees.  The pain is worse with standing/walking.  She denies any associated numbness, tingling, weakness, bowel bladder dysfunction.        Interval History (1/6/22):  She returns today for follow up.  She reports that bilateral ischial bursa steroid injections has been helpful for the bilateral buttock/hip pain.  She reports 60% relief following this procedure.  Today she wishes to discuss her neck pain.  She has had neck pain intermittently for years.  The pain is located the midline mid to lower  cervical region.  The pain does not radiate.  She denies any associated numbness, tingling, weakness, bowel bladder dysfunction.  She would like to attend additional physical therapy for the management of her neck pain.      Interval History (12/14/21):  She returns today for follow up.  She reports that ischial bursa pain has returned.  She reports near complete relief following bilateral ischial bursa steroid injection done in February 2021. She got at least 9 months of good relief from this procedure.  She states the pain has since returned.  She denies any changes in the quality location the pain.  She denies any new or worsening symptoms.        Interval History NP (3/16/21):    Pt returns today for follow up. She states that the bilateral ischial bursa injections have relieved nearly all of the bilateral buttock pain.  She is very happy with the results at this time.     Interval History (2/19/21):  She returns today for follow up.  She reports that she continues to have bilateral buttock pain particular when sitting.  She denies any changes in the quality location was pain.  She denies any new worsening symptoms.  She has been attending physical therapy and finds that it has been helpful.  She states that her pain is still quite severe especially given recent cold weather.        Interval History (12/21/20):  She returns today for follow up and imaging review.  She reports that she continues to have pain mainly surrounding the bilateral hip girdles.  She states that she has focal pain in the bilateral gluteal regions when sitting upright.  She also has pain in the bilateral lateral hip regions.  She has undergone multiple greater trochanteric bursa steroid injections in the past.  These were initially effective, but gradually provided less relief with subsequent injections.  She continues to deny any significant low back pain.        Initial Encounter (12/7/20):  Stephanie Sears is a 73 y.o. female who  presents today with chronic bilateral low back pain.  This pain has been present for years.  Patient is status post L4-5 fusion in 2006.  She states that she did have some pain radiating into her lower extremities in the past, but her current pain is isolated to the bilateral lower lumbar/lumbosacral regions.  She denies any associated numbness, tingling, weakness, bowel bladder dysfunction.  The pain is constant worse with prolonged sitting and standing..   This pain is described in detail below.    Physical Therapy:  Yes.    Non-pharmacologic Treatment:  Rest helps         TENS?  No    Pain Medications:         Currently taking:  gabapentin    Has tried in the past:  Tramadol, NSAIDs, Tylenol, meloxicam    Has not tried:  TCAs, SNRIs, topical creams    Blood thinners:  None    Interventional Therapies:    2/26/21 - bilateral ischial bursa injections - 90% relief for at least 9 months  12/23/21 - bilateral ischial bursa steroid injections - 60% relief    Relevant Surgeries:   L4-5 fusion in 2006    Affecting sleep?  Yes    Affecting daily activities? yes    Depressive symptoms? no          SI/HI? No    Work status: Retired    Pain Scores:    Best:       4/10  Worst:     9/10  Usually:   6/10  Today:    7/10    Review of Systems   Constitutional:  Negative for activity change, appetite change, chills, fatigue, fever and unexpected weight change.   HENT:  Negative for hearing loss.    Eyes:  Negative for visual disturbance.   Respiratory:  Negative for chest tightness and shortness of breath.    Cardiovascular:  Negative for chest pain.   Gastrointestinal:  Negative for abdominal pain, constipation, diarrhea, nausea and vomiting.   Genitourinary:  Negative for difficulty urinating.   Musculoskeletal:  Positive for arthralgias, back pain, gait problem, myalgias, neck pain and neck stiffness.   Skin:  Negative for rash.   Neurological:  Negative for dizziness, weakness, light-headedness, numbness and headaches.    Psychiatric/Behavioral:  Positive for sleep disturbance. Negative for hallucinations and suicidal ideas. The patient is not nervous/anxious.      Past Medical History:   Diagnosis Date    Acute pancreatitis     Angina pectoris     Anxiety     Arthritis     Asthma     as a child    Back pain     Bronchitis     Cervical spondylosis with radiculopathy 07/10/2012    Chest pain     Chronic neck pain 07/26/2012    Colon polyps     Controlled type 2 diabetes mellitus with stage 2 chronic kidney disease, with long-term current use of insulin 01/24/2020    COPD (chronic obstructive pulmonary disease)     Coronary artery disease     Depression     Fibrocystic breast     General anesthetics causing adverse effect in therapeutic use     trouble coming out of anes/ had the shakes    GERD (gastroesophageal reflux disease)     Glaucoma     Heart failure     Hyperlipidemia     Hypertension     Neck pain 07/10/2012    Obesity     JAM (obstructive sleep apnea)     Pneumonia     Pneumonia due to other staphylococcus     Primary osteoarthritis of both knees     Psoriasis     Subacromial or subdeltoid bursitis 07/10/2012    Thyroid disease     Tobacco dependence     Trouble in sleeping     Type 2 diabetes mellitus 12/10/2013    Type 2 diabetes mellitus with diabetic chronic kidney disease        Past Surgical History:   Procedure Laterality Date    APPENDECTOMY      BREAST BIOPSY Bilateral 1988    BREAST MASS EXCISION Right     benign    CATARACT EXTRACTION W/  INTRAOCULAR LENS IMPLANT Left 12/09/2020    PHACO IOL WITH I-STENT ()    CATARACT EXTRACTION W/  INTRAOCULAR LENS IMPLANT Right 10/21/2020    PHACO IOL WITH I-STENT x 2 ()    CHOLECYSTECTOMY      COLONOSCOPY N/A 05/22/2019    Procedure: COLONOSCOPY;  Surgeon: Darrin Calvin MD;  Location: [facility] ([address] [place] [address]);  Service: Endoscopy;  Laterality: N/A;  2nd floor - all other procedure done on 2, multiple comorbidities - ERW/   change in MD schedule, Pt  notified and verbalized understanding, new arrival time 0800, Ins mailed to Pt with new arrival time - ERW1/8/19@1130    COLONOSCOPY N/A 9/26/2022    Procedure: COLONOSCOPY;  Surgeon: Darrin Calvin MD;  Location: [facility] ([address] [place] [address]);  Service: Endoscopy;  Laterality: N/A;  2nd floor d/t previous anesthesia complications  vaccinated  inst mailed  clear liquids 4 hr prior-AM prep-LW  I should be able to complete this with the slim pediatric colonoscope, but should have the single-balloon available in case needed.    EPIDURAL STEROID INJECTION Bilateral 02/26/2021    Procedure: Injection, Steroid, Ischial Bursa;  Surgeon: Yonatan Garsia Jr., MD;  Location: [facility] ENDO;  Service: Pain Management;  Laterality: Bilateral;  Bilateral Ischial Bursa Steroid Injections  Arrive @ 1230; No ATC; Check BG    ESOPHAGOGASTRODUODENOSCOPY      HYSTERECTOMY  1980's    IMPLANTATION OF DEVICE FOR GLAUCOMA Right 10/21/2020    Procedure: INSERTION, DEVICE, FOR GLAUCOMA/ i Stent;  Surgeon: Melany Tse MD;  Location: [address];  Service: Ophthalmology;  Laterality: Right;    IMPLANTATION OF DEVICE FOR GLAUCOMA Left 12/09/2020    Procedure: INSERTION, DEVICE, FOR GLAUCOMA/I SENT;  Surgeon: Melany Tse MD;  Location: [address];  Service: Ophthalmology;  Laterality: Left;    INJECTION OF JOINT Bilateral 12/23/2021    Procedure: Injection, Joint---BILATERAL ISCHIAL BURSA STEROID INJECTION;  Surgeon: Yonatan Garsia Jr., MD;  Location: [facility];  Service: Pain Management;  Laterality: Bilateral;    INTRAOCULAR PROSTHESES INSERTION Right 10/21/2020    Procedure: INSERTION, IOL PROSTHESIS;  Surgeon: Melany Tse MD;  Location: [address];  Service: Ophthalmology;  Laterality: Right;    INTRAOCULAR PROSTHESES INSERTION Left 12/09/2020    Procedure: INSERTION, IOL PROSTHESIS;  Surgeon: Melany Tse MD;  Location: [address];  Service: Ophthalmology;  Laterality: Left;    KNEE  "SURGERY Left 01/20/2016    TKR    KNEE SURGERY Right 02/26/2018    TKR    L4-L5 fusion  2006    PHACOEMULSIFICATION OF CATARACT Right 10/21/2020    Procedure: PHACOEMULSIFICATION, CATARACT;  Surgeon: Melany Tse MD;  Location: [facility] OR [address] [place] [address];  Service: Ophthalmology;  Laterality: Right;    PHACOEMULSIFICATION OF CATARACT Left 12/09/2020    Procedure: PHACOEMULSIFICATION, CATARACT;  Surgeon: Melany Tse MD;  Location: [address];  Service: Ophthalmology;  Laterality: Left;       Social History     Socioeconomic History    Marital status: Single   Tobacco Use    Smoking status: Every Day     Packs/day: 0.25     Years: 40.00     Pack years: 10.00     Types: Cigarettes    Smokeless tobacco: Current   Substance and Sexual Activity    Alcohol use: No     Alcohol/week: 0.0 standard drinks    Drug use: No    Sexual activity: Yes     Partners: Male       Review of patient's allergies indicates:   Allergen Reactions    Hydrocodone Shortness Of Breath    Iodinated contrast media Shortness Of Breath     Difficulty breathing    Adhesive Itching     Skin peeling    Latex, natural rubber Other (See Comments)     "Takes skin off"    Morphine Hallucinations    Oxycodone Hallucinations    Sulfa (sulfonamide antibiotics) Hives and Itching       Current Outpatient Medications on File Prior to Visit   Medication Sig Dispense Refill    albuterol (PROVENTIL/VENTOLIN HFA) 90 mcg/actuation inhaler Inhale 2 puffs into the lungs every 4 (four) hours as needed for Wheezing. Rescue      amLODIPine-benazepriL (LOTREL) 10-40 mg per capsule Take 1 capsule by mouth once daily. 90 capsule 3    atorvastatin (LIPITOR) 40 MG tablet Take 1 tablet (40 mg total) by mouth once daily. 90 tablet 3    blood-glucose meter kit Check blood glucose QD with insurance preferred glucometer 1 each 0    carvediloL (COREG) 12.5 MG tablet Take 1 tablet (12.5 mg total) by mouth 2 (two) times daily with meals. 180 tablet 3    chlorthalidone " (HYGROTEN) 25 MG Tab Take 1 tablet (25 mg total) by mouth every other day. 15 tablet 11    gabapentin (NEURONTIN) 600 MG tablet Take 1 tablet (600 mg total) by mouth 2 (two) times daily. 180 tablet 3    INULIN (FIBER GUMMIES ORAL) Take 1 each by mouth every morning.       lancets (ACCU-CHEK MULTICLIX LANCET) Misc Test blood sugar twice daily 300 each 3    mupirocin (BACTROBAN) 2 % ointment Apply topically 3 (three) times daily. 22 g 0    nicotine, polacrilex, 2 mg lzmn Take 1 each (2 mg total) by mouth as needed (Take 1 piece as needed. Maximum dose of 10 per day.). 243 each 0    simethicone (GAS-X ORAL) Take 1 tablet by mouth as needed.       tiotropium bromide (SPIRIVA RESPIMAT) 2.5 mcg/actuation inhaler Inhale 2 puffs into the lungs once daily. Controller 4 g 4    [DISCONTINUED] meloxicam (MOBIC) 7.5 MG tablet TAKE 1 TABLET DAILY 90 tablet 3     No current facility-administered medications on file prior to visit.       Objective:      BP (!) 145/67 (BP Location: Left arm, Patient Position: Sitting, BP Method: Medium (Automatic))   Pulse (!) 53   Resp 20   SpO2 100%     Exam:  GEN:  Well developed, well nourished.  No acute distress.   HEENT:  No trauma.  Mucous membranes moist.  Nares patent bilaterally.  PSYCH: Normal affect. Thought content appropriate.  CHEST:  Breathing symmetric.  No audible wheezing.  ABD: Soft, non-distended.  SKIN:  Warm, pink, dry.  No rash on exposed areas.    EXT:  No cyanosis, clubbing, or edema.  No color change or changes in nail or hair growth.  NEURO/MUSCULOSKELETAL:  Fully alert, oriented, and appropriate. Speech normal janneth. No cranial nerve deficits.   Gait:  Antalgic.  No focal motor deficits.         Imaging:      Narrative & Impression    EXAMINATION:  MRI LUMBAR SPINE WITHOUT CONTRAST     CLINICAL HISTORY:  s/p lumbar fusion; Other intervertebral disc degeneration, lumbar region     TECHNIQUE:  Multiplanar, multisequence MR images of the lumbar spine were performed  without the administration of contrast.     COMPARISON:  Lumbar spine radiograph 01/23/2019; CT abdomen contrast 11/16/2012; MRI lumbar spine 10/26/2007     FINDINGS:  Alignment: Grade 1 anterolisthesis of L3 on L4.  Grade 1 retrolisthesis of L5 on S1.     Vertebrae: Postoperative changes from previous L4-5 posterior spinal fusion with bilateral transpedicular screws and posterior stabilizing bars and interbody spacer.  Normal marrow signal. No fracture.     Discs: Mild multilevel intervertebral disc height loss and desiccation.     Cord: Normal.  Conus terminates normally at L2.  Cauda equina appears normal.     Degenerative findings:     T10-T11: Diffuse disc bulge and bilateral facet arthropathy resulting in mild central canal stenosis, moderate left, and mild right neural foraminal narrowing.     T11-T12: Diffuse disc bulge and bilateral facet arthropathy resulting in moderate bilateral neural foraminal narrowing and mild central canal stenosis.     T12-L1: No significant disc abnormality.  Mild bilateral facet arthropathy without significant neural foraminal narrowing or central canal stenosis.     L1-L2: No significant disc abnormality.  Mild bilateral facet arthropathy without significant neural foraminal narrowing or central canal stenosis.     L2-L3: No significant disc abnormality.  Mild bilateral facet arthropathy without significant neural foraminal narrowing or central canal stenosis.     L3-L4: Diffuse disc bulge, moderate bilateral facet joint osseous hypertrophy, and ligamentum flavum buckling contribute to moderate central canal stenosis, moderate right, and mild left neural foraminal narrowing.     L4-L5: Postoperative change of previous decompression and posterior spinal fusion.  Productive changes contribute to mild left neural foraminal narrowing.     L5-S1: Diffuse disc bulge, bilateral facet arthropathy, and ligamentum flavum buckling resulting in severe left and moderate right neural  foraminal narrowing and mild central canal stenosis.     Paraspinal muscles & soft tissues: Mild fatty atrophy of the lower paraspinous musculature.  No paraspinal fluid collections.  Upper sacrum and sacroiliac joints are unremarkable.     Impression:     1. Postsurgical changes of L4-L5 fusion.  2. Multilevel degenerative changes as detailed above.  Moderate spinal canal stenosis noted at L3-L4.  Moderate neural foraminal narrowing noted at L3-L4 and L5-S1, as well as within the lower thoracic spine.     Electronically signed by resident: Naveen Resendez  Date:                                            12/17/2020  Time:                                           09:53     Electronically signed by: Mitch Pappas MD  Date:                                            12/17/2020  Time:                                           14:49           Narrative & Impression    EXAMINATION:  XR LUMBAR SPINE 5 VIEW WITH FLEX AND EXT     CLINICAL HISTORY:  Low back pain, cauda equina syndrome suspected;  Lumbago with sciatica, left side     TECHNIQUE:  Five views of the lumbar spine plus flexion extension views were performed.     COMPARISON:  02/25/2014     FINDINGS:  The lumbar vertebra are intact.  No compression fracture or obvious paraspinal lesion is detected.  Degenerative changes are present with small osteophytes at most levels.  There is progressive disc space narrowing at L3-4; this level also shows development of a grade 1 anterolisthesis, stable between flexion and extension.  The other lumbar disc spaces show no significant narrowing, but lower thoracic disc spaces are narrowed, some with vacuum disc.     Postoperative findings from posterior instrumented fusion are again seen at L4 and 5 with bilateral pedicle screws, vertical connecting rods, and a device in the disc space.     Visualized abdomen shows aortic atherosclerosis.     IMPRESSION:      Stable postoperative findings of L4-5 fusion     Degenerative spine  changes with interval worsening at L3-4.        Electronically signed by: Valentín Booker MD  Date:                                            01/23/2019  Time:                                           10:16         Assessment:       Encounter Diagnoses   Name Primary?    Lumbar spondylosis Yes    S/P lumbar fusion     DDD (degenerative disc disease), lumbar     Lumbar radiculopathy            Plan:       Stephanie was seen today for low-back pain.    Diagnoses and all orders for this visit:    Lumbar spondylosis    S/P lumbar fusion    DDD (degenerative disc disease), lumbar    Lumbar radiculopathy  -     Ambulatory referral/consult to Physical/Occupational Therapy; Future          Stephanie Sears is a 73 y.o. female with recurrent left-sided low back and lower extremity pain.  Consistent with left lower lumbar radicular pain.  History of L4-5 fusion.  Adjacent segment degenerative disc disease at the L3-4 and L5-S1 levels.  Current symptoms most likely related to L5-S1 disc herniation with compression of either the L5 and/or S1 nerve roots.    Refer to PT for ROM, strengthening, stretching and HEP.  External order to epic physical therapy placed today.  Return to clinic in 8 weeks or sooner if needed.  That time we will discuss efficacy of physical therapy/home exercise program.  We will likely need to get updated MRI if pain persists or worsens.

## 2023-07-13 ENCOUNTER — CLINICAL SUPPORT (OUTPATIENT)
Dept: OPHTHALMOLOGY | Facility: CLINIC | Age: 73
End: 2023-07-13
Payer: MEDICARE

## 2023-07-13 ENCOUNTER — OFFICE VISIT (OUTPATIENT)
Dept: OPHTHALMOLOGY | Facility: CLINIC | Age: 73
End: 2023-07-13
Payer: MEDICARE

## 2023-07-13 DIAGNOSIS — H40.1131 PRIMARY OPEN-ANGLE GLAUCOMA, BILATERAL, MILD STAGE: Primary | ICD-10-CM

## 2023-07-13 DIAGNOSIS — Z96.1 PSEUDOPHAKIA, BOTH EYES: ICD-10-CM

## 2023-07-13 DIAGNOSIS — Z98.83 STATUS POST GLAUCOMA SURGERY: ICD-10-CM

## 2023-07-13 DIAGNOSIS — H35.372 EPIRETINAL MEMBRANE (ERM) OF LEFT EYE: ICD-10-CM

## 2023-07-13 PROCEDURE — 92083 HUMPHREY VISUAL FIELD - OU - BOTH EYES: ICD-10-PCS | Mod: 26,S$PBB,, | Performed by: OPHTHALMOLOGY

## 2023-07-13 PROCEDURE — 99999 PR PBB SHADOW E&M-EST. PATIENT-LVL III: CPT | Mod: PBBFAC,,, | Performed by: OPHTHALMOLOGY

## 2023-07-13 PROCEDURE — 99213 OFFICE O/P EST LOW 20 MIN: CPT | Mod: PBBFAC | Performed by: OPHTHALMOLOGY

## 2023-07-13 PROCEDURE — 92014 COMPRE OPH EXAM EST PT 1/>: CPT | Mod: S$PBB,,, | Performed by: OPHTHALMOLOGY

## 2023-07-13 PROCEDURE — 92133 CPTRZD OPH DX IMG PST SGM ON: CPT | Mod: PBBFAC | Performed by: OPHTHALMOLOGY

## 2023-07-13 PROCEDURE — 92014 PR EYE EXAM, EST PATIENT,COMPREHESV: ICD-10-PCS | Mod: S$PBB,,, | Performed by: OPHTHALMOLOGY

## 2023-07-13 PROCEDURE — 92083 EXTENDED VISUAL FIELD XM: CPT | Mod: PBBFAC | Performed by: OPHTHALMOLOGY

## 2023-07-13 PROCEDURE — 99999 PR PBB SHADOW E&M-EST. PATIENT-LVL III: ICD-10-PCS | Mod: PBBFAC,,, | Performed by: OPHTHALMOLOGY

## 2023-07-13 PROCEDURE — 92133 POSTERIOR SEGMENT OCT OPTIC NERVE(OCULAR COHERENCE TOMOGRAPHY) - OU - BOTH EYES: ICD-10-PCS | Mod: 26,S$PBB,, | Performed by: OPHTHALMOLOGY

## 2023-07-18 ENCOUNTER — CLINICAL SUPPORT (OUTPATIENT)
Dept: SMOKING CESSATION | Facility: CLINIC | Age: 73
End: 2023-07-18
Payer: COMMERCIAL

## 2023-07-18 DIAGNOSIS — F17.200 NICOTINE DEPENDENCE: Primary | ICD-10-CM

## 2023-07-18 PROCEDURE — 99999 PR PBB SHADOW E&M-EST. PATIENT-LVL I: ICD-10-PCS | Mod: PBBFAC,,,

## 2023-07-18 PROCEDURE — 99403 PR PREVENT COUNSEL,INDIV,45 MIN: ICD-10-PCS | Mod: S$GLB,,,

## 2023-07-18 PROCEDURE — 99403 PREV MED CNSL INDIV APPRX 45: CPT | Mod: S$GLB,,,

## 2023-07-18 PROCEDURE — 99999 PR PBB SHADOW E&M-EST. PATIENT-LVL I: CPT | Mod: PBBFAC,,,

## 2023-07-18 NOTE — PROGRESS NOTES
Individual Follow-Up Form    7/18/2023    Quit Date: TBD    Clinical Status of Patient: Outpatient    Length of Service: 45 minutes    Continuing Medication: yes  Patches or Nicotine Lozenges    Other Medications: None      Target Symptoms: Withdrawal and medication side effects. The following were  rated moderate (3) to severe (4) on TCRS:  Moderate (3): Crave and Desire   Severe (4): None     Comments: Counselor spoke with patient telephonically for follow-up visit, name and date of birth verified as two patient identifiers.  Patient also reported she is still smoking 10 CPD, and she is still using 21 mg nicotine patch in conjunction with 2 mg nicotine lozenges without any negative side effects reported at this time.  Counselor congratulated patient on her progress thus far.  Counselor also discussed continuing to work towards rate reduction and patient continuing to work working towards further behavior modification strategies.  Patient will attempt a rate reduction of 9 CPD or less.  Follow-up visit scheduled in two weeks. Counselor will remain available should any further needs arise.     Diagnosis: F17.200    Next Visit: 2 weeks

## 2023-07-28 ENCOUNTER — TELEPHONE (OUTPATIENT)
Dept: FAMILY MEDICINE | Facility: CLINIC | Age: 73
End: 2023-07-28
Payer: MEDICARE

## 2023-07-28 NOTE — TELEPHONE ENCOUNTER
Patient notified, request for order for CT sent to Dr. Rossi. Clinical notes faxed to [facility] for diabetic shoes. Patient also notified $65 charge for [employer] to come out a check rollater said that she will just bring it there herself.

## 2023-07-31 ENCOUNTER — EXTERNAL CHRONIC CARE MANAGEMENT (OUTPATIENT)
Dept: PRIMARY CARE CLINIC | Facility: CLINIC | Age: 73
End: 2023-07-31
Payer: MEDICARE

## 2023-07-31 PROCEDURE — 99490 CHRNC CARE MGMT STAFF 1ST 20: CPT | Mod: PBBFAC,PN | Performed by: FAMILY MEDICINE

## 2023-07-31 PROCEDURE — 99490 CHRNC CARE MGMT STAFF 1ST 20: CPT | Mod: S$PBB,,, | Performed by: FAMILY MEDICINE

## 2023-07-31 PROCEDURE — 99490 PR CHRONIC CARE MGMT, 1ST 20 MIN: ICD-10-PCS | Mod: S$PBB,,, | Performed by: FAMILY MEDICINE

## 2023-07-31 PROCEDURE — 99439 CHRNC CARE MGMT STAF EA ADDL: CPT | Mod: PBBFAC,PN | Performed by: FAMILY MEDICINE

## 2023-07-31 PROCEDURE — 99439 CHRNC CARE MGMT STAF EA ADDL: CPT | Mod: S$PBB,,, | Performed by: FAMILY MEDICINE

## 2023-07-31 PROCEDURE — 99439 PR CHRONIC CARE MGMT, EA ADDTL 20 MIN: ICD-10-PCS | Mod: S$PBB,,, | Performed by: FAMILY MEDICINE

## 2023-08-01 ENCOUNTER — TELEPHONE (OUTPATIENT)
Dept: FAMILY MEDICINE | Facility: CLINIC | Age: 73
End: 2023-08-01
Payer: MEDICARE

## 2023-08-01 NOTE — TELEPHONE ENCOUNTER
Called pt back , informed Dr. Rossi would like her to attend and keep 8/17 apt as scheduled . Advised to bring orders/letter . She said [facility] kept the orders , said she'll request it from them

## 2023-08-02 ENCOUNTER — HOSPITAL ENCOUNTER (OUTPATIENT)
Dept: RADIOLOGY | Facility: CLINIC | Age: 73
Discharge: HOME | End: 2023-08-02
Attending: STUDENT IN AN ORGANIZED HEALTH CARE EDUCATION/TRAINING PROGRAM
Payer: MEDICARE

## 2023-08-02 DIAGNOSIS — Z78.0 POSTMENOPAUSAL: ICD-10-CM

## 2023-08-02 PROCEDURE — 77080 DXA BONE DENSITY AXIAL SKELETON 1 OR MORE SITES: ICD-10-PCS | Mod: 26,,, | Performed by: INTERNAL MEDICINE

## 2023-08-02 PROCEDURE — 77080 DXA BONE DENSITY AXIAL: CPT | Mod: TC

## 2023-08-02 PROCEDURE — 77080 DXA BONE DENSITY AXIAL: CPT | Mod: 26,,, | Performed by: INTERNAL MEDICINE

## 2023-08-04 ENCOUNTER — CLINICAL SUPPORT (OUTPATIENT)
Dept: SMOKING CESSATION | Facility: CLINIC | Age: 73
End: 2023-08-04
Payer: COMMERCIAL

## 2023-08-04 DIAGNOSIS — F17.200 NICOTINE DEPENDENCE: Primary | ICD-10-CM

## 2023-08-04 PROCEDURE — 99999 PR PBB SHADOW E&M-EST. PATIENT-LVL II: CPT | Mod: PBBFAC,,,

## 2023-08-04 PROCEDURE — 99404 PREV MED CNSL INDIV APPRX 60: CPT | Mod: S$GLB,,,

## 2023-08-04 PROCEDURE — 99404 PR PREVENT COUNSEL,INDIV,60 MIN: ICD-10-PCS | Mod: S$GLB,,,

## 2023-08-04 PROCEDURE — 99999 PR PBB SHADOW E&M-EST. PATIENT-LVL II: ICD-10-PCS | Mod: PBBFAC,,,

## 2023-08-04 NOTE — PROGRESS NOTES
Individual Follow-Up Form    8/4/2023    Quit Date: TBD    Clinical Status of Patient: Outpatient    Length of Service: 60 minutes    Continuing Medication: yes  Patches or Nicotine Lozenges    Other Medications: None      Target Symptoms: Withdrawal and medication side effects. The following were  rated moderate (3) to severe (4) on TCRS:  Moderate (3): Crave and Desire  Severe (4): None     Comments: Patient presented to clinic for follow-up visit, name and date of birth verified as two patient identifiers.  Patient also reported she is still smoking 10 CPD, and she is still using 21 mg nicotine patch in conjunction with 2 mg nicotine lozenges without any negative side effects reported at this time.  Patient also stated she experienced a lot of loss in her family lately, but she is still working towards quitting.  Counselor congratulated patient on her progress thus far.  Counselor also discussed patient utilizing alternate coping mechanisms to work through stressful situations, continuing to work towards rate reduction, and further behavior modification strategies.  Follow-up visit scheduled in two weeks. Counselor will remain available should any further needs arise.     Diagnosis: F17.200    Next Visit: 2 weeks

## 2023-08-09 ENCOUNTER — OFFICE VISIT (OUTPATIENT)
Dept: PODIATRY | Facility: CLINIC | Age: 73
End: 2023-08-09
Payer: MEDICARE

## 2023-08-09 VITALS — BODY MASS INDEX: 33.31 KG/M2 | HEIGHT: 62 IN | WEIGHT: 181 LBS

## 2023-08-09 DIAGNOSIS — L84 CORN OR CALLUS: Primary | ICD-10-CM

## 2023-08-09 DIAGNOSIS — B35.1 ONYCHOMYCOSIS DUE TO DERMATOPHYTE: ICD-10-CM

## 2023-08-09 PROCEDURE — 99499 UNLISTED E&M SERVICE: CPT | Mod: ,,, | Performed by: PODIATRIST

## 2023-08-09 PROCEDURE — 99213 OFFICE O/P EST LOW 20 MIN: CPT | Mod: PBBFAC,PO | Performed by: PODIATRIST

## 2023-08-09 PROCEDURE — 17999 UNLISTD PX SKN MUC MEMB SUBQ: CPT | Mod: CSM,S$GLB,, | Performed by: PODIATRIST

## 2023-08-09 PROCEDURE — 99999 PR PBB SHADOW E&M-EST. PATIENT-LVL III: CPT | Mod: PBBFAC,,, | Performed by: PODIATRIST

## 2023-08-09 PROCEDURE — 99999 PR PBB SHADOW E&M-EST. PATIENT-LVL III: ICD-10-PCS | Mod: PBBFAC,,, | Performed by: PODIATRIST

## 2023-08-09 PROCEDURE — 99499 NO LOS: ICD-10-PCS | Mod: ,,, | Performed by: PODIATRIST

## 2023-08-09 PROCEDURE — 17999 PR NON-COVERED FOOT CARE: ICD-10-PCS | Mod: CSM,S$GLB,, | Performed by: PODIATRIST

## 2023-08-10 ENCOUNTER — LAB VISIT (OUTPATIENT)
Dept: LAB | Facility: HOSPITAL | Age: 73
End: 2023-08-10
Attending: FAMILY MEDICINE
Payer: MEDICARE

## 2023-08-10 DIAGNOSIS — I15.2 HYPERTENSION ASSOCIATED WITH TYPE 2 DIABETES MELLITUS: Chronic | ICD-10-CM

## 2023-08-10 DIAGNOSIS — E11.59 HYPERTENSION ASSOCIATED WITH TYPE 2 DIABETES MELLITUS: Chronic | ICD-10-CM

## 2023-08-10 LAB
ALBUMIN SERPL BCP-MCNC: 3.2 G/DL (ref 3.5–5.2)
ALP SERPL-CCNC: 128 U/L (ref 55–135)
ALT SERPL W/O P-5'-P-CCNC: 18 U/L (ref 10–44)
ANION GAP SERPL CALC-SCNC: 7 MMOL/L (ref 8–16)
AST SERPL-CCNC: 15 U/L (ref 10–40)
BASOPHILS # BLD AUTO: 0.02 K/UL (ref 0–0.2)
BASOPHILS NFR BLD: 0.3 % (ref 0–1.9)
BILIRUB SERPL-MCNC: 0.2 MG/DL (ref 0.1–1)
BUN SERPL-MCNC: 25 MG/DL (ref 8–23)
CALCIUM SERPL-MCNC: 8.9 MG/DL (ref 8.7–10.5)
CHLORIDE SERPL-SCNC: 110 MMOL/L (ref 95–110)
CHOLEST SERPL-MCNC: 125 MG/DL (ref 120–199)
CHOLEST/HDLC SERPL: 3.4 {RATIO} (ref 2–5)
CO2 SERPL-SCNC: 24 MMOL/L (ref 23–29)
CREAT SERPL-MCNC: 1.1 MG/DL (ref 0.5–1.4)
DIFFERENTIAL METHOD: ABNORMAL
EOSINOPHIL # BLD AUTO: 0.4 K/UL (ref 0–0.5)
EOSINOPHIL NFR BLD: 5.4 % (ref 0–8)
ERYTHROCYTE [DISTWIDTH] IN BLOOD BY AUTOMATED COUNT: 15.9 % (ref 11.5–14.5)
EST. GFR  (NO RACE VARIABLE): 53 ML/MIN/1.73 M^2
ESTIMATED AVG GLUCOSE: 120 MG/DL (ref 68–131)
GLUCOSE SERPL-MCNC: 104 MG/DL (ref 70–110)
HBA1C MFR BLD: 5.8 % (ref 4–5.6)
HCT VFR BLD AUTO: 33.7 % (ref 37–48.5)
HDLC SERPL-MCNC: 37 MG/DL (ref 40–75)
HDLC SERPL: 29.6 % (ref 20–50)
HGB BLD-MCNC: 10.5 G/DL (ref 12–16)
IMM GRANULOCYTES # BLD AUTO: 0.01 K/UL (ref 0–0.04)
IMM GRANULOCYTES NFR BLD AUTO: 0.1 % (ref 0–0.5)
LDLC SERPL CALC-MCNC: 70.4 MG/DL (ref 63–159)
LYMPHOCYTES # BLD AUTO: 2.6 K/UL (ref 1–4.8)
LYMPHOCYTES NFR BLD: 37.3 % (ref 18–48)
MCH RBC QN AUTO: 25.1 PG (ref 27–31)
MCHC RBC AUTO-ENTMCNC: 31.2 G/DL (ref 32–36)
MCV RBC AUTO: 80 FL (ref 82–98)
MONOCYTES # BLD AUTO: 0.5 K/UL (ref 0.3–1)
MONOCYTES NFR BLD: 7.3 % (ref 4–15)
NEUTROPHILS # BLD AUTO: 3.4 K/UL (ref 1.8–7.7)
NEUTROPHILS NFR BLD: 49.6 % (ref 38–73)
NONHDLC SERPL-MCNC: 88 MG/DL
NRBC BLD-RTO: 0 /100 WBC
PLATELET # BLD AUTO: 257 K/UL (ref 150–450)
PMV BLD AUTO: 10.8 FL (ref 9.2–12.9)
POTASSIUM SERPL-SCNC: 4.1 MMOL/L (ref 3.5–5.1)
PROT SERPL-MCNC: 6.5 G/DL (ref 6–8.4)
RBC # BLD AUTO: 4.19 M/UL (ref 4–5.4)
SODIUM SERPL-SCNC: 141 MMOL/L (ref 136–145)
TRIGL SERPL-MCNC: 88 MG/DL (ref 30–150)
WBC # BLD AUTO: 6.86 K/UL (ref 3.9–12.7)

## 2023-08-10 PROCEDURE — 80061 LIPID PANEL: CPT | Performed by: FAMILY MEDICINE

## 2023-08-10 PROCEDURE — 83036 HEMOGLOBIN GLYCOSYLATED A1C: CPT | Performed by: FAMILY MEDICINE

## 2023-08-10 PROCEDURE — 80053 COMPREHEN METABOLIC PANEL: CPT | Performed by: FAMILY MEDICINE

## 2023-08-10 PROCEDURE — 36415 COLL VENOUS BLD VENIPUNCTURE: CPT | Mod: PN | Performed by: FAMILY MEDICINE

## 2023-08-10 PROCEDURE — 85025 COMPLETE CBC W/AUTO DIFF WBC: CPT | Performed by: FAMILY MEDICINE

## 2023-08-17 ENCOUNTER — OFFICE VISIT (OUTPATIENT)
Dept: FAMILY MEDICINE | Facility: CLINIC | Age: 73
End: 2023-08-17
Payer: MEDICARE

## 2023-08-17 ENCOUNTER — TELEPHONE (OUTPATIENT)
Dept: NEPHROLOGY | Facility: CLINIC | Age: 73
End: 2023-08-17
Payer: MEDICARE

## 2023-08-17 VITALS
OXYGEN SATURATION: 98 % | HEIGHT: 62 IN | WEIGHT: 178.56 LBS | TEMPERATURE: 98 F | DIASTOLIC BLOOD PRESSURE: 68 MMHG | HEART RATE: 56 BPM | SYSTOLIC BLOOD PRESSURE: 148 MMHG | BODY MASS INDEX: 32.86 KG/M2

## 2023-08-17 DIAGNOSIS — E11.21 TYPE 2 DIABETES WITH NEPHROPATHY: Chronic | ICD-10-CM

## 2023-08-17 DIAGNOSIS — N18.31 STAGE 3A CHRONIC KIDNEY DISEASE: Chronic | ICD-10-CM

## 2023-08-17 DIAGNOSIS — I10 ESSENTIAL HYPERTENSION: Chronic | ICD-10-CM

## 2023-08-17 DIAGNOSIS — E78.5 DYSLIPIDEMIA: Chronic | ICD-10-CM

## 2023-08-17 PROCEDURE — 99999 PR PBB SHADOW E&M-EST. PATIENT-LVL IV: ICD-10-PCS | Mod: PBBFAC,,, | Performed by: FAMILY MEDICINE

## 2023-08-17 PROCEDURE — 99214 OFFICE O/P EST MOD 30 MIN: CPT | Mod: S$PBB,,, | Performed by: FAMILY MEDICINE

## 2023-08-17 PROCEDURE — 99214 PR OFFICE/OUTPT VISIT, EST, LEVL IV, 30-39 MIN: ICD-10-PCS | Mod: S$PBB,,, | Performed by: FAMILY MEDICINE

## 2023-08-17 PROCEDURE — 99999 PR PBB SHADOW E&M-EST. PATIENT-LVL IV: CPT | Mod: PBBFAC,,, | Performed by: FAMILY MEDICINE

## 2023-08-17 PROCEDURE — 99214 OFFICE O/P EST MOD 30 MIN: CPT | Mod: PBBFAC,PN | Performed by: FAMILY MEDICINE

## 2023-08-17 RX ORDER — HYDRALAZINE HYDROCHLORIDE 10 MG/1
10 TABLET, FILM COATED ORAL EVERY 12 HOURS
Qty: 60 TABLET | Refills: 2 | Status: SHIPPED | OUTPATIENT
Start: 2023-08-17 | End: 2023-08-17 | Stop reason: SDUPTHER

## 2023-08-17 NOTE — TELEPHONE ENCOUNTER
----- Message from Della Amaral sent at 8/17/2023 12:27 PM CDT -----  .Type: Patient Call Back    Who called: Self     What is the request in detail: Can you you resend hydrALAZINE (APRESOLINE) 10 MG tablet prescription to .    [id number] - [address]  [place] [address]  Phone: [phone] Fax: [phone]        Can the clinic reply by [facility]? No     Would the patient rather a call back or a response via [facility]? Call Back     Best call back number:.[phone] (home)       Additional Information:

## 2023-08-17 NOTE — TELEPHONE ENCOUNTER
No care due was identified.  [facility] Embedded Care Due Messages. Reference number: [id number].   8/17/2023 2:04:37 PM CDT

## 2023-08-17 NOTE — PATIENT INSTRUCTIONS
Please call [phone] to reschedule your eye appointment    You are due for your visits fo Nephrology and Cardiology

## 2023-08-17 NOTE — TELEPHONE ENCOUNTER
----- Message from Laura Villatoro sent at 8/17/2023 10:59 AM CDT -----  Patient is due for follow up. Can you please reach out both phone numbers listed to have patient scheduled? Thank you!

## 2023-08-18 RX ORDER — HYDRALAZINE HYDROCHLORIDE 10 MG/1
10 TABLET, FILM COATED ORAL EVERY 12 HOURS
Qty: 60 TABLET | Refills: 2 | Status: SHIPPED | OUTPATIENT
Start: 2023-08-18 | End: 2023-08-20

## 2023-08-20 PROBLEM — E11.21 TYPE 2 DIABETES WITH NEPHROPATHY: Chronic | Status: ACTIVE | Noted: 2020-01-24

## 2023-08-20 RX ORDER — HYDRALAZINE HYDROCHLORIDE 10 MG/1
10 TABLET, FILM COATED ORAL EVERY 12 HOURS
Qty: 60 TABLET | Refills: 2 | COMMUNITY
Start: 2023-08-17

## 2023-08-21 NOTE — PROGRESS NOTES
Patient Name: Stephanie Sears    : 1950  MRN: [id number]      Subjective:     Patient ID: Stephanie is a 73 y.o. female    Chief Complaint:  Hypertension, Diabetes, Hyperlipidemia, and Chronic Kidney Disease    Hypertension  This is a chronic problem. The problem is uncontrolled. Associated symptoms include neck pain. Pertinent negatives include no anxiety, blurred vision, chest pain, headaches, orthopnea, palpitations, PND, shortness of breath or sweats. Risk factors for coronary artery disease include diabetes mellitus, obesity, dyslipidemia, post-menopausal state and smoking/tobacco exposure. Past treatments include beta blockers, calcium channel blockers and ACE inhibitors. The current treatment provides moderate improvement. There are no compliance problems.  Hypertensive end-organ damage includes kidney disease. Identifiable causes of hypertension include chronic renal disease.   Diabetes  She presents for her follow-up diabetic visit. She has type 2 diabetes mellitus. There are no hypoglycemic associated symptoms. Pertinent negatives for hypoglycemia include no headaches or sweats. Pertinent negatives for diabetes include no blurred vision and no chest pain. Symptoms are stable. Risk factors for coronary artery disease include post-menopausal, obesity, dyslipidemia and hypertension. Current diabetic treatment includes diet. There is no compliance with monitoring of blood glucose. Eye exam is current.   Hyperlipidemia  Exacerbating diseases include chronic renal disease. Pertinent negatives include no chest pain or shortness of breath.        Review of Systems   Constitutional:  Negative for chills, fever and unexpected weight change.   Eyes:  Negative for blurred vision.   Respiratory:  Negative for shortness of breath.    Cardiovascular:  Negative for chest pain, palpitations, orthopnea and PND.   Gastrointestinal:  Negative for abdominal pain, blood in stool, change in bowel habit and change in bowel  "habit.   Musculoskeletal:  Positive for neck pain.   Neurological:  Negative for headaches.        Objective:   BP (!) 148/68 (BP Location: Right arm, Patient Position: Sitting, BP Method: Medium (Manual))   Pulse (!) 56   Temp 97.8 °F (36.6 °C) (Oral)   Ht 5' 2" (1.575 m)   Wt 81 kg (178 lb 9.2 oz)   SpO2 98%   BMI 32.66 kg/m²     Physical Exam  Vitals reviewed.   Constitutional:       General: She is not in acute distress.     Appearance: She is well-developed. She is not diaphoretic.   HENT:      Head: Normocephalic and atraumatic.      Right Ear: Tympanic membrane, ear canal and external ear normal.      Left Ear: Tympanic membrane, ear canal and external ear normal.      Nose: Nose normal.   Eyes:      General:         Right eye: No discharge.         Left eye: No discharge.      Conjunctiva/sclera: Conjunctivae normal.      Pupils: Pupils are equal, round, and reactive to light.   Neck:      Thyroid: No thyromegaly.      Trachea: No tracheal deviation.   Cardiovascular:      Rate and Rhythm: Normal rate and regular rhythm.      Pulses:           Radial pulses are 2+ on the right side and 2+ on the left side.      Heart sounds: Normal heart sounds, S1 normal and S2 normal. No murmur heard.  Pulmonary:      Effort: Pulmonary effort is normal. No respiratory distress.      Breath sounds: Normal breath sounds. No wheezing, rhonchi or rales.   Abdominal:      General: Bowel sounds are normal. There is no distension.      Palpations: Abdomen is soft. Abdomen is not rigid. There is no mass.      Tenderness: There is no abdominal tenderness. There is no guarding.   Musculoskeletal:      Cervical back: Normal range of motion and neck supple.   Lymphadenopathy:      Cervical: No cervical adenopathy.   Skin:     General: Skin is warm and dry.      Capillary Refill: Capillary refill takes less than 2 seconds.      Findings: No rash.   Neurological:      Mental Status: She is alert and oriented to person, place, and " time.   Psychiatric:         Behavior: Behavior normal.        Assessment        ICD-10-CM ICD-9-CM   1. Type 2 diabetes with nephropathy  E11.21 250.40     583.81   2. Essential hypertension  I10 401.9   3. Dyslipidemia  E78.5 272.4   4. Stage 3a chronic kidney disease  N18.31 585.3         Plan:     1. Type 2 diabetes with nephropathy  Overview:  Lab Results   Component Value Date    HGBA1C 5.8 (H) 08/10/2023    HGBA1C 5.8 (H) 02/03/2023    HGBA1C 6.0 (H) 08/01/2022       Lab Results   Component Value Date    LDLCALC 70.4 08/10/2023       Lab Results   Component Value Date    MICALBCREAT 2146.0 (H) 04/29/2022        Eye Exam:  07-    Assessment & Plan:  A1c is at goal with dietary modifications.  Continue dietary modifications.       2. Essential hypertension  Overview:  BP Readings from Last 3 Encounters:   08/17/23 (!) 148/68   07/11/23 (!) 145/67   04/25/23 (!) 167/83        ACC/AHA guidelines on blood pressure goals reviewed.  Reinforced correct way of measuring blood pressure.     Assessment & Plan:  Blood pressure is not controlled.   We discussed the importance of improving her blood pressure for her renal health as well as her overall health and to decrease her risk of cardiovascular events.  Will add low-dose hydralazine 10 milligrams twice a day to current regimen and recheck blood pressure in the next two weeks    Orders:          -     HydrALAZINE (APRESOLINE) 10 MG tablet; Take 1 tablet (10 mg total) by mouth every 12 (twelve) hours.  Dispense: 60 tablet; Refill: 2    3. Dyslipidemia  Overview:  Lab Results   Component Value Date    CHOL 125 08/10/2023    CHOL 141 02/07/2023    CHOL 135 02/03/2023     Lab Results   Component Value Date    HDL 37 (L) 08/10/2023    HDL 41 02/07/2023    HDL 39 (L) 02/03/2023     Lab Results   Component Value Date    LDLCALC 70.4 08/10/2023    LDLCALC 82.4 02/07/2023    LDLCALC 79.2 02/03/2023     Lab Results   Component Value Date    TRIG 88 08/10/2023    TRIG 88  02/07/2023    TRIG 84 02/03/2023     Lab Results   Component Value Date    CHOLHDL 29.6 08/10/2023    CHOLHDL 29.1 02/07/2023    CHOLHDL 28.9 02/03/2023        The ASCVD Risk score (Sawyer NARVAEZ, et al., 2019) failed to calculate for the following reasons:    The valid total cholesterol range is 130 to 320 mg/dL      Assessment & Plan:  Continue atorvastatin 40 milligrams daily.       4. Stage 3a chronic kidney disease  Overview:  Lab Results   Component Value Date    EGFRNORACEVR 53 (A) 08/10/2023    EGFRNORACEVR 48 (A) 02/03/2023    EGFRNORACEVR 48 (A) 02/03/2023        Assessment & Plan:  Renal function stable.   Advised good hydration.  Avoid nephrotoxica agents.              -Williams Rossi Jr., MD, AAHIVS      This office note has been dictated.  This dictation has been generated using M-Modal Fluency Direct dictation; some phonetic errors may occur.         Patient Instructions   Please call [phone] to reschedule your eye appointment    You are due for your visits fo Nephrology and Cardiology    Future Appointments   Date Time Provider Department Center   8/25/2023  9:30 AM Erica Cherry [facility] [place] [place] [place]   9/6/2023  9:00 AM Harpal Braswell [facility]-C [facility]   11/3/2023  9:00 AM Justin Nicholson [place] [facility] [place]   11/10/2023  8:30 AM Melany Tse MD [facility] [place]   11/20/2023  9:30 AM Dean Robins MD [facility] CARDIO [place] [facility]   11/21/2023  9:20 AM Williams Rossi Jr., MD [facility]

## 2023-08-21 NOTE — ASSESSMENT & PLAN NOTE
Blood pressure is not controlled.   We discussed the importance of improving her blood pressure for her renal health as well as her overall health and to decrease her risk of cardiovascular events.  Will add low-dose hydralazine 10 milligrams twice a day to current regimen and recheck blood pressure in the next two weeks

## 2023-08-30 ENCOUNTER — CLINICAL SUPPORT (OUTPATIENT)
Dept: SMOKING CESSATION | Facility: CLINIC | Age: 73
End: 2023-08-30
Payer: COMMERCIAL

## 2023-08-30 DIAGNOSIS — F17.200 NICOTINE DEPENDENCE: Primary | ICD-10-CM

## 2023-08-30 PROCEDURE — 99404 PREV MED CNSL INDIV APPRX 60: CPT | Mod: S$GLB,,,

## 2023-08-30 PROCEDURE — 99999 PR PBB SHADOW E&M-EST. PATIENT-LVL I: CPT | Mod: PBBFAC,,,

## 2023-08-30 PROCEDURE — 99404 PR PREVENT COUNSEL,INDIV,60 MIN: ICD-10-PCS | Mod: S$GLB,,,

## 2023-08-30 PROCEDURE — 99999 PR PBB SHADOW E&M-EST. PATIENT-LVL I: ICD-10-PCS | Mod: PBBFAC,,,

## 2023-08-30 RX ORDER — NICOTINE POLACRILEX 2 MG/1
2 LOZENGE ORAL
Qty: 243 EACH | Refills: 0 | Status: SHIPPED | OUTPATIENT
Start: 2023-08-30 | End: 2024-02-28 | Stop reason: SDUPTHER

## 2023-08-30 NOTE — PROGRESS NOTES
Individual Follow-Up Form    8/30/2023    Quit Date: TBD    Clinical Status of Patient: Outpatient    Length of Service: 60 minutes    Continuing Medication: yes  Nicotine Lozenges    Other Medications: None      Target Symptoms: Withdrawal and medication side effects. The following were  rated moderate (3) to severe (4) on TCRS:  Moderate (3): Crave and Desire   Severe (4): None     Comments: Counselor spoke with patient telephonically for follow-up visit, name and date of birth verified as two patient identifiers.  Patient also reported she is still smoking, but she is smoking less because she is not feeling well.  Patient also reported she is still using 2 mg nicotine lozenges without any negative side effects reported at this time, but she is no longer using 21 mg nicotine patch because they are no longer sticking.  Counselor congratulated patient on her progress thus far.  Counselor also discussed patient continuing to work towards rate reduction and further behavior modification strategies.  Follow-up visit scheduled in two weeks. Counselor will remain available should any further needs arise.     Diagnosis: F17.200    Next Visit: 2 weeks

## 2023-08-31 ENCOUNTER — TELEPHONE (OUTPATIENT)
Dept: FAMILY MEDICINE | Facility: CLINIC | Age: 73
End: 2023-08-31
Payer: MEDICARE

## 2023-08-31 ENCOUNTER — EXTERNAL CHRONIC CARE MANAGEMENT (OUTPATIENT)
Dept: PRIMARY CARE CLINIC | Facility: CLINIC | Age: 73
End: 2023-08-31
Payer: MEDICARE

## 2023-08-31 PROCEDURE — 99490 CHRNC CARE MGMT STAFF 1ST 20: CPT | Mod: PBBFAC,25,PN | Performed by: FAMILY MEDICINE

## 2023-08-31 PROCEDURE — 99439 CHRNC CARE MGMT STAF EA ADDL: CPT | Mod: PBBFAC,PN | Performed by: FAMILY MEDICINE

## 2023-08-31 PROCEDURE — 99439 CHRNC CARE MGMT STAF EA ADDL: CPT | Mod: S$PBB,,, | Performed by: FAMILY MEDICINE

## 2023-08-31 PROCEDURE — 99439 PR CHRONIC CARE MGMT, EA ADDTL 20 MIN: ICD-10-PCS | Mod: S$PBB,,, | Performed by: FAMILY MEDICINE

## 2023-08-31 PROCEDURE — 99490 CHRNC CARE MGMT STAFF 1ST 20: CPT | Mod: S$PBB,,, | Performed by: FAMILY MEDICINE

## 2023-08-31 PROCEDURE — 99490 PR CHRONIC CARE MGMT, 1ST 20 MIN: ICD-10-PCS | Mod: S$PBB,,, | Performed by: FAMILY MEDICINE

## 2023-08-31 NOTE — TELEPHONE ENCOUNTER
CC spoke with patient today for monthly health check. Patient advised CC that she has not received diabetic shoes. CC called [facility] and spoke with Vanessa. Vanessa states she needs clinical notes from 08/17 office visit in order to schedule patient for diabetic shoes appt. Vanessa stated once these office visit notes were received, they would call and schedule the patient for an appointment. Fax number for [employer] is [phone]. Please let me know if I can be of any assistance. Thank you!     Krista zIquierdo LPN    - [facility]   [phone] ext [phone]     Faxed clinical notes to [facility] with order

## 2023-09-06 ENCOUNTER — OFFICE VISIT (OUTPATIENT)
Dept: PAIN MEDICINE | Facility: CLINIC | Age: 73
End: 2023-09-06
Payer: MEDICARE

## 2023-09-06 VITALS
HEIGHT: 62 IN | SYSTOLIC BLOOD PRESSURE: 144 MMHG | WEIGHT: 177.56 LBS | BODY MASS INDEX: 32.67 KG/M2 | DIASTOLIC BLOOD PRESSURE: 67 MMHG | HEART RATE: 57 BPM | TEMPERATURE: 98 F | OXYGEN SATURATION: 100 %

## 2023-09-06 DIAGNOSIS — M51.36 DDD (DEGENERATIVE DISC DISEASE), LUMBAR: ICD-10-CM

## 2023-09-06 DIAGNOSIS — M54.16 LUMBAR RADICULOPATHY, CHRONIC: Primary | ICD-10-CM

## 2023-09-06 DIAGNOSIS — M47.816 LUMBAR SPONDYLOSIS: ICD-10-CM

## 2023-09-06 DIAGNOSIS — Z98.1 S/P LUMBAR FUSION: ICD-10-CM

## 2023-09-06 DIAGNOSIS — M70.61 GREATER TROCHANTERIC BURSITIS OF BOTH HIPS: ICD-10-CM

## 2023-09-06 DIAGNOSIS — M54.16 LUMBAR RADICULOPATHY: ICD-10-CM

## 2023-09-06 DIAGNOSIS — M70.62 GREATER TROCHANTERIC BURSITIS OF BOTH HIPS: ICD-10-CM

## 2023-09-06 PROCEDURE — 99999PBSHW PR PBB SHADOW TECHNICAL ONLY FILED TO HB: Mod: PBBFAC,,,

## 2023-09-06 PROCEDURE — 20610 DRAIN/INJ JOINT/BURSA W/O US: CPT | Mod: PBBFAC,PN

## 2023-09-06 PROCEDURE — 99215 OFFICE O/P EST HI 40 MIN: CPT | Mod: PBBFAC,PN

## 2023-09-06 PROCEDURE — 20610 DRAIN/INJ JOINT/BURSA W/O US: CPT | Mod: 50,S$PBB,,

## 2023-09-06 PROCEDURE — 99999PBSHW PR PBB SHADOW TECHNICAL ONLY FILED TO HB: ICD-10-PCS | Mod: PBBFAC,,,

## 2023-09-06 PROCEDURE — 99214 OFFICE O/P EST MOD 30 MIN: CPT | Mod: 25,S$PBB,,

## 2023-09-06 PROCEDURE — 99214 PR OFFICE/OUTPT VISIT, EST, LEVL IV, 30-39 MIN: ICD-10-PCS | Mod: 25,S$PBB,,

## 2023-09-06 PROCEDURE — 20610 PR DRAIN/INJECT LARGE JOINT/BURSA: ICD-10-PCS | Mod: 50,S$PBB,,

## 2023-09-06 PROCEDURE — 99999 PR PBB SHADOW E&M-EST. PATIENT-LVL V: CPT | Mod: PBBFAC,,,

## 2023-09-06 PROCEDURE — 99999 PR PBB SHADOW E&M-EST. PATIENT-LVL V: ICD-10-PCS | Mod: PBBFAC,,,

## 2023-09-06 RX ORDER — TRIAMCINOLONE ACETONIDE 40 MG/ML
40 INJECTION, SUSPENSION INTRA-ARTICULAR; INTRAMUSCULAR
Status: COMPLETED | OUTPATIENT
Start: 2023-09-06 | End: 2023-09-06

## 2023-09-06 RX ADMIN — TRIAMCINOLONE ACETONIDE 40 MG: 40 INJECTION, SUSPENSION INTRA-ARTICULAR; INTRAMUSCULAR at 09:09

## 2023-09-06 NOTE — PROGRESS NOTES
Subjective:     Patient ID: Stephanie Sears is a 73 y.o. female    Chief Complaint: Back Pain and Leg Pain      Referred by: No ref. provider found      HPI:    Interval History PA (09/06/2023):  Patient returns to clinic for follow up.  Patient notes that she has been attending physical therapy with overall improvement in her health and pain levels.  However continues to report significant pain located in her midline lower lumbar region radiating down into her bilateral posterior thighs, stopping at the knees.  Denies any changes in the quality or location of her pain.  Overall states physical therapy has been beneficial, although pain continues to be significant.  She also notes return and worsening of bilateral lateral hip pain.  States pain is in similar quality and location as with previous trochanteric bursitis.  Patient is interested in repeat injections.  Notes previous GTB injections have been significantly beneficial, typically alleviate the pain for 8+ months.  Denies any weakness, bowel or bladder dysfunction.    Interval History (7/11/23):  She returns today for follow up.  Shoe reports that her left sided low back and lower extremity pain has returned.  Denies any changes in the quality or location of his pain.  Denies any new or worsening symptoms.  States that she was in physical therapy earlier this year and was finding this very helpful.  She would like to see if additional physical therapy can be done.      Interval History (5/18/22):  She returns today for follow up.  She reports that physical therapy has been helpful for the low back pain.  She states the low back is currently doing well and she is happy with his progress.  Today, she mainly complains of left lateral hip pain consistent with trochanteric bursitis.  Has had injections for this in the past that have helped.  She would like repeat injection today.      Interval History (3/23/22):  She returns today for follow up.  She reports  that she has completed physical therapy for her neck pain.  She states this issue was doing well.  Today she wishes discuss her low back pain.  This pain is located the midline lower lumbar/lumbosacral region.  The pain will radiate to the bilateral lumbar paraspinal regions and bilateral posterior thighs.  The pain does not cross the knees.  The pain is worse with standing/walking.  She denies any associated numbness, tingling, weakness, bowel bladder dysfunction.        Interval History (1/6/22):  She returns today for follow up.  She reports that bilateral ischial bursa steroid injections has been helpful for the bilateral buttock/hip pain.  She reports 60% relief following this procedure.  Today she wishes to discuss her neck pain.  She has had neck pain intermittently for years.  The pain is located the midline mid to lower cervical region.  The pain does not radiate.  She denies any associated numbness, tingling, weakness, bowel bladder dysfunction.  She would like to attend additional physical therapy for the management of her neck pain.      Interval History (12/14/21):  She returns today for follow up.  She reports that ischial bursa pain has returned.  She reports near complete relief following bilateral ischial bursa steroid injection done in February 2021. She got at least 9 months of good relief from this procedure.  She states the pain has since returned.  She denies any changes in the quality location the pain.  She denies any new or worsening symptoms.        Interval History NP (3/16/21):    Pt returns today for follow up. She states that the bilateral ischial bursa injections have relieved nearly all of the bilateral buttock pain.  She is very happy with the results at this time.     Interval History (2/19/21):  She returns today for follow up.  She reports that she continues to have bilateral buttock pain particular when sitting.  She denies any changes in the quality location was pain.  She  denies any new worsening symptoms.  She has been attending physical therapy and finds that it has been helpful.  She states that her pain is still quite severe especially given recent cold weather.        Interval History (12/21/20):  She returns today for follow up and imaging review.  She reports that she continues to have pain mainly surrounding the bilateral hip girdles.  She states that she has focal pain in the bilateral gluteal regions when sitting upright.  She also has pain in the bilateral lateral hip regions.  She has undergone multiple greater trochanteric bursa steroid injections in the past.  These were initially effective, but gradually provided less relief with subsequent injections.  She continues to deny any significant low back pain.        Initial Encounter (12/7/20):  Stephanie Sears is a 73 y.o. female who presents today with chronic bilateral low back pain.  This pain has been present for years.  Patient is status post L4-5 fusion in 2006.  She states that she did have some pain radiating into her lower extremities in the past, but her current pain is isolated to the bilateral lower lumbar/lumbosacral regions.  She denies any associated numbness, tingling, weakness, bowel bladder dysfunction.  The pain is constant worse with prolonged sitting and standing..   This pain is described in detail below.    Physical Therapy:  Yes, currently.    Non-pharmacologic Treatment:  Rest helps         TENS?  No    Pain Medications:         Currently taking:  gabapentin    Has tried in the past:  Tramadol, NSAIDs, Tylenol, meloxicam    Has not tried:  TCAs, SNRIs, topical creams    Blood thinners:  None    Interventional Therapies:    2/26/21 - bilateral ischial bursa injections - 90% relief for at least 9 months  12/23/21 - bilateral ischial bursa steroid injections - 60% relief    Relevant Surgeries:   L4-5 fusion in 2006    Affecting sleep?  Yes    Affecting daily activities? yes    Depressive symptoms?  no          SI/HI? No    Work status: Retired    Pain Scores:    Best:       4/10  Worst:     9/10  Usually:   6/10  Today:    7/10    Pain Disability Index  Family/Home Responsibilities:: 8  Recreation:: 5  Social Activity:: 5  Occupation:: 0 (NOT EMPLOYED)  Sexual Behavior:: 0  Self Care:: 4  Life-Support Activities:: 2  Pain Disability Index (PDI): 24(     Review of Systems   Constitutional:  Negative for activity change, appetite change, chills, fatigue, fever and unexpected weight change.   HENT:  Negative for hearing loss.    Eyes:  Negative for visual disturbance.   Respiratory:  Negative for chest tightness and shortness of breath.    Cardiovascular:  Negative for chest pain.   Gastrointestinal:  Negative for abdominal pain, constipation, diarrhea, nausea and vomiting.   Genitourinary:  Negative for difficulty urinating.   Musculoskeletal:  Positive for arthralgias, back pain, gait problem, myalgias, neck pain and neck stiffness.   Skin:  Negative for rash.   Neurological:  Negative for dizziness, weakness, light-headedness, numbness and headaches.   Psychiatric/Behavioral:  Positive for sleep disturbance. Negative for hallucinations and suicidal ideas. The patient is not nervous/anxious.        Past Medical History:   Diagnosis Date    Acute pancreatitis     Angina pectoris     Anxiety     Arthritis     Asthma     as a child    Back pain     Bronchitis     Cervical spondylosis with radiculopathy 07/10/2012    Chest pain     Chronic neck pain 07/26/2012    Colon polyps     Controlled type 2 diabetes mellitus with stage 2 chronic kidney disease, with long-term current use of insulin 01/24/2020    COPD (chronic obstructive pulmonary disease)     Coronary artery disease     Depression     Fibrocystic breast     General anesthetics causing adverse effect in therapeutic use     trouble coming out of anes/ had the shakes    GERD (gastroesophageal reflux disease)     Glaucoma     Heart failure     Hyperlipidemia      Hypertension     Neck pain 07/10/2012    Obesity     JAM (obstructive sleep apnea)     Pneumonia     Pneumonia due to other staphylococcus     Primary osteoarthritis of both knees     Psoriasis     Subacromial or subdeltoid bursitis 07/10/2012    Thyroid disease     Tobacco dependence     Trouble in sleeping     Type 2 diabetes mellitus 12/10/2013    Type 2 diabetes mellitus with diabetic chronic kidney disease        Past Surgical History:   Procedure Laterality Date    APPENDECTOMY      BREAST BIOPSY Bilateral 1988    BREAST MASS EXCISION Right     benign    CATARACT EXTRACTION W/  INTRAOCULAR LENS IMPLANT Left 12/09/2020    PHACO IOL WITH I-STENT ()    CATARACT EXTRACTION W/  INTRAOCULAR LENS IMPLANT Right 10/21/2020    PHACO IOL WITH I-STENT x 2 ()    CHOLECYSTECTOMY      COLONOSCOPY N/A 05/22/2019    Procedure: COLONOSCOPY;  Surgeon: Darrin Calvin MD;  Location: [facility] ([address] [place] [address]);  Service: Endoscopy;  Laterality: N/A;  2nd floor - all other procedure done on 2, multiple comorbidities - ERW/   change in MD schedule, Pt notified and verbalized understanding, new arrival time 0800, Ins mailed to Pt with new arrival time - ERW1/8/19@1130    COLONOSCOPY N/A 9/26/2022    Procedure: COLONOSCOPY;  Surgeon: Darrin Calvin MD;  Location: [facility] ([address] [place] [address]);  Service: Endoscopy;  Laterality: N/A;  2nd floor d/t previous anesthesia complications  vaccinated  inst mailed  clear liquids 4 hr prior-AM prep-LW  I should be able to complete this with the slim pediatric colonoscope, but should have the single-balloon available in case needed.    EPIDURAL STEROID INJECTION Bilateral 02/26/2021    Procedure: Injection, Steroid, Ischial Bursa;  Surgeon: Yonatan Garsia Jr., MD;  Location: [facility];  Service: Pain Management;  Laterality: Bilateral;  Bilateral Ischial Bursa Steroid Injections  Arrive @ 1230; No ATC; Check BG    ESOPHAGOGASTRODUODENOSCOPY      HYSTERECTOMY  1980's     IMPLANTATION OF DEVICE FOR GLAUCOMA Right 10/21/2020    Procedure: INSERTION, DEVICE, FOR GLAUCOMA/ i Stent;  Surgeon: Melany Tse MD;  Location: [address];  Service: Ophthalmology;  Laterality: Right;    IMPLANTATION OF DEVICE FOR GLAUCOMA Left 12/09/2020    Procedure: INSERTION, DEVICE, FOR GLAUCOMA/I SENT;  Surgeon: Melany Tse MD;  Location: [facility] OR [address] [place] [address];  Service: Ophthalmology;  Laterality: Left;    INJECTION OF JOINT Bilateral 12/23/2021    Procedure: Injection, Joint---BILATERAL ISCHIAL BURSA STEROID INJECTION;  Surgeon: Yonatan Garsia Jr., MD;  Location: [facility];  Service: Pain Management;  Laterality: Bilateral;    INTRAOCULAR PROSTHESES INSERTION Right 10/21/2020    Procedure: INSERTION, IOL PROSTHESIS;  Surgeon: Melany Tse MD;  Location: [facility] OR [address] [place] [address];  Service: Ophthalmology;  Laterality: Right;    INTRAOCULAR PROSTHESES INSERTION Left 12/09/2020    Procedure: INSERTION, IOL PROSTHESIS;  Surgeon: Melany Tse MD;  Location: [address];  Service: Ophthalmology;  Laterality: Left;    KNEE SURGERY Left 01/20/2016    TKR    KNEE SURGERY Right 02/26/2018    TKR    L4-L5 fusion  2006    PHACOEMULSIFICATION OF CATARACT Right 10/21/2020    Procedure: PHACOEMULSIFICATION, CATARACT;  Surgeon: Melany Tse MD;  Location: [address];  Service: Ophthalmology;  Laterality: Right;    PHACOEMULSIFICATION OF CATARACT Left 12/09/2020    Procedure: PHACOEMULSIFICATION, CATARACT;  Surgeon: Melany Tse MD;  Location: [address];  Service: Ophthalmology;  Laterality: Left;       Social History     Socioeconomic History    Marital status: Single   Tobacco Use    Smoking status: Every Day     Current packs/day: 0.50     Average packs/day: 0.4 packs/day for 53.7 years (21.7 ttl pk-yrs)     Types: Cigarettes     Start date: 1970    Smokeless tobacco: Current    Tobacco comments:     Patient is enrolled in smoking cessation. Comprehensive  "smoking hx was updated on 8/30/23 by MARQUIS Cherry RRT,MSW,LMSW,TTS   Substance and Sexual Activity    Alcohol use: No     Alcohol/week: 0.0 standard drinks of alcohol    Drug use: No    Sexual activity: Yes     Partners: Male       Review of patient's allergies indicates:   Allergen Reactions    Hydrocodone Shortness Of Breath    Iodinated contrast media Shortness Of Breath     Difficulty breathing    Adhesive Itching     Skin peeling    Latex, natural rubber Other (See Comments)     "Takes skin off"    Morphine Hallucinations    Oxycodone Hallucinations    Sulfa (sulfonamide antibiotics) Hives and Itching       Current Outpatient Medications on File Prior to Visit   Medication Sig Dispense Refill    albuterol (PROVENTIL/VENTOLIN HFA) 90 mcg/actuation inhaler Inhale 2 puffs into the lungs every 4 (four) hours as needed for Wheezing. Rescue      amLODIPine-benazepriL (LOTREL) 10-40 mg per capsule Take 1 capsule by mouth once daily. 90 capsule 3    atorvastatin (LIPITOR) 40 MG tablet Take 1 tablet (40 mg total) by mouth once daily. 90 tablet 3    blood-glucose meter kit Check blood glucose QD with insurance preferred glucometer 1 each 0    carvediloL (COREG) 12.5 MG tablet Take 1 tablet (12.5 mg total) by mouth 2 (two) times daily with meals. 180 tablet 3    chlorthalidone (HYGROTEN) 25 MG Tab Take 1 tablet (25 mg total) by mouth every other day. 15 tablet 11    gabapentin (NEURONTIN) 600 MG tablet Take 1 tablet (600 mg total) by mouth 2 (two) times daily. 180 tablet 3    hydrALAZINE (APRESOLINE) 10 MG tablet Take 1 tablet (10 mg total) by mouth every 12 (twelve) hours. 60 tablet 2    INULIN (FIBER GUMMIES ORAL) Take 1 each by mouth every morning.       lancets (ACCU-CHEK MULTICLIX LANCET) Misc Test blood sugar twice daily 300 each 3    nicotine, polacrilex, 2 mg lzmn Take 1 each (2 mg total) by mouth as needed (Use 1 lozenge as needed in the place of a cigarette every 1-2 hours as needed.  Maximum of 8 per day.). 243 " "each 0    simethicone (GAS-X ORAL) Take 1 tablet by mouth as needed.       [DISCONTINUED] meloxicam (MOBIC) 7.5 MG tablet TAKE 1 TABLET DAILY 90 tablet 3     No current facility-administered medications on file prior to visit.       Objective:      BP (!) 144/67 (BP Location: Right arm, Patient Position: Sitting, BP Method: Large (Automatic))   Pulse (!) 57   Temp 98.3 °F (36.8 °C) (Oral)   Ht 5' 2" (1.575 m)   Wt 80.6 kg (177 lb 9.3 oz)   SpO2 100%   BMI 32.48 kg/m²     Exam:  GEN:  Well developed, well nourished.  No acute distress.  Normal pain behavior.  HEENT:  No trauma.  Mucous membranes moist.  Nares patent bilaterally.  PSYCH: Normal affect. Thought content appropriate.  CHEST:  Breathing symmetric.  No audible wheezing.  ABD: Soft, non-distended.  SKIN:  Warm, pink, dry.  No rash on exposed areas.    EXT:  No cyanosis, clubbing, or edema.  No color change or changes in nail or hair growth.  NEURO/MUSCULOSKELETAL:  Fully alert, oriented, and appropriate. Speech normal janneth. No cranial nerve deficits.   Gait:  Antalgic.  No trendelenburg sign bilaterally.   Motor Strength:  5/5 motor strength throughout lower extremities.   Sensory:  No sensory deficit in the lower extremities.   L-Spine:  Limited ROM with pain on flexion.  Negative pain with axial/facet loading bilaterally.  Positive SLR on the right.    Positive TTP over bilateral GTB, left ischial bursa          Imaging:      Narrative & Impression    EXAMINATION:  MRI LUMBAR SPINE WITHOUT CONTRAST     CLINICAL HISTORY:  s/p lumbar fusion; Other intervertebral disc degeneration, lumbar region     TECHNIQUE:  Multiplanar, multisequence MR images of the lumbar spine were performed without the administration of contrast.     COMPARISON:  Lumbar spine radiograph 01/23/2019; CT abdomen contrast 11/16/2012; MRI lumbar spine 10/26/2007     FINDINGS:  Alignment: Grade 1 anterolisthesis of L3 on L4.  Grade 1 retrolisthesis of L5 on S1.     Vertebrae: " Postoperative changes from previous L4-5 posterior spinal fusion with bilateral transpedicular screws and posterior stabilizing bars and interbody spacer.  Normal marrow signal. No fracture.     Discs: Mild multilevel intervertebral disc height loss and desiccation.     Cord: Normal.  Conus terminates normally at L2.  Cauda equina appears normal.     Degenerative findings:     T10-T11: Diffuse disc bulge and bilateral facet arthropathy resulting in mild central canal stenosis, moderate left, and mild right neural foraminal narrowing.     T11-T12: Diffuse disc bulge and bilateral facet arthropathy resulting in moderate bilateral neural foraminal narrowing and mild central canal stenosis.     T12-L1: No significant disc abnormality.  Mild bilateral facet arthropathy without significant neural foraminal narrowing or central canal stenosis.     L1-L2: No significant disc abnormality.  Mild bilateral facet arthropathy without significant neural foraminal narrowing or central canal stenosis.     L2-L3: No significant disc abnormality.  Mild bilateral facet arthropathy without significant neural foraminal narrowing or central canal stenosis.     L3-L4: Diffuse disc bulge, moderate bilateral facet joint osseous hypertrophy, and ligamentum flavum buckling contribute to moderate central canal stenosis, moderate right, and mild left neural foraminal narrowing.     L4-L5: Postoperative change of previous decompression and posterior spinal fusion.  Productive changes contribute to mild left neural foraminal narrowing.     L5-S1: Diffuse disc bulge, bilateral facet arthropathy, and ligamentum flavum buckling resulting in severe left and moderate right neural foraminal narrowing and mild central canal stenosis.     Paraspinal muscles & soft tissues: Mild fatty atrophy of the lower paraspinous musculature.  No paraspinal fluid collections.  Upper sacrum and sacroiliac joints are unremarkable.     Impression:     1. Postsurgical  changes of L4-L5 fusion.  2. Multilevel degenerative changes as detailed above.  Moderate spinal canal stenosis noted at L3-L4.  Moderate neural foraminal narrowing noted at L3-L4 and L5-S1, as well as within the lower thoracic spine.     Electronically signed by resident: Naveen Resendez  Date:                                            12/17/2020  Time:                                           09:53     Electronically signed by: Mitch Pappas MD  Date:                                            12/17/2020  Time:                                           14:49           Narrative & Impression    EXAMINATION:  XR LUMBAR SPINE 5 VIEW WITH FLEX AND EXT     CLINICAL HISTORY:  Low back pain, cauda equina syndrome suspected;  Lumbago with sciatica, left side     TECHNIQUE:  Five views of the lumbar spine plus flexion extension views were performed.     COMPARISON:  02/25/2014     FINDINGS:  The lumbar vertebra are intact.  No compression fracture or obvious paraspinal lesion is detected.  Degenerative changes are present with small osteophytes at most levels.  There is progressive disc space narrowing at L3-4; this level also shows development of a grade 1 anterolisthesis, stable between flexion and extension.  The other lumbar disc spaces show no significant narrowing, but lower thoracic disc spaces are narrowed, some with vacuum disc.     Postoperative findings from posterior instrumented fusion are again seen at L4 and 5 with bilateral pedicle screws, vertical connecting rods, and a device in the disc space.     Visualized abdomen shows aortic atherosclerosis.     IMPRESSION:      Stable postoperative findings of L4-5 fusion     Degenerative spine changes with interval worsening at L3-4.        Electronically signed by: Valentín Booker MD  Date:                                            01/23/2019  Time:                                           10:16         Assessment:       Encounter Diagnoses   Name Primary?     Lumbar radiculopathy, chronic Yes    S/P lumbar fusion     Greater trochanteric bursitis of both hips     Lumbar spondylosis     DDD (degenerative disc disease), lumbar     Lumbar radiculopathy        Plan:       Stephanie was seen today for back pain and leg pain.    Diagnoses and all orders for this visit:    Lumbar radiculopathy, chronic  -     MRI Lumbar Spine Without Contrast; Future    S/P lumbar fusion  -     MRI Lumbar Spine Without Contrast; Future    Greater trochanteric bursitis of both hips  -     triamcinolone acetonide injection 40 mg  -     triamcinolone acetonide injection 40 mg    Lumbar spondylosis    DDD (degenerative disc disease), lumbar    Lumbar radiculopathy        Stephanie Sears is a 73 y.o. female with recurrent left-sided low back and lower extremity pain.  Consistent with left lower lumbar radicular pain.  History of L4-5 fusion.  Adjacent segment degenerative disc disease at the L3-4 and L5-S1 levels.  Current symptoms most likely related to L5-S1 disc herniation with compression of either the L5 and/or S1 nerve roots.  Also with recurrent bilateral GTB.    Prior records reviewed.  Pertinent imaging studies reviewed by me. Imaging results were discussed with patient.  Ordered lumbar MRI to get updated imaging.  Bilateral greater trochanteric bursa steroid injections done today.  See note below.  Continue physical therapy and home exercise program.  Return to clinic after imaging to discuss results.  May consider lumbar interventional procedures at that time.    bilateral Greater Trochanteric bursa Steroid Injection     Risks vs. Benefits were discussed with patient. Patient expressed understanding and written consent was obtained. The bilateral greater trochanter was palpated and marked.  The lateral hip(s) was/were draped and prepped with chrolaprep. A 27 gauge 1.5 inch needle was advanced to the left greater trochanter. After negative aspiration, 40 mg of Kenalog and 2.0 cc of 1%  lidocaine were injected.  The same procedures were performed on the right side.  The patient tolerated the procedure well, without any complications or bleeding. She was discharged from clinic in good condition.      Harpal Braswell PA-C  [facility]  Interventional Pain Management   09/06/2023    This note was created by combination of typed  and M-Modal dictation.  Transcription and phonetic errors may be present.  If there are any questions, please contact me.

## 2023-09-07 NOTE — PROGRESS NOTES
OCT/HVF done ou. REl/fix poor od fair os coop. Good ou. Chart checked for latex allergy./ -.75 + 1.00 x 180/od -1.25 + 1.00 x 176/os-[facility]

## 2023-09-13 ENCOUNTER — CLINICAL SUPPORT (OUTPATIENT)
Dept: SMOKING CESSATION | Facility: CLINIC | Age: 73
End: 2023-09-13
Payer: COMMERCIAL

## 2023-09-13 DIAGNOSIS — F17.200 NICOTINE DEPENDENCE: Primary | ICD-10-CM

## 2023-09-13 PROCEDURE — 99999 PR PBB SHADOW E&M-EST. PATIENT-LVL II: CPT | Mod: PBBFAC,,,

## 2023-09-13 PROCEDURE — 99999 PR PBB SHADOW E&M-EST. PATIENT-LVL II: ICD-10-PCS | Mod: PBBFAC,,,

## 2023-09-13 PROCEDURE — 99402 PREV MED CNSL INDIV APPRX 30: CPT | Mod: S$GLB,,,

## 2023-09-13 PROCEDURE — 99402 PR PREVENT COUNSEL,INDIV,30 MIN: ICD-10-PCS | Mod: S$GLB,,,

## 2023-09-13 NOTE — PROGRESS NOTES
Individual Follow-Up Form    9/13/2023    Quit Date: TBD    Clinical Status of Patient: Outpatient    Length of Service: 30 minutes    Continuing Medication: yes  Nicotine Lozenges    Other Medications: None     Target Symptoms: Withdrawal and medication side effects. The following were  rated moderate (3) to severe (4) on TCRS:  Moderate (3): Crave an Desire   Severe (4): None     Comments: Counselor spoke with patient telephonically for follow-up visit, name and date of birth verified as two patient identifiers.  Patient stated she was not feeling well, therefore she was unable to come into the clinic today.  Patient also reported she is still smoking about 10 cpd, but she allows the cigarettes to burn out at times.   Patient also reported she is still using 2 mg nicotine lozenges without any negative side effects reported at this time.  Counselor congratulated patient on her progress thus far.  Counselor also discussed patient selecting a new quit date,further rate reduction,and working towards further behavior modification strategies.  Follow-up visit scheduled in two weeks. Counselor will remain available should any further needs arise.     Diagnosis: F17.200    Next Visit: 2 weeks

## 2023-09-15 ENCOUNTER — HOSPITAL ENCOUNTER (OUTPATIENT)
Dept: RADIOLOGY | Facility: HOSPITAL | Age: 73
Discharge: HOME | End: 2023-09-15
Payer: MEDICARE

## 2023-09-15 DIAGNOSIS — M54.16 LUMBAR RADICULOPATHY, CHRONIC: ICD-10-CM

## 2023-09-15 DIAGNOSIS — Z98.1 S/P LUMBAR FUSION: ICD-10-CM

## 2023-09-15 PROCEDURE — 72148 MRI LUMBAR SPINE W/O DYE: CPT | Mod: 26,,, | Performed by: INTERNAL MEDICINE

## 2023-09-15 PROCEDURE — 72148 MRI LUMBAR SPINE W/O DYE: CPT | Mod: TC

## 2023-09-15 PROCEDURE — 72148 MRI LUMBAR SPINE WITHOUT CONTRAST: ICD-10-PCS | Mod: 26,,, | Performed by: INTERNAL MEDICINE

## 2023-09-21 ENCOUNTER — OFFICE VISIT (OUTPATIENT)
Dept: PAIN MEDICINE | Facility: CLINIC | Age: 73
End: 2023-09-21
Payer: MEDICARE

## 2023-09-21 VITALS
OXYGEN SATURATION: 100 % | HEART RATE: 66 BPM | DIASTOLIC BLOOD PRESSURE: 81 MMHG | RESPIRATION RATE: 18 BRPM | SYSTOLIC BLOOD PRESSURE: 178 MMHG

## 2023-09-21 DIAGNOSIS — M54.16 LUMBAR RADICULOPATHY, CHRONIC: ICD-10-CM

## 2023-09-21 DIAGNOSIS — M48.062 SPINAL STENOSIS OF LUMBAR REGION WITH NEUROGENIC CLAUDICATION: ICD-10-CM

## 2023-09-21 DIAGNOSIS — M70.62 GREATER TROCHANTERIC BURSITIS OF BOTH HIPS: ICD-10-CM

## 2023-09-21 DIAGNOSIS — M47.816 LUMBAR SPONDYLOSIS: ICD-10-CM

## 2023-09-21 DIAGNOSIS — Z98.1 S/P LUMBAR FUSION: ICD-10-CM

## 2023-09-21 DIAGNOSIS — M70.61 GREATER TROCHANTERIC BURSITIS OF BOTH HIPS: ICD-10-CM

## 2023-09-21 DIAGNOSIS — M51.36 DDD (DEGENERATIVE DISC DISEASE), LUMBAR: Primary | ICD-10-CM

## 2023-09-21 PROCEDURE — 99213 OFFICE O/P EST LOW 20 MIN: CPT | Mod: S$PBB,,,

## 2023-09-21 PROCEDURE — 99999 PR PBB SHADOW E&M-EST. PATIENT-LVL III: ICD-10-PCS | Mod: PBBFAC,,,

## 2023-09-21 PROCEDURE — 99213 OFFICE O/P EST LOW 20 MIN: CPT | Mod: PBBFAC,PN

## 2023-09-21 PROCEDURE — 99999 PR PBB SHADOW E&M-EST. PATIENT-LVL III: CPT | Mod: PBBFAC,,,

## 2023-09-21 PROCEDURE — 99213 PR OFFICE/OUTPT VISIT, EST, LEVL III, 20-29 MIN: ICD-10-PCS | Mod: S$PBB,,,

## 2023-09-21 NOTE — PROGRESS NOTES
Subjective:     Patient ID: Stephanie Sears is a 73 y.o. female    Chief Complaint: Follow-up (MRI Review)      Referred by: No ref. provider found      HPI:    Interval History PA (09/21/2023):  Patient returns to clinic for follow up and MRI review.  Patient reports recent repeat bilateral GTB steroid injections have been beneficial for bilateral lateral hip pain.  Reports approximately 90% relief over her lateral hip, no continued pain over these locations.  She does report some continued midline lower back pain however since previous visit she is no longer been having pain radiates into her bilateral posterior thighs.  Notes doing overall with physical therapy and a regular home exercise program.  Pain has been more intermittent and more manageable.  She does note some continued pain with prolonged sitting but overall tolerable.  Patient would like to hold off on interventional procedures at this time.  Denies any numbness, tingling, weakness, bowel or bladder dysfunction.    Interval History PA (09/06/2023):  Patient returns to clinic for follow up.  Patient notes that she has been attending physical therapy with overall improvement in her health and pain levels.  However continues to report significant pain located in her midline lower lumbar region radiating down into her bilateral posterior thighs, stopping at the knees.  Denies any changes in the quality or location of her pain.  Overall states physical therapy has been beneficial, although pain continues to be significant.  She also notes return and worsening of bilateral lateral hip pain.  States pain is in similar quality and location as with previous trochanteric bursitis.  Patient is interested in repeat injections.  Notes previous GTB injections have been significantly beneficial, typically alleviate the pain for 8+ months.  Denies any weakness, bowel or bladder dysfunction.    Interval History (7/11/23):  She returns today for follow up.  Shoe  reports that her left sided low back and lower extremity pain has returned.  Denies any changes in the quality or location of his pain.  Denies any new or worsening symptoms.  States that she was in physical therapy earlier this year and was finding this very helpful.  She would like to see if additional physical therapy can be done.      Interval History (5/18/22):  She returns today for follow up.  She reports that physical therapy has been helpful for the low back pain.  She states the low back is currently doing well and she is happy with his progress.  Today, she mainly complains of left lateral hip pain consistent with trochanteric bursitis.  Has had injections for this in the past that have helped.  She would like repeat injection today.      Interval History (3/23/22):  She returns today for follow up.  She reports that she has completed physical therapy for her neck pain.  She states this issue was doing well.  Today she wishes discuss her low back pain.  This pain is located the midline lower lumbar/lumbosacral region.  The pain will radiate to the bilateral lumbar paraspinal regions and bilateral posterior thighs.  The pain does not cross the knees.  The pain is worse with standing/walking.  She denies any associated numbness, tingling, weakness, bowel bladder dysfunction.        Interval History (1/6/22):  She returns today for follow up.  She reports that bilateral ischial bursa steroid injections has been helpful for the bilateral buttock/hip pain.  She reports 60% relief following this procedure.  Today she wishes to discuss her neck pain.  She has had neck pain intermittently for years.  The pain is located the midline mid to lower cervical region.  The pain does not radiate.  She denies any associated numbness, tingling, weakness, bowel bladder dysfunction.  She would like to attend additional physical therapy for the management of her neck pain.      Interval History (12/14/21):  She returns today  for follow up.  She reports that ischial bursa pain has returned.  She reports near complete relief following bilateral ischial bursa steroid injection done in February 2021. She got at least 9 months of good relief from this procedure.  She states the pain has since returned.  She denies any changes in the quality location the pain.  She denies any new or worsening symptoms.        Interval History NP (3/16/21):    Pt returns today for follow up. She states that the bilateral ischial bursa injections have relieved nearly all of the bilateral buttock pain.  She is very happy with the results at this time.     Interval History (2/19/21):  She returns today for follow up.  She reports that she continues to have bilateral buttock pain particular when sitting.  She denies any changes in the quality location was pain.  She denies any new worsening symptoms.  She has been attending physical therapy and finds that it has been helpful.  She states that her pain is still quite severe especially given recent cold weather.        Interval History (12/21/20):  She returns today for follow up and imaging review.  She reports that she continues to have pain mainly surrounding the bilateral hip girdles.  She states that she has focal pain in the bilateral gluteal regions when sitting upright.  She also has pain in the bilateral lateral hip regions.  She has undergone multiple greater trochanteric bursa steroid injections in the past.  These were initially effective, but gradually provided less relief with subsequent injections.  She continues to deny any significant low back pain.        Initial Encounter (12/7/20):  Stephanie Sears is a 73 y.o. female who presents today with chronic bilateral low back pain.  This pain has been present for years.  Patient is status post L4-5 fusion in 2006.  She states that she did have some pain radiating into her lower extremities in the past, but her current pain is isolated to the bilateral  lower lumbar/lumbosacral regions.  She denies any associated numbness, tingling, weakness, bowel bladder dysfunction.  The pain is constant worse with prolonged sitting and standing..   This pain is described in detail below.    Physical Therapy:  Yes, currently.    Non-pharmacologic Treatment:  Rest helps         TENS?  No    Pain Medications:         Currently taking:  gabapentin    Has tried in the past:  Tramadol, NSAIDs, Tylenol, meloxicam    Has not tried:  TCAs, SNRIs, topical creams    Blood thinners:  None    Interventional Therapies:    2/26/21 - bilateral ischial bursa injections - 90% relief for at least 9 months  12/23/21 - bilateral ischial bursa steroid injections - 60% relief  09/06/2023 - bilateral GTB steroid injections - 90% relief    Relevant Surgeries:   L4-5 fusion in 2006    Affecting sleep?  Yes    Affecting daily activities? yes    Depressive symptoms? no          SI/HI? No    Work status: Retired    Pain Scores:    Best:       2/10  Worst:     9/10  Usually:   3/10  Today:    3/10    Pain Disability Index  Family/Home Responsibilities:: 5  Recreation:: 5  Social Activity:: 3  Occupation:: 0  Sexual Behavior:: 0 (Not Active)  Self Care:: 5  Life-Support Activities:: 9  Pain Disability Index (PDI): 27(     Review of Systems   Constitutional:  Negative for activity change, appetite change, chills, fatigue, fever and unexpected weight change.   HENT:  Negative for hearing loss.    Eyes:  Negative for visual disturbance.   Respiratory:  Negative for chest tightness and shortness of breath.    Cardiovascular:  Negative for chest pain.   Gastrointestinal:  Negative for abdominal pain, constipation, diarrhea, nausea and vomiting.   Genitourinary:  Negative for difficulty urinating.   Musculoskeletal:  Positive for arthralgias, back pain, gait problem, myalgias, neck pain and neck stiffness.   Skin:  Negative for rash.   Neurological:  Negative for dizziness, weakness, light-headedness, numbness and  headaches.   Psychiatric/Behavioral:  Positive for sleep disturbance. Negative for hallucinations and suicidal ideas. The patient is not nervous/anxious.        Past Medical History:   Diagnosis Date    Acute pancreatitis     Angina pectoris     Anxiety     Arthritis     Asthma     as a child    Back pain     Bronchitis     Cervical spondylosis with radiculopathy 07/10/2012    Chest pain     Chronic neck pain 07/26/2012    Colon polyps     Controlled type 2 diabetes mellitus with stage 2 chronic kidney disease, with long-term current use of insulin 01/24/2020    COPD (chronic obstructive pulmonary disease)     Coronary artery disease     Depression     Fibrocystic breast     General anesthetics causing adverse effect in therapeutic use     trouble coming out of anes/ had the shakes    GERD (gastroesophageal reflux disease)     Glaucoma     Heart failure     Hyperlipidemia     Hypertension     Neck pain 07/10/2012    Obesity     JAM (obstructive sleep apnea)     Pneumonia     Pneumonia due to other staphylococcus     Primary osteoarthritis of both knees     Psoriasis     Subacromial or subdeltoid bursitis 07/10/2012    Thyroid disease     Tobacco dependence     Trouble in sleeping     Type 2 diabetes mellitus 12/10/2013    Type 2 diabetes mellitus with diabetic chronic kidney disease        Past Surgical History:   Procedure Laterality Date    APPENDECTOMY      BREAST BIOPSY Bilateral 1988    BREAST MASS EXCISION Right     benign    CATARACT EXTRACTION W/  INTRAOCULAR LENS IMPLANT Left 12/09/2020    PHACO IOL WITH I-STENT ()    CATARACT EXTRACTION W/  INTRAOCULAR LENS IMPLANT Right 10/21/2020    PHACO IOL WITH I-STENT x 2 ()    CHOLECYSTECTOMY      COLONOSCOPY N/A 05/22/2019    Procedure: COLONOSCOPY;  Surgeon: Darrin Calvin MD;  Location: [address]);  Service: Endoscopy;  Laterality: N/A;  2nd floor - all other procedure done on 2, multiple comorbidities - ERW/   change in MD  schedule, Pt notified and verbalized understanding, new arrival time 0800, Ins mailed to Pt with new arrival time - ERW1/8/19@1130    COLONOSCOPY N/A 9/26/2022    Procedure: COLONOSCOPY;  Surgeon: Darrin Calvin MD;  Location: [facility] ([address] [place] [address]);  Service: Endoscopy;  Laterality: N/A;  2nd floor d/t previous anesthesia complications  vaccinated  inst mailed  clear liquids 4 hr prior-AM prep-LW  I should be able to complete this with the slim pediatric colonoscope, but should have the single-balloon available in case needed.    EPIDURAL STEROID INJECTION Bilateral 02/26/2021    Procedure: Injection, Steroid, Ischial Bursa;  Surgeon: Yonatan Garsia Jr., MD;  Location: [facility] ENDO;  Service: Pain Management;  Laterality: Bilateral;  Bilateral Ischial Bursa Steroid Injections  Arrive @ 1230; No ATC; Check BG    ESOPHAGOGASTRODUODENOSCOPY      HYSTERECTOMY  1980's    IMPLANTATION OF DEVICE FOR GLAUCOMA Right 10/21/2020    Procedure: INSERTION, DEVICE, FOR GLAUCOMA/ i Stent;  Surgeon: Melany Tse MD;  Location: [address];  Service: Ophthalmology;  Laterality: Right;    IMPLANTATION OF DEVICE FOR GLAUCOMA Left 12/09/2020    Procedure: INSERTION, DEVICE, FOR GLAUCOMA/I SENT;  Surgeon: Melany Tse MD;  Location: [address];  Service: Ophthalmology;  Laterality: Left;    INJECTION OF JOINT Bilateral 12/23/2021    Procedure: Injection, Joint---BILATERAL ISCHIAL BURSA STEROID INJECTION;  Surgeon: Yonatan Garsia Jr., MD;  Location: [facility] PAIN MGMT;  Service: Pain Management;  Laterality: Bilateral;    INTRAOCULAR PROSTHESES INSERTION Right 10/21/2020    Procedure: INSERTION, IOL PROSTHESIS;  Surgeon: Melany Tse MD;  Location: [address];  Service: Ophthalmology;  Laterality: Right;    INTRAOCULAR PROSTHESES INSERTION Left 12/09/2020    Procedure: INSERTION, IOL PROSTHESIS;  Surgeon: Melany Tse MD;  Location: [address];  Service: Ophthalmology;  Laterality:  "Left;    KNEE SURGERY Left 01/20/2016    TKR    KNEE SURGERY Right 02/26/2018    TKR    L4-L5 fusion  2006    PHACOEMULSIFICATION OF CATARACT Right 10/21/2020    Procedure: PHACOEMULSIFICATION, CATARACT;  Surgeon: Melany Tse MD;  Location: [facility] OR [address] [place] [address];  Service: Ophthalmology;  Laterality: Right;    PHACOEMULSIFICATION OF CATARACT Left 12/09/2020    Procedure: PHACOEMULSIFICATION, CATARACT;  Surgeon: Melany Tse MD;  Location: [facility] OR [address] [place] [address];  Service: Ophthalmology;  Laterality: Left;       Social History     Socioeconomic History    Marital status: Single   Tobacco Use    Smoking status: Every Day     Current packs/day: 0.50     Average packs/day: 0.4 packs/day for 53.7 years (21.7 ttl pk-yrs)     Types: Cigarettes     Start date: 1970    Smokeless tobacco: Current    Tobacco comments:     Patient is enrolled in smoking cessation. Comprehensive smoking hx was updated on 8/30/23 by MARQUIS Cherry RRT,MSW,LMSW,TTS   Substance and Sexual Activity    Alcohol use: No     Alcohol/week: 0.0 standard drinks of alcohol    Drug use: No    Sexual activity: Yes     Partners: Male       Review of patient's allergies indicates:   Allergen Reactions    Hydrocodone Shortness Of Breath    Iodinated contrast media Shortness Of Breath     Difficulty breathing    Adhesive Itching     Skin peeling    Latex, natural rubber Other (See Comments)     "Takes skin off"    Morphine Hallucinations    Oxycodone Hallucinations    Sulfa (sulfonamide antibiotics) Hives and Itching       Current Outpatient Medications on File Prior to Visit   Medication Sig Dispense Refill    albuterol (PROVENTIL/VENTOLIN HFA) 90 mcg/actuation inhaler Inhale 2 puffs into the lungs every 4 (four) hours as needed for Wheezing. Rescue      amLODIPine-benazepriL (LOTREL) 10-40 mg per capsule Take 1 capsule by mouth once daily. 90 capsule 3    atorvastatin (LIPITOR) 40 MG tablet Take 1 tablet (40 mg total) by mouth once daily. 90 tablet 3    " blood-glucose meter kit Check blood glucose QD with insurance preferred glucometer 1 each 0    carvediloL (COREG) 12.5 MG tablet Take 1 tablet (12.5 mg total) by mouth 2 (two) times daily with meals. 180 tablet 3    chlorthalidone (HYGROTEN) 25 MG Tab Take 1 tablet (25 mg total) by mouth every other day. 15 tablet 11    gabapentin (NEURONTIN) 600 MG tablet Take 1 tablet (600 mg total) by mouth 2 (two) times daily. 180 tablet 3    hydrALAZINE (APRESOLINE) 10 MG tablet Take 1 tablet (10 mg total) by mouth every 12 (twelve) hours. 60 tablet 2    INULIN (FIBER GUMMIES ORAL) Take 1 each by mouth every morning.       lancets (ACCU-CHEK MULTICLIX LANCET) Misc Test blood sugar twice daily 300 each 3    nicotine, polacrilex, 2 mg lzmn Take 1 each (2 mg total) by mouth as needed (Use 1 lozenge as needed in the place of a cigarette every 1-2 hours as needed.  Maximum of 8 per day.). 243 each 0    simethicone (GAS-X ORAL) Take 1 tablet by mouth as needed.       [DISCONTINUED] meloxicam (MOBIC) 7.5 MG tablet TAKE 1 TABLET DAILY 90 tablet 3     No current facility-administered medications on file prior to visit.       Objective:      BP (!) 178/81 (BP Location: Right arm, Patient Position: Sitting, BP Method: Medium (Automatic))   Pulse 66   Resp 18   SpO2 100%     Exam:  GEN:  Well developed, well nourished.  No acute distress.  Normal pain behavior.  HEENT:  No trauma.  Mucous membranes moist.  Nares patent bilaterally.  PSYCH: Normal affect. Thought content appropriate.  CHEST:  Breathing symmetric.  No audible wheezing.  ABD: Soft, non-distended.  SKIN:  Warm, pink, dry.  No rash on exposed areas.    EXT:  No cyanosis, clubbing, or edema.  No color change or changes in nail or hair growth.  NEURO/MUSCULOSKELETAL:  Fully alert, oriented, and appropriate. Speech normal janneth. No cranial nerve deficits.   Gait:  Antalgic.  No trendelenburg sign bilaterally.             Imaging:  Narrative & Impression    EXAMINATION:  MRI  LUMBAR SPINE WITHOUT CONTRAST     CLINICAL HISTORY:  Lumbar radiculopathy, symptoms persist with conservative treatment; Radiculopathy, lumbar region     TECHNIQUE:  Multiplanar, multisequence MR images were acquired from the thoracolumbar junction to the sacrum without the administration of contrast.     COMPARISON:  MRI lumbar spine 12/17/2020     FINDINGS:  Postoperative changes from L4-5 interbody and posterior instrumented fusion.  Susceptibility artifact degrades evaluation of adjacent structures.  Unchanged alignment.  There is osseous fusion across the facet joints.  No fluid collections in the operative bed.     ALIGNMENT: Levocurvature centered at L3.  Grade 1 anterolisthesis at L3-4.     BONE: No compression fractures.  No marrow replacing lesions.     JOINT: Multilevel degenerative changes with disc desiccation, disc height loss, and facet arthropathy, described in detail below.  Mild endplate edema at L3-4 and L5-S1.     SPINAL CANAL: The conus medullaris has a normal appearance and terminates at the L2 level.  Cauda equina nerve roots are unremarkable.  No mass or collection.     PARASPINAL SOFT TISSUES: Left renal cyst.  Colonic diverticulosis.  Paraspinal muscle atrophy.     SIGNIFICANT FINDINGS BY LEVEL:     Lower thoracic spine: Disc bulges and facet arthropathy resulting in mild-moderate canal and foraminal stenosis at multiple levels.     T12-L1: Unremarkable.     L1-2: Unremarkable.     L2-3: Unremarkable.     L3-4: Disc bulge with posterior disc uncovering.  Severe bilateral facet arthropathy and ligamentum flavum thickening.  Severe canal stenosis.  Severe right and mild left foraminal stenosis.     L4-5: Postoperative changes from interbody and posterior fusion.  Residual endplate osteophytes protruding into the left paracentral zone.  No significant canal stenosis.  Mild right and moderate left foraminal stenosis.     L5-S1: Disc bulge.  Advanced facet arthropathy and ligamentum flavum  thickening.  Moderate canal stenosis.  Moderate right and severe left foraminal stenosis.     Impression:     Postoperative changes from L4-5 fusion.     Multilevel degenerative changes, most advanced at L3-4 and L5-S1 as detailed above.  No significant changes from 12/17/2020.        Electronically signed by: Parish Grijalva  Date:                                            09/15/2023  Time:                                           14:33       Narrative & Impression    EXAMINATION:  MRI LUMBAR SPINE WITHOUT CONTRAST     CLINICAL HISTORY:  s/p lumbar fusion; Other intervertebral disc degeneration, lumbar region     TECHNIQUE:  Multiplanar, multisequence MR images of the lumbar spine were performed without the administration of contrast.     COMPARISON:  Lumbar spine radiograph 01/23/2019; CT abdomen contrast 11/16/2012; MRI lumbar spine 10/26/2007     FINDINGS:  Alignment: Grade 1 anterolisthesis of L3 on L4.  Grade 1 retrolisthesis of L5 on S1.     Vertebrae: Postoperative changes from previous L4-5 posterior spinal fusion with bilateral transpedicular screws and posterior stabilizing bars and interbody spacer.  Normal marrow signal. No fracture.     Discs: Mild multilevel intervertebral disc height loss and desiccation.     Cord: Normal.  Conus terminates normally at L2.  Cauda equina appears normal.     Degenerative findings:     T10-T11: Diffuse disc bulge and bilateral facet arthropathy resulting in mild central canal stenosis, moderate left, and mild right neural foraminal narrowing.     T11-T12: Diffuse disc bulge and bilateral facet arthropathy resulting in moderate bilateral neural foraminal narrowing and mild central canal stenosis.     T12-L1: No significant disc abnormality.  Mild bilateral facet arthropathy without significant neural foraminal narrowing or central canal stenosis.     L1-L2: No significant disc abnormality.  Mild bilateral facet arthropathy without significant neural foraminal narrowing or  central canal stenosis.     L2-L3: No significant disc abnormality.  Mild bilateral facet arthropathy without significant neural foraminal narrowing or central canal stenosis.     L3-L4: Diffuse disc bulge, moderate bilateral facet joint osseous hypertrophy, and ligamentum flavum buckling contribute to moderate central canal stenosis, moderate right, and mild left neural foraminal narrowing.     L4-L5: Postoperative change of previous decompression and posterior spinal fusion.  Productive changes contribute to mild left neural foraminal narrowing.     L5-S1: Diffuse disc bulge, bilateral facet arthropathy, and ligamentum flavum buckling resulting in severe left and moderate right neural foraminal narrowing and mild central canal stenosis.     Paraspinal muscles & soft tissues: Mild fatty atrophy of the lower paraspinous musculature.  No paraspinal fluid collections.  Upper sacrum and sacroiliac joints are unremarkable.     Impression:     1. Postsurgical changes of L4-L5 fusion.  2. Multilevel degenerative changes as detailed above.  Moderate spinal canal stenosis noted at L3-L4.  Moderate neural foraminal narrowing noted at L3-L4 and L5-S1, as well as within the lower thoracic spine.     Electronically signed by resident: Naveen Resendez  Date:                                            12/17/2020  Time:                                           09:53     Electronically signed by: Mitch Pappas MD  Date:                                            12/17/2020  Time:                                           14:49           Narrative & Impression    EXAMINATION:  XR LUMBAR SPINE 5 VIEW WITH FLEX AND EXT     CLINICAL HISTORY:  Low back pain, cauda equina syndrome suspected;  Lumbago with sciatica, left side     TECHNIQUE:  Five views of the lumbar spine plus flexion extension views were performed.     COMPARISON:  02/25/2014     FINDINGS:  The lumbar vertebra are intact.  No compression fracture or obvious paraspinal  lesion is detected.  Degenerative changes are present with small osteophytes at most levels.  There is progressive disc space narrowing at L3-4; this level also shows development of a grade 1 anterolisthesis, stable between flexion and extension.  The other lumbar disc spaces show no significant narrowing, but lower thoracic disc spaces are narrowed, some with vacuum disc.     Postoperative findings from posterior instrumented fusion are again seen at L4 and 5 with bilateral pedicle screws, vertical connecting rods, and a device in the disc space.     Visualized abdomen shows aortic atherosclerosis.     IMPRESSION:      Stable postoperative findings of L4-5 fusion     Degenerative spine changes with interval worsening at L3-4.        Electronically signed by: Valentín Booker MD  Date:                                            01/23/2019  Time:                                           10:16         Assessment:       Encounter Diagnoses   Name Primary?    DDD (degenerative disc disease), lumbar Yes    Lumbar radiculopathy, chronic     Greater trochanteric bursitis of both hips     Lumbar spondylosis     S/P lumbar fusion     Spinal stenosis of lumbar region with neurogenic claudication          Plan:       Stephanie was seen today for follow-up.    Diagnoses and all orders for this visit:    DDD (degenerative disc disease), lumbar    Lumbar radiculopathy, chronic    Greater trochanteric bursitis of both hips    Lumbar spondylosis    S/P lumbar fusion    Spinal stenosis of lumbar region with neurogenic claudication          Stephanie Sears is a 73 y.o. female with midline lower back and bilateral lower extremity pain.  Consistent with lower lumbar radiculopathy.  History of a L4-5 fusion.  Adjacent segment degenerative disc disease at L3-4 and L5-S1 levels.  Updated lumbar MRI confirming this, severe central canal stenosis at L3-4 as well as bilateral foraminal narrowing at this level.  Also with significant central  and foraminal stenosis at L5-S1.  Patient has been having good improvement with physical therapy/home exercise program.  Also recent bilateral GTB injections with significant relief of lateral hip pain.    Prior records reviewed.  Pertinent imaging studies reviewed by me. Imaging results were discussed with patient.  Patient is s/p bilateral greater trochanteric bursa steroid injections with 90% relief of symptoms.  Patient notes minimal to no continued pain over bilateral lateral hips.  Patient reports lower back pain overall at a manageable level at this time.  Would like to hold off on any interventional procedures at this time.  May consider in the future if pain persists/worsens.  Continue physical therapy and home exercise program.  Return to clinic as needed.      Harpal Braswell PA-C  [facility]  Interventional Pain Management   09/21/2023    This note was created by combination of typed  and M-Modal dictation.  Transcription and phonetic errors may be present.  If there are any questions, please contact me.

## 2023-09-25 ENCOUNTER — HOSPITAL ENCOUNTER (EMERGENCY)
Facility: HOSPITAL | Age: 73
Discharge: HOME | End: 2023-09-25
Attending: EMERGENCY MEDICINE
Payer: MEDICARE

## 2023-09-25 VITALS
TEMPERATURE: 99 F | RESPIRATION RATE: 18 BRPM | OXYGEN SATURATION: 100 % | SYSTOLIC BLOOD PRESSURE: 169 MMHG | HEART RATE: 63 BPM | HEIGHT: 62 IN | DIASTOLIC BLOOD PRESSURE: 77 MMHG | WEIGHT: 175 LBS | BODY MASS INDEX: 32.2 KG/M2

## 2023-09-25 DIAGNOSIS — S90.121A CONTUSION OF FIFTH TOE OF RIGHT FOOT, INITIAL ENCOUNTER: Primary | ICD-10-CM

## 2023-09-25 DIAGNOSIS — T14.90XA TRAUMA: ICD-10-CM

## 2023-09-25 PROCEDURE — 99283 EMERGENCY DEPT VISIT LOW MDM: CPT

## 2023-09-25 PROCEDURE — 25000003 PHARM REV CODE 250: Performed by: PHYSICIAN ASSISTANT

## 2023-09-25 RX ORDER — ACETAMINOPHEN 500 MG
1000 TABLET ORAL
Status: COMPLETED | OUTPATIENT
Start: 2023-09-25 | End: 2023-09-25

## 2023-09-25 RX ORDER — MELOXICAM 7.5 MG/1
7.5 TABLET ORAL DAILY
Qty: 12 TABLET | Refills: 0 | Status: SHIPPED | OUTPATIENT
Start: 2023-09-25 | End: 2023-12-07

## 2023-09-25 RX ADMIN — ACETAMINOPHEN 1000 MG: 500 TABLET ORAL at 07:09

## 2023-09-25 NOTE — ED PROVIDER NOTES
"Encounter Date: 9/25/2023    SCRIBE #1 NOTE: I, Kendy Murphy, am scribing for, and in the presence of,  Kaleb Lam PA-C. Other sections scribed: HPI, ROS.       History     Chief Complaint   Patient presents with    Toe Pain     Pt arrives to Ed with c/o toe pain with onset this morning, reports hitting toe on night stand, pain to right 5th digit      CC: Toe pain    HPI: History is obtained from independent historian. This is a 73 y.o. F who has a PMHx of Arthritis who presents to the ED for emergent evaluation of acute pain to the 5th digit of the right foot that began after stubbing the 5th digit on the nightstand today. Pt states that she is unable to bear weight onto the right foot due to pain in the 5th digit of the right foot. She has been applying a cold compressor to the affected area without improvement. Pt denies numbness.    The history is provided by the patient. No  was used.     Review of patient's allergies indicates:   Allergen Reactions    Hydrocodone Shortness Of Breath    Iodinated contrast media Shortness Of Breath     Difficulty breathing    Adhesive Itching     Skin peeling    Latex, natural rubber Other (See Comments)     "Takes skin off"    Morphine Hallucinations    Oxycodone Hallucinations    Sulfa (sulfonamide antibiotics) Hives and Itching     Past Medical History:   Diagnosis Date    Acute pancreatitis     Angina pectoris     Anxiety     Arthritis     Asthma     as a child    Back pain     Bronchitis     Cervical spondylosis with radiculopathy 07/10/2012    Chest pain     Chronic neck pain 07/26/2012    Colon polyps     Controlled type 2 diabetes mellitus with stage 2 chronic kidney disease, with long-term current use of insulin 01/24/2020    COPD (chronic obstructive pulmonary disease)     Coronary artery disease     Depression     Fibrocystic breast     General anesthetics causing adverse effect in therapeutic use     trouble coming out of anes/ had the " priti    GERD (gastroesophageal reflux disease)     Glaucoma     Heart failure     Hyperlipidemia     Hypertension     Neck pain 07/10/2012    Obesity     JAM (obstructive sleep apnea)     Pneumonia     Pneumonia due to other staphylococcus     Primary osteoarthritis of both knees     Psoriasis     Subacromial or subdeltoid bursitis 07/10/2012    Thyroid disease     Tobacco dependence     Trouble in sleeping     Type 2 diabetes mellitus 12/10/2013    Type 2 diabetes mellitus with diabetic chronic kidney disease      Past Surgical History:   Procedure Laterality Date    APPENDECTOMY      BREAST BIOPSY Bilateral 1988    BREAST MASS EXCISION Right     benign    CATARACT EXTRACTION W/  INTRAOCULAR LENS IMPLANT Left 12/09/2020    PHACO IOL WITH I-STENT ()    CATARACT EXTRACTION W/  INTRAOCULAR LENS IMPLANT Right 10/21/2020    PHACO IOL WITH I-STENT x 2 ()    CHOLECYSTECTOMY      COLONOSCOPY N/A 05/22/2019    Procedure: COLONOSCOPY;  Surgeon: Darrin Calvin MD;  Location: [facility] ([address] [place] [address]);  Service: Endoscopy;  Laterality: N/A;  2nd floor - all other procedure done on 2, multiple comorbidities - ERW/   change in MD schedule, Pt notified and verbalized understanding, new arrival time 0800, Ins mailed to Pt with new arrival time - ERW1/8/19@1130    COLONOSCOPY N/A 9/26/2022    Procedure: COLONOSCOPY;  Surgeon: Darrin Calvin MD;  Location: [facility] ([address] [place] [address]);  Service: Endoscopy;  Laterality: N/A;  2nd floor d/t previous anesthesia complications  vaccinated  inst mailed  clear liquids 4 hr prior-AM prep-LW  I should be able to complete this with the slim pediatric colonoscope, but should have the single-balloon available in case needed.    EPIDURAL STEROID INJECTION Bilateral 02/26/2021    Procedure: Injection, Steroid, Ischial Bursa;  Surgeon: Yonatan Garsia Jr., MD;  Location: [facility];  Service: Pain Management;  Laterality: Bilateral;  Bilateral Ischial Bursa Steroid  Injections  Arrive @ 1230; No ATC; Check BG    ESOPHAGOGASTRODUODENOSCOPY      HYSTERECTOMY  1980's    IMPLANTATION OF DEVICE FOR GLAUCOMA Right 10/21/2020    Procedure: INSERTION, DEVICE, FOR GLAUCOMA/ i Stent;  Surgeon: Melany Tse MD;  Location: [address];  Service: Ophthalmology;  Laterality: Right;    IMPLANTATION OF DEVICE FOR GLAUCOMA Left 12/09/2020    Procedure: INSERTION, DEVICE, FOR GLAUCOMA/I SENT;  Surgeon: Melany Tse MD;  Location: [address];  Service: Ophthalmology;  Laterality: Left;    INJECTION OF JOINT Bilateral 12/23/2021    Procedure: Injection, Joint---BILATERAL ISCHIAL BURSA STEROID INJECTION;  Surgeon: Yonatan Garsia Jr., MD;  [facility];  Service: Pain Management;  Laterality: Bilateral;    INTRAOCULAR PROSTHESES INSERTION Right 10/21/2020    Procedure: INSERTION, IOL PROSTHESIS;  Surgeon: Melany Tse MD;  Location: [address];  Service: Ophthalmology;  Laterality: Right;    INTRAOCULAR PROSTHESES INSERTION Left 12/09/2020    Procedure: INSERTION, IOL PROSTHESIS;  Surgeon: Melany Tse MD;  Location: [address];  Service: Ophthalmology;  Laterality: Left;    KNEE SURGERY Left 01/20/2016    TKR    KNEE SURGERY Right 02/26/2018    TKR    L4-L5 fusion  2006    PHACOEMULSIFICATION OF CATARACT Right 10/21/2020    Procedure: PHACOEMULSIFICATION, CATARACT;  Surgeon: Melany Tse MD;  Location: [address];  Service: Ophthalmology;  Laterality: Right;    PHACOEMULSIFICATION OF CATARACT Left 12/09/2020    Procedure: PHACOEMULSIFICATION, CATARACT;  Surgeon: Melany Tse MD;  Location: [address];  Service: Ophthalmology;  Laterality: Left;     Family History   Problem Relation Age of Onset    Diabetes Mother     Hypertension Mother         CHF, angina    Angina Mother     Kidney disease Mother     Heart disease Mother         CHF    Hypertension Father         glaucoma, Alzhiemer's , supra pubic     Alzheimer's disease Father     Glaucoma Father     Glaucoma Sister     Hypertension Sister     Hyperlipidemia Sister     Sleep apnea Sister     Hypertension Sister     Thyroid disease Sister     No Known Problems Sister     Breast cancer Sister 54    Cancer Paternal Aunt         colon ca    Kidney disease Brother         kidney transplant, HTN,DM    Diabetes Brother     Hepatitis Brother     Gout Son     Hypertension Son     Diabetes Son     Amblyopia Neg Hx     Blindness Neg Hx     Melanoma Neg Hx     Macular degeneration Neg Hx     Retinal detachment Neg Hx     Strabismus Neg Hx      Social History     Tobacco Use    Smoking status: Every Day     Current packs/day: 0.50     Average packs/day: 0.4 packs/day for 53.7 years (21.7 ttl pk-yrs)     Types: Cigarettes     Start date: 1970    Smokeless tobacco: Current    Tobacco comments:     Patient is enrolled in smoking cessation. Comprehensive smoking hx was updated on 8/30/23 by MARQUIS Cherry RRT,MSW,LMSW,TTS   Substance Use Topics    Alcohol use: No     Alcohol/week: 0.0 standard drinks of alcohol    Drug use: No     Review of Systems   Constitutional:  Negative for fever.   HENT:  Negative for sore throat.    Respiratory:  Negative for shortness of breath.    Cardiovascular:  Negative for chest pain.   Gastrointestinal:  Negative for abdominal pain, diarrhea, nausea and vomiting.   Genitourinary:  Negative for dysuria.   Musculoskeletal:  Negative for back pain.        (+) Pain to the 5th digit of the right foot   Skin:  Negative for rash.   Neurological:  Negative for weakness and numbness.   Hematological:  Does not bruise/bleed easily.   All other systems reviewed and are negative.      Physical Exam     Initial Vitals [09/25/23 0659]   BP Pulse Resp Temp SpO2   (!) 169/77 63 18 98.5 °F (36.9 °C) 100 %      MAP       --         Physical Exam    Nursing note and vitals reviewed.  Constitutional: She appears well-developed and well-nourished. She is not diaphoretic.  No distress.   HENT:   Head: Atraumatic.   Right Ear: External ear normal.   Left Ear: External ear normal.   Eyes: Conjunctivae and EOM are normal.   Neck: No tracheal deviation present.   Normal range of motion.  Cardiovascular:  Normal rate and regular rhythm.           Pulmonary/Chest: No accessory muscle usage or stridor. No tachypnea. No respiratory distress.   Musculoskeletal:         General: Normal range of motion.      Cervical back: Normal range of motion.      Comments: Mild tenderness without gross deformity, open wounds, or erythema to the right 5th digit.  No nail bed disruption.  Full ROM of digits.  Distal skin perfusion normal.  Full weight-bearing and ambulatory.     Neurological: She is alert and oriented to person, place, and time. She displays no tremor. She displays no seizure activity. Coordination and gait normal.   Skin: Skin is intact. Capillary refill takes less than 2 seconds. No rash noted. No erythema.         ED Course   Procedures  Labs Reviewed - No data to display       Imaging Results              X-Ray Foot Complete Right (Final result)  Result time 09/25/23 07:32:31      Final result by Troy Colbert MD (09/25/23 07:32:31)                   Impression:      No convincing evidence of acute fracture or dislocation.      Electronically signed by: Troy Colbert  Date:    09/25/2023  Time:    07:32               Narrative:    EXAMINATION:  XR FOOT COMPLETE 3 VIEW RIGHT    CLINICAL HISTORY:  . Injury, unspecified, initial encounter    TECHNIQUE:  AP, lateral, and oblique views of the right foot were performed.    COMPARISON:  None    FINDINGS:  No definite evidence of acute fracture or dislocation.  Lisfranc joint congruent.  Joint spaces appear maintained.    Vascular calcifications.  Minor degenerative spurring of the dorsal midfoot.  No definite radiopaque foreign body.                                       Medications   acetaminophen tablet 1,000 mg (1,000 mg Oral Given  9/25/23 0746)     Medical Decision Making  Presentation consistent with contusion. Xray shows no acute fracture/dislocation. No open wounds. No compartment syndrome or vascular compromise. No infectious process.     Pain controlled in ED. Will send home with supportive care. Advising PCP follow up. Strict return precautions discussed. Agreeable to plan.           Amount and/or Complexity of Data Reviewed  Radiology: ordered.    Risk  OTC drugs.  Prescription drug management.            Scribe Attestation:   Scribe #1: I performed the above scribed service and the documentation accurately describes the services I performed. I attest to the accuracy of the note.                      I, Kaleb Lam PA-C, personally performed the services described in this documentation. All medical record entries made by the scribe were at my direction and in my presence. I have reviewed the chart and agree that the record reflects my personal performance and is accurate and complete.    Clinical Impression:   Final diagnoses:  [T14.90XA] Trauma  [S90.121A] Contusion of fifth toe of right foot, initial encounter (Primary)        ED Disposition Condition    Discharge Stable          ED Prescriptions       Medication Sig Dispense Start Date End Date Auth. Provider    meloxicam (MOBIC) 7.5 MG tablet Take 1 tablet (7.5 mg total) by mouth once daily. 12 tablet 9/25/2023 -- Kaleb Lam PA-C          Follow-up Information       Follow up With Specialties Details Why Contact Info Additional Information    Williams Rossi Jr., MD Family Medicine Schedule an appointment as soon as possible for a visit in 1 day For re-evaluation [facility] [address]  [phone]       [facility] Schedule an appointment as soon as possible for a visit in 1 day For further evaluation, For more definitive management of your symptoms [address] [facility] [address] [phone] 1st Floor    [facility]  Emergency Dept Emergency Medicine Go to  If symptoms worsen or new symptoms develop [address]  [facility] [address] [phone]              Kaleb Lam PAGUZMAN  09/25/23 0753

## 2023-09-25 NOTE — DISCHARGE INSTRUCTIONS
Thank you for coming to our Emergency Department today. It is important to remember that some problems or medical conditions are difficult to diagnose and may not be found or addressed during your Emergency Department visit.  These conditions often start with non-specific symptoms and can only be diagnosed on follow up visits with your primary care physician or specialist when the symptoms continue or change. Please remember that all medical conditions can change, and we cannot predict how you will be feeling tomorrow or the next day. Return to the ER with any questions/concerns, new/concerning symptoms, worsening or failure to improve.       Be sure to follow up with your primary care doctor and review all labs/imaging/tests that were performed during your ER visit with them. It is very common for us to identify non-emergent incidental findings which must be followed up with your primary care physician.  Some labs/imaging/tests may be outside of the normal range, and require non-emergent follow-up and/or further investigation/treatment/procedures/testing to help diagnose/exclude/prevent complications or other potentially serious medical conditions. Some abnormalities may not have been discussed or addressed during your ER visit.     An ER visit does not replace a primary care visit, and many screening tests or follow-up tests cannot be ordered by an ER doctor or performed by the ER. Some tests may even require pre-approval.    If you do not have a primary care doctor, you may contact the one listed on your discharge paperwork or you may also call the [facility] Appointment Desk at [phone] , or [address] [place] [address] at  [phone] to schedule an appointment, or establish care with a primary care doctor or even a specialist and to obtain information about local resources. It is important to your health that you have a primary care doctor.    Please take all medications as directed. We have done our best to select  a medication for you that will treat your condition however, all medications may potentially have side-effects and it is impossible to predict which medications may give you side-effects or what those side-effects (if any) those medications may give you.  If you feel that you are having a negative effect or side-effect of any medication you should stop taking those medications immediately and seek medical attention. If you feel that you are having a life-threatening reaction call 911.        Do not drive, swim, climb to height, take a bath, operate heavy machinery, drink alcohol or take potentially sedating medications, sign any legal documents or make any important decisions for 24 hours if you have received any pain medications, sedatives or mood altering drugs during your ER visit or within 24 hours of taking them if they have been prescribed to you.     You can find additional resources for Dentists, hearing aids, durable medical equipment, low cost pharmacies and other resources at [email or web address]

## 2023-09-28 ENCOUNTER — OFFICE VISIT (OUTPATIENT)
Dept: FAMILY MEDICINE | Facility: CLINIC | Age: 73
End: 2023-09-28
Payer: MEDICARE

## 2023-09-28 VITALS
DIASTOLIC BLOOD PRESSURE: 82 MMHG | OXYGEN SATURATION: 98 % | HEIGHT: 62 IN | BODY MASS INDEX: 32.01 KG/M2 | TEMPERATURE: 98 F | SYSTOLIC BLOOD PRESSURE: 135 MMHG | RESPIRATION RATE: 18 BRPM | HEART RATE: 72 BPM

## 2023-09-28 DIAGNOSIS — E11.21 TYPE 2 DIABETES WITH NEPHROPATHY: ICD-10-CM

## 2023-09-28 DIAGNOSIS — S93.509D TOE SPRAIN, SUBSEQUENT ENCOUNTER: Primary | ICD-10-CM

## 2023-09-28 DIAGNOSIS — I10 ESSENTIAL HYPERTENSION: ICD-10-CM

## 2023-09-28 PROCEDURE — 99999 PR PBB SHADOW E&M-EST. PATIENT-LVL IV: CPT | Mod: PBBFAC,,, | Performed by: STUDENT IN AN ORGANIZED HEALTH CARE EDUCATION/TRAINING PROGRAM

## 2023-09-28 PROCEDURE — 99214 OFFICE O/P EST MOD 30 MIN: CPT | Mod: S$PBB,,, | Performed by: STUDENT IN AN ORGANIZED HEALTH CARE EDUCATION/TRAINING PROGRAM

## 2023-09-28 PROCEDURE — 99999 PR PBB SHADOW E&M-EST. PATIENT-LVL IV: ICD-10-PCS | Mod: PBBFAC,,, | Performed by: STUDENT IN AN ORGANIZED HEALTH CARE EDUCATION/TRAINING PROGRAM

## 2023-09-28 PROCEDURE — 99214 PR OFFICE/OUTPT VISIT, EST, LEVL IV, 30-39 MIN: ICD-10-PCS | Mod: S$PBB,,, | Performed by: STUDENT IN AN ORGANIZED HEALTH CARE EDUCATION/TRAINING PROGRAM

## 2023-09-28 PROCEDURE — 99214 OFFICE O/P EST MOD 30 MIN: CPT | Mod: PBBFAC,PO | Performed by: STUDENT IN AN ORGANIZED HEALTH CARE EDUCATION/TRAINING PROGRAM

## 2023-09-28 NOTE — PROGRESS NOTES
SUBJECTIVE     Chief Complaint   Patient presents with    Follow-up       HPI  Stephanie Sears is a 73 y.o. female with multiple medical diagnoses as listed in the medical history and problem list that presents for follow-up after recent ED visit.    Pt is established to Williams Rossi MD, new to me today.    Records reviewed, pt was in ED w/ r 5th toe pain after stubbing on the nightstand. Found to have no acute fractures or dislocations. Treated with supportive care and pain management.     Last A1C 5.8, well controlled.     PAST MEDICAL HISTORY:  Past Medical History:   Diagnosis Date    Acute pancreatitis     Angina pectoris     Anxiety     Arthritis     Asthma     as a child    Back pain     Bronchitis     Cervical spondylosis with radiculopathy 07/10/2012    Chest pain     Chronic neck pain 07/26/2012    Colon polyps     Controlled type 2 diabetes mellitus with stage 2 chronic kidney disease, with long-term current use of insulin 01/24/2020    COPD (chronic obstructive pulmonary disease)     Coronary artery disease     Depression     Fibrocystic breast     General anesthetics causing adverse effect in therapeutic use     trouble coming out of anes/ had the shakes    GERD (gastroesophageal reflux disease)     Glaucoma     Heart failure     Hyperlipidemia     Hypertension     Neck pain 07/10/2012    Obesity     JAM (obstructive sleep apnea)     Pneumonia     Pneumonia due to other staphylococcus     Primary osteoarthritis of both knees     Psoriasis     Subacromial or subdeltoid bursitis 07/10/2012    Thyroid disease     Tobacco dependence     Trouble in sleeping     Type 2 diabetes mellitus 12/10/2013    Type 2 diabetes mellitus with diabetic chronic kidney disease        ALLERGIES AND MEDICATIONS: updated and reviewed.  Review of patient's allergies indicates:   Allergen Reactions    Hydrocodone Shortness Of Breath    Iodinated contrast media Shortness Of Breath     Difficulty breathing    Adhesive Itching      "Skin peeling    Latex, natural rubber Other (See Comments)     "Takes skin off"    Morphine Hallucinations    Oxycodone Hallucinations    Sulfa (sulfonamide antibiotics) Hives and Itching     Current Outpatient Medications   Medication Sig Dispense Refill    albuterol (PROVENTIL/VENTOLIN HFA) 90 mcg/actuation inhaler Inhale 2 puffs into the lungs every 4 (four) hours as needed for Wheezing. Rescue      amLODIPine-benazepriL (LOTREL) 10-40 mg per capsule Take 1 capsule by mouth once daily. 90 capsule 3    atorvastatin (LIPITOR) 40 MG tablet Take 1 tablet (40 mg total) by mouth once daily. 90 tablet 3    blood-glucose meter kit Check blood glucose QD with insurance preferred glucometer 1 each 0    carvediloL (COREG) 12.5 MG tablet Take 1 tablet (12.5 mg total) by mouth 2 (two) times daily with meals. 180 tablet 3    chlorthalidone (HYGROTEN) 25 MG Tab Take 1 tablet (25 mg total) by mouth every other day. 15 tablet 11    gabapentin (NEURONTIN) 600 MG tablet Take 1 tablet (600 mg total) by mouth 2 (two) times daily. 180 tablet 3    hydrALAZINE (APRESOLINE) 10 MG tablet Take 1 tablet (10 mg total) by mouth every 12 (twelve) hours. 60 tablet 2    INULIN (FIBER GUMMIES ORAL) Take 1 each by mouth every morning.       lancets (ACCU-CHEK MULTICLIX LANCET) Misc Test blood sugar twice daily 300 each 3    meloxicam (MOBIC) 7.5 MG tablet Take 1 tablet (7.5 mg total) by mouth once daily. 12 tablet 0    nicotine, polacrilex, 2 mg lzmn Take 1 each (2 mg total) by mouth as needed (Use 1 lozenge as needed in the place of a cigarette every 1-2 hours as needed.  Maximum of 8 per day.). 243 each 0    simethicone (GAS-X ORAL) Take 1 tablet by mouth as needed.        No current facility-administered medications for this visit.       ROS  Review of Systems   Constitutional:  Negative for chills, fatigue and fever.   HENT:  Negative for rhinorrhea and sore throat.    Respiratory:  Negative for cough and shortness of breath.  " "  Cardiovascular:  Negative for chest pain and palpitations.   Gastrointestinal:  Negative for constipation, diarrhea, nausea and vomiting.   Genitourinary:  Negative for dysuria.   Musculoskeletal:  Positive for gait problem. Negative for joint swelling.   Skin:  Negative for rash and wound.   Neurological:  Negative for light-headedness and headaches.   Psychiatric/Behavioral:  Negative for dysphoric mood and sleep disturbance. The patient is not nervous/anxious.          OBJECTIVE     Physical Exam  Vitals:    09/28/23 1336   BP: 135/82   Pulse: 72   Resp:    Temp:     Body mass index is 32.01 kg/m².      Height: 5' 2" (157.5 cm)     Physical Exam  Vitals reviewed.   Constitutional:       General: She is not in acute distress.  HENT:      Right Ear: External ear normal.      Left Ear: External ear normal.      Nose: Nose normal.      Mouth/Throat:      Mouth: Mucous membranes are moist.   Eyes:      Extraocular Movements: Extraocular movements intact.      Conjunctiva/sclera: Conjunctivae normal.      Pupils: Pupils are equal, round, and reactive to light.   Pulmonary:      Effort: Pulmonary effort is normal.   Abdominal:      General: There is no distension.      Palpations: Abdomen is soft.   Musculoskeletal:         General: No swelling. Normal range of motion.      Cervical back: Normal range of motion.   Feet:      Right foot:      Skin integrity: Skin integrity normal.      Toenail Condition: Right toenails are normal.      Left foot:      Skin integrity: Skin integrity normal.      Toenail Condition: Left toenails are normal.      Comments: R 5th toe with mild swelling and erythema, neurovascularly intact.  Skin:     General: Skin is warm and dry.      Findings: No rash.   Neurological:      General: No focal deficit present.      Mental Status: She is alert and oriented to person, place, and time.   Psychiatric:         Mood and Affect: Mood normal.         Behavior: Behavior normal.           Health " Maintenance         Date Due Completion Date    TETANUS VACCINE Never done ---    Shingles Vaccine (1 of 2) Never done ---    COVID-19 Vaccine (4 - Pfizer series) 02/14/2022 12/20/2021    Diabetes Urine Screening 04/29/2023 4/29/2022    LDCT Lung Screen 05/09/2023 5/9/2022    Influenza Vaccine (1) 09/01/2023 10/29/2022    Hemoglobin A1c 02/10/2024 8/10/2023    Mammogram 02/14/2024 2/14/2023    Foot Exam 05/09/2024 5/9/2023    Override on 5/9/2023: Done    Override on 5/23/2022: Done    Override on 12/27/2018: Done    Eye Exam 07/13/2024 7/13/2023    Lipid Panel 08/10/2024 8/10/2023    High Dose Statin 09/21/2024 9/21/2023    DEXA Scan 08/02/2025 8/2/2023    Colorectal Cancer Screening 09/26/2027 9/26/2022              ASSESSMENT     73 y.o. female with     1. Toe sprain, subsequent encounter    2. Type 2 diabetes with nephropathy    3. Essential hypertension        PLAN:     1. Toe sprain, subsequent encounter  - Healing. Advised pt to continue hard soled shoe, ice and tylenol as needed. F/u w/ Dr. Alvarado in 3 weeks if not resolved.    2. Type 2 diabetes with nephropathy  - Stable. Patient is encouraged to follow a diet low in carbohydrates and simple sugars. Advised to focus on good food choices and increased physical activity and encouraged to adhere to medication regimen and/or lifestyle adjustments, and to check glucose level as recommended.  Contact office if glucose levels are not improving over time.  Will monitor HbA1c appropriately.     3. Essential hypertension  - Stable. Patient was counseled and encouraged to maintain a low sodium diet, as well as increasing physical activity.  Recommend random BP checks at home on a regular basis. Repeat BP at end of visit was improved. Will continue medication at this time, and follow up in 3-6 months, or sooner if blood pressure begins to increase.           Jossie Romero MD  09/28/2023 1:24 PM        No follow-ups on file.

## 2023-09-28 NOTE — PROGRESS NOTES
Health Maintenance Due   Topic     TETANUS VACCINE  Consult pcp    Shingles Vaccine (1 of 2)  hx chicken pox. Notified pt can get vaccine at pharmacy    COVID-19 Vaccine (4 - Pfizer series) Not offered at this office    Diabetes Urine Screening  Consult pcp    LDCT Lung Screen  Consult pcp    Influenza Vaccine (1)

## 2023-09-30 ENCOUNTER — EXTERNAL CHRONIC CARE MANAGEMENT (OUTPATIENT)
Dept: PRIMARY CARE CLINIC | Facility: CLINIC | Age: 73
End: 2023-09-30
Payer: MEDICARE

## 2023-09-30 PROCEDURE — 99439 CHRNC CARE MGMT STAF EA ADDL: CPT | Mod: S$PBB,,, | Performed by: FAMILY MEDICINE

## 2023-09-30 PROCEDURE — 99439 CHRNC CARE MGMT STAF EA ADDL: CPT | Mod: PBBFAC,PN | Performed by: FAMILY MEDICINE

## 2023-09-30 PROCEDURE — 99490 CHRNC CARE MGMT STAFF 1ST 20: CPT | Mod: PBBFAC,25,PN | Performed by: FAMILY MEDICINE

## 2023-09-30 PROCEDURE — 99439 PR CHRONIC CARE MGMT, EA ADDTL 20 MIN: ICD-10-PCS | Mod: S$PBB,,, | Performed by: FAMILY MEDICINE

## 2023-09-30 PROCEDURE — 99490 PR CHRONIC CARE MGMT, 1ST 20 MIN: ICD-10-PCS | Mod: S$PBB,,, | Performed by: FAMILY MEDICINE

## 2023-09-30 PROCEDURE — 99490 CHRNC CARE MGMT STAFF 1ST 20: CPT | Mod: S$PBB,,, | Performed by: FAMILY MEDICINE

## 2023-10-20 ENCOUNTER — CLINICAL SUPPORT (OUTPATIENT)
Dept: SMOKING CESSATION | Facility: CLINIC | Age: 73
End: 2023-10-20
Payer: COMMERCIAL

## 2023-10-20 DIAGNOSIS — F17.200 NICOTINE DEPENDENCE: Primary | ICD-10-CM

## 2023-10-20 PROCEDURE — 99999 PR PBB SHADOW E&M-EST. PATIENT-LVL II: CPT | Mod: PBBFAC,,,

## 2023-10-20 PROCEDURE — 99999 PR PBB SHADOW E&M-EST. PATIENT-LVL II: ICD-10-PCS | Mod: PBBFAC,,,

## 2023-10-20 PROCEDURE — 99403 PREV MED CNSL INDIV APPRX 45: CPT | Mod: S$GLB,,,

## 2023-10-20 PROCEDURE — 99403 PR PREVENT COUNSEL,INDIV,45 MIN: ICD-10-PCS | Mod: S$GLB,,,

## 2023-10-20 NOTE — PROGRESS NOTES
Individual Follow-Up Form    10/20/2023    Quit Date: TBD    Clinical Status of Patient: Outpatient    Length of Service: 45 minutes    Continuing Medication: yes  Patches or Nicotine Lozenges    Other Medications: None      Target Symptoms: Withdrawal and medication side effects. The following were  rated moderate (3) to severe (4) on TCRS:  Moderate (3): Crave and Desire   Severe (4): None     Comments: Counselor spoke with patient telephonically for follow-up visit, name and date of birth verified as two patient identifiers.  Patient reported she is still smoking about 10 cpd, but she continues to allow more cigarettes to burn out than she smokes.   Patient also reported she is still using 21 mg nicotine patches and 2 mg nicotine lozenges without any negative side effects reported at this time.  Counselor congratulated patient on her progress thus far.  Counselor also discussed patient working towards further rate reduction and working towards further behavior modification strategies.  Follow-up visit scheduled in two weeks. Counselor will remain available should any further needs arise.      Diagnosis: F17.200    Next Visit: 2 weeks

## 2023-10-25 DIAGNOSIS — I10 ESSENTIAL HYPERTENSION: ICD-10-CM

## 2023-10-25 NOTE — TELEPHONE ENCOUNTER
No care due was identified.  [facility] Embedded Care Due Messages. Reference number: [id number].   10/25/2023 12:17:15 AM CDT

## 2023-10-28 RX ORDER — CARVEDILOL 12.5 MG/1
12.5 TABLET ORAL 2 TIMES DAILY WITH MEALS
Qty: 180 TABLET | Refills: 3 | Status: SHIPPED | OUTPATIENT
Start: 2023-10-28

## 2023-10-31 ENCOUNTER — OFFICE VISIT (OUTPATIENT)
Dept: PULMONOLOGY | Facility: CLINIC | Age: 73
End: 2023-10-31
Payer: MEDICARE

## 2023-10-31 ENCOUNTER — EXTERNAL CHRONIC CARE MANAGEMENT (OUTPATIENT)
Dept: PRIMARY CARE CLINIC | Facility: CLINIC | Age: 73
End: 2023-10-31
Payer: MEDICARE

## 2023-10-31 VITALS
BODY MASS INDEX: 32.82 KG/M2 | SYSTOLIC BLOOD PRESSURE: 162 MMHG | HEIGHT: 62 IN | OXYGEN SATURATION: 99 % | HEART RATE: 70 BPM | DIASTOLIC BLOOD PRESSURE: 83 MMHG | WEIGHT: 178.38 LBS

## 2023-10-31 DIAGNOSIS — G47.01 INSOMNIA DUE TO MEDICAL CONDITION: ICD-10-CM

## 2023-10-31 DIAGNOSIS — J43.2 CENTRILOBULAR EMPHYSEMA: ICD-10-CM

## 2023-10-31 DIAGNOSIS — G47.33 OSA ON CPAP: ICD-10-CM

## 2023-10-31 PROCEDURE — 99490 CHRNC CARE MGMT STAFF 1ST 20: CPT | Mod: PBBFAC,PN | Performed by: FAMILY MEDICINE

## 2023-10-31 PROCEDURE — 99439 PR CHRONIC CARE MGMT, EA ADDTL 20 MIN: ICD-10-PCS | Mod: S$PBB,,, | Performed by: FAMILY MEDICINE

## 2023-10-31 PROCEDURE — 99214 PR OFFICE/OUTPT VISIT, EST, LEVL IV, 30-39 MIN: ICD-10-PCS | Mod: S$PBB,,, | Performed by: INTERNAL MEDICINE

## 2023-10-31 PROCEDURE — 99213 OFFICE O/P EST LOW 20 MIN: CPT | Mod: PBBFAC | Performed by: INTERNAL MEDICINE

## 2023-10-31 PROCEDURE — 99490 PR CHRONIC CARE MGMT, 1ST 20 MIN: ICD-10-PCS | Mod: S$PBB,,, | Performed by: FAMILY MEDICINE

## 2023-10-31 PROCEDURE — 99999 PR PBB SHADOW E&M-EST. PATIENT-LVL III: ICD-10-PCS | Mod: PBBFAC,,, | Performed by: INTERNAL MEDICINE

## 2023-10-31 PROCEDURE — 99439 CHRNC CARE MGMT STAF EA ADDL: CPT | Mod: S$PBB,,, | Performed by: FAMILY MEDICINE

## 2023-10-31 PROCEDURE — 99439 CHRNC CARE MGMT STAF EA ADDL: CPT | Mod: PBBFAC,PN,27 | Performed by: FAMILY MEDICINE

## 2023-10-31 PROCEDURE — 99999 PR PBB SHADOW E&M-EST. PATIENT-LVL III: CPT | Mod: PBBFAC,,, | Performed by: INTERNAL MEDICINE

## 2023-10-31 PROCEDURE — 99490 CHRNC CARE MGMT STAFF 1ST 20: CPT | Mod: S$PBB,,, | Performed by: FAMILY MEDICINE

## 2023-10-31 PROCEDURE — 99214 OFFICE O/P EST MOD 30 MIN: CPT | Mod: S$PBB,,, | Performed by: INTERNAL MEDICINE

## 2023-10-31 NOTE — PROGRESS NOTES
Stephanie Sears  was seen as a follow up.      CHIEF COMPLAINT:    Chief Complaint   Patient presents with    Apnea       HISTORY OF PRESENT ILLNESS: Stephanie Sears is a 73 y.o. female is here for sleep evaluation.   Patient was diagnosed with jarrett in 8/17.  Patient was seen by KRZYSZTOF Olson.  Patient was started on cpap therapy 10 cm H20 with maykel salazar.  Patient was doing well with cpap for the first 7-8 months.  Our first encounter was 8/18.  Since 6/18, patient with 1-2 awakenings per night.  Can take up to 1 hour to go back to sleep.  Feeling rested upon awake.  +dry mouth upon awake.  During our initial visit, patient endorsed stress a/w her boyfriend x 40 years moved back to his home in [place].     Chronic knee pain s/p knee replacement.  Better since knee replacement.  Chronic knee pain.      [place] Sleepiness Scale score during initial sleep evaluation was 8 (with cpap).  Used to be 16 without cpap.    S/p psg 8/11/2017 with ahi of 6.  patient was set up with cpap 10 cm H20.       Her boyfriend/common law  had moved back from [place] in 2021.  Unfortunately, he suffered from massive stroke prior to moving back.  Patient has been his primary care taker.      Patient has gotten dreamstation 2 from [employer] around 6/22.  Using with Dreamstation 2 nightly.  Patient has had difficulty with sleep maintenance.  Often wake up 1-2 hours after sleep initiation for bathroom.  Have difficulty going back to sleep.  No GERD symptoms.        SLEEP ROUTINE:  Activity the hour prior to sleep: watch tv in bed  Bed partner:  boyfriend  Time to bed: 10:30 PM   Lights off:  Gonzales light is on, TV is on   Sleep onset latency:  Watch tv until fall asleep; 20-30 minutes after putting cpap mask on.          Disruptions or awakenings:    wake up after 1-2 hours of sleep (20-30 minutes to go back to sleep); 2-3 times per night  Wakeup time:      6-6:30 am   Perceived sleep quality:  rested       Daytime naps:       none  Weekend sleep routine:      same  Caffeine use: 2 cups of coffee in morning  exercise habit:   none      PAST MEDICAL HISTORY:    Active Ambulatory Problems     Diagnosis Date Noted    GERD (gastroesophageal reflux disease)     Primary osteoarthritis of both knees     Cervical spondylosis with radiculopathy 07/10/2012    Nontoxic uninodular goiter 12/13/2010    Lumbar spondylosis 04/17/2014    Osteoarthritis of left hip, mild 04/17/2014    DDD (degenerative disc disease), cervical 08/21/2014    Obesity 02/23/2015    Essential hypertension 01/07/2016    Tobacco abuse 01/12/2016    H/O scarlet fever 01/12/2016    Aortic valve sclerosis 01/12/2016    Pulmonary hypertension 01/12/2016    Diastolic dysfunction 01/14/2016    Status post total knee replacement, left 1/20/2016 02/02/2016    Chronic midline thoracic back pain 09/15/2017    JAM on CPAP 12/06/2017    Personal history of spine surgery 02/09/2018    Fatty liver 02/09/2018    Dyslipidemia 07/05/2018    Bilateral carotid bruits 07/05/2018    Atherosclerosis of aortic arch 01/22/2019    Insomnia 03/11/2019    Lichen planus 03/27/2019    History of colon polyps 05/22/2019    Type 2 diabetes with nephropathy 01/24/2020    Primary open-angle glaucoma, bilateral, mild stage 10/21/2020    S/P lumbar fusion 12/07/2020    DDD (degenerative disc disease), lumbar 12/07/2020    Dyspnea on exertion 05/11/2022    Stage 3a chronic kidney disease 06/14/2022    Neuroforaminal stenosis of cervical spine 02/14/2023    COPD (chronic obstructive pulmonary disease) 03/05/2023    Lumbar radiculopathy 07/11/2023     Resolved Ambulatory Problems     Diagnosis Date Noted    Open angle with borderline findings, low risk - Both Eyes 10/18/2012    Posterior vitreous detachment - Right Eye 10/18/2012    Nuclear sclerosis - Both Eyes 08/12/2013    Myopia with astigmatism and presbyopia - Both Eyes 08/12/2013    Carpal tunnel syndrome, bilateral 08/13/2013    Diet-controlled diabetes  mellitus 12/10/2013    Asymptomatic proteinuria 12/10/2013    Chronic LBP 06/17/2014    Borderline glaucoma with ocular hypertension 07/21/2014    Dermatitis 01/07/2016    Severe obesity (BMI 35.0-39.9) with comorbidity 01/12/2016    Cardiac murmur 01/12/2016    At risk for aspiration 01/12/2016    Mild aortic stenosis 01/14/2016    Primary osteoarthritis of left knee 01/20/2016    Annual physical exam 10/18/2016    Encounter for gynecological examination without abnormal finding 10/18/2016    Status post hysterectomy 10/18/2016    Menopausal state 10/18/2016    Hematuria, unspecified 10/18/2016    DM (diabetes mellitus screen) 10/18/2016    Hematuria, gross 10/31/2016    CMC arthritis 12/07/2016    Primary osteoarthritis of right knee 09/15/2017    Hand pain, left 09/15/2017    UTI (urinary tract infection) 02/09/2018    Status post total right knee replacement 2/26/2018 03/13/2018    Goals of care, counseling/discussion 07/05/2018    Acute pancreatitis without infection or necrosis 10/17/2019    Arthritis of carpometacarpal (CMC) joint of left thumb 12/02/2019    Greater trochanteric bursitis of both hips 12/02/2019    Hypoalbuminemia due to protein-calorie malnutrition 01/24/2020    Nuclear sclerotic cataract of right eye 10/21/2020    Postoperative care for cataract 12/09/2020    Bursitis, ischial, unspecified laterality 12/21/2020    Ischial bursitis 02/26/2021    Stage 2 chronic kidney disease 09/20/2022    Persistent proteinuria 09/20/2022     Past Medical History:   Diagnosis Date    Acute pancreatitis     Angina pectoris     Anxiety     Arthritis     Asthma     Back pain     Bronchitis     Chest pain     Chronic neck pain 07/26/2012    Colon polyps     Controlled type 2 diabetes mellitus with stage 2 chronic kidney disease, with long-term current use of insulin 01/24/2020    Coronary artery disease     Depression     Fibrocystic breast     General anesthetics causing adverse effect in therapeutic use      Glaucoma     Heart failure     Hyperlipidemia     Hypertension     Neck pain 07/10/2012    JAM (obstructive sleep apnea)     Pneumonia     Pneumonia due to other staphylococcus     Psoriasis     Subacromial or subdeltoid bursitis 07/10/2012    Thyroid disease     Tobacco dependence     Trouble in sleeping     Type 2 diabetes mellitus 12/10/2013    Type 2 diabetes mellitus with diabetic chronic kidney disease                 PAST SURGICAL HISTORY:    Past Surgical History:   Procedure Laterality Date    APPENDECTOMY      BREAST BIOPSY Bilateral 1988    BREAST MASS EXCISION Right     benign    CATARACT EXTRACTION W/  INTRAOCULAR LENS IMPLANT Left 12/09/2020    PHACO IOL WITH I-STENT ()    CATARACT EXTRACTION W/  INTRAOCULAR LENS IMPLANT Right 10/21/2020    PHACO IOL WITH I-STENT x 2 ()    CHOLECYSTECTOMY      COLONOSCOPY N/A 05/22/2019    Procedure: COLONOSCOPY;  Surgeon: Darrin Calvin MD;  Location: [facility] ([address] [place] [address]);  Service: Endoscopy;  Laterality: N/A;  2nd floor - all other procedure done on 2, multiple comorbidities - ERW/   change in MD schedule, Pt notified and verbalized understanding, new arrival time 0800, Ins mailed to Pt with new arrival time - ERW1/8/19@1130    COLONOSCOPY N/A 9/26/2022    Procedure: COLONOSCOPY;  Surgeon: Darrin Calvin MD;  Location: [facility] ([address] [place] [address]);  Service: Endoscopy;  Laterality: N/A;  2nd floor d/t previous anesthesia complications  vaccinated  inst mailed  clear liquids 4 hr prior-AM prep-LW  I should be able to complete this with the slim pediatric colonoscope, but should have the single-balloon available in case needed.    EPIDURAL STEROID INJECTION Bilateral 02/26/2021    Procedure: Injection, Steroid, Ischial Bursa;  Surgeon: Yonatan Garsia Jr., MD;  Location: [facility];  Service: Pain Management;  Laterality: Bilateral;  Bilateral Ischial Bursa Steroid Injections  Arrive @ 1230; No ATC; Check BG    ESOPHAGOGASTRODUODENOSCOPY       HYSTERECTOMY  1980's    IMPLANTATION OF DEVICE FOR GLAUCOMA Right 10/21/2020    Procedure: INSERTION, DEVICE, FOR GLAUCOMA/ i Stent;  Surgeon: Mealny Tse MD;  Location: [address];  Service: Ophthalmology;  Laterality: Right;    IMPLANTATION OF DEVICE FOR GLAUCOMA Left 12/09/2020    Procedure: INSERTION, DEVICE, FOR GLAUCOMA/I SENT;  Surgeon: Melany Tse MD;  Location: [facility] OR [address] [place] [address];  Service: Ophthalmology;  Laterality: Left;    INJECTION OF JOINT Bilateral 12/23/2021    Procedure: Injection, Joint---BILATERAL ISCHIAL BURSA STEROID INJECTION;  Surgeon: Yonatan Garsia Jr., MD;  Location: [facility];  Service: Pain Management;  Laterality: Bilateral;    INTRAOCULAR PROSTHESES INSERTION Right 10/21/2020    Procedure: INSERTION, IOL PROSTHESIS;  Surgeon: Melany Tse MD;  Location: [facility] OR [address] [place] [address];  Service: Ophthalmology;  Laterality: Right;    INTRAOCULAR PROSTHESES INSERTION Left 12/09/2020    Procedure: INSERTION, IOL PROSTHESIS;  Surgeon: Melany Tse MD;  Location: [address];  Service: Ophthalmology;  Laterality: Left;    KNEE SURGERY Left 01/20/2016    TKR    KNEE SURGERY Right 02/26/2018    TKR    L4-L5 fusion  2006    PHACOEMULSIFICATION OF CATARACT Right 10/21/2020    Procedure: PHACOEMULSIFICATION, CATARACT;  Surgeon: Melany Tse MD;  Location: [address];  Service: Ophthalmology;  Laterality: Right;    PHACOEMULSIFICATION OF CATARACT Left 12/09/2020    Procedure: PHACOEMULSIFICATION, CATARACT;  Surgeon: Melany Tse MD;  Location: [address];  Service: Ophthalmology;  Laterality: Left;         FAMILY HISTORY:                Family History   Problem Relation Age of Onset    Diabetes Mother     Hypertension Mother         CHF, angina    Angina Mother     Kidney disease Mother     Heart disease Mother         CHF    Hypertension Father         glaucoma, Alzhiemer's , supra pubic    Alzheimer's disease Father     Glaucoma Father      Glaucoma Sister     Hypertension Sister     Hyperlipidemia Sister     Sleep apnea Sister     Hypertension Sister     Thyroid disease Sister     No Known Problems Sister     Breast cancer Sister 54    Cancer Paternal Aunt         colon ca    Kidney disease Brother         kidney transplant, HTN,DM    Diabetes Brother     Hepatitis Brother     Gout Son     Hypertension Son     Diabetes Son     Amblyopia Neg Hx     Blindness Neg Hx     Melanoma Neg Hx     Macular degeneration Neg Hx     Retinal detachment Neg Hx     Strabismus Neg Hx        SOCIAL HISTORY:          Tobacco:   Social History     Tobacco Use   Smoking Status Every Day    Current packs/day: 0.50    Average packs/day: 0.4 packs/day for 53.8 years (21.7 ttl pk-yrs)    Types: Cigarettes    Start date: 1970   Smokeless Tobacco Current   Tobacco Comments    Patient is enrolled in smoking cessation. Comprehensive smoking hx was updated on 8/30/23 by MARQUIS Cehrry RRT,MSW,LMSW,TTS       alcohol use:    Social History     Substance and Sexual Activity   Alcohol Use No    Alcohol/week: 0.0 standard drinks of alcohol                 Occupation: former     ALLERGIES:    Review of patient's allergies indicates:   Allergen Reactions    Hydrocodone Shortness Of Breath    Iodinated contrast- oral and iv dye Shortness Of Breath     Difficulty breathing    Adhesive Itching    Oxycodone      Hallucinations    Sulfa (sulfonamide antibiotics) Hives and Itching       CURRENT MEDICATIONS:    Current Outpatient Medications   Medication Sig Dispense Refill    albuterol (PROVENTIL/VENTOLIN HFA) 90 mcg/actuation inhaler Inhale 2 puffs into the lungs every 4 (four) hours as needed for Wheezing. Rescue      amLODIPine-benazepriL (LOTREL) 10-40 mg per capsule Take 1 capsule by mouth once daily. 90 capsule 3    atorvastatin (LIPITOR) 40 MG tablet Take 1 tablet (40 mg total) by mouth once daily. 90 tablet 3    blood-glucose meter kit Check blood glucose QD  "with insurance preferred glucometer 1 each 0    carvediloL (COREG) 12.5 MG tablet TAKE 1 TABLET TWICE A DAY WITH MEALS 180 tablet 3    chlorthalidone (HYGROTEN) 25 MG Tab Take 1 tablet (25 mg total) by mouth every other day. 15 tablet 11    gabapentin (NEURONTIN) 600 MG tablet Take 1 tablet (600 mg total) by mouth 2 (two) times daily. 180 tablet 3    hydrALAZINE (APRESOLINE) 10 MG tablet Take 1 tablet (10 mg total) by mouth every 12 (twelve) hours. 60 tablet 2    INULIN (FIBER GUMMIES ORAL) Take 1 each by mouth every morning.       lancets (ACCU-CHEK MULTICLIX LANCET) Misc Test blood sugar twice daily 300 each 3    meloxicam (MOBIC) 7.5 MG tablet Take 1 tablet (7.5 mg total) by mouth once daily. 12 tablet 0    nicotine, polacrilex, 2 mg lzmn Take 1 each (2 mg total) by mouth as needed (Use 1 lozenge as needed in the place of a cigarette every 1-2 hours as needed.  Maximum of 8 per day.). 243 each 0    simethicone (GAS-X ORAL) Take 1 tablet by mouth as needed.        No current facility-administered medications for this visit.                  REVIEW OF SYSTEMS:     Sleep related symptoms as per HPI.  CONST:Denies weight gain; loosing weight    HEENT: no sinus congestion  PULM: + dyspnea x 2 block  CARD:  Denies palpitations   GI:  +occasional acid reflux  : Denies polyuria  NEURO: Denies headaches  PSYCH: Denies mood disturbance  HEME: Denies anemia   Otherwise, a balance of systems reviewed is negative.          PHYSICAL EXAM:  Vitals:    10/31/23 1304   BP: (!) 162/83   Pulse: 70   SpO2: 99%   Weight: 80.9 kg (178 lb 5.6 oz)   Height: 5' 2" (1.575 m)   PainSc: 0-No pain     Body mass index is 32.62 kg/m².     GENERAL: Normal development, well groomed  HEENT:  Conjunctivae are non-erythematous; Pupils equal, round, and reactive to light; Nose is symmetrical; Nasal mucosa is pink and moist; Septum is midline; Inferior turbinates are normal; Nasal airflow is normal; Posterior pharynx is pink; Modified Mallampati: " 4; Posterior palate is normal; Tonsils +1; Uvula is normal and pink;Tongue is normal; Dentition is fair; No TMJ tenderness; Jaw opening and protrusion without click and without discomfort.  NECK: Supple. Neck circumference is 13 inches. No thyromegaly. No palpable nodes.     SKIN: On face and neck: No abrasions, no rashes, no lesions.  No subcutaneous nodules are palpable.  RESPIRATORY: Chest is clear to auscultation.  Normal chest expansion and non-labored breathing at rest.  CARDIOVASCULAR: Normal S1, S2.  No murmurs, gallops or rubs. No carotid bruits bilaterally.  EXTREMITIES: No edema. No clubbing. No cyanosis. Station normal. Gait normal.        NEURO/PSYCH: Oriented to time, place and person. Normal attention span and concentration. Affect is full. Mood is normal.                                              DATA   PSG 8/11/17: AHI was 6.0 with an oxygen aye of 86.0%. The RERA index was 4.8 and RDI was 10.8 events per hour.   9/6/17 Ttiration study, effective supine REM cpap 10cm    PFT 6/27/22 Ratio of 65%; FVC 1.96 L (91%); FEV1 1.29 L (75%); TLC 6.3 L (138%); dlco 15 (75%) ; lung volume is unreliable with RV >220%    Echo 1/13/16  CONCLUSIONS     1 - Normal left ventricular systolic function (EF 60-65%).     2 - Left ventricular diastolic dysfunction.     3 - Normal right ventricular systolic function .     4 - Mild left atrial enlargement.     5 - Mild tricuspid regurgitation.     6 - The estimated PA systolic pressure is 39 mmHg.     7 - Mild aortic stenosis, JOHN = 1.46 cm2, peak velocity = 2.7 m/s, mean gradient = 17.0 mmHg.    Lab Results   Component Value Date    TSH 0.708 05/29/2019     ASSESSMENT  Problem List Items Addressed This Visit       COPD (chronic obstructive pulmonary disease)    Overview       There are mild emphysematous changes within the lung apices.  -mild obstruction with ration of 65 and fev1 75%  -encourage smoking cessation  -currently on albuterol.    Did not get spiriva due to  cost of copay.           Insomnia    Overview     difficulty with sleep maintenance.  Mainly driven by nocturia.  Poor sleep sleep hygiene + nocturia + psychophysiologic in etiology.     avoid tv in bed.  Stimulus control d/w patient.    Avoid fluid intake after dinner.    Denied psych referral for cbt i         JAM on CPAP    Overview     -ahi of 6, rdi of 10.8.    -currently with Dream station 2  -Doing well with cpap of 10 cm H20.  91%>4 hours.  Residual ahi of 2.2.  Patient is benefit from cpap                   Education: During our discussion today, we talked about the etiology of obstructive sleep apnea as well as the potential ramifications of untreated sleep apnea, which could include daytime sleepiness, hypertension, heart disease and/or stroke.     Precautions: The patient was advised to abstain from driving should they feel sleepy or drowsy.       Patient will No follow-ups on file. with md/np.    25 minutes of total time spent on the encounter, which includes face to face time and non-face to face time preparing to see the patient (eg, review of tests), Obtaining and/or reviewing separately obtained history, documenting clinical information in the electronic or other health record, independently interpreting results (not separately reported) and communicating results to the patient/family/caregiver, or Care coordination (not separately reported).    .

## 2023-11-03 ENCOUNTER — CLINICAL SUPPORT (OUTPATIENT)
Dept: SMOKING CESSATION | Facility: CLINIC | Age: 73
End: 2023-11-03
Payer: COMMERCIAL

## 2023-11-03 DIAGNOSIS — F17.200 NICOTINE DEPENDENCE: Primary | ICD-10-CM

## 2023-11-03 PROCEDURE — 99403 PR PREVENT COUNSEL,INDIV,45 MIN: ICD-10-PCS | Mod: S$GLB,,,

## 2023-11-03 PROCEDURE — 99403 PREV MED CNSL INDIV APPRX 45: CPT | Mod: S$GLB,,,

## 2023-11-03 PROCEDURE — 99999 PR PBB SHADOW E&M-EST. PATIENT-LVL II: ICD-10-PCS | Mod: PBBFAC,,,

## 2023-11-03 PROCEDURE — 99999 PR PBB SHADOW E&M-EST. PATIENT-LVL II: CPT | Mod: PBBFAC,,,

## 2023-11-03 RX ORDER — VARENICLINE TARTRATE 1 MG/1
TABLET, FILM COATED ORAL
Qty: 56 TABLET | Refills: 0 | Status: SHIPPED | OUTPATIENT
Start: 2023-11-03 | End: 2024-01-30

## 2023-11-03 RX ORDER — VARENICLINE TARTRATE 1 MG/1
TABLET, FILM COATED ORAL
Qty: 56 TABLET | Refills: 0 | Status: SHIPPED | OUTPATIENT
Start: 2023-11-03 | End: 2023-11-03

## 2023-11-03 NOTE — PROGRESS NOTES
Individual Follow-Up Form    11/3/2023    Quit Date: TBD    Clinical Status of Patient: Outpatient    Length of Service: 45 minutes    Continuing Medication: yes  Patches or Nicotine Lozenges    Other Medications: None      Target Symptoms: Withdrawal and medication side effects. The following were  rated moderate (3) to severe (4) on TCRS:  Moderate (3): Crave and Desire   Severe (4): None     Comments: Counselor spoke with patient telephonically for follow-up visit, name and date of birth verified as two patient identifiers.  Patient reported she remains smoking about 10 cpd, but she is experiencing difficulty with reducing her rate of smoking.  Patient also reported she is still using 21 mg nicotine patches and 2 mg nicotine lozenges without any negative side effects reported at this time.  Counselor congratulated patient on her progress thus far.  Counselor also discussed alternate nicotine replacement options with patient to aid her with quitting.  Counselor also discussed patient working towards further rate reduction and working towards further behavior modification strategies.  Patient will begin 1 mg Varenicline, counselor discussed proper medication usage and possible side effects with patient.  Patient verbalized understanding.  Follow-up visit scheduled in two weeks. Counselor will remain available should any further needs arise.     Diagnosis: F17.200    Next Visit: 2 weeks

## 2023-11-04 NOTE — PROGRESS NOTES
"  HPI    DLS: 7/13/2023    Pt here for 4 Month Check;  Pt states no eye pain but some have some discomfort driving at night. Pt   states her vision has changed since her last visit.     Meds;  AT's PRN OU    1  Preperimetric POAG vs OHT   2. PVD OD   3. PCIOL OU   4. Blepharitis / MGD   5. RAGHU Allergy   6. PCO OU   7. S/P ALT ou (many decades ago)   8. S/P SLT OD 2008   9. S/P istents ou       Last edited by Angelic Saravia on 11/10/2023  8:37 AM.            Assessment /Plan     For exam results, see Encounter Report.    Primary open-angle glaucoma, bilateral, mild stage    Epiretinal membrane (ERM) of left eye    Pseudophakia, both eyes    Status post glaucoma surgery        1. Pre-perimetric mild POAG   Vs OHT  -Followed at [facility] since 1991   -First HVF 1999   -First photos 1991   - Intolerant to all gtts - they aggravate his blepharitis-? RAGHU allergy   -IOP "OK" off gtts and s/p ALT ou and SLT od - 2008    Family history neg   Glaucoma meds none (( off gtts post SLT ou -))   H/O adverse rxn to glaucoma drops Intolerant to all gtts - 2/2 aggravates blepharitis- ? RAGHU allergy   LASERS ALT ou -? Date / SLT OD 7/17/08 - good response   GLAUCOMA SURGERIES - I stent x 2 - OD  - 10/21/2020 // I stent 12/9/2020 - OS   OTHER EYE SURGERIES - phaco/IOL od - 10/21/2020 - PCB00 14.0  // oS 12/9/2020 - pcb00 15.0   CDR 0.6/0.3   Tbase 19-26 / 16-21   Tmax 26/21   Ttarget ? About 18 ou  HVF 15 test - 1999 to 2021 - Full ou   Gonio +3 ou   /588   OCT 6 test 2005 to 2021 -  RNFL - OD: bord TI (?prog)  // OS:NL  HRT 9 test 2004 to 2020 -MR -  MR -  Dec T, border NI od // full os /// CDR 0.619 od // 0.508 os - but unreliable OD  Disc photos 1991, 1996, 2003 - slides // 2012 , 2016, 2020   - OIS     - Ttoday   18/16  - Test done today   IOP // gonio       2. Posterior Vitreous Detachment OD - 11/2008   - RD PRECAUTIONS    3. Eyelid inflammation / Blepharitis / MGD    4. Allergic Conjunctivitis - RAGHU allergy     5. PCO    Vis " sign os    Mild od // + ant capsule phimosis od     6. PC IOL OU (w/ istents)    OD 10/21/202 - PCB00 14.0   OS 12/9/2020 - PCB00 15.0     Plan   POAG - mild -pre-perimetric glaucoma - intolerant to all gtts   IOP ok s/p ALT ou - years ago and s/p SLT OD 2008 ( no SLT os)  Continue to monitor HVF/DFE/OCT/HRT/photos/IOP   If increase IOP or progression on VF testing consider repeat SLT OD and primary SLT os      Pt was a myope - sph eq is -5.25 od and -4.25 os - pre- phac0 -- set for distance post op    OCT macula - NO CME     Rx for bifocals  Given - or ok to use no distance correction and OTC readers - re-printed 3/28/2022  Pt mostly uses the [employer] reading glasses     Photo file updated 7/13/2023    If IOP too high off gtts and post I-stent - can try a SLT - OS and a repeat SLT od - can skip the istent areas // (( H/O ALT's many years ago)    - or can re-try some gtts (H/O intol to all galucoma gtts in past - ? RAGHU intol) -  ?? If could try travatan Z again in future again - non RAGHU // or ?? Try alphagan P     Rec yag cap os (pt C/O glare - nicola at night -

## 2023-11-10 ENCOUNTER — OFFICE VISIT (OUTPATIENT)
Dept: OPHTHALMOLOGY | Facility: CLINIC | Age: 73
End: 2023-11-10
Payer: MEDICARE

## 2023-11-10 DIAGNOSIS — H35.372 EPIRETINAL MEMBRANE (ERM) OF LEFT EYE: ICD-10-CM

## 2023-11-10 DIAGNOSIS — H26.492 PCO (POSTERIOR CAPSULAR OPACIFICATION), LEFT: ICD-10-CM

## 2023-11-10 DIAGNOSIS — H40.1131 PRIMARY OPEN-ANGLE GLAUCOMA, BILATERAL, MILD STAGE: Primary | ICD-10-CM

## 2023-11-10 DIAGNOSIS — Z96.1 PSEUDOPHAKIA, BOTH EYES: ICD-10-CM

## 2023-11-10 DIAGNOSIS — Z98.83 STATUS POST GLAUCOMA SURGERY: ICD-10-CM

## 2023-11-10 PROCEDURE — 99999 PR PBB SHADOW E&M-EST. PATIENT-LVL III: CPT | Mod: PBBFAC,,, | Performed by: OPHTHALMOLOGY

## 2023-11-10 PROCEDURE — 92014 COMPRE OPH EXAM EST PT 1/>: CPT | Mod: S$PBB,,, | Performed by: OPHTHALMOLOGY

## 2023-11-10 PROCEDURE — 99213 OFFICE O/P EST LOW 20 MIN: CPT | Mod: PBBFAC | Performed by: OPHTHALMOLOGY

## 2023-11-10 PROCEDURE — 99999 PR PBB SHADOW E&M-EST. PATIENT-LVL III: ICD-10-PCS | Mod: PBBFAC,,, | Performed by: OPHTHALMOLOGY

## 2023-11-10 PROCEDURE — 92014 PR EYE EXAM, EST PATIENT,COMPREHESV: ICD-10-PCS | Mod: S$PBB,,, | Performed by: OPHTHALMOLOGY

## 2023-11-15 ENCOUNTER — CLINICAL SUPPORT (OUTPATIENT)
Dept: SMOKING CESSATION | Facility: CLINIC | Age: 73
End: 2023-11-15
Payer: COMMERCIAL

## 2023-11-15 DIAGNOSIS — F17.200 NICOTINE DEPENDENCE: Primary | ICD-10-CM

## 2023-11-15 PROCEDURE — 99999 PR PBB SHADOW E&M-EST. PATIENT-LVL II: ICD-10-PCS | Mod: PBBFAC,,,

## 2023-11-15 PROCEDURE — 99999 PR PBB SHADOW E&M-EST. PATIENT-LVL II: CPT | Mod: PBBFAC,,,

## 2023-11-15 PROCEDURE — 99402 PREV MED CNSL INDIV APPRX 30: CPT | Mod: S$GLB,,,

## 2023-11-15 PROCEDURE — 99402 PR PREVENT COUNSEL,INDIV,30 MIN: ICD-10-PCS | Mod: S$GLB,,,

## 2023-11-15 NOTE — TELEPHONE ENCOUNTER
----- Message from Will Wakefield sent at 11/15/2023  9:24 AM CST -----  Type: RX Refill Request    Who Called: Self     Have you contacted your pharmacy:Yes     Refill or New Rx:REFILL     RX Name and Strength:albuterol (PROVENTIL/VENTOLIN HFA) 90 mcg/actuation inhaler    Preferred Pharmacy with phone number:  [id number] - [address] AT [address]  [place] [address]  Phone: [phone] Fax: [phone]    Local or Mail Order:Local     Would the patient rather a call back or a response via [facility]? CALL     Best Call Back Number: [phone] (home)     Additional information: PT asking for medication today says she will be going on a cruise , says [facility] for [facility] to send it to her local pharmacy so that she can get it faster

## 2023-11-15 NOTE — PROGRESS NOTES
Individual Follow-Up Form    11/15/2023    Quit Date: TBD     Clinical Status of Patient: Outpatient    Length of Service: 30 minutes    Continuing Medication: yes  Chantix or Nicotine Lozenges    Other Medications: None      Target Symptoms: Withdrawal and medication side effects. The following were  rated moderate (3) to severe (4) on TCRS:  Moderate (3): Crave and Desire   Severe (4): None     Comments:  Counselor spoke with patient telephonically for follow-up visit, name and date of birth verified as two patient identifiers. Patient reported she remains smoking about 10 cpd.  Patient also reported she began using 1 mg Varenicline in conjunction with 2 mg nicotine lozenges without any negative side effects reported at this time.  Counselor congratulated patient on her progress thus far.  Counselor also discussed patient solidifying a quit date, working towards further rate reduction,  and working towards further behavior modification strategies.  Follow-up visit scheduled in two weeks. Counselor will remain available should any further needs arise.      Diagnosis: F17.200    Next Visit: 2 weeks

## 2023-11-15 NOTE — TELEPHONE ENCOUNTER
No care due was identified.  Health [facility] Embedded Care Due Messages. Reference number: [id number].   11/15/2023 9:05:02 AM CST

## 2023-11-15 NOTE — TELEPHONE ENCOUNTER
Department of Hospital Medicine  Inpatient Progress Note    Patient: Vick Mahan MRN: [id number]   : 1946 74 year old male   Date of admission: 12/3/2020  4:24 PM Today's Date: 2020     REASON FOR ADMISSION: Syncope    Interval events:      Subjective:   Patient examined today at bedside. Interval events noted.   Lab results and imaging reviewed.   Doing well today, states that he is close to his 100%.   Eating well, participating in PT.   MRI of shoulder negative for rotator cuff tear, only with post traumatic swelling. Patient states that his shoulder also feels better today.   Telemetry uneventful since admission. Will discontinued.   Will start discharge planning.         Vital 24 Hour Range Most Recent Value   Temperature Temp  Min: 97.5 °F (36.4 °C)  Max: 98.7 °F (37.1 °C) 98.7 °F (37.1 °C)   Pulse Pulse  Min: 71  Max: 103 82   Respiratory Resp  Min: 20  Max: 20 20   Blood Pressure BP  Min: 159/80  Max: 172/79 (!) 159/80   Pulse Oximetry SpO2  Min: 91 %  Max: 96 % 91 %   Current O2   O2 Device: None/Room air (20 0815)   Body mass index is 23.92 kg/m².      Intake/Output Summary (Last 24 hours) at 2020 1308  Last data filed at 2020 0915  Gross per 24 hour   Intake 1346 ml   Output 850 ml   Net 496 ml          Physical exam:      General: NAD  Skin: Warm and dry, No rash, No icterus.  HEENT: PERRLA, EOMI, supple neck, no JVD  Respiratory: Clear to auscultation bilaterally, breathing non labored   Cardio: S1, S2, regular rate and rhythm, no murmur   Abdomen: BS +, not tender, not distended; No masses   Muscular: ROM intact, no lower extremity edema  Neurologic: A & O X 3, no focal deficits        Medications: personally reviewed today in this patient's active orders section of epic    Allergies: personally reviewed today in epic    DATA:  Recent Labs   Lab 20  0626 20  0709 20  0606 20  0711 20  1611   WBC 5.9 6.7 7.1 7.7 18.7*   RBC 4.08* 4.20*  Please see the attached refill request.   4.05* 3.88* 5.52   HGB 11.9* 12.4* 11.9* 11.4* 16.4   HCT 34.9* 36.7* 35.5* 35.5* 48.1    370 352 374 512*   SEG 65 63 68 69 88       Recent Labs   Lab 12/08/20  0626 12/07/20  0709 12/06/20  0606 12/05/20  0710 12/03/20  1611   SODIUM 137 137 147* 153* 142   POTASSIUM 3.8 3.3* 3.5 4.0 4.6   CHLORIDE 106 105 114* 119* 101   CO2 24 22 23 22 24   BUN 18 21* 24* 35* 63*   CREATININE 0.95 0.90 0.99 1.21* 1.57*   GLUCOSE 99 128* 141* 100* 142*   CALCIUM 8.2* 8.0* 8.1* 8.4 10.0       Recent Labs   Lab 12/08/20  0626 12/07/20  0709 12/06/20  0606 12/05/20  0710 12/03/20  1611   AST 86* 56* 55* 54* 160*   GPT 64* 34 36 48 62   BILIRUBIN 0.6 0.5 0.5 0.4 0.8   ALBUMIN 2.2* 2.2* 2.2* 2.3* 3.6   ALKPT 47 44* 44* 50 86       Recent Labs   Lab 12/05/20  0952 12/03/20  1611   INR 1.6 1.3   PT 17.1* 13.5*   PTT  --  33*       Blood Culture  No results found for this or any previous visit.    Urine Culture  No results found for this or any previous visit.    IMAGING:  MRI SHOULDER JOINT WO CONTRAST RIGHT   Final Result   IMPRESSION:       Partially limited examination due to patient motion artifact.   Within this limitation, no full-thickness rotator cuff tear.   Moderate supraspinatus, infraspinatus, subscapularis tendinosis.      Moderate degenerative changes of the glenohumeral and   acromioclavicular joints.      Extensive edema throughout the right shoulder soft tissues,   likely post-traumatic.            Signed by: Ba Noyola         CT ABDOMEN PELVIS WO CONTRAST   Final Result   Impression:   No acute traumatic injury detected.   Nonspecific infectious or inflammatory process in the lung bases.   Viral pandemic disease not excluded.   Sizable left inguinal hernia containing a loop of sigmoid colon.      Signed by: Zenon Wetzel         MRI BRAIN WO CONTRAST   Final Result   IMPRESSION:   1. There is a small focus of restricted diffusion seen in the   right frontal lobe superiorly consistent with an acute ischemic    insult.   2. There is some volume loss in the brain parenchyma and probable   sequela of small vessel ischemic disease changes and a few old   insults in the brain as stated above. Report sent to the   referring clinical department time of dictation      Signed by: Curt Rojo         CT Angio Head and Neck Level 1   Final Result   IMPRESSION:      1.  No hemodynamically significant stenosis within the neck.   2. No evidence of acute large vessel occlusion.   3. Approximately 75% left supraclinoid carotid stenosis with mild   less than 50% right supraclinoid carotid stenosis.      Signed by: Luis Antonio Dsouza         XR Chest AP or PA   Final Result   IMPRESSION:      1.  Right suprahilar infiltrate/pneumonia.      Signed by: Luis Antonio Dsouza         XR Shoulder 2 View Right   Final Result   IMPRESSION:      1.  Negative right shoulder.      Signed by: Luis Antonio Dsouza         CT HEAD WO CONTRAST   Final Result   IMPRESSION:      1.  Moderate microvascular changes with remote brainstem lacunar   infarcts.   2. No acute traumatic abnormality.      Signed by: Luis Antonio Dsouza             EKG Interpretation:  Results for orders placed or performed during the hospital encounter of 20   Electrocardiogram 12-Lead   Result Value Ref Range    REPORT TEXT                               [facility]                                           Test Date:    2020               Test Time:    18:22:08  Pat Name:     KEVIN ORNELAS        Department:     Patient ID:   [id number]                 Room:         [facility]  Gender:       Male                     Technician:   CLARA  :          1946               Requested By:   Order Number:                          Reading MD:                                      Measurements  Intervals                              Axis            Rate:         86                       P:            55  OK:           144                      QRS:          49  QRSD:         84                        T:            60  QT:           406                                      QTc:          485                                                                 Interpretive Statements  Sinus rhythm with occasional premature ventricular complexes and premature  atrial complexes  Prolonged QT         ECHO:  No results found for this or any previous visit.      Procedures:        Principal Problem:    Syncope  Active Problems:    Traumatic rhabdomyolysis (CMS/HCC)    High anion gap metabolic acidosis    Pneumonia    UTI (urinary tract infection)    Hematuria    Hypermagnesemia    Right shoulder pain    Dysphagia, oropharyngeal phase      ASSESSMENT:  Vick Mahan is a 74 year old male admitted on 12/3/2020 with the PMH of HTN, BPH, that presents with syncopal episode. Tested positive for COVID. MRI with small old ischemic event, without acute changes. Telemetry uneventful. ECHO with normal EF.     # Acute metabolic encephalopathy - resolved   # Acute vs old CVA  # COVID infection  # UTI  # Right upper lobe pneumonia, could be from aspiration   # Rhabdomyolysis - resolved   # Right shoulder pain due to syncopal fall  # Carotid artery stenosis     Plan:  - continue dual antiplatelet therapy for at least 3 months   - will start atorvastatin   - continue augmentin, DC doxycycline   - EEG preliminary result negative   - neurology consulted on the case  - complete 5 days remdesivir course   - although not treated, covid encephalopathy has been reported in patients, and could be contributing on this case, would avoid steroids as patient is recovering   - outpatient follow up with neurosurgery for carotid artery stenosis  - discharge planning         IV fluids:  none  DVT Prophylaxis:  SCD, lovenox   GI Prophylaxis:    none  Code status: Do Not Resuscitate    Disposition:     Anticipated post discharge needs:       Momcilo Durdevic, MD  Hospitalist, Department of Internal Medicine  12/8/2020  1:08 PM

## 2023-11-16 RX ORDER — ALBUTEROL SULFATE 90 UG/1
AEROSOL, METERED RESPIRATORY (INHALATION)
Qty: 18 G | Refills: 3 | Status: SHIPPED | OUTPATIENT
Start: 2023-11-16 | End: 2023-11-20 | Stop reason: SDUPTHER

## 2023-11-16 NOTE — TELEPHONE ENCOUNTER
----- Message from El Chambers sent at 11/16/2023 11:25 AM CST -----  Regarding: Self [phone]  Type: Patient Call Back    Who called: Self     What is the request in detail: checking on the status of a refill for the prescription for albuterol (PROVENTIL/VENTOLIN HFA) 90 mcg/actuation inhaler. Would like a call back.     Can the clinic reply by [facility]? No     Would the patient rather a call back or a response via [facility]? Call back     Best call back number: [phone]     Additional Information:    Thank you.

## 2023-11-17 NOTE — PROGRESS NOTES
Chart has been dictated using voice recognition software.  It is not been reviewed carefully for any transcriptional errors due to this technology.   Subjective:   Patient ID:  Stephanie Sears is a 73 y.o. female who presents for follow-up of No chief complaint on file.      HPI: Patient with history of diastolic dysfunction, mild aortic stenosis, GERD, GI distress, hypertension, dyslipidemia, type 2 diabetes, obstructive sleep apnea using CPAP, s/p bilateral knee replacements, and thyroid disease.  Patient continues to smoke about 10 cigarettes per day despite being followed in the smoking cessation clinic.       Patient denies any dyspnea at rest, orthopnea, or PND. If she walks too fast, she will get mild dyspnea.  No edema.  Patient denies any chest discomfort on exertion or at rest. Patient denies any palpitations, lightheadedness, or syncope. Still smoking 3 packs per week.  Going to smoking cessation.    BP at home usually around 130.      Outpatient Medications Prior to Visit   Medication Sig Dispense Refill    albuterol (PROVENTIL/VENTOLIN HFA) 90 mcg/actuation inhaler USE 2 INHALATIONS EVERY 4 HOURS AS NEEDED FOR WHEEZING (RESCUE) 18 g 3    amLODIPine-benazepriL (LOTREL) 10-40 mg per capsule Take 1 capsule by mouth once daily. 90 capsule 3    atorvastatin (LIPITOR) 40 MG tablet Take 1 tablet (40 mg total) by mouth once daily. 90 tablet 3    blood-glucose meter kit Check blood glucose QD with insurance preferred glucometer 1 each 0    carvediloL (COREG) 12.5 MG tablet TAKE 1 TABLET TWICE A DAY WITH MEALS 180 tablet 3    chlorthalidone (HYGROTEN) 25 MG Tab Take 1 tablet (25 mg total) by mouth every other day. 15 tablet 11    gabapentin (NEURONTIN) 600 MG tablet Take 1 tablet (600 mg total) by mouth 2 (two) times daily. 180 tablet 3    hydrALAZINE (APRESOLINE) 10 MG tablet Take 1 tablet (10 mg total) by mouth every 12 (twelve) hours. 60 tablet 2    INULIN (FIBER GUMMIES ORAL) Take 1 each by mouth every  "morning.       lancets (ACCU-CHEK MULTICLIX LANCET) Misc Test blood sugar twice daily 300 each 3    meloxicam (MOBIC) 7.5 MG tablet Take 1 tablet (7.5 mg total) by mouth once daily. 12 tablet 0    nicotine, polacrilex, 2 mg lzmn Take 1 each (2 mg total) by mouth as needed (Use 1 lozenge as needed in the place of a cigarette every 1-2 hours as needed.  Maximum of 8 per day.). 243 each 0    simethicone (GAS-X ORAL) Take 1 tablet by mouth as needed.       varenicline (CHANTIX) 1 mg Tab Take ½ tablet by mouth daily for 3 days, THEN increase to ½ tablet twice daily for 4 days, THEN increase to 1 tablet twice daily thereafter 56 tablet 0     No facility-administered medications prior to visit.       ROS   Objective:   Physical Exam  Constitutional:       Appearance: She is well-developed.      Comments: BP (!) 148/75   Pulse (!) 58   Ht 5' 2" (1.575 m)   Wt 79.8 kg (175 lb 14.8 oz)   SpO2 100%   BMI 32.18 kg/m²    Neck:      Thyroid: No thyromegaly.      Vascular: Carotid bruit (left) present. No JVD.   Cardiovascular:      Rate and Rhythm: Normal rate and regular rhythm.      Pulses: Intact distal pulses.      Heart sounds: S1 normal and S2 normal. Murmur heard.      Scratchy crescendo-decrescendo early systolic murmur is present with a grade of 2/6 at the lower left sternal border radiating to the neck and apex.      No friction rub. Gallop present. S4 sounds present.   Pulmonary:      Effort: Pulmonary effort is normal.      Breath sounds: Normal breath sounds. No wheezing or rales.   Abdominal:      General: Bowel sounds are normal.      Palpations: Abdomen is soft.      Tenderness: There is no abdominal tenderness.   Musculoskeletal:      Cervical back: Neck supple.      Right lower leg: No edema.      Left lower leg: No edema.   Skin:     Nails: There is no clubbing.   Neurological:      Mental Status: She is alert and oriented to person, place, and time.           Lab Results   Component Value Date     " 08/10/2023    K 4.1 08/10/2023    BUN 25 (H) 08/10/2023    CREATININE 1.1 08/10/2023     08/10/2023    HGBA1C 5.8 (H) 08/10/2023    CHOL 125 08/10/2023    HDL 37 (L) 08/10/2023    LDLCALC 70.4 08/10/2023    TRIG 88 08/10/2023    CHOLHDL 29.6 08/10/2023    HGB 10.5 (L) 08/10/2023    HCT 33.7 (L) 08/10/2023     08/10/2023    INR 0.9 02/09/2018       Assessment:     1. Essential hypertension    2. Diastolic dysfunction    3. Aortic valve sclerosis    4. Gastroesophageal reflux disease, unspecified whether esophagitis present    5. JAM on CPAP    6. Tobacco abuse    7. Dyslipidemia    8. Obesity (BMI 30.0-34.9)    9. Bilateral carotid bruits    10. Status post total knee replacement, left 1/20/2016    11. Status post total right knee replacement 2/26/2018    12. Lichen planus      Patient has no symptoms of cardiac ischemia, heart failure, or significant arrhythmias.  Her LDL is borderline at goal, no change will be made at this time and she is stable.  She continues to use CPAP for her sleep apnea.  She has no symptoms her aortic valve, but an echocardiogram will be obtained  to reassess the valve and her ventricular function.  Patient seems to be doing okay on her knees she is about take a 5 day cruise.  Weight loss was again discussed with the patient.    Unless there are intervening problems, patient should return for re-evaluation in 6 months.   Plan:     Diagnoses and all orders for this visit:    Essential hypertension    Diastolic dysfunction    Aortic valve sclerosis    Gastroesophageal reflux disease, unspecified whether esophagitis present    JAM on CPAP    Tobacco abuse    Dyslipidemia    Obesity (BMI 30.0-34.9)    Bilateral carotid bruits    Status post total knee replacement, left 1/20/2016    Status post total right knee replacement 2/26/2018    Lichen planus          Dean Robins MD  Consultative Cardiology

## 2023-11-20 ENCOUNTER — OFFICE VISIT (OUTPATIENT)
Dept: CARDIOLOGY | Facility: CLINIC | Age: 73
End: 2023-11-20
Payer: MEDICARE

## 2023-11-20 VITALS
OXYGEN SATURATION: 100 % | BODY MASS INDEX: 32.37 KG/M2 | WEIGHT: 175.94 LBS | HEIGHT: 62 IN | SYSTOLIC BLOOD PRESSURE: 148 MMHG | DIASTOLIC BLOOD PRESSURE: 75 MMHG | HEART RATE: 58 BPM

## 2023-11-20 DIAGNOSIS — I10 ESSENTIAL HYPERTENSION: Primary | Chronic | ICD-10-CM

## 2023-11-20 DIAGNOSIS — K21.9 GASTROESOPHAGEAL REFLUX DISEASE, UNSPECIFIED WHETHER ESOPHAGITIS PRESENT: ICD-10-CM

## 2023-11-20 DIAGNOSIS — E66.9 OBESITY (BMI 30.0-34.9): ICD-10-CM

## 2023-11-20 DIAGNOSIS — I35.8 AORTIC VALVE SCLEROSIS: ICD-10-CM

## 2023-11-20 DIAGNOSIS — R09.89 BILATERAL CAROTID BRUITS: ICD-10-CM

## 2023-11-20 DIAGNOSIS — E78.5 DYSLIPIDEMIA: Chronic | ICD-10-CM

## 2023-11-20 DIAGNOSIS — I51.89 DIASTOLIC DYSFUNCTION: ICD-10-CM

## 2023-11-20 DIAGNOSIS — Z96.652 STATUS POST TOTAL KNEE REPLACEMENT, LEFT: ICD-10-CM

## 2023-11-20 DIAGNOSIS — Z72.0 TOBACCO ABUSE: ICD-10-CM

## 2023-11-20 DIAGNOSIS — L43.9 LICHEN PLANUS: ICD-10-CM

## 2023-11-20 DIAGNOSIS — Z96.651 STATUS POST TOTAL RIGHT KNEE REPLACEMENT: ICD-10-CM

## 2023-11-20 DIAGNOSIS — G47.33 OSA ON CPAP: ICD-10-CM

## 2023-11-20 PROCEDURE — 99999 PR PBB SHADOW E&M-EST. PATIENT-LVL V: ICD-10-PCS | Mod: PBBFAC,,, | Performed by: INTERNAL MEDICINE

## 2023-11-20 PROCEDURE — 99215 OFFICE O/P EST HI 40 MIN: CPT | Mod: PBBFAC | Performed by: INTERNAL MEDICINE

## 2023-11-20 PROCEDURE — 99214 OFFICE O/P EST MOD 30 MIN: CPT | Mod: S$PBB,,, | Performed by: INTERNAL MEDICINE

## 2023-11-20 PROCEDURE — 99214 PR OFFICE/OUTPT VISIT, EST, LEVL IV, 30-39 MIN: ICD-10-PCS | Mod: S$PBB,,, | Performed by: INTERNAL MEDICINE

## 2023-11-20 PROCEDURE — 99999 PR PBB SHADOW E&M-EST. PATIENT-LVL V: CPT | Mod: PBBFAC,,, | Performed by: INTERNAL MEDICINE

## 2023-11-20 RX ORDER — ALBUTEROL SULFATE 90 UG/1
AEROSOL, METERED RESPIRATORY (INHALATION)
Qty: 18 G | Refills: 3 | Status: SHIPPED | OUTPATIENT
Start: 2023-11-20 | End: 2023-11-21 | Stop reason: SDUPTHER

## 2023-11-20 NOTE — Clinical Note
Thank you for allowing me to follow-up with Stephanie Sears for aortic valve disease and cardiac risk factor control. Please see my note for details of this encounter. If you have any questions, please contact me.  Thank you again for allowing to participate in the care of this patient.

## 2023-11-21 ENCOUNTER — LAB VISIT (OUTPATIENT)
Dept: LAB | Facility: HOSPITAL | Age: 73
End: 2023-11-21
Attending: FAMILY MEDICINE
Payer: MEDICARE

## 2023-11-21 ENCOUNTER — OFFICE VISIT (OUTPATIENT)
Dept: FAMILY MEDICINE | Facility: CLINIC | Age: 73
End: 2023-11-21
Payer: MEDICARE

## 2023-11-21 VITALS
DIASTOLIC BLOOD PRESSURE: 70 MMHG | WEIGHT: 177.25 LBS | TEMPERATURE: 98 F | SYSTOLIC BLOOD PRESSURE: 136 MMHG | OXYGEN SATURATION: 96 % | HEART RATE: 66 BPM | BODY MASS INDEX: 32.62 KG/M2 | HEIGHT: 62 IN

## 2023-11-21 DIAGNOSIS — E78.5 DYSLIPIDEMIA: Chronic | ICD-10-CM

## 2023-11-21 DIAGNOSIS — M20.42 HAMMER TOES OF BOTH FEET: ICD-10-CM

## 2023-11-21 DIAGNOSIS — M20.41 HAMMER TOES OF BOTH FEET: ICD-10-CM

## 2023-11-21 DIAGNOSIS — D64.9 NORMOCHROMIC ANEMIA: ICD-10-CM

## 2023-11-21 DIAGNOSIS — I70.0 ATHEROSCLEROSIS OF AORTIC ARCH: Chronic | ICD-10-CM

## 2023-11-21 DIAGNOSIS — L84 CALLUS OF FOOT: ICD-10-CM

## 2023-11-21 DIAGNOSIS — I10 ESSENTIAL HYPERTENSION: Chronic | ICD-10-CM

## 2023-11-21 DIAGNOSIS — J43.8 OTHER EMPHYSEMA: ICD-10-CM

## 2023-11-21 DIAGNOSIS — E11.51 TYPE II DIABETES MELLITUS WITH PERIPHERAL CIRCULATORY DISORDER: ICD-10-CM

## 2023-11-21 DIAGNOSIS — N18.31 STAGE 3A CHRONIC KIDNEY DISEASE: ICD-10-CM

## 2023-11-21 DIAGNOSIS — E11.69 TYPE 2 DIABETES MELLITUS WITH OTHER SPECIFIED COMPLICATION, WITHOUT LONG-TERM CURRENT USE OF INSULIN: Primary | Chronic | ICD-10-CM

## 2023-11-21 PROCEDURE — 99214 OFFICE O/P EST MOD 30 MIN: CPT | Mod: S$PBB,,, | Performed by: FAMILY MEDICINE

## 2023-11-21 PROCEDURE — 83036 HEMOGLOBIN GLYCOSYLATED A1C: CPT | Performed by: FAMILY MEDICINE

## 2023-11-21 PROCEDURE — 99214 PR OFFICE/OUTPT VISIT, EST, LEVL IV, 30-39 MIN: ICD-10-PCS | Mod: S$PBB,,, | Performed by: FAMILY MEDICINE

## 2023-11-21 PROCEDURE — 99999 PR PBB SHADOW E&M-EST. PATIENT-LVL IV: CPT | Mod: PBBFAC,,, | Performed by: FAMILY MEDICINE

## 2023-11-21 PROCEDURE — 99214 OFFICE O/P EST MOD 30 MIN: CPT | Mod: PBBFAC,PN | Performed by: FAMILY MEDICINE

## 2023-11-21 PROCEDURE — 36415 COLL VENOUS BLD VENIPUNCTURE: CPT | Mod: PN | Performed by: FAMILY MEDICINE

## 2023-11-21 PROCEDURE — 99999 PR PBB SHADOW E&M-EST. PATIENT-LVL IV: ICD-10-PCS | Mod: PBBFAC,,, | Performed by: FAMILY MEDICINE

## 2023-11-21 RX ORDER — ALBUTEROL SULFATE 90 UG/1
AEROSOL, METERED RESPIRATORY (INHALATION)
Qty: 18 G | Refills: 3 | Status: SHIPPED | OUTPATIENT
Start: 2023-11-21

## 2023-11-22 LAB
ESTIMATED AVG GLUCOSE: 128 MG/DL (ref 68–131)
HBA1C MFR BLD: 6.1 % (ref 4–5.6)

## 2023-11-23 PROBLEM — J43.8 OTHER EMPHYSEMA: Status: ACTIVE | Noted: 2023-03-05

## 2023-11-23 PROBLEM — E11.69 TYPE 2 DIABETES MELLITUS WITH OTHER SPECIFIED COMPLICATION: Chronic | Status: ACTIVE | Noted: 2020-01-24

## 2023-11-24 NOTE — ASSESSMENT & PLAN NOTE
No acute respiratory concerns reported.  Continue current medical management.  Stressed importance of smoking cessation.

## 2023-11-24 NOTE — ASSESSMENT & PLAN NOTE
BP Readings from Last 3 Encounters:   11/21/23 136/70   11/20/23 (!) 148/75   10/31/23 (!) 162/83      ACC/AHA guidelines on blood pressure goals reviewed.  Reinforced correct way of measuring blood pressure.   Continue current blood pressure regimen.   Importance of smoking cessation stressed.

## 2023-11-24 NOTE — PROGRESS NOTES
"  Patient Name: Stephanie Sears    : 1950  MRN: [id number]      Subjective:     Patient ID: Stephanie is a 73 y.o. female    Chief Complaint:  Diabetes    73-year-old female with chronic conditions as per problem list comes in for routine follow-up on these.  She reports compliance with her medications.  She reports she is been having a hard time getting her diabetic shoes filled.  Prescription has been sent in by Podiatry previously.         Review of Systems   Constitutional:  Negative for chills, fever and unexpected weight change.   Respiratory:  Negative for shortness of breath.    Cardiovascular:  Negative for chest pain and palpitations.   Gastrointestinal:  Negative for abdominal pain, blood in stool and change in bowel habit.   Genitourinary:  Negative for difficulty urinating.   Musculoskeletal:  Positive for neck pain.   Neurological:  Positive for numbness (feet). Negative for headaches.        Objective:   /70 (BP Location: Left arm, Patient Position: Sitting, BP Method: Medium (Manual))   Pulse 66   Temp 98 °F (36.7 °C) (Oral)   Ht 5' 2" (1.575 m)   Wt 80.4 kg (177 lb 4 oz)   SpO2 96%   BMI 32.42 kg/m²     Physical Exam  Vitals reviewed.   Constitutional:       General: She is not in acute distress.     Appearance: She is well-developed. She is not diaphoretic.   HENT:      Head: Normocephalic and atraumatic.      Right Ear: Tympanic membrane, ear canal and external ear normal.      Left Ear: Tympanic membrane, ear canal and external ear normal.      Nose: Nose normal.   Eyes:      General:         Right eye: No discharge.         Left eye: No discharge.      Conjunctiva/sclera: Conjunctivae normal.      Pupils: Pupils are equal, round, and reactive to light.   Neck:      Thyroid: No thyromegaly.      Trachea: No tracheal deviation.   Cardiovascular:      Rate and Rhythm: Normal rate and regular rhythm.      Pulses:           Radial pulses are 2+ on the right side and 2+ on the left " side.      Heart sounds: Normal heart sounds, S1 normal and S2 normal. No murmur heard.  Pulmonary:      Effort: Pulmonary effort is normal. No respiratory distress.      Breath sounds: Normal breath sounds. No wheezing, rhonchi or rales.   Abdominal:      General: Bowel sounds are normal. There is no distension.      Palpations: Abdomen is soft. Abdomen is not rigid. There is no mass.      Tenderness: There is no abdominal tenderness. There is no guarding.   Musculoskeletal:      Cervical back: Normal range of motion and neck supple.   Lymphadenopathy:      Cervical: No cervical adenopathy.   Skin:     General: Skin is warm and dry.      Capillary Refill: Capillary refill takes less than 2 seconds.      Findings: No rash.   Neurological:      Mental Status: She is alert and oriented to person, place, and time.   Psychiatric:         Behavior: Behavior normal.          Protective Sensation (w/ 10 gram monofilament):  Right: Decreased  Left: Decreased    Visual Inspection:  Nails Intact - with Evidence of Foot Deformity- bilateral hammer toes and Callus -  Left    Pedal Pulses:   Right: Present  Left: Present    Posterior Tibialis Pulses:   Right:Present  Left: Present         Lab Visit on 11/21/2023   Component Date Value Ref Range Status    Hemoglobin A1C 11/21/2023 6.1 (H)  4.0 - 5.6 % Final    Comment: ADA Screening Guidelines:  5.7-6.4%  Consistent with prediabetes  >or=6.5%  Consistent with diabetes    High levels of fetal hemoglobin interfere with the HbA1C  assay. Heterozygous hemoglobin variants (HbS, HgC, etc)do  not significantly interfere with this assay.   However, presence of multiple variants may affect accuracy.      Estimated Avg Glucose 11/21/2023 128  68 - 131 mg/dL Final       Assessment        ICD-10-CM ICD-9-CM   1. Type 2 diabetes mellitus with other specified complication, without long-term current use of insulin  E11.69 250.80   2. Essential hypertension  I10 401.9   3. Dyslipidemia  E78.5  272.4   4. Atherosclerosis of aortic arch  I70.0 440.0   5. Hammer toes of both feet  M20.41 735.4    M20.42    6. Callus of foot  L84 700   7. Other emphysema  J43.8 492.8   8. Stage 3a chronic kidney disease  N18.31 585.3   9. Normochromic anemia  D64.9 285.9         Plan:     1. Type 2 diabetes mellitus with other specified complication, without long-term current use of insulin  Overview:  Complications: Nephropathy, hypertension, dyslipidemia, obesity    Lab Results   Component Value Date    HGBA1C 6.1 (H) 11/21/2023    HGBA1C 5.8 (H) 08/10/2023    HGBA1C 5.8 (H) 02/03/2023     Lab Results   Component Value Date    LDLCALC 70.4 08/10/2023     Lab Results   Component Value Date    MICALBCREAT 2146.0 (H) 04/29/2022      Eye Exam:  11-  Foot exam:  11-    Assessment & Plan:  A1c at goal. Continue current diabetic regimen.     Orders:  -     DIABETIC SHOES FOR HOME USE  -     Microalbumin/creatinine urine ratio; Future; Expected date: 03/21/2024  -     CBC auto differential; Future; Expected date: 03/21/2024  -     Comprehensive metabolic panel; Future; Expected date: 03/21/2024  -     Hemoglobin A1c; Future; Expected date: 03/21/2024  -     Lipid panel; Future; Expected date: 03/21/2024  -     Hemoglobin A1C; Future; Expected date: 11/21/2023    2. Essential hypertension  Assessment & Plan:  BP Readings from Last 3 Encounters:   11/21/23 136/70   11/20/23 (!) 148/75   10/31/23 (!) 162/83      ACC/AHA guidelines on blood pressure goals reviewed.  Reinforced correct way of measuring blood pressure.   Continue current blood pressure regimen.   Importance of smoking cessation stressed.      3. Dyslipidemia  Overview:  Lab Results   Component Value Date    CHOL 125 08/10/2023    CHOL 141 02/07/2023    CHOL 135 02/03/2023     Lab Results   Component Value Date    HDL 37 (L) 08/10/2023    HDL 41 02/07/2023    HDL 39 (L) 02/03/2023     Lab Results   Component Value Date    LDLCALC 70.4 08/10/2023    LDLCALC  82.4 02/07/2023    LDLCALC 79.2 02/03/2023     Lab Results   Component Value Date    TRIG 88 08/10/2023    TRIG 88 02/07/2023    TRIG 84 02/03/2023     Lab Results   Component Value Date    CHOLHDL 29.6 08/10/2023    CHOLHDL 29.1 02/07/2023    CHOLHDL 28.9 02/03/2023        The ASCVD Risk score (Sawyer NARVAEZ, et al., 2019) failed to calculate for the following reasons:    The valid total cholesterol range is 130 to 320 mg/dL      Assessment & Plan:  Continue atorvastatin 40 milligrams daily.       4. Atherosclerosis of aortic arch  Overview:  Noted on CXR from 1/21/16.    Assessment & Plan:  Continue statin regimen.      5. Hammer toes of both feet/  6. Callus of foot  -     DIABETIC SHOES FOR HOME USE  Patient would benefit from diabetic shoes given foot deformities and peripheral circulatory disorder.  Order placed.  Will [facility].    7. Other emphysema  Overview:  05-  LDCT Chest  Lungs: Once again, soft tissue nodularity is identified within the left upper lobe measuring 10 mm, stable dating back to 06/07/2019.  There is also nodularity within the right upper lobe measuring approximately 4 mm (image 127, series 4), stable dating back to 06/07/2019.  No new pulmonary nodules are identified.  There are mild emphysematous changes within the lung apices.    Assessment & Plan:  No acute respiratory concerns reported.  Continue current medical management.  Stressed importance of smoking cessation.    Orders:  -     albuterol (PROVENTIL/VENTOLIN HFA) 90 mcg/actuation inhaler; USE 2 INHALATIONS EVERY 4 HOURS AS NEEDED FOR WHEEZING (RESCUE)  Dispense: 18 g; Refill: 3    8. Stage 3a chronic kidney disease  Overview:  Lab Results   Component Value Date    EGFRNORACEVR 53 (A) 08/10/2023    EGFRNORACEVR 48 (A) 02/03/2023    EGFRNORACEVR 48 (A) 02/03/2023        Assessment & Plan:  Advised on importance of blood pressure and blood glucose control.  Advised good hydration.  Medications renally dosed.  Continue  monitoring level.    Orders:  -     Microalbumin/creatinine urine ratio; Future; Expected date: 03/21/2024  -     CBC auto differential; Future; Expected date: 03/21/2024  -     Comprehensive metabolic panel; Future; Expected date: 03/21/2024    9. Normochromic anemia  -     CBC auto differential; Future; Expected date: 03/21/2024  -     Iron and TIBC; Future; Expected date: 03/21/2024  -     Ferritin; Future; Expected date: 03/21/2024  Monitor iron studies             -Williams Rossi Jr., MD, AAHIVS      This office note has been dictated.  This dictation has been generated using M-Modal Fluency Direct dictation; some phonetic errors may occur.         Patient Instructions   [facility] [address]   Phone number: [phone]    Follow Up  No follow-ups on file.    Future Appointments   Date Time Provider Department Center   11/29/2023  9:00 AM NURSE, [facility] MED/INT MED [facility]   12/1/2023  1:00 PM ECHO, [facility] [place]   12/7/2023  9:30 AM Melany Tse MD [facility] ABBIE [place]   3/21/2024  8:00 AM LAB, [facility] DRAW STATION [facility] LAB [facility]   3/28/2024 10:00 AM Williams Rossi Jr., MD [facility]

## 2023-11-24 NOTE — ASSESSMENT & PLAN NOTE
Advised on importance of blood pressure and blood glucose control.  Advised good hydration.  Medications renally dosed.  Continue monitoring level.

## 2023-11-29 ENCOUNTER — CLINICAL SUPPORT (OUTPATIENT)
Dept: FAMILY MEDICINE | Facility: CLINIC | Age: 73
End: 2023-11-29
Payer: MEDICARE

## 2023-11-29 DIAGNOSIS — Z23 NEEDS FLU SHOT: Primary | ICD-10-CM

## 2023-11-29 PROCEDURE — 99999PBSHW FLU VACCINE - QUADRIVALENT - ADJUVANTED: ICD-10-PCS | Mod: PBBFAC,,,

## 2023-11-29 PROCEDURE — G0008 ADMIN INFLUENZA VIRUS VAC: HCPCS | Mod: PBBFAC,PN

## 2023-11-29 PROCEDURE — 99999PBSHW FLU VACCINE - QUADRIVALENT - ADJUVANTED: Mod: PBBFAC,,,

## 2023-11-29 NOTE — PROGRESS NOTES
Flu vaccine administered as ordered.  VIS given.  Tolerated well.  Told to wait in clinic for 15 mins.  Patient verbalized understanding.

## 2023-11-30 ENCOUNTER — EXTERNAL CHRONIC CARE MANAGEMENT (OUTPATIENT)
Dept: PRIMARY CARE CLINIC | Facility: CLINIC | Age: 73
End: 2023-11-30
Payer: MEDICARE

## 2023-11-30 PROCEDURE — 99490 CHRNC CARE MGMT STAFF 1ST 20: CPT | Mod: PBBFAC,PN | Performed by: FAMILY MEDICINE

## 2023-11-30 PROCEDURE — 99490 PR CHRONIC CARE MGMT, 1ST 20 MIN: ICD-10-PCS | Mod: S$PBB,,, | Performed by: FAMILY MEDICINE

## 2023-11-30 PROCEDURE — 99490 CHRNC CARE MGMT STAFF 1ST 20: CPT | Mod: S$PBB,,, | Performed by: FAMILY MEDICINE

## 2023-12-01 ENCOUNTER — HOSPITAL ENCOUNTER (OUTPATIENT)
Dept: CARDIOLOGY | Facility: HOSPITAL | Age: 73
Discharge: HOME | End: 2023-12-01
Attending: INTERNAL MEDICINE
Payer: MEDICARE

## 2023-12-01 VITALS
HEIGHT: 62 IN | SYSTOLIC BLOOD PRESSURE: 140 MMHG | WEIGHT: 177 LBS | DIASTOLIC BLOOD PRESSURE: 75 MMHG | BODY MASS INDEX: 32.57 KG/M2 | HEART RATE: 60 BPM

## 2023-12-01 DIAGNOSIS — I35.8 AORTIC VALVE SCLEROSIS: ICD-10-CM

## 2023-12-01 LAB
ASCENDING AORTA: 3.04 CM
AV INDEX (PROSTH): 0.51
AV MEAN GRADIENT: 15 MMHG
AV PEAK GRADIENT: 28 MMHG
AV VALVE AREA BY VELOCITY RATIO: 1.86 CM²
AV VALVE AREA: 1.61 CM²
AV VELOCITY RATIO: 0.59
BSA FOR ECHO PROCEDURE: 1.87 M2
CV ECHO LV RWT: 0.35 CM
DOP CALC AO PEAK VEL: 2.65 M/S
DOP CALC AO VTI: 60.04 CM
DOP CALC LVOT AREA: 3.1 CM2
DOP CALC LVOT DIAMETER: 2 CM
DOP CALC LVOT PEAK VEL: 1.57 M/S
DOP CALC LVOT STROKE VOLUME: 96.52 CM3
DOP CALCLVOT PEAK VEL VTI: 30.74 CM
E WAVE DECELERATION TIME: 285.62 MSEC
E/A RATIO: 0.88
E/E' RATIO: 13.87 M/S
ECHO LV POSTERIOR WALL: 0.95 CM (ref 0.6–1.1)
FRACTIONAL SHORTENING: 46 % (ref 28–44)
INTERVENTRICULAR SEPTUM: 0.74 CM (ref 0.6–1.1)
LA MAJOR: 5.8 CM
LA MINOR: 5.8 CM
LA WIDTH: 4.29 CM
LEFT ATRIUM SIZE: 4.2 CM
LEFT ATRIUM VOLUME INDEX MOD: 48.1 ML/M2
LEFT ATRIUM VOLUME INDEX: 49.1 ML/M2
LEFT ATRIUM VOLUME MOD: 87.07 CM3
LEFT ATRIUM VOLUME: 88.83 CM3
LEFT INTERNAL DIMENSION IN SYSTOLE: 2.9 CM (ref 2.1–4)
LEFT VENTRICLE DIASTOLIC VOLUME INDEX: 78.36 ML/M2
LEFT VENTRICLE DIASTOLIC VOLUME: 141.84 ML
LEFT VENTRICLE MASS INDEX: 92 G/M2
LEFT VENTRICLE SYSTOLIC VOLUME INDEX: 17.7 ML/M2
LEFT VENTRICLE SYSTOLIC VOLUME: 32.12 ML
LEFT VENTRICULAR INTERNAL DIMENSION IN DIASTOLE: 5.41 CM (ref 3.5–6)
LEFT VENTRICULAR MASS: 166.64 G
LV LATERAL E/E' RATIO: 14.86 M/S
LV SEPTAL E/E' RATIO: 13 M/S
MV PEAK A VEL: 1.18 M/S
MV PEAK E VEL: 1.04 M/S
MV STENOSIS PRESSURE HALF TIME: 82.83 MS
MV VALVE AREA P 1/2 METHOD: 2.66 CM2
PISA TR MAX VEL: 2.93 M/S
RA MAJOR: 4.44 CM
RA PRESSURE ESTIMATED: 3 MMHG
RA WIDTH: 2.88 CM
RIGHT VENTRICULAR END-DIASTOLIC DIMENSION: 3.33 CM
RV TB RVSP: 6 MMHG
SINUS: 2.38 CM
STJ: 1.88 CM
TDI LATERAL: 0.07 M/S
TDI SEPTAL: 0.08 M/S
TDI: 0.08 M/S
TR MAX PG: 34 MMHG
TRICUSPID ANNULAR PLANE SYSTOLIC EXCURSION: 2.21 CM
TV REST PULMONARY ARTERY PRESSURE: 37 MMHG
Z-SCORE OF LEFT VENTRICULAR DIMENSION IN END DIASTOLE: 0.77
Z-SCORE OF LEFT VENTRICULAR DIMENSION IN END SYSTOLE: -0.51

## 2023-12-01 PROCEDURE — 93306 ECHO (CUPID ONLY): ICD-10-PCS | Mod: 26,,, | Performed by: INTERNAL MEDICINE

## 2023-12-01 PROCEDURE — 93306 TTE W/DOPPLER COMPLETE: CPT

## 2023-12-01 PROCEDURE — 93306 TTE W/DOPPLER COMPLETE: CPT | Mod: 26,,, | Performed by: INTERNAL MEDICINE

## 2023-12-02 NOTE — PROGRESS NOTES
"  HPI     Laser Treatment            Comments: Yag cap os          Comments    YAG CAP OS TODAY    1  Preperimetric POAG vs OHT   2. PVD OD   3. PCIOL OU   4. Blepharitis / MGD   5. RAGHU Allergy   6. PCO OU    MEDS:  AT's PRN OU    Intolerant to all glaucoma drops           Last edited by Alina Olivares MA on 12/7/2023  9:25 AM.            Assessment /Plan     For exam results, see Encounter Report.    PCO (posterior capsular opacification), left    Primary open-angle glaucoma, bilateral, mild stage    Epiretinal membrane (ERM) of left eye    Pseudophakia, both eyes    Status post glaucoma surgery          1. Pre-perimetric mild POAG   Vs OHT  -Followed at [facility] since 1991   -First HVF 1999   -First photos 1991   - Intolerant to all gtts - they aggravate his blepharitis-? RAGHU allergy   -IOP "OK" off gtts and s/p ALT ou and SLT od - 2008    Family history neg   Glaucoma meds none (( off gtts post SLT ou -))   H/O adverse rxn to glaucoma drops Intolerant to all gtts - 2/2 aggravates blepharitis- ? RAGHU allergy   LASERS ALT ou -? Date / SLT OD 7/17/08 - good response   GLAUCOMA SURGERIES - I stent x 2 - OD  - 10/21/2020 // I stent 12/9/2020 - OS   OTHER EYE SURGERIES - phaco/IOL od - 10/21/2020 - PCB00 14.0  // oS 12/9/2020 - pcb00 15.0   CDR 0.6/0.3   Tbase 19-26 / 16-21   Tmax 26/21   Ttarget ? About 18 ou  HVF 15 test - 1999 to 2021 - Full ou   Gonio +3 ou   /588   OCT 6 test 2005 to 2021 -  RNFL - OD: bord TI (?prog)  // OS:NL  HRT 9 test 2004 to 2020 -MR -  MR -  Dec T, border NI od // full os /// CDR 0.619 od // 0.508 os - but unreliable OD  Disc photos 1991, 1996, 2003 - slides // 2012 , 2016, 2020   - OIS     - Ttoday   ??/16  - Test done today   IOP // yag cap os       2. Posterior Vitreous Detachment OD - 11/2008   - RD PRECAUTIONS    3. Eyelid inflammation / Blepharitis / MGD    4. Allergic Conjunctivitis - RAGHU allergy     5. PCO    Vis sign os    Mild od // + ant capsule phimosis od     6. PC IOL OU " (w/ istents)    OD 10/21/202 - PCB00 14.0   OS 12/9/2020 - PCB00 15.0     Plan   POAG - mild -pre-perimetric glaucoma - intolerant to all gtts   IOP ok s/p ALT ou - years ago and s/p SLT OD 2008 ( no SLT os)  Continue to monitor HVF/DFE/OCT/HRT/photos/IOP   If increase IOP or progression on VF testing consider repeat SLT OD and primary SLT os      Pt was a myope - sph eq is -5.25 od and -4.25 os - pre- phac0 -- set for distance post op    OCT macula - NO CME     Rx for bifocals  Given - or ok to use no distance correction and OTC readers - re-printed 3/28/2022  Pt mostly uses the [employer] reading glasses     Photo file updated 7/13/2023    If IOP too high off gtts and post I-stent - can try a SLT - OS and a repeat SLT od - can skip the istent areas // (( H/O ALT's many years ago)    - or can re-try some gtts (H/O intol to all galucoma gtts in past - ? RAGHU intol) -  ?? If could try travatan Z again in future again - non RAGUH // or ?? Try alphagan P      yag cap os (pt C/O glare - nicola at night - ) - done 12/7/2023 - dexametasone taper over 12 days - sample and instructions given     F/U 4 months IOP // gonio // chek to see if glare has improved os post yag cap  - sooner  prn

## 2023-12-05 ENCOUNTER — CLINICAL SUPPORT (OUTPATIENT)
Dept: SMOKING CESSATION | Facility: CLINIC | Age: 73
End: 2023-12-05
Payer: COMMERCIAL

## 2023-12-05 DIAGNOSIS — F17.200 NICOTINE DEPENDENCE: Primary | ICD-10-CM

## 2023-12-05 PROCEDURE — 99999 PR PBB SHADOW E&M-EST. PATIENT-LVL I: CPT | Mod: PBBFAC,,,

## 2023-12-05 PROCEDURE — 99404 PR PREVENT COUNSEL,INDIV,60 MIN: ICD-10-PCS | Mod: S$GLB,,,

## 2023-12-05 PROCEDURE — 99404 PREV MED CNSL INDIV APPRX 60: CPT | Mod: S$GLB,,,

## 2023-12-05 PROCEDURE — 99999 PR PBB SHADOW E&M-EST. PATIENT-LVL I: ICD-10-PCS | Mod: PBBFAC,,,

## 2023-12-05 NOTE — PROGRESS NOTES
Individual Follow-Up Form    12/5/2023    Quit Date: TBD    Clinical Status of Patient: Outpatient    Length of Service: 60 minutes    Continuing Medication: yes  Chantix or Patches    Other Medications: None      Target Symptoms: Withdrawal and medication side effects. The following were  rated moderate (3) to severe (4) on TCRS:  Moderate (3): Crave and Desire   Severe (4): None     Comments: Counselor spoke with patient telephonically for follow-up visit, name and date of birth verified as two patient identifiers. Patient reported she was in vacation, and she actually smoked a lot less.  Patient also reported she is smoking remains smoking about 10 cpd.  Patient also reported she began using 1 mg Varenicline in conjunction with 2 mg nicotine lozenges without any negative side effects reported at this time.  Counselor congratulated patient on her progress thus far. Counselor also discussed patient incorporating more lozenge usage in lieu of smoking, working towards further rate reduction,  and working towards further behavior modification strategies.  Follow-up visit scheduled in two weeks. Counselor will remain available should any further needs arise.       Diagnosis: F17.200    Next Visit: 2 weeks

## 2023-12-07 ENCOUNTER — OFFICE VISIT (OUTPATIENT)
Dept: OPHTHALMOLOGY | Facility: CLINIC | Age: 73
End: 2023-12-07
Payer: MEDICARE

## 2023-12-07 DIAGNOSIS — H35.372 EPIRETINAL MEMBRANE (ERM) OF LEFT EYE: ICD-10-CM

## 2023-12-07 DIAGNOSIS — Z98.83 STATUS POST GLAUCOMA SURGERY: ICD-10-CM

## 2023-12-07 DIAGNOSIS — H26.492 PCO (POSTERIOR CAPSULAR OPACIFICATION), LEFT: Primary | ICD-10-CM

## 2023-12-07 DIAGNOSIS — H40.1131 PRIMARY OPEN-ANGLE GLAUCOMA, BILATERAL, MILD STAGE: ICD-10-CM

## 2023-12-07 DIAGNOSIS — Z96.1 PSEUDOPHAKIA, BOTH EYES: ICD-10-CM

## 2023-12-07 PROCEDURE — 99499 UNLISTED E&M SERVICE: CPT | Mod: S$PBB,,, | Performed by: OPHTHALMOLOGY

## 2023-12-07 PROCEDURE — 99999 PR PBB SHADOW E&M-EST. PATIENT-LVL III: CPT | Mod: PBBFAC,,, | Performed by: OPHTHALMOLOGY

## 2023-12-07 PROCEDURE — 99999 PR PBB SHADOW E&M-EST. PATIENT-LVL III: ICD-10-PCS | Mod: PBBFAC,,, | Performed by: OPHTHALMOLOGY

## 2023-12-07 PROCEDURE — 99213 OFFICE O/P EST LOW 20 MIN: CPT | Mod: PBBFAC,25 | Performed by: OPHTHALMOLOGY

## 2023-12-07 PROCEDURE — 66821 AFTER CATARACT LASER SURGERY: CPT | Mod: PBBFAC,LT | Performed by: OPHTHALMOLOGY

## 2023-12-07 PROCEDURE — 99499 NO LOS: ICD-10-PCS | Mod: S$PBB,,, | Performed by: OPHTHALMOLOGY

## 2023-12-07 PROCEDURE — 66821 YAG CAPSULOTOMY - OS - LEFT EYE: ICD-10-PCS | Mod: S$PBB,LT,, | Performed by: OPHTHALMOLOGY

## 2023-12-19 ENCOUNTER — CLINICAL SUPPORT (OUTPATIENT)
Dept: SMOKING CESSATION | Facility: CLINIC | Age: 73
End: 2023-12-19
Payer: COMMERCIAL

## 2023-12-19 DIAGNOSIS — F17.200 NICOTINE DEPENDENCE: Primary | ICD-10-CM

## 2023-12-19 PROCEDURE — 99403 PR PREVENT COUNSEL,INDIV,45 MIN: ICD-10-PCS | Mod: S$GLB,,,

## 2023-12-19 PROCEDURE — 99999 PR PBB SHADOW E&M-EST. PATIENT-LVL II: ICD-10-PCS | Mod: PBBFAC,,,

## 2023-12-19 PROCEDURE — 99999 PR PBB SHADOW E&M-EST. PATIENT-LVL II: CPT | Mod: PBBFAC,,,

## 2023-12-19 PROCEDURE — 99403 PREV MED CNSL INDIV APPRX 45: CPT | Mod: S$GLB,,,

## 2023-12-19 NOTE — PROGRESS NOTES
Individual Follow-Up Form    12/19/2023    Quit Date: TBD    Clinical Status of Patient: Outpatient    Length of Service: 45 minutes    Continuing Medication: yes  Patches or Nicotine Lozenges    Other Medications: None      Target Symptoms: Withdrawal and medication side effects. The following were  rated moderate (3) to severe (4) on TCRS:  Moderate (3): Crave and Desire   Severe (4): None     Comments: Counselor spoke with patient telephonically for follow-up visit, name and date of birth verified as two patient identifiers. Patient reported she is smoking 10 cpd, but she believes she allows a lot of those cigarettes to burn out instead of smoking. Patient also reported she resumed using 21 mg nicotine patches in conjunction with 2 mg nicotine lozenges without any negative side effects reported at this time.  Counselor congratulated patient on her progress thus far. Counselor also reiterated patient  continuing to work towards further rate reduction,working towards further behavior modification strategies, and incorporating more lozenge usage in lieu of smoking.  Follow-up visit scheduled in two weeks. Counselor will remain available should any further needs arise.        Diagnosis: F17.200    Next Visit: 2 weeks

## 2023-12-31 ENCOUNTER — EXTERNAL CHRONIC CARE MANAGEMENT (OUTPATIENT)
Dept: PRIMARY CARE CLINIC | Facility: CLINIC | Age: 73
End: 2023-12-31
Payer: MEDICARE

## 2023-12-31 PROCEDURE — 99490 CHRNC CARE MGMT STAFF 1ST 20: CPT | Mod: PBBFAC,PN | Performed by: FAMILY MEDICINE

## 2023-12-31 PROCEDURE — 99490 CHRNC CARE MGMT STAFF 1ST 20: CPT | Mod: S$PBB,,, | Performed by: FAMILY MEDICINE

## 2024-01-12 ENCOUNTER — CLINICAL SUPPORT (OUTPATIENT)
Dept: SMOKING CESSATION | Facility: CLINIC | Age: 74
End: 2024-01-12
Payer: COMMERCIAL

## 2024-01-12 DIAGNOSIS — F17.200 NICOTINE DEPENDENCE: Primary | ICD-10-CM

## 2024-01-12 PROCEDURE — 99999 PR PBB SHADOW E&M-EST. PATIENT-LVL II: CPT | Mod: PBBFAC,,,

## 2024-01-12 PROCEDURE — 99402 PREV MED CNSL INDIV APPRX 30: CPT | Mod: S$GLB,,,

## 2024-01-12 NOTE — PROGRESS NOTES
Individual Follow-Up Form    1/12/2024    Quit Date: TBD    Clinical Status of Patient: Outpatient    Length of Service: 30 minutes    Continuing Medication: yes  Nicotine Lozenges    Other Medications: None     Target Symptoms: Withdrawal and medication side effects. The following were  rated moderate (3) to severe (4) on TCRS:  Moderate (3): Crave and Desire  Severe (4): None    Comments: Counselor spoke with patient telephonically for follow-up visit, name and date of birth verified as two patient identifiers. Patient reported she is improving with her smoking reduction, she is now to smoking 8 cpd.  Patient also reported she resumed no longer using 21 mg nicotine patches, but she is using 2 mg nicotine lozenges without any negative side effects reported at this time.  Counselor congratulated patient on her progress thus far. Counselor also discussed patient continuing to work towards further rate reduction and working towards further behavior modification strategies.   Follow-up visit scheduled in two weeks. Counselor will remain available should any further needs arise.         Diagnosis: F17.200    Next Visit: 2 weeks

## 2024-01-17 RX ORDER — CHLORTHALIDONE 25 MG/1
TABLET ORAL
Qty: 15 TABLET | Refills: 11 | Status: SHIPPED | OUTPATIENT
Start: 2024-01-17 | End: 2024-01-22

## 2024-01-22 RX ORDER — CHLORTHALIDONE 25 MG/1
25 TABLET ORAL DAILY
Qty: 30 TABLET | Refills: 6 | Status: SHIPPED | OUTPATIENT
Start: 2024-01-22 | End: 2024-06-13 | Stop reason: SDUPTHER

## 2024-01-23 ENCOUNTER — CLINICAL SUPPORT (OUTPATIENT)
Dept: SMOKING CESSATION | Facility: CLINIC | Age: 74
End: 2024-01-23
Payer: COMMERCIAL

## 2024-01-23 DIAGNOSIS — F17.200 NICOTINE DEPENDENCE, UNCOMPLICATED: Primary | ICD-10-CM

## 2024-01-23 DIAGNOSIS — E78.5 DYSLIPIDEMIA: Chronic | ICD-10-CM

## 2024-01-23 DIAGNOSIS — I70.0 ATHEROSCLEROSIS OF AORTIC ARCH: Chronic | ICD-10-CM

## 2024-01-23 PROCEDURE — 99999 PR PBB SHADOW E&M-EST. PATIENT-LVL I: CPT | Mod: PBBFAC,,,

## 2024-01-23 PROCEDURE — 99407 BEHAV CHNG SMOKING > 10 MIN: CPT | Mod: S$GLB,,, | Performed by: GENERAL PRACTICE

## 2024-01-23 RX ORDER — ATORVASTATIN CALCIUM 40 MG/1
TABLET, FILM COATED ORAL
Qty: 90 TABLET | Refills: 1 | Status: SHIPPED | OUTPATIENT
Start: 2024-01-23

## 2024-01-23 NOTE — PROGRESS NOTES
Spoke with patient today in regard to smoking cessation progress for 12 month follow up. She states she is not yet tobacco free. Congratulated patient on their success to reduce her cigarette intake. Encouraged her to pick a quit date. Patient has scheduled appointment with [facility]. Informed patient of benefit period, future follow ups and contact information if any further help is needed. Will complete smart form for 12 month follow up for Quit # 7.

## 2024-01-23 NOTE — TELEPHONE ENCOUNTER
No care due was identified.  Health [facility] Embedded Care Due Messages. Reference number: [id number].   1/23/2024 12:31:09 AM CST

## 2024-01-23 NOTE — TELEPHONE ENCOUNTER
Refill Decision Note   Stephanie Sears  is requesting a refill authorization.  Brief Assessment and Rationale for Refill:  Approve     Medication Therapy Plan:         Comments:     Note composed:5:38 AM 01/23/2024

## 2024-01-24 ENCOUNTER — HOSPITAL ENCOUNTER (OUTPATIENT)
Dept: RADIOLOGY | Facility: HOSPITAL | Age: 74
Discharge: HOME | End: 2024-01-24
Attending: INTERNAL MEDICINE
Payer: MEDICARE

## 2024-01-24 ENCOUNTER — OFFICE VISIT (OUTPATIENT)
Dept: FAMILY MEDICINE | Facility: CLINIC | Age: 74
End: 2024-01-24
Payer: MEDICARE

## 2024-01-24 VITALS
SYSTOLIC BLOOD PRESSURE: 162 MMHG | DIASTOLIC BLOOD PRESSURE: 60 MMHG | OXYGEN SATURATION: 99 % | WEIGHT: 173.94 LBS | HEIGHT: 62 IN | TEMPERATURE: 98 F | HEART RATE: 62 BPM | BODY MASS INDEX: 32.01 KG/M2

## 2024-01-24 DIAGNOSIS — I15.2 HYPERTENSION ASSOCIATED WITH TYPE 2 DIABETES MELLITUS: ICD-10-CM

## 2024-01-24 DIAGNOSIS — R10.13 EPIGASTRIC PAIN: ICD-10-CM

## 2024-01-24 DIAGNOSIS — E11.59 HYPERTENSION ASSOCIATED WITH TYPE 2 DIABETES MELLITUS: ICD-10-CM

## 2024-01-24 DIAGNOSIS — R42 DIZZINESS: ICD-10-CM

## 2024-01-24 DIAGNOSIS — N18.31 STAGE 3A CHRONIC KIDNEY DISEASE: Chronic | ICD-10-CM

## 2024-01-24 DIAGNOSIS — I10 ESSENTIAL HYPERTENSION: Chronic | ICD-10-CM

## 2024-01-24 DIAGNOSIS — N30.01 ACUTE CYSTITIS WITH HEMATURIA: ICD-10-CM

## 2024-01-24 DIAGNOSIS — R10.9 ABDOMINAL PAIN, UNSPECIFIED ABDOMINAL LOCATION: ICD-10-CM

## 2024-01-24 DIAGNOSIS — R30.0 DYSURIA: ICD-10-CM

## 2024-01-24 DIAGNOSIS — R10.9 ABDOMINAL PAIN, UNSPECIFIED ABDOMINAL LOCATION: Primary | ICD-10-CM

## 2024-01-24 DIAGNOSIS — E11.69 TYPE 2 DIABETES MELLITUS WITH OTHER SPECIFIED COMPLICATION, WITHOUT LONG-TERM CURRENT USE OF INSULIN: ICD-10-CM

## 2024-01-24 LAB
BILIRUB SERPL-MCNC: NORMAL MG/DL
BLOOD URINE, POC: 250
COLOR, POC UA: YELLOW
GLUCOSE SERPL-MCNC: 114 MG/DL (ref 70–110)
GLUCOSE UR QL STRIP: NORMAL
KETONES UR QL STRIP: NORMAL
LEUKOCYTE ESTERASE URINE, POC: NORMAL
NITRITE, POC UA: NORMAL
PH, POC UA: 5
PROTEIN, POC: NORMAL
SPECIFIC GRAVITY, POC UA: 1.02
UROBILINOGEN, POC UA: 1

## 2024-01-24 PROCEDURE — 99215 OFFICE O/P EST HI 40 MIN: CPT | Mod: PBBFAC,PN,25 | Performed by: INTERNAL MEDICINE

## 2024-01-24 PROCEDURE — 99999PBSHW POCT GLUCOSE, HAND-HELD DEVICE: Mod: PBBFAC,,,

## 2024-01-24 PROCEDURE — 82962 GLUCOSE BLOOD TEST: CPT | Mod: PBBFAC,PN | Performed by: INTERNAL MEDICINE

## 2024-01-24 PROCEDURE — 99999PBSHW POCT URINALYSIS, DIPSTICK OR TABLET REAGENT, AUTOMATED, WITH MICROSCOP: Mod: PBBFAC,,,

## 2024-01-24 PROCEDURE — 99999 PR PBB SHADOW E&M-EST. PATIENT-LVL V: CPT | Mod: PBBFAC,,, | Performed by: INTERNAL MEDICINE

## 2024-01-24 PROCEDURE — 81001 URINALYSIS AUTO W/SCOPE: CPT | Mod: PBBFAC,PN | Performed by: INTERNAL MEDICINE

## 2024-01-24 PROCEDURE — 74150 CT ABDOMEN W/O CONTRAST: CPT | Mod: TC

## 2024-01-24 PROCEDURE — 74150 CT ABDOMEN W/O CONTRAST: CPT | Mod: 26,,, | Performed by: RADIOLOGY

## 2024-01-24 PROCEDURE — 99214 OFFICE O/P EST MOD 30 MIN: CPT | Mod: S$PBB,,, | Performed by: INTERNAL MEDICINE

## 2024-01-24 RX ORDER — AMOXICILLIN AND CLAVULANATE POTASSIUM 875; 125 MG/1; MG/1
1 TABLET, FILM COATED ORAL 2 TIMES DAILY
Qty: 10 TABLET | Refills: 0 | Status: SHIPPED | OUTPATIENT
Start: 2024-01-24 | End: 2024-01-29

## 2024-01-24 NOTE — PROGRESS NOTES
HISTORY OF PRESENT ILLNESS:  Stephanie Sears is a 74 y.o. female who presents to the clinic today for No chief complaint on file.    My first encounter with patient.    For the last week, she is feeling lightheaded, fatigue, with reduced appetite, notes abdominal pain in the center, back pain, urinary frequency.  Denies fever.  Notes constipation but better with taking fiber gummy.    She is a diabetic with last A1C 6.1% also with ongoing tobacco use and COPD, HTN, JAM, CAD, h/o pancreatitis and other hx listed below.    Elevated BP noted.  Prescribed amlodipine-benazepril, carvedilol, chlorthalidone, hydralazine.  Has not taken her BP meds today b/c takes with meals and has not eaten yet.  Not checking BP often - no recent results.    PAST MEDICAL HISTORY:  Past Medical History:   Diagnosis Date    Acute pancreatitis     Angina pectoris     Anxiety     Arthritis     Asthma     as a child    Back pain     Bronchitis     Cervical spondylosis with radiculopathy 07/10/2012    Chest pain     Chronic neck pain 07/26/2012    Colon polyps     Controlled type 2 diabetes mellitus with stage 2 chronic kidney disease, with long-term current use of insulin 01/24/2020    COPD (chronic obstructive pulmonary disease)     Coronary artery disease     Depression     Fibrocystic breast     General anesthetics causing adverse effect in therapeutic use     trouble coming out of anes/ had the shakes    GERD (gastroesophageal reflux disease)     Glaucoma     Heart failure     Hyperlipidemia     Hypertension     Neck pain 07/10/2012    Obesity     JAM (obstructive sleep apnea)     Pneumonia     Pneumonia due to other staphylococcus     Primary osteoarthritis of both knees     Psoriasis     Subacromial or subdeltoid bursitis 07/10/2012    Thyroid disease     Tobacco dependence     Trouble in sleeping     Type 2 diabetes mellitus 12/10/2013    Type 2 diabetes mellitus with diabetic chronic kidney disease        PAST SURGICAL HISTORY:  Past  Surgical History:   Procedure Laterality Date    APPENDECTOMY      BREAST BIOPSY Bilateral 1988    BREAST MASS EXCISION Right     benign    CATARACT EXTRACTION W/  INTRAOCULAR LENS IMPLANT Left 12/09/2020    PHACO IOL WITH I-STENT ()    CATARACT EXTRACTION W/  INTRAOCULAR LENS IMPLANT Right 10/21/2020    PHACO IOL WITH I-STENT x 2 ()    CHOLECYSTECTOMY      COLONOSCOPY N/A 05/22/2019    Procedure: COLONOSCOPY;  Surgeon: Darrin Calvin MD;  Location: [facility] ([address] [place] [address]);  Service: Endoscopy;  Laterality: N/A;  2nd floor - all other procedure done on 2, multiple comorbidities - ERW/   change in MD schedule, Pt notified and verbalized understanding, new arrival time 0800, Ins mailed to Pt with new arrival time - ERW1/8/19@1130    COLONOSCOPY N/A 09/26/2022    Procedure: COLONOSCOPY;  Surgeon: Darrin Calvin MD;  Location: [facility] ([address] [place] [address]);  Service: Endoscopy;  Laterality: N/A;  2nd floor d/t previous anesthesia complications  vaccinated  inst mailed  clear liquids 4 hr prior-AM prep-LW  I should be able to complete this with the slim pediatric colonoscope, but should have the single-balloon available in case needed.    EPIDURAL STEROID INJECTION Bilateral 02/26/2021    Procedure: Injection, Steroid, Ischial Bursa;  Surgeon: Yonatan Garsia Jr., MD;  Location: [facility];  Service: Pain Management;  Laterality: Bilateral;  Bilateral Ischial Bursa Steroid Injections  Arrive @ 1230; No ATC; Check BG    ESOPHAGOGASTRODUODENOSCOPY      HYSTERECTOMY  1980's    IMPLANTATION OF DEVICE FOR GLAUCOMA Right 10/21/2020    Procedure: INSERTION, DEVICE, FOR GLAUCOMA/ i Stent;  Surgeon: Melany Tse MD;  Location: [facility] OR [address] [place] [address];  Service: Ophthalmology;  Laterality: Right;    IMPLANTATION OF DEVICE FOR GLAUCOMA Left 12/09/2020    Procedure: INSERTION, DEVICE, FOR GLAUCOMA/I SENT;  Surgeon: Melany Tse MD;  Location: [facility] OR [address] [place] [address];  Service: Ophthalmology;  Laterality: Left;     INJECTION OF JOINT Bilateral 12/23/2021    Procedure: Injection, Joint---BILATERAL ISCHIAL BURSA STEROID INJECTION;  Surgeon: Yonatan Garsia Jr., MD;  Location: [facility];  Service: Pain Management;  Laterality: Bilateral;    INTRAOCULAR PROSTHESES INSERTION Right 10/21/2020    Procedure: INSERTION, IOL PROSTHESIS;  Surgeon: Melany Tse MD;  [facility];  Service: Ophthalmology;  Laterality: Right;    INTRAOCULAR PROSTHESES INSERTION Left 12/09/2020    Procedure: INSERTION, IOL PROSTHESIS;  Surgeon: Melany Tse MD;  Location: [facility] OR [address] [place] [address];  Service: Ophthalmology;  Laterality: Left;    KNEE SURGERY Left 01/20/2016    TKR    KNEE SURGERY Right 02/26/2018    TKR    L4-L5 fusion  2006    PHACOEMULSIFICATION OF CATARACT Right 10/21/2020    Procedure: PHACOEMULSIFICATION, CATARACT;  Surgeon: Melany Tse MD;  Location: [facility] OR [address] [place] [address];  Service: Ophthalmology;  Laterality: Right;    PHACOEMULSIFICATION OF CATARACT Left 12/09/2020    Procedure: PHACOEMULSIFICATION, CATARACT;  Surgeon: Melany Tse MD;  Location: [address];  Service: Ophthalmology;  Laterality: Left;    YAG CAPSULOTOMY Left 12/07/2023           SOCIAL HISTORY:  Social History     Socioeconomic History    Marital status: Single   Tobacco Use    Smoking status: Every Day     Current packs/day: 0.50     Average packs/day: 0.4 packs/day for 54.1 years (21.8 ttl pk-yrs)     Types: Cigarettes     Start date: 1970    Smokeless tobacco: Never    Tobacco comments:     Patient is enrolled in smoking cessation. Comprehensive smoking hx was updated on 8/30/23 by MARQUIS Cherry RRT,MSW,LMSW,TTS   Substance and Sexual Activity    Alcohol use: No     Alcohol/week: 0.0 standard drinks of alcohol    Drug use: No    Sexual activity: Yes     Partners: Male       FAMILY HISTORY:  Family History   Problem Relation Age of Onset    Diabetes Mother     Hypertension Mother         CHF, angina    Angina  "Mother     Kidney disease Mother     Heart disease Mother         CHF    Hypertension Father         glaucoma, Alzhiemer's , supra pubic    Alzheimer's disease Father     Glaucoma Father     Glaucoma Sister     Hypertension Sister     Hyperlipidemia Sister     Sleep apnea Sister     Hypertension Sister     Thyroid disease Sister     No Known Problems Sister     Breast cancer Sister 54    Cancer Paternal Aunt         colon ca    Kidney disease Brother         kidney transplant, HTN,DM    Diabetes Brother     Hepatitis Brother     Gout Son     Hypertension Son     Diabetes Son     Amblyopia Neg Hx     Blindness Neg Hx     Melanoma Neg Hx     Macular degeneration Neg Hx     Retinal detachment Neg Hx     Strabismus Neg Hx        ALLERGIES AND MEDICATIONS: updated and reviewed.  Review of patient's allergies indicates:   Allergen Reactions    Hydrocodone Shortness Of Breath    Iodinated contrast media Shortness Of Breath     Difficulty breathing    Adhesive Itching     Skin peeling    Latex, natural rubber Other (See Comments)     "Takes skin off"    Morphine Hallucinations    Oxycodone Hallucinations    Sulfa (sulfonamide antibiotics) Hives and Itching     Medication List with Changes/Refills   Current Medications    ALBUTEROL (PROVENTIL/VENTOLIN HFA) 90 MCG/ACTUATION INHALER    USE 2 INHALATIONS EVERY 4 HOURS AS NEEDED FOR WHEEZING (RESCUE)    AMLODIPINE-BENAZEPRIL (LOTREL) 10-40 MG PER CAPSULE    Take 1 capsule by mouth once daily.    ATORVASTATIN (LIPITOR) 40 MG TABLET    TAKE 1 TABLET DAILY (DISCONTINUE REFILLS ON ATORVASTATIN 20 MG)    BLOOD-GLUCOSE METER KIT    Check blood glucose QD with insurance preferred glucometer    CARVEDILOL (COREG) 12.5 MG TABLET    TAKE 1 TABLET TWICE A DAY WITH MEALS    CHLORTHALIDONE (HYGROTEN) 25 MG TAB    Take 1 tablet (25 mg total) by mouth once daily.    GABAPENTIN (NEURONTIN) 600 MG TABLET    Take 1 tablet (600 mg total) by mouth 2 (two) times daily.    HYDRALAZINE (APRESOLINE) 10 " MG TABLET    Take 1 tablet (10 mg total) by mouth every 12 (twelve) hours.    INULIN (FIBER GUMMIES ORAL)    Take 1 each by mouth every morning.     LANCETS (ACCU-CHEK MULTICLIX LANCET) MISC    Test blood sugar twice daily    NICOTINE, POLACRILEX, 2 MG LZMN    Take 1 each (2 mg total) by mouth as needed (Use 1 lozenge as needed in the place of a cigarette every 1-2 hours as needed.  Maximum of 8 per day.).    SIMETHICONE (GAS-X ORAL)    Take 1 tablet by mouth as needed.     VARENICLINE (CHANTIX) 1 MG TAB    Take ½ tablet by mouth daily for 3 days, THEN increase to ½ tablet twice daily for 4 days, THEN increase to 1 tablet twice daily thereafter          CARE TEAM:  Patient Care Team:  Williams Rossi Jr., MD as PCP - General (Family Medicine)  Sandi Menon MD as Consulting Physician (Urology)  Ronald Romero MD as Consulting Physician (Nephrology)  Tiffany Garrison MD as Consulting Physician (Orthopedic Surgery)  Sarah Oliver MD as Consulting Physician (Physical Medicine and Rehabilitation)  Dean Robins MD as Consulting Physician (Cardiology)  Melany Tse MD as Consulting Physician (Ophthalmology)  Melany Tse MD as Consulting Physician (Ophthalmology)  Yonatan Garsia Jr., MD as Consulting Physician (Pain Medicine)  Justin Nicholson MD as Consulting Physician (Pulmonary Disease)  Dean Robins MD as Consulting Physician (Cardiology)         REVIEW OF SYSTEMS:  Review of Systems   Constitutional:  Positive for fatigue. Negative for chills and fever.   HENT:  Negative for congestion and postnasal drip.    Eyes:  Negative for photophobia and visual disturbance.   Respiratory:  Negative for cough and shortness of breath.    Cardiovascular:  Negative for chest pain and palpitations.   Gastrointestinal:  Positive for abdominal pain and constipation. Negative for blood in stool, diarrhea, nausea and vomiting.   Genitourinary:  Positive for dysuria. Negative for  hematuria.   Musculoskeletal:  Negative for arthralgias and back pain.   Neurological:  Positive for dizziness and light-headedness. Negative for tremors, syncope, speech difficulty, weakness and headaches.   Psychiatric/Behavioral:  Negative for dysphoric mood. The patient is not nervous/anxious.          PHYSICAL EXAM:   Vitals:    01/24/24 0907   BP: (!) 162/60   Pulse: 62   Temp: 98 °F (36.7 °C)             Body mass index is 31.81 kg/m².     General appearance - alert, well appearing, and in no distress, oriented to person, place, and time, and obese  Mental status - normal mood, behavior, speech, dress, motor activity, and thought processes  Eyes - sclera anicteric, left eye normal, right eye normal  Chest - clear to auscultation, no wheezes, rales or rhonchi, symmetric air entry  Heart - normal rate, regular rhythm, normal S1, S2, no murmurs, rubs, clicks or gallops  Abdomen - tenderness noted epigastric  bowel sounds normal  no CVA tenderness  Neurological - alert, oriented, normal speech, no focal findings or movement disorder noted  Extremities - peripheral pulses normal, no pedal edema, no clubbing or cyanosis      ASSESSMENT AND PLAN:  Abdominal pain, unspecified abdominal location  -     CBC Auto Differential; Future; Expected date: 01/24/2024  -     Comprehensive Metabolic Panel; Future; Expected date: 01/24/2024  -     Urinalysis; Future; Expected date: 01/24/2024  -     Urine culture; Future; Expected date: 01/24/2024  -     BNP; Future; Expected date: 01/24/2024  -     Lipase; Future; Expected date: 01/24/2024  -     Hemoglobin A1C; Future; Expected date: 01/24/2024  -     Urinalysis Microscopic; Future; Expected date: 01/24/2024  -     Cancel: CT Abdomen Without Contrast; Future; Expected date: 01/24/2024  -     CT Abdomen Without Contrast; Future; Expected date: 01/24/2024    Epigastric pain  -     CT Abdomen Without Contrast; Future; Expected date: 01/24/2024    Dysuria  -     Urinalysis  Microscopic; Future; Expected date: 01/24/2024  -     POCT URINE DIPSTICK WITH MICROSCOPE, AUTOMATED    Acute cystitis with hematuria  -     amoxicillin-clavulanate 875-125mg (AUGMENTIN) 875-125 mg per tablet; Take 1 tablet by mouth 2 (two) times daily. for 5 days  Dispense: 10 tablet; Refill: 0    Dizziness  -     POCT Glucose, Hand-Held Device  - Possible is in setting reduced oral intake.  Advised for adequate oral water intake.    Essential hypertension        - Elevated,  Reinforced daily compliance and monitoring at home.  Return for nurse BP check.    Stage 3a chronic kidney disease  -     Comprehensive Metabolic Panel; Future; Expected date: 01/24/2024    Type 2 diabetes mellitus with other specified complication, with long-term current use of insulin  -     Hemoglobin A1C; Future; Expected date: 01/24/2024      Advised strict ED precautions for any increase of symptoms.  Follow up in 2 weeks if not better or sooner as needed.

## 2024-01-29 ENCOUNTER — CLINICAL SUPPORT (OUTPATIENT)
Dept: SMOKING CESSATION | Facility: CLINIC | Age: 74
End: 2024-01-29
Payer: COMMERCIAL

## 2024-01-29 DIAGNOSIS — F17.200 NICOTINE DEPENDENCE: Primary | ICD-10-CM

## 2024-01-29 PROCEDURE — 99999 PR PBB SHADOW E&M-EST. PATIENT-LVL I: CPT | Mod: PBBFAC,,,

## 2024-01-29 PROCEDURE — 99403 PREV MED CNSL INDIV APPRX 45: CPT | Mod: S$GLB,,,

## 2024-01-29 NOTE — PROGRESS NOTES
Individual Follow-Up Form    1/29/2024    Quit Date: TBD    Clinical Status of Patient: Outpatient    Length of Service: 45 minutes    Continuing Medication: yes  Nicotine Lozenges    Other Medications: None      Target Symptoms: Withdrawal and medication side effects. The following were  rated moderate (3) to severe (4) on TCRS:  Moderate (3): Crave and Desire   Severe (4): None     Comments: Counselor spoke with patient telephonically for follow-up visit, name and date of birth verified as two patient identifiers. Patient reported she is smoking 10 cpd, and she is using 2 mg nicotine lozenges without any negative side effects reported at this time.  Counselor congratulated patient on her progress thus far.   Counselor discussed patient planning not purchasing anymore cigarettes, incorporating more lozenge usage, continuing to work towards further behavior modification strategies, and patient solidifying a quit date.  Follow-up visit scheduled in two weeks. Counselor will remain available should any further needs arise.            Diagnosis: F17.200    Next Visit: 2 weeks

## 2024-01-31 ENCOUNTER — EXTERNAL CHRONIC CARE MANAGEMENT (OUTPATIENT)
Dept: PRIMARY CARE CLINIC | Facility: CLINIC | Age: 74
End: 2024-01-31
Payer: MEDICARE

## 2024-01-31 PROCEDURE — 99490 CHRNC CARE MGMT STAFF 1ST 20: CPT | Mod: PBBFAC,PN | Performed by: FAMILY MEDICINE

## 2024-01-31 PROCEDURE — 99490 CHRNC CARE MGMT STAFF 1ST 20: CPT | Mod: S$PBB,,, | Performed by: FAMILY MEDICINE

## 2024-02-01 ENCOUNTER — OFFICE VISIT (OUTPATIENT)
Dept: FAMILY MEDICINE | Facility: CLINIC | Age: 74
End: 2024-02-01
Payer: MEDICARE

## 2024-02-01 VITALS
SYSTOLIC BLOOD PRESSURE: 162 MMHG | DIASTOLIC BLOOD PRESSURE: 78 MMHG | BODY MASS INDEX: 32.05 KG/M2 | OXYGEN SATURATION: 99 % | TEMPERATURE: 98 F | HEART RATE: 63 BPM | HEIGHT: 62 IN | WEIGHT: 174.19 LBS

## 2024-02-01 DIAGNOSIS — R22.32 ARM MASS, LEFT: Primary | ICD-10-CM

## 2024-02-01 DIAGNOSIS — E11.69 TYPE 2 DIABETES MELLITUS WITH OTHER SPECIFIED COMPLICATION, WITHOUT LONG-TERM CURRENT USE OF INSULIN: Chronic | ICD-10-CM

## 2024-02-01 DIAGNOSIS — J43.8 OTHER EMPHYSEMA: Chronic | ICD-10-CM

## 2024-02-01 DIAGNOSIS — I10 ESSENTIAL HYPERTENSION: Chronic | ICD-10-CM

## 2024-02-01 DIAGNOSIS — I27.20 PULMONARY HYPERTENSION: Chronic | ICD-10-CM

## 2024-02-01 DIAGNOSIS — M77.12 LATERAL EPICONDYLITIS OF LEFT ELBOW: ICD-10-CM

## 2024-02-01 DIAGNOSIS — M19.012 PRIMARY OSTEOARTHRITIS OF LEFT SHOULDER: ICD-10-CM

## 2024-02-01 DIAGNOSIS — E66.09 CLASS 1 OBESITY DUE TO EXCESS CALORIES WITH SERIOUS COMORBIDITY AND BODY MASS INDEX (BMI) OF 31.0 TO 31.9 IN ADULT: Chronic | ICD-10-CM

## 2024-02-01 DIAGNOSIS — N18.31 STAGE 3A CHRONIC KIDNEY DISEASE: Chronic | ICD-10-CM

## 2024-02-01 DIAGNOSIS — I70.0 ATHEROSCLEROSIS OF AORTIC ARCH: Chronic | ICD-10-CM

## 2024-02-01 PROCEDURE — 99999 PR PBB SHADOW E&M-EST. PATIENT-LVL V: CPT | Mod: PBBFAC,,, | Performed by: FAMILY MEDICINE

## 2024-02-01 PROCEDURE — 99214 OFFICE O/P EST MOD 30 MIN: CPT | Mod: S$PBB,,, | Performed by: FAMILY MEDICINE

## 2024-02-01 PROCEDURE — 99215 OFFICE O/P EST HI 40 MIN: CPT | Mod: PBBFAC,PN | Performed by: FAMILY MEDICINE

## 2024-02-01 RX ORDER — METHYLPREDNISOLONE 4 MG/1
TABLET ORAL
Qty: 1 EACH | Refills: 0 | Status: SHIPPED | OUTPATIENT
Start: 2024-02-01 | End: 2024-02-01 | Stop reason: SDUPTHER

## 2024-02-01 RX ORDER — METHYLPREDNISOLONE 4 MG/1
TABLET ORAL
Qty: 1 EACH | Refills: 0 | Status: SHIPPED | OUTPATIENT
Start: 2024-02-01 | End: 2024-06-13 | Stop reason: CLARIF

## 2024-02-01 NOTE — PROGRESS NOTES
"  Patient Name: Stephanie Sears    : 1950  MRN: [id number]      Subjective:     Patient ID: Stephanie is a 74 y.o. female    Chief Complaint:  Arm Pain (Left arm pain, hurts worse when raising arm, knot also visible )    74-year-old female presents for evaluation of left upper arm mass as well as a left upper arm pain.  She states that the mass has been present for several months unchanged in size, and does not think it is the cause of her pain.     She c/o of left upper arm pain for approx 1 week. States feels like a shooting pain pain from back of middle upper arm to elbow. Has pain with twisting at forearm such as turning a door handle.    Left upper arm mass- 6 months, hasn't grown since noticing, has not hurt. Used to wear smoking cessation patches over area         Review of Systems   Constitutional:  Negative for chills, fever and unexpected weight change.   Respiratory:  Negative for shortness of breath.    Cardiovascular:  Negative for chest pain and palpitations.   Gastrointestinal:  Negative for abdominal pain, blood in stool and change in bowel habit.   Genitourinary:  Negative for difficulty urinating.   Musculoskeletal:  Positive for arthralgias, myalgias and neck pain.   Neurological:  Positive for numbness (feet). Negative for headaches.        Objective:   BP (!) 162/78 (BP Location: Right arm, Patient Position: Sitting, BP Method: Medium (Manual))   Pulse 63   Temp 97.9 °F (36.6 °C) (Oral)   Ht 5' 2" (1.575 m)   Wt 79 kg (174 lb 2.6 oz)   SpO2 99%   BMI 31.85 kg/m²     Physical Exam  Vitals reviewed.   Constitutional:       General: She is not in acute distress.     Appearance: She is well-developed. She is not diaphoretic.   HENT:      Head: Normocephalic and atraumatic.      Right Ear: Tympanic membrane, ear canal and external ear normal.      Left Ear: Tympanic membrane, ear canal and external ear normal.      Nose: Nose normal.   Eyes:      General:         Right eye: No discharge.    "      Left eye: No discharge.      Conjunctiva/sclera: Conjunctivae normal.      Pupils: Pupils are equal, round, and reactive to light.   Neck:      Thyroid: No thyromegaly.      Trachea: No tracheal deviation.   Cardiovascular:      Rate and Rhythm: Normal rate and regular rhythm.      Pulses:           Radial pulses are 2+ on the right side and 2+ on the left side.      Heart sounds: Normal heart sounds, S1 normal and S2 normal. No murmur heard.  Pulmonary:      Effort: Pulmonary effort is normal. No respiratory distress.      Breath sounds: Normal breath sounds. No wheezing, rhonchi or rales.   Abdominal:      General: Bowel sounds are normal. There is no distension.      Palpations: Abdomen is soft. Abdomen is not rigid. There is no mass.      Tenderness: There is no abdominal tenderness. There is no guarding.   Musculoskeletal:      Left shoulder: Tenderness and crepitus present. No swelling. Decreased range of motion.      Left upper arm: Swelling (see images) present.      Left elbow: No swelling. Tenderness present in lateral epicondyle.      Cervical back: Normal range of motion and neck supple.   Lymphadenopathy:      Cervical: No cervical adenopathy.   Skin:     General: Skin is warm and dry.      Capillary Refill: Capillary refill takes less than 2 seconds.      Findings: No rash.   Neurological:      Mental Status: She is alert and oriented to person, place, and time.   Psychiatric:         Behavior: Behavior normal.              Assessment        ICD-10-CM ICD-9-CM   1. Arm mass, left  R22.32 782.2   2. Lateral epicondylitis of left elbow  M77.12 726.32   3. Primary osteoarthritis of left shoulder  M19.012 715.11   4. Class 1 obesity due to excess calories with serious comorbidity and body mass index (BMI) of 31.0 to 31.9 in adult  E66.09 278.00    Z68.31 V85.31   5. Other emphysema  J43.8 492.8   6. Atherosclerosis of aortic arch  I70.0 440.0   7. Stage 3a chronic kidney disease  N18.31 585.3   8.  "Essential hypertension  I10 401.9   9. Type 2 diabetes mellitus with other specified complication, without long-term current use of insulin  E11.69 250.80   10. Pulmonary hypertension  I27.20 416.8         Plan:     1. Arm mass, left  -     US Soft Tissue Upper Extremity, Left; Future; Expected date: 02/01/2024  Check US for further evaluation    2. Lateral epicondylitis of left elbow/ 3. Primary osteoarthritis of left shoulder  -     X-Ray Elbow 2 Views Left; Future; Expected date: 02/01/2024  -     X-Ray Shoulder Trauma 3 view Left; Future; Expected date: 02/01/2024  -     methylPREDNISolone (MEDROL DOSEPACK) 4 mg tablet; use as directed  Dispense: 1 each; Refill: 0  Check x-ray.    Given chronic kidney disease, nonsteroidal anti-inflammatory not recommended.  Short course of steroid prescribed.  Patient agrees to monitor her sugars and may need temporary insulin based on her sugars.    4. Class 1 obesity due to excess calories with serious comorbidity and body mass index (BMI) of 31.0 to 31.9 in adult  Assessment & Plan:  Wt Readings from Last 3 Encounters:   02/01/24 1008 79 kg (174 lb 2.6 oz)   01/24/24 0907 78.9 kg (173 lb 15.1 oz)   12/01/23 1450 80.3 kg (177 lb)      Estimated body mass index is 31.85 kg/m² as calculated from the following:    Height as of this encounter: 5' 2" (1.575 m).    Weight as of this encounter: 79 kg (174 lb 2.6 oz).  Ideal body weight: 50.1 kg (110 lb 7.2 oz)     The patient's BMI has been recorded in the chart. The patient has been provided educational materials regarding the benefits of attaining and maintaining a normal weight. We will continue to address and follow this issue during follow up visits.       5. Other emphysema  Overview:  05-  LDCT Chest  Lungs: Once again, soft tissue nodularity is identified within the left upper lobe measuring 10 mm, stable dating back to 06/07/2019.  There is also nodularity within the right upper lobe measuring approximately 4 mm " (image 127, series 4), stable dating back to 06/07/2019.  No new pulmonary nodules are identified.  There are mild emphysematous changes within the lung apices.    Assessment & Plan:  No acute respiratory concerns reported.  Continue current management.  Discussed importance of smoking cessation.      6. Atherosclerosis of aortic arch  Overview:  Noted on CXR from 1/21/16.    Assessment & Plan:  Continue with blood pressure management and statin medication.      7. Stage 3a chronic kidney disease  Overview:  Lab Results   Component Value Date    EGFRNORACEVR 43 (A) 01/24/2024    EGFRNORACEVR 53 (A) 08/10/2023    EGFRNORACEVR 48 (A) 02/03/2023    EGFRNORACEVR 48 (A) 02/03/2023        Assessment & Plan:  Importance of good hydration once against discussed with patient.    Avoid nephrotoxic agents.    Continue monitoring renal function.  She is on an ACE inhibitor.      8. Essential hypertension  Assessment & Plan:  Poorly controlled.    She has follow up appointment to discuss her blood pressure.      9. Type 2 diabetes mellitus with other specified complication, without long-term current use of insulin  Overview:  Complications: Nephropathy, hypertension, dyslipidemia, obesity    Lab Results   Component Value Date    HGBA1C 6.2 (H) 01/24/2024    HGBA1C 6.1 (H) 11/21/2023    HGBA1C 5.8 (H) 08/10/2023     Lab Results   Component Value Date    LDLCALC 70.4 08/10/2023     Lab Results   Component Value Date    MICALBCREAT 2146.0 (H) 04/29/2022      Eye Exam:  11-  Foot exam:  11-    Assessment & Plan:  Most recent A1c at goal.    Given limited options or acute concern, with arm pain, a Medrol Joey is being prescribed and I discussed with the patient importance of monitoring her sugars.  If they start to increase, may need temporary insulin.      10. Pulmonary hypertension  Overview:  -pasp of 39.  -group 2 with diastolic dysfunction    Assessment & Plan:  Continue with medical management.                  -Williams Rossi Jr., MD, AAHIVS      This office note has been dictated.  This dictation has been generated using M-Modal Fluency Direct dictation; some phonetic errors may occur.         Patient Instructions   Start the Medrol (steroid pack) Check your sugar daily and bring it with you next week    Follow Up  No follow-ups on file.    Future Appointments   Date Time Provider Department Center   2/8/2024  2:20 PM NURSE, [facility]   2/8/2024  2:40 PM Williams Rossi Jr., MD [facility]   2/9/2024  9:30 AM Erica Cherry [facility] SMOKE [place]   3/21/2024  8:00 AM LAB, [facility] DRAW STATION [facility] LAB [facility]   3/28/2024 10:00 AM Williams Rossi Jr., MD [facility]   4/22/2024  8:30 AM Melany Tse MD [facility] [place]

## 2024-02-02 ENCOUNTER — TELEPHONE (OUTPATIENT)
Dept: NEPHROLOGY | Facility: CLINIC | Age: 74
End: 2024-02-02
Payer: MEDICARE

## 2024-02-02 NOTE — TELEPHONE ENCOUNTER
----- Message from Ninfa Beck MD sent at 2/2/2024  9:11 AM CST -----  Regarding: RE: clarification  Contact: [phone]  She can take one pill every other day as she was doing before. Thanks      ----- Message -----  From: Allison Day MA  Sent: 1/31/2024   4:55 PM CST  To: Ninfa Beck MD  Subject: FW: clarification                                Is there a reason her dosage changed or was it always once daily?  ----- Message -----  From: Viviane Coleman  Sent: 1/31/2024   9:33 AM CST  To: Kiran Flores Staff  Subject: clarification                                    Stephanie Anderson calling regarding clarification on Sig for chlorthalidone (HYGROTEN) 25 MG Tab.  Pt stated she normally take 1 tablet every other day not once daily. She usually get 90 day supply.  She would like a call back

## 2024-02-05 NOTE — ASSESSMENT & PLAN NOTE
"Wt Readings from Last 3 Encounters:   02/01/24 1008 79 kg (174 lb 2.6 oz)   01/24/24 0907 78.9 kg (173 lb 15.1 oz)   12/01/23 1450 80.3 kg (177 lb)      Estimated body mass index is 31.85 kg/m² as calculated from the following:    Height as of this encounter: 5' 2" (1.575 m).    Weight as of this encounter: 79 kg (174 lb 2.6 oz).  Ideal body weight: 50.1 kg (110 lb 7.2 oz)     The patient's BMI has been recorded in the chart. The patient has been provided educational materials regarding the benefits of attaining and maintaining a normal weight. We will continue to address and follow this issue during follow up visits.   "

## 2024-02-05 NOTE — ASSESSMENT & PLAN NOTE
Most recent A1c at goal.    Given limited options or acute concern, with arm pain, a Medrol Joey is being prescribed and I discussed with the patient importance of monitoring her sugars.  If they start to increase, may need temporary insulin.

## 2024-02-05 NOTE — ASSESSMENT & PLAN NOTE
Importance of good hydration once against discussed with patient.    Avoid nephrotoxic agents.    Continue monitoring renal function.  She is on an ACE inhibitor.

## 2024-02-05 NOTE — ASSESSMENT & PLAN NOTE
No acute respiratory concerns reported.  Continue current management.  Discussed importance of smoking cessation.

## 2024-02-06 ENCOUNTER — TELEPHONE (OUTPATIENT)
Dept: FAMILY MEDICINE | Facility: CLINIC | Age: 74
End: 2024-02-06
Payer: MEDICARE

## 2024-02-06 DIAGNOSIS — Z12.31 SCREENING MAMMOGRAM FOR HIGH-RISK PATIENT: Primary | ICD-10-CM

## 2024-02-06 NOTE — TELEPHONE ENCOUNTER
Placed order , called pt . Assisted with booking she preferred [facility] 2/19 1:15 pm . Requested a apt reminder , mailed

## 2024-02-06 NOTE — TELEPHONE ENCOUNTER
----- Message from Cheo Liam Ribeiroe sent at 2/6/2024  9:13 AM CST -----  Regarding: Stephanie  Type: Patient Callback     Who called: Stephanie     What is the request in detail: Pt stated that she would like for the doctor to put in the orders for her mammogram. Please put in the orders and call the patient so she can schedule the appointment.     Can the clinic reply by [facility]? Yes     Would the patient rather a call back or a response via [facility]?Callback     Best call back number: [phone]      Additional Information:

## 2024-02-09 DIAGNOSIS — M19.012 OSTEOARTHRITIS OF LEFT AC (ACROMIOCLAVICULAR) JOINT: Primary | ICD-10-CM

## 2024-02-16 ENCOUNTER — CLINICAL SUPPORT (OUTPATIENT)
Dept: SMOKING CESSATION | Facility: CLINIC | Age: 74
End: 2024-02-16
Payer: COMMERCIAL

## 2024-02-16 DIAGNOSIS — F17.200 NICOTINE DEPENDENCE: Primary | ICD-10-CM

## 2024-02-16 PROCEDURE — 99402 PREV MED CNSL INDIV APPRX 30: CPT | Mod: S$GLB,,,

## 2024-02-16 PROCEDURE — 99999 PR PBB SHADOW E&M-EST. PATIENT-LVL I: CPT | Mod: PBBFAC,,,

## 2024-02-16 NOTE — PROGRESS NOTES
Individual Follow-Up Form    2/16/2024    Quit Date: TBD    Clinical Status of Patient: Outpatient    Length of Service: 30 minutes    Continuing Medication: yes  Nicotine Lozenges    Other Medications: None      Target Symptoms: Withdrawal and medication side effects. The following were  rated moderate (3) to severe (4) on TCRS:  Moderate (3): Crave and Desire   Severe (4): None     Comments: Counselor spoke with patient telephonically for follow-up visit, name and date of birth verified as two patient identifiers. Patient reported she remains smoking 10 cpd, and she also remains using 2 mg nicotine lozenges without any negative side effects reported at this time.  Counselor congratulated patient on her progress thus far.   Counselor reiterated patient incorporating more lozenge usage, continuing to work towards further behavior modification strategies, and patient solidifying a quit date.  Follow-up visit scheduled in two weeks. Counselor will remain available should any further needs arise.          Diagnosis: F17.200    Next Visit: 2 weeks

## 2024-02-19 ENCOUNTER — HOSPITAL ENCOUNTER (OUTPATIENT)
Dept: RADIOLOGY | Facility: HOSPITAL | Age: 74
Discharge: HOME | End: 2024-02-19
Attending: FAMILY MEDICINE
Payer: MEDICARE

## 2024-02-19 DIAGNOSIS — Z12.31 SCREENING MAMMOGRAM FOR HIGH-RISK PATIENT: ICD-10-CM

## 2024-02-19 PROCEDURE — 77067 SCR MAMMO BI INCL CAD: CPT | Mod: TC

## 2024-02-19 PROCEDURE — 77067 SCR MAMMO BI INCL CAD: CPT | Mod: 26,,, | Performed by: RADIOLOGY

## 2024-02-19 PROCEDURE — 77063 BREAST TOMOSYNTHESIS BI: CPT | Mod: 26,,, | Performed by: RADIOLOGY

## 2024-02-28 ENCOUNTER — CLINICAL SUPPORT (OUTPATIENT)
Dept: SMOKING CESSATION | Facility: CLINIC | Age: 74
End: 2024-02-28
Payer: COMMERCIAL

## 2024-02-28 ENCOUNTER — OFFICE VISIT (OUTPATIENT)
Dept: ORTHOPEDICS | Facility: CLINIC | Age: 74
End: 2024-02-28
Payer: MEDICARE

## 2024-02-28 DIAGNOSIS — M67.912 ROTATOR CUFF DYSFUNCTION, LEFT: ICD-10-CM

## 2024-02-28 DIAGNOSIS — F17.200 NICOTINE DEPENDENCE: Primary | ICD-10-CM

## 2024-02-28 DIAGNOSIS — R22.32 ARM MASS, LEFT: Primary | ICD-10-CM

## 2024-02-28 DIAGNOSIS — M19.012 OSTEOARTHRITIS OF LEFT AC (ACROMIOCLAVICULAR) JOINT: ICD-10-CM

## 2024-02-28 PROCEDURE — 99204 OFFICE O/P NEW MOD 45 MIN: CPT | Mod: S$PBB,,, | Performed by: ORTHOPAEDIC SURGERY

## 2024-02-28 PROCEDURE — 99999 PR PBB SHADOW E&M-EST. PATIENT-LVL III: CPT | Mod: PBBFAC,,, | Performed by: ORTHOPAEDIC SURGERY

## 2024-02-28 PROCEDURE — 99999 PR PBB SHADOW E&M-EST. PATIENT-LVL II: CPT | Mod: PBBFAC,,,

## 2024-02-28 PROCEDURE — 99213 OFFICE O/P EST LOW 20 MIN: CPT | Mod: PBBFAC,PN,25 | Performed by: ORTHOPAEDIC SURGERY

## 2024-02-28 PROCEDURE — 99403 PREV MED CNSL INDIV APPRX 45: CPT | Mod: S$GLB,,,

## 2024-02-28 RX ORDER — NICOTINE POLACRILEX 2 MG/1
2 LOZENGE ORAL
Qty: 150 EACH | Refills: 0 | Status: SHIPPED | OUTPATIENT
Start: 2024-02-28

## 2024-02-28 NOTE — PROGRESS NOTES
"Assessment: 74 y.o. female with left subacromial bursitis , cuff tendinitis, mass consistent in appearance with lipoma    I explained my diagnostic impression and the reasoning behind it in detail, using layman's terms.       Plan:   - Humerus films today  - PT referral for shoulder pain  - Will monitor mass. Likely lipoma. If it becomes painful or enlarges will order MRI  - Return to clinic in 12 weeks. Return sooner if symptoms worsen or fail to improve.    All questions were answered in detail. The patient is in full agreement with the treatment plan and will proceed accordingly.    Chief Complaint   Patient presents with    Left Upper Arm - Pain       Initial visit (2/28/24): Stephanie Sears is a 74 y.o. female who presents today complaining of left arm pain  Mass to lateral arm   Pain behind this knot  Has tried steroid pack with relief of pain   Mass is unchanged   Mass has been present about 6 months   Trauma or new activity: no  Pain is intermittent  Aggravating factors: Was having pain with FE/motion of the L shoulder, this resolved with steroids  Relieving factors: rest  Night pain is absent  Radicular symptoms: no   Pain does not interfere with activities of daily living .    This patient was seen in consultation at the request of Dr. Williams Rossi Jr.    This is the extent of the patient's complaints at this time.      Review of patient's allergies indicates:   Allergen Reactions    Hydrocodone Shortness Of Breath    Iodinated contrast media Shortness Of Breath     Difficulty breathing    Adhesive Itching     Skin peeling    Latex, natural rubber Other (See Comments)     "Takes skin off"    Morphine Hallucinations    Oxycodone Hallucinations    Sulfa (sulfonamide antibiotics) Hives and Itching     Physical Exam:   Vitals:    02/28/24 1345   PainSc: 0-No pain     General: Patient is alert, awake and oriented to time, place and person. Mood and affect are appropriate.  Patient does not appear to be in " any distress, denies any constitutional symptoms and appears stated age.   HEENT: Pupils are equal and round, sclera are not injected. External examination of ears and nose reveals no abnormalities. Cranial nerves II-X are grossly intact  Skin: no rashes, abrasions or open wounds on the affected extremity   Resp: No respiratory distress or audible wheezing   CV: 2+  pulses, all extremities warm and well perfused   Left Shoulder    Shoulder Range of Motion    Right     Left   (Active/Passive)       Forward Elevation     165/165             140/150  External rotation (arm at side)  45/45             45/45   Internal rotation behind the back  L5             L5     Range of motion is painful       Acromioclavicular joint is not tender  Crossbody test: negative    Neer's positive  Hawkin's positive    Debbi's pos  Drop arm negative  Belly press negative      Cuff Strength     Right     Left   Supraspinatus        5/5    5/5  Infraspinatus     5/5    5/5  Subscapularis     5/5    5/5    Deltoid testing            5/5    5/5    Speeds negative  Yergasons negative      Elbow examination demonstrates no tenderness to palpation and has normal range of motion.     ltsi C5-T1  + epl, io, fds, fdp   2+ RP      Imaging:  3 views of the left shoulder:  positive for degenerative changes of the AC joint. The humeral head is well centered on the AP and axillary views.   There is not significant degenerative change of the glenohumeral joint or posterior subluxation of the humeral head. No acute changes or fracture.      I personally reviewed and interpreted the patient's imaging obtained today in clinic     A note notifying Dr. Williams Rossi Jr. of my findings was sent via the electronic medical record     This note was created by combination of typed  and M-Modal dictation. Transcription and phonetic errors may be present.  If there are any questions, please contact me.      Current Outpatient Medications:     albuterol  (PROVENTIL/VENTOLIN HFA) 90 mcg/actuation inhaler, USE 2 INHALATIONS EVERY 4 HOURS AS NEEDED FOR WHEEZING (RESCUE), Disp: 18 g, Rfl: 3    amLODIPine-benazepriL (LOTREL) 10-40 mg per capsule, Take 1 capsule by mouth once daily., Disp: 90 capsule, Rfl: 3    atorvastatin (LIPITOR) 40 MG tablet, TAKE 1 TABLET DAILY (DISCONTINUE REFILLS ON ATORVASTATIN 20 MG), Disp: 90 tablet, Rfl: 1    blood-glucose meter kit, Check blood glucose QD with insurance preferred glucometer, Disp: 1 each, Rfl: 0    carvediloL (COREG) 12.5 MG tablet, TAKE 1 TABLET TWICE A DAY WITH MEALS, Disp: 180 tablet, Rfl: 3    chlorthalidone (HYGROTEN) 25 MG Tab, Take 1 tablet (25 mg total) by mouth once daily., Disp: 30 tablet, Rfl: 6    gabapentin (NEURONTIN) 600 MG tablet, Take 1 tablet (600 mg total) by mouth 2 (two) times daily. (Patient taking differently: Take 600 mg by mouth every other day.), Disp: 180 tablet, Rfl: 3    hydrALAZINE (APRESOLINE) 10 MG tablet, Take 1 tablet (10 mg total) by mouth every 12 (twelve) hours., Disp: 60 tablet, Rfl: 2    INULIN (FIBER GUMMIES ORAL), Take 1 each by mouth every morning. , Disp: , Rfl:     lancets (ACCU-CHEK MULTICLIX LANCET) Misc, Test blood sugar twice daily, Disp: 300 each, Rfl: 3    methylPREDNISolone (MEDROL DOSEPACK) 4 mg tablet, use as directed, Disp: 1 each, Rfl: 0    nicotine, polacrilex, 2 mg lzmn, Take 1 each (2 mg total) by mouth as needed (Use 1 lozenge as needed in the place of a cigarette every 1-2 hours as needed.  Maximum of 8 per day.)., Disp: 243 each, Rfl: 0    simethicone (GAS-X ORAL), Take 1 tablet by mouth as needed. , Disp: , Rfl:     Past Medical History:   Diagnosis Date    Acute pancreatitis     Angina pectoris     Anxiety     Arthritis     Asthma     as a child    Back pain     Bronchitis     Cervical spondylosis with radiculopathy 07/10/2012    Chest pain     Chronic neck pain 07/26/2012    Colon polyps     COPD (chronic obstructive pulmonary disease)     Coronary artery disease      Depression     Fibrocystic breast     General anesthetics causing adverse effect in therapeutic use     trouble coming out of anes/ had the shakes    GERD (gastroesophageal reflux disease)     Glaucoma     Heart failure     Hyperlipidemia     Hypertension     Neck pain 07/10/2012    Obesity     JAM (obstructive sleep apnea)     Pneumonia     Pneumonia due to other staphylococcus     Primary osteoarthritis of both knees     Psoriasis     Subacromial or subdeltoid bursitis 07/10/2012    Thyroid disease     Tobacco dependence     Trouble in sleeping     Type 2 diabetes mellitus 12/10/2013    Type 2 diabetes mellitus with diabetic chronic kidney disease        Active Problem List with Overview Notes    Diagnosis Date Noted    Lumbar radiculopathy 07/11/2023    Other emphysema 03/05/2023 05-  LDCT Chest  Lungs: Once again, soft tissue nodularity is identified within the left upper lobe measuring 10 mm, stable dating back to 06/07/2019.  There is also nodularity within the right upper lobe measuring approximately 4 mm (image 127, series 4), stable dating back to 06/07/2019.  No new pulmonary nodules are identified.  There are mild emphysematous changes within the lung apices.      Neuroforaminal stenosis of cervical spine 02/14/2023    Stage 3a chronic kidney disease 06/14/2022     Lab Results   Component Value Date    EGFRNORACEVR 43 (A) 01/24/2024    EGFRNORACEVR 53 (A) 08/10/2023    EGFRNORACEVR 48 (A) 02/03/2023    EGFRNORACEVR 48 (A) 02/03/2023          Dyspnea on exertion 05/11/2022     diastolic dysfunction along with back and knee pain + copd      S/P lumbar fusion 12/07/2020    DDD (degenerative disc disease), lumbar 12/07/2020    Primary open-angle glaucoma, bilateral, mild stage 10/21/2020    Type 2 diabetes mellitus with other specified complication 01/24/2020     Complications: Nephropathy, hypertension, dyslipidemia, obesity    Lab Results   Component Value Date    HGBA1C 6.2 (H) 01/24/2024     HGBA1C 6.1 (H) 11/21/2023    HGBA1C 5.8 (H) 08/10/2023     Lab Results   Component Value Date    LDLCALC 70.4 08/10/2023     Lab Results   Component Value Date    MICALBCREAT 2146.0 (H) 04/29/2022      Eye Exam:  11-  Foot exam:  11-      History of colon polyps 05/22/2019    Lichen planus 03/27/2019    Insomnia 03/11/2019     difficulty with sleep maintenance.  Mainly driven by nocturia.  Poor sleep sleep hygiene + nocturia + psychophysiologic in etiology.     avoid tv in bed.  Stimulus control d/w patient.    Avoid fluid intake after dinner.    Denied psych referral for cbt i      Atherosclerosis of aortic arch 01/22/2019     Noted on CXR from 1/21/16.      Dyslipidemia 07/05/2018     Lab Results   Component Value Date    CHOL 125 08/10/2023    CHOL 141 02/07/2023    CHOL 135 02/03/2023     Lab Results   Component Value Date    HDL 37 (L) 08/10/2023    HDL 41 02/07/2023    HDL 39 (L) 02/03/2023     Lab Results   Component Value Date    LDLCALC 70.4 08/10/2023    LDLCALC 82.4 02/07/2023    LDLCALC 79.2 02/03/2023     Lab Results   Component Value Date    TRIG 88 08/10/2023    TRIG 88 02/07/2023    TRIG 84 02/03/2023     Lab Results   Component Value Date    CHOLHDL 29.6 08/10/2023    CHOLHDL 29.1 02/07/2023    CHOLHDL 28.9 02/03/2023        The ASCVD Risk score ([place] DK, et al., 2019) failed to calculate for the following reasons:    The valid total cholesterol range is 130 to 320 mg/dL        Bilateral carotid bruits 07/05/2018    Personal history of spine surgery 02/09/2018    Fatty liver 02/09/2018    JAM on CPAP 12/06/2017     -ahi of 6, rdi of 10.8.    -currently with Dream station 2  -Doing well with cpap of 10 cm H20.  91%>4 hours.  Residual ahi of 2.2.  Patient is benefit from cpap        Chronic midline thoracic back pain 09/15/2017    Status post total knee replacement, left 1/20/2016 02/02/2016    Diastolic dysfunction 01/14/2016    Class 1 obesity due to excess calories with serious  comorbidity and body mass index (BMI) of 31.0 to 31.9 in adult 01/12/2016    Tobacco abuse 01/12/2016       Patient is enrolled in smoking cessation.  Nicotine patch      H/O scarlet fever 01/12/2016    Aortic valve sclerosis 01/12/2016    Pulmonary hypertension 01/12/2016     -pasp of 39.  -group 2 with diastolic dysfunction      Essential hypertension 01/07/2016    DDD (degenerative disc disease), cervical 08/21/2014    Lumbar spondylosis 04/17/2014     Dx updated per 2019 IMO Load      Osteoarthritis of left hip, mild 04/17/2014    Cervical spondylosis with radiculopathy 07/10/2012     MRI Cervical Spine 9/22/2017:  Severe bilateral neuroforaminal narrowing at C6-7.  No more than mild spinal canal stenosis.  Active degenerative change of the left facet at C3-4.  Stable anterior deviation of the posterior aspect of the spinal cord at the T2 vertebral body level, unchanged since 2013.  The edema in the interspinous ligament at T2 with extension subcutaneous fat, a finding of uncertain significance.  Thyroid nodule previously characterized on thyroid ultrasound 7/8/2013.      GERD (gastroesophageal reflux disease)      Multiple regurgitation events under anesthesia, recommending ETT for future general anesthetics      Primary osteoarthritis of both knees     Nontoxic uninodular goiter 12/13/2010       Past Surgical History:   Procedure Laterality Date    APPENDECTOMY      BREAST BIOPSY Bilateral 1988    BREAST MASS EXCISION Right     benign    CATARACT EXTRACTION W/  INTRAOCULAR LENS IMPLANT Left 12/09/2020    PHACO IOL WITH I-STENT ()    CATARACT EXTRACTION W/  INTRAOCULAR LENS IMPLANT Right 10/21/2020    PHACO IOL WITH I-STENT x 2 ()    CHOLECYSTECTOMY      COLONOSCOPY N/A 05/22/2019    Procedure: COLONOSCOPY;  Surgeon: Darrin Calvin MD;  Location: [facility] ([address] [place] [address]);  Service: Endoscopy;  Laterality: N/A;  2nd floor - all other procedure done on 2, multiple comorbidities - ERW/    change in MD schedule, Pt notified and verbalized understanding, new arrival time 0800, Ins mailed to Pt with new arrival time - ERW1/8/19@1130    COLONOSCOPY N/A 09/26/2022    Procedure: COLONOSCOPY;  Surgeon: Darrin Calvin MD;  Location: [facility] ([address] [place] [address]);  Service: Endoscopy;  Laterality: N/A;  2nd floor d/t previous anesthesia complications  vaccinated  inst mailed  clear liquids 4 hr prior-AM prep-LW  I should be able to complete this with the slim pediatric colonoscope, but should have the single-balloon available in case needed.    EPIDURAL STEROID INJECTION Bilateral 02/26/2021    Procedure: Injection, Steroid, Ischial Bursa;  Surgeon: Yonatan Garsia Jr., MD;  Location: [facility] ENDO;  Service: Pain Management;  Laterality: Bilateral;  Bilateral Ischial Bursa Steroid Injections  Arrive @ 1230; No ATC; Check BG    ESOPHAGOGASTRODUODENOSCOPY      HYSTERECTOMY  1980's    IMPLANTATION OF DEVICE FOR GLAUCOMA Right 10/21/2020    Procedure: INSERTION, DEVICE, FOR GLAUCOMA/ i Stent;  Surgeon: Melany Tse MD;  Location: [address];  Service: Ophthalmology;  Laterality: Right;    IMPLANTATION OF DEVICE FOR GLAUCOMA Left 12/09/2020    Procedure: INSERTION, DEVICE, FOR GLAUCOMA/I SENT;  Surgeon: Melany Tse MD;  Location: [address];  Service: Ophthalmology;  Laterality: Left;    INJECTION OF JOINT Bilateral 12/23/2021    Procedure: Injection, Joint---BILATERAL ISCHIAL BURSA STEROID INJECTION;  Surgeon: Yonatan Garsia Jr., MD;  Location: [facility] PAIN [facility]MT;  Service: Pain Management;  Laterality: Bilateral;    INTRAOCULAR PROSTHESES INSERTION Right 10/21/2020    Procedure: INSERTION, IOL PROSTHESIS;  Surgeon: Melany Tse MD;  Location: [address];  Service: Ophthalmology;  Laterality: Right;    INTRAOCULAR PROSTHESES INSERTION Left 12/09/2020    Procedure: INSERTION, IOL PROSTHESIS;  Surgeon: Melany Tse MD;  Location: [address];  Service: Ophthalmology;   Laterality: Left;    KNEE SURGERY Left 01/20/2016    TKR    KNEE SURGERY Right 02/26/2018    TKR    L4-L5 fusion  2006    PHACOEMULSIFICATION OF CATARACT Right 10/21/2020    Procedure: PHACOEMULSIFICATION, CATARACT;  Surgeon: Melany Tse MD;  Location: [facility] OR [address] [place] [address];  Service: Ophthalmology;  Laterality: Right;    PHACOEMULSIFICATION OF CATARACT Left 12/09/2020    Procedure: PHACOEMULSIFICATION, CATARACT;  Surgeon: Melany Tse MD;  Location: [facility] OR [address] [place] [address];  Service: Ophthalmology;  Laterality: Left;    YAG CAPSULOTOMY Left 12/07/2023           Social History     Socioeconomic History    Marital status: Single   Tobacco Use    Smoking status: Every Day     Current packs/day: 0.50     Average packs/day: 0.4 packs/day for 54.2 years (21.9 ttl pk-yrs)     Types: Cigarettes     Start date: 1970    Smokeless tobacco: Never    Tobacco comments:     Patient is enrolled in smoking cessation. Comprehensive smoking hx was updated on 2/28/24 by MARQUIS Cherry RRT,MSW,LMSW,TTS   Substance and Sexual Activity    Alcohol use: No     Alcohol/week: 0.0 standard drinks of alcohol    Drug use: No    Sexual activity: Yes     Partners: Male

## 2024-02-28 NOTE — PROGRESS NOTES
Individual Follow-Up Form    2/28/2024    Quit Date: TBD    Clinical Status of Patient: Outpatient    Length of Service: 45 minutes    Continuing Medication: yes  Nicotine Lozenges    Other Medications: None      Target Symptoms: Withdrawal and medication side effects. The following were  rated moderate (3) to severe (4) on TCRS:  Moderate (3): Crave and Desire   Severe (4): None     Comments: Patient presented to clinic for follow-up visit, name and date of birth verified as two patient identifiers. Patient reported she remains smoking 10 cpd and she is smoking less some days less.  Patient also reported she is still using 2 mg nicotine lozenges without any negative side effects reported at this time.  Counselor congratulated patient on her progress thus far. Counselor reiterated patient selecting a quit date, incorporating more lozenge usage, continuing to work towards further behavior modification strategies, and refraining from purchasing cigarettes.  Follow-up visit scheduled in two weeks. Counselor will remain available should any further needs arise.           Diagnosis: F17.200    Next Visit: 2 weeks

## 2024-02-29 ENCOUNTER — EXTERNAL CHRONIC CARE MANAGEMENT (OUTPATIENT)
Dept: PRIMARY CARE CLINIC | Facility: CLINIC | Age: 74
End: 2024-02-29
Payer: MEDICARE

## 2024-02-29 PROCEDURE — 99490 CHRNC CARE MGMT STAFF 1ST 20: CPT | Mod: S$PBB,,, | Performed by: FAMILY MEDICINE

## 2024-02-29 PROCEDURE — 99490 CHRNC CARE MGMT STAFF 1ST 20: CPT | Mod: PBBFAC,PN | Performed by: FAMILY MEDICINE

## 2024-03-15 ENCOUNTER — CLINICAL SUPPORT (OUTPATIENT)
Dept: SMOKING CESSATION | Facility: CLINIC | Age: 74
End: 2024-03-15
Payer: COMMERCIAL

## 2024-03-15 DIAGNOSIS — F17.200 NICOTINE DEPENDENCE: Primary | ICD-10-CM

## 2024-03-15 PROCEDURE — 99404 PREV MED CNSL INDIV APPRX 60: CPT | Mod: S$GLB,,,

## 2024-03-15 PROCEDURE — 99999 PR PBB SHADOW E&M-EST. PATIENT-LVL II: CPT | Mod: PBBFAC,,,

## 2024-03-15 NOTE — PROGRESS NOTES
Individual Follow-Up Form    3/15/2024    Quit Date: TBD    Clinical Status of Patient: Outpatient    Length of Service: 60 minutes    Continuing Medication: yes  Nicotine Lozenges    Other Medications: None      Target Symptoms: Withdrawal and medication side effects. The following were  rated moderate (3) to severe (4) on TCRS:  Moderate (3): Crave and Desire   Severe (4): None     Comments: Counselor spoke with patient telephonically for follow-up visit, name and date of birth verified as two patient identifiers. Patient stated she was unable to come into the clinic today because she was not feeling well. Patient reported she is smoking 15 cpd, but most days she is smoking less.  Patient also reported she is still using 2 mg nicotine lozenges without any negative side effects reported at this time.  Counselor congratulated patient on her progress thus far. Counselor discussed goal setting, continuing to incorporate more lozenge usage, and continuing to work towards further behavior modification strategies.  Follow-up visit scheduled in two weeks. Counselor will remain available should any further needs arise.            Diagnosis: F17.200    Next Visit: 2 weeks

## 2024-03-22 ENCOUNTER — DOCUMENTATION ONLY (OUTPATIENT)
Dept: REHABILITATION | Facility: HOSPITAL | Age: 74
End: 2024-03-22

## 2024-03-22 ENCOUNTER — LAB VISIT (OUTPATIENT)
Dept: LAB | Facility: HOSPITAL | Age: 74
End: 2024-03-22
Attending: FAMILY MEDICINE
Payer: MEDICARE

## 2024-03-22 DIAGNOSIS — N18.31 STAGE 3A CHRONIC KIDNEY DISEASE: ICD-10-CM

## 2024-03-22 DIAGNOSIS — E11.69 TYPE 2 DIABETES MELLITUS WITH OTHER SPECIFIED COMPLICATION, WITHOUT LONG-TERM CURRENT USE OF INSULIN: Chronic | ICD-10-CM

## 2024-03-22 DIAGNOSIS — D64.9 NORMOCHROMIC ANEMIA: ICD-10-CM

## 2024-03-22 LAB
ALBUMIN SERPL BCP-MCNC: 3.3 G/DL (ref 3.5–5.2)
ALP SERPL-CCNC: 103 U/L (ref 55–135)
ALT SERPL W/O P-5'-P-CCNC: 14 U/L (ref 10–44)
ANION GAP SERPL CALC-SCNC: 6 MMOL/L (ref 8–16)
AST SERPL-CCNC: 14 U/L (ref 10–40)
BASOPHILS # BLD AUTO: 0.03 K/UL (ref 0–0.2)
BASOPHILS NFR BLD: 0.4 % (ref 0–1.9)
BILIRUB SERPL-MCNC: 0.3 MG/DL (ref 0.1–1)
BUN SERPL-MCNC: 21 MG/DL (ref 8–23)
CALCIUM SERPL-MCNC: 9.1 MG/DL (ref 8.7–10.5)
CHLORIDE SERPL-SCNC: 109 MMOL/L (ref 95–110)
CHOLEST SERPL-MCNC: 122 MG/DL (ref 120–199)
CHOLEST/HDLC SERPL: 3.2 {RATIO} (ref 2–5)
CO2 SERPL-SCNC: 26 MMOL/L (ref 23–29)
CREAT SERPL-MCNC: 1.2 MG/DL (ref 0.5–1.4)
DIFFERENTIAL METHOD BLD: ABNORMAL
EOSINOPHIL # BLD AUTO: 0.3 K/UL (ref 0–0.5)
EOSINOPHIL NFR BLD: 3.7 % (ref 0–8)
ERYTHROCYTE [DISTWIDTH] IN BLOOD BY AUTOMATED COUNT: 16.4 % (ref 11.5–14.5)
EST. GFR  (NO RACE VARIABLE): 48 ML/MIN/1.73 M^2
FERRITIN SERPL-MCNC: 38 NG/ML (ref 20–300)
GLUCOSE SERPL-MCNC: 95 MG/DL (ref 70–110)
HCT VFR BLD AUTO: 36.9 % (ref 37–48.5)
HDLC SERPL-MCNC: 38 MG/DL (ref 40–75)
HDLC SERPL: 31.1 % (ref 20–50)
HGB BLD-MCNC: 12 G/DL (ref 12–16)
IMM GRANULOCYTES # BLD AUTO: 0.02 K/UL (ref 0–0.04)
IMM GRANULOCYTES NFR BLD AUTO: 0.3 % (ref 0–0.5)
IRON SERPL-MCNC: 51 UG/DL (ref 30–160)
LDLC SERPL CALC-MCNC: 67.2 MG/DL (ref 63–159)
LYMPHOCYTES # BLD AUTO: 2.6 K/UL (ref 1–4.8)
LYMPHOCYTES NFR BLD: 37.6 % (ref 18–48)
MCH RBC QN AUTO: 27.5 PG (ref 27–31)
MCHC RBC AUTO-ENTMCNC: 32.5 G/DL (ref 32–36)
MCV RBC AUTO: 84 FL (ref 82–98)
MONOCYTES # BLD AUTO: 0.6 K/UL (ref 0.3–1)
MONOCYTES NFR BLD: 8.9 % (ref 4–15)
NEUTROPHILS # BLD AUTO: 3.4 K/UL (ref 1.8–7.7)
NEUTROPHILS NFR BLD: 49.1 % (ref 38–73)
NONHDLC SERPL-MCNC: 84 MG/DL
NRBC BLD-RTO: 0 /100 WBC
PLATELET # BLD AUTO: 228 K/UL (ref 150–450)
PMV BLD AUTO: 11.6 FL (ref 9.2–12.9)
POTASSIUM SERPL-SCNC: 4.2 MMOL/L (ref 3.5–5.1)
PROT SERPL-MCNC: 5.9 G/DL (ref 6–8.4)
RBC # BLD AUTO: 4.37 M/UL (ref 4–5.4)
SATURATED IRON: 14 % (ref 20–50)
SODIUM SERPL-SCNC: 141 MMOL/L (ref 136–145)
TOTAL IRON BINDING CAPACITY: 377 UG/DL (ref 250–450)
TRANSFERRIN SERPL-MCNC: 255 MG/DL (ref 200–375)
TRIGL SERPL-MCNC: 84 MG/DL (ref 30–150)
WBC # BLD AUTO: 6.95 K/UL (ref 3.9–12.7)

## 2024-03-22 PROCEDURE — 82043 UR ALBUMIN QUANTITATIVE: CPT | Performed by: FAMILY MEDICINE

## 2024-03-22 PROCEDURE — 82728 ASSAY OF FERRITIN: CPT | Performed by: FAMILY MEDICINE

## 2024-03-22 PROCEDURE — 85025 COMPLETE CBC W/AUTO DIFF WBC: CPT | Performed by: FAMILY MEDICINE

## 2024-03-22 PROCEDURE — 80053 COMPREHEN METABOLIC PANEL: CPT | Performed by: FAMILY MEDICINE

## 2024-03-22 PROCEDURE — 36415 COLL VENOUS BLD VENIPUNCTURE: CPT | Mod: PN | Performed by: FAMILY MEDICINE

## 2024-03-22 PROCEDURE — 80061 LIPID PANEL: CPT | Performed by: FAMILY MEDICINE

## 2024-03-22 PROCEDURE — 83036 HEMOGLOBIN GLYCOSYLATED A1C: CPT | Performed by: FAMILY MEDICINE

## 2024-03-22 PROCEDURE — 83540 ASSAY OF IRON: CPT | Performed by: FAMILY MEDICINE

## 2024-03-22 NOTE — PROGRESS NOTES
No Show Note/Documentation    Patient: Stephanie Sears  Date of Session: 3/22/2024  Diagnosis: No diagnosis found.  MRN: [id number]    Stephanie Sears did not attend her scheduled therapy evaluation today. Stephanie did not call to cancel nor reschedule. This is the first evaluation appointment that Stephanie has not attended.  No charges have been posted today.       KARLO Fatima  3/22/2024

## 2024-03-23 LAB
ALBUMIN/CREAT UR: 128.2 UG/MG (ref 0–30)
CREAT UR-MCNC: 163 MG/DL (ref 15–325)
ESTIMATED AVG GLUCOSE: 128 MG/DL (ref 68–131)
HBA1C MFR BLD: 6.1 % (ref 4–5.6)
MICROALBUMIN UR DL<=1MG/L-MCNC: 209 UG/ML

## 2024-03-23 NOTE — TELEPHONE ENCOUNTER
Please contact patient and inform that I have prescribed a cough medication.  It does contain codeine.  If she can tolerate codeine please call medication to pharmacy.   all other ROS negative except as per HPI

## 2024-03-28 ENCOUNTER — OFFICE VISIT (OUTPATIENT)
Dept: FAMILY MEDICINE | Facility: CLINIC | Age: 74
End: 2024-03-28
Payer: MEDICARE

## 2024-03-28 VITALS
TEMPERATURE: 98 F | BODY MASS INDEX: 31.8 KG/M2 | HEART RATE: 56 BPM | HEIGHT: 62 IN | OXYGEN SATURATION: 99 % | SYSTOLIC BLOOD PRESSURE: 144 MMHG | DIASTOLIC BLOOD PRESSURE: 68 MMHG | WEIGHT: 172.81 LBS

## 2024-03-28 DIAGNOSIS — F17.219 NICOTINE DEPENDENCE, CIGARETTES, W UNSP DISORDERS: ICD-10-CM

## 2024-03-28 DIAGNOSIS — E78.5 DYSLIPIDEMIA: Chronic | ICD-10-CM

## 2024-03-28 DIAGNOSIS — Z12.2 SCREENING FOR LUNG CANCER: ICD-10-CM

## 2024-03-28 DIAGNOSIS — R05.1 ACUTE COUGH: ICD-10-CM

## 2024-03-28 DIAGNOSIS — N18.31 STAGE 3A CHRONIC KIDNEY DISEASE: Chronic | ICD-10-CM

## 2024-03-28 DIAGNOSIS — E11.69 TYPE 2 DIABETES MELLITUS WITH OTHER SPECIFIED COMPLICATION, WITHOUT LONG-TERM CURRENT USE OF INSULIN: Chronic | ICD-10-CM

## 2024-03-28 DIAGNOSIS — I10 ESSENTIAL HYPERTENSION: Primary | Chronic | ICD-10-CM

## 2024-03-28 PROCEDURE — 99215 OFFICE O/P EST HI 40 MIN: CPT | Mod: PBBFAC,PN | Performed by: FAMILY MEDICINE

## 2024-03-28 PROCEDURE — 99214 OFFICE O/P EST MOD 30 MIN: CPT | Mod: S$PBB,,, | Performed by: FAMILY MEDICINE

## 2024-03-28 PROCEDURE — 99999 PR PBB SHADOW E&M-EST. PATIENT-LVL V: CPT | Mod: PBBFAC,,, | Performed by: FAMILY MEDICINE

## 2024-03-28 RX ORDER — PROMETHAZINE HYDROCHLORIDE AND DEXTROMETHORPHAN HYDROBROMIDE 6.25; 15 MG/5ML; MG/5ML
5 SYRUP ORAL
Qty: 240 ML | Refills: 0 | Status: SHIPPED | OUTPATIENT
Start: 2024-03-28 | End: 2024-06-13 | Stop reason: CLARIF

## 2024-03-28 NOTE — PROGRESS NOTES
"  Patient Name: Stephanie Sears    : 1950  MRN: [id number]      Subjective:     Patient ID: Stephanie is a 74 y.o. female    Chief Complaint:  Diabetes, Hypertension, and Hyperlipidemia    74-year-old female presents for follow-up on hypertension, hyperlipidemia, diabetes.  She does not check her sugars regularly.  Also not checking blood pressure at home.  She reports taking her medications as prescribed without side effects.    Patient reports having several days of an acute cough.  She denies any chest pain or palpitations.  She reports that the cough is productive of sputum.  She denies any shortness a breath.         Review of Systems   Constitutional:  Negative for chills, fever and unexpected weight change.   Respiratory:  Positive for cough. Negative for chest tightness, shortness of breath and wheezing.    Cardiovascular:  Negative for chest pain and palpitations.   Gastrointestinal:  Negative for abdominal pain, blood in stool and change in bowel habit.   Genitourinary:  Negative for difficulty urinating.   Musculoskeletal:  Positive for neck pain.   Neurological:  Positive for numbness (feet). Negative for headaches.        Objective:   BP (!) 144/68 (BP Location: Left arm, Patient Position: Sitting, BP Method: Large (Manual))   Pulse (!) 56   Temp 98 °F (36.7 °C) (Oral)   Ht 5' 2" (1.575 m)   Wt 78.4 kg (172 lb 13.5 oz)   SpO2 99%   BMI 31.61 kg/m²     Physical Exam  Vitals reviewed.   Constitutional:       General: She is not in acute distress.     Appearance: She is well-developed. She is not diaphoretic.   HENT:      Head: Normocephalic and atraumatic.      Right Ear: Tympanic membrane, ear canal and external ear normal.      Left Ear: Tympanic membrane, ear canal and external ear normal.      Nose: Nose normal.   Eyes:      General:         Right eye: No discharge.         Left eye: No discharge.      Conjunctiva/sclera: Conjunctivae normal.      Pupils: Pupils are equal, round, and " reactive to light.   Neck:      Thyroid: No thyromegaly.      Trachea: No tracheal deviation.   Cardiovascular:      Rate and Rhythm: Normal rate and regular rhythm.      Pulses:           Radial pulses are 2+ on the right side and 2+ on the left side.      Heart sounds: Normal heart sounds, S1 normal and S2 normal. No murmur heard.  Pulmonary:      Effort: Pulmonary effort is normal. No respiratory distress.      Breath sounds: Normal breath sounds. No wheezing, rhonchi or rales.   Abdominal:      General: Bowel sounds are normal. There is no distension.      Palpations: Abdomen is soft. Abdomen is not rigid. There is no mass.      Tenderness: There is no abdominal tenderness. There is no guarding.   Musculoskeletal:      Cervical back: Normal range of motion and neck supple.   Lymphadenopathy:      Cervical: No cervical adenopathy.   Skin:     General: Skin is warm and dry.      Capillary Refill: Capillary refill takes less than 2 seconds.      Findings: No rash.   Neurological:      Mental Status: She is alert and oriented to person, place, and time.   Psychiatric:         Behavior: Behavior normal.        Assessment        ICD-10-CM ICD-9-CM   1. Essential hypertension  I10 401.9   2. Dyslipidemia  E78.5 272.4   3. Stage 3a chronic kidney disease  N18.31 585.3   4. Type 2 diabetes mellitus with other specified complication, without long-term current use of insulin  E11.69 250.80   5. Nicotine dependence, cigarettes, w unsp disorders  F17.219 292.9   6. Screening for lung cancer  Z12.2 V76.0   7. Acute cough  R05.1 786.2         Plan:     1. Essential hypertension  Assessment & Plan:  Blood pressure elevated.    However patient ill today.    Discussed compliance with medication and will recheck blood pressure next few weeks.    Orders:  -     Comprehensive metabolic panel; Future; Expected date: 08/28/2024    2. Dyslipidemia  Overview:  Lab Results   Component Value Date    CHOL 122 03/22/2024    CHOL 125  08/10/2023    CHOL 141 02/07/2023     Lab Results   Component Value Date    HDL 38 (L) 03/22/2024    HDL 37 (L) 08/10/2023    HDL 41 02/07/2023     Lab Results   Component Value Date    LDLCALC 67.2 03/22/2024    LDLCALC 70.4 08/10/2023    LDLCALC 82.4 02/07/2023     Lab Results   Component Value Date    TRIG 84 03/22/2024    TRIG 88 08/10/2023    TRIG 88 02/07/2023     Lab Results   Component Value Date    CHOLHDL 31.1 03/22/2024    CHOLHDL 29.6 08/10/2023    CHOLHDL 29.1 02/07/2023        The ASCVD Risk score (Sawyer DK, et al., 2019) failed to calculate for the following reasons:    The valid total cholesterol range is 130 to 320 mg/dL      Assessment & Plan:  Continue atorvastatin 40 milligrams daily.    Orders:  -     Comprehensive metabolic panel; Future; Expected date: 08/28/2024  -     Lipid panel; Future; Expected date: 08/28/2024    3. Stage 3a chronic kidney disease  Overview:  Lab Results   Component Value Date    EGFRNORACEVR 43 (A) 01/24/2024    EGFRNORACEVR 53 (A) 08/10/2023    EGFRNORACEVR 48 (A) 02/03/2023    EGFRNORACEVR 48 (A) 02/03/2023        Orders:  -     CBC auto differential; Future; Expected date: 08/28/2024  -     Comprehensive metabolic panel; Future; Expected date: 08/28/2024    4. Type 2 diabetes mellitus with other specified complication, without long-term current use of insulin  Overview:  Complications: Nephropathy, hypertension, dyslipidemia, obesity    Lab Results   Component Value Date    HGBA1C 6.1 (H) 03/22/2024    HGBA1C 6.2 (H) 01/24/2024    HGBA1C 6.1 (H) 11/21/2023     Lab Results   Component Value Date    LDLCALC 67.2 03/22/2024     Lab Results   Component Value Date    MICALBCREAT 128.2 (H) 03/22/2024      Eye Exam:  11-  Foot exam:  11-    Assessment & Plan:  Continue dietary modifications.    Orders:  -     CBC auto differential; Future; Expected date: 08/28/2024  -     Comprehensive metabolic panel; Future; Expected date: 08/28/2024  -     Hemoglobin A1c;  Future; Expected date: 08/28/2024  -     Lipid panel; Future; Expected date: 08/28/2024  -     Microalbumin/creatinine urine ratio; Future; Expected date: 08/28/2024    5. Nicotine dependence, cigarettes, w unsp disorders  Overview:    Patient is enrolled in smoking cessation.  Nicotine patch    Assessment & Plan:  I reviewed with the patient the [place] Preventative Services Task Force Recommendation on Lung Cancer Screening With Low-Dose Computed Tomography.  Patient meets eligibility criteria as per current CMS guidelines for subsequent LDCT lung cancer screening (age 50-80; asymptomatic; tobacco smoking hx of at least 30 pack years; current smoker or one who has quit smoking within the last 15 years).  Benefits and harms of screening discussed with patient, including follow-up diagnostic testing, over-diagnosis, false positive rate, and total radiation exposure.  Patient counseled on the importance of adherence to annual lung cancer LDCT screening, impact of comorbidities and ability or willingness to undergo diagnosis and treatment.  Patient counseled on the importance of maintaining cigarette smoking abstinence if former smoker; or the importance of smoking cessation if current smoker and, if appropriate, furnishing of information about tobacco cessation interventions  All questions answered.   Patient wishes to proceed with continued annual surveillance testing.     Orders:  -     CT Chest Lung Screening Low Dose; Future; Expected date: 03/28/2024    6. Screening for lung cancer  -     CT Chest Lung Screening Low Dose; Future; Expected date: 03/28/2024    7. Acute cough  -     promethazine-dextromethorphan (PROMETHAZINE-DM) 6.25-15 mg/5 mL Syrp; Take 5 mLs by mouth every 4 to 6 hours as needed (cough/congestion).  Dispense: 240 mL; Refill: 0  Advised symptomatic care with plenty of fluids, honey and lemon, rest, and prescribed regimen.  OTC Ibuprofen or Tylenol for pain.  Discussed course of a viral respiratory  infections.  Explained that after respiratory infection is better, may continue to have a dry cough for 2-4 weeks, and sometimes longer.  Cough lasting more than 3 weeks may need further evaluation.  To help ease a cough at home, can try:  -Drinking water, warm broths, and teas to stay hydrated  -Drinking lemon and honey (not suitable before the age of 12 months)  -Avoiding irritants such as pollen, smoke, and dust  -Using a humidifier  -Breathing in steam from a hot shower or bath to open the airways           -Williams Rossi Jr., MD, AAHIVS      This office note has been dictated.  This dictation has been generated using [employer] in [employer] Direct dictation; some phonetic errors may occur.         There are no Patient Instructions on file for this visit.      Follow up in about 5 months (around 8/12/2024) for Diabetes, Chronic Kidney Disease, Hypertension (fasting labs a week prior).   Future Appointments   Date Time Provider Department Center   4/5/2024 12:15 PM [facility] OI-CT2 500 LB LIMIT [facility]   4/22/2024  8:30 AM Melany Tse MD [facility] [address]   5/22/2024  1:00 PM Tiffany Garrison MD [facility]   8/16/2024  8:15 AM LAB, [facility] DRAW STATION [facility] LAB [facility]   8/23/2024  9:20 AM Williams Rossi Jr., MD [facility]

## 2024-03-31 ENCOUNTER — EXTERNAL CHRONIC CARE MANAGEMENT (OUTPATIENT)
Dept: PRIMARY CARE CLINIC | Facility: CLINIC | Age: 74
End: 2024-03-31
Payer: MEDICARE

## 2024-03-31 PROCEDURE — 99490 CHRNC CARE MGMT STAFF 1ST 20: CPT | Mod: S$PBB,,, | Performed by: FAMILY MEDICINE

## 2024-03-31 PROCEDURE — 99490 CHRNC CARE MGMT STAFF 1ST 20: CPT | Mod: PBBFAC,PN | Performed by: FAMILY MEDICINE

## 2024-04-01 NOTE — ASSESSMENT & PLAN NOTE
Blood pressure elevated.    However patient ill today.    Discussed compliance with medication and will recheck blood pressure next few weeks.

## 2024-04-04 ENCOUNTER — CLINICAL SUPPORT (OUTPATIENT)
Dept: SMOKING CESSATION | Facility: CLINIC | Age: 74
End: 2024-04-04
Payer: COMMERCIAL

## 2024-04-04 DIAGNOSIS — F17.200 NICOTINE DEPENDENCE: Primary | ICD-10-CM

## 2024-04-04 PROCEDURE — 99999 PR PBB SHADOW E&M-EST. PATIENT-LVL I: CPT | Mod: PBBFAC,,,

## 2024-04-04 PROCEDURE — 99403 PREV MED CNSL INDIV APPRX 45: CPT | Mod: S$GLB,,,

## 2024-04-04 NOTE — PROGRESS NOTES
Individual Follow-Up Form    4/4/2024    Quit Date: TBD    Clinical Status of Patient: Outpatient    Length of Service: 45 minutes    Continuing Medication: yes  Nicotine Lozenges    Other Medications: None      Target Symptoms: Withdrawal and medication side effects. The following were  rated moderate (3) to severe (4) on TCRS:  Moderate (3): Crave and Desire   Severe (4): None     Comments: Counselor spoke with patient telephonically for follow-up visit, name and date of birth verified as two patient identifiers.  Patient reported she is smoking 10 cpd, but most days she continues to smoking less.  Patient also reported she is still using 2 mg nicotine lozenges without any negative side effects reported at this time.  Counselor congratulated patient on her progress thus far. Counselor encouraged patient to incorporate more lozenge usage, and continuing to work towards further behavior modification strategies.  Follow-up visit scheduled in two weeks. Counselor will remain available should any further needs arise.            Diagnosis: F17.200    Next Visit: 2 weeks

## 2024-04-10 ENCOUNTER — TELEPHONE (OUTPATIENT)
Dept: FAMILY MEDICINE | Facility: CLINIC | Age: 74
End: 2024-04-10
Payer: MEDICARE

## 2024-04-10 NOTE — TELEPHONE ENCOUNTER
Notified pt that Chlorthalidone can be given once a day or three times a week based on the fluid and blood pressure levels. Pt stated she already knew this.

## 2024-04-11 DIAGNOSIS — I10 ESSENTIAL HYPERTENSION: Chronic | ICD-10-CM

## 2024-04-11 RX ORDER — AMLODIPINE AND BENAZEPRIL HYDROCHLORIDE 10; 40 MG/1; MG/1
1 CAPSULE ORAL
Qty: 90 CAPSULE | Refills: 1 | Status: SHIPPED | OUTPATIENT
Start: 2024-04-11

## 2024-04-11 NOTE — TELEPHONE ENCOUNTER
Refill Routing Note   Medication(s) are not appropriate for processing by [facility] for the following reason(s):        Required vitals abnormal    [facility] action(s):  Defer             Appointments  past 12m or future 3m with PCP    Date Provider   Last Visit   3/28/2024 Williams Rossi Jr., MD   Next Visit   8/23/2024 Williams Rossi Jr., MD   ED visits in past 90 days: 0        Note composed:9:51 AM 04/11/2024

## 2024-04-11 NOTE — TELEPHONE ENCOUNTER
No care due was identified.  [facility] Embedded Care Due Messages. Reference number: [id number].   4/11/2024 12:33:29 AM CDT

## 2024-04-19 ENCOUNTER — HOSPITAL ENCOUNTER (OUTPATIENT)
Dept: RADIOLOGY | Facility: HOSPITAL | Age: 74
Discharge: HOME | End: 2024-04-19
Attending: FAMILY MEDICINE
Payer: MEDICARE

## 2024-04-19 DIAGNOSIS — F17.219 NICOTINE DEPENDENCE, CIGARETTES, W UNSP DISORDERS: ICD-10-CM

## 2024-04-19 DIAGNOSIS — Z12.2 SCREENING FOR LUNG CANCER: ICD-10-CM

## 2024-04-19 PROCEDURE — 71271 CT THORAX LUNG CANCER SCR C-: CPT | Mod: TC

## 2024-04-19 PROCEDURE — 71271 CT THORAX LUNG CANCER SCR C-: CPT | Mod: 26,,, | Performed by: STUDENT IN AN ORGANIZED HEALTH CARE EDUCATION/TRAINING PROGRAM

## 2024-04-21 NOTE — PROGRESS NOTES
"  HPI    DLS: 12/07/2023    Pt here for 4 Month Check;  Pt states no eye pain or discomfort. Pt states she is sensitive to the sun   always have to wear dark sunglasses.     Meds;  AT's PRN OU    1  Preperimetric POAG vs OHT   2. PVD OD   3. PCIOL OU   4. Blepharitis / MGD   5. RAGHU Allergy   6. PCO OU       Last edited by Angelic Saravia on 4/22/2024  8:59 AM.            Assessment /Plan     For exam results, see Encounter Report.    Primary open-angle glaucoma, bilateral, mild stage    PCO (posterior capsular opacification), left    Epiretinal membrane (ERM) of left eye    Pseudophakia, both eyes    Status post glaucoma surgery        1. Pre-perimetric mild POAG   Vs OHT  -Followed at [facility] since 1991   -First HVF 1999   -First photos 1991   - Intolerant to all gtts - they aggravate his blepharitis-? RAGHU allergy   -IOP "OK" off gtts and s/p ALT ou and SLT od - 2008    Family history neg   Glaucoma meds none (( off gtts post SLT ou -))   H/O adverse rxn to glaucoma drops Intolerant to all gtts - 2/2 aggravates blepharitis- ? RAGHU allergy   LASERS ALT ou -? Date / SLT OD 7/17/08 - good response   GLAUCOMA SURGERIES - I stent x 2 - OD  - 10/21/2020 // I stent 12/9/2020 - OS   OTHER EYE SURGERIES - phaco/IOL od - 10/21/2020 - PCB00 14.0  // oS 12/9/2020 - pcb00 15.0   CDR 0.6/0.3   Tbase 19-26 / 16-21   Tmax 26/21   Ttarget ? About 18 ou  HVF 15 test - 1999 to 2021 - Full ou   Gonio +3 ou   /588   OCT 6 test 2005 to 2021 -  RNFL - OD: bord TI (?prog)  // OS:NL  HRT 9 test 2004 to 2020 -MR -  MR -  Dec T, border NI od // full os /// CDR 0.619 od // 0.508 os - but unreliable OD  Disc photos 1991, 1996, 2003 - slides // 2012 , 2016, 2020   - OIS     - Ttoday  16/16  - Test done today   IOP //  gonio      2. Posterior Vitreous Detachment OD - 11/2008   - RD PRECAUTIONS    3. Eyelid inflammation / Blepharitis / MGD    4. Allergic Conjunctivitis - RAGHU allergy     5. PCO    Vis sign os - s/p yag cap os 12/7/2023 - [facility] " improved post yag cap    Mild od // + ant capsule phimosis od     6. PC IOL OU (w/ istents)    OD 10/21/202 - PCB00 14.0   OS 12/9/2020 - PCB00 15.0     Plan   POAG - mild -pre-perimetric glaucoma - intolerant to all gtts   IOP ok s/p ALT ou - years ago and s/p SLT OD 2008 ( no SLT os)  Continue to monitor HVF/DFE/OCT/HRT/photos/IOP   If increase IOP or progression on VF testing consider repeat SLT OD and primary SLT os      Pt was a myope - sph eq is -5.25 od and -4.25 os - pre- phac0 -- set for distance post op    OCT macula - NO CME     Rx for bifocals  Given - or ok to use no distance correction and OTC readers - re-printed 3/28/2022  Pt mostly uses the [employer] reading glasses     Photo file updated 7/13/2023    If IOP too high off gtts and post I-stent - can try a SLT - OS and a repeat SLT od - can skip the istent areas // (( H/O ALT's many years ago)    - or can re-try some gtts (H/O intol to all galucoma gtts in past - ? RAGHU intol) -  ?? If could try travatan Z again in future again - non RAGHU // or ?? Try alphagan P     F/U 4 months IOP // HVF / DFE / OCT

## 2024-04-22 ENCOUNTER — OFFICE VISIT (OUTPATIENT)
Dept: OPHTHALMOLOGY | Facility: CLINIC | Age: 74
End: 2024-04-22
Payer: MEDICARE

## 2024-04-22 DIAGNOSIS — H40.1131 PRIMARY OPEN-ANGLE GLAUCOMA, BILATERAL, MILD STAGE: Primary | ICD-10-CM

## 2024-04-22 DIAGNOSIS — Z98.83 STATUS POST GLAUCOMA SURGERY: ICD-10-CM

## 2024-04-22 DIAGNOSIS — Z96.1 PSEUDOPHAKIA, BOTH EYES: ICD-10-CM

## 2024-04-22 DIAGNOSIS — H26.492 PCO (POSTERIOR CAPSULAR OPACIFICATION), LEFT: ICD-10-CM

## 2024-04-22 DIAGNOSIS — H35.372 EPIRETINAL MEMBRANE (ERM) OF LEFT EYE: ICD-10-CM

## 2024-04-22 PROCEDURE — 99213 OFFICE O/P EST LOW 20 MIN: CPT | Mod: PBBFAC,25 | Performed by: OPHTHALMOLOGY

## 2024-04-22 PROCEDURE — 99214 OFFICE O/P EST MOD 30 MIN: CPT | Mod: S$PBB,,, | Performed by: OPHTHALMOLOGY

## 2024-04-22 PROCEDURE — 99999 PR PBB SHADOW E&M-EST. PATIENT-LVL III: CPT | Mod: PBBFAC,,, | Performed by: OPHTHALMOLOGY

## 2024-04-22 PROCEDURE — 92020 GONIOSCOPY: CPT | Mod: PBBFAC | Performed by: OPHTHALMOLOGY

## 2024-04-22 PROCEDURE — 92020 GONIOSCOPY: CPT | Mod: S$PBB,,, | Performed by: OPHTHALMOLOGY

## 2024-04-26 ENCOUNTER — CLINICAL SUPPORT (OUTPATIENT)
Dept: SMOKING CESSATION | Facility: CLINIC | Age: 74
End: 2024-04-26
Payer: COMMERCIAL

## 2024-04-26 ENCOUNTER — TELEPHONE (OUTPATIENT)
Dept: SMOKING CESSATION | Facility: CLINIC | Age: 74
End: 2024-04-26
Payer: MEDICARE

## 2024-04-26 DIAGNOSIS — F17.200 NICOTINE DEPENDENCE: Primary | ICD-10-CM

## 2024-04-26 PROCEDURE — 99402 PREV MED CNSL INDIV APPRX 30: CPT | Mod: S$GLB,,,

## 2024-04-26 PROCEDURE — 99999 PR PBB SHADOW E&M-EST. PATIENT-LVL I: CPT | Mod: PBBFAC,,,

## 2024-04-26 NOTE — PROGRESS NOTES
Individual Follow-Up Form    4/26/2024    Quit Date: TBD    Clinical Status of Patient: Outpatient    Length of Service: 30 minutes    Continuing Medication: yes  Nicotine Lozenges    Other Medications: None     Target Symptoms: Withdrawal and medication side effects. The following were  rated moderate (3) to severe (4) on TCRS:  Moderate (3): Crave and Desire   Severe (4): None     Comments: Counselor spoke with patient telephonically for follow-up visit, name and date of birth verified as two patient identifiers.  Patient reported she is smoking about 10 cpd, but most days she is letting them burn out.  Patient also reported she is still using 2 mg nicotine lozenges without any negative side effects reported at this time.  Counselor congratulated patient on her progress thus far. Counselor encouraged patient to attempt to refrain from purchasing any more cigarettes, and she also reiterated to patient to attempt incorporate more lozenge usage, and continuing to work towards further behavior modification strategies.  Follow-up visit scheduled in two weeks. Counselor will remain available should any further needs arise.         Diagnosis: F17.200    Next Visit: 2 weeks

## 2024-04-30 ENCOUNTER — EXTERNAL CHRONIC CARE MANAGEMENT (OUTPATIENT)
Dept: PRIMARY CARE CLINIC | Facility: CLINIC | Age: 74
End: 2024-04-30
Payer: MEDICARE

## 2024-04-30 PROCEDURE — 99490 CHRNC CARE MGMT STAFF 1ST 20: CPT | Mod: PBBFAC,PN | Performed by: FAMILY MEDICINE

## 2024-04-30 PROCEDURE — 99490 CHRNC CARE MGMT STAFF 1ST 20: CPT | Mod: S$PBB,,, | Performed by: FAMILY MEDICINE

## 2024-05-09 ENCOUNTER — CLINICAL SUPPORT (OUTPATIENT)
Dept: SMOKING CESSATION | Facility: CLINIC | Age: 74
End: 2024-05-09
Attending: OPHTHALMOLOGY
Payer: COMMERCIAL

## 2024-05-09 DIAGNOSIS — F17.200 NICOTINE DEPENDENCE: Primary | ICD-10-CM

## 2024-05-09 PROCEDURE — 99404 PREV MED CNSL INDIV APPRX 60: CPT | Mod: S$GLB,,,

## 2024-05-09 PROCEDURE — 99999 PR PBB SHADOW E&M-EST. PATIENT-LVL II: CPT | Mod: PBBFAC,,,

## 2024-05-09 NOTE — PROGRESS NOTES
Individual Follow-Up Form    5/9/2024    Quit Date: TBD    Clinical Status of Patient: Outpatient    Length of Service: 60 minutes    Continuing Medication: yes  Nicotine Lozenges    Other Medications: None      Target Symptoms: Withdrawal and medication side effects. The following were  rated moderate (3) to severe (4) on TCRS:  Moderate (3): Crave and Desire   Severe (4): None     Comments: Patient presented to clinic for follow-up visit, name and date of birth verified as two patient identifiers.  Patient reported she is smoking about 8-10 cpd.  Patient also reported she is still using 2 mg nicotine lozenges without any negative side effects reported at this time.  Counselor congratulated patient on her progress thus far. Counselor reiterated patient attempting tp refrain from purchasing any more cigarettes, incorporating more lozenge usage, and continuing to work towards further behavior modification strategies.  Follow-up visit scheduled in two weeks. Counselor will remain available should any further needs arise.         Diagnosis: F17.200    Next Visit: 2 weeks

## 2024-05-22 ENCOUNTER — TELEPHONE (OUTPATIENT)
Dept: SMOKING CESSATION | Facility: CLINIC | Age: 74
End: 2024-05-22
Payer: MEDICARE

## 2024-05-22 ENCOUNTER — OFFICE VISIT (OUTPATIENT)
Dept: PODIATRY | Facility: CLINIC | Age: 74
End: 2024-05-22
Payer: MEDICARE

## 2024-05-22 ENCOUNTER — OFFICE VISIT (OUTPATIENT)
Dept: ORTHOPEDICS | Facility: CLINIC | Age: 74
End: 2024-05-22
Payer: MEDICARE

## 2024-05-22 ENCOUNTER — CLINICAL SUPPORT (OUTPATIENT)
Dept: SMOKING CESSATION | Facility: CLINIC | Age: 74
End: 2024-05-22
Payer: COMMERCIAL

## 2024-05-22 VITALS
WEIGHT: 172.81 LBS | DIASTOLIC BLOOD PRESSURE: 72 MMHG | BODY MASS INDEX: 31.8 KG/M2 | SYSTOLIC BLOOD PRESSURE: 150 MMHG | HEIGHT: 62 IN

## 2024-05-22 DIAGNOSIS — M20.40 HAMMER TOE, UNSPECIFIED LATERALITY: ICD-10-CM

## 2024-05-22 DIAGNOSIS — E11.51 TYPE II DIABETES MELLITUS WITH PERIPHERAL CIRCULATORY DISORDER: Primary | ICD-10-CM

## 2024-05-22 DIAGNOSIS — F17.200 NICOTINE DEPENDENCE: Primary | ICD-10-CM

## 2024-05-22 DIAGNOSIS — B35.1 ONYCHOMYCOSIS DUE TO DERMATOPHYTE: ICD-10-CM

## 2024-05-22 DIAGNOSIS — E11.9 COMPREHENSIVE DIABETIC FOOT EXAMINATION, TYPE 2 DM, ENCOUNTER FOR: ICD-10-CM

## 2024-05-22 DIAGNOSIS — M20.5X1 HALLUX LIMITUS, ACQUIRED, RIGHT: ICD-10-CM

## 2024-05-22 DIAGNOSIS — L84 CORN OR CALLUS: ICD-10-CM

## 2024-05-22 DIAGNOSIS — M67.912 ROTATOR CUFF DYSFUNCTION, LEFT: ICD-10-CM

## 2024-05-22 DIAGNOSIS — M20.5X2 HALLUX LIMITUS, ACQUIRED, LEFT: ICD-10-CM

## 2024-05-22 DIAGNOSIS — R22.32 ARM MASS, LEFT: Primary | ICD-10-CM

## 2024-05-22 PROCEDURE — 99999 PR PBB SHADOW E&M-EST. PATIENT-LVL III: CPT | Mod: PBBFAC,,, | Performed by: ORTHOPAEDIC SURGERY

## 2024-05-22 PROCEDURE — 99214 OFFICE O/P EST MOD 30 MIN: CPT | Mod: PBBFAC,PO | Performed by: PODIATRIST

## 2024-05-22 PROCEDURE — 99402 PREV MED CNSL INDIV APPRX 30: CPT | Mod: S$GLB,,,

## 2024-05-22 PROCEDURE — 99214 OFFICE O/P EST MOD 30 MIN: CPT | Mod: S$PBB,,, | Performed by: PODIATRIST

## 2024-05-22 PROCEDURE — 99213 OFFICE O/P EST LOW 20 MIN: CPT | Mod: PBBFAC,27,PN | Performed by: ORTHOPAEDIC SURGERY

## 2024-05-22 PROCEDURE — 99213 OFFICE O/P EST LOW 20 MIN: CPT | Mod: S$PBB,,, | Performed by: ORTHOPAEDIC SURGERY

## 2024-05-22 PROCEDURE — 99999 PR PBB SHADOW E&M-EST. PATIENT-LVL II: CPT | Mod: PBBFAC,,,

## 2024-05-22 PROCEDURE — 99999 PR PBB SHADOW E&M-EST. PATIENT-LVL IV: CPT | Mod: PBBFAC,,, | Performed by: PODIATRIST

## 2024-05-22 NOTE — PROGRESS NOTES
Subjective:      Patient ID: Stephanie Sears is a 74 y.o. female.    Chief Complaint: Diabetes Mellitus, Nail Care, and Diabetic Foot Exam (3/28/24 Dr Rossi)    Stephanie is a 74 y.o. female who presents to the clinic for evaluation and treatment of high risk feet. Stephanie has a past medical history of Acute pancreatitis, Angina pectoris, Anxiety, Arthritis, Asthma, Back pain, Bronchitis, Cervical spondylosis with radiculopathy (07/10/2012), Chest pain, Chronic neck pain (07/26/2012), Colon polyps, COPD (chronic obstructive pulmonary disease), Coronary artery disease, Depression, Fibrocystic breast, General anesthetics causing adverse effect in therapeutic use, GERD (gastroesophageal reflux disease), Glaucoma, Heart failure, Hyperlipidemia, Hypertension, Neck pain (07/10/2012), Obesity, JAM (obstructive sleep apnea), Pneumonia, Pneumonia due to other staphylococcus, Primary osteoarthritis of both knees, Psoriasis, Subacromial or subdeltoid bursitis (07/10/2012), Thyroid disease, Tobacco dependence, Trouble in sleeping, Type 2 diabetes mellitus (12/10/2013), and Type 2 diabetes mellitus with diabetic chronic kidney disease. The patient has no new major complaints with feet, she has corns and calluses which she presents for proc B visits. Chief concern is how to care for feet as a diabetic.    PCP: Williams Rossi Jr., MD    Date Last Seen by PCP:   Chief Complaint   Patient presents with    Diabetes Mellitus    Nail Care    Diabetic Foot Exam     3/28/24 Dr Rossi       Current shoe gear:  [employer], states rx shoes are somewhat heavy    Hemoglobin A1C   Date Value Ref Range Status   03/22/2024 6.1 (H) 4.0 - 5.6 % Final     Comment:     ADA Screening Guidelines:  5.7-6.4%  Consistent with prediabetes  >or=6.5%  Consistent with diabetes    High levels of fetal hemoglobin interfere with the HbA1C  assay. Heterozygous hemoglobin variants (HbS, HgC, etc)do  not significantly interfere with this assay.   However, presence of  "multiple variants may affect accuracy.     01/24/2024 6.2 (H) 4.0 - 5.6 % Final     Comment:     ADA Screening Guidelines:  5.7-6.4%  Consistent with prediabetes  >or=6.5%  Consistent with diabetes    High levels of fetal hemoglobin interfere with the HbA1C  assay. Heterozygous hemoglobin variants (HbS, HgC, etc)do  not significantly interfere with this assay.   However, presence of multiple variants may affect accuracy.     11/21/2023 6.1 (H) 4.0 - 5.6 % Final     Comment:     ADA Screening Guidelines:  5.7-6.4%  Consistent with prediabetes  >or=6.5%  Consistent with diabetes    High levels of fetal hemoglobin interfere with the HbA1C  assay. Heterozygous hemoglobin variants (HbS, HgC, etc)do  not significantly interfere with this assay.   However, presence of multiple variants may affect accuracy.         Review of Systems   Constitutional: Negative for chills, diaphoresis, fever, malaise/fatigue and night sweats.   Cardiovascular:  Negative for claudication, cyanosis, leg swelling and syncope.   Skin:  Positive for nail changes and suspicious lesions. Negative for color change, dry skin, rash and unusual hair distribution.   Musculoskeletal:  Negative for falls, joint pain, joint swelling, muscle cramps, muscle weakness and stiffness.   Gastrointestinal:  Negative for constipation, diarrhea, nausea and vomiting.   Neurological:  Positive for sensory change. Negative for brief paralysis, disturbances in coordination, focal weakness, numbness, paresthesias and tremors.           Objective:       Vitals:    05/22/24 0842   BP: (!) 150/72   Weight: 78.4 kg (172 lb 13.5 oz)   Height: 5' 2" (1.575 m)   PainSc: 0-No pain       Physical Exam  Constitutional:       General: She is not in acute distress.     Appearance: She is well-developed. She is not diaphoretic.   Cardiovascular:      Pulses:           Popliteal pulses are 2+ on the right side and 2+ on the left side.        Dorsalis pedis pulses are 2+ on the right " side and 2+ on the left side.        Posterior tibial pulses are 2+ on the right side and 2+ on the left side.      Comments: Capillary refill 3 seconds all toes/distal feet, all toes/both feet warm to touch.      Negative lymphadenopathy bilateral popliteal fossa and tarsal tunnel.      Negavie lower extremity edema bilateral.    Musculoskeletal:      Right ankle: No swelling, deformity, ecchymosis or lacerations. Normal range of motion. Normal pulse.      Right Achilles Tendon: Normal. No defects. Patrick's test negative.      Comments: Patient has hammertoes of digits   2-4 bilateral                partially reducible without symptom today.'    Otherwise, Normal angle, base, station of gait. All ten toes without clubbing, cyanosis, or signs of ischemia.  No pain to palpation bilateral lower extremities.  Range of motion, stability, muscle strength, and muscle tone normal bilateral feet and legs.    Lymphadenopathy:      Lower Body: No right inguinal adenopathy. No left inguinal adenopathy.      Comments: Negative lymphadenopathy bilateral popliteal fossa and tarsal tunnel.    Negative lymphangitic streaking bilateral feet/ankles/legs.   Skin:     General: Skin is warm and dry.      Capillary Refill: Capillary refill takes 2 to 3 seconds.      Coloration: Skin is not pale.      Findings: No abrasion, bruising, burn, ecchymosis, erythema, laceration, lesion or rash.      Nails: There is no clubbing.      Comments: Focal hyperkeratotic lesion consisting entirely of hyperkeratotic tissue without open skin, drainage, pus, fluctuance, malodor, or signs of infection dorsal 2nd pipj bilateral.    Otehrwise, Skin is normal age and health appropriate color, turgor, texture, and temperature bilateral lower extremities without ulceration, hyperpigmentation, discoloration, masses nodules or cords palpated.  No ecchymosis, erythema, edema, or cardinal signs of infection bilateral lower extremities.    Toenails 1st, 2nd, 3rd,  4th, 5th  bilateral are hypertrophic thickened 2-3 mm, dystrophic, discolored tanish brown with tan, gray crumbly subungual debris.  Long, not tender to distal nail plate pressure, without periungual skin abnormality of each.        Neurological:      Mental Status: She is alert and oriented to person, place, and time.      Sensory: Sensory deficit present.      Motor: No tremor, atrophy or abnormal muscle tone.      Gait: Gait normal.      Deep Tendon Reflexes:      Reflex Scores:       Patellar reflexes are 2+ on the right side and 2+ on the left side.       Achilles reflexes are 2+ on the right side and 2+ on the left side.     Comments: Decreased/absent vibratory sensation bilateral feet to 128Hz tuning fork.    Otherwise, Negative tinel sign to percussion sural, superficial peroneal, deep peroneal, saphenous, and posterior tibial nerves right and left ankles and feet.    Psychiatric:         Behavior: Behavior is cooperative.               Assessment:       Encounter Diagnoses   Name Primary?    Type II diabetes mellitus with peripheral circulatory disorder Yes    Comprehensive diabetic foot examination, type 2 DM, encounter for     Corn or callus     Onychomycosis due to dermatophyte     Hammer toe, unspecified laterality     Hallux limitus, acquired, right     Hallux limitus, acquired, left          Plan:       Stephanie was seen today for diabetes mellitus, nail care and diabetic foot exam.    Diagnoses and all orders for this visit:    Type II diabetes mellitus with peripheral circulatory disorder  -     DIABETIC SHOES FOR HOME USE    Comprehensive diabetic foot examination, type 2 DM, encounter for    Corn or callus  -     DIABETIC SHOES FOR HOME USE    Onychomycosis due to dermatophyte    Hammer toe, unspecified laterality  -     DIABETIC SHOES FOR HOME USE    Hallux limitus, acquired, right  -     DIABETIC SHOES FOR HOME USE    Hallux limitus, acquired, left  -     DIABETIC SHOES FOR HOME USE      I  counseled the patient on her conditions, their implications and medical management.    Education about the diabetic foot, neuropathy, and prevention of limb loss.    Shoe inspection. Diabetic Foot Education. Patient reminded of the importance of good nutrition/healthy diet/weight management and blood sugar control to help prevent podiatric complications of diabetes. Patient instructed on proper foot hygeine. Wear comfortable, proper fitting shoes. Wash feet daily. Dry well. After drying, apply moisturizer to feet (no lotion to webspaces). Inspect feet daily for skin breaks, blisters, swelling, or redness. Wear cotton socks (preferably white)  Change socks every day. Do NOT walk barefoot. Do NOT use heating pads or hot water soaks. We discussed wearing proper shoe gear, daily foot inspections, never walking without protective shoe gear.     Rx diabetic shoes for protection and support    Discussed edema control and the importance of daily moisturizer to the feet such as Gold bonds diabetic foot cream    Recommend applying vicks vaporub to thick abnormal toenails daily x 6 months to treat fungal nail infection.    Counseled patient on the effects of smoking on healing. She verbalizes understanding that smoking and/or history of smoking has an adverse effect on circulation an can increase the chances of delayed healing and this prolonged exposure leads to infection or progression of infection.    she will continue to monitor the areas daily, inspect her feet, wear protective shoe gear when ambulatory, moisturizer to maintain skin integrity and follow in this office in approximately 12 months, sooner p.r.n. proc B          No follow-ups on file.

## 2024-05-22 NOTE — PATIENT INSTRUCTIONS
Over the counter pain creams: Voltaren Gel, Biofreeze, Bengay, tiger balm, two old goat, lidocaine gel,  Absorbine Veterinary Liniment Gel Topical Analgesic Sore Muscle and Joint Pain Relief    Recommend lotions: eucerin, eucerin for diabetics, aquaphor, A&D ointment, gold bond for diabetics, sween, Haxtun's Bees all purpose baby ointment,  urea 40 with aloe or SkinIntegra rapid crack repair (found on amazon.com)    Shoe recommendations: (try 6pm.com, zappos.com , nordstromrack.[employer], or shoes.com for discounted prices) you can visit varsity shoes in [place], DSW shoes in [place]  or rasheeda rack in the [facility] (there are also several shoe brand outlets in the [facility])    ONLY purchase stability style tennis shoes NO flex, foam, free, yoga mat style shoes    Shoe examples:    Asics (GT 4000 or gel foundations), new balance stability type shoes (such as the 940 series), saucony (stabil c3),  Alamo (GTS or Beast or   transcend), propet, HokaOne (tennis shoe) Nadeem (tennis shoes and boots)    Anelft Hern[place] (women) Cherry&Moise (men), clarks, crocs, aerosoles, naturalizers, SAS, ecco, born, meghana hernandez, rockports (dress shoes)    Vionic, burkenstocks, fitflops, propet, taos, baretraps (sandals)    HokaOne sandals, crocs, propet (house shoes)      Nail Home remedy:  Vicks Vapor rub or Emuaid to nails for easier manageability    Diabetes: Inspecting Your Feet  Diabetes increases your chances of developing foot problems. So inspect your feet every day. This helps you find small skin irritations before they become serious infections. If you have trouble seeing the bottoms of your feet, use a mirror or ask a family member or friend to help.     Pressure spots on the bottom of the foot are common areas where problems develop.   How to check your feet  Below are tips to help you look for foot problems. Try to check your feet at the same time each day, such as when you get out of bed in the morning:  Check the top of  each foot. The tops of toes, back of the heel, and outer edge of the foot can get a lot of rubbing from poor-fitting shoes.  Check the bottom of each foot. Daily wear and tear often leads to problems at pressure spots.  Check the toes and nails. Fungal infections often occur between toes. Toenail problems can also be a sign of fungal infections or lead to breaks in the skin.  Check your shoes, too. Loose objects inside a shoe can injure the foot. Use your hand to feel inside your shoes for things like néstor, loose stitching, or rough areas that could irritate your skin.  Warning signs  Look for any color changes in the foot. Redness with streaks can signal a severe infection, which needs immediate medical attention. Tell your doctor right away if you have any of these problems:  Swelling, sometimes with color changes, may be a sign of poor blood flow or infection. Symptoms include tenderness and an increase in the size of your foot.  Warm or hot areas on your feet may be signs of infection. A foot that is cold may not be getting enough blood.  Sensations such as burning, tingling, or pins and needles can be signs of a problem. Also check for areas that may be numb.  Hot spots are caused by friction or pressure. Look for hot spots in areas that get a lot of rubbing. Hot spots can turn into blisters, calluses, or sores.  Cracks and sores are caused by dry or irritated skin. They are a sign that the skin is breaking down, which can lead to infection.  Toenail problems to watch for include nails growing into the skin (ingrown toenail) and causing redness or pain. Thick, yellow, or discolored nails can signal a fungal infection.  Drainage and odor can develop from untreated sores and ulcers. Call your doctor right away if you notice white or yellow drainage, bleeding, or unpleasant odor.   © 1163-8922 [employer]. [address] [place] [address], [place] [address]. All rights reserved. This information is not  intended as a substitute for professional medical care. Always follow your healthcare professional's instructions.        Step-by-Step:  Inspecting Your Feet (Diabetes)    Date Last Reviewed: 10/1/2016  © 4431-0497 [employer]. [address] [place] [address], [place] [address]. All rights reserved. This information is not intended as a substitute for professional medical care. Always follow your healthcare professional's instructions.

## 2024-05-22 NOTE — PROGRESS NOTES
"Assessment: 74 y.o. female with left subacromial bursitis , cuff tendinitis, mass consistent in appearance with lipoma    I explained my diagnostic impression and the reasoning behind it in detail, using layman's terms.       Plan:   - Will observe shoulder, elbow  - Dr Garsia for neck  - Return to clinic PRN    All questions were answered in detail. The patient is in full agreement with the treatment plan and will proceed accordingly.    Chief Complaint   Patient presents with    Left Forearm - Pain       Initial visit (2/28/24): Stephanie Sears is a 74 y.o. female who presents today complaining of left arm pain  Mass to lateral arm   Pain behind this knot  Has tried steroid pack with relief of pain   Mass is unchanged   Mass has been present about 6 months   Trauma or new activity: no  Pain is intermittent  Aggravating factors: Was having pain with FE/motion of the L shoulder, this resolved with steroids  Relieving factors: rest  Night pain is absent  Radicular symptoms: no   Pain does not interfere with activities of daily living .    5/22/24  Did not do PT because of scheduling   Mass over arm is smaller, not painful  Shoulder pain has resolved  Hip is bothering her   Wants to do PT at [facility]     Also complains of numbness in left hand, weakness in hands   History of cervical radic    This is the extent of the patient's complaints at this time.      Review of patient's allergies indicates:   Allergen Reactions    Hydrocodone Shortness Of Breath    Iodinated contrast media Shortness Of Breath     Difficulty breathing    Adhesive Itching     Skin peeling    Latex, natural rubber Other (See Comments)     "Takes skin off"    Morphine Hallucinations    Oxycodone Hallucinations    Sulfa (sulfonamide antibiotics) Hives and Itching     Physical Exam:   Vitals:    05/22/24 1308   PainSc:   2   PainLoc: Arm     General: Patient is alert, awake and oriented to time, place and person. Mood and affect are appropriate.  " Patient does not appear to be in any distress, denies any constitutional symptoms and appears stated age.   HEENT: Pupils are equal and round, sclera are not injected. External examination of ears and nose reveals no abnormalities. Cranial nerves II-X are grossly intact  Skin: no rashes, abrasions or open wounds on the affected extremity   Resp: No respiratory distress or audible wheezing   CV: 2+  pulses, all extremities warm and well perfused   Left Shoulder    Shoulder Range of Motion    Right     Left   (Active/Passive)       Forward Elevation     165/165            165/165  External rotation (arm at side)  45/45             45/45   Internal rotation behind the back  L5             L5     Range of motion is not painful       Acromioclavicular joint is not tender  Crossbody test: negative    Neer'snegative  Hawkin's negative    Debbi's negative  Drop arm negative  Belly press negative      Cuff Strength     Right     Left   Supraspinatus        5/5    5/5  Infraspinatus     5/5    5/5  Subscapularis     5/5    5/5    Deltoid testing            5/5    5/5    Speeds negative  Yergasons negative      Elbow examination demonstrates no tenderness to palpation and has normal range of motion.     Bilateral hands:   Neg tinel's at carpal tunnel   Negative median n compression, phalens   No atrophy      Imaging:  no new     This note was created by combination of typed  and M-Modal dictation. Transcription and phonetic errors may be present.  If there are any questions, please contact me.      Current Outpatient Medications:     albuterol (PROVENTIL/VENTOLIN HFA) 90 mcg/actuation inhaler, USE 2 INHALATIONS EVERY 4 HOURS AS NEEDED FOR WHEEZING (RESCUE), Disp: 18 g, Rfl: 3    amLODIPine-benazepriL (LOTREL) 10-40 mg per capsule, TAKE 1 CAPSULE DAILY, Disp: 90 capsule, Rfl: 1    atorvastatin (LIPITOR) 40 MG tablet, TAKE 1 TABLET DAILY (DISCONTINUE REFILLS ON ATORVASTATIN 20 MG), Disp: 90 tablet, Rfl: 1     blood-glucose meter kit, Check blood glucose QD with insurance preferred glucometer, Disp: 1 each, Rfl: 0    carvediloL (COREG) 12.5 MG tablet, TAKE 1 TABLET TWICE A DAY WITH MEALS, Disp: 180 tablet, Rfl: 3    chlorthalidone (HYGROTEN) 25 MG Tab, Take 1 tablet (25 mg total) by mouth once daily., Disp: 30 tablet, Rfl: 6    gabapentin (NEURONTIN) 600 MG tablet, Take 1 tablet (600 mg total) by mouth 2 (two) times daily. (Patient taking differently: Take 600 mg by mouth every other day.), Disp: 180 tablet, Rfl: 3    hydrALAZINE (APRESOLINE) 10 MG tablet, Take 1 tablet (10 mg total) by mouth every 12 (twelve) hours., Disp: 60 tablet, Rfl: 2    INULIN (FIBER GUMMIES ORAL), Take 1 each by mouth every morning. , Disp: , Rfl:     lancets (ACCU-CHEK MULTICLIX LANCET) Misc, Test blood sugar twice daily, Disp: 300 each, Rfl: 3    methylPREDNISolone (MEDROL DOSEPACK) 4 mg tablet, use as directed, Disp: 1 each, Rfl: 0    nicotine, polacrilex, 2 mg lzmn, Take 1 each (2 mg total) by mouth as needed (Use 1 lozenge as needed in the place of a cigarette every 2-4 hours as needed.  Maximum of 5 per day.)., Disp: 150 each, Rfl: 0    promethazine-dextromethorphan (PROMETHAZINE-DM) 6.25-15 mg/5 mL Syrp, Take 5 mLs by mouth every 4 to 6 hours as needed (cough/congestion)., Disp: 240 mL, Rfl: 0    simethicone (GAS-X ORAL), Take 1 tablet by mouth as needed. , Disp: , Rfl:     Past Medical History:   Diagnosis Date    Acute pancreatitis     Angina pectoris     Anxiety     Arthritis     Asthma     as a child    Back pain     Bronchitis     Cervical spondylosis with radiculopathy 07/10/2012    Chest pain     Chronic neck pain 07/26/2012    Colon polyps     COPD (chronic obstructive pulmonary disease)     Coronary artery disease     Depression     Fibrocystic breast     General anesthetics causing adverse effect in therapeutic use     trouble coming out of anes/ had the shakes    GERD (gastroesophageal reflux disease)     Glaucoma     Heart  failure     Hyperlipidemia     Hypertension     Neck pain 07/10/2012    Obesity     JAM (obstructive sleep apnea)     Pneumonia     Pneumonia due to other staphylococcus     Primary osteoarthritis of both knees     Psoriasis     Subacromial or subdeltoid bursitis 07/10/2012    Thyroid disease     Tobacco dependence     Trouble in sleeping     Type 2 diabetes mellitus 12/10/2013    Type 2 diabetes mellitus with diabetic chronic kidney disease        Active Problem List with Overview Notes    Diagnosis Date Noted    Lumbar radiculopathy 07/11/2023    Other emphysema 03/05/2023 05-  LDCT Chest  Lungs: Once again, soft tissue nodularity is identified within the left upper lobe measuring 10 mm, stable dating back to 06/07/2019.  There is also nodularity within the right upper lobe measuring approximately 4 mm (image 127, series 4), stable dating back to 06/07/2019.  No new pulmonary nodules are identified.  There are mild emphysematous changes within the lung apices.      Neuroforaminal stenosis of cervical spine 02/14/2023    Stage 3a chronic kidney disease 06/14/2022     Lab Results   Component Value Date    EGFRNORACEVR 43 (A) 01/24/2024    EGFRNORACEVR 53 (A) 08/10/2023    EGFRNORACEVR 48 (A) 02/03/2023    EGFRNORACEVR 48 (A) 02/03/2023          Dyspnea on exertion 05/11/2022     diastolic dysfunction along with back and knee pain + copd      S/P lumbar fusion 12/07/2020    DDD (degenerative disc disease), lumbar 12/07/2020    Primary open-angle glaucoma, bilateral, mild stage 10/21/2020    Type 2 diabetes mellitus with other specified complication 01/24/2020     Complications: Nephropathy, hypertension, dyslipidemia, obesity    Lab Results   Component Value Date    HGBA1C 6.1 (H) 03/22/2024    HGBA1C 6.2 (H) 01/24/2024    HGBA1C 6.1 (H) 11/21/2023     Lab Results   Component Value Date    LDLCALC 67.2 03/22/2024     Lab Results   Component Value Date    MICALBCREAT 128.2 (H) 03/22/2024      Eye  Exam:  11-  Foot exam:  11-      History of colon polyps 05/22/2019    Lichen planus 03/27/2019    Insomnia 03/11/2019     difficulty with sleep maintenance.  Mainly driven by nocturia.  Poor sleep sleep hygiene + nocturia + psychophysiologic in etiology.     avoid tv in bed.  Stimulus control d/w patient.    Avoid fluid intake after dinner.    Denied psych referral for cbt i      Atherosclerosis of aortic arch 01/22/2019     Noted on CXR from 1/21/16.      Dyslipidemia 07/05/2018     Lab Results   Component Value Date    CHOL 122 03/22/2024    CHOL 125 08/10/2023    CHOL 141 02/07/2023     Lab Results   Component Value Date    HDL 38 (L) 03/22/2024    HDL 37 (L) 08/10/2023    HDL 41 02/07/2023     Lab Results   Component Value Date    LDLCALC 67.2 03/22/2024    LDLCALC 70.4 08/10/2023    LDLCALC 82.4 02/07/2023     Lab Results   Component Value Date    TRIG 84 03/22/2024    TRIG 88 08/10/2023    TRIG 88 02/07/2023     Lab Results   Component Value Date    CHOLHDL 31.1 03/22/2024    CHOLHDL 29.6 08/10/2023    CHOLHDL 29.1 02/07/2023        The ASCVD Risk score (Sawyer NARVAEZ, et al., 2019) failed to calculate for the following reasons:    The valid total cholesterol range is 130 to 320 mg/dL        Bilateral carotid bruits 07/05/2018    Personal history of spine surgery 02/09/2018    Fatty liver 02/09/2018    JAM on CPAP 12/06/2017     -ahi of 6, rdi of 10.8.    -currently with Dream station 2  -Doing well with cpap of 10 cm H20.  91%>4 hours.  Residual ahi of 2.2.  Patient is benefit from cpap        Chronic midline thoracic back pain 09/15/2017    Status post total knee replacement, left 1/20/2016 02/02/2016    Diastolic dysfunction 01/14/2016    Class 1 obesity due to excess calories with serious comorbidity and body mass index (BMI) of 31.0 to 31.9 in adult 01/12/2016    Nicotine dependence, cigarettes, w unsp disorders 01/12/2016       Patient is enrolled in smoking cessation.  Nicotine patch      H/O  scarlet fever 01/12/2016    Aortic valve sclerosis 01/12/2016    Pulmonary hypertension 01/12/2016     -pasp of 39.  -group 2 with diastolic dysfunction      Essential hypertension 01/07/2016    DDD (degenerative disc disease), cervical 08/21/2014    Lumbar spondylosis 04/17/2014     Dx updated per 2019 IMO Load      Osteoarthritis of left hip, mild 04/17/2014    Cervical spondylosis with radiculopathy 07/10/2012     MRI Cervical Spine 9/22/2017:  Severe bilateral neuroforaminal narrowing at C6-7.  No more than mild spinal canal stenosis.  Active degenerative change of the left facet at C3-4.  Stable anterior deviation of the posterior aspect of the spinal cord at the T2 vertebral body level, unchanged since 2013.  The edema in the interspinous ligament at T2 with extension subcutaneous fat, a finding of uncertain significance.  Thyroid nodule previously characterized on thyroid ultrasound 7/8/2013.      GERD (gastroesophageal reflux disease)      Multiple regurgitation events under anesthesia, recommending ETT for future general anesthetics      Primary osteoarthritis of both knees     Nontoxic uninodular goiter 12/13/2010       Past Surgical History:   Procedure Laterality Date    APPENDECTOMY      BREAST BIOPSY Bilateral 1988    BREAST MASS EXCISION Right     benign    CATARACT EXTRACTION W/  INTRAOCULAR LENS IMPLANT Left 12/09/2020    PHACO IOL WITH I-STENT ()    CATARACT EXTRACTION W/  INTRAOCULAR LENS IMPLANT Right 10/21/2020    PHACO IOL WITH I-STENT x 2 ()    CHOLECYSTECTOMY      COLONOSCOPY N/A 05/22/2019    Procedure: COLONOSCOPY;  Surgeon: Darrin Calvin MD;  Location: [address]);  Service: Endoscopy;  Laterality: N/A;  2nd floor - all other procedure done on 2, multiple comorbidities - ERW/   change in MD schedule, Pt notified and verbalized understanding, new arrival time 0800, Ins mailed to Pt with new arrival time - ERW1/8/19@1130    COLONOSCOPY N/A 09/26/2022     Procedure: COLONOSCOPY;  Surgeon: Darrin Calvin MD;  Location: [facility] ENDO ([address] [place] [address]);  Service: Endoscopy;  Laterality: N/A;  2nd floor d/t previous anesthesia complications  vaccinated  inst mailed  clear liquids 4 hr prior-AM prep-LW  I should be able to complete this with the slim pediatric colonoscope, but should have the single-balloon available in case needed.    EPIDURAL STEROID INJECTION Bilateral 02/26/2021    Procedure: Injection, Steroid, Ischial Bursa;  Surgeon: Yonatan Garsia Jr., MD;  Location: [facility] ENDO;  Service: Pain Management;  Laterality: Bilateral;  Bilateral Ischial Bursa Steroid Injections  Arrive @ 1230; No ATC; Check BG    ESOPHAGOGASTRODUODENOSCOPY      HYSTERECTOMY  1980's    IMPLANTATION OF DEVICE FOR GLAUCOMA Right 10/21/2020    Procedure: INSERTION, DEVICE, FOR GLAUCOMA/ i Stent;  Surgeon: Melany Tse MD;  Location: [address];  Service: Ophthalmology;  Laterality: Right;    IMPLANTATION OF DEVICE FOR GLAUCOMA Left 12/09/2020    Procedure: INSERTION, DEVICE, FOR GLAUCOMA/I SENT;  Surgeon: Melany Tse MD;  Location: [address];  Service: Ophthalmology;  Laterality: Left;    INJECTION OF JOINT Bilateral 12/23/2021    Procedure: Injection, Joint---BILATERAL ISCHIAL BURSA STEROID INJECTION;  Surgeon: Yonatan Garsia Jr., MD;  Location: [facility] PAIN MGMT;  Service: Pain Management;  Laterality: Bilateral;    INTRAOCULAR PROSTHESES INSERTION Right 10/21/2020    Procedure: INSERTION, IOL PROSTHESIS;  Surgeon: Melany Tse MD;  Location: [address];  Service: Ophthalmology;  Laterality: Right;    INTRAOCULAR PROSTHESES INSERTION Left 12/09/2020    Procedure: INSERTION, IOL PROSTHESIS;  Surgeon: Melany Tse MD;  Location: [address];  Service: Ophthalmology;  Laterality: Left;    KNEE SURGERY Left 01/20/2016    TKR    KNEE SURGERY Right 02/26/2018    TKR    L4-L5 fusion  2006    PHACOEMULSIFICATION OF CATARACT Right 10/21/2020     Procedure: PHACOEMULSIFICATION, CATARACT;  Surgeon: Melany Tse MD;  Location: [facility] OR [address] [place] [address];  Service: Ophthalmology;  Laterality: Right;    PHACOEMULSIFICATION OF CATARACT Left 12/09/2020    Procedure: PHACOEMULSIFICATION, CATARACT;  Surgeon: Melany Tse MD;  Location: [facility] OR [address] [place] [address];  Service: Ophthalmology;  Laterality: Left;    YAG CAPSULOTOMY Left 12/07/2023           Social History     Socioeconomic History    Marital status: Single   Tobacco Use    Smoking status: Every Day     Current packs/day: 0.50     Average packs/day: 0.4 packs/day for 54.4 years (22.0 ttl pk-yrs)     Types: Cigarettes     Start date: 1970    Smokeless tobacco: Never    Tobacco comments:     Patient is enrolled in smoking cessation. Comprehensive smoking hx was updated on 2/28/24 by MARQUIS Cherry RRT,MSW,LMSW,TTS   Substance and Sexual Activity    Alcohol use: No     Alcohol/week: 0.0 standard drinks of alcohol    Drug use: No    Sexual activity: Yes     Partners: Male

## 2024-05-22 NOTE — PROGRESS NOTES
Individual Follow-Up Form    5/22/2024    Quit Date: TBD    Clinical Status of Patient: Outpatient    Length of Service: 30 minutes    Continuing Medication: yes  Nicotine Lozenges    Other Medications: None      Target Symptoms: Withdrawal and medication side effects. The following were  rated moderate (3) to severe (4) on TCRS:  Moderate (3): Crave and Desire   Severe (4): None     Comments: Counselor spoke with patient for follow-up visit, name and date of birth verified as two patient identifiers.  Patient reported she remains smoking about 8-10 cpd.  Patient also reported she is still using 2 mg nicotine lozenges without any negative side effects reported at this time.  Counselor congratulated patient on her progress thus far. Counselor discussed patient focusing on working towards further behavior modification strategies and utilizing re directive techniques to aid her with with quitting.    Follow-up visit scheduled in two weeks. Counselor will remain available should any further needs arise.       Diagnosis: F17.200    Next Visit: 2 weeks

## 2024-05-22 NOTE — TELEPHONE ENCOUNTER
Counselor attempted to contact patient for scheduled telephonic visit. Counselor left a message, awaiting return call.      Erica Cherry RRT, MSW, LMSW, [facility]  Certified Professional [employer]- Tobacco Treatment  [phone]

## 2024-05-31 ENCOUNTER — EXTERNAL CHRONIC CARE MANAGEMENT (OUTPATIENT)
Dept: PRIMARY CARE CLINIC | Facility: CLINIC | Age: 74
End: 2024-05-31
Payer: MEDICARE

## 2024-05-31 PROCEDURE — 99490 CHRNC CARE MGMT STAFF 1ST 20: CPT | Mod: S$PBB,,, | Performed by: FAMILY MEDICINE

## 2024-05-31 PROCEDURE — 99490 CHRNC CARE MGMT STAFF 1ST 20: CPT | Mod: PBBFAC,PN | Performed by: FAMILY MEDICINE

## 2024-06-04 ENCOUNTER — PATIENT MESSAGE (OUTPATIENT)
Dept: ADMINISTRATIVE | Facility: HOSPITAL | Age: 74
End: 2024-06-04
Payer: MEDICARE

## 2024-06-06 ENCOUNTER — CLINICAL SUPPORT (OUTPATIENT)
Dept: SMOKING CESSATION | Facility: CLINIC | Age: 74
End: 2024-06-06
Payer: COMMERCIAL

## 2024-06-06 DIAGNOSIS — F17.200 NICOTINE DEPENDENCE: Primary | ICD-10-CM

## 2024-06-06 PROCEDURE — 99999 PR PBB SHADOW E&M-EST. PATIENT-LVL II: CPT | Mod: PBBFAC,,,

## 2024-06-06 PROCEDURE — 99403 PREV MED CNSL INDIV APPRX 45: CPT | Mod: S$GLB,,,

## 2024-06-06 NOTE — PROGRESS NOTES
Individual Follow-Up Form    6/6/2024    Quit Date: TBD    Clinical Status of Patient: Outpatient    Length of Service: 45 minutes    Continuing Medication: yes  Nicotine Lozenges    Other Medications: None     Target Symptoms: Withdrawal and medication side effects. The following were  rated moderate (3) to severe (4) on TCRS:  Moderate (3): Crave and Desire   Severe (4): None     Comments: Counselor spoke with patient for follow-up visit, name and date of birth verified as two patient identifiers.  Patient reported she remains smoking about 8-10 cpd, and she attributes her continued smoking to familial stress.  Patient also reported she is still using 2 mg nicotine lozenges without any negative side effects reported at this time, but she is not using them as often as she could use them.  Counselor congratulated patient on her progress thus far.  Counselor discussed patient utilizing alternate methods to manage stress in lieu of smoking, focusing on working towards further behavior modification strategies,and utilizing re directive techniques to aid her with with quitting.    Follow-up visit scheduled in two weeks. Counselor will remain available should any further needs arise.          Diagnosis: F17.200    Next Visit: 2 weeks

## 2024-06-07 ENCOUNTER — TELEPHONE (OUTPATIENT)
Dept: NEPHROLOGY | Facility: CLINIC | Age: 74
End: 2024-06-07
Payer: MEDICARE

## 2024-06-07 DIAGNOSIS — N18.31 STAGE 3A CHRONIC KIDNEY DISEASE: Primary | ICD-10-CM

## 2024-06-11 ENCOUNTER — LAB VISIT (OUTPATIENT)
Dept: LAB | Facility: HOSPITAL | Age: 74
End: 2024-06-11
Payer: MEDICARE

## 2024-06-11 DIAGNOSIS — N18.31 STAGE 3A CHRONIC KIDNEY DISEASE: ICD-10-CM

## 2024-06-11 LAB
ALBUMIN SERPL BCP-MCNC: 3.2 G/DL (ref 3.5–5.2)
ANION GAP SERPL CALC-SCNC: 8 MMOL/L (ref 8–16)
BASOPHILS # BLD AUTO: 0.03 K/UL (ref 0–0.2)
BASOPHILS NFR BLD: 0.5 % (ref 0–1.9)
BUN SERPL-MCNC: 21 MG/DL (ref 8–23)
CALCIUM SERPL-MCNC: 9.4 MG/DL (ref 8.7–10.5)
CHLORIDE SERPL-SCNC: 105 MMOL/L (ref 95–110)
CO2 SERPL-SCNC: 25 MMOL/L (ref 23–29)
CREAT SERPL-MCNC: 1.4 MG/DL (ref 0.5–1.4)
DIFFERENTIAL METHOD BLD: ABNORMAL
EOSINOPHIL # BLD AUTO: 0.3 K/UL (ref 0–0.5)
EOSINOPHIL NFR BLD: 4.8 % (ref 0–8)
ERYTHROCYTE [DISTWIDTH] IN BLOOD BY AUTOMATED COUNT: 15.2 % (ref 11.5–14.5)
EST. GFR  (NO RACE VARIABLE): 39 ML/MIN/1.73 M^2
GLUCOSE SERPL-MCNC: 86 MG/DL (ref 70–110)
HCT VFR BLD AUTO: 36.9 % (ref 37–48.5)
HGB BLD-MCNC: 11.8 G/DL (ref 12–16)
IMM GRANULOCYTES # BLD AUTO: 0.01 K/UL (ref 0–0.04)
IMM GRANULOCYTES NFR BLD AUTO: 0.2 % (ref 0–0.5)
LYMPHOCYTES # BLD AUTO: 2.8 K/UL (ref 1–4.8)
LYMPHOCYTES NFR BLD: 44 % (ref 18–48)
MCH RBC QN AUTO: 25.8 PG (ref 27–31)
MCHC RBC AUTO-ENTMCNC: 32 G/DL (ref 32–36)
MCV RBC AUTO: 81 FL (ref 82–98)
MONOCYTES # BLD AUTO: 0.8 K/UL (ref 0.3–1)
MONOCYTES NFR BLD: 12.1 % (ref 4–15)
NEUTROPHILS # BLD AUTO: 2.4 K/UL (ref 1.8–7.7)
NEUTROPHILS NFR BLD: 38.4 % (ref 38–73)
NRBC BLD-RTO: 0 /100 WBC
PHOSPHATE SERPL-MCNC: 3.3 MG/DL (ref 2.7–4.5)
PLATELET # BLD AUTO: 252 K/UL (ref 150–450)
PMV BLD AUTO: 11.4 FL (ref 9.2–12.9)
POTASSIUM SERPL-SCNC: 4.2 MMOL/L (ref 3.5–5.1)
PTH-INTACT SERPL-MCNC: 178.6 PG/ML (ref 9–77)
RBC # BLD AUTO: 4.58 M/UL (ref 4–5.4)
SODIUM SERPL-SCNC: 138 MMOL/L (ref 136–145)
WBC # BLD AUTO: 6.29 K/UL (ref 3.9–12.7)

## 2024-06-11 PROCEDURE — 83970 ASSAY OF PARATHORMONE: CPT | Performed by: HOSPITALIST

## 2024-06-11 PROCEDURE — 36415 COLL VENOUS BLD VENIPUNCTURE: CPT | Mod: PN | Performed by: HOSPITALIST

## 2024-06-11 PROCEDURE — 85025 COMPLETE CBC W/AUTO DIFF WBC: CPT | Performed by: HOSPITALIST

## 2024-06-11 PROCEDURE — 80069 RENAL FUNCTION PANEL: CPT | Performed by: HOSPITALIST

## 2024-06-13 ENCOUNTER — OFFICE VISIT (OUTPATIENT)
Dept: NEPHROLOGY | Facility: CLINIC | Age: 74
End: 2024-06-13
Payer: MEDICARE

## 2024-06-13 VITALS
OXYGEN SATURATION: 99 % | BODY MASS INDEX: 31 KG/M2 | HEIGHT: 62 IN | DIASTOLIC BLOOD PRESSURE: 74 MMHG | WEIGHT: 168.44 LBS | SYSTOLIC BLOOD PRESSURE: 146 MMHG | HEART RATE: 58 BPM

## 2024-06-13 DIAGNOSIS — I51.89 DIASTOLIC DYSFUNCTION: ICD-10-CM

## 2024-06-13 DIAGNOSIS — R80.1 PERSISTENT PROTEINURIA: ICD-10-CM

## 2024-06-13 DIAGNOSIS — I10 ESSENTIAL HYPERTENSION: Chronic | ICD-10-CM

## 2024-06-13 DIAGNOSIS — N18.32 STAGE 3B CHRONIC KIDNEY DISEASE: Primary | ICD-10-CM

## 2024-06-13 DIAGNOSIS — N18.31 STAGE 3A CHRONIC KIDNEY DISEASE: Primary | ICD-10-CM

## 2024-06-13 DIAGNOSIS — E66.09 CLASS 1 OBESITY DUE TO EXCESS CALORIES WITH SERIOUS COMORBIDITY AND BODY MASS INDEX (BMI) OF 31.0 TO 31.9 IN ADULT: Chronic | ICD-10-CM

## 2024-06-13 PROCEDURE — 99214 OFFICE O/P EST MOD 30 MIN: CPT | Mod: S$PBB,,, | Performed by: HOSPITALIST

## 2024-06-13 PROCEDURE — 99999 PR PBB SHADOW E&M-EST. PATIENT-LVL III: CPT | Mod: PBBFAC,,, | Performed by: HOSPITALIST

## 2024-06-13 PROCEDURE — 99213 OFFICE O/P EST LOW 20 MIN: CPT | Mod: PBBFAC | Performed by: HOSPITALIST

## 2024-06-13 PROCEDURE — G2211 COMPLEX E/M VISIT ADD ON: HCPCS | Mod: S$PBB,,, | Performed by: HOSPITALIST

## 2024-06-13 RX ORDER — CHLORTHALIDONE 25 MG/1
25 TABLET ORAL EVERY OTHER DAY
Qty: 60 TABLET | Refills: 6 | Status: SHIPPED | OUTPATIENT
Start: 2024-06-13

## 2024-06-13 NOTE — PROGRESS NOTES
REASON FOR CONSULT/CHIEF COMPLAIN: CKD stage 3 and Proteinuria     REFERRING PHYSICIAN: Williams Rossi Jr., MD      HISTORY OF PRESENT ILLNESS: 74 y.o. female who is established to me  has a past medical history of Acute pancreatitis, Angina pectoris, Anxiety, Arthritis, Asthma, Back pain, Bronchitis, Cervical spondylosis with radiculopathy (07/10/2012), Chest pain, Chronic neck pain (07/26/2012), Colon polyps, COPD (chronic obstructive pulmonary disease), Coronary artery disease, Depression, Fibrocystic breast, General anesthetics causing adverse effect in therapeutic use, GERD (gastroesophageal reflux disease), Glaucoma, Heart failure, Hyperlipidemia, Hypertension, Neck pain (07/10/2012), Obesity, JAM (obstructive sleep apnea), Pneumonia, Pneumonia due to other staphylococcus, Primary osteoarthritis of both knees, Psoriasis, Subacromial or subdeltoid bursitis (07/10/2012), Thyroid disease, Tobacco dependence, Trouble in sleeping, Type 2 diabetes mellitus (12/10/2013), and Type 2 diabetes mellitus with diabetic chronic kidney disease. patient was referred here for abnormal renal function with CKD stage 3.     71 year old woman with medical history of hypertension presenting for follow up of proteinuria.  Patient reports blood pressure usually 130-140's systolic.  She reports adequate fluid intake, takes meloxicam only occasionally for joint pains.  She otherwise denies any fever, chest pain, shortness of breath, abdominal pain, diarrhea, dysuria/hematuria. Continues to have proteinuria      Interval H/o 6/13/24   Was prescribed SGLT2i, however had issues with insurance  and could nt afford it. Could nt tolerate Spironolactone as it made her sick. Blood pressures not routinely checked at home. Current blood pressure at clinic at 140/90.   Was taking chlorthalidone every other day  [place] heat is making her sick and exhausted and not drinking enough water    ROS:  General: negative for chills, or  fatigue  ENT: No epistaxis or headaches  Hematological and Lymphatic: No bleeding problems or blood clots.  Endocrine: No skin changes or temperature intolerance  Respiratory: No cough, shortness of breath, or wheezing  Cardiovascular: No chest pain or dyspnea   Gastrointestinal: No abdominal pain, change in bowel habits  Genito-Urinary: No dysuria, trouble voiding, or hematuria  Musculoskeletal: ROS: negative for - joint pain, joint stiffness, joint swelling, muscle pain or muscular weakness  Neurological: No new focal weakness, no numbness  Dermatological: No rash or ulcers.    PAST MEDICAL HISTORY:  Past Medical History:   Diagnosis Date    Acute pancreatitis     Angina pectoris     Anxiety     Arthritis     Asthma     as a child    Back pain     Bronchitis     Cervical spondylosis with radiculopathy 07/10/2012    Chest pain     Chronic neck pain 07/26/2012    Colon polyps     COPD (chronic obstructive pulmonary disease)     Coronary artery disease     Depression     Fibrocystic breast     General anesthetics causing adverse effect in therapeutic use     trouble coming out of anes/ had the shakes    GERD (gastroesophageal reflux disease)     Glaucoma     Heart failure     Hyperlipidemia     Hypertension     Neck pain 07/10/2012    Obesity     JAM (obstructive sleep apnea)     Pneumonia     Pneumonia due to other staphylococcus     Primary osteoarthritis of both knees     Psoriasis     Subacromial or subdeltoid bursitis 07/10/2012    Thyroid disease     Tobacco dependence     Trouble in sleeping     Type 2 diabetes mellitus 12/10/2013    Type 2 diabetes mellitus with diabetic chronic kidney disease        PAST SURGICAL HISTORY:  Past Surgical History:   Procedure Laterality Date    APPENDECTOMY      BREAST BIOPSY Bilateral 1988    BREAST MASS EXCISION Right     benign    CATARACT EXTRACTION W/  INTRAOCULAR LENS IMPLANT Left 12/09/2020    PHACO IOL WITH I-STENT ()    CATARACT EXTRACTION W/  INTRAOCULAR  LENS IMPLANT Right 10/21/2020    PHACO IOL WITH I-STENT x 2 ()    CHOLECYSTECTOMY      COLONOSCOPY N/A 05/22/2019    Procedure: COLONOSCOPY;  Surgeon: Darrin Calvin MD;  Location: [facility] ([address] [place] [address]);  Service: Endoscopy;  Laterality: N/A;  2nd floor - all other procedure done on 2, multiple comorbidities - ERW/   change in MD schedule, Pt notified and verbalized understanding, new arrival time 0800, Ins mailed to Pt with new arrival time - ERW1/8/19@1130    COLONOSCOPY N/A 09/26/2022    Procedure: COLONOSCOPY;  Surgeon: Darrin Calvin MD;  Location: [facility] ENDO ([address] [place] [address]);  Service: Endoscopy;  Laterality: N/A;  2nd floor d/t previous anesthesia complications  vaccinated  inst mailed  clear liquids 4 hr prior-AM prep-LW  I should be able to complete this with the slim pediatric colonoscope, but should have the single-balloon available in case needed.    EPIDURAL STEROID INJECTION Bilateral 02/26/2021    Procedure: Injection, Steroid, Ischial Bursa;  Surgeon: Yonatan Garsia Jr., MD;  Location: [facility] ENDO;  Service: Pain Management;  Laterality: Bilateral;  Bilateral Ischial Bursa Steroid Injections  Arrive @ 1230; No ATC; Check BG    ESOPHAGOGASTRODUODENOSCOPY      HYSTERECTOMY  1980's    IMPLANTATION OF DEVICE FOR GLAUCOMA Right 10/21/2020    Procedure: INSERTION, DEVICE, FOR GLAUCOMA/ i Stent;  Surgeon: Melany Tse MD;  Location: [facility] OR [address] [place] [address];  Service: Ophthalmology;  Laterality: Right;    IMPLANTATION OF DEVICE FOR GLAUCOMA Left 12/09/2020    Procedure: INSERTION, DEVICE, FOR GLAUCOMA/I SENT;  Surgeon: Melany Tse MD;  Location: [facility] OR [address] [place] [address];  Service: Ophthalmology;  Laterality: Left;    INJECTION OF JOINT Bilateral 12/23/2021    Procedure: Injection, Joint---BILATERAL ISCHIAL BURSA STEROID INJECTION;  Surgeon: Yonatan Garsia Jr., MD;  Location: [facility] PAIN MGMT;  Service: Pain Management;  Laterality: Bilateral;    INTRAOCULAR PROSTHESES INSERTION Right 10/21/2020     Procedure: INSERTION, IOL PROSTHESIS;  Surgeon: Melany Tse MD;  Location: [facility] OR [address] [place] [address];  Service: Ophthalmology;  Laterality: Right;    INTRAOCULAR PROSTHESES INSERTION Left 12/09/2020    Procedure: INSERTION, IOL PROSTHESIS;  Surgeon: Melany Tse MD;  Location: [facility] OR [address] [place] [address];  Service: Ophthalmology;  Laterality: Left;    KNEE SURGERY Left 01/20/2016    TKR    KNEE SURGERY Right 02/26/2018    TKR    L4-L5 fusion  2006    PHACOEMULSIFICATION OF CATARACT Right 10/21/2020    Procedure: PHACOEMULSIFICATION, CATARACT;  Surgeon: Melany Tse MD;  Location: [facility] OR [address] [place] [address];  Service: Ophthalmology;  Laterality: Right;    PHACOEMULSIFICATION OF CATARACT Left 12/09/2020    Procedure: PHACOEMULSIFICATION, CATARACT;  Surgeon: Melany Tse MD;  Location: [facility] OR [address] [place] [address];  Service: Ophthalmology;  Laterality: Left;    YAG CAPSULOTOMY Left 12/07/2023           FAMILY HISTORY:   Family History   Problem Relation Name Age of Onset    Diabetes Mother      Hypertension Mother          CHF, angina    Angina Mother      Kidney disease Mother      Heart disease Mother          CHF    Hypertension Father          glaucoma, Alzhiemer's , supra pubic    Alzheimer's disease Father      Glaucoma Father      Glaucoma Sister Paige     Hypertension Sister Paige     Hyperlipidemia Sister Paige     Sleep apnea Sister Whiting     Hypertension Sister Paula     Thyroid disease Sister Paula     No Known Problems Sister Tanna     Breast cancer Sister Marleny 54    Cancer Paternal Aunt          colon ca    Kidney disease Brother Vinod         kidney transplant, HTN,DM    Diabetes Brother Vinod     Hepatitis Brother Rajwinder     Gout Son Vic     Hypertension Son Vic     Diabetes Son Vic     Amblyopia Neg Hx      Blindness Neg Hx      Melanoma Neg Hx      Macular degeneration Neg Hx      Retinal detachment Neg Hx      Strabismus Neg Hx         SOCIAL HISTORY:  Social History     Socioeconomic  "History    Marital status: Single   Tobacco Use    Smoking status: Every Day     Current packs/day: 0.50     Average packs/day: 0.4 packs/day for 54.4 years (22.0 ttl pk-yrs)     Types: Cigarettes     Start date: 1970    Smokeless tobacco: Never    Tobacco comments:     Patient is enrolled in smoking cessation. Comprehensive smoking hx was updated on 2/28/24 by MARQUIS Cherry RRT,MSW,LMSW,TTS   Substance and Sexual Activity    Alcohol use: No     Alcohol/week: 0.0 standard drinks of alcohol    Drug use: No    Sexual activity: Yes     Partners: Male       ALLERGIES:  Review of patient's allergies indicates:   Allergen Reactions    Hydrocodone Shortness Of Breath    Iodinated contrast media Shortness Of Breath     Difficulty breathing    Adhesive Itching     Skin peeling    Latex, natural rubber Other (See Comments)     "Takes skin off"    Morphine Hallucinations    Oxycodone Hallucinations    Sulfa (sulfonamide antibiotics) Hives and Itching       MEDICATIONS:    Current Outpatient Medications:     albuterol (PROVENTIL/VENTOLIN HFA) 90 mcg/actuation inhaler, USE 2 INHALATIONS EVERY 4 HOURS AS NEEDED FOR WHEEZING (RESCUE), Disp: 18 g, Rfl: 3    amLODIPine-benazepriL (LOTREL) 10-40 mg per capsule, TAKE 1 CAPSULE DAILY, Disp: 90 capsule, Rfl: 1    atorvastatin (LIPITOR) 40 MG tablet, TAKE 1 TABLET DAILY (DISCONTINUE REFILLS ON ATORVASTATIN 20 MG), Disp: 90 tablet, Rfl: 1    carvediloL (COREG) 12.5 MG tablet, TAKE 1 TABLET TWICE A DAY WITH MEALS, Disp: 180 tablet, Rfl: 3    gabapentin (NEURONTIN) 600 MG tablet, Take 1 tablet (600 mg total) by mouth 2 (two) times daily. (Patient taking differently: Take 600 mg by mouth every other day.), Disp: 180 tablet, Rfl: 3    INULIN (FIBER GUMMIES ORAL), Take 1 each by mouth every morning. , Disp: , Rfl:     lancets (ACCU-CHEK MULTICLIX LANCET) Misc, Test blood sugar twice daily, Disp: 300 each, Rfl: 3    nicotine, polacrilex, 2 mg lzmn, Take 1 each (2 mg total) by mouth as needed (Use 1 " lozenge as needed in the place of a cigarette every 2-4 hours as needed.  Maximum of 5 per day.)., Disp: 150 each, Rfl: 0    simethicone (GAS-X ORAL), Take 1 tablet by mouth as needed. , Disp: , Rfl:     blood-glucose meter kit, Check blood glucose QD with insurance preferred glucometer, Disp: 1 each, Rfl: 0    chlorthalidone (HYGROTEN) 25 MG Tab, Take 1 tablet (25 mg total) by mouth every other day., Disp: 60 tablet, Rfl: 6   Medication List with Changes/Refills   Current Medications    ALBUTEROL (PROVENTIL/VENTOLIN HFA) 90 MCG/ACTUATION INHALER    USE 2 INHALATIONS EVERY 4 HOURS AS NEEDED FOR WHEEZING (RESCUE)    AMLODIPINE-BENAZEPRIL (LOTREL) 10-40 MG PER CAPSULE    TAKE 1 CAPSULE DAILY    ATORVASTATIN (LIPITOR) 40 MG TABLET    TAKE 1 TABLET DAILY (DISCONTINUE REFILLS ON ATORVASTATIN 20 MG)    BLOOD-GLUCOSE METER KIT    Check blood glucose QD with insurance preferred glucometer    CARVEDILOL (COREG) 12.5 MG TABLET    TAKE 1 TABLET TWICE A DAY WITH MEALS    GABAPENTIN (NEURONTIN) 600 MG TABLET    Take 1 tablet (600 mg total) by mouth 2 (two) times daily.    INULIN (FIBER GUMMIES ORAL)    Take 1 each by mouth every morning.     LANCETS (ACCU-CHEK MULTICLIX LANCET) MISC    Test blood sugar twice daily    NICOTINE, POLACRILEX, 2 MG LZMN    Take 1 each (2 mg total) by mouth as needed (Use 1 lozenge as needed in the place of a cigarette every 2-4 hours as needed.  Maximum of 5 per day.).    SIMETHICONE (GAS-X ORAL)    Take 1 tablet by mouth as needed.    Changed and/or Refilled Medications    Modified Medication Previous Medication    CHLORTHALIDONE (HYGROTEN) 25 MG TAB chlorthalidone (HYGROTEN) 25 MG Tab       Take 1 tablet (25 mg total) by mouth every other day.    Take 1 tablet (25 mg total) by mouth once daily.   Discontinued Medications    HYDRALAZINE (APRESOLINE) 10 MG TABLET    Take 1 tablet (10 mg total) by mouth every 12 (twelve) hours.    METHYLPREDNISOLONE (MEDROL DOSEPACK) 4 MG TABLET    use as directed     "PROMETHAZINE-DEXTROMETHORPHAN (PROMETHAZINE-DM) 6.25-15 MG/5 ML SYRP    Take 5 mLs by mouth every 4 to 6 hours as needed (cough/congestion).        PHYSICAL EXAM:  BP (!) 146/74   Pulse (!) 58   Ht 5' 2" (1.575 m)   Wt 76.4 kg (168 lb 6.9 oz)   SpO2 99%   BMI 30.81 kg/m²     General: No distress, No fever or chills  Head: Normocephalic,atraumatic  Eyes: conjunctivae/corneas clear. PERRL, EOM's intact.  Nose: Nares normal. Mucosa normal. No drainage or sinus tenderness.  Neck: No adenopathy,no carotid bruit,no JVD  Lungs:Clear to auscultation bilaterally, No Crackles  Heart: Regular rate and rhythm, no murmur, gallops or rubs  Abdomen: Soft, no tenderness, bowel sounds normal  Extremities: Atraumatic, no edema in LE  Skin: Turgor normal. No rashes or ulcers  Neurologic: No focal weakness, oriented.          LABS:  Lab Results   Component Value Date    HGB 11.8 (L) 06/11/2024        Lab Results   Component Value Date    CREATININE 1.4 06/11/2024       Prot/Creat Ratio, Urine   Date Value Ref Range Status   06/11/2024 0.37 (H) 0.00 - 0.20 Final   02/03/2023 1.37 (H) 0.00 - 0.20 Final   09/14/2022 2.15 (H) 0.00 - 0.20 Final       Lab Results   Component Value Date     06/11/2024    K 4.2 06/11/2024    CO2 25 06/11/2024       last PTH   Lab Results   Component Value Date    .6 (H) 06/11/2024    CALCIUM 9.4 06/11/2024    PHOS 3.3 06/11/2024       Lab Results   Component Value Date    HGBA1C 6.1 (H) 03/22/2024       Lab Results   Component Value Date    LDLCALC 67.2 03/22/2024         Creatinine, Urine   Date Value Ref Range Status   06/11/2024 75.8 15.0 - 325.0 mg/dL Final   03/22/2024 163.0 15.0 - 325.0 mg/dL Final   02/03/2023 65.7 15.0 - 325.0 mg/dL Final       Protein, Urine   Date Value Ref Range Status   07/17/2007 42 (H) 0 - 10 mg/dl Final     Protein, Urine Random   Date Value Ref Range Status   06/11/2024 28 mg/dL Final   02/03/2023 90 mg/dL Final   09/14/2022 232 mg/dL Final       Prot/Creat " Ratio, Urine   Date Value Ref Range Status   06/11/2024 0.37 (H) 0.00 - 0.20 Final   02/03/2023 1.37 (H) 0.00 - 0.20 Final   09/14/2022 2.15 (H) 0.00 - 0.20 Final         No images are attached to the encounter.       Problem List   Chronic kidney disease stage 2   Long standing DM    Essential hypertension   Proteinuria   Hypoalbuminemia        ASSESSMENT and PLAN    CKD stage 3b , eGFR 39 ml/min secondary to long standing Hypertension   Baseline creatinine is around 0.8 to 1.1  and current creatinine at 1.4 mg/dl  Most likely etiology for CKD is  DM, HTN,    UPCR trending down from 3.8 grams to 2.1  to 1.3 and now at 0.3 grams this visit   On ACE/ ARB for proteinuria and hypertension   Discussed options of SGLT2i, however her insurance was nt covering.    Patient couldn't tolerate Spironolactone.    On Chlorthalidone 25 mg every other day to help with blood pressures and proteinuria    Discussed adequate hydration especially with the summer and high risk for volume depletion    Long standing Diabetes Mellitus with CKD    Continue current medications      Essential Hypertension   - Added SGLT2i, However her insurance was nt able to cover.   Patient couldn't tolerate Spironolactone.   Continue Chlorthalidone 25 mg every other day to help with blood pressures and proteinuria.   Continue coreg and amlodipine/ valsartan      Nephrotic range proteinuria    Has proteinuria of 3 grams , improved to 0.3 grams    Has been long standing since 2015, progressively worsened to 3 grams    Serological work up including KAITY, sFLC, SPEP, PLA2R, Hepatitis negative    Kidney Ultrasound reviewed and with out hydronephrosis or obstruction   Explained to patient that might need kidney biopsy with worsening proteinuria      Risk of progression to ESRD. ]Pt gives family h/o ESRD among her mother and her brother secondary to long standing DM. Her risk of progression is moderate .    34 minutes of total time spent on the encounter, which  includes face to face time and non-face to face time preparing to see the patient (eg, review of tests), Obtaining and/or reviewing separately obtained history, documenting clinical information in the electronic or other health record, independently interpreting results (not separately reported) and communicating results to the patient/family/caregiver, or Care coordination (not separately reported).     Visit today included increased complexity associated with the care of the episodic problem addressed and managing the longitudinal care of the patient due to the serious and/or complex managed problem(s) Chronic kidney disease stage 3b, Hypertension         RTC in 4 Months   Diagnosis and plan of care explained to the patient.  Pt Verbalized understanding. Answered all questions.  Thanks for allowing me to participate in the care of this patient.       Ninfa Beck MD  Nephrology  [facility].

## 2024-06-17 ENCOUNTER — APPOINTMENT (OUTPATIENT)
Dept: RADIOLOGY | Facility: HOSPITAL | Age: 74
End: 2024-06-17
Attending: PAIN MEDICINE
Payer: MEDICARE

## 2024-06-17 ENCOUNTER — OFFICE VISIT (OUTPATIENT)
Dept: PAIN MEDICINE | Facility: CLINIC | Age: 74
End: 2024-06-17
Payer: MEDICARE

## 2024-06-17 VITALS
OXYGEN SATURATION: 97 % | SYSTOLIC BLOOD PRESSURE: 152 MMHG | RESPIRATION RATE: 18 BRPM | DIASTOLIC BLOOD PRESSURE: 71 MMHG | HEART RATE: 82 BPM

## 2024-06-17 DIAGNOSIS — M47.816 LUMBAR SPONDYLOSIS: ICD-10-CM

## 2024-06-17 DIAGNOSIS — M50.30 DDD (DEGENERATIVE DISC DISEASE), CERVICAL: ICD-10-CM

## 2024-06-17 DIAGNOSIS — Z98.1 S/P LUMBAR FUSION: ICD-10-CM

## 2024-06-17 DIAGNOSIS — Z98.1 S/P LUMBAR FUSION: Primary | ICD-10-CM

## 2024-06-17 PROCEDURE — 99214 OFFICE O/P EST MOD 30 MIN: CPT | Mod: S$PBB,,, | Performed by: PAIN MEDICINE

## 2024-06-17 PROCEDURE — 99214 OFFICE O/P EST MOD 30 MIN: CPT | Mod: PBBFAC,PN | Performed by: PAIN MEDICINE

## 2024-06-17 PROCEDURE — 72110 X-RAY EXAM L-2 SPINE 4/>VWS: CPT | Mod: 26,,, | Performed by: STUDENT IN AN ORGANIZED HEALTH CARE EDUCATION/TRAINING PROGRAM

## 2024-06-17 PROCEDURE — 99999 PR PBB SHADOW E&M-EST. PATIENT-LVL IV: CPT | Mod: PBBFAC,,, | Performed by: PAIN MEDICINE

## 2024-06-17 PROCEDURE — 72110 X-RAY EXAM L-2 SPINE 4/>VWS: CPT | Mod: TC,FY,PN

## 2024-06-17 NOTE — PROGRESS NOTES
Subjective:     Patient ID: Stephanie Sears is a 74 y.o. female    Chief Complaint: Low-back Pain (Midline achy pain)      Referred by: No ref. provider found      HPI:    Interval History (6/17/24):  She returns today for follow up.  She reports that she continues to have midline lumbosacral back pain.  This pain is overall unchanged in quality and location to previous episodes.  States that she would like to attend additional physical therapy as this has been helpful in the past.  She continues to perform home exercises though she has difficulty performing most of them because she can not get up off of the floor after doing the exercises.  The pain will radiate to the right buttock and posterior thigh intermittently.  She denies any new numbness, tingling, weakness, bowel bladder dysfunction.      Interval History PA (09/21/2023):  Patient returns to clinic for follow up and MRI review.  Patient reports recent repeat bilateral GTB steroid injections have been beneficial for bilateral lateral hip pain.  Reports approximately 90% relief over her lateral hip, no continued pain over these locations.  She does report some continued midline lower back pain however since previous visit she is no longer been having pain radiates into her bilateral posterior thighs.  Notes doing overall with physical therapy and a regular home exercise program.  Pain has been more intermittent and more manageable.  She does note some continued pain with prolonged sitting but overall tolerable.  Patient would like to hold off on interventional procedures at this time.  Denies any numbness, tingling, weakness, bowel or bladder dysfunction.    Interval History PA (09/06/2023):  Patient returns to clinic for follow up.  Patient notes that she has been attending physical therapy with overall improvement in her health and pain levels.  However continues to report significant pain located in her midline lower lumbar region radiating down into  her bilateral posterior thighs, stopping at the knees.  Denies any changes in the quality or location of her pain.  Overall states physical therapy has been beneficial, although pain continues to be significant.  She also notes return and worsening of bilateral lateral hip pain.  States pain is in similar quality and location as with previous trochanteric bursitis.  Patient is interested in repeat injections.  Notes previous GTB injections have been significantly beneficial, typically alleviate the pain for 8+ months.  Denies any weakness, bowel or bladder dysfunction.    Interval History (7/11/23):  She returns today for follow up.  Juliana reports that her left sided low back and lower extremity pain has returned.  Denies any changes in the quality or location of his pain.  Denies any new or worsening symptoms.  States that she was in physical therapy earlier this year and was finding this very helpful.  She would like to see if additional physical therapy can be done.      Interval History (5/18/22):  She returns today for follow up.  She reports that physical therapy has been helpful for the low back pain.  She states the low back is currently doing well and she is happy with his progress.  Today, she mainly complains of left lateral hip pain consistent with trochanteric bursitis.  Has had injections for this in the past that have helped.  She would like repeat injection today.      Interval History (3/23/22):  She returns today for follow up.  She reports that she has completed physical therapy for her neck pain.  She states this issue was doing well.  Today she wishes discuss her low back pain.  This pain is located the midline lower lumbar/lumbosacral region.  The pain will radiate to the bilateral lumbar paraspinal regions and bilateral posterior thighs.  The pain does not cross the knees.  The pain is worse with standing/walking.  She denies any associated numbness, tingling, weakness, bowel bladder  dysfunction.        Interval History (1/6/22):  She returns today for follow up.  She reports that bilateral ischial bursa steroid injections has been helpful for the bilateral buttock/hip pain.  She reports 60% relief following this procedure.  Today she wishes to discuss her neck pain.  She has had neck pain intermittently for years.  The pain is located the midline mid to lower cervical region.  The pain does not radiate.  She denies any associated numbness, tingling, weakness, bowel bladder dysfunction.  She would like to attend additional physical therapy for the management of her neck pain.      Interval History (12/14/21):  She returns today for follow up.  She reports that ischial bursa pain has returned.  She reports near complete relief following bilateral ischial bursa steroid injection done in February 2021. She got at least 9 months of good relief from this procedure.  She states the pain has since returned.  She denies any changes in the quality location the pain.  She denies any new or worsening symptoms.        Interval History NP (3/16/21):    Pt returns today for follow up. She states that the bilateral ischial bursa injections have relieved nearly all of the bilateral buttock pain.  She is very happy with the results at this time.     Interval History (2/19/21):  She returns today for follow up.  She reports that she continues to have bilateral buttock pain particular when sitting.  She denies any changes in the quality location was pain.  She denies any new worsening symptoms.  She has been attending physical therapy and finds that it has been helpful.  She states that her pain is still quite severe especially given recent cold weather.        Interval History (12/21/20):  She returns today for follow up and imaging review.  She reports that she continues to have pain mainly surrounding the bilateral hip girdles.  She states that she has focal pain in the bilateral gluteal regions when sitting  upright.  She also has pain in the bilateral lateral hip regions.  She has undergone multiple greater trochanteric bursa steroid injections in the past.  These were initially effective, but gradually provided less relief with subsequent injections.  She continues to deny any significant low back pain.        Initial Encounter (12/7/20):  Stephanie Sears is a 74 y.o. female who presents today with chronic bilateral low back pain.  This pain has been present for years.  Patient is status post L4-5 fusion in 2006.  She states that she did have some pain radiating into her lower extremities in the past, but her current pain is isolated to the bilateral lower lumbar/lumbosacral regions.  She denies any associated numbness, tingling, weakness, bowel bladder dysfunction.  The pain is constant worse with prolonged sitting and standing..   This pain is described in detail below.    Physical Therapy:  Yes.    Non-pharmacologic Treatment:  Rest helps         TENS?  No    Pain Medications:         Currently taking:  gabapentin    Has tried in the past:  Tramadol, NSAIDs, Tylenol, meloxicam    Has not tried:  TCAs, SNRIs, topical creams    Blood thinners:  None    Interventional Therapies:    2/26/21 - bilateral ischial bursa injections - 90% relief for at least 9 months  12/23/21 - bilateral ischial bursa steroid injections - 60% relief  09/06/2023 - bilateral GTB steroid injections - 90% relief    Relevant Surgeries:   L4-5 fusion in 2006    Affecting sleep?  Yes    Affecting daily activities? yes    Depressive symptoms? no          SI/HI? No    Work status: Retired    Pain Scores:    Best:       8/10  Worst:     9/10  Usually:   9/10  Today:    9/10    Pain Disability Index  Family/Home Responsibilities:: 7  Recreation:: 6  Social Activity:: 7  Occupation:: 8  Self Care:: 7  Life-Support Activities:: 6(     Review of Systems   Constitutional:  Negative for activity change, appetite change, chills, fatigue, fever and  unexpected weight change.   HENT:  Negative for hearing loss.    Eyes:  Negative for visual disturbance.   Respiratory:  Negative for chest tightness and shortness of breath.    Cardiovascular:  Negative for chest pain.   Gastrointestinal:  Negative for abdominal pain, constipation, diarrhea, nausea and vomiting.   Genitourinary:  Negative for difficulty urinating.   Musculoskeletal:  Positive for arthralgias, back pain, gait problem, myalgias, neck pain and neck stiffness.   Skin:  Negative for rash.   Neurological:  Negative for dizziness, weakness, light-headedness, numbness and headaches.   Psychiatric/Behavioral:  Positive for sleep disturbance. Negative for hallucinations and suicidal ideas. The patient is not nervous/anxious.        Past Medical History:   Diagnosis Date    Acute pancreatitis     Angina pectoris     Anxiety     Arthritis     Asthma     as a child    Back pain     Bronchitis     Cervical spondylosis with radiculopathy 07/10/2012    Chest pain     Chronic neck pain 07/26/2012    Colon polyps     COPD (chronic obstructive pulmonary disease)     Coronary artery disease     Depression     Fibrocystic breast     General anesthetics causing adverse effect in therapeutic use     trouble coming out of anes/ had the shakes    GERD (gastroesophageal reflux disease)     Glaucoma     Heart failure     Hyperlipidemia     Hypertension     Neck pain 07/10/2012    Obesity     JAM (obstructive sleep apnea)     Pneumonia     Pneumonia due to other staphylococcus     Primary osteoarthritis of both knees     Psoriasis     Subacromial or subdeltoid bursitis 07/10/2012    Thyroid disease     Tobacco dependence     Trouble in sleeping     Type 2 diabetes mellitus 12/10/2013    Type 2 diabetes mellitus with diabetic chronic kidney disease        Past Surgical History:   Procedure Laterality Date    APPENDECTOMY      BREAST BIOPSY Bilateral 1988    BREAST MASS EXCISION Right     benign    CATARACT EXTRACTION W/   INTRAOCULAR LENS IMPLANT Left 12/09/2020    PHACO IOL WITH I-STENT ()    CATARACT EXTRACTION W/  INTRAOCULAR LENS IMPLANT Right 10/21/2020    PHACO IOL WITH I-STENT x 2 ()    CHOLECYSTECTOMY      COLONOSCOPY N/A 05/22/2019    Procedure: COLONOSCOPY;  Surgeon: Darrin Calvin MD;  Location: [facility] ([address] [place] [address]);  Service: Endoscopy;  Laterality: N/A;  2nd floor - all other procedure done on 2, multiple comorbidities - ERW/   change in MD schedule, Pt notified and verbalized understanding, new arrival time 0800, Ins mailed to Pt with new arrival time - ERW1/8/19@1130    COLONOSCOPY N/A 09/26/2022    Procedure: COLONOSCOPY;  Surgeon: Darrin Calvin MD;  Location: [facility] ([address] [place] [address]);  Service: Endoscopy;  Laterality: N/A;  2nd floor d/t previous anesthesia complications  vaccinated  inst mailed  clear liquids 4 hr prior-AM prep-LW  I should be able to complete this with the slim pediatric colonoscope, but should have the single-balloon available in case needed.    EPIDURAL STEROID INJECTION Bilateral 02/26/2021    Procedure: Injection, Steroid, Ischial Bursa;  Surgeon: Yonatan Garsia Jr., MD;  Location: [facility];  Service: Pain Management;  Laterality: Bilateral;  Bilateral Ischial Bursa Steroid Injections  Arrive @ 1230; No ATC; Check BG    ESOPHAGOGASTRODUODENOSCOPY      HYSTERECTOMY  1980's    IMPLANTATION OF DEVICE FOR GLAUCOMA Right 10/21/2020    Procedure: INSERTION, DEVICE, FOR GLAUCOMA/ i Stent;  Surgeon: Melany Tse MD;  Location: [address];  Service: Ophthalmology;  Laterality: Right;    IMPLANTATION OF DEVICE FOR GLAUCOMA Left 12/09/2020    Procedure: INSERTION, DEVICE, FOR GLAUCOMA/I SENT;  Surgeon: Melany Tse MD;  Location: [facility] OR [address] [place] [address];  Service: Ophthalmology;  Laterality: Left;    INJECTION OF JOINT Bilateral 12/23/2021    Procedure: Injection, Joint---BILATERAL ISCHIAL BURSA STEROID INJECTION;  Surgeon: Yonatan Garsia Jr., MD;  Location:  "[facility];  Service: Pain Management;  Laterality: Bilateral;    INTRAOCULAR PROSTHESES INSERTION Right 10/21/2020    Procedure: INSERTION, IOL PROSTHESIS;  Surgeon: Melany Tse MD;  Location: [facility] OR [address] [place] [address];  Service: Ophthalmology;  Laterality: Right;    INTRAOCULAR PROSTHESES INSERTION Left 12/09/2020    Procedure: INSERTION, IOL PROSTHESIS;  Surgeon: Melany Tse MD;  Location: [facility] OR [address] [place] [address];  Service: Ophthalmology;  Laterality: Left;    KNEE SURGERY Left 01/20/2016    TKR    KNEE SURGERY Right 02/26/2018    TKR    L4-L5 fusion  2006    PHACOEMULSIFICATION OF CATARACT Right 10/21/2020    Procedure: PHACOEMULSIFICATION, CATARACT;  Surgeon: Melany Tse MD;  Location: [facility] OR [address] [place] [address];  Service: Ophthalmology;  Laterality: Right;    PHACOEMULSIFICATION OF CATARACT Left 12/09/2020    Procedure: PHACOEMULSIFICATION, CATARACT;  Surgeon: Melany Tse MD;  Location: [facility] OR [address] [place] [address];  Service: Ophthalmology;  Laterality: Left;    YAG CAPSULOTOMY Left 12/07/2023           Social History     Socioeconomic History    Marital status: Single   Tobacco Use    Smoking status: Every Day     Current packs/day: 0.50     Average packs/day: 0.4 packs/day for 54.5 years (22.0 ttl pk-yrs)     Types: Cigarettes     Start date: 1970    Smokeless tobacco: Never    Tobacco comments:     Patient is enrolled in smoking cessation. Comprehensive smoking hx was updated on 2/28/24 by MARQUIS Cherry RRT,MSW,LMSW,TTS   Substance and Sexual Activity    Alcohol use: No     Alcohol/week: 0.0 standard drinks of alcohol    Drug use: No    Sexual activity: Yes     Partners: Male       Review of patient's allergies indicates:   Allergen Reactions    Hydrocodone Shortness Of Breath    Iodinated contrast media Shortness Of Breath     Difficulty breathing    Adhesive Itching     Skin peeling    Latex, natural rubber Other (See Comments)     "Takes skin off"    Morphine Hallucinations    Oxycodone " Hallucinations    Sulfa (sulfonamide antibiotics) Hives and Itching       Current Outpatient Medications on File Prior to Visit   Medication Sig Dispense Refill    albuterol (PROVENTIL/VENTOLIN HFA) 90 mcg/actuation inhaler USE 2 INHALATIONS EVERY 4 HOURS AS NEEDED FOR WHEEZING (RESCUE) 18 g 3    amLODIPine-benazepriL (LOTREL) 10-40 mg per capsule TAKE 1 CAPSULE DAILY 90 capsule 1    atorvastatin (LIPITOR) 40 MG tablet TAKE 1 TABLET DAILY (DISCONTINUE REFILLS ON ATORVASTATIN 20 MG) 90 tablet 1    carvediloL (COREG) 12.5 MG tablet TAKE 1 TABLET TWICE A DAY WITH MEALS 180 tablet 3    chlorthalidone (HYGROTEN) 25 MG Tab Take 1 tablet (25 mg total) by mouth every other day. 60 tablet 6    gabapentin (NEURONTIN) 600 MG tablet Take 1 tablet (600 mg total) by mouth 2 (two) times daily. (Patient taking differently: Take 600 mg by mouth every other day.) 180 tablet 3    INULIN (FIBER GUMMIES ORAL) Take 1 each by mouth every morning.       lancets (ACCU-CHEK MULTICLIX LANCET) Misc Test blood sugar twice daily 300 each 3    nicotine, polacrilex, 2 mg lzmn Take 1 each (2 mg total) by mouth as needed (Use 1 lozenge as needed in the place of a cigarette every 2-4 hours as needed.  Maximum of 5 per day.). 150 each 0    simethicone (GAS-X ORAL) Take 1 tablet by mouth as needed.       blood-glucose meter kit Check blood glucose QD with insurance preferred glucometer 1 each 0     No current facility-administered medications on file prior to visit.       Objective:      BP (!) 152/71 (BP Location: Right arm, Patient Position: Sitting, BP Method: Medium (Automatic))   Pulse 82   Resp 18   SpO2 97%     Exam:  GEN:  Well developed, well nourished.  No acute distress.  Normal pain behavior.  HEENT:  No trauma.  Mucous membranes moist.  Nares patent bilaterally.  PSYCH: Normal affect. Thought content appropriate.  CHEST:  Breathing symmetric.  No audible wheezing.  ABD: Soft, non-distended.  SKIN:  Warm, pink, dry.  No rash on exposed  areas.    EXT:  No cyanosis, clubbing, or edema.  No color change or changes in nail or hair growth.  NEURO/MUSCULOSKELETAL:  Fully alert, oriented, and appropriate. Speech normal janneth. No cranial nerve deficits.   Gait:  Antalgic.  No trendelenburg sign bilaterally.     Tender palpation over the midline lumbosacral region  No tenderness to palpation of the bilateral lumbosacral paraspinals SI joints            Imaging:  Narrative & Impression    EXAMINATION:  MRI LUMBAR SPINE WITHOUT CONTRAST     CLINICAL HISTORY:  Lumbar radiculopathy, symptoms persist with conservative treatment; Radiculopathy, lumbar region     TECHNIQUE:  Multiplanar, multisequence MR images were acquired from the thoracolumbar junction to the sacrum without the administration of contrast.     COMPARISON:  MRI lumbar spine 12/17/2020     FINDINGS:  Postoperative changes from L4-5 interbody and posterior instrumented fusion.  Susceptibility artifact degrades evaluation of adjacent structures.  Unchanged alignment.  There is osseous fusion across the facet joints.  No fluid collections in the operative bed.     ALIGNMENT: Levocurvature centered at L3.  Grade 1 anterolisthesis at L3-4.     BONE: No compression fractures.  No marrow replacing lesions.     JOINT: Multilevel degenerative changes with disc desiccation, disc height loss, and facet arthropathy, described in detail below.  Mild endplate edema at L3-4 and L5-S1.     SPINAL CANAL: The conus medullaris has a normal appearance and terminates at the L2 level.  Cauda equina nerve roots are unremarkable.  No mass or collection.     PARASPINAL SOFT TISSUES: Left renal cyst.  Colonic diverticulosis.  Paraspinal muscle atrophy.     SIGNIFICANT FINDINGS BY LEVEL:     Lower thoracic spine: Disc bulges and facet arthropathy resulting in mild-moderate canal and foraminal stenosis at multiple levels.     T12-L1: Unremarkable.     L1-2: Unremarkable.     L2-3: Unremarkable.     L3-4: Disc bulge with  posterior disc uncovering.  Severe bilateral facet arthropathy and ligamentum flavum thickening.  Severe canal stenosis.  Severe right and mild left foraminal stenosis.     L4-5: Postoperative changes from interbody and posterior fusion.  Residual endplate osteophytes protruding into the left paracentral zone.  No significant canal stenosis.  Mild right and moderate left foraminal stenosis.     L5-S1: Disc bulge.  Advanced facet arthropathy and ligamentum flavum thickening.  Moderate canal stenosis.  Moderate right and severe left foraminal stenosis.     Impression:     Postoperative changes from L4-5 fusion.     Multilevel degenerative changes, most advanced at L3-4 and L5-S1 as detailed above.  No significant changes from 12/17/2020.        Electronically signed by: Parish Grijalva  Date:                                            09/15/2023  Time:                                           14:33       Narrative & Impression    EXAMINATION:  MRI LUMBAR SPINE WITHOUT CONTRAST     CLINICAL HISTORY:  s/p lumbar fusion; Other intervertebral disc degeneration, lumbar region     TECHNIQUE:  Multiplanar, multisequence MR images of the lumbar spine were performed without the administration of contrast.     COMPARISON:  Lumbar spine radiograph 01/23/2019; CT abdomen contrast 11/16/2012; MRI lumbar spine 10/26/2007     FINDINGS:  Alignment: Grade 1 anterolisthesis of L3 on L4.  Grade 1 retrolisthesis of L5 on S1.     Vertebrae: Postoperative changes from previous L4-5 posterior spinal fusion with bilateral transpedicular screws and posterior stabilizing bars and interbody spacer.  Normal marrow signal. No fracture.     Discs: Mild multilevel intervertebral disc height loss and desiccation.     Cord: Normal.  Conus terminates normally at L2.  Cauda equina appears normal.     Degenerative findings:     T10-T11: Diffuse disc bulge and bilateral facet arthropathy resulting in mild central canal stenosis, moderate left, and mild  right neural foraminal narrowing.     T11-T12: Diffuse disc bulge and bilateral facet arthropathy resulting in moderate bilateral neural foraminal narrowing and mild central canal stenosis.     T12-L1: No significant disc abnormality.  Mild bilateral facet arthropathy without significant neural foraminal narrowing or central canal stenosis.     L1-L2: No significant disc abnormality.  Mild bilateral facet arthropathy without significant neural foraminal narrowing or central canal stenosis.     L2-L3: No significant disc abnormality.  Mild bilateral facet arthropathy without significant neural foraminal narrowing or central canal stenosis.     L3-L4: Diffuse disc bulge, moderate bilateral facet joint osseous hypertrophy, and ligamentum flavum buckling contribute to moderate central canal stenosis, moderate right, and mild left neural foraminal narrowing.     L4-L5: Postoperative change of previous decompression and posterior spinal fusion.  Productive changes contribute to mild left neural foraminal narrowing.     L5-S1: Diffuse disc bulge, bilateral facet arthropathy, and ligamentum flavum buckling resulting in severe left and moderate right neural foraminal narrowing and mild central canal stenosis.     Paraspinal muscles & soft tissues: Mild fatty atrophy of the lower paraspinous musculature.  No paraspinal fluid collections.  Upper sacrum and sacroiliac joints are unremarkable.     Impression:     1. Postsurgical changes of L4-L5 fusion.  2. Multilevel degenerative changes as detailed above.  Moderate spinal canal stenosis noted at L3-L4.  Moderate neural foraminal narrowing noted at L3-L4 and L5-S1, as well as within the lower thoracic spine.     Electronically signed by resident: Naveen Resendez  Date:                                            12/17/2020  Time:                                           09:53     Electronically signed by: Mitch Pappas MD  Date:                                             12/17/2020  Time:                                           14:49           Narrative & Impression    EXAMINATION:  XR LUMBAR SPINE 5 VIEW WITH FLEX AND EXT     CLINICAL HISTORY:  Low back pain, cauda equina syndrome suspected;  Lumbago with sciatica, left side     TECHNIQUE:  Five views of the lumbar spine plus flexion extension views were performed.     COMPARISON:  02/25/2014     FINDINGS:  The lumbar vertebra are intact.  No compression fracture or obvious paraspinal lesion is detected.  Degenerative changes are present with small osteophytes at most levels.  There is progressive disc space narrowing at L3-4; this level also shows development of a grade 1 anterolisthesis, stable between flexion and extension.  The other lumbar disc spaces show no significant narrowing, but lower thoracic disc spaces are narrowed, some with vacuum disc.     Postoperative findings from posterior instrumented fusion are again seen at L4 and 5 with bilateral pedicle screws, vertical connecting rods, and a device in the disc space.     Visualized abdomen shows aortic atherosclerosis.     IMPRESSION:      Stable postoperative findings of L4-5 fusion     Degenerative spine changes with interval worsening at L3-4.        Electronically signed by: Valentín Booker MD  Date:                                            01/23/2019  Time:                                           10:16         Assessment:       Encounter Diagnoses   Name Primary?    S/P lumbar fusion Yes    DDD (degenerative disc disease), cervical     Lumbar spondylosis            Plan:       Stephanie was seen today for low-back pain.    Diagnoses and all orders for this visit:    S/P lumbar fusion  -     Ambulatory referral/consult to Physical/Occupational Therapy; Future  -     X-Ray Lumbar Spine Ap Lateral w/Flex Ext; Future    DDD (degenerative disc disease), cervical  -     Ambulatory referral/consult to Physical/Occupational Therapy; Future  -     X-Ray Lumbar Spine Ap  Lateral w/Flex Ext; Future    Lumbar spondylosis  -     Ambulatory referral/consult to Physical/Occupational Therapy; Future  -     X-Ray Lumbar Spine Ap Lateral w/Flex Ext; Future            Stephanie Sears is a 74 y.o. female with midline lower back and bilateral lower extremity pain.  Consistent with lower lumbar radiculopathy.  History of a L4-5 fusion.  Adjacent segment degenerative disc disease at L3-4 and L5-S1 levels.  Updated lumbar MRI confirming this, severe central canal stenosis at L3-4 as well as bilateral foraminal narrowing at this level.  Also with significant central and foraminal stenosis at L5-S1.  Patient has been having good improvement with physical therapy/home exercise program.      Prior records reviewed.  Pertinent imaging studies reviewed by me. Imaging results were discussed with patient.  Refer to PT for ROM, strengthening, stretching and HEP.  Patient prefers to go to [facility] physical therapy.  Lumbar x-rays with flexion and extension to rule out instability.    Return to clinic in 8 weeks or sooner if needed.  At that time we will discuss efficacy of physical therapy/home exercise program and review x-ray results.  Patient may be a good candidate for epidural steroid injection or possibly via disc.      This note was created by combination of typed  and M-Modal dictation.  Transcription and phonetic errors may be present.  If there are any questions, please contact me.

## 2024-06-20 ENCOUNTER — CLINICAL SUPPORT (OUTPATIENT)
Dept: SMOKING CESSATION | Facility: CLINIC | Age: 74
End: 2024-06-20
Payer: COMMERCIAL

## 2024-06-20 DIAGNOSIS — F17.200 NICOTINE DEPENDENCE: Primary | ICD-10-CM

## 2024-06-20 PROCEDURE — 99403 PREV MED CNSL INDIV APPRX 45: CPT | Mod: S$GLB,,,

## 2024-06-20 PROCEDURE — 99999 PR PBB SHADOW E&M-EST. PATIENT-LVL II: CPT | Mod: PBBFAC,,,

## 2024-06-20 NOTE — PROGRESS NOTES
Individual Follow-Up Form    6/20/2024    Quit Date: TBD    Clinical Status of Patient: Outpatient    Length of Service: 45 minutes    Continuing Medication: yes  Nicotine Lozenges    Other Medications: None      Target Symptoms: Withdrawal and medication side effects. The following were  rated moderate (3) to severe (4) on TCRS:  Moderate (3): Crave and Desire   Severe (4): None     Comments: Counselor spoke with patient for telephonic visit, name and date of birth verified as two patient identifiers. Patient reported she is still smoking,and she attributes her continued smoking to stress. Patient also reported she decided to delay her attempt to quit until she has time to dedicate more effort towards quitting.  Counselor congratulated patient on her progress thus far. Counselor explained to patient she respects her choice to delay her quit attempt until she can dedicate time towards quitting.  Counselor will remain available should any further needs arise.    Diagnosis: F17.200    Next Visit: 2 weeks

## 2024-06-30 ENCOUNTER — EXTERNAL CHRONIC CARE MANAGEMENT (OUTPATIENT)
Dept: PRIMARY CARE CLINIC | Facility: CLINIC | Age: 74
End: 2024-06-30
Payer: MEDICARE

## 2024-06-30 PROCEDURE — 99490 CHRNC CARE MGMT STAFF 1ST 20: CPT | Mod: PBBFAC,PN | Performed by: FAMILY MEDICINE

## 2024-06-30 PROCEDURE — 99490 CHRNC CARE MGMT STAFF 1ST 20: CPT | Mod: S$PBB,,, | Performed by: FAMILY MEDICINE

## 2024-07-01 ENCOUNTER — TELEPHONE (OUTPATIENT)
Dept: PULMONOLOGY | Facility: CLINIC | Age: 74
End: 2024-07-01
Payer: MEDICARE

## 2024-07-01 DIAGNOSIS — G47.33 OSA (OBSTRUCTIVE SLEEP APNEA): Primary | ICD-10-CM

## 2024-07-01 NOTE — TELEPHONE ENCOUNTER
----- Message from George Velasco MA sent at 7/1/2024  3:02 PM CDT -----  Can you place an order for her cpap supplies.  ----- Message -----  From: Delal Amaral  Sent: 7/1/2024   2:59 PM CDT  To: Lyn Anderson V Staff    .Type: Patient Call Back    Who called: Magali tay/ [facility]    What is the request in detail: Checking the status of a prescription for CPAP Supplies. Reference No. [id number]    Can the clinic reply by [facility]? No     Would the patient rather a call back or a response via My [facility]? Call Back     Best call back number:[phone] (office) [phone] (fax)    Additional Information:

## 2024-07-22 DIAGNOSIS — E78.5 DYSLIPIDEMIA: Chronic | ICD-10-CM

## 2024-07-22 DIAGNOSIS — I70.0 ATHEROSCLEROSIS OF AORTIC ARCH: Chronic | ICD-10-CM

## 2024-07-22 RX ORDER — ATORVASTATIN CALCIUM 40 MG/1
TABLET, FILM COATED ORAL
Qty: 90 TABLET | Refills: 2 | Status: SHIPPED | OUTPATIENT
Start: 2024-07-22

## 2024-07-22 NOTE — TELEPHONE ENCOUNTER
No care due was identified.  [facility] Embedded Care Due Messages. Reference number: [id number].   7/22/2024 1:00:50 AM CDT

## 2024-07-22 NOTE — TELEPHONE ENCOUNTER
Stephanie Sears  is requesting a refill authorization.  Brief Assessment and Rationale for Refill:  Approve     Medication Therapy Plan:         Comments:     Note composed:3:24 AM 07/22/2024

## 2024-07-23 ENCOUNTER — OFFICE VISIT (OUTPATIENT)
Dept: PODIATRY | Facility: CLINIC | Age: 74
End: 2024-07-23
Payer: MEDICARE

## 2024-07-23 DIAGNOSIS — L84 CORN OR CALLUS: ICD-10-CM

## 2024-07-23 DIAGNOSIS — B35.1 ONYCHOMYCOSIS DUE TO DERMATOPHYTE: Primary | ICD-10-CM

## 2024-07-23 PROCEDURE — 99999 PR PBB SHADOW E&M-EST. PATIENT-LVL II: CPT | Mod: PBBFAC,,, | Performed by: PODIATRIST

## 2024-07-23 PROCEDURE — 17999 UNLISTD PX SKN MUC MEMB SUBQ: CPT | Mod: CSM,S$GLB,, | Performed by: PODIATRIST

## 2024-07-23 PROCEDURE — 99499 UNLISTED E&M SERVICE: CPT | Mod: ,,, | Performed by: PODIATRIST

## 2024-07-23 PROCEDURE — 99212 OFFICE O/P EST SF 10 MIN: CPT | Mod: PBBFAC,PO | Performed by: PODIATRIST

## 2024-07-23 NOTE — PATIENT INSTRUCTIONS
Over the counter pain creams: Voltaren Gel, Biofreeze, Bengay, tiger balm, two old goat, lidocaine gel,  Absorbine Veterinary Liniment Gel Topical Analgesic Sore Muscle and Joint Pain Relief    Recommend lotions: eucerin, eucerin for diabetics, aquaphor, A&D ointment, gold bond for diabetics, sween, Bandon's Bees all purpose baby ointment,  urea 40 with aloe or SkinIntegra rapid crack repair (found on amazon.com)    Shoe recommendations: (try 6pm.com, zappos.com , nordstromrack.Talkwheel, or shoes.com for discounted prices) you can visit varsity shoes in [place], DSW shoes in [place]  or rasheeda rack in the [facility] (there are also several shoe brand outlets in the [facility])    ONLY purchase stability style tennis shoes NO flex, foam, free, yoga mat style shoes    Shoe examples:    Asics (GT 4000 or gel foundations), new balance stability type shoes (such as the 940 series), saucony (stabil c3),  Alamo (GTS or Beast or   transcend), propet, HokaOne (tennis shoe) Nadeem (tennis shoes and boots)    Anelft Hernesto (women) Cherry&Moise (men), clarks, crocs, aerosoles, naturalizers, SAS, ecco, born, meghana hernandez, rockports (dress shoes)    Vionic, burkenstocks, fitflops, propet, taos, baretraps (sandals)    HokaOne sandals, crocs, propet (house shoes)      Nail Home remedy:  Vicks Vapor rub or Emuaid to nails for easier manageability    Diabetes: Inspecting Your Feet  Diabetes increases your chances of developing foot problems. So inspect your feet every day. This helps you find small skin irritations before they become serious infections. If you have trouble seeing the bottoms of your feet, use a mirror or ask a family member or friend to help.     Pressure spots on the bottom of the foot are common areas where problems develop.   How to check your feet  Below are tips to help you look for foot problems. Try to check your feet at the same time each day, such as when you get out of bed in the morning:  Check the top of  each foot. The tops of toes, back of the heel, and outer edge of the foot can get a lot of rubbing from poor-fitting shoes.  Check the bottom of each foot. Daily wear and tear often leads to problems at pressure spots.  Check the toes and nails. Fungal infections often occur between toes. Toenail problems can also be a sign of fungal infections or lead to breaks in the skin.  Check your shoes, too. Loose objects inside a shoe can injure the foot. Use your hand to feel inside your shoes for things like néstor, loose stitching, or rough areas that could irritate your skin.  Warning signs  Look for any color changes in the foot. Redness with streaks can signal a severe infection, which needs immediate medical attention. Tell your doctor right away if you have any of these problems:  Swelling, sometimes with color changes, may be a sign of poor blood flow or infection. Symptoms include tenderness and an increase in the size of your foot.  Warm or hot areas on your feet may be signs of infection. A foot that is cold may not be getting enough blood.  Sensations such as burning, tingling, or pins and needles can be signs of a problem. Also check for areas that may be numb.  Hot spots are caused by friction or pressure. Look for hot spots in areas that get a lot of rubbing. Hot spots can turn into blisters, calluses, or sores.  Cracks and sores are caused by dry or irritated skin. They are a sign that the skin is breaking down, which can lead to infection.  Toenail problems to watch for include nails growing into the skin (ingrown toenail) and causing redness or pain. Thick, yellow, or discolored nails can signal a fungal infection.  Drainage and odor can develop from untreated sores and ulcers. Call your doctor right away if you notice white or yellow drainage, bleeding, or unpleasant odor.   © 9586-3039 The [employer]. [address] [place] [address], [place] [address]. All rights reserved. This information is not  intended as a substitute for professional medical care. Always follow your healthcare professional's instructions.        Step-by-Step:  Inspecting Your Feet (Diabetes)    Date Last Reviewed: 10/1/2016  © 3173-1455 The [employer]. [address] [place] [address], [place] [address]. All rights reserved. This information is not intended as a substitute for professional medical care. Always follow your healthcare professional's instructions.

## 2024-07-24 NOTE — PROGRESS NOTES
Pt presents for routine non-covered foot care. Based on chart review this patient does not qualify for nail care (Patients who qualify for nail care are usually as follows: diabetic with neurological manifestations, PVD, pernicious anemia, or CKD with appropriate modifiers that indicate high amputation risk).     Pt. does not have high risk feet. Pedal pulses are palpable and sensation intact. Nails are elongated, thickened Bilaterally. Multiple forefoot calluses      The primary encounter diagnosis was Onychomycosis due to dermatophyte. A diagnosis of Corn or callus was also pertinent to this visit.     Nail & callus care is a non-covered service as patient does not have the required diagnoses to allow for 'routine care' - as such, this would be a Proc B (out of pocket expense of $42).  Medicare carriers only cover this service in the presence of severe vascular or sensory disease. Note that this is not limited to persons with diabetes.  Correspondingly, although not an exclusive or comprehensive list, the following medical conditions are not, in and of themselves (i.e. in the absence of significant vascular or neurologic sequellae), considered to be of sufficient severity to make the patient at risk for nonprofessional care:   Diabetes without evidence of severe vascular or neurologic disease   End-stage renal disease   Kidney dialysis   History of organ transplantation, on immunosuppression   Hemorrhagic/bleeding conditions, including hemophilia   Use of blood thinners/anticoagulants (warfarin, Coumadin)   History of artificial joints, heart valves, or blood vessels   History of valvular heart disease, even if you have been advised to take antibiotics around the time of dental work   Cancer   Chemotherapy for cancer, or other health condition   HIV/Aids   Legal blindness   Inability to see and/or reach your own feet   Living alone   Mental retardation   History of stroke, spinal cord injury, or brain injury    Parkinson's Disease, or any medical condition associated with tremor of the hands or feet  Routine foot care is only a covered service in those patients with severe circulatory or sensory problems. For all other stated or unstated conditions, such care is not covered under Medicare. As such, patients are expected to perform the service themselves, or have the care provided by a family member or friend, or pay directly to have such care provided by a professional, such as a podiatrist. Payment for such care is a patient responsibility (i.e. cash). In addition, as a non-covered service, Medicare's fee schedule does not apply.     Nails were reduced Bilaterally.  After cleansing the  area w/ alcohol prep pad the above mentioned hyperkeratosis was trimmed utilizing No 15 scapel, to a smooth base with out incident to calluses. Patient tolerated this  well and reported comfort      Patient tolerated well and related relief. RTC for annual diabetic foot exam or. as PROC B

## 2024-07-31 ENCOUNTER — EXTERNAL CHRONIC CARE MANAGEMENT (OUTPATIENT)
Dept: PRIMARY CARE CLINIC | Facility: CLINIC | Age: 74
End: 2024-07-31
Payer: MEDICARE

## 2024-07-31 PROCEDURE — 99490 CHRNC CARE MGMT STAFF 1ST 20: CPT | Mod: PBBFAC,PN | Performed by: FAMILY MEDICINE

## 2024-07-31 PROCEDURE — 99490 CHRNC CARE MGMT STAFF 1ST 20: CPT | Mod: S$PBB,,, | Performed by: FAMILY MEDICINE

## 2024-08-06 ENCOUNTER — TELEPHONE (OUTPATIENT)
Dept: FAMILY MEDICINE | Facility: CLINIC | Age: 74
End: 2024-08-06
Payer: MEDICARE

## 2024-08-08 ENCOUNTER — HOSPITAL ENCOUNTER (EMERGENCY)
Facility: HOSPITAL | Age: 74
Discharge: HOME | End: 2024-08-08
Attending: EMERGENCY MEDICINE
Payer: MEDICARE

## 2024-08-08 VITALS
HEIGHT: 62 IN | HEART RATE: 79 BPM | DIASTOLIC BLOOD PRESSURE: 72 MMHG | RESPIRATION RATE: 18 BRPM | OXYGEN SATURATION: 98 % | BODY MASS INDEX: 33.13 KG/M2 | WEIGHT: 180 LBS | TEMPERATURE: 98 F | SYSTOLIC BLOOD PRESSURE: 173 MMHG

## 2024-08-08 DIAGNOSIS — U07.1 COVID-19: Primary | ICD-10-CM

## 2024-08-08 DIAGNOSIS — U07.1 COVID-19 VIRUS DETECTED: ICD-10-CM

## 2024-08-08 LAB
CTP QC/QA: YES
CTP QC/QA: YES
POC MOLECULAR INFLUENZA A AGN: NEGATIVE
POC MOLECULAR INFLUENZA B AGN: NEGATIVE
SARS-COV-2 RDRP RESP QL NAA+PROBE: POSITIVE

## 2024-08-08 PROCEDURE — 87635 SARS-COV-2 COVID-19 AMP PRB: CPT | Performed by: EMERGENCY MEDICINE

## 2024-08-08 PROCEDURE — 99282 EMERGENCY DEPT VISIT SF MDM: CPT

## 2024-08-08 PROCEDURE — 87502 INFLUENZA DNA AMP PROBE: CPT

## 2024-08-08 NOTE — ED PROVIDER NOTES
"Encounter Date: 8/8/2024       History     Chief Complaint   Patient presents with    Generalized Body Aches     With fatigue, subjective fever, decreased appetite, and nasal congestion/drainage since Sunday. Taking tylenol and mucinex.      Chief complaint: COVID     History of present illness: Patient is a 74-year-old female who states that 5 days ago she began experience decreased appetite fatigue subjective fever runny nose.  She denies sinus pressure sore throat ear pain ear pressure change in hearing discharge from ears cough.  She reports she always has shortness of breath.  She states she has tried increasing her fluids Tylenol and Mucinex without relief.    The history is provided by the patient. No  was used.     Review of patient's allergies indicates:   Allergen Reactions    Hydrocodone Shortness Of Breath    Iodinated contrast media Shortness Of Breath     Difficulty breathing    Adhesive Itching     Skin peeling    Latex, natural rubber Other (See Comments)     "Takes skin off"    Morphine Hallucinations    Oxycodone Hallucinations    Sulfa (sulfonamide antibiotics) Hives and Itching     Past Medical History:   Diagnosis Date    Acute pancreatitis     Angina pectoris     Anxiety     Arthritis     Asthma     as a child    Back pain     Bronchitis     Cervical spondylosis with radiculopathy 07/10/2012    Chest pain     Chronic neck pain 07/26/2012    Colon polyps     COPD (chronic obstructive pulmonary disease)     Coronary artery disease     Depression     Fibrocystic breast     General anesthetics causing adverse effect in therapeutic use     trouble coming out of anes/ had the shakes    GERD (gastroesophageal reflux disease)     Glaucoma     Heart failure     Hyperlipidemia     Hypertension     Neck pain 07/10/2012    Obesity     JAM (obstructive sleep apnea)     Pneumonia     Pneumonia due to other staphylococcus     Primary osteoarthritis of both knees     Psoriasis     " Subacromial or subdeltoid bursitis 07/10/2012    Thyroid disease     Tobacco dependence     Trouble in sleeping     Type 2 diabetes mellitus 12/10/2013    Type 2 diabetes mellitus with diabetic chronic kidney disease      Past Surgical History:   Procedure Laterality Date    APPENDECTOMY      BREAST BIOPSY Bilateral 1988    BREAST MASS EXCISION Right     benign    CATARACT EXTRACTION W/  INTRAOCULAR LENS IMPLANT Left 12/09/2020    PHACO IOL WITH I-STENT ()    CATARACT EXTRACTION W/  INTRAOCULAR LENS IMPLANT Right 10/21/2020    PHACO IOL WITH I-STENT x 2 ()    CHOLECYSTECTOMY      COLONOSCOPY N/A 05/22/2019    Procedure: COLONOSCOPY;  Surgeon: Darrin Calvin MD;  Location: [facility] ([address] [place] [address]);  Service: Endoscopy;  Laterality: N/A;  2nd floor - all other procedure done on 2, multiple comorbidities - ERW/   change in MD schedule, Pt notified and verbalized understanding, new arrival time 0800, Ins mailed to Pt with new arrival time - ERW1/8/19@1130    COLONOSCOPY N/A 09/26/2022    Procedure: COLONOSCOPY;  Surgeon: Darrin Calvin MD;  Location: [facility] ([address] [place] [address]);  Service: Endoscopy;  Laterality: N/A;  2nd floor d/t previous anesthesia complications  vaccinated  inst mailed  clear liquids 4 hr prior-AM prep-LW  I should be able to complete this with the slim pediatric colonoscope, but should have the single-balloon available in case needed.    EPIDURAL STEROID INJECTION Bilateral 02/26/2021    Procedure: Injection, Steroid, Ischial Bursa;  Surgeon: Yonatan Garsia Jr., MD;  Location: [facility];  Service: Pain Management;  Laterality: Bilateral;  Bilateral Ischial Bursa Steroid Injections  Arrive @ 1230; No ATC; Check BG    ESOPHAGOGASTRODUODENOSCOPY      HYSTERECTOMY  1980's    IMPLANTATION OF DEVICE FOR GLAUCOMA Right 10/21/2020    Procedure: INSERTION, DEVICE, FOR GLAUCOMA/ i Stent;  Surgeon: Melany Tse MD;  Location: [facility] OR [address] [place] [address];  Service: Ophthalmology;  Laterality:  Right;    IMPLANTATION OF DEVICE FOR GLAUCOMA Left 12/09/2020    Procedure: INSERTION, DEVICE, FOR GLAUCOMA/I SENT;  Surgeon: Melany Tes MD;  Location: [facility] OR [address] [place] [address];  Service: Ophthalmology;  Laterality: Left;    INJECTION OF JOINT Bilateral 12/23/2021    Procedure: Injection, Joint---BILATERAL ISCHIAL BURSA STEROID INJECTION;  Surgeon: Yonatan Garsia Jr., MD;  Location: [facility];  Service: Pain Management;  Laterality: Bilateral;    INTRAOCULAR PROSTHESES INSERTION Right 10/21/2020    Procedure: INSERTION, IOL PROSTHESIS;  Surgeon: Melany Tse MD;  Location: [address];  Service: Ophthalmology;  Laterality: Right;    INTRAOCULAR PROSTHESES INSERTION Left 12/09/2020    Procedure: INSERTION, IOL PROSTHESIS;  Surgeon: Melany Tse MD;  Location: [facility] OR [address] [place] [address];  Service: Ophthalmology;  Laterality: Left;    KNEE SURGERY Left 01/20/2016    TKR    KNEE SURGERY Right 02/26/2018    TKR    L4-L5 fusion  2006    PHACOEMULSIFICATION OF CATARACT Right 10/21/2020    Procedure: PHACOEMULSIFICATION, CATARACT;  Surgeon: Melany Tse MD;  Location: [address];  Service: Ophthalmology;  Laterality: Right;    PHACOEMULSIFICATION OF CATARACT Left 12/09/2020    Procedure: PHACOEMULSIFICATION, CATARACT;  Surgeon: Melany Tse MD;  Location: [facility] OR [address] [place] [address];  Service: Ophthalmology;  Laterality: Left;    YAG CAPSULOTOMY Left 12/07/2023         Family History   Problem Relation Name Age of Onset    Diabetes Mother      Hypertension Mother          CHF, angina    Angina Mother      Kidney disease Mother      Heart disease Mother          CHF    Hypertension Father          glaucoma, Alzhiemer's , supra pubic    Alzheimer's disease Father      Glaucoma Father      Glaucoma Sister Paige     Hypertension Sister Paige     Hyperlipidemia Sister Paige     Sleep apnea Sister Paula     Hypertension Sister Paula     Thyroid disease Sister Warren     No Known Problems  Sister Tanna     Breast cancer Sister Marleny 54    Cancer Paternal Aunt          colon ca    Kidney disease Brother Vinod         kidney transplant, HTN,DM    Diabetes Brother Ivnod     Hepatitis Brother Rajwinder     Gout Son Vic     Hypertension Son Vic     Diabetes Son Vic     Amblyopia Neg Hx      Blindness Neg Hx      Melanoma Neg Hx      Macular degeneration Neg Hx      Retinal detachment Neg Hx      Strabismus Neg Hx       Social History     Tobacco Use    Smoking status: Every Day     Current packs/day: 0.50     Average packs/day: 0.4 packs/day for 54.6 years (22.1 ttl pk-yrs)     Types: Cigarettes     Start date: 1970    Smokeless tobacco: Never    Tobacco comments:     Patient is enrolled in smoking cessation. Comprehensive smoking hx was updated on 2/28/24 by MARQUIS Cherry RRT,MSW,LMSW,TTS   Substance Use Topics    Alcohol use: No     Alcohol/week: 0.0 standard drinks of alcohol    Drug use: No     Review of Systems   Constitutional:  Positive for appetite change, fatigue and fever.   HENT:  Positive for rhinorrhea. Negative for congestion, ear discharge, ear pain, sinus pressure and sore throat.    Eyes:  Negative for pain, discharge and itching.   Respiratory:  Positive for shortness of breath. Negative for cough.        Physical Exam     Initial Vitals [08/08/24 1249]   BP Pulse Resp Temp SpO2   (!) 184/79 60 19 98.4 °F (36.9 °C) 99 %      MAP       --         Physical Exam    Nursing note and vitals reviewed.  Constitutional: She appears well-developed and well-nourished.   HENT:   Head: Normocephalic and atraumatic.   Right Ear: External ear normal.   Left Ear: External ear normal.   Nose: Nose normal.   Eyes: Conjunctivae and EOM are normal. Pupils are equal, round, and reactive to light. Right eye exhibits no discharge. Left eye exhibits no discharge.   Neck:   Normal range of motion.  Cardiovascular:  Regular rhythm, S1 normal, S2 normal and normal heart sounds.     Exam reveals no gallop.        No murmur heard.  Pulmonary/Chest: Effort normal and breath sounds normal. No respiratory distress. She has no decreased breath sounds. She has no wheezes. She has no rhonchi. She has no rales.   Abdominal: She exhibits no distension.   Musculoskeletal:         General: Normal range of motion.      Cervical back: Normal range of motion.     Neurological: She is alert and oriented to person, place, and time.   Skin: Skin is dry. Capillary refill takes less than 2 seconds.         ED Course   Procedures  Labs Reviewed   SARS-COV-2 RDRP GENE - Abnormal       Result Value    POC Rapid COVID Positive (*)      Acceptable Yes     POCT INFLUENZA A/B MOLECULAR    POC Molecular Influenza A Ag Negative      POC Molecular Influenza B Ag Negative       Acceptable Yes            Imaging Results    None          Medications - No data to display  Medical Decision Making  This is an evaluation of a 74 y.o. female that presents to the Emergency Department for sx as above. The patient is a non-toxic, afebrile, and well appearing female. On physical exam: No meningeal signs or cervical lymphadenopathy. Breath sounds clear without adventitious breath sounds, tachypnea or respiratory distress. Room air pulse ox of 98%. No hypoxia or dyspnea on exertion. Speaking in full sentences with no pauses or respiratory distress. Vital Signs Are Reassuring.     RESULTS:  COVID-19: Positive    My overall impression is COVID-19 Positive. I considered, but at this time, do not suspect OM, OE, strep pharyngitis, influenza, meningitis, pneumonia, bacterial sinusitis, or significant dehydration requiring IV fluids or admission. The patient is maintaining oxygen saturations > 95% on room air and does not require supplemental oxygen.     The patient will be discharged home with supportive care and recommendations for quarantine. Return precautions discussed to return if patient develops fevers, SOB, or other worsening  "symptoms. The diagnosis, treatment plan, instructions for follow-up and reevaluation with Primary Care as well as ED return precautions were discussed and understanding was verbalized. All questions or concerns have been addressed.        Problems Addressed:  COVID-19: acute illness or injury     Details: CONSIDERED TREATMENT WITH PAXLOVID DUE TO PATIENT'S RISK FACTORS HOWEVER SHE IS CURRENTLY ON AMLODIPINE WHICH HAS A LEVEL SEE INTERACTION.      Amount and/or Complexity of Data Reviewed  Labs:  Decision-making details documented in ED Course.  Discussion of management or test interpretation with external provider(s): Vital signs at the time of disposition were:  BP (!) 173/72 (BP Location: Right arm, Patient Position: Sitting)   Pulse 79   Temp 98.3 °F (36.8 °C) (Oral)   Resp 18   Ht 5' 2" (1.575 m)   Wt 81.6 kg (180 lb)   SpO2 98%   BMI 32.92 kg/m²       See AVS for additional recommendations. Medications listed herein were prescribed after reviewing the patient's allergies, medication list, history, most recent laboratories as available.  Referrals below were provided after reviewing the patient's previous medical providers. She understands she  should return for any worsening or changes in condition.  Prior to discharge the patient was asked if she  had any additional concerns or complaints and she declined. The patient was given an opportunity to ask questions and all were answered to her satisfaction.     Risk  OTC drugs.  Diagnosis or treatment significantly limited by social determinants of health.               ED Course as of 08/08/24 1421   Thu Aug 08, 2024   1302 BP(!): 184/79 [VC]   1302 Temp: 98.4 °F (36.9 °C) [VC]   1302 Temp Source: Oral [VC]   1302 Pulse: 60 [VC]   1302 Resp: 19 [VC]   1302 SpO2: 99 % [VC]   1335 POC Molecular Influenza A Ag: Negative [VC]   1335 POC Molecular Influenza B Ag: Negative [VC]   1335 SARS-CoV-2 RNA, Amplification, Qual(!): Positive [VC]      ED Course User " Index  [VC] Les Matias DNP                           Clinical Impression:  Final diagnoses:  [U07.1] COVID-19 (Primary)          ED Disposition Condition    Discharge Stable          ED Prescriptions    None       Follow-up Information       Follow up With Specialties Details Why Contact Info    Williams Rossi Jr., MD Family Medicine  Following appropriate period of isolation [address] [facility] [address]  [phone]               Les Matias DNP  08/08/24 1426

## 2024-08-08 NOTE — DISCHARGE INSTRUCTIONS
Push fluids., Alternate tylenol with ibuprofen every 3h for fever., Use Delsym, over the counter for cough, as directed on package.   Delsym, Cepacol Lozenges as directed on package.  Warm fluids and warm saltwater gargles.     Cepacol Throat Trupti, Flonase as directed on package.   Flonase, Claritin, Allegra, or Zyrtec over the counter (without decongestants).   Antihistamines with no D, and Return to the Emergency Department for any worsening, change in condition, or any emergent concerns. Return Precautions

## 2024-08-09 ENCOUNTER — PATIENT OUTREACH (OUTPATIENT)
Dept: EMERGENCY MEDICINE | Facility: HOSPITAL | Age: 74
End: 2024-08-09
Payer: MEDICARE

## 2024-08-12 ENCOUNTER — PATIENT OUTREACH (OUTPATIENT)
Dept: EMERGENCY MEDICINE | Facility: HOSPITAL | Age: 74
End: 2024-08-12
Payer: MEDICARE

## 2024-08-12 NOTE — PROGRESS NOTES
Phoned patient to advise of the ED Discharge appointment that was offered by PCP staff. Appointment on 8/19/24. Patient declined appointment. Patient decided to keep scheduled appointment on 8/23/24.  Eve Dowell

## 2024-08-14 ENCOUNTER — OFFICE VISIT (OUTPATIENT)
Dept: PAIN MEDICINE | Facility: CLINIC | Age: 74
End: 2024-08-14
Payer: MEDICARE

## 2024-08-14 VITALS — SYSTOLIC BLOOD PRESSURE: 119 MMHG | DIASTOLIC BLOOD PRESSURE: 63 MMHG | OXYGEN SATURATION: 100 % | HEART RATE: 59 BPM

## 2024-08-14 DIAGNOSIS — M51.36 DDD (DEGENERATIVE DISC DISEASE), LUMBAR: Primary | ICD-10-CM

## 2024-08-14 DIAGNOSIS — M70.61 GREATER TROCHANTERIC BURSITIS OF BOTH HIPS: ICD-10-CM

## 2024-08-14 DIAGNOSIS — M48.062 SPINAL STENOSIS OF LUMBAR REGION WITH NEUROGENIC CLAUDICATION: ICD-10-CM

## 2024-08-14 DIAGNOSIS — M70.62 GREATER TROCHANTERIC BURSITIS OF BOTH HIPS: ICD-10-CM

## 2024-08-14 DIAGNOSIS — M47.816 LUMBAR SPONDYLOSIS: ICD-10-CM

## 2024-08-14 DIAGNOSIS — Z98.1 S/P LUMBAR FUSION: ICD-10-CM

## 2024-08-14 DIAGNOSIS — M54.16 LUMBAR RADICULOPATHY: ICD-10-CM

## 2024-08-14 PROCEDURE — 99213 OFFICE O/P EST LOW 20 MIN: CPT | Mod: PBBFAC,PN

## 2024-08-14 PROCEDURE — 20610 DRAIN/INJ JOINT/BURSA W/O US: CPT | Mod: PBBFAC,PN

## 2024-08-14 PROCEDURE — 99999PBSHW PR PBB SHADOW TECHNICAL ONLY FILED TO HB: Mod: PBBFAC,,,

## 2024-08-14 PROCEDURE — 99999 PR PBB SHADOW E&M-EST. PATIENT-LVL III: CPT | Mod: PBBFAC,,,

## 2024-08-14 RX ORDER — TRIAMCINOLONE ACETONIDE 40 MG/ML
40 INJECTION, SUSPENSION INTRA-ARTICULAR; INTRAMUSCULAR
Status: COMPLETED | OUTPATIENT
Start: 2024-08-14 | End: 2024-08-14

## 2024-08-14 RX ADMIN — TRIAMCINOLONE ACETONIDE 40 MG: 40 INJECTION, SUSPENSION INTRA-ARTICULAR; INTRAMUSCULAR at 03:08

## 2024-08-14 NOTE — PROGRESS NOTES
Subjective:     Patient ID: Stephanie Sears is a 74 y.o. female    Chief Complaint: Follow-up      Referred by: No ref. provider found      HPI:    Interval History PA (08/14/2024):  Patient returns to clinic for follow up midline lower lumbosacral pain.  Patient has been attending physical therapy since previous encounter with overall benefit.  Notes that she has noticed some improvement in her lower back pain.  Notes therapy has been on hold due to recent COVID diagnosis.  She does plan on resuming physical therapy in the next few weeks.  Notes she would prefer to hold off on any interventional procedures for her lower back at this time as she is noting progress with physical therapy.  Patient's primary concern today is acute worsening of pain located over her bilateral lateral hips.  Patient pointing to her greater trochanteric region.  Notes she has had similar episodes of pain in the past which significantly improved with bilateral GTB steroid injections.  Last completed September of 2023.  Patient interested in repeat injection if appropriate.    Interval History (6/17/24):  She returns today for follow up.  She reports that she continues to have midline lumbosacral back pain.  This pain is overall unchanged in quality and location to previous episodes.  States that she would like to attend additional physical therapy as this has been helpful in the past.  She continues to perform home exercises though she has difficulty performing most of them because she can not get up off of the floor after doing the exercises.  The pain will radiate to the right buttock and posterior thigh intermittently.  She denies any new numbness, tingling, weakness, bowel bladder dysfunction.      Interval History PA (09/21/2023):  Patient returns to clinic for follow up and MRI review.  Patient reports recent repeat bilateral GTB steroid injections have been beneficial for bilateral lateral hip pain.  Reports approximately 90%  relief over her lateral hip, no continued pain over these locations.  She does report some continued midline lower back pain however since previous visit she is no longer been having pain radiates into her bilateral posterior thighs.  Notes doing overall with physical therapy and a regular home exercise program.  Pain has been more intermittent and more manageable.  She does note some continued pain with prolonged sitting but overall tolerable.  Patient would like to hold off on interventional procedures at this time.  Denies any numbness, tingling, weakness, bowel or bladder dysfunction.    Interval History PA (09/06/2023):  Patient returns to clinic for follow up.  Patient notes that she has been attending physical therapy with overall improvement in her health and pain levels.  However continues to report significant pain located in her midline lower lumbar region radiating down into her bilateral posterior thighs, stopping at the knees.  Denies any changes in the quality or location of her pain.  Overall states physical therapy has been beneficial, although pain continues to be significant.  She also notes return and worsening of bilateral lateral hip pain.  States pain is in similar quality and location as with previous trochanteric bursitis.  Patient is interested in repeat injections.  Notes previous GTB injections have been significantly beneficial, typically alleviate the pain for 8+ months.  Denies any weakness, bowel or bladder dysfunction.    Interval History (7/11/23):  She returns today for follow up.  Graciee reports that her left sided low back and lower extremity pain has returned.  Denies any changes in the quality or location of his pain.  Denies any new or worsening symptoms.  States that she was in physical therapy earlier this year and was finding this very helpful.  She would like to see if additional physical therapy can be done.      Interval History (5/18/22):  She returns today for follow up.   She reports that physical therapy has been helpful for the low back pain.  She states the low back is currently doing well and she is happy with his progress.  Today, she mainly complains of left lateral hip pain consistent with trochanteric bursitis.  Has had injections for this in the past that have helped.  She would like repeat injection today.      Interval History (3/23/22):  She returns today for follow up.  She reports that she has completed physical therapy for her neck pain.  She states this issue was doing well.  Today she wishes discuss her low back pain.  This pain is located the midline lower lumbar/lumbosacral region.  The pain will radiate to the bilateral lumbar paraspinal regions and bilateral posterior thighs.  The pain does not cross the knees.  The pain is worse with standing/walking.  She denies any associated numbness, tingling, weakness, bowel bladder dysfunction.        Interval History (1/6/22):  She returns today for follow up.  She reports that bilateral ischial bursa steroid injections has been helpful for the bilateral buttock/hip pain.  She reports 60% relief following this procedure.  Today she wishes to discuss her neck pain.  She has had neck pain intermittently for years.  The pain is located the midline mid to lower cervical region.  The pain does not radiate.  She denies any associated numbness, tingling, weakness, bowel bladder dysfunction.  She would like to attend additional physical therapy for the management of her neck pain.      Interval History (12/14/21):  She returns today for follow up.  She reports that ischial bursa pain has returned.  She reports near complete relief following bilateral ischial bursa steroid injection done in February 2021. She got at least 9 months of good relief from this procedure.  She states the pain has since returned.  She denies any changes in the quality location the pain.  She denies any new or worsening symptoms.        Interval History  NP (3/16/21):    Pt returns today for follow up. She states that the bilateral ischial bursa injections have relieved nearly all of the bilateral buttock pain.  She is very happy with the results at this time.     Interval History (2/19/21):  She returns today for follow up.  She reports that she continues to have bilateral buttock pain particular when sitting.  She denies any changes in the quality location was pain.  She denies any new worsening symptoms.  She has been attending physical therapy and finds that it has been helpful.  She states that her pain is still quite severe especially given recent cold weather.        Interval History (12/21/20):  She returns today for follow up and imaging review.  She reports that she continues to have pain mainly surrounding the bilateral hip girdles.  She states that she has focal pain in the bilateral gluteal regions when sitting upright.  She also has pain in the bilateral lateral hip regions.  She has undergone multiple greater trochanteric bursa steroid injections in the past.  These were initially effective, but gradually provided less relief with subsequent injections.  She continues to deny any significant low back pain.        Initial Encounter (12/7/20):  Stephanie Sears is a 74 y.o. female who presents today with chronic bilateral low back pain.  This pain has been present for years.  Patient is status post L4-5 fusion in 2006.  She states that she did have some pain radiating into her lower extremities in the past, but her current pain is isolated to the bilateral lower lumbar/lumbosacral regions.  She denies any associated numbness, tingling, weakness, bowel bladder dysfunction.  The pain is constant worse with prolonged sitting and standing..   This pain is described in detail below.    Physical Therapy:  Yes.    Non-pharmacologic Treatment:  Rest helps         TENS?  No    Pain Medications:         Currently taking:  gabapentin    Has tried in the past:   Tramadol, NSAIDs, Tylenol, meloxicam    Has not tried:  TCAs, SNRIs, topical creams    Blood thinners:  None    Interventional Therapies:    2/26/21 - bilateral ischial bursa injections - 90% relief for at least 9 months  12/23/21 - bilateral ischial bursa steroid injections - 60% relief  09/06/2023 - bilateral GTB steroid injections - 90% relief    Relevant Surgeries:   L4-5 fusion in 2006    Affecting sleep?  Yes    Affecting daily activities? yes    Depressive symptoms? no          SI/HI? No    Work status: Retired    Pain Scores:    Best:       4/10  Worst:     10/10  Usually:   7/10  Today:    7/10    Pain Disability Index  Family/Home Responsibilities:: 6  Recreation:: 6  Social Activity:: 4  Sexual Behavior:: 3  Self Care:: 4  Life-Support Activities:: 3(     Review of Systems   Constitutional:  Negative for activity change, appetite change, chills, fatigue, fever and unexpected weight change.   HENT:  Negative for hearing loss.    Eyes:  Negative for visual disturbance.   Respiratory:  Negative for chest tightness and shortness of breath.    Cardiovascular:  Negative for chest pain.   Gastrointestinal:  Negative for abdominal pain, constipation, diarrhea, nausea and vomiting.   Genitourinary:  Negative for difficulty urinating.   Musculoskeletal:  Positive for arthralgias, back pain, gait problem, myalgias, neck pain and neck stiffness.   Skin:  Negative for rash.   Neurological:  Negative for dizziness, weakness, light-headedness, numbness and headaches.   Psychiatric/Behavioral:  Positive for sleep disturbance. Negative for hallucinations and suicidal ideas. The patient is not nervous/anxious.        Past Medical History:   Diagnosis Date    Acute pancreatitis     Angina pectoris     Anxiety     Arthritis     Asthma     as a child    Back pain     Bronchitis     Cervical spondylosis with radiculopathy 07/10/2012    Chest pain     Chronic neck pain 07/26/2012    Colon polyps     COPD (chronic obstructive  pulmonary disease)     Coronary artery disease     Depression     Fibrocystic breast     General anesthetics causing adverse effect in therapeutic use     trouble coming out of anes/ had the shakes    GERD (gastroesophageal reflux disease)     Glaucoma     Heart failure     Hyperlipidemia     Hypertension     Neck pain 07/10/2012    Obesity     JAM (obstructive sleep apnea)     Pneumonia     Pneumonia due to other staphylococcus     Primary osteoarthritis of both knees     Psoriasis     Subacromial or subdeltoid bursitis 07/10/2012    Thyroid disease     Tobacco dependence     Trouble in sleeping     Type 2 diabetes mellitus 12/10/2013    Type 2 diabetes mellitus with diabetic chronic kidney disease        Past Surgical History:   Procedure Laterality Date    APPENDECTOMY      BREAST BIOPSY Bilateral 1988    BREAST MASS EXCISION Right     benign    CATARACT EXTRACTION W/  INTRAOCULAR LENS IMPLANT Left 12/09/2020    PHACO IOL WITH I-STENT ()    CATARACT EXTRACTION W/  INTRAOCULAR LENS IMPLANT Right 10/21/2020    PHACO IOL WITH I-STENT x 2 ()    CHOLECYSTECTOMY      COLONOSCOPY N/A 05/22/2019    Procedure: COLONOSCOPY;  Surgeon: Darrin Calvin MD;  Location: [facility] ([address] [place] [address]);  Service: Endoscopy;  Laterality: N/A;  2nd floor - all other procedure done on 2, multiple comorbidities - ERW/   change in MD schedule, Pt notified and verbalized understanding, new arrival time 0800, Ins mailed to Pt with new arrival time - ERW1/8/19@1130    COLONOSCOPY N/A 09/26/2022    Procedure: COLONOSCOPY;  Surgeon: Darrin Calvin MD;  Location: [facility] ([address] [place] [address]);  Service: Endoscopy;  Laterality: N/A;  2nd floor d/t previous anesthesia complications  vaccinated  inst mailed  clear liquids 4 hr prior-AM prep-LW  I should be able to complete this with the slim pediatric colonoscope, but should have the single-balloon available in case needed.    EPIDURAL STEROID INJECTION Bilateral 02/26/2021    Procedure:  Injection, Steroid, Ischial Bursa;  Surgeon: Yonatan Garsia Jr., MD;  Location: [facility] ENDO;  Service: Pain Management;  Laterality: Bilateral;  Bilateral Ischial Bursa Steroid Injections  Arrive @ 1230; No ATC; Check BG    ESOPHAGOGASTRODUODENOSCOPY      HYSTERECTOMY  1980's    IMPLANTATION OF DEVICE FOR GLAUCOMA Right 10/21/2020    Procedure: INSERTION, DEVICE, FOR GLAUCOMA/ i Stent;  Surgeon: Melany Tse MD;  Location: [address];  Service: Ophthalmology;  Laterality: Right;    IMPLANTATION OF DEVICE FOR GLAUCOMA Left 12/09/2020    Procedure: INSERTION, DEVICE, FOR GLAUCOMA/I SENT;  Surgeon: Melany Tse MD;  Location: [facility] OR [address] [place] [address];  Service: Ophthalmology;  Laterality: Left;    INJECTION OF JOINT Bilateral 12/23/2021    Procedure: Injection, Joint---BILATERAL ISCHIAL BURSA STEROID INJECTION;  Surgeon: Yonatan Garsia Jr., MD;  Location: [facility]IN [facility];  Service: Pain Management;  Laterality: Bilateral;    INTRAOCULAR PROSTHESES INSERTION Right 10/21/2020    Procedure: INSERTION, IOL PROSTHESIS;  Surgeon: Melany Tse MD;  Location: [facility] OR [address] [place] [address];  Service: Ophthalmology;  Laterality: Right;    INTRAOCULAR PROSTHESES INSERTION Left 12/09/2020    Procedure: INSERTION, IOL PROSTHESIS;  Surgeon: Melany Tse MD;  Location: [address];  Service: Ophthalmology;  Laterality: Left;    KNEE SURGERY Left 01/20/2016    TKR    KNEE SURGERY Right 02/26/2018    TKR    L4-L5 fusion  2006    PHACOEMULSIFICATION OF CATARACT Right 10/21/2020    Procedure: PHACOEMULSIFICATION, CATARACT;  Surgeon: Melany Tse MD;  Location: [address];  Service: Ophthalmology;  Laterality: Right;    PHACOEMULSIFICATION OF CATARACT Left 12/09/2020    Procedure: PHACOEMULSIFICATION, CATARACT;  Surgeon: Melany Tse MD;  Location: [facility] OR [address] [place] [address];  Service: Ophthalmology;  Laterality: Left;    YAG CAPSULOTOMY Left 12/07/2023           Social History  "    Socioeconomic History    Marital status: Single   Tobacco Use    Smoking status: Every Day     Current packs/day: 0.50     Average packs/day: 0.4 packs/day for 54.6 years (22.1 ttl pk-yrs)     Types: Cigarettes     Start date: 1970    Smokeless tobacco: Never    Tobacco comments:     Patient is enrolled in smoking cessation. Comprehensive smoking hx was updated on 2/28/24 by MARQUIS Cherry RRT,MSW,LMSW,TTS   Substance and Sexual Activity    Alcohol use: No     Alcohol/week: 0.0 standard drinks of alcohol    Drug use: No    Sexual activity: Yes     Partners: Male     Social Determinants of Health     Financial Resource Strain: Patient Unable To Answer (8/9/2024)    Overall Financial Resource Strain (CARDIA)     Difficulty of Paying Living Expenses: Patient unable to answer   Food Insecurity: No Food Insecurity (8/9/2024)    Hunger Vital Sign     Worried About Running Out of Food in the Last Year: Never true     Ran Out of Food in the Last Year: Never true   Transportation Needs: No Transportation Needs (8/9/2024)    PRAPARE - Transportation     Lack of Transportation (Medical): No     Lack of Transportation (Non-Medical): No   Physical Activity: Inactive (8/9/2024)    Exercise Vital Sign     Days of Exercise per Week: 0 days     Minutes of Exercise per Session: 0 min   Stress: No Stress Concern Present (8/9/2024)    [employer] - Occupational Stress Questionnaire     Feeling of Stress : Not at all   Housing Stability: Low Risk  (8/9/2024)    Housing Stability Vital Sign     Unable to Pay for Housing in the Last Year: No     Homeless in the Last Year: No       Review of patient's allergies indicates:   Allergen Reactions    Hydrocodone Shortness Of Breath    Iodinated contrast media Shortness Of Breath     Difficulty breathing    Adhesive Itching     Skin peeling    Latex, natural rubber Other (See Comments)     "Takes skin off"    Morphine Hallucinations    Oxycodone Hallucinations    " Sulfa (sulfonamide antibiotics) Hives and Itching       Current Outpatient Medications on File Prior to Visit   Medication Sig Dispense Refill    albuterol (PROVENTIL/VENTOLIN HFA) 90 mcg/actuation inhaler USE 2 INHALATIONS EVERY 4 HOURS AS NEEDED FOR WHEEZING (RESCUE) 18 g 3    amLODIPine-benazepriL (LOTREL) 10-40 mg per capsule TAKE 1 CAPSULE DAILY 90 capsule 1    atorvastatin (LIPITOR) 40 MG tablet TAKE 1 TABLET DAILY (DISCONTINUE REFILLS ON ATORVASTATIN 20 MG) 90 tablet 2    carvediloL (COREG) 12.5 MG tablet TAKE 1 TABLET TWICE A DAY WITH MEALS 180 tablet 3    chlorthalidone (HYGROTEN) 25 MG Tab Take 1 tablet (25 mg total) by mouth every other day. 60 tablet 6    gabapentin (NEURONTIN) 600 MG tablet Take 1 tablet (600 mg total) by mouth 2 (two) times daily. (Patient taking differently: Take 600 mg by mouth every other day.) 180 tablet 3    INULIN (FIBER GUMMIES ORAL) Take 1 each by mouth every morning.       lancets (ACCU-CHEK MULTICLIX LANCET) Misc Test blood sugar twice daily 300 each 3    nicotine, polacrilex, 2 mg lzmn Take 1 each (2 mg total) by mouth as needed (Use 1 lozenge as needed in the place of a cigarette every 2-4 hours as needed.  Maximum of 5 per day.). 150 each 0    simethicone (GAS-X ORAL) Take 1 tablet by mouth as needed.       blood-glucose meter kit Check blood glucose QD with insurance preferred glucometer 1 each 0     No current facility-administered medications on file prior to visit.       Objective:      /63 (BP Location: Left arm, Patient Position: Sitting, BP Method: Medium (Automatic))   Pulse (!) 59   SpO2 100%     Exam:  GEN:  Well developed, well nourished.  No acute distress.  Normal pain behavior.  HEENT:  No trauma.  Mucous membranes moist.  Nares patent bilaterally.  PSYCH: Normal affect. Thought content appropriate.  CHEST:  Breathing symmetric.  No audible wheezing.  ABD: Soft, non-distended.  SKIN:  Warm, pink, dry.  No rash on exposed areas.    EXT:  No cyanosis,  clubbing, or edema.  No color change or changes in nail or hair growth.  NEURO/MUSCULOSKELETAL:  Fully alert, oriented, and appropriate. Speech normal janneth. No cranial nerve deficits.   Gait:  Antalgic.  No trendelenburg sign bilaterally.     TTP over bilateral GTB with full reproduction of symptom        Imaging:  Narrative & Impression    EXAMINATION:  MRI LUMBAR SPINE WITHOUT CONTRAST     CLINICAL HISTORY:  Lumbar radiculopathy, symptoms persist with conservative treatment; Radiculopathy, lumbar region     TECHNIQUE:  Multiplanar, multisequence MR images were acquired from the thoracolumbar junction to the sacrum without the administration of contrast.     COMPARISON:  MRI lumbar spine 12/17/2020     FINDINGS:  Postoperative changes from L4-5 interbody and posterior instrumented fusion.  Susceptibility artifact degrades evaluation of adjacent structures.  Unchanged alignment.  There is osseous fusion across the facet joints.  No fluid collections in the operative bed.     ALIGNMENT: Levocurvature centered at L3.  Grade 1 anterolisthesis at L3-4.     BONE: No compression fractures.  No marrow replacing lesions.     JOINT: Multilevel degenerative changes with disc desiccation, disc height loss, and facet arthropathy, described in detail below.  Mild endplate edema at L3-4 and L5-S1.     SPINAL CANAL: The conus medullaris has a normal appearance and terminates at the L2 level.  Cauda equina nerve roots are unremarkable.  No mass or collection.     PARASPINAL SOFT TISSUES: Left renal cyst.  Colonic diverticulosis.  Paraspinal muscle atrophy.     SIGNIFICANT FINDINGS BY LEVEL:     Lower thoracic spine: Disc bulges and facet arthropathy resulting in mild-moderate canal and foraminal stenosis at multiple levels.     T12-L1: Unremarkable.     L1-2: Unremarkable.     L2-3: Unremarkable.     L3-4: Disc bulge with posterior disc uncovering.  Severe bilateral facet arthropathy and ligamentum flavum thickening.  Severe  canal stenosis.  Severe right and mild left foraminal stenosis.     L4-5: Postoperative changes from interbody and posterior fusion.  Residual endplate osteophytes protruding into the left paracentral zone.  No significant canal stenosis.  Mild right and moderate left foraminal stenosis.     L5-S1: Disc bulge.  Advanced facet arthropathy and ligamentum flavum thickening.  Moderate canal stenosis.  Moderate right and severe left foraminal stenosis.     Impression:     Postoperative changes from L4-5 fusion.     Multilevel degenerative changes, most advanced at L3-4 and L5-S1 as detailed above.  No significant changes from 12/17/2020.        Electronically signed by: Parish Grijalva  Date:                                            09/15/2023  Time:                                           14:33       Narrative & Impression    EXAMINATION:  MRI LUMBAR SPINE WITHOUT CONTRAST     CLINICAL HISTORY:  s/p lumbar fusion; Other intervertebral disc degeneration, lumbar region     TECHNIQUE:  Multiplanar, multisequence MR images of the lumbar spine were performed without the administration of contrast.     COMPARISON:  Lumbar spine radiograph 01/23/2019; CT abdomen contrast 11/16/2012; MRI lumbar spine 10/26/2007     FINDINGS:  Alignment: Grade 1 anterolisthesis of L3 on L4.  Grade 1 retrolisthesis of L5 on S1.     Vertebrae: Postoperative changes from previous L4-5 posterior spinal fusion with bilateral transpedicular screws and posterior stabilizing bars and interbody spacer.  Normal marrow signal. No fracture.     Discs: Mild multilevel intervertebral disc height loss and desiccation.     Cord: Normal.  Conus terminates normally at L2.  Cauda equina appears normal.     Degenerative findings:     T10-T11: Diffuse disc bulge and bilateral facet arthropathy resulting in mild central canal stenosis, moderate left, and mild right neural foraminal narrowing.     T11-T12: Diffuse disc bulge and bilateral facet arthropathy resulting  in moderate bilateral neural foraminal narrowing and mild central canal stenosis.     T12-L1: No significant disc abnormality.  Mild bilateral facet arthropathy without significant neural foraminal narrowing or central canal stenosis.     L1-L2: No significant disc abnormality.  Mild bilateral facet arthropathy without significant neural foraminal narrowing or central canal stenosis.     L2-L3: No significant disc abnormality.  Mild bilateral facet arthropathy without significant neural foraminal narrowing or central canal stenosis.     L3-L4: Diffuse disc bulge, moderate bilateral facet joint osseous hypertrophy, and ligamentum flavum buckling contribute to moderate central canal stenosis, moderate right, and mild left neural foraminal narrowing.     L4-L5: Postoperative change of previous decompression and posterior spinal fusion.  Productive changes contribute to mild left neural foraminal narrowing.     L5-S1: Diffuse disc bulge, bilateral facet arthropathy, and ligamentum flavum buckling resulting in severe left and moderate right neural foraminal narrowing and mild central canal stenosis.     Paraspinal muscles & soft tissues: Mild fatty atrophy of the lower paraspinous musculature.  No paraspinal fluid collections.  Upper sacrum and sacroiliac joints are unremarkable.     Impression:     1. Postsurgical changes of L4-L5 fusion.  2. Multilevel degenerative changes as detailed above.  Moderate spinal canal stenosis noted at L3-L4.  Moderate neural foraminal narrowing noted at L3-L4 and L5-S1, as well as within the lower thoracic spine.     Electronically signed by resident: Naveen Resednez  Date:                                            12/17/2020  Time:                                           09:53     Electronically signed by: Mitch Pappas MD  Date:                                            12/17/2020  Time:                                           14:49           Narrative &  Impression    EXAMINATION:  XR LUMBAR SPINE 5 VIEW WITH FLEX AND EXT     CLINICAL HISTORY:  Low back pain, cauda equina syndrome suspected;  Lumbago with sciatica, left side     TECHNIQUE:  Five views of the lumbar spine plus flexion extension views were performed.     COMPARISON:  02/25/2014     FINDINGS:  The lumbar vertebra are intact.  No compression fracture or obvious paraspinal lesion is detected.  Degenerative changes are present with small osteophytes at most levels.  There is progressive disc space narrowing at L3-4; this level also shows development of a grade 1 anterolisthesis, stable between flexion and extension.  The other lumbar disc spaces show no significant narrowing, but lower thoracic disc spaces are narrowed, some with vacuum disc.     Postoperative findings from posterior instrumented fusion are again seen at L4 and 5 with bilateral pedicle screws, vertical connecting rods, and a device in the disc space.     Visualized abdomen shows aortic atherosclerosis.     IMPRESSION:      Stable postoperative findings of L4-5 fusion     Degenerative spine changes with interval worsening at L3-4.        Electronically signed by: Valentín Booker MD  Date:                                            01/23/2019  Time:                                           10:16         Assessment:       Encounter Diagnoses   Name Primary?    Greater trochanteric bursitis of both hips     S/P lumbar fusion     DDD (degenerative disc disease), lumbar Yes    Lumbar spondylosis     Spinal stenosis of lumbar region with neurogenic claudication     Lumbar radiculopathy              Plan:       Stephanie was seen today for follow-up.    Diagnoses and all orders for this visit:    DDD (degenerative disc disease), lumbar    Greater trochanteric bursitis of both hips  -     triamcinolone acetonide injection 40 mg  -     triamcinolone acetonide injection 40 mg    S/P lumbar fusion    Lumbar spondylosis    Spinal stenosis of lumbar  region with neurogenic claudication    Lumbar radiculopathy              Stephanie Sears is a 74 y.o. female with midline lower back and bilateral lower extremity pain.  Consistent with lower lumbar radiculopathy.  History of a L4-5 fusion.  Adjacent segment degenerative disc disease at L3-4 and L5-S1 levels.  Updated lumbar MRI confirming this, severe central canal stenosis at L3-4 as well as bilateral foraminal narrowing at this level.  Also with significant central and foraminal stenosis at L5-S1.  Patient has been having good improvement with physical therapy/home exercise program.  Today with bilateral greater trochanteric bursitis.  Significant benefit from previous bilateral GTB steroid injections in the past    Prior records reviewed.  Pertinent imaging studies reviewed by me. Imaging results were discussed with patient.  Bilateral GTB steroid injections completed in clinic today.  See note below.  Continue with physical therapy for ROM, strengthening, stretching and home exercise program.  Patient noting overall benefit before her lower back pain with physical therapy would like to continue with this option.  Stressed the importance of maintaining regular home exercise program and being mindful of how they use their back throughout the day.  Patient expressed understanding and agreement.  Return to clinic as needed.  Patient may be a good candidate for epidural steroid injections versus possible via disc.  Advised to message or call via [employer] in the future if patient's pain persists/worsens.    bilateral Greater Trochanteric bursa Steroid Injection     Risks vs. Benefits were discussed with patient. Patient expressed understanding and written consent was obtained. The bilateral greater trochanter was palpated and marked.  The lateral hip(s) was/were draped and prepped with chrolaprep. A 27 gauge 1.5 inch needle was advanced to the right greater trochanter. After negative aspiration, 40 mg of Kenalog and  2.0 cc of 1% lidocaine were injected.  The same procedures were performed on the left side.  The patient tolerated the procedure well, without any complications or bleeding. She was discharged from clinic in good condition.            This note was created by combination of typed  and M-Modal dictation.  Transcription and phonetic errors may be present.  If there are any questions, please contact me.

## 2024-08-15 ENCOUNTER — PATIENT OUTREACH (OUTPATIENT)
Dept: ADMINISTRATIVE | Facility: HOSPITAL | Age: 74
End: 2024-08-15
Payer: MEDICARE

## 2024-08-16 ENCOUNTER — LAB VISIT (OUTPATIENT)
Dept: LAB | Facility: HOSPITAL | Age: 74
End: 2024-08-16
Attending: FAMILY MEDICINE
Payer: MEDICARE

## 2024-08-16 DIAGNOSIS — N18.31 STAGE 3A CHRONIC KIDNEY DISEASE: Chronic | ICD-10-CM

## 2024-08-16 DIAGNOSIS — I10 ESSENTIAL HYPERTENSION: Chronic | ICD-10-CM

## 2024-08-16 DIAGNOSIS — E78.5 DYSLIPIDEMIA: Chronic | ICD-10-CM

## 2024-08-16 DIAGNOSIS — E11.69 TYPE 2 DIABETES MELLITUS WITH OTHER SPECIFIED COMPLICATION, WITHOUT LONG-TERM CURRENT USE OF INSULIN: Chronic | ICD-10-CM

## 2024-08-16 LAB
ALBUMIN SERPL BCP-MCNC: 3.1 G/DL (ref 3.5–5.2)
ALBUMIN/CREAT UR: 348 UG/MG (ref 0–30)
ALP SERPL-CCNC: 112 U/L (ref 55–135)
ALT SERPL W/O P-5'-P-CCNC: 15 U/L (ref 10–44)
ANION GAP SERPL CALC-SCNC: 9 MMOL/L (ref 8–16)
AST SERPL-CCNC: 17 U/L (ref 10–40)
BASOPHILS # BLD AUTO: 0.02 K/UL (ref 0–0.2)
BASOPHILS NFR BLD: 0.3 % (ref 0–1.9)
BILIRUB SERPL-MCNC: 0.3 MG/DL (ref 0.1–1)
BUN SERPL-MCNC: 24 MG/DL (ref 8–23)
CALCIUM SERPL-MCNC: 9.3 MG/DL (ref 8.7–10.5)
CHLORIDE SERPL-SCNC: 110 MMOL/L (ref 95–110)
CHOLEST SERPL-MCNC: 123 MG/DL (ref 120–199)
CHOLEST/HDLC SERPL: 3.2 {RATIO} (ref 2–5)
CO2 SERPL-SCNC: 23 MMOL/L (ref 23–29)
CREAT SERPL-MCNC: 1.3 MG/DL (ref 0.5–1.4)
CREAT UR-MCNC: 171 MG/DL (ref 15–325)
DIFFERENTIAL METHOD BLD: ABNORMAL
EOSINOPHIL # BLD AUTO: 0.1 K/UL (ref 0–0.5)
EOSINOPHIL NFR BLD: 1.7 % (ref 0–8)
ERYTHROCYTE [DISTWIDTH] IN BLOOD BY AUTOMATED COUNT: 15.6 % (ref 11.5–14.5)
EST. GFR  (NO RACE VARIABLE): 43 ML/MIN/1.73 M^2
ESTIMATED AVG GLUCOSE: 123 MG/DL (ref 68–131)
GLUCOSE SERPL-MCNC: 113 MG/DL (ref 70–110)
HBA1C MFR BLD: 5.9 % (ref 4–5.6)
HCT VFR BLD AUTO: 35.6 % (ref 37–48.5)
HDLC SERPL-MCNC: 38 MG/DL (ref 40–75)
HDLC SERPL: 30.9 % (ref 20–50)
HGB BLD-MCNC: 11.3 G/DL (ref 12–16)
IMM GRANULOCYTES # BLD AUTO: 0.03 K/UL (ref 0–0.04)
IMM GRANULOCYTES NFR BLD AUTO: 0.5 % (ref 0–0.5)
LDLC SERPL CALC-MCNC: 71 MG/DL (ref 63–159)
LYMPHOCYTES # BLD AUTO: 2.1 K/UL (ref 1–4.8)
LYMPHOCYTES NFR BLD: 32.1 % (ref 18–48)
MCH RBC QN AUTO: 25.9 PG (ref 27–31)
MCHC RBC AUTO-ENTMCNC: 31.7 G/DL (ref 32–36)
MCV RBC AUTO: 82 FL (ref 82–98)
MICROALBUMIN UR DL<=1MG/L-MCNC: 595 UG/ML
MONOCYTES # BLD AUTO: 0.5 K/UL (ref 0.3–1)
MONOCYTES NFR BLD: 7.1 % (ref 4–15)
NEUTROPHILS # BLD AUTO: 3.9 K/UL (ref 1.8–7.7)
NEUTROPHILS NFR BLD: 58.3 % (ref 38–73)
NONHDLC SERPL-MCNC: 85 MG/DL
NRBC BLD-RTO: 0 /100 WBC
PLATELET # BLD AUTO: 291 K/UL (ref 150–450)
PMV BLD AUTO: 11.2 FL (ref 9.2–12.9)
POTASSIUM SERPL-SCNC: 4 MMOL/L (ref 3.5–5.1)
PROT SERPL-MCNC: 6.3 G/DL (ref 6–8.4)
RBC # BLD AUTO: 4.37 M/UL (ref 4–5.4)
SODIUM SERPL-SCNC: 142 MMOL/L (ref 136–145)
TRIGL SERPL-MCNC: 70 MG/DL (ref 30–150)
WBC # BLD AUTO: 6.66 K/UL (ref 3.9–12.7)

## 2024-08-16 PROCEDURE — 82043 UR ALBUMIN QUANTITATIVE: CPT | Performed by: FAMILY MEDICINE

## 2024-08-16 PROCEDURE — 80061 LIPID PANEL: CPT | Performed by: FAMILY MEDICINE

## 2024-08-16 PROCEDURE — 83036 HEMOGLOBIN GLYCOSYLATED A1C: CPT | Performed by: FAMILY MEDICINE

## 2024-08-16 PROCEDURE — 82570 ASSAY OF URINE CREATININE: CPT | Performed by: FAMILY MEDICINE

## 2024-08-16 PROCEDURE — 36415 COLL VENOUS BLD VENIPUNCTURE: CPT | Mod: PN | Performed by: FAMILY MEDICINE

## 2024-08-16 PROCEDURE — 80053 COMPREHEN METABOLIC PANEL: CPT | Performed by: FAMILY MEDICINE

## 2024-08-16 PROCEDURE — 85025 COMPLETE CBC W/AUTO DIFF WBC: CPT | Performed by: FAMILY MEDICINE

## 2024-08-21 ENCOUNTER — PATIENT OUTREACH (OUTPATIENT)
Dept: EMERGENCY MEDICINE | Facility: HOSPITAL | Age: 74
End: 2024-08-21
Payer: MEDICARE

## 2024-08-21 NOTE — PROGRESS NOTES
Attempted to phone patient for appointment reminder. Patient was unavailable. Message left on voice mail reminding patient of upcoming appointment on 8/23/24 at 9:20 with Dr Rossi [facility], Family Medicine [address] [place] [address], [facility] [place] [id number] [phone].  Eve Dowell

## 2024-08-21 NOTE — PROGRESS NOTES
Phoned patient for follow-up. Patient was unavailable. Message left on voice mail urging patient to call if additional assistance is needed.  Eve Dowell

## 2024-08-23 ENCOUNTER — OFFICE VISIT (OUTPATIENT)
Dept: FAMILY MEDICINE | Facility: CLINIC | Age: 74
End: 2024-08-23
Payer: MEDICARE

## 2024-08-23 VITALS
HEIGHT: 62 IN | OXYGEN SATURATION: 99 % | WEIGHT: 179.88 LBS | BODY MASS INDEX: 33.1 KG/M2 | RESPIRATION RATE: 18 BRPM | DIASTOLIC BLOOD PRESSURE: 72 MMHG | SYSTOLIC BLOOD PRESSURE: 160 MMHG | HEART RATE: 50 BPM

## 2024-08-23 DIAGNOSIS — N18.32 STAGE 3B CHRONIC KIDNEY DISEASE: ICD-10-CM

## 2024-08-23 DIAGNOSIS — E11.69 TYPE 2 DIABETES MELLITUS WITH OTHER SPECIFIED COMPLICATION, WITHOUT LONG-TERM CURRENT USE OF INSULIN: Chronic | ICD-10-CM

## 2024-08-23 DIAGNOSIS — I10 ESSENTIAL HYPERTENSION: Primary | Chronic | ICD-10-CM

## 2024-08-23 DIAGNOSIS — E78.5 DYSLIPIDEMIA: Chronic | ICD-10-CM

## 2024-08-23 PROCEDURE — 99214 OFFICE O/P EST MOD 30 MIN: CPT | Mod: PBBFAC,PN | Performed by: FAMILY MEDICINE

## 2024-08-23 PROCEDURE — G2211 COMPLEX E/M VISIT ADD ON: HCPCS | Mod: S$PBB,,, | Performed by: FAMILY MEDICINE

## 2024-08-23 PROCEDURE — 99999 PR PBB SHADOW E&M-EST. PATIENT-LVL IV: CPT | Mod: PBBFAC,,, | Performed by: FAMILY MEDICINE

## 2024-08-23 PROCEDURE — 99214 OFFICE O/P EST MOD 30 MIN: CPT | Mod: S$PBB,,, | Performed by: FAMILY MEDICINE

## 2024-08-23 RX ORDER — HYDRALAZINE HYDROCHLORIDE 10 MG/1
10 TABLET, FILM COATED ORAL EVERY 12 HOURS
Qty: 60 TABLET | Refills: 0 | Status: SHIPPED | OUTPATIENT
Start: 2024-08-23

## 2024-08-24 NOTE — PROGRESS NOTES
"HPI    DLS: 4/22/2024    Pt here for HVF review/OCT;  Pt states no eye pain or discomfort.     Med;  AT's PRN OU  Intolerant to multiple eye drops / ? RAGHU allergy   H/O ALT ou - yrs ago // H/O SLT's od 7/2008  H/O I-stents ou 2020 1  Preperimetric POAG vs OHT   2. PVD OD   3. PCIOL OU   4. Blepharit is / MGD   5. RAGHU Allergy   6. PCO OU     Last edited by Melany Tse MD on 8/26/2024 10:13 AM.            Assessment /Plan     For exam results, see Encounter Report.    Primary open-angle glaucoma, bilateral, mild stage  -     Tan Visual Field - OU - Extended - Both Eyes  -     Posterior Segment OCT Optic Nerve- Both eyes    PCO (posterior capsular opacification), left    Epiretinal membrane (ERM) of left eye    Pseudophakia, both eyes    Status post glaucoma surgery        1. Pre-perimetric mild POAG   Vs OHT  -Followed at [facility] since 1991   -First HVF 1999   -First photos 1991   - Intolerant to all gtts - they aggravate his blepharitis-? RAGHU allergy   -IOP "OK" off gtts and s/p ALT ou and SLT od - 2008    Family history neg   Glaucoma meds none (( off gtts post SLT ou -))   H/O adverse rxn to glaucoma drops Intolerant to all gtts - 2/2 aggravates blepharitis- ? RAGHU allergy   LASERS ALT ou -? Date / SLT OD 7/17/08 - good response   GLAUCOMA SURGERIES - I stent x 2 - OD  - 10/21/2020 // I stent 12/9/2020 - OS   OTHER EYE SURGERIES - phaco/IOL od - 10/21/2020 - PCB00 14.0  // oS 12/9/2020 - pcb00 15.0   CDR 0.6/0.3   Tbase 19-26 / 16-21   Tmax 26/21   Ttarget ? About 18 ou  HVF 16  test - 1999 to 2024 - SNS od since 2021 // full os   Gonio +3 ou   /588   OCT 6 test 2018 to 2024 -  RNFL - OD: bord  G/N/TI (stable since 2022  // OS:NL- stable since 2018   HRT 9 test 2004 to 2020 -MR -  MR -  Dec T, border NI od // full os /// CDR 0.619 od // 0.508 os - but unreliable OD  Disc photos 1991, 1996, 2003 - slides // 2012 , 2016, 2020   - OIS     - Ttoday  16/16  - Test done today   IOP // HVF ou // " DFE / OCT       2. Posterior Vitreous Detachment OD - 11/2008   - RD PRECAUTIONS    3. Eyelid inflammation / Blepharitis / MGD    4. Allergic Conjunctivitis - RAGHU allergy     5. PCO    Vis sign os - s/p yag cap os 12/7/2023 - [facility] improved post yag cap    Mild od // + ant capsule phimosis od     6. PC IOL OU (w/ istents)    OD 10/21/202 - PCB00 14.0   OS 12/9/2020 - PCB00 15.0     Plan   POAG - mild OD // pre-perimetric os // - intolerant to all gtts   IOP ok s/p ALT ou - years ago and s/p SLT OD 2008 ( no SLT os)  Continue to monitor HVF/DFE/OCT/HRT/photos/IOP   If increase IOP or progression on VF testing consider repeat SLT OD and primary SLT os      Pt was a myope - sph eq is -5.25 od and -4.25 os - pre- phac0 -- set for distance post op    OCT macula - NO CME     Rx for bifocals  Given - or ok to use no distance correction and OTC readers - re-printed 3/28/2022  Pt mostly uses the [employer] reading glasses     Photo file updated 7/13/2023    If IOP too high off gtts and post I-stent - can try a SLT - OS and a repeat SLT od - can skip the istent areas // (( H/O ALT's many years ago)    - or can re-try some gtts (H/O intol to all galucoma gtts in past - ? RAGHU intol) -  ?? If could try travatan Z again in future again - non RAGHU // or ?? Try alphagan P     F/U 5-6 months IOP // gonio

## 2024-08-26 ENCOUNTER — OFFICE VISIT (OUTPATIENT)
Dept: OPHTHALMOLOGY | Facility: CLINIC | Age: 74
End: 2024-08-26
Payer: MEDICARE

## 2024-08-26 ENCOUNTER — CLINICAL SUPPORT (OUTPATIENT)
Dept: OPHTHALMOLOGY | Facility: CLINIC | Age: 74
End: 2024-08-26
Payer: MEDICARE

## 2024-08-26 DIAGNOSIS — H40.1131 PRIMARY OPEN-ANGLE GLAUCOMA, BILATERAL, MILD STAGE: Primary | ICD-10-CM

## 2024-08-26 DIAGNOSIS — Z96.1 PSEUDOPHAKIA, BOTH EYES: ICD-10-CM

## 2024-08-26 DIAGNOSIS — Z98.83 STATUS POST GLAUCOMA SURGERY: ICD-10-CM

## 2024-08-26 DIAGNOSIS — H26.492 PCO (POSTERIOR CAPSULAR OPACIFICATION), LEFT: ICD-10-CM

## 2024-08-26 DIAGNOSIS — H35.372 EPIRETINAL MEMBRANE (ERM) OF LEFT EYE: ICD-10-CM

## 2024-08-26 PROCEDURE — 99999 PR PBB SHADOW E&M-EST. PATIENT-LVL III: CPT | Mod: PBBFAC,,, | Performed by: OPHTHALMOLOGY

## 2024-08-26 PROCEDURE — 99214 OFFICE O/P EST MOD 30 MIN: CPT | Mod: S$PBB,,, | Performed by: OPHTHALMOLOGY

## 2024-08-26 PROCEDURE — 92083 EXTENDED VISUAL FIELD XM: CPT | Mod: PBBFAC | Performed by: OPHTHALMOLOGY

## 2024-08-26 PROCEDURE — 99213 OFFICE O/P EST LOW 20 MIN: CPT | Mod: PBBFAC | Performed by: OPHTHALMOLOGY

## 2024-08-26 PROCEDURE — 92133 CPTRZD OPH DX IMG PST SGM ON: CPT | Mod: PBBFAC | Performed by: OPHTHALMOLOGY

## 2024-08-26 NOTE — PROGRESS NOTES
VISUAL FIELD TEST 24-2 SS-OU-DONE/AB  OU-REL-GOOD-FIX-POOR-COOP-GOOD/AB    PT HAS KNOWN ALLERGIES TO LATEX AND ADHESIVES. USED PIRATE PATCH./AB    MRX: OD -0.75 +1.00 X 180            OS -1.25 +1.00 X 176

## 2024-08-27 ENCOUNTER — TELEPHONE (OUTPATIENT)
Dept: FAMILY MEDICINE | Facility: CLINIC | Age: 74
End: 2024-08-27
Payer: MEDICARE

## 2024-08-27 PROBLEM — N18.32 STAGE 3B CHRONIC KIDNEY DISEASE: Chronic | Status: ACTIVE | Noted: 2022-06-14

## 2024-08-27 NOTE — TELEPHONE ENCOUNTER
Called patient back and she stated that she's been having GI symptoms and causing some constipation - she states that she will not be taking them anymore. I did schedule her for a nurse bp check for next week to see how she does without them.

## 2024-08-27 NOTE — ASSESSMENT & PLAN NOTE
Continue recommendations as per Nephrology.  Continue good hydration and avoiding nephrotoxic agents.

## 2024-08-27 NOTE — TELEPHONE ENCOUNTER
----- Message from Kimberly Morales MA sent at 8/27/2024 10:24 AM CDT -----  Type: Patient Call Back    Who called: self    What is the request in detail: states the medication she was prescribed is messing with her stomach ..     Can the clinic reply by [facility]?    Would the patient rather a call back or a response via [facility]? Yes, call     Best call back number: [phone] (home)

## 2024-08-27 NOTE — PROGRESS NOTES
"  Patient Name: Stephanie Sears    : 1950  MRN: [id number]      Subjective:     Patient ID: Stephanie is a 74 y.o. female    Chief Complaint:  Annual Exam    History of Present Illness  The patient is a 74-year-old female who presents for a routine follow-up of diabetes, blood pressure, kidney function, and cholesterol.    She monitors her blood glucose levels twice a week at home, typically before breakfast.    She continues to smoke.    Her medication regimen includes amlodipine/benazepril once daily and carvedilol 12.5 mg twice daily. She was previously on chlorthalidone every other day, but her nephrologist discontinued this due to its impact on her lab results. She was advised to take it once or twice a week as needed. She recalls being on four different medications, including Lasix, which she could not tolerate daily due to side effects.    She has been under the care of a nephrologist for her kidney condition, with appointments scheduled every six months. Her next appointment is in 2024.    She uses a CPAP machine for sleep apnea and reports that she woke up three times last night, which is unusual for her when using the CPAP mask.      Review of Systems   Constitutional:  Negative for chills, fever and unexpected weight change.   Respiratory:  Negative for chest tightness, shortness of breath and wheezing.    Cardiovascular:  Negative for chest pain and palpitations.   Gastrointestinal:  Negative for abdominal pain, blood in stool and change in bowel habit.   Genitourinary:  Negative for difficulty urinating.   Neurological:  Positive for numbness (feet). Negative for headaches.        Objective:   BP (!) 160/72 (BP Location: Right arm, Patient Position: Sitting, BP Method: Medium (Manual))   Pulse (!) 50   Resp 18   Ht 5' 2" (1.575 m)   Wt 81.6 kg (179 lb 14.3 oz)   SpO2 99%   BMI 32.90 kg/m²     Physical Exam  Vital Signs  Blood pressure reading is 160/72.  Physical Exam  Vitals reviewed. "   Constitutional:       General: She is not in acute distress.     Appearance: She is well-developed. She is not diaphoretic.   HENT:      Head: Normocephalic and atraumatic.      Right Ear: Tympanic membrane, ear canal and external ear normal.      Left Ear: Tympanic membrane, ear canal and external ear normal.      Nose: Nose normal.   Eyes:      General:         Right eye: No discharge.         Left eye: No discharge.      Conjunctiva/sclera: Conjunctivae normal.      Pupils: Pupils are equal, round, and reactive to light.   Neck:      Thyroid: No thyromegaly.      Trachea: No tracheal deviation.   Cardiovascular:      Rate and Rhythm: Normal rate and regular rhythm.      Pulses:           Radial pulses are 2+ on the right side and 2+ on the left side.      Heart sounds: Normal heart sounds, S1 normal and S2 normal. No murmur heard.  Pulmonary:      Effort: Pulmonary effort is normal. No respiratory distress.      Breath sounds: Normal breath sounds. No wheezing, rhonchi or rales.   Abdominal:      General: Bowel sounds are normal. There is no distension.      Palpations: Abdomen is soft. Abdomen is not rigid. There is no mass.      Tenderness: There is no abdominal tenderness. There is no guarding.   Musculoskeletal:      Cervical back: Normal range of motion and neck supple.   Lymphadenopathy:      Cervical: No cervical adenopathy.   Skin:     General: Skin is warm and dry.      Capillary Refill: Capillary refill takes less than 2 seconds.      Findings: No rash.   Neurological:      Mental Status: She is alert and oriented to person, place, and time.   Psychiatric:         Behavior: Behavior normal.       Lab Visit on 08/16/2024   Component Date Value Ref Range Status    WBC 08/16/2024 6.66  3.90 - 12.70 K/uL Final    RBC 08/16/2024 4.37  4.00 - 5.40 M/uL Final    Hemoglobin 08/16/2024 11.3 (L)  12.0 - 16.0 g/dL Final    Hematocrit 08/16/2024 35.6 (L)  37.0 - 48.5 % Final    MCV 08/16/2024 82  82 - 98 fL Final     MCH 08/16/2024 25.9 (L)  27.0 - 31.0 pg Final    MCHC 08/16/2024 31.7 (L)  32.0 - 36.0 g/dL Final    RDW 08/16/2024 15.6 (H)  11.5 - 14.5 % Final    Platelets 08/16/2024 291  150 - 450 K/uL Final    MPV 08/16/2024 11.2  9.2 - 12.9 fL Final    Immature Granulocytes 08/16/2024 0.5  0.0 - 0.5 % Final    Gran # (ANC) 08/16/2024 3.9  1.8 - 7.7 K/uL Final    Immature Grans (Abs) 08/16/2024 0.03  0.00 - 0.04 K/uL Final    Comment: Mild elevation in immature granulocytes is non specific and   can be seen in a variety of conditions including stress response,   acute inflammation, trauma and pregnancy. Correlation with other   laboratory and clinical findings is essential.      Lymph # 08/16/2024 2.1  1.0 - 4.8 K/uL Final    Mono # 08/16/2024 0.5  0.3 - 1.0 K/uL Final    Eos # 08/16/2024 0.1  0.0 - 0.5 K/uL Final    Baso # 08/16/2024 0.02  0.00 - 0.20 K/uL Final    nRBC 08/16/2024 0  0 /100 WBC Final    Gran % 08/16/2024 58.3  38.0 - 73.0 % Final    Lymph % 08/16/2024 32.1  18.0 - 48.0 % Final    Mono % 08/16/2024 7.1  4.0 - 15.0 % Final    Eosinophil % 08/16/2024 1.7  0.0 - 8.0 % Final    Basophil % 08/16/2024 0.3  0.0 - 1.9 % Final    Differential Method 08/16/2024 Automated   Final    Sodium 08/16/2024 142  136 - 145 mmol/L Final    Potassium 08/16/2024 4.0  3.5 - 5.1 mmol/L Final    Chloride 08/16/2024 110  95 - 110 mmol/L Final    CO2 08/16/2024 23  23 - 29 mmol/L Final    Glucose 08/16/2024 113 (H)  70 - 110 mg/dL Final    BUN 08/16/2024 24 (H)  8 - 23 mg/dL Final    Creatinine 08/16/2024 1.3  0.5 - 1.4 mg/dL Final    Calcium 08/16/2024 9.3  8.7 - 10.5 mg/dL Final    Total Protein 08/16/2024 6.3  6.0 - 8.4 g/dL Final    Albumin 08/16/2024 3.1 (L)  3.5 - 5.2 g/dL Final    Total Bilirubin 08/16/2024 0.3  0.1 - 1.0 mg/dL Final    Comment: For infants and newborns, interpretation of results should be based  on gestational age, weight and in agreement with clinical  observations.    Premature Infant recommended reference  ranges:  Up to 24 hours.............<8.0 mg/dL  Up to 48 hours............<12.0 mg/dL  3-5 days..................<15.0 mg/dL  6-29 days.................<15.0 mg/dL      Alkaline Phosphatase 08/16/2024 112  55 - 135 U/L Final    AST 08/16/2024 17  10 - 40 U/L Final    ALT 08/16/2024 15  10 - 44 U/L Final    eGFR 08/16/2024 43 (A)  >60 mL/min/1.73 m^2 Final    Anion Gap 08/16/2024 9  8 - 16 mmol/L Final    Hemoglobin A1C 08/16/2024 5.9 (H)  4.0 - 5.6 % Final    Comment: ADA Screening Guidelines:  5.7-6.4%  Consistent with prediabetes  >or=6.5%  Consistent with diabetes    High levels of fetal hemoglobin interfere with the HbA1C  assay. Heterozygous hemoglobin variants (HbS, HgC, etc)do  not significantly interfere with this assay.   However, presence of multiple variants may affect accuracy.      Estimated Avg Glucose 08/16/2024 123  68 - 131 mg/dL Final    Cholesterol 08/16/2024 123  120 - 199 mg/dL Final    Comment: The National Cholesterol Education Program (NCEP) has set the  following guidelines (reference ranges) for Cholesterol:  Optimal.....................<200 mg/dL  Borderline High.............200-239 mg/dL  High........................> or = 240 mg/dL      Triglycerides 08/16/2024 70  30 - 150 mg/dL Final    Comment: The National Cholesterol Education Program (NCEP) has set the  following guidelines (reference values) for triglycerides:  Normal......................<150 mg/dL  Borderline High.............150-199 mg/dL  High........................200-499 mg/dL      HDL 08/16/2024 38 (L)  40 - 75 mg/dL Final    Comment: The National Cholesterol Education Program (NCEP) has set the  following guidelines (reference values) for HDL Cholesterol:  Low...............<40 mg/dL  Optimal...........>60 mg/dL      LDL Cholesterol 08/16/2024 71.0  63.0 - 159.0 mg/dL Final    Comment: The National Cholesterol Education Program (NCEP) has set the  following guidelines (reference values) for LDL  Cholesterol:  Optimal.......................<130 mg/dL  Borderline High...............130-159 mg/dL  High..........................160-189 mg/dL  Very High.....................>190 mg/dL      HDL/Cholesterol Ratio 08/16/2024 30.9  20.0 - 50.0 % Final    Total Cholesterol/HDL Ratio 08/16/2024 3.2  2.0 - 5.0 Final    Non-HDL Cholesterol 08/16/2024 85  mg/dL Final    Comment: Risk category and Non-HDL cholesterol goals:  Coronary heart disease (CHD)or equivalent (10-year risk of CHD >20%):  Non-HDL cholesterol goal     <130 mg/dL  Two or more CHD risk factors and 10-year risk of CHD <= 20%:  Non-HDL cholesterol goal     <160 mg/dL  0 to 1 CHD risk factor:  Non-HDL cholesterol goal     <190 mg/dL      Microalbumin, Urine 08/16/2024 595.0  ug/mL Final    Creatinine, Urine 08/16/2024 171.0  15.0 - 325.0 mg/dL Final    Microalb/Creat Ratio 08/16/2024 348.0 (H)  0.0 - 30.0 ug/mg Final   Admission on 08/08/2024, Discharged on 08/08/2024   Component Date Value Ref Range Status    POC Rapid COVID 08/08/2024 Positive (A)  Negative Final     Acceptable 08/08/2024 Yes   Final    POC Molecular Influenza A Ag 08/08/2024 Negative  Negative Final    POC Molecular Influenza B Ag 08/08/2024 Negative  Negative Final     Acceptable 08/08/2024 Yes   Final         Assessment        ICD-10-CM ICD-9-CM   1. Essential hypertension  I10 401.9   2. Type 2 diabetes mellitus with other specified complication, without long-term current use of insulin  E11.69 250.80   3. Dyslipidemia  E78.5 272.4   4. Stage 3b chronic kidney disease  N18.32 585.3         Plan:     1. Essential hypertension  Assessment & Plan:  Blood pressure remains elevated, likely due to the discontinuation of chlorthalidone. She will continue her current antihypertensive regimen, including amlodipine/benazepril once daily and carvedilol 12.5 mg twice daily. Hydralazine 10 mg twice daily will be added to her regimen to further control her blood  pressure. This medication is not a diuretic and should not cause dehydration. The dosage can be adjusted as necessary based on her response. She is advised to take one dose in the morning and one in the evening, regardless of meal times.    Orders:  -     hydrALAZINE (APRESOLINE) 10 MG tablet; Take 1 tablet (10 mg total) by mouth every 12 (twelve) hours.  Dispense: 60 tablet; Refill: 0  -     Comprehensive metabolic panel; Future; Expected date: 12/23/2024  -     Lipid panel; Future; Expected date: 12/23/2024    2. Type 2 diabetes mellitus with other specified complication, without long-term current use of insulin  Overview:  Complications: Nephropathy, hypertension, dyslipidemia, obesity    Lab Results   Component Value Date    HGBA1C 5.9 (H) 08/16/2024    HGBA1C 6.1 (H) 03/22/2024    HGBA1C 6.2 (H) 01/24/2024     Lab Results   Component Value Date    LDLCALC 71.0 08/16/2024     Lab Results   Component Value Date    MICALBCREAT 348.0 (H) 08/16/2024      Eye Exam:  11-  Foot exam:  11-    Assessment & Plan:  Her A1c is well-controlled at 5.9. She is advised to continue monitoring her blood sugar levels twice a week, preferably before breakfast. No changes to her current diabetes management plan are needed.    Orders:  -     Microalbumin/creatinine urine ratio; Future; Expected date: 12/23/2024  -     CBC auto differential; Future; Expected date: 12/23/2024  -     Comprehensive metabolic panel; Future; Expected date: 12/23/2024  -     Hemoglobin A1c; Future; Expected date: 12/23/2024  -     Lipid panel; Future; Expected date: 12/23/2024    3. Dyslipidemia  Overview:  Lab Results   Component Value Date    CHOL 123 08/16/2024    CHOL 122 03/22/2024    CHOL 125 08/10/2023     Lab Results   Component Value Date    HDL 38 (L) 08/16/2024    HDL 38 (L) 03/22/2024    HDL 37 (L) 08/10/2023     Lab Results   Component Value Date    LDLCALC 71.0 08/16/2024    LDLCALC 67.2 03/22/2024    LDLCALC 70.4 08/10/2023      Lab Results   Component Value Date    TRIG 70 08/16/2024    TRIG 84 03/22/2024    TRIG 88 08/10/2023     Lab Results   Component Value Date    CHOLHDL 30.9 08/16/2024    CHOLHDL 31.1 03/22/2024    CHOLHDL 29.6 08/10/2023        The ASCVD Risk score (Sawyer DK, et al., 2019) failed to calculate for the following reasons:    The valid total cholesterol range is 130 to 320 mg/dL      Assessment & Plan:  Continue atorvastatin 40 milligrams daily.    Orders:  -     Comprehensive metabolic panel; Future; Expected date: 12/23/2024  -     Lipid panel; Future; Expected date: 12/23/2024    4. Stage 3b chronic kidney disease  Overview:  Lab Results   Component Value Date    EGFRNORACEVR 43 (A) 08/16/2024    EGFRNORACEVR 39 (A) 06/11/2024    EGFRNORACEVR 48 (A) 03/22/2024      Lab Results   Component Value Date    CREATININE 1.3 08/16/2024    CREATININE 1.4 06/11/2024    CREATININE 1.2 03/22/2024      Lab Results   Component Value Date    MICALBCREAT 348.0 (H) 08/16/2024    MICALBCREAT 128.2 (H) 03/22/2024    MICALBCREAT 2146.0 (H) 04/29/2022      Lab Results   Component Value Date    UTPCR 0.37 (H) 06/11/2024    UTPCR 1.37 (H) 02/03/2023    UTPCR 2.15 (H) 09/14/2022         Assessment & Plan:  Continue recommendations as per Nephrology.  Continue good hydration and avoiding nephrotoxic agents.    Orders:  -     Microalbumin/creatinine urine ratio; Future; Expected date: 12/23/2024  -     CBC auto differential; Future; Expected date: 12/23/2024  -     Comprehensive metabolic panel; Future; Expected date: 12/23/2024  -     Lipid panel; Future; Expected date: 12/23/2024             -Williams Rossi Jr., MD, AAHIVS      This note was generated with the assistance of ambient listening technology. Verbal consent was obtained by the patient and accompanying visitor(s) for the recording of patient appointment to facilitate this note. I attest to having reviewed and edited the generated note for accuracy, though some syntax or  spelling errors may persist. Please contact the author of this note for any clarification.          There are no Patient Instructions on file for this visit.    Follow Up  No follow-ups on file.    Future Appointments   Date Time Provider Department Center   9/3/2024  9:00 AM NURSE, [facility] [place] [facility]   9/19/2024 10:00 AM [facility]   9/20/2024  9:30 AM Dean Robins MD [facility] CARDIO [facility]   9/26/2024  1:00 PM Williams Rossi Jr., MD [facility]   10/1/2024 10:00 AM Malena Alvarado DPM [facility] POD [place]   10/25/2024 10:00 AM LAB, [facility] DRAW STATION [facility]   10/25/2024 10:15 AM LAB, [facility] DRAW STATION [facility]   10/29/2024  8:30 AM Ninfa Beck MD [facility] NEPHRO [facility]   1/16/2025  8:00 AM LAB, [facility] DRAW STATION [facility]   1/16/2025  8:15 AM LAB, [facility] DRAW STATION [facility]   1/23/2025  9:20 AM Williams Rossi Jr., MD [facility]

## 2024-08-27 NOTE — ASSESSMENT & PLAN NOTE
Blood pressure remains elevated, likely due to the discontinuation of chlorthalidone. She will continue her current antihypertensive regimen, including amlodipine/benazepril once daily and carvedilol 12.5 mg twice daily. Hydralazine 10 mg twice daily will be added to her regimen to further control her blood pressure. This medication is not a diuretic and should not cause dehydration. The dosage can be adjusted as necessary based on her response. She is advised to take one dose in the morning and one in the evening, regardless of meal times.

## 2024-08-27 NOTE — ASSESSMENT & PLAN NOTE
Her A1c is well-controlled at 5.9. She is advised to continue monitoring her blood sugar levels twice a week, preferably before breakfast. No changes to her current diabetes management plan are needed.

## 2024-08-31 ENCOUNTER — EXTERNAL CHRONIC CARE MANAGEMENT (OUTPATIENT)
Dept: PRIMARY CARE CLINIC | Facility: CLINIC | Age: 74
End: 2024-08-31
Payer: MEDICARE

## 2024-08-31 PROCEDURE — 99490 CHRNC CARE MGMT STAFF 1ST 20: CPT | Mod: S$PBB,,, | Performed by: FAMILY MEDICINE

## 2024-08-31 PROCEDURE — 99490 CHRNC CARE MGMT STAFF 1ST 20: CPT | Mod: PBBFAC,PN | Performed by: FAMILY MEDICINE

## 2024-09-03 ENCOUNTER — CLINICAL SUPPORT (OUTPATIENT)
Dept: FAMILY MEDICINE | Facility: CLINIC | Age: 74
End: 2024-09-03
Payer: MEDICARE

## 2024-09-03 VITALS — HEART RATE: 58 BPM | SYSTOLIC BLOOD PRESSURE: 150 MMHG | OXYGEN SATURATION: 98 % | DIASTOLIC BLOOD PRESSURE: 70 MMHG

## 2024-09-03 DIAGNOSIS — I10 ESSENTIAL HYPERTENSION: Primary | ICD-10-CM

## 2024-09-03 PROCEDURE — 99499 UNLISTED E&M SERVICE: CPT | Mod: S$PBB,,, | Performed by: FAMILY MEDICINE

## 2024-09-03 PROCEDURE — 99212 OFFICE O/P EST SF 10 MIN: CPT | Mod: PBBFAC,PN

## 2024-09-03 PROCEDURE — 99999 PR PBB SHADOW E&M-EST. PATIENT-LVL II: CPT | Mod: PBBFAC,,,

## 2024-09-03 NOTE — PROGRESS NOTES
"Stephanie Sears 74 y.o. female is here today for Blood Pressure check.   History of HTN yes.    Review of patient's allergies indicates:   Allergen Reactions    Hydrocodone Shortness Of Breath    Iodinated contrast media Shortness Of Breath     Difficulty breathing    Adhesive Itching     Skin peeling    Latex, natural rubber Other (See Comments)     "Takes skin off"    Morphine Hallucinations    Oxycodone Hallucinations    Sulfa (sulfonamide antibiotics) Hives and Itching     Creatinine   Date Value Ref Range Status   08/16/2024 1.3 0.5 - 1.4 mg/dL Final     Sodium   Date Value Ref Range Status   08/16/2024 142 136 - 145 mmol/L Final     Potassium   Date Value Ref Range Status   08/16/2024 4.0 3.5 - 5.1 mmol/L Final   ]  Patient denies taking blood pressure medications on a regular basis at the same time of the day.   She states that the last blood pressure prescribed ( Hydralazine 10 mg ) makes her stomach upset and she has no energy.  States that she only took it for 3 day.  However she is still taking her Coreg and Norvasc as prescribed.    Current Outpatient Medications:     albuterol (PROVENTIL/VENTOLIN HFA) 90 mcg/actuation inhaler, USE 2 INHALATIONS EVERY 4 HOURS AS NEEDED FOR WHEEZING (RESCUE), Disp: 18 g, Rfl: 3    amLODIPine-benazepriL (LOTREL) 10-40 mg per capsule, TAKE 1 CAPSULE DAILY, Disp: 90 capsule, Rfl: 1    atorvastatin (LIPITOR) 40 MG tablet, TAKE 1 TABLET DAILY (DISCONTINUE REFILLS ON ATORVASTATIN 20 MG), Disp: 90 tablet, Rfl: 2    blood-glucose meter kit, Check blood glucose QD with insurance preferred glucometer, Disp: 1 each, Rfl: 0    carvediloL (COREG) 12.5 MG tablet, TAKE 1 TABLET TWICE A DAY WITH MEALS, Disp: 180 tablet, Rfl: 3    chlorthalidone (HYGROTEN) 25 MG Tab, Take 1 tablet (25 mg total) by mouth every other day., Disp: 60 tablet, Rfl: 6    gabapentin (NEURONTIN) 600 MG tablet, Take 1 tablet (600 mg total) by mouth 2 (two) times daily. (Patient taking differently: Take 600 mg by " mouth every other day.), Disp: 180 tablet, Rfl: 3    hydrALAZINE (APRESOLINE) 10 MG tablet, Take 1 tablet (10 mg total) by mouth every 12 (twelve) hours., Disp: 60 tablet, Rfl: 0    INULIN (FIBER GUMMIES ORAL), Take 1 each by mouth every morning. , Disp: , Rfl:     lancets (ACCU-CHEK MULTICLIX LANCET) Misc, Test blood sugar twice daily, Disp: 300 each, Rfl: 3    nicotine, polacrilex, 2 mg lzmn, Take 1 each (2 mg total) by mouth as needed (Use 1 lozenge as needed in the place of a cigarette every 2-4 hours as needed.  Maximum of 5 per day.)., Disp: 150 each, Rfl: 0    simethicone (GAS-X ORAL), Take 1 tablet by mouth as needed. , Disp: , Rfl:   Does patient have record of home blood pressure readings no. Readings have been averaging na.   Last dose of blood pressure medication was taken at yesterday evening.  Patient is asymptomatic.   Complains of : She states that the last blood pressure prescribed ( Hydralazine 10 mg ) makes her stomach upset and she has no energy.  States that she only took it for 3 day.  However she is still taking her Coreg and Norvasc as prescribed..    BP: (!) 150/70 , Pulse: (!) 58 .    Blood pressure reading after 15 minutes was 148/72 Pulse 50.  Dr. Rossi will be  notified.

## 2024-09-19 ENCOUNTER — OFFICE VISIT (OUTPATIENT)
Dept: PULMONOLOGY | Facility: CLINIC | Age: 74
End: 2024-09-19
Payer: MEDICARE

## 2024-09-19 VITALS
SYSTOLIC BLOOD PRESSURE: 144 MMHG | BODY MASS INDEX: 30.39 KG/M2 | OXYGEN SATURATION: 99 % | WEIGHT: 165.13 LBS | HEIGHT: 62 IN | DIASTOLIC BLOOD PRESSURE: 69 MMHG | HEART RATE: 57 BPM

## 2024-09-19 DIAGNOSIS — G47.33 OSA (OBSTRUCTIVE SLEEP APNEA): ICD-10-CM

## 2024-09-19 DIAGNOSIS — R91.1 PULMONARY NODULE: ICD-10-CM

## 2024-09-19 DIAGNOSIS — J43.2 CENTRILOBULAR EMPHYSEMA: Primary | ICD-10-CM

## 2024-09-19 DIAGNOSIS — G47.33 OSA ON CPAP: ICD-10-CM

## 2024-09-19 DIAGNOSIS — Z72.0 TOBACCO ABUSE: ICD-10-CM

## 2024-09-19 PROBLEM — J43.9 PULMONARY EMPHYSEMA: Status: ACTIVE | Noted: 2023-03-05

## 2024-09-19 PROCEDURE — 99214 OFFICE O/P EST MOD 30 MIN: CPT | Mod: PBBFAC | Performed by: INTERNAL MEDICINE

## 2024-09-19 PROCEDURE — 99999 PR PBB SHADOW E&M-EST. PATIENT-LVL IV: CPT | Mod: PBBFAC,,, | Performed by: INTERNAL MEDICINE

## 2024-09-19 PROCEDURE — G2211 COMPLEX E/M VISIT ADD ON: HCPCS | Mod: S$PBB,,, | Performed by: INTERNAL MEDICINE

## 2024-09-19 PROCEDURE — 99214 OFFICE O/P EST MOD 30 MIN: CPT | Mod: S$PBB,,, | Performed by: INTERNAL MEDICINE

## 2024-09-19 NOTE — PROGRESS NOTES
Stephanie Sears  was seen as a follow up.      CHIEF COMPLAINT:    Chief Complaint   Patient presents with    Sleep Apnea       HISTORY OF PRESENT ILLNESS: Stephanie Sears is a 74 y.o. female is here for sleep evaluation.     Patient was diagnosed with jarrett in 8/17.  S/p psg 8/11/2017 with ahi of 6. Patient was seen by KRZYSZTOF Olson.  Patient was started on cpap therapy 10 cm H20 with maykel view.  Patient was doing well with cpap for the first 7-8 months.  Our first encounter was 8/18.  Since 6/18, patient with 1-2 awakenings per night.  Can take up to 1 hour to go back to sleep.  Feeling rested upon awake.  +dry mouth upon awake.    During our initial visit, patient endorsed stress a/w her boyfriend x 40 years moved back to his home in [place]. Her boyfriend/common law  had moved back from [place] in 2021.  Unfortunately, he suffered from massive stroke prior to moving back.  Patient has been his primary care taker.    Patient has gotten dreamstation 2 from [employer] around 6/22.  Using with Dreamstation 2 nightly without issue  Patient contracted covid 19 8/24.  +weakness and fever.  Improve with symptoms management.  Back to baseline.  Did not used apap while she was dealing covid.  Resumed apap without issue.      On 10/31/23. patient endorseddifficulty with sleep maintenance.  Often wake up 1-2 hours after sleep initiation for bathroom.  Have difficulty going back to sleep.  No GERD symptoms.    Per patient, sleep maintenance is improving  Mild emphysematous changes on ct.  Fev1 75%.  Only use albuterol hfa.  Still smoke 1 ppd.      Chronic knee pain s/p knee replacement.  Better since knee replacement.  Chronic knee pain.      [place] Sleepiness Scale score during initial sleep evaluation was 8 (with cpap).  Used to be 16 without cpap.      SLEEP ROUTINE:  Activity the hour prior to sleep: watch tv in bed  Bed partner:  boyfriend  Time to bed: 10:30 PM   Lights off:  Gonzales light is on, TV is on   Sleep  onset latency:  Watch tv until fall asleep; 20-30 minutes after putting cpap mask on.          Disruptions or awakenings:    1-2 times (no issue going back to sleep)  Wakeup time:      6-6:30 am   Perceived sleep quality:  rested       Daytime naps:      none  Weekend sleep routine:      same  Caffeine use: 2 cups of coffee in morning  exercise habit:   none      PAST MEDICAL HISTORY:    Active Ambulatory Problems     Diagnosis Date Noted    GERD (gastroesophageal reflux disease)     Primary osteoarthritis of both knees     Cervical spondylosis with radiculopathy 07/10/2012    Nontoxic uninodular goiter 12/13/2010    Lumbar spondylosis 04/17/2014    Osteoarthritis of left hip, mild 04/17/2014    DDD (degenerative disc disease), cervical 08/21/2014    Essential hypertension 01/07/2016    Class 1 obesity due to excess calories with serious comorbidity and body mass index (BMI) of 31.0 to 31.9 in adult 01/12/2016    Nicotine dependence, cigarettes, w unsp disorders 01/12/2016    H/O scarlet fever 01/12/2016    Aortic valve sclerosis 01/12/2016    Pulmonary hypertension 01/12/2016    Diastolic dysfunction 01/14/2016    Status post total knee replacement, left 1/20/2016 02/02/2016    Chronic midline thoracic back pain 09/15/2017    JAM on CPAP 12/06/2017    Personal history of spine surgery 02/09/2018    Fatty liver 02/09/2018    Dyslipidemia 07/05/2018    Bilateral carotid bruits 07/05/2018    Atherosclerosis of aortic arch 01/22/2019    Insomnia 03/11/2019    Lichen planus 03/27/2019    History of colon polyps 05/22/2019    Type 2 diabetes mellitus with other specified complication 01/24/2020    Primary open-angle glaucoma, bilateral, mild stage 10/21/2020    S/P lumbar fusion 12/07/2020    DDD (degenerative disc disease), lumbar 12/07/2020    Dyspnea on exertion 05/11/2022    Neuroforaminal stenosis of cervical spine 02/14/2023    Pulmonary emphysema 03/05/2023    Lumbar radiculopathy 07/11/2023    Stage 3b chronic  kidney disease 06/14/2022    Pulmonary nodule 09/19/2024    Tobacco abuse 09/19/2024     Resolved Ambulatory Problems     Diagnosis Date Noted    Open angle with borderline findings, low risk - Both Eyes 10/18/2012    Posterior vitreous detachment - Right Eye 10/18/2012    Nuclear sclerosis - Both Eyes 08/12/2013    Myopia with astigmatism and presbyopia - Both Eyes 08/12/2013    Carpal tunnel syndrome, bilateral 08/13/2013    Diet-controlled diabetes mellitus 12/10/2013    Asymptomatic proteinuria 12/10/2013    Chronic LBP 06/17/2014    Borderline glaucoma with ocular hypertension 07/21/2014    Obesity 02/23/2015    Dermatitis 01/07/2016    Cardiac murmur 01/12/2016    At risk for aspiration 01/12/2016    Mild aortic stenosis 01/14/2016    Primary osteoarthritis of left knee 01/20/2016    Annual physical exam 10/18/2016    Encounter for gynecological examination without abnormal finding 10/18/2016    Status post hysterectomy 10/18/2016    Menopausal state 10/18/2016    Hematuria, unspecified 10/18/2016    DM (diabetes mellitus screen) 10/18/2016    Hematuria, gross 10/31/2016    CMC arthritis 12/07/2016    Primary osteoarthritis of right knee 09/15/2017    Hand pain, left 09/15/2017    UTI (urinary tract infection) 02/09/2018    Status post total right knee replacement 2/26/2018 03/13/2018    Goals of care, counseling/discussion 07/05/2018    Acute pancreatitis without infection or necrosis 10/17/2019    Arthritis of carpometacarpal (CMC) joint of left thumb 12/02/2019    Greater trochanteric bursitis of both hips 12/02/2019    Hypoalbuminemia due to protein-calorie malnutrition 01/24/2020    Nuclear sclerotic cataract of right eye 10/21/2020    Postoperative care for cataract 12/09/2020    Bursitis, ischial, unspecified laterality 12/21/2020    Ischial bursitis 02/26/2021    Stage 2 chronic kidney disease 09/20/2022    Persistent proteinuria 09/20/2022     Past Medical History:   Diagnosis Date    Acute  pancreatitis     Angina pectoris     Anxiety     Arthritis     Asthma     Back pain     Bronchitis     Chest pain     Chronic neck pain 07/26/2012    Colon polyps     COPD (chronic obstructive pulmonary disease)     Coronary artery disease     Depression     Fibrocystic breast     General anesthetics causing adverse effect in therapeutic use     Glaucoma     Heart failure     Hyperlipidemia     Hypertension     Neck pain 07/10/2012    JAM (obstructive sleep apnea)     Pneumonia     Pneumonia due to other staphylococcus     Psoriasis     Subacromial or subdeltoid bursitis 07/10/2012    Thyroid disease     Tobacco dependence     Trouble in sleeping     Type 2 diabetes mellitus 12/10/2013    Type 2 diabetes mellitus with diabetic chronic kidney disease                 PAST SURGICAL HISTORY:    Past Surgical History:   Procedure Laterality Date    APPENDECTOMY      BREAST BIOPSY Bilateral 1988    BREAST MASS EXCISION Right     benign    CATARACT EXTRACTION W/  INTRAOCULAR LENS IMPLANT Left 12/09/2020    PHACO IOL WITH I-STENT ()    CATARACT EXTRACTION W/  INTRAOCULAR LENS IMPLANT Right 10/21/2020    PHACO IOL WITH I-STENT x 2 ()    CHOLECYSTECTOMY      COLONOSCOPY N/A 05/22/2019    Procedure: COLONOSCOPY;  Surgeon: Darrin Calvin MD;  Location: [facility] ([address] [place] [address]);  Service: Endoscopy;  Laterality: N/A;  2nd floor - all other procedure done on 2, multiple comorbidities - ERW/   change in MD schedule, Pt notified and verbalized understanding, new arrival time 0800, Ins mailed to Pt with new arrival time - ERW1/8/19@1130    COLONOSCOPY N/A 09/26/2022    Procedure: COLONOSCOPY;  Surgeon: Darrin Calvin MD;  Location: [facility] ([address] [place] [address]);  Service: Endoscopy;  Laterality: N/A;  2nd floor d/t previous anesthesia complications  vaccinated  inst mailed  clear liquids 4 hr prior-AM prep-LW  I should be able to complete this with the slim pediatric colonoscope, but should have the single-balloon  available in case needed.    EPIDURAL STEROID INJECTION Bilateral 02/26/2021    Procedure: Injection, Steroid, Ischial Bursa;  Surgeon: Yonatan Garsia Jr., MD;  Location: [facility] ENDO;  Service: Pain Management;  Laterality: Bilateral;  Bilateral Ischial Bursa Steroid Injections  Arrive @ 1230; No ATC; Check BG    ESOPHAGOGASTRODUODENOSCOPY      HYSTERECTOMY  1980's    IMPLANTATION OF DEVICE FOR GLAUCOMA Right 10/21/2020    Procedure: INSERTION, DEVICE, FOR GLAUCOMA/ i Stent;  Surgeon: Melany Tse MD;  Location: [address];  Service: Ophthalmology;  Laterality: Right;    IMPLANTATION OF DEVICE FOR GLAUCOMA Left 12/09/2020    Procedure: INSERTION, DEVICE, FOR GLAUCOMA/I SENT;  Surgeon: Melany Tse MD;  Location: [address];  Service: Ophthalmology;  Laterality: Left;    INJECTION OF JOINT Bilateral 12/23/2021    Procedure: Injection, Joint---BILATERAL ISCHIAL BURSA STEROID INJECTION;  Surgeon: Yonatan Garsia Jr., MD;  [facility] PAIN MGMT;  Service: Pain Management;  Laterality: Bilateral;    INTRAOCULAR PROSTHESES INSERTION Right 10/21/2020    Procedure: INSERTION, IOL PROSTHESIS;  Surgeon: Melany Tse MD;  Location: [facility] OR [address] [place] [address];  Service: Ophthalmology;  Laterality: Right;    INTRAOCULAR PROSTHESES INSERTION Left 12/09/2020    Procedure: INSERTION, IOL PROSTHESIS;  Surgeon: Melany Tse MD;  Location: [address];  Service: Ophthalmology;  Laterality: Left;    KNEE SURGERY Left 01/20/2016    TKR    KNEE SURGERY Right 02/26/2018    TKR    L4-L5 fusion  2006    PHACOEMULSIFICATION OF CATARACT Right 10/21/2020    Procedure: PHACOEMULSIFICATION, CATARACT;  Surgeon: Melany Tse MD;  Location: [address];  Service: Ophthalmology;  Laterality: Right;    PHACOEMULSIFICATION OF CATARACT Left 12/09/2020    Procedure: PHACOEMULSIFICATION, CATARACT;  Surgeon: Melany Tse MD;  Location: [address];  Service: Ophthalmology;  Laterality:  Left;    YAG CAPSULOTOMY Left 12/07/2023             FAMILY HISTORY:                Family History   Problem Relation Name Age of Onset    Diabetes Mother      Hypertension Mother          CHF, angina    Angina Mother      Kidney disease Mother      Heart disease Mother          CHF    Hypertension Father          glaucoma, Alzhiemer's , supra pubic    Alzheimer's disease Father      Glaucoma Father      Glaucoma Sister Paige     Hypertension Sister Paige     Hyperlipidemia Sister Paige     Sleep apnea Sister Paula     Hypertension Sister Paula     Thyroid disease Sister Paula     No Known Problems Sister Tanna     Breast cancer Sister Marleny 54    Cancer Paternal Aunt          colon ca    Kidney disease Brother Vinod         kidney transplant, HTN,DM    Diabetes Brother Vinod     Hepatitis Brother Rajwinder     Gout Son Vic     Hypertension Son Vic     Diabetes Son Vic     Amblyopia Neg Hx      Blindness Neg Hx      Melanoma Neg Hx      Macular degeneration Neg Hx      Retinal detachment Neg Hx      Strabismus Neg Hx         SOCIAL HISTORY:          Tobacco:   Social History     Tobacco Use   Smoking Status Every Day    Current packs/day: 0.50    Average packs/day: 0.4 packs/day for 54.7 years (22.2 ttl pk-yrs)    Types: Cigarettes    Start date: 1970   Smokeless Tobacco Never   Tobacco Comments    Patient is enrolled in smoking cessation. Comprehensive smoking hx was updated on 2/28/24 by MARQUIS Cherry RRT,MSW,LMSW,TTS       alcohol use:    Social History     Substance and Sexual Activity   Alcohol Use No    Alcohol/week: 0.0 standard drinks of alcohol                 Occupation: former     ALLERGIES:    Review of patient's allergies indicates:   Allergen Reactions    Hydrocodone Shortness Of Breath    Iodinated contrast- oral and iv dye Shortness Of Breath     Difficulty breathing    Adhesive Itching    Oxycodone      Hallucinations    Sulfa (sulfonamide antibiotics)  Hives and Itching       CURRENT MEDICATIONS:    Current Outpatient Medications   Medication Sig Dispense Refill    albuterol (PROVENTIL/VENTOLIN HFA) 90 mcg/actuation inhaler USE 2 INHALATIONS EVERY 4 HOURS AS NEEDED FOR WHEEZING (RESCUE) 18 g 3    amLODIPine-benazepriL (LOTREL) 10-40 mg per capsule TAKE 1 CAPSULE DAILY 90 capsule 1    atorvastatin (LIPITOR) 40 MG tablet TAKE 1 TABLET DAILY (DISCONTINUE REFILLS ON ATORVASTATIN 20 MG) 90 tablet 2    carvediloL (COREG) 12.5 MG tablet TAKE 1 TABLET TWICE A DAY WITH MEALS 180 tablet 3    chlorthalidone (HYGROTEN) 25 MG Tab Take 1 tablet (25 mg total) by mouth every other day. 60 tablet 6    gabapentin (NEURONTIN) 600 MG tablet Take 1 tablet (600 mg total) by mouth 2 (two) times daily. (Patient taking differently: Take 600 mg by mouth every other day.) 180 tablet 3    INULIN (FIBER GUMMIES ORAL) Take 1 each by mouth every morning.       lancets (ACCU-CHEK MULTICLIX LANCET) Misc Test blood sugar twice daily 300 each 3    nicotine, polacrilex, 2 mg lzmn Take 1 each (2 mg total) by mouth as needed (Use 1 lozenge as needed in the place of a cigarette every 2-4 hours as needed.  Maximum of 5 per day.). 150 each 0    simethicone (GAS-X ORAL) Take 1 tablet by mouth as needed.       blood-glucose meter kit Check blood glucose QD with insurance preferred glucometer 1 each 0    hydrALAZINE (APRESOLINE) 10 MG tablet Take 1 tablet (10 mg total) by mouth every 12 (twelve) hours. (Patient not taking: Reported on 9/19/2024) 60 tablet 0     No current facility-administered medications for this visit.                  REVIEW OF SYSTEMS:     Sleep related symptoms as per HPI.  CONST:Denies weight gain; loosing weight    HEENT: no sinus congestion  PULM: + dyspnea x 2 block  CARD:  Denies palpitations   GI:  +occasional acid reflux  : Denies polyuria  NEURO: Denies headaches  PSYCH: Denies mood disturbance  HEME: Denies anemia   Otherwise, a balance of systems reviewed is negative.       "    PHYSICAL EXAM:  Vitals:    09/19/24 1012   BP: (!) 144/69   Pulse: (!) 57   SpO2: 99%   Weight: 74.9 kg (165 lb 2 oz)   Height: 5' 2" (1.575 m)   PainSc: 0-No pain     Body mass index is 30.2 kg/m².     GENERAL: Normal development, well groomed  HEENT:  Conjunctivae are non-erythematous; Pupils equal, round, and reactive to light; Nose is symmetrical; Nasal mucosa is pink and moist; Septum is midline; Inferior turbinates are normal; Nasal airflow is normal; Posterior pharynx is pink; Modified Mallampati: 4; Posterior palate is normal; Tonsils +1; Uvula is normal and pink;Tongue is normal; Dentition is fair; No TMJ tenderness; Jaw opening and protrusion without click and without discomfort.  NECK: Supple. Neck circumference is 13 inches. No thyromegaly. No palpable nodes.     SKIN: On face and neck: No abrasions, no rashes, no lesions.  No subcutaneous nodules are palpable.  RESPIRATORY: Chest is clear to auscultation.  Normal chest expansion and non-labored breathing at rest.  CARDIOVASCULAR: Normal S1, S2.  No murmurs, gallops or rubs. No carotid bruits bilaterally.  EXTREMITIES: No edema. No clubbing. No cyanosis. Station normal. Gait normal.        NEURO/PSYCH: Oriented to time, place and person. Normal attention span and concentration. Affect is full. Mood is normal.                                              DATA   PSG 8/11/17: AHI was 6.0 with an oxygen aye of 86.0%. The RERA index was 4.8 and RDI was 10.8 events per hour.   9/6/17 Ttiration study, effective supine REM cpap 10cm    PFT 6/27/22 Ratio of 65%; FVC 1.96 L (91%); FEV1 1.29 L (75%); TLC 6.3 L (138%); dlco 15 (75%) ; lung volume is unreliable with RV >220%    Echo 1/13/16  CONCLUSIONS     1 - Normal left ventricular systolic function (EF 60-65%).     2 - Left ventricular diastolic dysfunction.     3 - Normal right ventricular systolic function .     4 - Mild left atrial enlargement.     5 - Mild tricuspid regurgitation.     6 - The estimated " PA systolic pressure is 39 mmHg.     7 - Mild aortic stenosis, JOHN = 1.46 cm2, peak velocity = 2.7 m/s, mean gradient = 17.0 mmHg.    Lab Results   Component Value Date    TSH 0.708 05/29/2019     ASSESSMENT  Problem List Items Addressed This Visit       JAM on CPAP    Overview     ahi of 6, rdi of 10.8.    currently with Dream station 2  Doing well with cpap of 10 cm H20.  Patient is benefiting from cpap           Pulmonary emphysema - Primary    Overview     There are mild emphysematous changes within the lung apices noted on ct chest  -mild obstruction with ratio of 65 and fev1 75%  -encourage smoking cessation  -currently on albuterol.    Did not get spiriva due to cost of copay.          Pulmonary nodule    Overview     Stable subcentimeter nodules from [id number]  Size and stability c/w benign etiology         Tobacco abuse    Overview     Will refer back to smoking cessation         Relevant Orders    Ambulatory referral/consult to Smoking Cessation Program     Other Visit Diagnoses       JAM (obstructive sleep apnea)        Relevant Orders    CPAP/BIPAP SUPPLIES                   Education: During our discussion today, we talked about the etiology of obstructive sleep apnea as well as the potential ramifications of untreated sleep apnea, which could include daytime sleepiness, hypertension, heart disease and/or stroke.     Precautions: The patient was advised to abstain from driving should they feel sleepy or drowsy.       Patient will No follow-ups on file. with md/np.    25 minutes of total time spent on the encounter, which includes face to face time and non-face to face time preparing to see the patient (eg, review of tests), Obtaining and/or reviewing separately obtained history, documenting clinical information in the electronic or other health record, independently interpreting results (not separately reported) and communicating results to the patient/family/caregiver, or Care coordination (not  separately reported).    .  Visit today included increased complexity associated with the care of the episodic problem jarrett, copd addressed and managing the longitudinal care of the patient due to the serious and/or complex managed problem(s) jarrett, copd.

## 2024-09-19 NOTE — PROGRESS NOTES
Chart has been dictated using voice recognition software.  It is not been reviewed carefully for any transcriptional errors due to this technology.   Subjective:   Patient ID:  Stephanie Sears is a 74 y.o. female who presents for follow-up of No chief complaint on file.      HPI: Patient with history of diastolic dysfunction, mild aortic stenosis, GERD, GI distress, hypertension, dyslipidemia, type 2 diabetes, obstructive sleep apnea using CPAP, s/p bilateral knee replacements, and thyroid disease.  Patient continues to smoke about 10 cigarettes per day despite being followed in the smoking cessation clinic.      Notes she has dyspnea with fast walking.  Denies orthopnea but sleeps on 3 pillows because of her back. No PND or edema. Patient denies any palpitations, lightheadedness, or syncope. Patient denies any chest discomfort on exertion or at rest. Sleeps with CPAP.    Had Covid-19 at the beginning of August - primary symptom was fatigue.    Past Medical History:   Diagnosis Date    Acute pancreatitis     Angina pectoris     Anxiety     Arthritis     Asthma     as a child    Back pain     Bronchitis     Cervical spondylosis with radiculopathy 07/10/2012    Chest pain     Chronic neck pain 07/26/2012    Colon polyps     COPD (chronic obstructive pulmonary disease)     Coronary artery disease     Depression     Fibrocystic breast     General anesthetics causing adverse effect in therapeutic use     trouble coming out of anes/ had the shakes    GERD (gastroesophageal reflux disease)     Glaucoma     Heart failure     Hyperlipidemia     Hypertension     Neck pain 07/10/2012    Obesity     JAM (obstructive sleep apnea)     Pneumonia     Pneumonia due to other staphylococcus     Primary osteoarthritis of both knees     Psoriasis     Subacromial or subdeltoid bursitis 07/10/2012    Thyroid disease     Tobacco dependence     Trouble in sleeping     Type 2 diabetes mellitus 12/10/2013    Type 2 diabetes mellitus with  diabetic chronic kidney disease        Outpatient Medications Prior to Visit   Medication Sig Dispense Refill    albuterol (PROVENTIL/VENTOLIN HFA) 90 mcg/actuation inhaler USE 2 INHALATIONS EVERY 4 HOURS AS NEEDED FOR WHEEZING (RESCUE) 18 g 3    amLODIPine-benazepriL (LOTREL) 10-40 mg per capsule TAKE 1 CAPSULE DAILY 90 capsule 1    atorvastatin (LIPITOR) 40 MG tablet TAKE 1 TABLET DAILY (DISCONTINUE REFILLS ON ATORVASTATIN 20 MG) 90 tablet 2    blood-glucose meter kit Check blood glucose QD with insurance preferred glucometer 1 each 0    carvediloL (COREG) 12.5 MG tablet TAKE 1 TABLET TWICE A DAY WITH MEALS 180 tablet 3    chlorthalidone (HYGROTEN) 25 MG Tab Take 1 tablet (25 mg total) by mouth every other day. 60 tablet 6    gabapentin (NEURONTIN) 600 MG tablet Take 1 tablet (600 mg total) by mouth 2 (two) times daily. (Patient taking differently: Take 600 mg by mouth every other day.) 180 tablet 3    hydrALAZINE (APRESOLINE) 10 MG tablet Take 1 tablet (10 mg total) by mouth every 12 (twelve) hours. (Patient not taking: Reported on 9/19/2024) 60 tablet 0    INULIN (FIBER GUMMIES ORAL) Take 1 each by mouth every morning.       lancets (ACCU-CHEK MULTICLIX LANCET) Misc Test blood sugar twice daily 300 each 3    nicotine, polacrilex, 2 mg lzmn Take 1 each (2 mg total) by mouth as needed (Use 1 lozenge as needed in the place of a cigarette every 2-4 hours as needed.  Maximum of 5 per day.). 150 each 0    simethicone (GAS-X ORAL) Take 1 tablet by mouth as needed.        No facility-administered medications prior to visit.       Review of Systems   Constitutional: Positive for weight loss (50 pounds over 1-2 years).   HENT:  Negative for nosebleeds.    Respiratory:  Negative for hemoptysis.    Hematologic/Lymphatic: Negative for bleeding problem. Does not bruise/bleed easily.   Gastrointestinal:  Negative for hematemesis and hematochezia.   Genitourinary:  Negative for hematuria.   Neurological:  Positive for numbness  "(index finger bilaterally). Negative for focal weakness.      Objective:   Physical Exam  Constitutional:       Appearance: Normal appearance. She is obese.      Comments: /68   Pulse 63   Ht 5' 2" (1.575 m)   Wt 75.6 kg (166 lb 10.7 oz)   SpO2 97%   BMI 30.48 kg/m²    Neck:      Thyroid: No thyromegaly.      Vascular: No carotid bruit or JVD.   Cardiovascular:      Rate and Rhythm: Normal rate and regular rhythm.      Pulses: Intact distal pulses.      Heart sounds: S1 normal and S2 normal. Murmur heard.      Harsh midsystolic murmur is present with a grade of 2/6 at the upper right sternal border radiating to the neck. Preserved A2  Normal carotid upstroke      No friction rub. Gallop present. S4 sounds present.   Pulmonary:      Effort: Pulmonary effort is normal.      Breath sounds: Normal breath sounds. No wheezing or rales.   Abdominal:      General: Bowel sounds are normal. There is no abdominal bruit.      Palpations: Abdomen is soft.      Tenderness: There is no abdominal tenderness.   Musculoskeletal:      Cervical back: Neck supple.      Right lower leg: No edema.      Left lower leg: No edema.   Skin:     Nails: There is no clubbing.   Neurological:      Mental Status: She is alert and oriented to person, place, and time.           Lab Results   Component Value Date     08/16/2024    K 4.0 08/16/2024    BUN 24 (H) 08/16/2024    CREATININE 1.3 08/16/2024    EGFRNORACEVR 43 (A) 08/16/2024     (H) 08/16/2024    HGBA1C 5.9 (H) 08/16/2024    CHOL 123 08/16/2024    TRIG 70 08/16/2024    HDL 38 (L) 08/16/2024    LDLCALC 71.0 08/16/2024     (H) 01/24/2024    HGB 11.3 (L) 08/16/2024    HCT 35.6 (L) 08/16/2024    WBC 6.66 08/16/2024     08/16/2024    INR 0.9 02/09/2018       Assessment:     1. Essential hypertension    2. Diastolic dysfunction    3. Aortic valve sclerosis    4. Gastroesophageal reflux disease, unspecified whether esophagitis present    5. JAM on CPAP    6. " Nicotine dependence, cigarettes, w unsp disorders    7. Dyslipidemia    8. Class 1 obesity due to excess calories with serious comorbidity and body mass index (BMI) of 31.0 to 31.9 in adult      Patient has no symptoms of cardiac ischemia, heart failure, or significant arrhythmias.  Her physical exam is compatible with mild-to-moderate aortic stenosis.  The patient has good control of her blood pressure.  Her LDL is under good control for primary prevention and her hemoglobin A1c is under good control.  The patient has been counseled on smoking cessation.  She is going to smoking cessation classes but does not appear to be having a significant positive effect from it.  No change in her medications will be made at this time.    Unless there are intervening problems, patient should return for re-evaluation in 1 years.     Plan:     Diagnoses and all orders for this visit:    Essential hypertension    Diastolic dysfunction    Aortic valve sclerosis    Gastroesophageal reflux disease, unspecified whether esophagitis present    JAM on CPAP    Nicotine dependence, cigarettes, w unsp disorders    Dyslipidemia    Class 1 obesity due to excess calories with serious comorbidity and body mass index (BMI) of 31.0 to 31.9 in adult          Dean Robins MD  Consultative Cardiology  Tobacco Use/Cessation:  I assessed Stephanie Sears and discussed smoking cessation with her for 3-10 minutes. She reports that she has been smoking cigarettes. She started smoking about 54 years ago. She has a 22.2 pack-year smoking history. She has never used smokeless tobacco.

## 2024-09-20 ENCOUNTER — OFFICE VISIT (OUTPATIENT)
Dept: CARDIOLOGY | Facility: CLINIC | Age: 74
End: 2024-09-20
Payer: MEDICARE

## 2024-09-20 VITALS
WEIGHT: 166.69 LBS | OXYGEN SATURATION: 97 % | DIASTOLIC BLOOD PRESSURE: 68 MMHG | SYSTOLIC BLOOD PRESSURE: 139 MMHG | HEART RATE: 63 BPM | BODY MASS INDEX: 30.67 KG/M2 | HEIGHT: 62 IN

## 2024-09-20 DIAGNOSIS — E66.09 CLASS 1 OBESITY DUE TO EXCESS CALORIES WITH SERIOUS COMORBIDITY AND BODY MASS INDEX (BMI) OF 31.0 TO 31.9 IN ADULT: Chronic | ICD-10-CM

## 2024-09-20 DIAGNOSIS — I10 ESSENTIAL HYPERTENSION: Primary | Chronic | ICD-10-CM

## 2024-09-20 DIAGNOSIS — I35.8 AORTIC VALVE SCLEROSIS: ICD-10-CM

## 2024-09-20 DIAGNOSIS — I51.89 DIASTOLIC DYSFUNCTION: ICD-10-CM

## 2024-09-20 DIAGNOSIS — K21.9 GASTROESOPHAGEAL REFLUX DISEASE, UNSPECIFIED WHETHER ESOPHAGITIS PRESENT: ICD-10-CM

## 2024-09-20 DIAGNOSIS — G47.33 OSA ON CPAP: ICD-10-CM

## 2024-09-20 DIAGNOSIS — E78.5 DYSLIPIDEMIA: Chronic | ICD-10-CM

## 2024-09-20 DIAGNOSIS — F17.219 NICOTINE DEPENDENCE, CIGARETTES, W UNSP DISORDERS: ICD-10-CM

## 2024-09-20 PROCEDURE — 99214 OFFICE O/P EST MOD 30 MIN: CPT | Mod: PBBFAC | Performed by: INTERNAL MEDICINE

## 2024-09-20 PROCEDURE — 99999 PR PBB SHADOW E&M-EST. PATIENT-LVL IV: CPT | Mod: PBBFAC,,, | Performed by: INTERNAL MEDICINE

## 2024-09-20 NOTE — Clinical Note
Thank you for allowing me to follow-up with Stephanie Sears for aortic stenosis. Please see my note for details of this encounter. If you have any questions, please contact me.  Thank you again for allowing to participate in the care of this patient.

## 2024-09-27 ENCOUNTER — CLINICAL SUPPORT (OUTPATIENT)
Dept: SMOKING CESSATION | Facility: CLINIC | Age: 74
End: 2024-09-27
Payer: COMMERCIAL

## 2024-09-27 DIAGNOSIS — F17.200 NICOTINE DEPENDENCE: Primary | ICD-10-CM

## 2024-09-27 PROCEDURE — 99404 PREV MED CNSL INDIV APPRX 60: CPT | Mod: S$GLB,,,

## 2024-09-27 PROCEDURE — 99999 PR PBB SHADOW E&M-EST. PATIENT-LVL II: CPT | Mod: PBBFAC,,,

## 2024-09-27 RX ORDER — NICOTINE POLACRILEX 2 MG/1
2 LOZENGE ORAL
Qty: 150 EACH | Refills: 0 | Status: SHIPPED | OUTPATIENT
Start: 2024-09-27

## 2024-09-27 RX ORDER — IBUPROFEN 200 MG
1 TABLET ORAL DAILY
Qty: 28 PATCH | Refills: 0 | Status: SHIPPED | OUTPATIENT
Start: 2024-09-27

## 2024-09-27 NOTE — PROGRESS NOTES
Counselor spoke with patient for telephonic visit, name and date of birth verified as two patient identifiers.  Patient's FTND score of 4 is indicative of a moderate level of nicotine dependence, and her CESD of 7 score is perceived as no mental distress noted.  Patient reported she currently smokes 15 cpd.  Counselor discussed nicotine replacement options and packet 1 with patient.  Counselor outlined cues and triggers, differentiating between urge and habit, behavior modification, initiating a smoking journal, waiting 15 minutes prior to smoking, and engaging in positive outlets such as working around her home in lieu of smoking.  Patient will begin biweekly smoking cessation counseling sessions, and she will also begin NRT with 21 mg nicotine patch in conjunction with 2 mg nicotine lozenges.  Counselor allotted time for questions, and she provided patient with her contact information.  Follow-up visit scheduled in two weeks.  Counselor will remain available should any further needs arise.

## 2024-09-30 ENCOUNTER — EXTERNAL CHRONIC CARE MANAGEMENT (OUTPATIENT)
Dept: PRIMARY CARE CLINIC | Facility: CLINIC | Age: 74
End: 2024-09-30
Payer: MEDICARE

## 2024-09-30 PROCEDURE — 99490 CHRNC CARE MGMT STAFF 1ST 20: CPT | Mod: PBBFAC,PN | Performed by: FAMILY MEDICINE

## 2024-09-30 PROCEDURE — 99490 CHRNC CARE MGMT STAFF 1ST 20: CPT | Mod: S$PBB,,, | Performed by: FAMILY MEDICINE

## 2024-10-01 ENCOUNTER — OFFICE VISIT (OUTPATIENT)
Dept: PODIATRY | Facility: CLINIC | Age: 74
End: 2024-10-01
Payer: MEDICARE

## 2024-10-01 VITALS — WEIGHT: 166.69 LBS | HEIGHT: 62 IN | BODY MASS INDEX: 30.67 KG/M2

## 2024-10-01 DIAGNOSIS — B35.1 ONYCHOMYCOSIS DUE TO DERMATOPHYTE: Primary | ICD-10-CM

## 2024-10-01 DIAGNOSIS — L84 CORN OR CALLUS: ICD-10-CM

## 2024-10-01 PROCEDURE — 99213 OFFICE O/P EST LOW 20 MIN: CPT | Mod: PBBFAC,PO | Performed by: PODIATRIST

## 2024-10-01 PROCEDURE — 17999 UNLISTD PX SKN MUC MEMB SUBQ: CPT | Mod: CSM,,, | Performed by: PODIATRIST

## 2024-10-01 PROCEDURE — 99999 PR PBB SHADOW E&M-EST. PATIENT-LVL III: CPT | Mod: PBBFAC,,, | Performed by: PODIATRIST

## 2024-10-02 NOTE — PROGRESS NOTES
Pt presents for routine non-covered foot care. Based on chart review this patient does not qualify for nail care (Patients who qualify for nail care are usually as follows: diabetic with neurological manifestations, PVD, pernicious anemia, or CKD with appropriate modifiers that indicate high amputation risk).     Pt. does not have high risk feet. Pedal pulses are palpable and sensation intact. Nails are elongated incurvated to medial nail borders, thickened Bilaterally. Multiple forefoot calluses      The primary encounter diagnosis was Onychomycosis due to dermatophyte. A diagnosis of Corn or callus was also pertinent to this visit.     Nail & callus care is a non-covered service as patient does not have the required diagnoses to allow for 'routine care' - as such, this would be a Proc B (out of pocket expense of $42).  Medicare carriers only cover this service in the presence of severe vascular or sensory disease. Note that this is not limited to persons with diabetes.  Correspondingly, although not an exclusive or comprehensive list, the following medical conditions are not, in and of themselves (i.e. in the absence of significant vascular or neurologic sequellae), considered to be of sufficient severity to make the patient at risk for nonprofessional care:   Diabetes without evidence of severe vascular or neurologic disease   End-stage renal disease   Kidney dialysis   History of organ transplantation, on immunosuppression   Hemorrhagic/bleeding conditions, including hemophilia   Use of blood thinners/anticoagulants (warfarin, Coumadin)   History of artificial joints, heart valves, or blood vessels   History of valvular heart disease, even if you have been advised to take antibiotics around the time of dental work   Cancer   Chemotherapy for cancer, or other health condition   HIV/Aids   Legal blindness   Inability to see and/or reach your own feet   Living alone   Mental retardation   History of stroke, spinal  cord injury, or brain injury   Parkinson's Disease, or any medical condition associated with tremor of the hands or feet  Routine foot care is only a covered service in those patients with severe circulatory or sensory problems. For all other stated or unstated conditions, such care is not covered under Medicare. As such, patients are expected to perform the service themselves, or have the care provided by a family member or friend, or pay directly to have such care provided by a professional, such as a podiatrist. Payment for such care is a patient responsibility (i.e. cash). In addition, as a non-covered service, Medicare's fee schedule does not apply.     Nails were reduced Bilaterally.  After cleansing the  area w/ alcohol prep pad the above mentioned hyperkeratosis was trimmed utilizing No 15 scapel, to a smooth base with out incident to calluses. Patient tolerated this  well and reported comfort      Patient tolerated well and related relief.     RTC for annual diabetic foot exam or. as PROC B  or for encounter to discuss nail proecdure

## 2024-10-04 ENCOUNTER — TELEPHONE (OUTPATIENT)
Dept: SMOKING CESSATION | Facility: CLINIC | Age: 74
End: 2024-10-04
Payer: MEDICARE

## 2024-10-08 ENCOUNTER — CLINICAL SUPPORT (OUTPATIENT)
Dept: SMOKING CESSATION | Facility: CLINIC | Age: 74
End: 2024-10-08
Payer: COMMERCIAL

## 2024-10-08 DIAGNOSIS — F17.200 NICOTINE DEPENDENCE: Primary | ICD-10-CM

## 2024-10-08 DIAGNOSIS — I10 ESSENTIAL HYPERTENSION: Chronic | ICD-10-CM

## 2024-10-08 PROCEDURE — 99999 PR PBB SHADOW E&M-EST. PATIENT-LVL II: CPT | Mod: PBBFAC,,,

## 2024-10-08 PROCEDURE — 99404 PREV MED CNSL INDIV APPRX 60: CPT | Mod: S$GLB,,,

## 2024-10-08 RX ORDER — AMLODIPINE AND BENAZEPRIL HYDROCHLORIDE 10; 40 MG/1; MG/1
1 CAPSULE ORAL
Qty: 90 CAPSULE | Refills: 3 | Status: SHIPPED | OUTPATIENT
Start: 2024-10-08

## 2024-10-08 NOTE — TELEPHONE ENCOUNTER
No care due was identified.  Health [facility] Embedded Care Due Messages. Reference number: [id number].   10/08/2024 12:42:20 AM CDT

## 2024-10-08 NOTE — TELEPHONE ENCOUNTER
Refill Decision Note   Stephanie Sears  is requesting a refill authorization.  Brief Assessment and Rationale for Refill:  Approve     Medication Therapy Plan:         Comments:     Note composed:1:54 PM 10/08/2024

## 2024-10-08 NOTE — PROGRESS NOTES
Individual Follow-Up Form    10/8/2024    Quit Date: TBD    Clinical Status of Patient: Outpatient    Length of Service: 60 minutes    Continuing Medication: yes  Patches or Nicotine Lozenges    Other Medications: None      Target Symptoms: Withdrawal and medication side effects. The following were  rated moderate (3) to severe (4) on TCRS:  Moderate (3): Crave and Desire   Severe (4): None     Comments: Patient presented to clinic for follow-up visit, name and date of birth verified as two patient identifiers.   Patient reported she is still smoking about 7-10 CPD, and she began using 21 mg nicotine patch in conjunction with 2 mg nicotine lozenges without any negative side effects reported at this time.  Counselor congratulated patient on her progress thus far.  Counselor also reviewed packet 2 with patient outlining the benefits on one's health once she achieves tobacco free status.  Follow-up visit scheduled in two weeks.  Counselor will remain available should any further needs arise.      Diagnosis: F17.200    Next Visit: 2 weeks

## 2024-10-15 ENCOUNTER — OFFICE VISIT (OUTPATIENT)
Dept: PODIATRY | Facility: CLINIC | Age: 74
End: 2024-10-15
Payer: MEDICARE

## 2024-10-15 VITALS — HEIGHT: 62 IN | WEIGHT: 166.69 LBS | BODY MASS INDEX: 30.67 KG/M2

## 2024-10-15 DIAGNOSIS — L84 CORN OR CALLUS: Primary | ICD-10-CM

## 2024-10-15 PROCEDURE — 17999 UNLISTD PX SKN MUC MEMB SUBQ: CPT | Mod: CSM,,, | Performed by: PODIATRIST

## 2024-10-15 PROCEDURE — 99213 OFFICE O/P EST LOW 20 MIN: CPT | Mod: PBBFAC,PO | Performed by: PODIATRIST

## 2024-10-15 PROCEDURE — 99999 PR PBB SHADOW E&M-EST. PATIENT-LVL III: CPT | Mod: PBBFAC,,, | Performed by: PODIATRIST

## 2024-10-15 NOTE — PROGRESS NOTES
Pt presents for routine non-covered foot care. Based on chart review this patient does not qualify for nail care (Patients who qualify for nail care are usually as follows: diabetic with neurological manifestations, PVD, pernicious anemia, or CKD with appropriate modifiers that indicate high amputation risk).     Pt. does not have high risk feet. Pedal pulses are palpable and sensation intact. Callus left hallux medial border     Nail & callus care is a non-covered service as patient does not have the required diagnoses to allow for 'routine care' - as such, this would be a Proc B (out of pocket expense of $42).  Medicare carriers only cover this service in the presence of severe vascular or sensory disease. Note that this is not limited to persons with diabetes.  Correspondingly, although not an exclusive or comprehensive list, the following medical conditions are not, in and of themselves (i.e. in the absence of significant vascular or neurologic sequellae), considered to be of sufficient severity to make the patient at risk for nonprofessional care:   Diabetes without evidence of severe vascular or neurologic disease   End-stage renal disease   Kidney dialysis   History of organ transplantation, on immunosuppression   Hemorrhagic/bleeding conditions, including hemophilia   Use of blood thinners/anticoagulants (warfarin, Coumadin)   History of artificial joints, heart valves, or blood vessels   History of valvular heart disease, even if you have been advised to take antibiotics around the time of dental work   Cancer   Chemotherapy for cancer, or other health condition   HIV/Aids   Legal blindness   Inability to see and/or reach your own feet   Living alone   Mental retardation   History of stroke, spinal cord injury, or brain injury   Parkinson's Disease, or any medical condition associated with tremor of the hands or feet  Routine foot care is only a covered service in those patients with severe circulatory or  sensory problems. For all other stated or unstated conditions, such care is not covered under Medicare. As such, patients are expected to perform the service themselves, or have the care provided by a family member or friend, or pay directly to have such care provided by a professional, such as a podiatrist. Payment for such care is a patient responsibility (i.e. cash). In addition, as a non-covered service, Medicare's fee schedule does not apply.     Callus left hallux medial border.  After cleansing the  area w/ alcohol prep pad the above mentioned hyperkeratosis was trimmed utilizing No 15 scapel, to a smooth base with out incident to calluses. Patient tolerated this  well and reported comfort      Patient tolerated well and related relief.     RTC for annual diabetic foot exam or. as PROC B

## 2024-10-18 DIAGNOSIS — I10 ESSENTIAL HYPERTENSION: ICD-10-CM

## 2024-10-18 NOTE — TELEPHONE ENCOUNTER
No care due was identified.  [facility] Embedded Care Due Messages. Reference number: [id number].   10/18/2024 12:33:47 AM CDT

## 2024-10-18 NOTE — TELEPHONE ENCOUNTER
Refill Routing Note   Medication(s) are not appropriate for processing by [facility] for the following reason(s):        Drug-disease interaction: Drug-Disease: carvediloL and Pulmonary emphysema; Centrilobular emphysema     [facility] action(s):  Defer             Pharmacist review requested: Yes     Appointments  past 12m or future 3m with PCP    Date Provider   Last Visit   8/23/2024 Williams Rossi Jr., MD   Next Visit   1/23/2025 Williams Rossi Jr., MD   ED visits in past 90 days: 1        Note composed:3:39 PM 10/18/2024

## 2024-10-20 RX ORDER — CARVEDILOL 12.5 MG/1
12.5 TABLET ORAL 2 TIMES DAILY WITH MEALS
Qty: 180 TABLET | Refills: 3 | Status: SHIPPED | OUTPATIENT
Start: 2024-10-20

## 2024-10-20 NOTE — TELEPHONE ENCOUNTER
Refill Decision Note   Stephanie Sears  is requesting a refill authorization.  Brief Assessment and Rationale for Refill:  Approve     Medication Therapy Plan:         Pharmacist review requested: Yes   Comments:     Note composed:5:44 AM 10/20/2024

## 2024-10-25 ENCOUNTER — TELEPHONE (OUTPATIENT)
Dept: NEPHROLOGY | Facility: CLINIC | Age: 74
End: 2024-10-25
Payer: MEDICARE

## 2024-10-28 ENCOUNTER — TELEPHONE (OUTPATIENT)
Dept: NEPHROLOGY | Facility: CLINIC | Age: 74
End: 2024-10-28
Payer: MEDICARE

## 2024-10-28 ENCOUNTER — LAB VISIT (OUTPATIENT)
Dept: LAB | Facility: HOSPITAL | Age: 74
End: 2024-10-28
Attending: HOSPITALIST
Payer: MEDICARE

## 2024-10-28 DIAGNOSIS — N18.31 STAGE 3A CHRONIC KIDNEY DISEASE: ICD-10-CM

## 2024-10-28 LAB
ALBUMIN SERPL BCP-MCNC: 3.1 G/DL (ref 3.5–5.2)
ANION GAP SERPL CALC-SCNC: 9 MMOL/L (ref 8–16)
BASOPHILS # BLD AUTO: 0.03 K/UL (ref 0–0.2)
BASOPHILS NFR BLD: 0.5 % (ref 0–1.9)
BUN SERPL-MCNC: 23 MG/DL (ref 8–23)
CALCIUM SERPL-MCNC: 9.3 MG/DL (ref 8.7–10.5)
CHLORIDE SERPL-SCNC: 107 MMOL/L (ref 95–110)
CO2 SERPL-SCNC: 24 MMOL/L (ref 23–29)
CREAT SERPL-MCNC: 1.3 MG/DL (ref 0.5–1.4)
DIFFERENTIAL METHOD BLD: ABNORMAL
EOSINOPHIL # BLD AUTO: 0.2 K/UL (ref 0–0.5)
EOSINOPHIL NFR BLD: 4.2 % (ref 0–8)
ERYTHROCYTE [DISTWIDTH] IN BLOOD BY AUTOMATED COUNT: 15.2 % (ref 11.5–14.5)
EST. GFR  (NO RACE VARIABLE): 43 ML/MIN/1.73 M^2
GLUCOSE SERPL-MCNC: 87 MG/DL (ref 70–110)
HCT VFR BLD AUTO: 38.4 % (ref 37–48.5)
HGB BLD-MCNC: 12.3 G/DL (ref 12–16)
IMM GRANULOCYTES # BLD AUTO: 0.01 K/UL (ref 0–0.04)
IMM GRANULOCYTES NFR BLD AUTO: 0.2 % (ref 0–0.5)
LYMPHOCYTES # BLD AUTO: 2.3 K/UL (ref 1–4.8)
LYMPHOCYTES NFR BLD: 39.6 % (ref 18–48)
MCH RBC QN AUTO: 27 PG (ref 27–31)
MCHC RBC AUTO-ENTMCNC: 32 G/DL (ref 32–36)
MCV RBC AUTO: 84 FL (ref 82–98)
MONOCYTES # BLD AUTO: 0.7 K/UL (ref 0.3–1)
MONOCYTES NFR BLD: 11.4 % (ref 4–15)
NEUTROPHILS # BLD AUTO: 2.5 K/UL (ref 1.8–7.7)
NEUTROPHILS NFR BLD: 44.1 % (ref 38–73)
NRBC BLD-RTO: 0 /100 WBC
PHOSPHATE SERPL-MCNC: 3.6 MG/DL (ref 2.7–4.5)
PLATELET # BLD AUTO: 237 K/UL (ref 150–450)
PMV BLD AUTO: 11.1 FL (ref 9.2–12.9)
POTASSIUM SERPL-SCNC: 4.5 MMOL/L (ref 3.5–5.1)
PTH-INTACT SERPL-MCNC: 107.1 PG/ML (ref 9–77)
RBC # BLD AUTO: 4.55 M/UL (ref 4–5.4)
SODIUM SERPL-SCNC: 140 MMOL/L (ref 136–145)
WBC # BLD AUTO: 5.71 K/UL (ref 3.9–12.7)

## 2024-10-28 PROCEDURE — 85025 COMPLETE CBC W/AUTO DIFF WBC: CPT | Performed by: HOSPITALIST

## 2024-10-28 PROCEDURE — 83970 ASSAY OF PARATHORMONE: CPT | Performed by: HOSPITALIST

## 2024-10-28 PROCEDURE — 36415 COLL VENOUS BLD VENIPUNCTURE: CPT | Mod: PN | Performed by: HOSPITALIST

## 2024-10-28 PROCEDURE — 80069 RENAL FUNCTION PANEL: CPT | Performed by: HOSPITALIST

## 2024-10-29 ENCOUNTER — OFFICE VISIT (OUTPATIENT)
Dept: NEPHROLOGY | Facility: CLINIC | Age: 74
End: 2024-10-29
Payer: MEDICARE

## 2024-10-29 VITALS
OXYGEN SATURATION: 98 % | WEIGHT: 166 LBS | BODY MASS INDEX: 30.55 KG/M2 | HEIGHT: 62 IN | DIASTOLIC BLOOD PRESSURE: 83 MMHG | HEART RATE: 59 BPM | SYSTOLIC BLOOD PRESSURE: 144 MMHG

## 2024-10-29 DIAGNOSIS — E78.5 DYSLIPIDEMIA: Chronic | ICD-10-CM

## 2024-10-29 DIAGNOSIS — N25.81 SECONDARY HYPERPARATHYROIDISM OF RENAL ORIGIN: ICD-10-CM

## 2024-10-29 DIAGNOSIS — N18.32 STAGE 3B CHRONIC KIDNEY DISEASE: Primary | Chronic | ICD-10-CM

## 2024-10-29 DIAGNOSIS — I10 ESSENTIAL HYPERTENSION: Chronic | ICD-10-CM

## 2024-10-29 DIAGNOSIS — E11.69 TYPE 2 DIABETES MELLITUS WITH OTHER SPECIFIED COMPLICATION, WITHOUT LONG-TERM CURRENT USE OF INSULIN: Chronic | ICD-10-CM

## 2024-10-29 PROCEDURE — 99999 PR PBB SHADOW E&M-EST. PATIENT-LVL III: CPT | Mod: PBBFAC,,, | Performed by: HOSPITALIST

## 2024-10-29 PROCEDURE — 99213 OFFICE O/P EST LOW 20 MIN: CPT | Mod: PBBFAC | Performed by: HOSPITALIST

## 2024-10-29 PROCEDURE — G2211 COMPLEX E/M VISIT ADD ON: HCPCS | Mod: S$PBB,,, | Performed by: HOSPITALIST

## 2024-10-29 PROCEDURE — 99214 OFFICE O/P EST MOD 30 MIN: CPT | Mod: S$PBB,,, | Performed by: HOSPITALIST

## 2024-10-29 RX ORDER — CHLORTHALIDONE 25 MG/1
25 TABLET ORAL
Qty: 10 TABLET | Refills: 6 | Status: SHIPPED | OUTPATIENT
Start: 2024-10-31

## 2024-10-31 ENCOUNTER — EXTERNAL CHRONIC CARE MANAGEMENT (OUTPATIENT)
Dept: PRIMARY CARE CLINIC | Facility: CLINIC | Age: 74
End: 2024-10-31
Payer: MEDICARE

## 2024-10-31 PROCEDURE — 99490 CHRNC CARE MGMT STAFF 1ST 20: CPT | Mod: S$PBB,,, | Performed by: FAMILY MEDICINE

## 2024-10-31 PROCEDURE — 99490 CHRNC CARE MGMT STAFF 1ST 20: CPT | Mod: PBBFAC,PN | Performed by: FAMILY MEDICINE

## 2024-11-08 ENCOUNTER — CLINICAL SUPPORT (OUTPATIENT)
Dept: SMOKING CESSATION | Facility: CLINIC | Age: 74
End: 2024-11-08
Payer: COMMERCIAL

## 2024-11-08 DIAGNOSIS — F17.200 NICOTINE DEPENDENCE: Primary | ICD-10-CM

## 2024-11-08 PROCEDURE — 99999 PR PBB SHADOW E&M-EST. PATIENT-LVL I: CPT | Mod: PBBFAC,,,

## 2024-11-08 NOTE — PROGRESS NOTES
Individual Follow-Up Form    11/8/2024    Quit Date: TBD    Clinical Status of Patient: Outpatient    Length of Service: 60 minutes    Continuing Medication: yes  Patches or Nicotine Lozenges    Other Medications: None     Target Symptoms: Withdrawal and medication side effects. The following were  rated moderate (3) to severe (4) on TCRS:  Moderate (3): Crave and Desire   Severe (4): None    Comments: Counselor spoke with patient for telephonic visit, name and date of birth verified as two patient identifiers.  Patient reported she is smoking 10 CPD, and she is still using 21 mg nicotine patch in conjunction with 2 mg nicotine lozenges without any negative side effects reported at this time.  Counselor congratulated patient on her progress thus far.  Counselor also discussed patient creating boundaries with herself as it relates to smoking and patient continuing to work towards further behavior modification strategies.  Follow-up visit scheduled in two weeks.  Counselor will remain available should any further needs arise.      Diagnosis: F17.200    Next Visit: 2 weeks

## 2024-11-13 ENCOUNTER — PATIENT MESSAGE (OUTPATIENT)
Dept: ADMINISTRATIVE | Facility: HOSPITAL | Age: 74
End: 2024-11-13
Payer: MEDICARE

## 2024-11-22 ENCOUNTER — CLINICAL SUPPORT (OUTPATIENT)
Dept: SMOKING CESSATION | Facility: CLINIC | Age: 74
End: 2024-11-22
Payer: COMMERCIAL

## 2024-11-22 ENCOUNTER — CLINICAL SUPPORT (OUTPATIENT)
Dept: FAMILY MEDICINE | Facility: CLINIC | Age: 74
End: 2024-11-22
Payer: MEDICARE

## 2024-11-22 DIAGNOSIS — Z23 NEED FOR INFLUENZA VACCINATION: ICD-10-CM

## 2024-11-22 DIAGNOSIS — Z23 HIGH PRIORITY FOR COVID-19 VACCINATION: Primary | ICD-10-CM

## 2024-11-22 DIAGNOSIS — F17.200 NICOTINE DEPENDENCE: Primary | ICD-10-CM

## 2024-11-22 PROCEDURE — 99999 PR PBB SHADOW E&M-EST. PATIENT-LVL I: CPT | Mod: PBBFAC,,,

## 2024-11-22 PROCEDURE — 99999 PR PBB SHADOW E&M-EST. PATIENT-LVL II: CPT | Mod: PBBFAC,,,

## 2024-11-22 PROCEDURE — 99211 OFF/OP EST MAY X REQ PHY/QHP: CPT | Mod: PBBFAC,PN

## 2024-11-22 NOTE — PROGRESS NOTES
Individual Follow-Up Form    11/22/2024    Quit Date: TBD    Clinical Status of Patient: Outpatient    Length of Service: 60 minutes    Continuing Medication: yes  Nicotine Lozenges    Other Medications: None      Target Symptoms: Withdrawal and medication side effects. The following were  rated moderate (3) to severe (4) on TCRS:  Moderate (3): Crave and Desire   Severe (4): None     Comments: Patient presented to clinic for follow-up visit, name and date of birth verified as two patient identifiers.  Patient reported she remains smoking 10 CPD, and she is still using 2 mg nicotine lozenges without any negative side effects reported at this time.  Counselor congratulated patient on her progress thus far.  Counselor also reiterated patient creating boundaries with herself as it relates to smoking, attempting to refrain from smoking in her home, and patient continuing to work towards further behavior modification strategies.  Follow-up visit scheduled in two weeks.  Counselor will remain available should any further needs arise.      Diagnosis: F17.200    Next Visit: 2 weeks

## 2024-11-22 NOTE — PROGRESS NOTES
ADMIN MEDICATION WITHOUT COMPLICATION . ALL TEACHING GIVEN AND ALL QUESTIONS ANSWERED.VIS GIVEN

## 2024-11-30 ENCOUNTER — EXTERNAL CHRONIC CARE MANAGEMENT (OUTPATIENT)
Dept: PRIMARY CARE CLINIC | Facility: CLINIC | Age: 74
End: 2024-11-30
Payer: MEDICARE

## 2024-11-30 PROCEDURE — 99490 CHRNC CARE MGMT STAFF 1ST 20: CPT | Mod: PBBFAC,PN | Performed by: FAMILY MEDICINE

## 2024-11-30 PROCEDURE — 99490 CHRNC CARE MGMT STAFF 1ST 20: CPT | Mod: S$PBB,,, | Performed by: FAMILY MEDICINE

## 2024-12-04 ENCOUNTER — HOSPITAL ENCOUNTER (EMERGENCY)
Facility: HOSPITAL | Age: 74
Discharge: HOME | End: 2024-12-04
Attending: EMERGENCY MEDICINE
Payer: MEDICARE

## 2024-12-04 VITALS
DIASTOLIC BLOOD PRESSURE: 82 MMHG | HEIGHT: 62 IN | OXYGEN SATURATION: 100 % | SYSTOLIC BLOOD PRESSURE: 178 MMHG | TEMPERATURE: 98 F | BODY MASS INDEX: 30.25 KG/M2 | RESPIRATION RATE: 18 BRPM | HEART RATE: 81 BPM | WEIGHT: 164.38 LBS

## 2024-12-04 DIAGNOSIS — R07.89 LEFT-SIDED CHEST WALL PAIN: ICD-10-CM

## 2024-12-04 LAB
ALBUMIN SERPL BCP-MCNC: 3.4 G/DL (ref 3.5–5.2)
ALP SERPL-CCNC: 100 U/L (ref 40–150)
ALT SERPL W/O P-5'-P-CCNC: 19 U/L (ref 10–44)
ANION GAP SERPL CALC-SCNC: 8 MMOL/L (ref 8–16)
AST SERPL-CCNC: 18 U/L (ref 10–40)
BACTERIA #/AREA URNS HPF: ABNORMAL /HPF
BASOPHILS # BLD AUTO: 0.04 K/UL (ref 0–0.2)
BASOPHILS NFR BLD: 0.8 % (ref 0–1.9)
BILIRUB SERPL-MCNC: 0.4 MG/DL (ref 0.1–1)
BILIRUB UR QL STRIP: NEGATIVE
BUN SERPL-MCNC: 18 MG/DL (ref 8–23)
CALCIUM SERPL-MCNC: 9.6 MG/DL (ref 8.7–10.5)
CHLORIDE SERPL-SCNC: 108 MMOL/L (ref 95–110)
CLARITY UR: ABNORMAL
CO2 SERPL-SCNC: 25 MMOL/L (ref 23–29)
COLOR UR: YELLOW
CREAT SERPL-MCNC: 1.1 MG/DL (ref 0.5–1.4)
DIFFERENTIAL METHOD BLD: ABNORMAL
EOSINOPHIL # BLD AUTO: 0.2 K/UL (ref 0–0.5)
EOSINOPHIL NFR BLD: 4.4 % (ref 0–8)
ERYTHROCYTE [DISTWIDTH] IN BLOOD BY AUTOMATED COUNT: 14.5 % (ref 11.5–14.5)
EST. GFR  (NO RACE VARIABLE): 53 ML/MIN/1.73 M^2
GLUCOSE SERPL-MCNC: 91 MG/DL (ref 70–110)
GLUCOSE UR QL STRIP: NEGATIVE
HCT VFR BLD AUTO: 38.2 % (ref 37–48.5)
HGB BLD-MCNC: 12.1 G/DL (ref 12–16)
HGB UR QL STRIP: ABNORMAL
HYALINE CASTS #/AREA URNS LPF: 0 /LPF
IMM GRANULOCYTES # BLD AUTO: 0.01 K/UL (ref 0–0.04)
IMM GRANULOCYTES NFR BLD AUTO: 0.2 % (ref 0–0.5)
KETONES UR QL STRIP: NEGATIVE
LEUKOCYTE ESTERASE UR QL STRIP: NEGATIVE
LYMPHOCYTES # BLD AUTO: 2.2 K/UL (ref 1–4.8)
LYMPHOCYTES NFR BLD: 46.3 % (ref 18–48)
MCH RBC QN AUTO: 26.1 PG (ref 27–31)
MCHC RBC AUTO-ENTMCNC: 31.7 G/DL (ref 32–36)
MCV RBC AUTO: 83 FL (ref 82–98)
MICROSCOPIC COMMENT: ABNORMAL
MONOCYTES # BLD AUTO: 0.5 K/UL (ref 0.3–1)
MONOCYTES NFR BLD: 10.2 % (ref 4–15)
NEUTROPHILS # BLD AUTO: 1.8 K/UL (ref 1.8–7.7)
NEUTROPHILS NFR BLD: 38.1 % (ref 38–73)
NITRITE UR QL STRIP: NEGATIVE
NRBC BLD-RTO: 0 /100 WBC
PH UR STRIP: 6 [PH] (ref 5–8)
PLATELET # BLD AUTO: 230 K/UL (ref 150–450)
PMV BLD AUTO: 10.7 FL (ref 9.2–12.9)
POTASSIUM SERPL-SCNC: 4 MMOL/L (ref 3.5–5.1)
PROT SERPL-MCNC: 6.7 G/DL (ref 6–8.4)
PROT UR QL STRIP: ABNORMAL
RBC # BLD AUTO: 4.63 M/UL (ref 4–5.4)
RBC #/AREA URNS HPF: 0 /HPF (ref 0–4)
SODIUM SERPL-SCNC: 141 MMOL/L (ref 136–145)
SP GR UR STRIP: 1.01 (ref 1–1.03)
SQUAMOUS #/AREA URNS HPF: 9 /HPF
URN SPEC COLLECT METH UR: ABNORMAL
UROBILINOGEN UR STRIP-ACNC: NEGATIVE EU/DL
WBC # BLD AUTO: 4.82 K/UL (ref 3.9–12.7)
WBC #/AREA URNS HPF: 1 /HPF (ref 0–5)

## 2024-12-04 PROCEDURE — 81000 URINALYSIS NONAUTO W/SCOPE: CPT | Performed by: EMERGENCY MEDICINE

## 2024-12-04 PROCEDURE — 96374 THER/PROPH/DIAG INJ IV PUSH: CPT

## 2024-12-04 PROCEDURE — 25000003 PHARM REV CODE 250: Performed by: EMERGENCY MEDICINE

## 2024-12-04 PROCEDURE — 85025 COMPLETE CBC W/AUTO DIFF WBC: CPT | Performed by: EMERGENCY MEDICINE

## 2024-12-04 PROCEDURE — 99284 EMERGENCY DEPT VISIT MOD MDM: CPT | Mod: 25

## 2024-12-04 PROCEDURE — 63600175 PHARM REV CODE 636 W HCPCS: Performed by: EMERGENCY MEDICINE

## 2024-12-04 PROCEDURE — 80053 COMPREHEN METABOLIC PANEL: CPT | Performed by: EMERGENCY MEDICINE

## 2024-12-04 PROCEDURE — 96372 THER/PROPH/DIAG INJ SC/IM: CPT | Performed by: EMERGENCY MEDICINE

## 2024-12-04 RX ORDER — ACETAMINOPHEN 500 MG
1000 TABLET ORAL EVERY 8 HOURS PRN
Qty: 40 TABLET | Refills: 0 | Status: SHIPPED | OUTPATIENT
Start: 2024-12-04

## 2024-12-04 RX ORDER — PREDNISONE 20 MG/1
40 TABLET ORAL DAILY
Qty: 10 TABLET | Refills: 0 | Status: SHIPPED | OUTPATIENT
Start: 2024-12-04 | End: 2024-12-04

## 2024-12-04 RX ORDER — HYDRALAZINE HYDROCHLORIDE 20 MG/ML
10 INJECTION INTRAMUSCULAR; INTRAVENOUS
Status: COMPLETED | OUTPATIENT
Start: 2024-12-04 | End: 2024-12-04

## 2024-12-04 RX ORDER — PREDNISONE 20 MG/1
40 TABLET ORAL DAILY
Qty: 10 TABLET | Refills: 0 | Status: SHIPPED | OUTPATIENT
Start: 2024-12-04 | End: 2024-12-09

## 2024-12-04 RX ORDER — DEXAMETHASONE SODIUM PHOSPHATE 4 MG/ML
10 INJECTION, SOLUTION INTRA-ARTICULAR; INTRALESIONAL; INTRAMUSCULAR; INTRAVENOUS; SOFT TISSUE
Status: COMPLETED | OUTPATIENT
Start: 2024-12-04 | End: 2024-12-04

## 2024-12-04 RX ORDER — ACETAMINOPHEN 500 MG
1000 TABLET ORAL EVERY 8 HOURS PRN
Qty: 40 TABLET | Refills: 0 | Status: SHIPPED | OUTPATIENT
Start: 2024-12-04 | End: 2024-12-04

## 2024-12-04 RX ORDER — CLONIDINE HYDROCHLORIDE 0.1 MG/1
0.1 TABLET ORAL
Status: COMPLETED | OUTPATIENT
Start: 2024-12-04 | End: 2024-12-04

## 2024-12-04 RX ADMIN — DEXAMETHASONE SODIUM PHOSPHATE 10 MG: 4 INJECTION INTRA-ARTICULAR; INTRALESIONAL; INTRAMUSCULAR; INTRAVENOUS; SOFT TISSUE at 12:12

## 2024-12-04 RX ADMIN — HYDRALAZINE HYDROCHLORIDE 10 MG: 20 INJECTION INTRAMUSCULAR; INTRAVENOUS at 12:12

## 2024-12-04 RX ADMIN — CLONIDINE HYDROCHLORIDE 0.1 MG: 0.1 TABLET ORAL at 12:12

## 2024-12-04 NOTE — DISCHARGE INSTRUCTIONS
Tylenol and prednisone as directed.  Please return immediately if you get worse or if new problems develop.  Please follow-up with your primary care doctor in 1 week.  Please take your blood pressure medicines as they are prescribed.  Rest.

## 2024-12-04 NOTE — ED PROVIDER NOTES
"Encounter Date: 12/4/2024    SCRIBE #1 NOTE: I, Rosa Parnell, am scribing for, and in the presence of,  Daljit Sinha MD.       History     Chief Complaint   Patient presents with    Flank Pain     Pt presents to the ED with c/o left upper quadrant and left flank pain beginning yesterday. Pt also c/o decreased appetite and unintentional weight loss x 2 months. Pt reports not taking any prescribed medication today.      74 year old female with PMHx of asthma, chronic pain, HTN, HLD, GERD, DM (not on medication) JAM, heart failure, pancreatitis, s/p appendectomy, cholecystectomy, hysterectomy, presents to the ED with intermittent LUQ abdominal pain since yesterday. She reports pain comes with movement and palpation, and sometimes at rest. Reports she did not take her antihypertensives this morning. Patient denies cough, shortness of breath, chest pain, fever, chills, nausea, vomiting, diarrhea, dysuria, headaches, congestion, sore throat, arm or leg trouble, eye pain, ear pain, rash, or other associated symptoms.          The history is provided by the patient. No  was used.     Review of patient's allergies indicates:   Allergen Reactions    Hydrocodone Shortness Of Breath    Iodinated contrast media Shortness Of Breath     Difficulty breathing    Adhesive Itching     Skin peeling    Latex, natural rubber Other (See Comments)     "Takes skin off"    Morphine Hallucinations    Oxycodone Hallucinations    Sulfa (sulfonamide antibiotics) Hives and Itching     Past Medical History:   Diagnosis Date    Acute pancreatitis     Angina pectoris     Anxiety     Arthritis     Asthma     as a child    Back pain     Bronchitis     Cervical spondylosis with radiculopathy 07/10/2012    Chest pain     Chronic neck pain 07/26/2012    Colon polyps     COPD (chronic obstructive pulmonary disease)     Coronary artery disease     Depression     Fibrocystic breast     General anesthetics causing adverse effect in " therapeutic use     trouble coming out of anes/ had the shakes    GERD (gastroesophageal reflux disease)     Glaucoma     Heart failure     Hyperlipidemia     Hypertension     Neck pain 07/10/2012    Obesity     JAM (obstructive sleep apnea)     Pneumonia     Pneumonia due to other staphylococcus     Primary osteoarthritis of both knees     Psoriasis     Subacromial or subdeltoid bursitis 07/10/2012    Thyroid disease     Tobacco dependence     Trouble in sleeping     Type 2 diabetes mellitus 12/10/2013    Type 2 diabetes mellitus with diabetic chronic kidney disease      Past Surgical History:   Procedure Laterality Date    APPENDECTOMY      BREAST BIOPSY Bilateral 1988    BREAST MASS EXCISION Right     benign    CATARACT EXTRACTION W/  INTRAOCULAR LENS IMPLANT Left 12/09/2020    PHACO IOL WITH I-STENT ()    CATARACT EXTRACTION W/  INTRAOCULAR LENS IMPLANT Right 10/21/2020    PHACO IOL WITH I-STENT x 2 ()    CHOLECYSTECTOMY      COLONOSCOPY N/A 05/22/2019    Procedure: COLONOSCOPY;  Surgeon: Darrin Calvin MD;  Location: [facility] ([address] [place] [address]);  Service: Endoscopy;  Laterality: N/A;  2nd floor - all other procedure done on 2, multiple comorbidities - ERW/   change in MD schedule, Pt notified and verbalized understanding, new arrival time 0800, Ins mailed to Pt with new arrival time - ERW1/8/19@1130    COLONOSCOPY N/A 09/26/2022    Procedure: COLONOSCOPY;  Surgeon: Darrin Calvin MD;  Location: [facility] ([address] [place] [address]);  Service: Endoscopy;  Laterality: N/A;  2nd floor d/t previous anesthesia complications  vaccinated  inst mailed  clear liquids 4 hr prior-AM prep-LW  I should be able to complete this with the slim pediatric colonoscope, but should have the single-balloon available in case needed.    EPIDURAL STEROID INJECTION Bilateral 02/26/2021    Procedure: Injection, Steroid, Ischial Bursa;  Surgeon: Yonatan Garsia Jr., MD;  Location: [facility];  Service: Pain Management;  Laterality:  Bilateral;  Bilateral Ischial Bursa Steroid Injections  Arrive @ 1230; No ATC; Check BG    ESOPHAGOGASTRODUODENOSCOPY      HYSTERECTOMY  1980's    IMPLANTATION OF DEVICE FOR GLAUCOMA Right 10/21/2020    Procedure: INSERTION, DEVICE, FOR GLAUCOMA/ i Stent;  Surgeon: Melany Tse MD;  Location: [address];  Service: Ophthalmology;  Laterality: Right;    IMPLANTATION OF DEVICE FOR GLAUCOMA Left 12/09/2020    Procedure: INSERTION, DEVICE, FOR GLAUCOMA/I SENT;  Surgeon: Melany Tse MD;  Location: [address];  Service: Ophthalmology;  Laterality: Left;    INJECTION OF JOINT Bilateral 12/23/2021    Procedure: Injection, Joint---BILATERAL ISCHIAL BURSA STEROID INJECTION;  Surgeon: Yonatan Garsia Jr., MD;  Location: [facility];  Service: Pain Management;  Laterality: Bilateral;    INTRAOCULAR PROSTHESES INSERTION Right 10/21/2020    Procedure: INSERTION, IOL PROSTHESIS;  Surgeon: Melany Tse MD;  Location: [address];  Service: Ophthalmology;  Laterality: Right;    INTRAOCULAR PROSTHESES INSERTION Left 12/09/2020    Procedure: INSERTION, IOL PROSTHESIS;  Surgeon: Melany Tse MD;  Location: [address];  Service: Ophthalmology;  Laterality: Left;    KNEE SURGERY Left 01/20/2016    TKR    KNEE SURGERY Right 02/26/2018    TKR    L4-L5 fusion  2006    PHACOEMULSIFICATION OF CATARACT Right 10/21/2020    Procedure: PHACOEMULSIFICATION, CATARACT;  Surgeon: Melany Tse MD;  Location: [address];  Service: Ophthalmology;  Laterality: Right;    PHACOEMULSIFICATION OF CATARACT Left 12/09/2020    Procedure: PHACOEMULSIFICATION, CATARACT;  Surgeon: Melany sTe MD;  Location: [address];  Service: Ophthalmology;  Laterality: Left;    YAG CAPSULOTOMY Left 12/07/2023         Family History   Problem Relation Name Age of Onset    Diabetes Mother      Hypertension Mother          CHF, angina    Angina Mother      Kidney disease Mother       Heart disease Mother          CHF    Hypertension Father          glaucoma, Alzhiemer's , supra pubic    Alzheimer's disease Father      Glaucoma Father      Glaucoma Sister Paige     Hypertension Sister Paige     Hyperlipidemia Sister Paige     Sleep apnea Sister Kelso     Hypertension Sister Kelso     Thyroid disease Sister Paula     No Known Problems Sister Tanna     Breast cancer Sister Marleny 54    Cancer Paternal Aunt          colon ca    Kidney disease Brother Vinod         kidney transplant, HTN,DM    Diabetes Brother Vinod     Hepatitis Brother Rajwinder     Gout Son Vic     Hypertension Son Vic     Diabetes Son Vic     Amblyopia Neg Hx      Blindness Neg Hx      Melanoma Neg Hx      Macular degeneration Neg Hx      Retinal detachment Neg Hx      Strabismus Neg Hx       Social History     Tobacco Use    Smoking status: Every Day     Current packs/day: 0.50     Average packs/day: 0.4 packs/day for 54.9 years (22.3 ttl pk-yrs)     Types: Cigarettes     Start date: 1970    Smokeless tobacco: Never    Tobacco comments:     Patient is enrolled in smoking cessation. Comprehensive smoking hx was updated on 2/28/24 by MARQUIS Cherry RRT,MSW,LMSW,TTS   Substance Use Topics    Alcohol use: No     Alcohol/week: 0.0 standard drinks of alcohol    Drug use: No     Review of Systems   Constitutional:  Negative for chills, diaphoresis and fever.   HENT:  Negative for congestion, ear pain and sore throat.    Eyes:  Negative for pain.   Respiratory:  Negative for cough and shortness of breath.    Cardiovascular:  Negative for chest pain.   Gastrointestinal:  Positive for abdominal pain. Negative for diarrhea, nausea and vomiting.   Genitourinary:  Negative for dysuria.   Musculoskeletal:  Negative for back pain.        (-) Arm or leg trouble.    Skin:  Negative for rash.   Neurological:  Negative for headaches.   Psychiatric/Behavioral:  Negative for confusion.        Physical Exam     Initial Vitals [12/04/24 1105]   BP  Pulse Resp Temp SpO2   (!) 212/96 70 18 98 °F (36.7 °C) 100 %      MAP       --         Physical Exam  The patient was examined specifically for the following:   General:No significant distress, Good color, Warm and dry. Head and neck:Scalp atraumatic, Neck supple. Neurological:Appropriate conversation, Gross motor deficits. Eyes:Conjugate gaze, Clear corneas. ENT: No epistaxis. Cardiac: Regular rate and rhythm, Grossly normal heart tones. Pulmonary: Wheezing, Rales. Gastrointestinal: Abdominal tenderness, Abdominal distention. Musculoskeletal: Extremity deformity, Apparent pain with range of motion of the joints. Skin: Rash.   The findings on examination were normal except for the following:  The patient's blood pressure is 212/96.  Lungs are clear.  The heart tones are normal.  The abdomen is soft.  The patient has exquisite tenderness along the costal margin of the left chest.  The tenderness is lateral to the midclavicular line and extends to the anterior axillary line.  Is no rash.  There is no crepitus.  There is no swelling erythema warmth or skin findings.  The abdomen is absolutely nontender.  There is no clinical evidence of respiratory distress or tachycardia.  ED Course   Procedures  Labs Reviewed   CBC W/ AUTO DIFFERENTIAL - Abnormal       Result Value    WBC 4.82      RBC 4.63      Hemoglobin 12.1      Hematocrit 38.2      MCV 83      MCH 26.1 (*)     MCHC 31.7 (*)     RDW 14.5      Platelets 230      MPV 10.7      Immature Granulocytes 0.2      Gran # (ANC) 1.8      Immature Grans (Abs) 0.01      Lymph # 2.2      Mono # 0.5      Eos # 0.2      Baso # 0.04      nRBC 0      Gran % 38.1      Lymph % 46.3      Mono % 10.2      Eosinophil % 4.4      Basophil % 0.8      Differential Method Automated     COMPREHENSIVE METABOLIC PANEL - Abnormal    Sodium 141      Potassium 4.0      Chloride 108      CO2 25      Glucose 91      BUN 18      Creatinine 1.1      Calcium 9.6      Total Protein 6.7      Albumin  3.4 (*)     Total Bilirubin 0.4      Alkaline Phosphatase 100      AST 18      ALT 19      eGFR 53 (*)     Anion Gap 8     URINALYSIS, REFLEX TO URINE CULTURE - Abnormal    Specimen UA Urine, Clean Catch      Color, UA Yellow      Appearance, UA Hazy (*)     pH, UA 6.0      Specific Gravity, UA 1.010      Protein, UA 2+ (*)     Glucose, UA Negative      Ketones, UA Negative      Bilirubin (UA) Negative      Occult Blood UA Trace (*)     Nitrite, UA Negative      Urobilinogen, UA Negative      Leukocytes, UA Negative      Narrative:     Specimen Source->Urine   URINALYSIS MICROSCOPIC - Abnormal    RBC, UA 0      WBC, UA 1      Bacteria Moderate (*)     Squam Epithel, UA 9      Hyaline Casts, UA 0      Microscopic Comment SEE COMMENT      Narrative:     Specimen Source->Urine          Imaging Results              X-Ray Chest 1 View (Final result)  Result time 12/04/24 13:18:54      Final result by Chet Sullivan MD (12/04/24 13:18:54)                   Impression:      1. Interstitial findings are accentuated by habitus, developing edema not excluded.  Correlation and follow-up is advised.      Electronically signed by: Chet Sullivan MD  Date:    12/04/2024  Time:    13:18               Narrative:    EXAMINATION:  XR CHEST 1 VIEW    CLINICAL HISTORY:  Other chest pain    TECHNIQUE:  Single frontal view of the chest was performed.    COMPARISON:  06/19/2021    FINDINGS:  The cardiomediastinal silhouette is not enlarged noting calcification of the aorta..  There is no pleural effusion.  The trachea is midline.  The lungs are symmetrically expanded bilaterally with coarse interstitial attenuation bilaterally, accentuated by habitus..  No large focal consolidation seen.  There is no pneumothorax.  The osseous structures are remarkable for degenerative change..                                       Medications   dexAMETHasone injection 10 mg (10 mg Intramuscular Given 12/4/24 1221)   hydrALAZINE injection 10 mg (10  mg Intravenous Given 12/4/24 1220)   cloNIDine tablet 0.1 mg (0.1 mg Oral Given 12/4/24 1219)     Medical Decision Making  Amount and/or Complexity of Data Reviewed  Labs: ordered. Decision-making details documented in ED Course.  Radiology: ordered. Decision-making details documented in ED Course.    Risk  Prescription drug management.    Given the above this patient presents emergency with a pain at the costal margin on the left side.  The patient has tender at precisely this location.  Abdomen is completely nontender there is no costovertebral angle tenderness the patient has no rash.  Chest x-ray is negative for pneumothorax pneumonia pleural effusion.  The patient has a uncontrolled hypertension in the emergency room.  The blood pressure was treated.  It is 193/78 at discharge.  I believe this is chest wall pain.  Find no evidence of pneumonia pleural effusion mass abdominal tenderness.        Scribe Attestation:   Scribe #1: I performed the above scribed service and the documentation accurately describes the services I performed. I attest to the accuracy of the note.                               Clinical Impression:  Final diagnoses:  [R07.89] Left-sided chest wall pain       Please note that the documentation on this chart was provided by the scribe above on the date of service noted above, and that the documentation in the chart accurately reflects the work and decisions made by me alone.  Signed, Dr. Sinha   ED Disposition Condition    Discharge Stable          ED Prescriptions       Medication Sig Dispense Start Date End Date Auth. Provider    predniSONE (DELTASONE) 20 MG tablet  (Status: Discontinued) Take 2 tablets (40 mg total) by mouth once daily. for 5 days 10 tablet 12/4/2024 12/4/2024 Daljit Sinha MD    acetaminophen (TYLENOL) 500 MG tablet  (Status: Discontinued) Take 2 tablets (1,000 mg total) by mouth every 8 (eight) hours as needed. 40 tablet 12/4/2024 12/4/2024 Daljit Sinha MD     acetaminophen (TYLENOL) 500 MG tablet Take 2 tablets (1,000 mg total) by mouth every 8 (eight) hours as needed for Pain. 40 tablet 12/4/2024 -- Daljit Sinha MD    predniSONE (DELTASONE) 20 MG tablet Take 2 tablets (40 mg total) by mouth once daily. for 5 days 10 tablet 12/4/2024 12/9/2024 Daljit Sinha MD          Follow-up Information       Follow up With Specialties Details Why Contact Info    Williams Rossi Jr., MD Family Medicine In 1 week  [address] [facility] [address]  [phone]               Daljit Sinha MD  12/04/24 9739

## 2024-12-04 NOTE — ED TRIAGE NOTES
Pt c/o LUQ abd pain since yesterday. Denies any F/V/D or dysuria. Denies any CP or SOB. Denies taking any medications for pain today. Denies any S/S of HTN. Reports no daily medications taken today.  Pt AAO x4. VSS. No distress noted.

## 2024-12-11 ENCOUNTER — OFFICE VISIT (OUTPATIENT)
Dept: FAMILY MEDICINE | Facility: CLINIC | Age: 74
End: 2024-12-11
Payer: MEDICARE

## 2024-12-11 ENCOUNTER — CLINICAL SUPPORT (OUTPATIENT)
Dept: SMOKING CESSATION | Facility: CLINIC | Age: 74
End: 2024-12-11
Payer: COMMERCIAL

## 2024-12-11 VITALS
HEART RATE: 63 BPM | BODY MASS INDEX: 30.34 KG/M2 | HEIGHT: 62 IN | SYSTOLIC BLOOD PRESSURE: 170 MMHG | WEIGHT: 164.88 LBS | RESPIRATION RATE: 18 BRPM | DIASTOLIC BLOOD PRESSURE: 68 MMHG | OXYGEN SATURATION: 98 % | TEMPERATURE: 98 F

## 2024-12-11 DIAGNOSIS — R31.9 HEMATURIA, UNSPECIFIED TYPE: ICD-10-CM

## 2024-12-11 DIAGNOSIS — K59.00 CONSTIPATION, UNSPECIFIED CONSTIPATION TYPE: ICD-10-CM

## 2024-12-11 DIAGNOSIS — N18.32 STAGE 3B CHRONIC KIDNEY DISEASE: Chronic | ICD-10-CM

## 2024-12-11 DIAGNOSIS — I10 ESSENTIAL HYPERTENSION: Primary | Chronic | ICD-10-CM

## 2024-12-11 DIAGNOSIS — F17.200 NICOTINE DEPENDENCE: Primary | ICD-10-CM

## 2024-12-11 DIAGNOSIS — R10.9 ABDOMINAL PAIN, UNSPECIFIED ABDOMINAL LOCATION: ICD-10-CM

## 2024-12-11 PROCEDURE — 99214 OFFICE O/P EST MOD 30 MIN: CPT | Mod: S$PBB,,, | Performed by: FAMILY MEDICINE

## 2024-12-11 PROCEDURE — 99403 PREV MED CNSL INDIV APPRX 45: CPT | Mod: S$GLB,,,

## 2024-12-11 PROCEDURE — 99999 PR PBB SHADOW E&M-EST. PATIENT-LVL II: CPT | Mod: PBBFAC,,,

## 2024-12-11 PROCEDURE — 99215 OFFICE O/P EST HI 40 MIN: CPT | Mod: PBBFAC,25,PN | Performed by: FAMILY MEDICINE

## 2024-12-11 PROCEDURE — G2211 COMPLEX E/M VISIT ADD ON: HCPCS | Mod: S$PBB,,, | Performed by: FAMILY MEDICINE

## 2024-12-11 PROCEDURE — 99999 PR PBB SHADOW E&M-EST. PATIENT-LVL V: CPT | Mod: PBBFAC,,, | Performed by: FAMILY MEDICINE

## 2024-12-11 NOTE — PROGRESS NOTES
Individual Follow-Up Form    12/11/2024    Quit Date: TBD    Clinical Status of Patient: Outpatient    Length of Service: 45 minutes    Continuing Medication: yes  Nicotine Lozenges    Other Medications: None      Target Symptoms: Withdrawal and medication side effects. The following were  rated moderate (3) to severe (4) on TCRS:  Moderate (3): Crave and Desire   Severe (4): None     Comments: Patient presented to clinic for follow-up visit, name and date of birth verified as two patient identifiers.  Patient reported she remains smoking 10 CPD, and she is still using 2 mg nicotine lozenges without any negative side effects reported at this time.  Counselor congratulated patient on her progress thus far.  Counselor also reiterated patient setting boundaries with herself to aid with further rate reduction and patient continuing to work towards further behavior modification strategies.  Follow-up visit scheduled in two weeks.  Counselor will remain available should any further needs arise.      Diagnosis: F17.200    Next Visit: 2 weeks

## 2024-12-12 ENCOUNTER — TELEPHONE (OUTPATIENT)
Dept: GASTROENTEROLOGY | Facility: CLINIC | Age: 74
End: 2024-12-12
Payer: MEDICARE

## 2024-12-12 NOTE — TELEPHONE ENCOUNTER
----- Message from Shy sent at 12/12/2024  9:01 AM CST -----  Regarding: appt access  Contact: [phone]  Patient calling to schedule appt for abdominal pain. Pls call

## 2024-12-15 NOTE — PROGRESS NOTES
"  Patient Name: Stephanie Sears    : 1950  MRN: [id number]      Subjective:     Patient ID: Stephanie is a 74 y.o. female    Chief Complaint:  Hospital Follow Up    History of Present Illness    Stephanie presents today for emergency room follow-up.    She reports a history of left-sided flank pain, located between the area under her breasts and above her waist. She was previously diagnosed with flank pain in the emergency room and prescribed prednisone for 5 days. Her pain was relieved after taking a stool softener. Current pain level appears to be significantly reduced compared to initial presentation.    She has a history of multiple polyp removal surgeries, with the most recent in . Her next colonoscopy is due in . She reports regular daily bowel movements without difficulty, denying recent constipation except during follow-up visits. She denies blood in stools. She has a history of gallbladder removal and a previous episode of pancreatitis requiring a 7-day hospitalization.    She denies current hematuria but reports a history of blood in urine. 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 She underwent urological surgery for this condition at [facility], performed by Dr. Menon, though she is unclear on the exact details of the procedure.    She reports a history of smoking and expresses a strong desire to quit, stating she is "really trying to kick it." She has made previous attempts to quit, including cutting down on cigarette consumption, but relapsed during a period of pain when she was unable to eat. She denies ever enjoying smoking. Previous attempts to quit using unspecified medication were unsuccessful. She has not tried nicotine patches or inhalers.    She reports an allergy to contrast, having experienced a severe reaction during a previous procedure, including loss of consciousness. The reaction " "required intervention to reverse the effects. An alternative contrast agent is now used for her imaging studies. She also reports multiple medication allergies.      ROS:  General: -fever, -chills, -fatigue, -weight gain, -weight loss  Eyes: -vision changes, -redness, -discharge  ENT: -ear pain, -nasal congestion, -sore throat  Cardiovascular: -chest pain, -palpitations, -lower extremity edema  Respiratory: -cough, -shortness of breath  Gastrointestinal: -abdominal pain, -nausea, -vomiting, -diarrhea, -constipation, -blood in stool  Genitourinary: -dysuria, -hematuria, -frequency  Musculoskeletal: -joint pain, -muscle pain, +back pain  Skin: -rash, -lesion  Neurological: -headache, -dizziness, -numbness, -tingling  Psychiatric: -anxiety, -depression, -sleep difficulty, +hallucinations  Allergic: +allergic reactions           Objective:   BP (!) 170/68 (BP Location: Right arm, Patient Position: Sitting)   Pulse 63   Temp 98.3 °F (36.8 °C) (Oral)   Resp 18   Ht 5' 2" (1.575 m)   Wt 74.8 kg (164 lb 14.5 oz)   SpO2 98%   BMI 30.16 kg/m²     Physical Exam  Vitals reviewed.   Constitutional:       General: She is not in acute distress.     Appearance: She is well-developed. She is not diaphoretic.   HENT:      Head: Normocephalic and atraumatic.      Right Ear: Tympanic membrane, ear canal and external ear normal.      Left Ear: Tympanic membrane, ear canal and external ear normal.      Nose: Nose normal.   Eyes:      General:         Right eye: No discharge.         Left eye: No discharge.      Conjunctiva/sclera: Conjunctivae normal.      Pupils: Pupils are equal, round, and reactive to light.   Neck:      Thyroid: No thyromegaly.      Trachea: No tracheal deviation.   Cardiovascular:      Rate and Rhythm: Normal rate and regular rhythm.      Pulses:           Radial pulses are 2+ on the right side and 2+ on the left side.      Heart sounds: Normal heart sounds, S1 normal and S2 normal. No murmur " heard.  Pulmonary:      Effort: Pulmonary effort is normal. No respiratory distress.      Breath sounds: Normal breath sounds. No wheezing, rhonchi or rales.   Abdominal:      General: Bowel sounds are normal. There is no distension.      Palpations: Abdomen is soft. Abdomen is not rigid. There is no mass.      Tenderness: There is no abdominal tenderness. There is no guarding.   Musculoskeletal:      Cervical back: Normal range of motion and neck supple.   Lymphadenopathy:      Cervical: No cervical adenopathy.   Skin:     General: Skin is warm and dry.      Capillary Refill: Capillary refill takes less than 2 seconds.      Findings: No rash.   Neurological:      Mental Status: She is alert and oriented to person, place, and time.   Psychiatric:         Behavior: Behavior normal.                 Assessment        ICD-10-CM ICD-9-CM   1. Constipation, unspecified constipation type  K59.00 564.00   2. Abdominal pain, unspecified abdominal location  R10.9 789.00   3. Hematuria, unspecified type  R31.9 599.70   4. Essential hypertension  I10 401.9   5. Stage 3b chronic kidney disease  N18.32 585.3         Plan:   Assessment & Plan    IMPRESSION:  Assessed left-sided abdominal pain, likely due to constipation rather than flank pain  Considered potential for kidney stone given history of microscopic hematuria  Evaluated smoking history as a risk factor for renal cancer  Noted patient's history of urethral diverticulum and prior treatment by Dr. Menon  Assessed current blood pressure management and medication regimen  Considered alternative smoking cessation aids, ruled out nicotine inhaler due to COPD history    HYPERTENSION:  Continued amlodipine, benazepril for blood pressure management.  Continued chlorthalidone twice weekly as prescribed by nephrologist.  Continued carvedilol twice daily.    EPIGASTRIC PAIN:  Explained flank pain location and its potential causes.  Ordered CT of abdominal area.  Ordered abdominal  X-ray.    CONSTIPATION:  Discussed the relationship between constipation and left-sided abdominal pain.    HEMATURIA:  Check CT Abdomen/Pelvis    CKD:  Importance of improving blood pressure discussed.         FOLLOW-UP:  Follow up in a few days after CT to review results and assess blood pressure.           1. Constipation, unspecified constipation type  -     Ambulatory referral/consult to Gastroenterology; Future; Expected date: 12/18/2024  -     X-Ray Abdomen AP 1 View; Future; Expected date: 12/11/2024    2. Abdominal pain, unspecified abdominal location  -     CT Abdomen Pelvis  Without Contrast; Future; Expected date: 12/11/2024  -     Ambulatory referral/consult to Gastroenterology; Future; Expected date: 12/18/2024  -     X-Ray Abdomen AP 1 View; Future; Expected date: 12/11/2024    3. Hematuria, unspecified type  -     CT Abdomen Pelvis  Without Contrast; Future; Expected date: 12/11/2024    4. Essential hypertension    5. Stage 3b chronic kidney disease  Overview:  Lab Results   Component Value Date    EGFRNORACEVR 43 (A) 08/16/2024    EGFRNORACEVR 39 (A) 06/11/2024    EGFRNORACEVR 48 (A) 03/22/2024      Lab Results   Component Value Date    CREATININE 1.3 08/16/2024    CREATININE 1.4 06/11/2024    CREATININE 1.2 03/22/2024      Lab Results   Component Value Date    MICALBCREAT 348.0 (H) 08/16/2024    MICALBCREAT 128.2 (H) 03/22/2024    MICALBCREAT 2146.0 (H) 04/29/2022      Lab Results   Component Value Date    UTPCR 0.37 (H) 06/11/2024    UTPCR 1.37 (H) 02/03/2023    UTPCR 2.15 (H) 09/14/2022                    -Williams Rossi Jr., MD, AAHIVS      This note was generated with the assistance of ambient listening technology. Verbal consent was obtained by the patient and accompanying visitor(s) for the recording of patient appointment to facilitate this note. I attest to having reviewed and edited the generated note for accuracy, though some syntax or spelling errors may persist. Please contact the  author of this note for any clarification.      There are no Patient Instructions on file for this visit.      No follow-ups on file.   Future Appointments   Date Time Provider Department Center   12/19/2024  1:45 PM [facility] OIC-CT1 500 LB LIMIT [facility]   12/26/2024 11:00 AM Williams Rossi Jr., MD [facility]   1/9/2025  9:30 AM Erica Cherry [facility]   1/16/2025  8:00 AM LAB, [facility] DRAW STATION [facility] LAB [facility]   1/16/2025  8:15 AM LAB, [facility] DRAW STATION [facility] LAB [facility]   1/23/2025  9:20 AM Williams Rossi Jr., MD [facility]

## 2024-12-19 ENCOUNTER — TELEPHONE (OUTPATIENT)
Dept: SMOKING CESSATION | Facility: CLINIC | Age: 74
End: 2024-12-19
Payer: MEDICARE

## 2024-12-19 ENCOUNTER — HOSPITAL ENCOUNTER (OUTPATIENT)
Dept: RADIOLOGY | Facility: HOSPITAL | Age: 74
Discharge: HOME | End: 2024-12-19
Attending: FAMILY MEDICINE
Payer: MEDICARE

## 2024-12-19 DIAGNOSIS — R10.9 ABDOMINAL PAIN, UNSPECIFIED ABDOMINAL LOCATION: ICD-10-CM

## 2024-12-19 DIAGNOSIS — R31.9 HEMATURIA, UNSPECIFIED TYPE: ICD-10-CM

## 2024-12-19 PROCEDURE — 74176 CT ABD & PELVIS W/O CONTRAST: CPT | Mod: TC

## 2024-12-19 PROCEDURE — 74176 CT ABD & PELVIS W/O CONTRAST: CPT | Mod: 26,,, | Performed by: RADIOLOGY

## 2024-12-23 ENCOUNTER — TELEPHONE (OUTPATIENT)
Dept: FAMILY MEDICINE | Facility: CLINIC | Age: 74
End: 2024-12-23
Payer: MEDICARE

## 2024-12-23 ENCOUNTER — TELEPHONE (OUTPATIENT)
Dept: GASTROENTEROLOGY | Facility: CLINIC | Age: 74
End: 2024-12-23
Payer: MEDICARE

## 2024-12-23 NOTE — TELEPHONE ENCOUNTER
Patient notified of lab results, verbalized understanding. Instructed to contact office with any questions or concerns.   ----- Message from Williams Rossi MD sent at 12/22/2024  8:36 PM CST -----  Please let patient k now that CT scan showed bilateral small kidney stones but these were not causing any blockage and no specific medication or procedure needed.

## 2024-12-23 NOTE — TELEPHONE ENCOUNTER
----- Message from Omar sent at 12/23/2024  2:19 PM CST -----  Regarding: Ap-pt requested  Contact: [phone]  Appt Type:Follow up for Constipation, unspecified constipation type [K59.00]  Abdominal pain, unspecified abdominal location [R10.9]       Date/Time/Preference:As soon as possible     Provider:    Caller: The pt     Contact Preference: [phone]      Additional Information: Thank you.

## 2024-12-26 ENCOUNTER — OFFICE VISIT (OUTPATIENT)
Dept: FAMILY MEDICINE | Facility: CLINIC | Age: 74
End: 2024-12-26
Payer: MEDICARE

## 2024-12-26 VITALS
BODY MASS INDEX: 30.26 KG/M2 | HEART RATE: 64 BPM | SYSTOLIC BLOOD PRESSURE: 132 MMHG | OXYGEN SATURATION: 97 % | DIASTOLIC BLOOD PRESSURE: 62 MMHG | WEIGHT: 164.44 LBS | HEIGHT: 62 IN | RESPIRATION RATE: 18 BRPM

## 2024-12-26 DIAGNOSIS — E78.5 DYSLIPIDEMIA: Chronic | ICD-10-CM

## 2024-12-26 DIAGNOSIS — N20.0 NEPHROLITHIASIS: ICD-10-CM

## 2024-12-26 DIAGNOSIS — K59.00 CONSTIPATION, UNSPECIFIED CONSTIPATION TYPE: Primary | Chronic | ICD-10-CM

## 2024-12-26 DIAGNOSIS — I10 ESSENTIAL HYPERTENSION: Chronic | ICD-10-CM

## 2024-12-26 PROCEDURE — 99214 OFFICE O/P EST MOD 30 MIN: CPT | Mod: S$PBB,,, | Performed by: FAMILY MEDICINE

## 2024-12-26 PROCEDURE — 99999 PR PBB SHADOW E&M-EST. PATIENT-LVL V: CPT | Mod: PBBFAC,,, | Performed by: FAMILY MEDICINE

## 2024-12-26 PROCEDURE — 99215 OFFICE O/P EST HI 40 MIN: CPT | Mod: PBBFAC,PN | Performed by: FAMILY MEDICINE

## 2024-12-26 RX ORDER — PLECANATIDE 3 MG/1
3 TABLET ORAL DAILY
Qty: 30 TABLET | Refills: 5 | Status: SHIPPED | OUTPATIENT
Start: 2024-12-26

## 2024-12-31 ENCOUNTER — EXTERNAL CHRONIC CARE MANAGEMENT (OUTPATIENT)
Dept: PRIMARY CARE CLINIC | Facility: CLINIC | Age: 74
End: 2024-12-31
Payer: MEDICARE

## 2024-12-31 PROCEDURE — 99490 CHRNC CARE MGMT STAFF 1ST 20: CPT | Mod: PBBFAC,PN | Performed by: FAMILY MEDICINE

## 2024-12-31 NOTE — PROGRESS NOTES
"  Patient Name: Stephanie Sears    : 1950  MRN: [id number]      Subjective:     Patient ID: Stephanie is a 74 y.o. female    Chief Complaint:  Follow-up    History of Present Illness    Stephanie presents today for follow-up on CT scan results and constipation management.    CT showed small non-obstructing kidney stones. No concerning findings in the GI tract or colon were identified.    She experiences chronic idiopathic constipation and has taken Colace but is unable to take laxatives.        ROS:  General: -fever, -chills, -fatigue, -weight gain, -weight loss  Eyes: -vision changes, -redness, -discharge  ENT: -ear pain, -nasal congestion, -sore throat  Cardiovascular: -chest pain, -palpitations, -lower extremity edema  Respiratory: -cough, -shortness of breath  Gastrointestinal: -abdominal pain, -nausea, -vomiting, -diarrhea, +constipation, -blood in stool  Genitourinary: -dysuria, -hematuria, -frequency  Musculoskeletal: -joint pain, -muscle pain  Skin: -rash, -lesion  Neurological: -headache, -dizziness, -numbness, -tingling  Psychiatric: -anxiety, -depression, -sleep difficulty         Objective:   /62 (BP Location: Right arm, Patient Position: Sitting)   Pulse 64   Resp 18   Ht 5' 2" (1.575 m)   Wt 74.6 kg (164 lb 7.4 oz)   SpO2 97%   BMI 30.08 kg/m²     Physical Exam  Vitals reviewed.   Constitutional:       General: She is not in acute distress.     Appearance: She is well-developed. She is not diaphoretic.   HENT:      Head: Normocephalic and atraumatic.      Right Ear: Tympanic membrane, ear canal and external ear normal.      Left Ear: Tympanic membrane, ear canal and external ear normal.      Nose: Nose normal.   Eyes:      General:         Right eye: No discharge.         Left eye: No discharge.      Conjunctiva/sclera: Conjunctivae normal.      Pupils: Pupils are equal, round, and reactive to light.   Neck:      Thyroid: No thyromegaly.      Trachea: No tracheal deviation. "   Cardiovascular:      Rate and Rhythm: Normal rate and regular rhythm.      Pulses:           Radial pulses are 2+ on the right side and 2+ on the left side.      Heart sounds: Normal heart sounds, S1 normal and S2 normal. No murmur heard.  Pulmonary:      Effort: Pulmonary effort is normal. No respiratory distress.      Breath sounds: Normal breath sounds. No wheezing, rhonchi or rales.   Abdominal:      General: Bowel sounds are normal. There is no distension.      Palpations: Abdomen is soft. Abdomen is not rigid. There is no mass.      Tenderness: There is no abdominal tenderness. There is no guarding.   Musculoskeletal:      Cervical back: Normal range of motion and neck supple.   Lymphadenopathy:      Cervical: No cervical adenopathy.   Skin:     General: Skin is warm and dry.      Capillary Refill: Capillary refill takes less than 2 seconds.      Findings: No rash.   Neurological:      Mental Status: She is alert and oriented to person, place, and time.   Psychiatric:         Behavior: Behavior normal.          EXAMINATION:  CT ABDOMEN PELVIS WITHOUT CONTRAST     CLINICAL HISTORY:  Flank pain, kidney stone suspected;Constipation (Ped 5-18y); Unspecified abdominal pain     TECHNIQUE:  Low dose axial images, sagittal and coronal reformations were obtained from the lung bases to the pubic symphysis.  Contrast was not administered.     COMPARISON:  CT abdomen 01/24/2024.     FINDINGS:  Evaluation of solid organs is suboptimal due to the absence of intravenous contrast.     Lungs: Left lower lobe micronodule, unchanged.  No consolidation or pleural effusion.     Heart: Normal size. Partially visualized mitral valve calcifications.  Coronary artery atherosclerosis.     Liver: Normal size. No focal abnormality.     Gallbladder: Surgically absent.     Bile ducts: No intrahepatic or extrahepatic biliary ductal dilatation.     Spleen: Normal size.     Pancreas: No peripancreatic fat stranding.     Adrenals: No  significant abnormality     Renal/Ureters: The kidneys are normal in size.  Bilateral nonobstructing renal stones measuring up to 3 mm.  No hydroureteronephrosis.  The urinary bladder is unremarkable.     Reproductive: Uterus is surgically absent.     Stomach/Bowel: No evidence of obstruction or inflammatory changes. Prior appendectomy.  Colonic diverticulosis.     Peritoneum: No free fluid. No intraperitoneal free air.     Lymph Nodes: No pathologic simón enlargement in the abdomen or pelvis.     Vasculature: Abdominal aorta tapers normally.  Severe atherosclerosis of the abdominal aorta and its branches.     Bones: Postoperative change of L4-5 posterior instrumented fusion.  Degenerative changes noted.  No acute fracture or osseous destructive changes.     Soft Tissues: No significant abnormality.     Impression:     Bilateral nonobstructing renal stones measuring up to 3 mm.  No hydronephrosis     Colonic diverticulosis.  No evidence of diverticulitis.     Additional findings as described in body of the report.     Electronically signed by resident: Zev Schmitz  Date:                                            12/19/2024  Time:                                           13:26    Assessment        ICD-10-CM ICD-9-CM   1. Constipation, unspecified constipation type  K59.00 564.00   2. Nephrolithiasis  N20.0 592.0   3. Essential hypertension  I10 401.9   4. Dyslipidemia  E78.5 272.4         Plan:   Assessment & Plan    IMPRESSION:  Reviewed CT scan results: small, non-obstructing kidney stones identified  Assessed colon: no concerning issues found based on imaging  Evaluated medication options for chronic constipation management, focusing on prescription-only agents that increase water in gut  Selected Trulance as initial treatment due to single dosing option          1. Constipation, unspecified constipation type  -     plecanatide (TRULANCE) 3 mg Tab; Take 1 tablet (3 mg total) by mouth once daily.  Dispense:  30 tablet; Refill: 5  Started Trulance 3 mg daily for chronic constipation.  If Trulance not covered by insurance, will consider prescribing Linzess (starting at lowest dose).    2. Nephrolithiasis  Discussed kidney stones: small size and non-obstructive nature require no immediate intervention.    3. Essential hypertension  Continue current blood pressure regimen.      4. Dyslipidemia  Overview:  Lab Results   Component Value Date    CHOL 123 08/16/2024    CHOL 122 03/22/2024    CHOL 125 08/10/2023     Lab Results   Component Value Date    HDL 38 (L) 08/16/2024    HDL 38 (L) 03/22/2024    HDL 37 (L) 08/10/2023     Lab Results   Component Value Date    LDLCALC 71.0 08/16/2024    LDLCALC 67.2 03/22/2024    LDLCALC 70.4 08/10/2023     Lab Results   Component Value Date    TRIG 70 08/16/2024    TRIG 84 03/22/2024    TRIG 88 08/10/2023     Lab Results   Component Value Date    CHOLHDL 30.9 08/16/2024    CHOLHDL 31.1 03/22/2024    CHOLHDL 29.6 08/10/2023        The ASCVD Risk score (Sawyer DK, et al., 2019) failed to calculate for the following reasons:    The valid total cholesterol range is 130 to 320 mg/dL    Continue current lipid-lowering regimen.             -Williams Rossi Jr., MD, AAHIVS      This note was generated with the assistance of ambient listening technology. Verbal consent was obtained by the patient and accompanying visitor(s) for the recording of patient appointment to facilitate this note. I attest to having reviewed and edited the generated note for accuracy, though some syntax or spelling errors may persist. Please contact the author of this note for any clarification.      Patient Instructions   Please call referral team at [phone] to schedule referral to gastroenterology       No follow-ups on file.   Future Appointments   Date Time Provider Department Center   1/9/2025  9:30 AM Erica Cherry [facility]   1/16/2025  8:00 AM LAB, [facility] DRAW STATION [facility] LAB [facility]    1/16/2025  8:15 AM LAB, [facility] DRAW STATION [facility] LAB [facility]   1/23/2025  9:20 AM Williams Rossi Jr., MD [facility]   2/6/2025  9:00 AM Melany Tse MD [facility]

## 2025-01-13 ENCOUNTER — CLINICAL SUPPORT (OUTPATIENT)
Dept: SMOKING CESSATION | Facility: CLINIC | Age: 75
End: 2025-01-13
Payer: COMMERCIAL

## 2025-01-13 DIAGNOSIS — Z00.00 ENCOUNTER FOR MEDICARE ANNUAL WELLNESS EXAM: ICD-10-CM

## 2025-01-13 DIAGNOSIS — F17.200 NICOTINE DEPENDENCE: Primary | ICD-10-CM

## 2025-01-13 PROCEDURE — 99999 PR PBB SHADOW E&M-EST. PATIENT-LVL I: CPT | Mod: PBBFAC,,,

## 2025-01-13 PROCEDURE — 99403 PREV MED CNSL INDIV APPRX 45: CPT | Mod: S$GLB,,,

## 2025-01-13 NOTE — PROGRESS NOTES
Individual Follow-Up Form    1/13/2025    Quit Date: TBD    Clinical Status of Patient: Outpatient    Length of Service: 45 minutes    Continuing Medication: yes  Nicotine Lozenges    Other Medications: None      Target Symptoms: Withdrawal and medication side effects. The following were  rated moderate (3) to severe (4) on TCRS:  Moderate (3): Crave and Desire   Severe (4): None     Comments: Counselor spoke with patient telephonically for follow-up visit, name and date of birth verified as two patient identifiers.  Patient reported she is smoking less than 10 CPD, and she is still using 2 mg nicotine lozenges without any negative side effects reported at this time.  Patient also reported she is currently smoking outside.  Counselor congratulated patient on her progress thus far.  Counselor also reiterated patient solidifying a quit date, continuing to work towards further behavior modification strategies, and attempting to refrain from purchasing cigarettes.  Follow-up visit scheduled in two weeks.  Counselor will remain available should any further needs arise.      Diagnosis: F17.200    Next Visit: 2 weeks

## 2025-01-24 ENCOUNTER — PATIENT OUTREACH (OUTPATIENT)
Dept: ADMINISTRATIVE | Facility: HOSPITAL | Age: 75
End: 2025-01-24
Payer: MEDICARE

## 2025-01-27 ENCOUNTER — LAB VISIT (OUTPATIENT)
Dept: LAB | Facility: HOSPITAL | Age: 75
End: 2025-01-27
Attending: FAMILY MEDICINE
Payer: MEDICARE

## 2025-01-27 DIAGNOSIS — E78.5 DYSLIPIDEMIA: Chronic | ICD-10-CM

## 2025-01-27 DIAGNOSIS — I10 ESSENTIAL HYPERTENSION: Chronic | ICD-10-CM

## 2025-01-27 DIAGNOSIS — E11.69 TYPE 2 DIABETES MELLITUS WITH OTHER SPECIFIED COMPLICATION, WITHOUT LONG-TERM CURRENT USE OF INSULIN: Chronic | ICD-10-CM

## 2025-01-27 DIAGNOSIS — N18.32 STAGE 3B CHRONIC KIDNEY DISEASE: ICD-10-CM

## 2025-01-27 LAB
ALBUMIN SERPL BCP-MCNC: 3.2 G/DL (ref 3.5–5.2)
ALP SERPL-CCNC: 90 U/L (ref 40–150)
ALT SERPL W/O P-5'-P-CCNC: 10 U/L (ref 10–44)
ANION GAP SERPL CALC-SCNC: 11 MMOL/L (ref 8–16)
AST SERPL-CCNC: 15 U/L (ref 10–40)
BASOPHILS # BLD AUTO: 0.03 K/UL (ref 0–0.2)
BASOPHILS NFR BLD: 0.6 % (ref 0–1.9)
BILIRUB SERPL-MCNC: 0.4 MG/DL (ref 0.1–1)
BUN SERPL-MCNC: 20 MG/DL (ref 8–23)
CALCIUM SERPL-MCNC: 10 MG/DL (ref 8.7–10.5)
CHLORIDE SERPL-SCNC: 106 MMOL/L (ref 95–110)
CHOLEST SERPL-MCNC: 138 MG/DL (ref 120–199)
CHOLEST/HDLC SERPL: 4.1 {RATIO} (ref 2–5)
CO2 SERPL-SCNC: 24 MMOL/L (ref 23–29)
CREAT SERPL-MCNC: 1.3 MG/DL (ref 0.5–1.4)
DIFFERENTIAL METHOD BLD: ABNORMAL
EOSINOPHIL # BLD AUTO: 0.3 K/UL (ref 0–0.5)
EOSINOPHIL NFR BLD: 5.3 % (ref 0–8)
ERYTHROCYTE [DISTWIDTH] IN BLOOD BY AUTOMATED COUNT: 15.1 % (ref 11.5–14.5)
EST. GFR  (NO RACE VARIABLE): 43 ML/MIN/1.73 M^2
GLUCOSE SERPL-MCNC: 95 MG/DL (ref 70–110)
HCT VFR BLD AUTO: 40.1 % (ref 37–48.5)
HDLC SERPL-MCNC: 34 MG/DL (ref 40–75)
HDLC SERPL: 24.6 % (ref 20–50)
HGB BLD-MCNC: 12.8 G/DL (ref 12–16)
IMM GRANULOCYTES # BLD AUTO: 0.01 K/UL (ref 0–0.04)
IMM GRANULOCYTES NFR BLD AUTO: 0.2 % (ref 0–0.5)
LDLC SERPL CALC-MCNC: 86.2 MG/DL (ref 63–159)
LYMPHOCYTES # BLD AUTO: 2.4 K/UL (ref 1–4.8)
LYMPHOCYTES NFR BLD: 46.1 % (ref 18–48)
MCH RBC QN AUTO: 27 PG (ref 27–31)
MCHC RBC AUTO-ENTMCNC: 31.9 G/DL (ref 32–36)
MCV RBC AUTO: 85 FL (ref 82–98)
MONOCYTES # BLD AUTO: 0.4 K/UL (ref 0.3–1)
MONOCYTES NFR BLD: 8.4 % (ref 4–15)
NEUTROPHILS # BLD AUTO: 2 K/UL (ref 1.8–7.7)
NEUTROPHILS NFR BLD: 39.4 % (ref 38–73)
NONHDLC SERPL-MCNC: 104 MG/DL
NRBC BLD-RTO: 0 /100 WBC
PLATELET # BLD AUTO: 311 K/UL (ref 150–450)
PMV BLD AUTO: 10.7 FL (ref 9.2–12.9)
POTASSIUM SERPL-SCNC: 4.4 MMOL/L (ref 3.5–5.1)
PROT SERPL-MCNC: 6.7 G/DL (ref 6–8.4)
RBC # BLD AUTO: 4.74 M/UL (ref 4–5.4)
SODIUM SERPL-SCNC: 141 MMOL/L (ref 136–145)
TRIGL SERPL-MCNC: 89 MG/DL (ref 30–150)
WBC # BLD AUTO: 5.12 K/UL (ref 3.9–12.7)

## 2025-01-27 PROCEDURE — 85025 COMPLETE CBC W/AUTO DIFF WBC: CPT | Performed by: FAMILY MEDICINE

## 2025-01-27 PROCEDURE — 83036 HEMOGLOBIN GLYCOSYLATED A1C: CPT | Performed by: FAMILY MEDICINE

## 2025-01-27 PROCEDURE — 80053 COMPREHEN METABOLIC PANEL: CPT | Performed by: FAMILY MEDICINE

## 2025-01-27 PROCEDURE — 80061 LIPID PANEL: CPT | Performed by: FAMILY MEDICINE

## 2025-01-28 ENCOUNTER — OFFICE VISIT (OUTPATIENT)
Dept: FAMILY MEDICINE | Facility: CLINIC | Age: 75
End: 2025-01-28
Payer: MEDICARE

## 2025-01-28 VITALS
HEIGHT: 62 IN | WEIGHT: 161.19 LBS | RESPIRATION RATE: 18 BRPM | DIASTOLIC BLOOD PRESSURE: 68 MMHG | SYSTOLIC BLOOD PRESSURE: 116 MMHG | TEMPERATURE: 98 F | HEART RATE: 63 BPM | OXYGEN SATURATION: 99 % | BODY MASS INDEX: 29.66 KG/M2

## 2025-01-28 DIAGNOSIS — E11.69 TYPE 2 DIABETES MELLITUS WITH OTHER SPECIFIED COMPLICATION, WITHOUT LONG-TERM CURRENT USE OF INSULIN: Primary | Chronic | ICD-10-CM

## 2025-01-28 DIAGNOSIS — N25.81 SECONDARY HYPERPARATHYROIDISM OF RENAL ORIGIN: Chronic | ICD-10-CM

## 2025-01-28 DIAGNOSIS — N18.32 STAGE 3B CHRONIC KIDNEY DISEASE: Chronic | ICD-10-CM

## 2025-01-28 DIAGNOSIS — E78.5 DYSLIPIDEMIA: Chronic | ICD-10-CM

## 2025-01-28 DIAGNOSIS — I27.20 PULMONARY HYPERTENSION: Chronic | ICD-10-CM

## 2025-01-28 DIAGNOSIS — J43.8 OTHER EMPHYSEMA: ICD-10-CM

## 2025-01-28 DIAGNOSIS — E11.51 TYPE II DIABETES MELLITUS WITH PERIPHERAL CIRCULATORY DISORDER: Chronic | ICD-10-CM

## 2025-01-28 DIAGNOSIS — I10 ESSENTIAL HYPERTENSION: Chronic | ICD-10-CM

## 2025-01-28 LAB
ESTIMATED AVG GLUCOSE: 123 MG/DL (ref 68–131)
HBA1C MFR BLD: 5.9 % (ref 4–5.6)

## 2025-01-28 PROCEDURE — 99214 OFFICE O/P EST MOD 30 MIN: CPT | Mod: S$PBB,,, | Performed by: FAMILY MEDICINE

## 2025-01-28 PROCEDURE — G2211 COMPLEX E/M VISIT ADD ON: HCPCS | Mod: S$PBB,,, | Performed by: FAMILY MEDICINE

## 2025-01-28 PROCEDURE — 99999 PR PBB SHADOW E&M-EST. PATIENT-LVL V: CPT | Mod: PBBFAC,,, | Performed by: FAMILY MEDICINE

## 2025-01-28 PROCEDURE — 99215 OFFICE O/P EST HI 40 MIN: CPT | Mod: PBBFAC,PN | Performed by: FAMILY MEDICINE

## 2025-01-28 RX ORDER — ALBUTEROL SULFATE 90 UG/1
INHALANT RESPIRATORY (INHALATION)
Qty: 54 G | Refills: 3 | Status: SHIPPED | OUTPATIENT
Start: 2025-01-28

## 2025-01-31 ENCOUNTER — EXTERNAL CHRONIC CARE MANAGEMENT (OUTPATIENT)
Dept: PRIMARY CARE CLINIC | Facility: CLINIC | Age: 75
End: 2025-01-31
Payer: MEDICARE

## 2025-01-31 PROCEDURE — 99439 CHRNC CARE MGMT STAF EA ADDL: CPT | Mod: PBBFAC,PN | Performed by: FAMILY MEDICINE

## 2025-01-31 PROCEDURE — 99439 CHRNC CARE MGMT STAF EA ADDL: CPT | Mod: S$PBB,,, | Performed by: FAMILY MEDICINE

## 2025-01-31 PROCEDURE — 99490 CHRNC CARE MGMT STAFF 1ST 20: CPT | Mod: PBBFAC,PN | Performed by: FAMILY MEDICINE

## 2025-01-31 PROCEDURE — 99490 CHRNC CARE MGMT STAFF 1ST 20: CPT | Mod: S$PBB,,, | Performed by: FAMILY MEDICINE

## 2025-02-02 PROBLEM — N25.81 SECONDARY HYPERPARATHYROIDISM OF RENAL ORIGIN: Chronic | Status: ACTIVE | Noted: 2025-01-28

## 2025-02-02 NOTE — PROGRESS NOTES
"  Patient Name: Stephanie Sears    : 1950  MRN: [id number]      Subjective:     Patient ID: Stephanie is a 75 y.o. female    Chief Complaint:  Follow-up    History of Present Illness    Stephanie presents today for follow-up of diabetes, kidneys, blood pressure, and cholesterol.    She manages diabetes through diet alone. She attended a dietician appointment upon initial diagnosis and follows the recommended diet plan. She has a strong family history of diabetes, including her mother, grandmother, two sisters, and brother.    She reports no acute cardiac or pulmonary symptoms.    She takes Carvedilol twice daily, Atorvastatin daily, and Amlodipine Benazepril daily. She has discontinued Trulance which was previously prescribed for constipation.      ROS:  General: -fever, -chills, -fatigue, -weight gain, -weight loss  Eyes: -vision changes, -redness, -discharge  ENT: -ear pain, -nasal congestion, -sore throat  Cardiovascular: -chest pain, -palpitations, -lower extremity edema  Respiratory: +cough, -shortness of breath  Gastrointestinal: -abdominal pain, -nausea, -vomiting, -diarrhea, -constipation, -blood in stool  Genitourinary: -dysuria, -hematuria, -frequency  Musculoskeletal: -joint pain, -muscle pain  Skin: -rash, -lesion  Neurological: -headache, -dizziness, -numbness, -tingling  Psychiatric: -anxiety, -depression, -sleep difficulty           Objective:   /68 (BP Location: Left arm, Patient Position: Sitting)   Pulse 63   Temp 98 °F (36.7 °C) (Oral)   Resp 18   Ht 5' 2" (1.575 m)   Wt 73.1 kg (161 lb 2.5 oz)   SpO2 99%   BMI 29.48 kg/m²     Physical Exam  Vitals reviewed.   Constitutional:       General: She is not in acute distress.     Appearance: She is well-developed. She is not diaphoretic.   HENT:      Head: Normocephalic and atraumatic.      Right Ear: Tympanic membrane, ear canal and external ear normal.      Left Ear: Tympanic membrane, ear canal and external ear normal.      Nose: Nose " normal.   Eyes:      General:         Right eye: No discharge.         Left eye: No discharge.      Conjunctiva/sclera: Conjunctivae normal.      Pupils: Pupils are equal, round, and reactive to light.   Neck:      Thyroid: No thyromegaly.      Trachea: No tracheal deviation.   Cardiovascular:      Rate and Rhythm: Normal rate and regular rhythm.      Pulses:           Radial pulses are 2+ on the right side and 2+ on the left side.      Heart sounds: Normal heart sounds, S1 normal and S2 normal. No murmur heard.  Pulmonary:      Effort: Pulmonary effort is normal. No respiratory distress.      Breath sounds: Normal breath sounds. No wheezing, rhonchi or rales.   Abdominal:      General: Bowel sounds are normal. There is no distension.      Palpations: Abdomen is soft. Abdomen is not rigid. There is no mass.      Tenderness: There is no abdominal tenderness. There is no guarding.   Musculoskeletal:      Cervical back: Normal range of motion and neck supple.   Lymphadenopathy:      Cervical: No cervical adenopathy.   Skin:     General: Skin is warm and dry.      Capillary Refill: Capillary refill takes less than 2 seconds.      Findings: No rash.   Neurological:      Mental Status: She is alert and oriented to person, place, and time.   Psychiatric:         Behavior: Behavior normal.        Lab Visit on 01/27/2025   Component Date Value Ref Range Status    Microalbumin, Urine 01/27/2025 148.0  ug/mL Final    Creatinine, Urine 01/27/2025 38.0  15.0 - 325.0 mg/dL Final    Microalb/Creat Ratio 01/27/2025 389.5 (H)  0.0 - 30.0 ug/mg Final   Lab Visit on 01/27/2025   Component Date Value Ref Range Status    WBC 01/27/2025 5.12  3.90 - 12.70 K/uL Final    RBC 01/27/2025 4.74  4.00 - 5.40 M/uL Final    Hemoglobin 01/27/2025 12.8  12.0 - 16.0 g/dL Final    Hematocrit 01/27/2025 40.1  37.0 - 48.5 % Final    MCV 01/27/2025 85  82 - 98 fL Final    MCH 01/27/2025 27.0  27.0 - 31.0 pg Final    MCHC 01/27/2025 31.9 (L)  32.0 - 36.0  g/dL Final    RDW 01/27/2025 15.1 (H)  11.5 - 14.5 % Final    Platelets 01/27/2025 311  150 - 450 K/uL Final    MPV 01/27/2025 10.7  9.2 - 12.9 fL Final    Immature Granulocytes 01/27/2025 0.2  0.0 - 0.5 % Final    Gran # (ANC) 01/27/2025 2.0  1.8 - 7.7 K/uL Final    Immature Grans (Abs) 01/27/2025 0.01  0.00 - 0.04 K/uL Final    Comment: Mild elevation in immature granulocytes is non specific and   can be seen in a variety of conditions including stress response,   acute inflammation, trauma and pregnancy. Correlation with other   laboratory and clinical findings is essential.      Lymph # 01/27/2025 2.4  1.0 - 4.8 K/uL Final    Mono # 01/27/2025 0.4  0.3 - 1.0 K/uL Final    Eos # 01/27/2025 0.3  0.0 - 0.5 K/uL Final    Baso # 01/27/2025 0.03  0.00 - 0.20 K/uL Final    nRBC 01/27/2025 0  0 /100 WBC Final    Gran % 01/27/2025 39.4  38.0 - 73.0 % Final    Lymph % 01/27/2025 46.1  18.0 - 48.0 % Final    Mono % 01/27/2025 8.4  4.0 - 15.0 % Final    Eosinophil % 01/27/2025 5.3  0.0 - 8.0 % Final    Basophil % 01/27/2025 0.6  0.0 - 1.9 % Final    Differential Method 01/27/2025 Automated   Final    Sodium 01/27/2025 141  136 - 145 mmol/L Final    Potassium 01/27/2025 4.4  3.5 - 5.1 mmol/L Final    Chloride 01/27/2025 106  95 - 110 mmol/L Final    CO2 01/27/2025 24  23 - 29 mmol/L Final    Glucose 01/27/2025 95  70 - 110 mg/dL Final    BUN 01/27/2025 20  8 - 23 mg/dL Final    Creatinine 01/27/2025 1.3  0.5 - 1.4 mg/dL Final    Calcium 01/27/2025 10.0  8.7 - 10.5 mg/dL Final    Total Protein 01/27/2025 6.7  6.0 - 8.4 g/dL Final    Albumin 01/27/2025 3.2 (L)  3.5 - 5.2 g/dL Final    Total Bilirubin 01/27/2025 0.4  0.1 - 1.0 mg/dL Final    Comment: For infants and newborns, interpretation of results should be based  on gestational age, weight and in agreement with clinical  observations.    Premature Infant recommended reference ranges:  Up to 24 hours.............<8.0 mg/dL  Up to 48 hours............<12.0 mg/dL  3-5  days..................<15.0 mg/dL  6-29 days.................<15.0 mg/dL      Alkaline Phosphatase 01/27/2025 90  40 - 150 U/L Final    AST 01/27/2025 15  10 - 40 U/L Final    ALT 01/27/2025 10  10 - 44 U/L Final    eGFR 01/27/2025 43 (A)  >60 mL/min/1.73 m^2 Final    Anion Gap 01/27/2025 11  8 - 16 mmol/L Final    Hemoglobin A1C 01/27/2025 5.9 (H)  4.0 - 5.6 % Final    Comment: ADA Screening Guidelines:  5.7-6.4%  Consistent with prediabetes  >or=6.5%  Consistent with diabetes    High levels of fetal hemoglobin interfere with the HbA1C  assay. Heterozygous hemoglobin variants (HbS, HgC, etc)do  not significantly interfere with this assay.   However, presence of multiple variants may affect accuracy.      Estimated Avg Glucose 01/27/2025 123  68 - 131 mg/dL Final    Cholesterol 01/27/2025 138  120 - 199 mg/dL Final    Comment: The National Cholesterol Education Program (NCEP) has set the  following guidelines (reference ranges) for Cholesterol:  Optimal.....................<200 mg/dL  Borderline High.............200-239 mg/dL  High........................> or = 240 mg/dL      Triglycerides 01/27/2025 89  30 - 150 mg/dL Final    Comment: The National Cholesterol Education Program (NCEP) has set the  following guidelines (reference values) for triglycerides:  Normal......................<150 mg/dL  Borderline High.............150-199 mg/dL  High........................200-499 mg/dL      HDL 01/27/2025 34 (L)  40 - 75 mg/dL Final    Comment: The National Cholesterol Education Program (NCEP) has set the  following guidelines (reference values) for HDL Cholesterol:  Low...............<40 mg/dL  Optimal...........>60 mg/dL      LDL Cholesterol 01/27/2025 86.2  63.0 - 159.0 mg/dL Final    Comment: The National Cholesterol Education Program (NCEP) has set the  following guidelines (reference values) for LDL Cholesterol:  Optimal.......................<130 mg/dL  Borderline High...............130-159  mg/dL  High..........................160-189 mg/dL  Very High.....................>190 mg/dL      HDL/Cholesterol Ratio 01/27/2025 24.6  20.0 - 50.0 % Final    Total Cholesterol/HDL Ratio 01/27/2025 4.1  2.0 - 5.0 Final    Non-HDL Cholesterol 01/27/2025 104  mg/dL Final    Comment: Risk category and Non-HDL cholesterol goals:  Coronary heart disease (CHD)or equivalent (10-year risk of CHD >20%):  Non-HDL cholesterol goal     <130 mg/dL  Two or more CHD risk factors and 10-year risk of CHD <= 20%:  Non-HDL cholesterol goal     <160 mg/dL  0 to 1 CHD risk factor:  Non-HDL cholesterol goal     <190 mg/dL       Assessment        ICD-10-CM ICD-9-CM   1. Type 2 diabetes mellitus with other specified complication, without long-term current use of insulin  E11.69 250.80   2. Type II diabetes mellitus with peripheral circulatory disorder  E11.51 250.70     443.81   3. Essential hypertension  I10 401.9   4. Dyslipidemia  E78.5 272.4   5. Stage 3b chronic kidney disease  N18.32 585.3   6. Secondary hyperparathyroidism of renal origin  N25.81 588.81   7. Pulmonary hypertension  I27.20 416.8   8. Other emphysema  J43.8 492.8         Plan:   Assessment & Plan    IMPRESSION:  Reviewed patient's lab results: A1C 5.9%, indicative of good glycemic control  Assessed blood pressure and cholesterol levels, noting improvement  Evaluated kidney function, determining it is maintaining at the same stage  Considered patient's asthma management, deciding to renew albuterol inhaler prescription    TYPE 2 DIABETES MELLITUS WITH OTHER SPECIFIED COMPLICATION, WITHOUT LONG-TERM CURRENT USE OF INSULIN:  Monitored HbA1c, currently at 5.9%.  Stephanie's blood glucose level is good, and efforts in maintaining good sugar levels and losing weight are acknowledged.  Stephanie is managing diabetes through diet alone, without medication.    HYPERTENSION:  Pelons blood pressure is reported as good.  Continue Amlodipine Benazepril (Lotrel) daily for blood  pressure management.    HYPERLIPIDEMIA:  Cholesterol levels are slightly higher than ideal.  Continue Atorvastatin daily for management.    STAGE 3B CHRONIC KIDNEY DISEASE:  Evaluated kidney function, which is maintaining at the same stage.  Referred patient to a new nephrologist as previous nephrologist no longer working at [facility].    HYPERPARATHYROIDISM:   Continue monitoring parathyroid labs.    PULMONARY HYPERTENSION:  Continue medical management.    EMPHYSEMA:  No acute concerns reported by patient.    Continue current management.        FOLLOW UP:  Follow up in 6 months.           1. Type 2 diabetes mellitus with other specified complication, without long-term current use of insulin  Overview:  Complications: Nephropathy, hypertension, dyslipidemia, obesity    Lab Results   Component Value Date    HGBA1C 5.9 (H) 01/27/2025    HGBA1C 5.9 (H) 08/16/2024    HGBA1C 6.1 (H) 03/22/2024     Lab Results   Component Value Date    LDLCALC 86.2 01/27/2025     Lab Results   Component Value Date    MICALBCREAT 389.5 (H) 01/27/2025      Eye Exam:  11-  Foot exam:  11-    Orders:  -     Microalbumin/creatinine urine ratio; Future; Expected date: 07/28/2025  -     Comprehensive metabolic panel; Future; Expected date: 07/28/2025  -     Hemoglobin A1c; Future; Expected date: 07/28/2025  -     Lipid panel; Future; Expected date: 07/28/2025    2. Type II diabetes mellitus with peripheral circulatory disorder  -     Microalbumin/creatinine urine ratio; Future; Expected date: 07/28/2025  -     Hemoglobin A1c; Future; Expected date: 07/28/2025  -     Lipid panel; Future; Expected date: 07/28/2025    3. Essential hypertension    4. Dyslipidemia  Overview:  Lab Results   Component Value Date    CHOL 138 01/27/2025    CHOL 123 08/16/2024    CHOL 122 03/22/2024     Lab Results   Component Value Date    HDL 34 (L) 01/27/2025    HDL 38 (L) 08/16/2024    HDL 38 (L) 03/22/2024     Lab Results   Component Value Date    LDLCALC  "86.2 01/27/2025    LDLCALC 71.0 08/16/2024    LDLCALC 67.2 03/22/2024     Lab Results   Component Value Date    TRIG 89 01/27/2025    TRIG 70 08/16/2024    TRIG 84 03/22/2024     Lab Results   Component Value Date    CHOLHDL 24.6 01/27/2025    CHOLHDL 30.9 08/16/2024    CHOLHDL 31.1 03/22/2024        The 10-year ASCVD risk score (Sawyer NARVAEZ, et al., 2019) is: 32.8%    Values used to calculate the score:      Age: 75 years      Sex: Female      Is Non- : Yes      Diabetic: Yes      Tobacco smoker: Yes      Systolic Blood Pressure: 116 mmHg      Is BP treated: Yes      HDL Cholesterol: 34 mg/dL      Total Cholesterol: 138 mg/dL        5. Stage 3b chronic kidney disease  Overview:  Lab Results   Component Value Date    EGFRNORACEVR 43 (A) 01/27/2025    EGFRNORACEVR 53 (A) 12/04/2024    EGFRNORACEVR 43 (A) 10/28/2024      Lab Results   Component Value Date    CREATININE 1.3 01/27/2025    CREATININE 1.1 12/04/2024    CREATININE 1.3 10/28/2024      Lab Results   Component Value Date    MICALBCREAT 389.5 (H) 01/27/2025    MICALBCREAT 348.0 (H) 08/16/2024    MICALBCREAT 128.2 (H) 03/22/2024      Lab Results   Component Value Date    UTPCR 0.37 (H) 06/11/2024    UTPCR 1.37 (H) 02/03/2023    UTPCR 2.15 (H) 09/14/2022         Orders:  -     Microalbumin/creatinine urine ratio; Future; Expected date: 07/28/2025  -     CBC auto differential; Future; Expected date: 07/28/2025  -     Comprehensive metabolic panel; Future; Expected date: 07/28/2025  -     Ambulatory referral/consult to Nephrology; Future; Expected date: 02/04/2025    6. Secondary hyperparathyroidism of renal origin  Overview:  Lab Results   Component Value Date    .1 (H) 10/28/2024    .6 (H) 06/11/2024    PTH 88.9 (H) 05/26/2017    No results found for: "[id number]"   Lab Results   Component Value Date    CALCIUM 10.0 01/27/2025    CALCIUM 9.6 12/04/2024    CALCIUM 9.3 10/28/2024      Lab Results   Component Value Date    PHOS " 3.6 10/28/2024    PHOS 3.3 06/11/2024    PHOS 4.1 02/03/2023         7. Pulmonary hypertension  Overview:  -pasp of 39.  -group 2 with diastolic dysfunction      8. Other emphysema  Overview:  There are mild emphysematous changes within the lung apices noted on ct chest  -mild obstruction with ratio of 65 and fev1 75%  -encourage smoking cessation  -currently on albuterol.    Did not get spiriva due to cost of copay.     Orders:  -     albuterol (PROVENTIL/VENTOLIN HFA) 90 mcg/actuation inhaler; USE 2 INHALATIONS EVERY 4 HOURS AS NEEDED FOR WHEEZING (RESCUE)  Dispense: 54 g; Refill: 3             -Williams Rosis Jr., MD, AAHIVS      This note was generated with the assistance of ambient listening technology. Verbal consent was obtained by the patient and accompanying visitor(s) for the recording of patient appointment to facilitate this note. I attest to having reviewed and edited the generated note for accuracy, though some syntax or spelling errors may persist. Please contact the author of this note for any clarification.      There are no Patient Instructions on file for this visit.      Follow up in about 6 months (around 7/28/2025) for Diabetes, Hypertension, Lipids, Chronic Kidney Disease (fasting labs a week prior).   Future Appointments   Date Time Provider Department Center   2/6/2025  9:00 AM Melany Tse MD [facility] OPHTHAL [facility]   2/19/2025  1:40 PM America Long DO [facility] GASTRO [facility]   5/14/2025  9:30 AM Caitlin Barber MD [facility] NEPHRO [facility]   7/22/2025  8:30 AM LAB, [facility] DRAW STATION [facility] LAB [facility]   7/22/2025  8:45 AM LAB, [facility] DRAW STATION [facility] LAB [facility]   7/29/2025 10:00 AM Williams Rossi Jr., MD [facility]

## 2025-02-02 NOTE — PROGRESS NOTES
"HPI    DLS: 8/26/2024    Pt here for 6 Month Check;  Pt states no eye pain but pt does sleep with a full face C-Pap and the air   comes through and hits her OS. Pt states she does notice vision changes in   her OS.    Meds;  AT's QAM OU  Intolerant to multiple eye drops / ? RAGHU allergy   H/O ALT ou - yrs ago // H/O SLT's od 7/2008   H/O I-stents ou 2020 1  Preperimetric POAG vs OHT   2. PVD OD   3. PCIOL OU   4. Blepharit is / MGD   5. RAGHU Allergy   6. PCO OU     Last edited by Melany Tse MD on 2/6/2025  9:50 AM.            Assessment /Plan     For exam results, see Encounter Report.    Primary open-angle glaucoma, bilateral, mild stage    PCO (posterior capsular opacification), left    Epiretinal membrane (ERM) of left eye    Pseudophakia, both eyes    Status post glaucoma surgery        1. Pre-perimetric mild POAG   Vs OHT  -Followed at [facility] since 1991   -First HVF 1999   -First photos 1991   - Intolerant to all gtts - they aggravate his blepharitis-? RAGHU allergy   -IOP "OK" off gtts and s/p ALT ou and SLT od - 2008    Family history neg   Glaucoma meds none (( off gtts post SLT ou -))   H/O adverse rxn to glaucoma drops Intolerant to all gtts - 2/2 aggravates blepharitis- ? RAGHU allergy   LASERS ALT ou -? Date / SLT OD 7/17/08 - good response   GLAUCOMA SURGERIES - I stent x 2 - OD  - 10/21/2020 // I stent 12/9/2020 - OS   OTHER EYE SURGERIES - phaco/IOL od - 10/21/2020 - PCB00 14.0  // oS 12/9/2020 - pcb00 15.0   CDR 0.6/0.3   Tbase 19-26 / 16-21   Tmax 26/21   Ttarget ? About 18 ou  HVF 16  test - 1999 to 2024 - SNS od since 2021 // full os   Gonio +3 ou   /588   OCT 6 test 2018 to 2024 -  RNFL - OD: bord  G/N/TI (stable since 2022  // OS:NL- stable since 2018   HRT 9 test 2004 to 2020 -MR -  MR -  Dec T, border NI od // full os /// CDR 0.619 od // 0.508 os - but unreliable OD  Disc photos 1991, 1996, 2003 - slides // 2012 , 2016, 2020   - OIS     - Ttoday  15/14  - Test done today   " IOP / gonio      2. Posterior Vitreous Detachment OD - 11/2008   - RD PRECAUTIONS    3. Eyelid inflammation / Blepharitis / MGD    4. Allergic Conjunctivitis - RAGHU allergy     5. PCO    Vis sign os - s/p yag cap os 12/7/2023 - VA improved post yag cap    Mild od // + ant capsule phimosis od     6. PC IOL OU (w/ istents)    OD 10/21/202 - PCB00 14.0   OS 12/9/2020 - PCB00 15.0     7. Dry eye os    ? 2/2 cpap and sleeping on left side   Add ointment at bedtime os (2/2025)   AT's in am     Plan   POAG - mild OD // pre-perimetric os // - intolerant to all gtts   IOP ok s/p ALT ou - years ago and s/p SLT OD 2008 ( no SLT os)  Continue to monitor HVF/DFE/OCT/HRT/photos/IOP   If increase IOP or progression on VF testing consider repeat SLT OD and primary SLT os      Pt was a myope - sph eq is -5.25 od and -4.25 os - pre- phac0 -- set for distance post op    OCT macula - NO CME     Rx for bifocals  Given - or ok to use no distance correction and OTC readers - re-printed 3/28/2022  Pt mostly uses the [employer] reading glasses     Photo file updated 7/13/2023    If IOP too high off gtts and post I-stent - can try a SLT - OS and a repeat SLT od - can skip the istent areas // (( H/O ALT's many years ago)    - or can re-try some gtts (H/O intol to all galucoma gtts in past - ? ARGHU intol) -  ?? If could try travatan Z again in future again - non RAGHU // or ?? Try alphagan P     F/U 6 months IOP //  ocuar surface check os with addition of ointment at night

## 2025-02-06 ENCOUNTER — OFFICE VISIT (OUTPATIENT)
Dept: OPHTHALMOLOGY | Facility: CLINIC | Age: 75
End: 2025-02-06
Payer: MEDICARE

## 2025-02-06 DIAGNOSIS — H35.372 EPIRETINAL MEMBRANE (ERM) OF LEFT EYE: ICD-10-CM

## 2025-02-06 DIAGNOSIS — H26.492 PCO (POSTERIOR CAPSULAR OPACIFICATION), LEFT: ICD-10-CM

## 2025-02-06 DIAGNOSIS — Z98.83 STATUS POST GLAUCOMA SURGERY: ICD-10-CM

## 2025-02-06 DIAGNOSIS — H40.1131 PRIMARY OPEN-ANGLE GLAUCOMA, BILATERAL, MILD STAGE: Primary | ICD-10-CM

## 2025-02-06 DIAGNOSIS — Z96.1 PSEUDOPHAKIA, BOTH EYES: ICD-10-CM

## 2025-02-06 PROCEDURE — 99999 PR PBB SHADOW E&M-EST. PATIENT-LVL III: CPT | Mod: PBBFAC,,, | Performed by: OPHTHALMOLOGY

## 2025-02-06 PROCEDURE — 99213 OFFICE O/P EST LOW 20 MIN: CPT | Mod: PBBFAC | Performed by: OPHTHALMOLOGY

## 2025-02-06 PROCEDURE — 92020 GONIOSCOPY: CPT | Mod: PBBFAC | Performed by: OPHTHALMOLOGY

## 2025-02-06 PROCEDURE — 92012 INTRM OPH EXAM EST PATIENT: CPT | Mod: S$PBB,,, | Performed by: OPHTHALMOLOGY

## 2025-02-06 PROCEDURE — 92020 GONIOSCOPY: CPT | Mod: S$PBB,,, | Performed by: OPHTHALMOLOGY

## 2025-02-17 ENCOUNTER — TELEPHONE (OUTPATIENT)
Dept: FAMILY MEDICINE | Facility: CLINIC | Age: 75
End: 2025-02-17
Payer: MEDICARE

## 2025-02-17 DIAGNOSIS — Z12.31 ENCOUNTER FOR SCREENING MAMMOGRAM FOR MALIGNANT NEOPLASM OF BREAST: ICD-10-CM

## 2025-02-17 DIAGNOSIS — Z12.31 OTHER SCREENING MAMMOGRAM: Primary | ICD-10-CM

## 2025-02-17 NOTE — TELEPHONE ENCOUNTER
----- Message from Germánwanda sent at 2/17/2025  9:59 AM CST -----  Regarding: Self  Caller is requesting to schedule their annual mammogram appointment.  Order is not listed in EPIC.  Please enter order and contact patient to schedule.Name of Caller:Self Where would they like the mammogram performed? [facility] Would the patient rather a call back or a response via [facility]? Call back Best Call Back Number: .[phone] Information:

## 2025-02-19 ENCOUNTER — OFFICE VISIT (OUTPATIENT)
Dept: GASTROENTEROLOGY | Facility: CLINIC | Age: 75
End: 2025-02-19
Payer: MEDICARE

## 2025-02-19 VITALS
WEIGHT: 162.5 LBS | HEIGHT: 62 IN | SYSTOLIC BLOOD PRESSURE: 164 MMHG | HEART RATE: 70 BPM | DIASTOLIC BLOOD PRESSURE: 75 MMHG | BODY MASS INDEX: 29.9 KG/M2

## 2025-02-19 DIAGNOSIS — Z86.0100 HISTORY OF COLON POLYPS: ICD-10-CM

## 2025-02-19 DIAGNOSIS — K59.00 CONSTIPATION, UNSPECIFIED CONSTIPATION TYPE: Primary | ICD-10-CM

## 2025-02-19 PROCEDURE — 99213 OFFICE O/P EST LOW 20 MIN: CPT | Mod: PBBFAC | Performed by: STUDENT IN AN ORGANIZED HEALTH CARE EDUCATION/TRAINING PROGRAM

## 2025-02-19 NOTE — PROGRESS NOTES
"GENERAL GI PATIENT INTAKE:    COVID symptoms in the last 7 days (runny nose, sore throat, congestion, cough, fever): No  PCP: Williams Rossi Jr.  If not PCP-  number given to establish [phone]: N/A    ALLERGIES REVIEWED:  Yes    CHIEF COMPLAINT:  No chief complaint on file.      VITAL SIGNS:  BP (!) 164/75 (Patient Position: Sitting)   Pulse 70   Ht 5' 2" (1.575 m)   Wt 73.7 kg (162 lb 7.7 oz)   BMI 29.72 kg/m²      Change in medical, surgical, family or social history:  Reviewed by DO      REVIEWED MEDICATION LIST RECONCILED INCLUDING ABOVE MEDS:  Yes     "

## 2025-02-19 NOTE — PROGRESS NOTES
"[facility] NOTE    Name: Stephanie Sears  : 1950  Date of Service: 2025   PCP: Williams Rossi Jr., MD    Reason for visit: Constipation     HPI:     The patient is a 76 y/o F with CAD, COPD, HTN, HLD, JAM and T2DM that presents to GI clinic to discuss constipation. She reports that she has had intermittent constipation on and off for years, particularly since her hysterectomy. She notes that up until 2-3 weeks ago, she was having 1 formed BM per day. She notes a change where she was then constipated and unable to go for 3 days. She ultimately started colace and took that 6 times on and off with resolution of her issue. She recently had a bout with 2 days without a BM, but has also used enemas x2 which were effective. She does not feel pain or discomfort, but does feel she feels overall better with a daily BM. She is drinking 1 cup of water per day with meds in the am since she does not like water. Prefers to drink soda and juice. She does not move much as she is s/p bilateral knee replacement and uses a rolling walker now for years. She does not like laxatives. She has not started any new medications recently.     Review of Systems:   Review of Systems   Constitutional:  Negative for chills and fever.   Gastrointestinal:  Positive for abdominal pain and constipation. Negative for diarrhea, nausea and vomiting.       Medications: Current Medications[1]     Past medical history, past surgical history, family history, allergies, and social history reviewed and updated in EMR.     Vitals:   Vitals:    25 1401   BP: (!) 164/75   Patient Position: Sitting   Pulse: 70   Weight: 73.7 kg (162 lb 7.7 oz)   Height: 5' 2" (1.575 m)     Body mass index is 29.72 kg/m².    Physical Exam:   Physical Exam  Vitals reviewed.   HENT:      Head: Normocephalic and atraumatic.      Nose: Nose normal.      Mouth/Throat:      Mouth: Mucous membranes are moist.   Eyes:      General: No scleral " icterus.  Cardiovascular:      Rate and Rhythm: Normal rate.      Pulses: Normal pulses.   Pulmonary:      Effort: Pulmonary effort is normal.      Breath sounds: Normal breath sounds.   Abdominal:      General: Abdomen is flat. There is no distension.      Palpations: Abdomen is soft.      Tenderness: There is no abdominal tenderness. There is no guarding.   Skin:     General: Skin is warm and dry.   Neurological:      Mental Status: She is alert and oriented to person, place, and time. Mental status is at baseline.   Psychiatric:         Mood and Affect: Mood normal.         Behavior: Behavior normal.       Assessment:   1. Constipation, unspecified constipation type        2. History of colon polyps          Plan:   - No new medications; full list reviewed with patient. Nothing that could be causing significant constipation noted  - Patient is drinking limited amounts of water, would recommend increased oral hydration. Limit sodas and juices at this time  - Recommend daily fiber supplement   - Can continue prn colace at this time   - The patient is sedentary, she is s/p bilateral knee replacement and uses a rolling walker for years. Unlikely she can significantly increase physical activity level at this time   - Repeat colonoscopy recommended in 5 years; can consider moving earlier if change in bowel function persists at f/u and or warning signs develop   - Will f/u in clinic with me in 4 weeks. If not improved, would consider laxatives like Miralax. If she fails with this, can consider Linzess vs Amitiza, but would like to avoid a new medication given pill burden already expressed by patient   - Consider ARM; rule our dyssynergic defecation     Discussed with staff attending. Attestation to follow.     America Long DO PGY- V  Gastroenterology Fellow  [facility]       [1]   Current Outpatient Medications:     acetaminophen (TYLENOL) 500 MG tablet, Take 2 tablets (1,000 mg total) by mouth every  8 (eight) hours as needed for Pain., Disp: 40 tablet, Rfl: 0    albuterol (PROVENTIL/VENTOLIN HFA) 90 mcg/actuation inhaler, USE 2 INHALATIONS EVERY 4 HOURS AS NEEDED FOR WHEEZING (RESCUE), Disp: 54 g, Rfl: 3    amLODIPine-benazepriL (LOTREL) 10-40 mg per capsule, TAKE 1 CAPSULE DAILY, Disp: 90 capsule, Rfl: 3    atorvastatin (LIPITOR) 40 MG tablet, TAKE 1 TABLET DAILY (DISCONTINUE REFILLS ON ATORVASTATIN 20 MG), Disp: 90 tablet, Rfl: 2    carvediloL (COREG) 12.5 MG tablet, Take 1 tablet (12.5 mg total) by mouth 2 (two) times daily with meals., Disp: 180 tablet, Rfl: 3    chlorthalidone (HYGROTEN) 25 MG Tab, Take 1 tablet (25 mg total) by mouth twice a week., Disp: 10 tablet, Rfl: 6    gabapentin (NEURONTIN) 600 MG tablet, Take 1 tablet (600 mg total) by mouth 2 (two) times daily., Disp: 180 tablet, Rfl: 3    INULIN (FIBER GUMMIES ORAL), Take 1 each by mouth every morning. , Disp: , Rfl:     lancets (ACCU-CHEK MULTICLIX LANCET) Misc, Test blood sugar twice daily, Disp: 300 each, Rfl: 3    nicotine (NICODERM CQ) 21 mg/24 hr, Place 1 patch onto the skin once daily., Disp: 28 patch, Rfl: 0    nicotine, polacrilex, 2 mg lzmn, Take 1 each (2 mg total) by mouth as needed (Use 1 lozenge as needed in the place of a cigarette every 2-4 hours as needed.  Maximum of 5 per day.)., Disp: 150 each, Rfl: 0    simethicone (GAS-X ORAL), Take 1 tablet by mouth as needed. , Disp: , Rfl:     blood-glucose meter kit, Check blood glucose QD with insurance preferred glucometer, Disp: 1 each, Rfl: 0

## 2025-02-26 ENCOUNTER — HOSPITAL ENCOUNTER (OUTPATIENT)
Dept: RADIOLOGY | Facility: HOSPITAL | Age: 75
Discharge: HOME | End: 2025-02-26
Attending: FAMILY MEDICINE
Payer: MEDICARE

## 2025-02-26 DIAGNOSIS — Z12.31 ENCOUNTER FOR SCREENING MAMMOGRAM FOR MALIGNANT NEOPLASM OF BREAST: ICD-10-CM

## 2025-02-26 PROCEDURE — 77063 BREAST TOMOSYNTHESIS BI: CPT | Mod: 26,,, | Performed by: RADIOLOGY

## 2025-02-26 PROCEDURE — 77067 SCR MAMMO BI INCL CAD: CPT | Mod: 26,,, | Performed by: RADIOLOGY

## 2025-02-26 PROCEDURE — 77067 SCR MAMMO BI INCL CAD: CPT | Mod: TC

## 2025-02-28 ENCOUNTER — EXTERNAL CHRONIC CARE MANAGEMENT (OUTPATIENT)
Dept: PRIMARY CARE CLINIC | Facility: CLINIC | Age: 75
End: 2025-02-28
Payer: MEDICARE

## 2025-02-28 PROCEDURE — 99490 CHRNC CARE MGMT STAFF 1ST 20: CPT | Mod: PBBFAC,PN | Performed by: FAMILY MEDICINE

## 2025-03-09 ENCOUNTER — RESULTS FOLLOW-UP (OUTPATIENT)
Dept: FAMILY MEDICINE | Facility: CLINIC | Age: 75
End: 2025-03-09

## 2025-03-13 ENCOUNTER — TELEPHONE (OUTPATIENT)
Dept: SMOKING CESSATION | Facility: CLINIC | Age: 75
End: 2025-03-13
Payer: MEDICARE

## 2025-03-19 ENCOUNTER — TELEPHONE (OUTPATIENT)
Dept: SMOKING CESSATION | Facility: CLINIC | Age: 75
End: 2025-03-19
Payer: MEDICARE

## 2025-03-27 ENCOUNTER — CLINICAL SUPPORT (OUTPATIENT)
Dept: SMOKING CESSATION | Facility: CLINIC | Age: 75
End: 2025-03-27
Payer: COMMERCIAL

## 2025-03-27 DIAGNOSIS — F17.200 NICOTINE DEPENDENCE: Primary | ICD-10-CM

## 2025-03-27 PROCEDURE — 99999 PR PBB SHADOW E&M-EST. PATIENT-LVL I: CPT | Mod: PBBFAC,,,

## 2025-03-27 PROCEDURE — 99407 BEHAV CHNG SMOKING > 10 MIN: CPT | Mod: S$GLB,,, | Performed by: GENERAL PRACTICE

## 2025-03-27 NOTE — PROGRESS NOTES
Spoke with patient today in regard to smoking cessation progress for 6 month telephone follow up. Patient states she is not tobacco free. She is not ready to schedule an appointment at this time due to having a lot going on with her 's health. She will continue working on her quit on her own and will call us back when ready.  Informed patient of benefit period, future follow ups, and contact information if any further help or support is needed. Will complete smart form for 3/6 month follow up on Quit attempt #8.

## 2025-03-31 ENCOUNTER — EXTERNAL CHRONIC CARE MANAGEMENT (OUTPATIENT)
Dept: PRIMARY CARE CLINIC | Facility: CLINIC | Age: 75
End: 2025-03-31
Payer: MEDICARE

## 2025-03-31 PROCEDURE — 99490 CHRNC CARE MGMT STAFF 1ST 20: CPT | Mod: PBBFAC,PN | Performed by: FAMILY MEDICINE

## 2025-03-31 PROCEDURE — 99490 CHRNC CARE MGMT STAFF 1ST 20: CPT | Mod: S$PBB,,, | Performed by: FAMILY MEDICINE

## 2025-04-01 ENCOUNTER — TELEPHONE (OUTPATIENT)
Dept: FAMILY MEDICINE | Facility: CLINIC | Age: 75
End: 2025-04-01
Payer: MEDICARE

## 2025-04-01 NOTE — TELEPHONE ENCOUNTER
Called patient recommend for her to come in office to be seen because of fall and hip pian. Patient states she okay and she does not need to come into clinic to be seen. I informed her to give us a call if anything changes

## 2025-04-15 DIAGNOSIS — I70.0 ATHEROSCLEROSIS OF AORTIC ARCH: Chronic | ICD-10-CM

## 2025-04-15 DIAGNOSIS — E78.5 DYSLIPIDEMIA: Chronic | ICD-10-CM

## 2025-04-15 RX ORDER — ATORVASTATIN CALCIUM 40 MG/1
TABLET, FILM COATED ORAL
Qty: 90 TABLET | Refills: 3 | Status: SHIPPED | OUTPATIENT
Start: 2025-04-15

## 2025-04-16 NOTE — TELEPHONE ENCOUNTER
Refill Decision Note   Stephanie Sears  is requesting a refill authorization.  Brief Assessment and Rationale for Refill:  Approve     Medication Therapy Plan:         Comments:     Note composed:11:34 PM 04/15/2025

## 2025-04-16 NOTE — TELEPHONE ENCOUNTER
No care due was identified.  [facility] Embedded Care Due Messages. Reference number: [id number].   4/15/2025 11:16:37 PM CDT

## 2025-04-30 ENCOUNTER — EXTERNAL CHRONIC CARE MANAGEMENT (OUTPATIENT)
Dept: PRIMARY CARE CLINIC | Facility: CLINIC | Age: 75
End: 2025-04-30
Payer: MEDICARE

## 2025-04-30 PROCEDURE — 99490 CHRNC CARE MGMT STAFF 1ST 20: CPT | Mod: PBBFAC,PN | Performed by: FAMILY MEDICINE

## 2025-05-02 DIAGNOSIS — N18.32 STAGE 3B CHRONIC KIDNEY DISEASE: Primary | ICD-10-CM

## 2025-05-08 ENCOUNTER — PATIENT OUTREACH (OUTPATIENT)
Dept: ADMINISTRATIVE | Facility: HOSPITAL | Age: 75
End: 2025-05-08
Payer: MEDICARE

## 2025-05-08 ENCOUNTER — LAB VISIT (OUTPATIENT)
Dept: LAB | Facility: HOSPITAL | Age: 75
End: 2025-05-08
Attending: INTERNAL MEDICINE
Payer: MEDICARE

## 2025-05-08 DIAGNOSIS — N18.32 STAGE 3B CHRONIC KIDNEY DISEASE: ICD-10-CM

## 2025-05-08 LAB
ABSOLUTE EOSINOPHIL (OHS): 0.38 K/UL
ABSOLUTE MONOCYTE (OHS): 0.54 K/UL (ref 0.3–1)
ABSOLUTE NEUTROPHIL COUNT (OHS): 2.46 K/UL (ref 1.8–7.7)
ALBUMIN SERPL BCP-MCNC: 3.2 G/DL (ref 3.5–5.2)
ANION GAP (OHS): 9 MMOL/L (ref 8–16)
BASOPHILS # BLD AUTO: 0.02 K/UL
BASOPHILS NFR BLD AUTO: 0.4 %
BUN SERPL-MCNC: 25 MG/DL (ref 8–23)
CALCIUM SERPL-MCNC: 9.2 MG/DL (ref 8.7–10.5)
CHLORIDE SERPL-SCNC: 107 MMOL/L (ref 95–110)
CO2 SERPL-SCNC: 24 MMOL/L (ref 23–29)
CREAT SERPL-MCNC: 1.2 MG/DL (ref 0.5–1.4)
ERYTHROCYTE [DISTWIDTH] IN BLOOD BY AUTOMATED COUNT: 15.3 % (ref 11.5–14.5)
GFR SERPLBLD CREATININE-BSD FMLA CKD-EPI: 47 ML/MIN/1.73/M2
GLUCOSE SERPL-MCNC: 86 MG/DL (ref 70–110)
HCT VFR BLD AUTO: 36.8 % (ref 37–48.5)
HGB BLD-MCNC: 11.7 GM/DL (ref 12–16)
IMM GRANULOCYTES # BLD AUTO: 0.01 K/UL (ref 0–0.04)
IMM GRANULOCYTES NFR BLD AUTO: 0.2 % (ref 0–0.5)
LYMPHOCYTES # BLD AUTO: 2.15 K/UL (ref 1–4.8)
MCH RBC QN AUTO: 26.9 PG (ref 27–31)
MCHC RBC AUTO-ENTMCNC: 31.8 G/DL (ref 32–36)
MCV RBC AUTO: 85 FL (ref 82–98)
NUCLEATED RBC (/100WBC) (OHS): 0 /100 WBC
PHOSPHATE SERPL-MCNC: 3.8 MG/DL (ref 2.7–4.5)
PLATELET # BLD AUTO: 237 K/UL (ref 150–450)
PMV BLD AUTO: 11.4 FL (ref 9.2–12.9)
POTASSIUM SERPL-SCNC: 4.3 MMOL/L (ref 3.5–5.1)
PTH-INTACT SERPL-MCNC: 130.9 PG/ML (ref 9–77)
RBC # BLD AUTO: 4.35 M/UL (ref 4–5.4)
RELATIVE EOSINOPHIL (OHS): 6.8 %
RELATIVE LYMPHOCYTE (OHS): 38.7 % (ref 18–48)
RELATIVE MONOCYTE (OHS): 9.7 % (ref 4–15)
RELATIVE NEUTROPHIL (OHS): 44.2 % (ref 38–73)
SODIUM SERPL-SCNC: 140 MMOL/L (ref 136–145)
URATE SERPL-MCNC: 8.2 MG/DL (ref 2.4–5.7)
WBC # BLD AUTO: 5.56 K/UL (ref 3.9–12.7)

## 2025-05-08 PROCEDURE — 82435 ASSAY OF BLOOD CHLORIDE: CPT

## 2025-05-08 PROCEDURE — 83970 ASSAY OF PARATHORMONE: CPT

## 2025-05-08 PROCEDURE — 84550 ASSAY OF BLOOD/URIC ACID: CPT

## 2025-05-08 PROCEDURE — 36415 COLL VENOUS BLD VENIPUNCTURE: CPT | Mod: PN

## 2025-05-08 PROCEDURE — 85025 COMPLETE CBC W/AUTO DIFF WBC: CPT

## 2025-05-13 PROBLEM — N18.31 STAGE 3A CHRONIC KIDNEY DISEASE: Status: ACTIVE | Noted: 2025-05-13

## 2025-05-13 PROBLEM — E79.0 HYPERURICEMIA: Status: ACTIVE | Noted: 2025-05-13

## 2025-05-13 PROBLEM — D63.1 ANEMIA OF CHRONIC RENAL FAILURE: Status: ACTIVE | Noted: 2025-05-13

## 2025-05-13 PROBLEM — N18.9 ANEMIA OF CHRONIC RENAL FAILURE: Status: ACTIVE | Noted: 2025-05-13

## 2025-05-13 NOTE — PROGRESS NOTES
[facility]  [address] [facility] [address], [address]  [place], [place]  [phone]    Referring Provider: Williams Rossi Jr., MD  PCP: Williams Rossi Jr., MD      HPI: Ms. Stephanie Sears is a 75 y.o. female with HTN, T2DM, CAD, CHF, COPD, and GERD who is here for new patient evaluation of Chronic Kidney Disease  She was previously followed by Dr. Beck but is new to me. Prior records obtained and reviewed.  Her baseline Cr appears to be 1.1-1.4 with GFR 39-53% since 2/2023; GFR > 60% prior to 2/2023. UA with proteinuria since at least 2004. UPCR 3.88 in 5/3/22. She is currently on benazepril. SGLT2i was tried in the past but not covered by insurance.  She reports her T2DM is controlled with diet. Per chart review, A1c has been </= 7 since 2007.   Her BP is currently controlled on 4 agents; she takes chlorthalidone BIW.   She has a strong family h/o HTN and DM. Her brother and mother were both on dialysis; brother received kidney transplant x2. She believes both were on dialysis due to DM.   She denies h/o gout or kidney stones.   Doesn't drink much water, but no longer drinks sodas. No leg swelling.         Past Medical History:   Diagnosis Date    Acute pancreatitis     Angina pectoris     Anxiety     Arthritis     Asthma     as a child    Back pain     Bronchitis     Cervical spondylosis with radiculopathy 07/10/2012    Chest pain     Chronic neck pain 07/26/2012    Colon polyps     COPD (chronic obstructive pulmonary disease)     Coronary artery disease     Depression     Fibrocystic breast     General anesthetics causing adverse effect in therapeutic use     trouble coming out of anes/ had the shakes    GERD (gastroesophageal reflux disease)     Glaucoma     Heart failure     Hyperlipidemia     Hypertension     Neck pain 07/10/2012    Obesity     JAM (obstructive sleep apnea)     Pneumonia     Pneumonia due to other staphylococcus     Primary osteoarthritis of both knees     Psoriasis     Subacromial or subdeltoid  bursitis 07/10/2012    Thyroid disease     Tobacco dependence     Trouble in sleeping     Type 2 diabetes mellitus 12/10/2013    Type 2 diabetes mellitus with diabetic chronic kidney disease        Past Surgical History:   Procedure Laterality Date    APPENDECTOMY      BREAST BIOPSY Bilateral 1988    BREAST MASS EXCISION Right     benign    CATARACT EXTRACTION W/  INTRAOCULAR LENS IMPLANT Left 12/09/2020    PHACO IOL WITH I-STENT ()    CATARACT EXTRACTION W/  INTRAOCULAR LENS IMPLANT Right 10/21/2020    PHACO IOL WITH I-STENT x 2 ()    CHOLECYSTECTOMY      COLONOSCOPY N/A 05/22/2019    Procedure: COLONOSCOPY;  Surgeon: Darrin Calvin MD;  Location: [facility] (2ND FLR);  Service: Endoscopy;  Laterality: N/A;  2nd floor - all other procedure done on 2, multiple comorbidities - ERW/   change in MD schedule, Pt notified and verbalized understanding, new arrival time 0800, Ins mailed to Pt with new arrival time - ERW1/8/19@1130    COLONOSCOPY N/A 09/26/2022    Procedure: COLONOSCOPY;  Surgeon: Darrin Calvin MD;  [facility]  Service: Endoscopy;  Laterality: N/A;  2nd floor d/t previous anesthesia complications  vaccinated  inst mailed  clear liquids 4 hr prior-AM prep-LW  I should be able to complete this with the slim pediatric colonoscope, but should have the single-balloon available in case needed.    EPIDURAL STEROID INJECTION Bilateral 02/26/2021    Procedure: Injection, Steroid, Ischial Bursa;  Surgeon: Yonatan Garsia Jr., MD;  Location: [facility] ENDO;  Service: Pain Management;  Laterality: Bilateral;  Bilateral Ischial Bursa Steroid Injections  Arrive @ 1230; No ATC; Check BG    ESOPHAGOGASTRODUODENOSCOPY      HYSTERECTOMY  1980's    IMPLANTATION OF DEVICE FOR GLAUCOMA Right 10/21/2020    Procedure: INSERTION, DEVICE, FOR GLAUCOMA/ i Stent;  Surgeon: Melany Tse MD;  Location: [facility] OR [address] [place] [address];  Service: Ophthalmology;  Laterality: Right;    IMPLANTATION OF  DEVICE FOR GLAUCOMA Left 12/09/2020    Procedure: INSERTION, DEVICE, FOR GLAUCOMA/I SENT;  Surgeon: Melany Tse MD;  Location: [address];  Service: Ophthalmology;  Laterality: Left;    INJECTION OF JOINT Bilateral 12/23/2021    Procedure: Injection, Joint---BILATERAL ISCHIAL BURSA STEROID INJECTION;  Surgeon: Yonatan Garsia Jr., MD;  Location: [facility] PAIN MGMT;  Service: Pain Management;  Laterality: Bilateral;    INTRAOCULAR PROSTHESES INSERTION Right 10/21/2020    Procedure: INSERTION, IOL PROSTHESIS;  Surgeon: Melany Tse MD;  Location: [address];  Service: Ophthalmology;  Laterality: Right;    INTRAOCULAR PROSTHESES INSERTION Left 12/09/2020    Procedure: INSERTION, IOL PROSTHESIS;  Surgeon: Melany Tse MD;  Location: [facility] OR [address] [place] [address];  Service: Ophthalmology;  Laterality: Left;    KNEE SURGERY Left 01/20/2016    TKR    KNEE SURGERY Right 02/26/2018    TKR    L4-L5 fusion  2006    PHACOEMULSIFICATION OF CATARACT Right 10/21/2020    Procedure: PHACOEMULSIFICATION, CATARACT;  Surgeon: Melany Tse MD;  Location: [address];  Service: Ophthalmology;  Laterality: Right;    PHACOEMULSIFICATION OF CATARACT Left 12/09/2020    Procedure: PHACOEMULSIFICATION, CATARACT;  Surgeon: Melany Tse MD;  Location: [address];  Service: Ophthalmology;  Laterality: Left;    YAG CAPSULOTOMY Left 12/07/2023           Family History   Problem Relation Name Age of Onset    Diabetes Mother      Hypertension Mother          CHF, angina    Angina Mother      Kidney disease Mother      Heart disease Mother          CHF    Hypertension Father          glaucoma, Alzhiemer's , supra pubic    Alzheimer's disease Father      Glaucoma Father      Glaucoma Sister Paige     Hypertension Sister Paige     Hyperlipidemia Sister Paige     Sleep apnea Sister Leivasy     Hypertension Sister Leivasy     Thyroid disease Sister Leivasy     No Known Problems Sister Tanna     Breast  "cancer Sister Marleny Reynoso    Cancer Paternal Aunt          colon ca    Kidney disease Brother Vinod         kidney transplant, HTN,DM    Diabetes Brother Vinod     Hepatitis Brother Rajwinder     Gout Son Vic     Hypertension Son Vic     Diabetes Son Vic     Amblyopia Neg Hx      Blindness Neg Hx      Melanoma Neg Hx      Macular degeneration Neg Hx      Retinal detachment Neg Hx      Strabismus Neg Hx         Social History[1]      ROS:  Complete ROS otherwise negative except as indicated above.       Current Medications[2]      Vitals: Blood pressure (!) 144/80, pulse 63, resp. rate 18, height 5' 2" (1.575 m), weight 72.1 kg (158 lb 15.2 oz), SpO2 98%. Body mass index is 29.07 kg/m².    Physical Exam  Vitals reviewed.   Constitutional:       General: She is awake. She is not in acute distress.     Appearance: Normal appearance. She is well-developed.   HENT:      Head: Normocephalic and atraumatic.      Nose: Nose normal.      Mouth/Throat:      Mouth: Mucous membranes are moist.   Eyes:      Extraocular Movements: Extraocular movements intact.      Conjunctiva/sclera: Conjunctivae normal.   Pulmonary:      Effort: Pulmonary effort is normal.   Musculoskeletal:         General: No tenderness or signs of injury.      Right lower leg: No edema.      Left lower leg: No edema.   Skin:     General: Skin is warm and dry.      Findings: No erythema or rash.   Neurological:      General: No focal deficit present.      Mental Status: She is alert. Mental status is at baseline.   Psychiatric:         Mood and Affect: Mood normal.         Behavior: Behavior normal.           Labs/Imaging:  Sodium   Date Value Ref Range Status   05/08/2025 140 136 - 145 mmol/L Final   01/27/2025 141 136 - 145 mmol/L Final   12/04/2024 141 136 - 145 mmol/L Final   10/28/2024 140 136 - 145 mmol/L Final     Potassium   Date Value Ref Range Status   05/08/2025 4.3 3.5 - 5.1 mmol/L Final   01/27/2025 4.4 3.5 - 5.1 mmol/L Final   12/04/2024 4.0 " 3.5 - 5.1 mmol/L Final   10/28/2024 4.5 3.5 - 5.1 mmol/L Final     Chloride   Date Value Ref Range Status   05/08/2025 107 95 - 110 mmol/L Final   01/27/2025 106 95 - 110 mmol/L Final   12/04/2024 108 95 - 110 mmol/L Final   10/28/2024 107 95 - 110 mmol/L Final     CO2   Date Value Ref Range Status   05/08/2025 24 23 - 29 mmol/L Final   01/27/2025 24 23 - 29 mmol/L Final   12/04/2024 25 23 - 29 mmol/L Final   10/28/2024 24 23 - 29 mmol/L Final     BUN   Date Value Ref Range Status   05/08/2025 25 (H) 8 - 23 mg/dL Final   01/27/2025 20 8 - 23 mg/dL Final   12/04/2024 18 8 - 23 mg/dL Final   10/28/2024 23 8 - 23 mg/dL Final     Creatinine   Date Value Ref Range Status   05/08/2025 1.2 0.5 - 1.4 mg/dL Final   01/27/2025 1.3 0.5 - 1.4 mg/dL Final   12/04/2024 1.1 0.5 - 1.4 mg/dL Final   10/28/2024 1.3 0.5 - 1.4 mg/dL Final     eGFR   Date Value Ref Range Status   05/08/2025 47 (L) >60 mL/min/1.73/m2 Final     Comment:     Estimated GFR calculated using the CKD-EPI creatinine (2021) equation.   01/27/2025 43 (A) >60 mL/min/1.73 m^2 Final   12/04/2024 53 (A) >60 mL/min/1.73 m^2 Final   10/28/2024 43 (A) >60 mL/min/1.73 m^2 Final     Calcium   Date Value Ref Range Status   05/08/2025 9.2 8.7 - 10.5 mg/dL Final   01/27/2025 10.0 8.7 - 10.5 mg/dL Final   12/04/2024 9.6 8.7 - 10.5 mg/dL Final   10/28/2024 9.3 8.7 - 10.5 mg/dL Final     Phosphorus Level   Date Value Ref Range Status   05/08/2025 3.8 2.7 - 4.5 mg/dL Final     Phosphorus   Date Value Ref Range Status   10/28/2024 3.6 2.7 - 4.5 mg/dL Final   06/11/2024 3.3 2.7 - 4.5 mg/dL Final   02/03/2023 4.1 2.7 - 4.5 mg/dL Final     Albumin   Date Value Ref Range Status   05/08/2025 3.2 (L) 3.5 - 5.2 g/dL Final   01/27/2025 3.2 (L) 3.5 - 5.2 g/dL Final   12/04/2024 3.4 (L) 3.5 - 5.2 g/dL Final   10/28/2024 3.1 (L) 3.5 - 5.2 g/dL Final       PTH, Intact   Date Value Ref Range Status   10/28/2024 107.1 (H) 9.0 - 77.0 pg/mL Final   06/11/2024 178.6 (H) 9.0 - 77.0 pg/mL Final    05/26/2017 88.9 (H) 9.0 - 77.0 pg/mL Final     PTH Intact   Date Value Ref Range Status   05/08/2025 130.9 (H) 9.0 - 77.0 pg/mL Final     Uric Acid   Date Value Ref Range Status   07/10/2009 5.4 2.4 - 5.7 mg/dl Final       Hemoglobin   Date Value Ref Range Status   01/27/2025 12.8 12.0 - 16.0 g/dL Final   12/04/2024 12.1 12.0 - 16.0 g/dL Final   10/28/2024 12.3 12.0 - 16.0 g/dL Final     HGB   Date Value Ref Range Status   05/08/2025 11.7 (L) 12.0 - 16.0 gm/dL Final       Urine Protein/Creatinine Ratio   Date Value Ref Range Status   05/08/2025 0.66 (H) <=0.20 Final     Prot/Creat Ratio, Urine   Date Value Ref Range Status   06/11/2024 0.37 (H) 0.00 - 0.20 Final   02/03/2023 1.37 (H) 0.00 - 0.20 Final   09/14/2022 2.15 (H) 0.00 - 0.20 Final           Renal US: 6/16/22:   Right kidney: The right kidney measures 11.4 cm. Resistive index measures 0.80.  No cortical thinning.  No loss of corticomedullary distinction. No mass. No stone visualized.  No hydronephrosis.     Left kidney: The left kidney measures 11.3 cm. Resistive index measures 0.77.  No cortical thinning.  No loss of corticomedullary distinction. No mass. No stone visualized.  No hydronephrosis.           Impression/Plan:    Stage 3a chronic kidney disease  - baseline Cr 1.1-1.4 with GFR 39-53% since 2/2023; clinically due to hypertensive arterionephrosclerosis though cannot rule out FSGS or diabetic nephropathy as proteinuria proceeded decline in GFR  - Cr 1.2 today; stable and at baseline  - UPCR 0.66; uncontrolled but previously 3.88 in 5/2022  - continue max dose benazepril  - offered SGLT2i +/- finerenone. Reviewed KFRE with patient; risk of needing RRT in 5 years is only 2-3%. Risk of medication > benefit at this time. Will reassess at each visit  - increase water intake. Low salt diet. Avoid NSAIDs.    Persistent proteinuria  - see above  - ensure BP is well-controlled    Secondary hyperparathyroidism of renal origin  - ; controlled. CCa  9.8, phos 3.8; normal. Will trend.     Anemia of chronic renal failure  - hgb 11.7; at goal for CKD    Hyperuricemia  - no h/o gout or stones  - uric acid 8.2; possibly related to thiazide  - will repeat         Visit today included increased complexity associated with the care of the episodic problem proteinuria addressed and managing the longitudinal care of the patient due to the serious and/or complex managed problem(s) CKD.    Treatment options and plan were discussed with the patient and/or caregiver.   RTC 3 months.       Caitlin Barber MD  [facility]  [phone]         [1]   Social History  Tobacco Use    Smoking status: Every Day     Current packs/day: 0.50     Average packs/day: 0.4 packs/day for 55.4 years (22.5 ttl pk-yrs)     Types: Cigarettes     Start date: 1970    Smokeless tobacco: Never    Tobacco comments:     Patient is enrolled in smoking cessation. Comprehensive smoking hx was updated on 2/28/24 by MARQUIS Cherry RRT,MSW,LMSW,TTS   Substance Use Topics    Alcohol use: No     Alcohol/week: 0.0 standard drinks of alcohol    Drug use: No   [2]   Current Outpatient Medications:     acetaminophen (TYLENOL) 500 MG tablet, Take 2 tablets (1,000 mg total) by mouth every 8 (eight) hours as needed for Pain., Disp: 40 tablet, Rfl: 0    albuterol (PROVENTIL/VENTOLIN HFA) 90 mcg/actuation inhaler, USE 2 INHALATIONS EVERY 4 HOURS AS NEEDED FOR WHEEZING (RESCUE), Disp: 54 g, Rfl: 3    amLODIPine-benazepriL (LOTREL) 10-40 mg per capsule, TAKE 1 CAPSULE DAILY, Disp: 90 capsule, Rfl: 3    atorvastatin (LIPITOR) 40 MG tablet, TAKE 1 TABLET DAILY (DISCONTINUE REFILLS ON ATORVASTATIN 20 MG), Disp: 90 tablet, Rfl: 3    blood-glucose meter kit, Check blood glucose QD with insurance preferred glucometer, Disp: 1 each, Rfl: 0    carvediloL (COREG) 12.5 MG tablet, Take 1 tablet (12.5 mg total) by mouth 2 (two) times daily with meals., Disp: 180 tablet, Rfl: 3    chlorthalidone (HYGROTEN) 25 MG Tab, Take 1 tablet  (25 mg total) by mouth twice a week., Disp: 10 tablet, Rfl: 6    INULIN (FIBER GUMMIES ORAL), Take 1 each by mouth every morning. , Disp: , Rfl:     lancets (ACCU-CHEK MULTICLIX LANCET) Misc, Test blood sugar twice daily, Disp: 300 each, Rfl: 3    simethicone (GAS-X ORAL), Take 1 tablet by mouth as needed. , Disp: , Rfl:     gabapentin (NEURONTIN) 600 MG tablet, Take 1 tablet (600 mg total) by mouth 2 (two) times daily. (Patient not taking: Reported on 5/14/2025), Disp: 180 tablet, Rfl: 3    nicotine (NICODERM CQ) 21 mg/24 hr, Place 1 patch onto the skin once daily. (Patient not taking: Reported on 5/14/2025), Disp: 28 patch, Rfl: 0    nicotine, polacrilex, 2 mg lzmn, Take 1 each (2 mg total) by mouth as needed (Use 1 lozenge as needed in the place of a cigarette every 2-4 hours as needed.  Maximum of 5 per day.). (Patient not taking: Reported on 5/14/2025), Disp: 150 each, Rfl: 0

## 2025-05-14 ENCOUNTER — OFFICE VISIT (OUTPATIENT)
Dept: NEPHROLOGY | Facility: CLINIC | Age: 75
End: 2025-05-14
Payer: MEDICARE

## 2025-05-14 VITALS
OXYGEN SATURATION: 98 % | HEART RATE: 63 BPM | WEIGHT: 158.94 LBS | BODY MASS INDEX: 29.25 KG/M2 | SYSTOLIC BLOOD PRESSURE: 144 MMHG | DIASTOLIC BLOOD PRESSURE: 80 MMHG | RESPIRATION RATE: 18 BRPM | HEIGHT: 62 IN

## 2025-05-14 DIAGNOSIS — N25.81 SECONDARY HYPERPARATHYROIDISM OF RENAL ORIGIN: ICD-10-CM

## 2025-05-14 DIAGNOSIS — N18.31 STAGE 3A CHRONIC KIDNEY DISEASE: Primary | ICD-10-CM

## 2025-05-14 DIAGNOSIS — E79.0 HYPERURICEMIA: ICD-10-CM

## 2025-05-14 DIAGNOSIS — D63.1 ANEMIA OF CHRONIC RENAL FAILURE, UNSPECIFIED CKD STAGE: ICD-10-CM

## 2025-05-14 DIAGNOSIS — N18.9 ANEMIA OF CHRONIC RENAL FAILURE, UNSPECIFIED CKD STAGE: ICD-10-CM

## 2025-05-14 DIAGNOSIS — R80.1 PERSISTENT PROTEINURIA: ICD-10-CM

## 2025-05-14 PROCEDURE — G2211 COMPLEX E/M VISIT ADD ON: HCPCS | Mod: S$PBB,,, | Performed by: INTERNAL MEDICINE

## 2025-05-14 PROCEDURE — 99215 OFFICE O/P EST HI 40 MIN: CPT | Mod: PBBFAC | Performed by: INTERNAL MEDICINE

## 2025-05-14 PROCEDURE — 99214 OFFICE O/P EST MOD 30 MIN: CPT | Mod: S$PBB,,, | Performed by: INTERNAL MEDICINE

## 2025-05-14 PROCEDURE — 99999 PR PBB SHADOW E&M-EST. PATIENT-LVL V: CPT | Mod: PBBFAC,,, | Performed by: INTERNAL MEDICINE

## 2025-05-18 NOTE — ASSESSMENT & PLAN NOTE
- baseline Cr 1.1-1.4 with GFR 39-53% since 2/2023; clinically due to hypertensive arterionephrosclerosis though cannot rule out FSGS or diabetic nephropathy as proteinuria proceeded decline in GFR  - Cr 1.2 today; stable and at baseline  - UPCR 0.66; uncontrolled but previously 3.88 in 5/2022  - continue max dose benazepril  - offered SGLT2i +/- finerenone. Reviewed KFRE with patient; risk of needing RRT in 5 years is only 2-3%. Risk of medication > benefit at this time. Will reassess at each visit  - increase water intake. Low salt diet. Avoid NSAIDs.

## 2025-05-24 ENCOUNTER — HOSPITAL ENCOUNTER (EMERGENCY)
Facility: HOSPITAL | Age: 75
Discharge: HOME | End: 2025-05-24
Attending: STUDENT IN AN ORGANIZED HEALTH CARE EDUCATION/TRAINING PROGRAM
Payer: MEDICARE

## 2025-05-24 VITALS
TEMPERATURE: 98 F | WEIGHT: 158 LBS | RESPIRATION RATE: 17 BRPM | OXYGEN SATURATION: 97 % | SYSTOLIC BLOOD PRESSURE: 127 MMHG | HEART RATE: 61 BPM | HEIGHT: 62 IN | BODY MASS INDEX: 29.08 KG/M2 | DIASTOLIC BLOOD PRESSURE: 64 MMHG

## 2025-05-24 DIAGNOSIS — R06.02 SHORTNESS OF BREATH: ICD-10-CM

## 2025-05-24 DIAGNOSIS — J44.1 COPD EXACERBATION: Primary | ICD-10-CM

## 2025-05-24 LAB
ABSOLUTE EOSINOPHIL (OHS): 0.64 K/UL
ABSOLUTE MONOCYTE (OHS): 0.58 K/UL (ref 0.3–1)
ABSOLUTE NEUTROPHIL COUNT (OHS): 2 K/UL (ref 1.8–7.7)
ALBUMIN SERPL BCP-MCNC: 3.3 G/DL (ref 3.5–5.2)
ALLENS TEST: ABNORMAL
ALP SERPL-CCNC: 96 UNIT/L (ref 40–150)
ALT SERPL W/O P-5'-P-CCNC: 9 UNIT/L (ref 10–44)
ANION GAP (OHS): 9 MMOL/L (ref 8–16)
AST SERPL-CCNC: 16 UNIT/L (ref 11–45)
BASOPHILS # BLD AUTO: 0.03 K/UL
BASOPHILS NFR BLD AUTO: 0.6 %
BILIRUB SERPL-MCNC: 0.4 MG/DL (ref 0.1–1)
BNP SERPL-MCNC: 127 PG/ML (ref 0–99)
BUN SERPL-MCNC: 20 MG/DL (ref 8–23)
CALCIUM SERPL-MCNC: 9.2 MG/DL (ref 8.7–10.5)
CHLORIDE SERPL-SCNC: 109 MMOL/L (ref 95–110)
CO2 SERPL-SCNC: 23 MMOL/L (ref 23–29)
CREAT SERPL-MCNC: 1.3 MG/DL (ref 0.5–1.4)
DELSYS: ABNORMAL
ERYTHROCYTE [DISTWIDTH] IN BLOOD BY AUTOMATED COUNT: 14.8 % (ref 11.5–14.5)
GFR SERPLBLD CREATININE-BSD FMLA CKD-EPI: 43 ML/MIN/1.73/M2
GLUCOSE SERPL-MCNC: 100 MG/DL (ref 70–110)
HCO3 UR-SCNC: 28.2 MMOL/L (ref 24–28)
HCT VFR BLD AUTO: 39.2 % (ref 37–48.5)
HGB BLD-MCNC: 12.5 GM/DL (ref 12–16)
IMM GRANULOCYTES # BLD AUTO: 0.01 K/UL (ref 0–0.04)
IMM GRANULOCYTES NFR BLD AUTO: 0.2 % (ref 0–0.5)
LYMPHOCYTES # BLD AUTO: 1.97 K/UL (ref 1–4.8)
MCH RBC QN AUTO: 26.9 PG (ref 27–31)
MCHC RBC AUTO-ENTMCNC: 31.9 G/DL (ref 32–36)
MCV RBC AUTO: 85 FL (ref 82–98)
NUCLEATED RBC (/100WBC) (OHS): 0 /100 WBC
PCO2 BLDA: 51.8 MMHG (ref 35–45)
PH SMN: 7.34 [PH] (ref 7.35–7.45)
PLATELET # BLD AUTO: 221 K/UL (ref 150–450)
PMV BLD AUTO: 10.8 FL (ref 9.2–12.9)
PO2 BLDA: 24 MMHG (ref 40–60)
POC BE: 2 MMOL/L (ref -2–2)
POC SATURATED O2: 38 % (ref 95–100)
POC TCO2: 30 MMOL/L (ref 24–29)
POTASSIUM SERPL-SCNC: 4.4 MMOL/L (ref 3.5–5.1)
PROT SERPL-MCNC: 6.7 GM/DL (ref 6–8.4)
RBC # BLD AUTO: 4.64 M/UL (ref 4–5.4)
RELATIVE EOSINOPHIL (OHS): 12.2 %
RELATIVE LYMPHOCYTE (OHS): 37.7 % (ref 18–48)
RELATIVE MONOCYTE (OHS): 11.1 % (ref 4–15)
RELATIVE NEUTROPHIL (OHS): 38.2 % (ref 38–73)
SAMPLE: ABNORMAL
SITE: ABNORMAL
SODIUM SERPL-SCNC: 141 MMOL/L (ref 136–145)
TROPONIN I SERPL DL<=0.01 NG/ML-MCNC: 0.01 NG/ML
TSH SERPL-ACNC: 0.94 UIU/ML (ref 0.4–4)
WBC # BLD AUTO: 5.23 K/UL (ref 3.9–12.7)

## 2025-05-24 PROCEDURE — 80053 COMPREHEN METABOLIC PANEL: CPT | Performed by: STUDENT IN AN ORGANIZED HEALTH CARE EDUCATION/TRAINING PROGRAM

## 2025-05-24 PROCEDURE — 96365 THER/PROPH/DIAG IV INF INIT: CPT

## 2025-05-24 PROCEDURE — 63600175 PHARM REV CODE 636 W HCPCS: Mod: JZ,TB | Performed by: STUDENT IN AN ORGANIZED HEALTH CARE EDUCATION/TRAINING PROGRAM

## 2025-05-24 PROCEDURE — 25000242 PHARM REV CODE 250 ALT 637 W/ HCPCS: Performed by: STUDENT IN AN ORGANIZED HEALTH CARE EDUCATION/TRAINING PROGRAM

## 2025-05-24 PROCEDURE — 94761 N-INVAS EAR/PLS OXIMETRY MLT: CPT | Mod: XB

## 2025-05-24 PROCEDURE — 99285 EMERGENCY DEPT VISIT HI MDM: CPT | Mod: 25

## 2025-05-24 PROCEDURE — 96375 TX/PRO/DX INJ NEW DRUG ADDON: CPT

## 2025-05-24 PROCEDURE — 85025 COMPLETE CBC W/AUTO DIFF WBC: CPT | Performed by: STUDENT IN AN ORGANIZED HEALTH CARE EDUCATION/TRAINING PROGRAM

## 2025-05-24 PROCEDURE — 82803 BLOOD GASES ANY COMBINATION: CPT

## 2025-05-24 PROCEDURE — 99900035 HC TECH TIME PER 15 MIN (STAT)

## 2025-05-24 PROCEDURE — 94640 AIRWAY INHALATION TREATMENT: CPT

## 2025-05-24 PROCEDURE — 83880 ASSAY OF NATRIURETIC PEPTIDE: CPT | Performed by: STUDENT IN AN ORGANIZED HEALTH CARE EDUCATION/TRAINING PROGRAM

## 2025-05-24 PROCEDURE — 84443 ASSAY THYROID STIM HORMONE: CPT | Performed by: STUDENT IN AN ORGANIZED HEALTH CARE EDUCATION/TRAINING PROGRAM

## 2025-05-24 PROCEDURE — 25000003 PHARM REV CODE 250: Performed by: STUDENT IN AN ORGANIZED HEALTH CARE EDUCATION/TRAINING PROGRAM

## 2025-05-24 PROCEDURE — 84484 ASSAY OF TROPONIN QUANT: CPT | Performed by: STUDENT IN AN ORGANIZED HEALTH CARE EDUCATION/TRAINING PROGRAM

## 2025-05-24 RX ORDER — PREDNISONE 20 MG/1
40 TABLET ORAL DAILY
Qty: 10 TABLET | Refills: 0 | Status: SHIPPED | OUTPATIENT
Start: 2025-05-24 | End: 2025-05-29

## 2025-05-24 RX ORDER — DOXYCYCLINE 100 MG/1
100 CAPSULE ORAL 2 TIMES DAILY
Qty: 20 CAPSULE | Refills: 0 | Status: SHIPPED | OUTPATIENT
Start: 2025-05-24 | End: 2025-05-24

## 2025-05-24 RX ORDER — DOXYCYCLINE 100 MG/1
100 CAPSULE ORAL 2 TIMES DAILY
Qty: 20 CAPSULE | Refills: 0 | Status: SHIPPED | OUTPATIENT
Start: 2025-05-24 | End: 2025-06-03

## 2025-05-24 RX ORDER — IPRATROPIUM BROMIDE AND ALBUTEROL SULFATE 2.5; .5 MG/3ML; MG/3ML
9 SOLUTION RESPIRATORY (INHALATION) ONCE
Status: COMPLETED | OUTPATIENT
Start: 2025-05-24 | End: 2025-05-24

## 2025-05-24 RX ORDER — IPRATROPIUM BROMIDE AND ALBUTEROL SULFATE 2.5; .5 MG/3ML; MG/3ML
3 SOLUTION RESPIRATORY (INHALATION)
Status: DISCONTINUED | OUTPATIENT
Start: 2025-05-24 | End: 2025-05-24

## 2025-05-24 RX ORDER — METHYLPREDNISOLONE SOD SUCC 125 MG
125 VIAL (EA) INJECTION
Status: COMPLETED | OUTPATIENT
Start: 2025-05-24 | End: 2025-05-24

## 2025-05-24 RX ORDER — PREDNISONE 20 MG/1
40 TABLET ORAL DAILY
Qty: 10 TABLET | Refills: 0 | Status: SHIPPED | OUTPATIENT
Start: 2025-05-24 | End: 2025-05-24

## 2025-05-24 RX ADMIN — AZITHROMYCIN MONOHYDRATE 500 MG: 500 INJECTION, POWDER, LYOPHILIZED, FOR SOLUTION INTRAVENOUS at 05:05

## 2025-05-24 RX ADMIN — METHYLPREDNISOLONE SODIUM SUCCINATE 125 MG: 125 INJECTION, POWDER, FOR SOLUTION INTRAMUSCULAR; INTRAVENOUS at 05:05

## 2025-05-24 RX ADMIN — IPRATROPIUM BROMIDE AND ALBUTEROL SULFATE 9 ML: 2.5; .5 SOLUTION RESPIRATORY (INHALATION) at 05:05

## 2025-05-31 ENCOUNTER — EXTERNAL CHRONIC CARE MANAGEMENT (OUTPATIENT)
Dept: PRIMARY CARE CLINIC | Facility: CLINIC | Age: 75
End: 2025-05-31
Payer: MEDICARE

## 2025-05-31 PROCEDURE — 99490 CHRNC CARE MGMT STAFF 1ST 20: CPT | Mod: S$PBB,,, | Performed by: FAMILY MEDICINE

## 2025-05-31 PROCEDURE — 99490 CHRNC CARE MGMT STAFF 1ST 20: CPT | Mod: PBBFAC,PN | Performed by: FAMILY MEDICINE

## 2025-06-30 ENCOUNTER — EXTERNAL CHRONIC CARE MANAGEMENT (OUTPATIENT)
Dept: PRIMARY CARE CLINIC | Facility: CLINIC | Age: 75
End: 2025-06-30
Payer: MEDICARE

## 2025-06-30 PROCEDURE — 99439 CHRNC CARE MGMT STAF EA ADDL: CPT | Mod: S$PBB,,, | Performed by: FAMILY MEDICINE

## 2025-06-30 PROCEDURE — 99490 CHRNC CARE MGMT STAFF 1ST 20: CPT | Mod: PBBFAC,PN | Performed by: FAMILY MEDICINE

## 2025-06-30 PROCEDURE — 99490 CHRNC CARE MGMT STAFF 1ST 20: CPT | Mod: S$PBB,,, | Performed by: FAMILY MEDICINE

## 2025-06-30 PROCEDURE — 99439 CHRNC CARE MGMT STAF EA ADDL: CPT | Mod: PBBFAC,PN | Performed by: FAMILY MEDICINE

## 2025-07-18 ENCOUNTER — OFFICE VISIT (OUTPATIENT)
Dept: PAIN MEDICINE | Facility: CLINIC | Age: 75
End: 2025-07-18
Payer: MEDICARE

## 2025-07-18 VITALS
RESPIRATION RATE: 18 BRPM | SYSTOLIC BLOOD PRESSURE: 172 MMHG | DIASTOLIC BLOOD PRESSURE: 82 MMHG | HEART RATE: 50 BPM | OXYGEN SATURATION: 100 %

## 2025-07-18 DIAGNOSIS — M51.362 DEGENERATION OF INTERVERTEBRAL DISC OF LUMBAR REGION WITH DISCOGENIC BACK PAIN AND LOWER EXTREMITY PAIN: Primary | ICD-10-CM

## 2025-07-18 DIAGNOSIS — Z98.1 S/P LUMBAR FUSION: ICD-10-CM

## 2025-07-18 DIAGNOSIS — M47.816 LUMBAR SPONDYLOSIS: ICD-10-CM

## 2025-07-18 DIAGNOSIS — M54.16 LUMBAR RADICULOPATHY: ICD-10-CM

## 2025-07-18 PROCEDURE — 99999 PR PBB SHADOW E&M-EST. PATIENT-LVL IV: CPT | Mod: PBBFAC,,, | Performed by: PAIN MEDICINE

## 2025-07-18 PROCEDURE — 99214 OFFICE O/P EST MOD 30 MIN: CPT | Mod: PBBFAC,PN | Performed by: PAIN MEDICINE

## 2025-07-18 NOTE — PROGRESS NOTES
Subjective:     Patient ID: Stephanie Sears is a 75 y.o. female    Chief Complaint: Low-back Pain (Bilat  sharp pain radiating into both legs)      Referred by: No ref. provider found      HPI:    Interval History (7/18/25):  She returns today for follow up.  She reports that she is having recurrent low back pain.  Denies any changes in the quality or location of her current back pain when compared to previous episodes.  In the past this pain has been very well managed with periodic physical therapy.  She completed a course physical therapy last year and found this very beneficial.  She would like to attend additional physical therapy if possible.  She does not feel that medications or injections are warranted at this time.  She denies any new numbness, tingling, weakness, bowel bladder dysfunction.      Interval History PA (08/14/2024):  Patient returns to clinic for follow up midline lower lumbosacral pain.  Patient has been attending physical therapy since previous encounter with overall benefit.  Notes that she has noticed some improvement in her lower back pain.  Notes therapy has been on hold due to recent COVID diagnosis.  She does plan on resuming physical therapy in the next few weeks.  Notes she would prefer to hold off on any interventional procedures for her lower back at this time as she is noting progress with physical therapy.  Patient's primary concern today is acute worsening of pain located over her bilateral lateral hips.  Patient pointing to her greater trochanteric region.  Notes she has had similar episodes of pain in the past which significantly improved with bilateral GTB steroid injections.  Last completed September of 2023.  Patient interested in repeat injection if appropriate.    Interval History (6/17/24):  She returns today for follow up.  She reports that she continues to have midline lumbosacral back pain.  This pain is overall unchanged in quality and location to previous  episodes.  States that she would like to attend additional physical therapy as this has been helpful in the past.  She continues to perform home exercises though she has difficulty performing most of them because she can not get up off of the floor after doing the exercises.  The pain will radiate to the right buttock and posterior thigh intermittently.  She denies any new numbness, tingling, weakness, bowel bladder dysfunction.      Interval History PA (09/21/2023):  Patient returns to clinic for follow up and MRI review.  Patient reports recent repeat bilateral GTB steroid injections have been beneficial for bilateral lateral hip pain.  Reports approximately 90% relief over her lateral hip, no continued pain over these locations.  She does report some continued midline lower back pain however since previous visit she is no longer been having pain radiates into her bilateral posterior thighs.  Notes doing overall with physical therapy and a regular home exercise program.  Pain has been more intermittent and more manageable.  She does note some continued pain with prolonged sitting but overall tolerable.  Patient would like to hold off on interventional procedures at this time.  Denies any numbness, tingling, weakness, bowel or bladder dysfunction.    Interval History PA (09/06/2023):  Patient returns to clinic for follow up.  Patient notes that she has been attending physical therapy with overall improvement in her health and pain levels.  However continues to report significant pain located in her midline lower lumbar region radiating down into her bilateral posterior thighs, stopping at the knees.  Denies any changes in the quality or location of her pain.  Overall states physical therapy has been beneficial, although pain continues to be significant.  She also notes return and worsening of bilateral lateral hip pain.  States pain is in similar quality and location as with previous trochanteric bursitis.  Patient  is interested in repeat injections.  Notes previous GTB injections have been significantly beneficial, typically alleviate the pain for 8+ months.  Denies any weakness, bowel or bladder dysfunction.    Interval History (7/11/23):  She returns today for follow up.  Juliana reports that her left sided low back and lower extremity pain has returned.  Denies any changes in the quality or location of his pain.  Denies any new or worsening symptoms.  States that she was in physical therapy earlier this year and was finding this very helpful.  She would like to see if additional physical therapy can be done.      Interval History (5/18/22):  She returns today for follow up.  She reports that physical therapy has been helpful for the low back pain.  She states the low back is currently doing well and she is happy with his progress.  Today, she mainly complains of left lateral hip pain consistent with trochanteric bursitis.  Has had injections for this in the past that have helped.  She would like repeat injection today.      Interval History (3/23/22):  She returns today for follow up.  She reports that she has completed physical therapy for her neck pain.  She states this issue was doing well.  Today she wishes discuss her low back pain.  This pain is located the midline lower lumbar/lumbosacral region.  The pain will radiate to the bilateral lumbar paraspinal regions and bilateral posterior thighs.  The pain does not cross the knees.  The pain is worse with standing/walking.  She denies any associated numbness, tingling, weakness, bowel bladder dysfunction.        Interval History (1/6/22):  She returns today for follow up.  She reports that bilateral ischial bursa steroid injections has been helpful for the bilateral buttock/hip pain.  She reports 60% relief following this procedure.  Today she wishes to discuss her neck pain.  She has had neck pain intermittently for years.  The pain is located the midline mid to lower  cervical region.  The pain does not radiate.  She denies any associated numbness, tingling, weakness, bowel bladder dysfunction.  She would like to attend additional physical therapy for the management of her neck pain.      Interval History (12/14/21):  She returns today for follow up.  She reports that ischial bursa pain has returned.  She reports near complete relief following bilateral ischial bursa steroid injection done in February 2021. She got at least 9 months of good relief from this procedure.  She states the pain has since returned.  She denies any changes in the quality location the pain.  She denies any new or worsening symptoms.        Interval History NP (3/16/21):    Pt returns today for follow up. She states that the bilateral ischial bursa injections have relieved nearly all of the bilateral buttock pain.  She is very happy with the results at this time.     Interval History (2/19/21):  She returns today for follow up.  She reports that she continues to have bilateral buttock pain particular when sitting.  She denies any changes in the quality location was pain.  She denies any new worsening symptoms.  She has been attending physical therapy and finds that it has been helpful.  She states that her pain is still quite severe especially given recent cold weather.        Interval History (12/21/20):  She returns today for follow up and imaging review.  She reports that she continues to have pain mainly surrounding the bilateral hip girdles.  She states that she has focal pain in the bilateral gluteal regions when sitting upright.  She also has pain in the bilateral lateral hip regions.  She has undergone multiple greater trochanteric bursa steroid injections in the past.  These were initially effective, but gradually provided less relief with subsequent injections.  She continues to deny any significant low back pain.        Initial Encounter (12/7/20):  Stephanie Sears is a 75 y.o. female who  presents today with chronic bilateral low back pain.  This pain has been present for years.  Patient is status post L4-5 fusion in 2006.  She states that she did have some pain radiating into her lower extremities in the past, but her current pain is isolated to the bilateral lower lumbar/lumbosacral regions.  She denies any associated numbness, tingling, weakness, bowel bladder dysfunction.  The pain is constant worse with prolonged sitting and standing..   This pain is described in detail below.    Physical Therapy:  Yes.    Non-pharmacologic Treatment:  Rest helps         TENS?  No    Pain Medications:         Currently taking:  gabapentin    Has tried in the past:  Tramadol, NSAIDs, Tylenol, meloxicam    Has not tried:  TCAs, SNRIs, topical creams    Blood thinners:  None    Interventional Therapies:    2/26/21 - bilateral ischial bursa injections - 90% relief for at least 9 months  12/23/21 - bilateral ischial bursa steroid injections - 60% relief  09/06/2023 - bilateral GTB steroid injections - 90% relief    Relevant Surgeries:   L4-5 fusion in 2006    Affecting sleep?  Yes    Affecting daily activities? yes    Depressive symptoms? no          SI/HI? No    Work status: Retired    Pain Scores:    Best:       6/10  Worst:     9/10  Usually:   8/10  Today:    8/10    Pain Disability Index  Family/Home Responsibilities:: 8  Recreation:: 10  Social Activity:: 7  Occupation:: 0  Sexual Behavior:: 0  Self Care:: 7  Life-Support Activities:: 7  Pain Disability Index (PDI): 39(     Review of Systems   Constitutional:  Negative for activity change, appetite change, chills, fatigue, fever and unexpected weight change.   HENT:  Negative for hearing loss.    Eyes:  Negative for visual disturbance.   Respiratory:  Negative for chest tightness and shortness of breath.    Cardiovascular:  Negative for chest pain.   Gastrointestinal:  Negative for abdominal pain, constipation, diarrhea, nausea and vomiting.   Genitourinary:   Negative for difficulty urinating.   Musculoskeletal:  Positive for arthralgias, back pain, gait problem, myalgias, neck pain and neck stiffness.   Skin:  Negative for rash.   Neurological:  Negative for dizziness, weakness, light-headedness, numbness and headaches.   Psychiatric/Behavioral:  Positive for sleep disturbance. Negative for hallucinations and suicidal ideas. The patient is not nervous/anxious.        Past Medical History:   Diagnosis Date    Acute pancreatitis     Angina pectoris     Anxiety     Arthritis     Asthma     as a child    Back pain     Bronchitis     Cervical spondylosis with radiculopathy 07/10/2012    Chest pain     Chronic neck pain 07/26/2012    Colon polyps     COPD (chronic obstructive pulmonary disease)     Coronary artery disease     Depression     Fibrocystic breast     General anesthetics causing adverse effect in therapeutic use     trouble coming out of anes/ had the shakes    GERD (gastroesophageal reflux disease)     Glaucoma     Heart failure     Hyperlipidemia     Hypertension     Neck pain 07/10/2012    Obesity     JAM (obstructive sleep apnea)     Pneumonia     Pneumonia due to other staphylococcus     Primary osteoarthritis of both knees     Psoriasis     Subacromial or subdeltoid bursitis 07/10/2012    Thyroid disease     Tobacco dependence     Trouble in sleeping     Type 2 diabetes mellitus 12/10/2013    Type 2 diabetes mellitus with diabetic chronic kidney disease        Past Surgical History:   Procedure Laterality Date    APPENDECTOMY      BREAST BIOPSY Bilateral 1988    BREAST MASS EXCISION Right     benign    CATARACT EXTRACTION W/  INTRAOCULAR LENS IMPLANT Left 12/09/2020    PHACO IOL WITH I-STENT ()    CATARACT EXTRACTION W/  INTRAOCULAR LENS IMPLANT Right 10/21/2020    PHACO IOL WITH I-STENT x 2 ()    CHOLECYSTECTOMY      COLONOSCOPY N/A 05/22/2019    Procedure: COLONOSCOPY;  Surgeon: Darrin Calvin MD;  Location: [facility] (2ND FLR);   Service: Endoscopy;  Laterality: N/A;  2nd floor - all other procedure done on 2, multiple comorbidities - ERW/   change in MD schedule, Pt notified and verbalized understanding, new arrival time 0800, Ins mailed to Pt with new arrival time - ERW1/8/19@1130    COLONOSCOPY N/A 09/26/2022    Procedure: COLONOSCOPY;  Surgeon: Darrin Calvin MD;  Location: [facility] ENDO ([address] [place] [address]);  Service: Endoscopy;  Laterality: N/A;  2nd floor d/t previous anesthesia complications  vaccinated  inst mailed  clear liquids 4 hr prior-AM prep-LW  I should be able to complete this with the slim pediatric colonoscope, but should have the single-balloon available in case needed.    EPIDURAL STEROID INJECTION Bilateral 02/26/2021    Procedure: Injection, Steroid, Ischial Bursa;  Surgeon: Yonatan Garsia Jr., MD;  Location: [facility] ENDO;  Service: Pain Management;  Laterality: Bilateral;  Bilateral Ischial Bursa Steroid Injections  Arrive @ 1230; No ATC; Check BG    ESOPHAGOGASTRODUODENOSCOPY      HYSTERECTOMY  1980's    IMPLANTATION OF DEVICE FOR GLAUCOMA Right 10/21/2020    Procedure: INSERTION, DEVICE, FOR GLAUCOMA/ i Stent;  Surgeon: Melany Tse MD;  Location: [facility] OR [address] [place] [address];  Service: Ophthalmology;  Laterality: Right;    IMPLANTATION OF DEVICE FOR GLAUCOMA Left 12/09/2020    Procedure: INSERTION, DEVICE, FOR GLAUCOMA/I SENT;  Surgeon: Melany Tse MD;  Location: [address];  Service: Ophthalmology;  Laterality: Left;    INJECTION OF JOINT Bilateral 12/23/2021    Procedure: Injection, Joint---BILATERAL ISCHIAL BURSA STEROID INJECTION;  Surgeon: Yonatan Garsia Jr., MD;  Location[facility] PAIN MGMT;  Service: Pain Management;  Laterality: Bilateral;    INTRAOCULAR PROSTHESES INSERTION Right 10/21/2020    Procedure: INSERTION, IOL PROSTHESIS;  Surgeon: Melany Tse MD;  Location: [address];  Service: Ophthalmology;  Laterality: Right;    INTRAOCULAR PROSTHESES INSERTION Left 12/09/2020    Procedure:  INSERTION, IOL PROSTHESIS;  Surgeon: Melany Tse MD;  Location: [facility] OR [address] [place] [address];  Service: Ophthalmology;  Laterality: Left;    KNEE SURGERY Left 01/20/2016    TKR    KNEE SURGERY Right 02/26/2018    TKR    L4-L5 fusion  2006    PHACOEMULSIFICATION OF CATARACT Right 10/21/2020    Procedure: PHACOEMULSIFICATION, CATARACT;  Surgeon: Melany Tse MD;  [facility];  Service: Ophthalmology;  Laterality: Right;    PHACOEMULSIFICATION OF CATARACT Left 12/09/2020    Procedure: PHACOEMULSIFICATION, CATARACT;  Surgeon: Melany Tse MD;  Location: [facility] OR [address] [place] [address];  Service: Ophthalmology;  Laterality: Left;    YAG CAPSULOTOMY Left 12/07/2023           Social History     Socioeconomic History    Marital status: Single   Tobacco Use    Smoking status: Every Day     Current packs/day: 0.50     Average packs/day: 0.4 packs/day for 55.5 years (22.6 ttl pk-yrs)     Types: Cigarettes     Start date: 1970    Smokeless tobacco: Never    Tobacco comments:     Patient is enrolled in smoking cessation. Comprehensive smoking hx was updated on 2/28/24 by MARQUIS Cherry RRT,MSW,LMSW,TTS   Substance and Sexual Activity    Alcohol use: No     Alcohol/week: 0.0 standard drinks of alcohol    Drug use: No    Sexual activity: Yes     Partners: Male     Social Drivers of Health     Financial Resource Strain: Patient Unable To Answer (8/9/2024)    Overall Financial Resource Strain (CARDIA)     Difficulty of Paying Living Expenses: Patient unable to answer   Food Insecurity: No Food Insecurity (8/9/2024)    Hunger Vital Sign     Worried About Running Out of Food in the Last Year: Never true     Ran Out of Food in the Last Year: Never true   Transportation Needs: No Transportation Needs (8/9/2024)    PRAPARE - Transportation     Lack of Transportation (Medical): No     Lack of Transportation (Non-Medical): No   Physical Activity: Inactive (8/9/2024)    Exercise Vital Sign     Days of Exercise per Week: 0  "days     Minutes of Exercise per Session: 0 min   Stress: No Stress Concern Present (8/9/2024)    [employer] - Occupational Stress Questionnaire     Feeling of Stress : Not at all   Housing Stability: Unknown (8/9/2024)    Housing Stability Vital Sign     Unable to Pay for Housing in the Last Year: No     Homeless in the Last Year: No       Review of patient's allergies indicates:   Allergen Reactions    Hydrocodone Shortness Of Breath    Iodinated contrast media Shortness Of Breath     Difficulty breathing    Adhesive Itching     Skin peeling    Latex, natural rubber Other (See Comments)     "Takes skin off"    Morphine Hallucinations    Oxycodone Hallucinations    Sulfa (sulfonamide antibiotics) Hives and Itching       Current Outpatient Medications on File Prior to Visit   Medication Sig Dispense Refill    acetaminophen (TYLENOL) 500 MG tablet Take 2 tablets (1,000 mg total) by mouth every 8 (eight) hours as needed for Pain. 40 tablet 0    albuterol (PROVENTIL/VENTOLIN HFA) 90 mcg/actuation inhaler USE 2 INHALATIONS EVERY 4 HOURS AS NEEDED FOR WHEEZING (RESCUE) 54 g 3    amLODIPine-benazepriL (LOTREL) 10-40 mg per capsule TAKE 1 CAPSULE DAILY 90 capsule 3    atorvastatin (LIPITOR) 40 MG tablet TAKE 1 TABLET DAILY (DISCONTINUE REFILLS ON ATORVASTATIN 20 MG) 90 tablet 3    carvediloL (COREG) 12.5 MG tablet Take 1 tablet (12.5 mg total) by mouth 2 (two) times daily with meals. 180 tablet 3    chlorthalidone (HYGROTEN) 25 MG Tab Take 1 tablet (25 mg total) by mouth twice a week. 10 tablet 6    gabapentin (NEURONTIN) 600 MG tablet Take 1 tablet (600 mg total) by mouth 2 (two) times daily. 180 tablet 3    INULIN (FIBER GUMMIES ORAL) Take 1 each by mouth every morning.       lancets (ACCU-CHEK MULTICLIX LANCET) Misc Test blood sugar twice daily 300 each 3    nicotine (NICODERM CQ) 21 mg/24 hr Place 1 patch onto the skin once daily. 28 patch 0    nicotine, polacrilex, 2 mg lzmn Take 1 each " (2 mg total) by mouth as needed (Use 1 lozenge as needed in the place of a cigarette every 2-4 hours as needed.  Maximum of 5 per day.). 150 each 0    simethicone (GAS-X ORAL) Take 1 tablet by mouth as needed.       blood-glucose meter kit Check blood glucose QD with insurance preferred glucometer 1 each 0     No current facility-administered medications on file prior to visit.       Objective:      BP (!) 172/82 (BP Location: Right arm, Patient Position: Sitting)   Pulse (!) 50   Resp 18   SpO2 100%     Exam:  GEN:  Well developed, well nourished.  No acute distress.  Normal pain behavior.  HEENT:  No trauma.  Mucous membranes moist.  Nares patent bilaterally.  PSYCH: Normal affect. Thought content appropriate.  CHEST:  Breathing symmetric.  No audible wheezing.  ABD: Soft, non-distended.  SKIN:  Warm, pink, dry.  No rash on exposed areas.    EXT:  No cyanosis, clubbing, or edema.  No color change or changes in nail or hair growth.  NEURO/MUSCULOSKELETAL:  Fully alert, oriented, and appropriate. Speech normal janneth. No cranial nerve deficits.   Gait:  Antalgic.  No trendelenburg sign bilaterally.             Imaging:  Narrative & Impression    EXAMINATION:  MRI LUMBAR SPINE WITHOUT CONTRAST     CLINICAL HISTORY:  Lumbar radiculopathy, symptoms persist with conservative treatment; Radiculopathy, lumbar region     TECHNIQUE:  Multiplanar, multisequence MR images were acquired from the thoracolumbar junction to the sacrum without the administration of contrast.     COMPARISON:  MRI lumbar spine 12/17/2020     FINDINGS:  Postoperative changes from L4-5 interbody and posterior instrumented fusion.  Susceptibility artifact degrades evaluation of adjacent structures.  Unchanged alignment.  There is osseous fusion across the facet joints.  No fluid collections in the operative bed.     ALIGNMENT: Levocurvature centered at L3.  Grade 1 anterolisthesis at L3-4.     BONE: No compression fractures.  No marrow replacing  lesions.     JOINT: Multilevel degenerative changes with disc desiccation, disc height loss, and facet arthropathy, described in detail below.  Mild endplate edema at L3-4 and L5-S1.     SPINAL CANAL: The conus medullaris has a normal appearance and terminates at the L2 level.  Cauda equina nerve roots are unremarkable.  No mass or collection.     PARASPINAL SOFT TISSUES: Left renal cyst.  Colonic diverticulosis.  Paraspinal muscle atrophy.     SIGNIFICANT FINDINGS BY LEVEL:     Lower thoracic spine: Disc bulges and facet arthropathy resulting in mild-moderate canal and foraminal stenosis at multiple levels.     T12-L1: Unremarkable.     L1-2: Unremarkable.     L2-3: Unremarkable.     L3-4: Disc bulge with posterior disc uncovering.  Severe bilateral facet arthropathy and ligamentum flavum thickening.  Severe canal stenosis.  Severe right and mild left foraminal stenosis.     L4-5: Postoperative changes from interbody and posterior fusion.  Residual endplate osteophytes protruding into the left paracentral zone.  No significant canal stenosis.  Mild right and moderate left foraminal stenosis.     L5-S1: Disc bulge.  Advanced facet arthropathy and ligamentum flavum thickening.  Moderate canal stenosis.  Moderate right and severe left foraminal stenosis.     Impression:     Postoperative changes from L4-5 fusion.     Multilevel degenerative changes, most advanced at L3-4 and L5-S1 as detailed above.  No significant changes from 12/17/2020.        Electronically signed by: Parish Grijalva  Date:                                            09/15/2023  Time:                                           14:33       Narrative & Impression    EXAMINATION:  MRI LUMBAR SPINE WITHOUT CONTRAST     CLINICAL HISTORY:  s/p lumbar fusion; Other intervertebral disc degeneration, lumbar region     TECHNIQUE:  Multiplanar, multisequence MR images of the lumbar spine were performed without the administration of contrast.      COMPARISON:  Lumbar spine radiograph 01/23/2019; CT abdomen contrast 11/16/2012; MRI lumbar spine 10/26/2007     FINDINGS:  Alignment: Grade 1 anterolisthesis of L3 on L4.  Grade 1 retrolisthesis of L5 on S1.     Vertebrae: Postoperative changes from previous L4-5 posterior spinal fusion with bilateral transpedicular screws and posterior stabilizing bars and interbody spacer.  Normal marrow signal. No fracture.     Discs: Mild multilevel intervertebral disc height loss and desiccation.     Cord: Normal.  Conus terminates normally at L2.  Cauda equina appears normal.     Degenerative findings:     T10-T11: Diffuse disc bulge and bilateral facet arthropathy resulting in mild central canal stenosis, moderate left, and mild right neural foraminal narrowing.     T11-T12: Diffuse disc bulge and bilateral facet arthropathy resulting in moderate bilateral neural foraminal narrowing and mild central canal stenosis.     T12-L1: No significant disc abnormality.  Mild bilateral facet arthropathy without significant neural foraminal narrowing or central canal stenosis.     L1-L2: No significant disc abnormality.  Mild bilateral facet arthropathy without significant neural foraminal narrowing or central canal stenosis.     L2-L3: No significant disc abnormality.  Mild bilateral facet arthropathy without significant neural foraminal narrowing or central canal stenosis.     L3-L4: Diffuse disc bulge, moderate bilateral facet joint osseous hypertrophy, and ligamentum flavum buckling contribute to moderate central canal stenosis, moderate right, and mild left neural foraminal narrowing.     L4-L5: Postoperative change of previous decompression and posterior spinal fusion.  Productive changes contribute to mild left neural foraminal narrowing.     L5-S1: Diffuse disc bulge, bilateral facet arthropathy, and ligamentum flavum buckling resulting in severe left and moderate right neural foraminal narrowing and mild central canal  stenosis.     Paraspinal muscles & soft tissues: Mild fatty atrophy of the lower paraspinous musculature.  No paraspinal fluid collections.  Upper sacrum and sacroiliac joints are unremarkable.     Impression:     1. Postsurgical changes of L4-L5 fusion.  2. Multilevel degenerative changes as detailed above.  Moderate spinal canal stenosis noted at L3-L4.  Moderate neural foraminal narrowing noted at L3-L4 and L5-S1, as well as within the lower thoracic spine.     Electronically signed by resident: Naveen Resendez  Date:                                            12/17/2020  Time:                                           09:53     Electronically signed by: Mitch Pappas MD  Date:                                            12/17/2020  Time:                                           14:49           Narrative & Impression    EXAMINATION:  XR LUMBAR SPINE 5 VIEW WITH FLEX AND EXT     CLINICAL HISTORY:  Low back pain, cauda equina syndrome suspected;  Lumbago with sciatica, left side     TECHNIQUE:  Five views of the lumbar spine plus flexion extension views were performed.     COMPARISON:  02/25/2014     FINDINGS:  The lumbar vertebra are intact.  No compression fracture or obvious paraspinal lesion is detected.  Degenerative changes are present with small osteophytes at most levels.  There is progressive disc space narrowing at L3-4; this level also shows development of a grade 1 anterolisthesis, stable between flexion and extension.  The other lumbar disc spaces show no significant narrowing, but lower thoracic disc spaces are narrowed, some with vacuum disc.     Postoperative findings from posterior instrumented fusion are again seen at L4 and 5 with bilateral pedicle screws, vertical connecting rods, and a device in the disc space.     Visualized abdomen shows aortic atherosclerosis.     IMPRESSION:      Stable postoperative findings of L4-5 fusion     Degenerative spine changes with interval worsening at L3-4.         Electronically signed by: Valentín Booker MD  Date:                                            01/23/2019  Time:                                           10:16         Assessment:       Encounter Diagnoses   Name Primary?    Degeneration of intervertebral disc of lumbar region with discogenic back pain and lower extremity pain Yes    S/P lumbar fusion     Lumbar spondylosis     Lumbar radiculopathy      Plan:       Stephanie was seen today for low-back pain.    Diagnoses and all orders for this visit:    Degeneration of intervertebral disc of lumbar region with discogenic back pain and lower extremity pain    S/P lumbar fusion    Lumbar spondylosis  -     Ambulatory Referral/Consult to Physical Therapy    Lumbar radiculopathy      Stephanie Sears is a 75 y.o. female with midline lower back and bilateral lower extremity pain.  Consistent with lower lumbar radiculopathy.  History of a L4-5 fusion.  Adjacent segment degenerative disc disease at L3-4 and L5-S1 levels.  Updated lumbar MRI confirming this, severe central canal stenosis at L3-4 as well as bilateral foraminal narrowing at this level.  Also with significant central and foraminal stenosis at L5-S1.  Patient has been having good improvement with physical therapy/home exercise program.      Prior records reviewed.  Pertinent imaging studies reviewed by me. Imaging results were discussed with patient.  Refer to PT for lumbar ROM, strengthening, stretching and HEP.  Stressed the importance of maintaining regular home exercise program and being mindful of how they use their back throughout the day.  Patient expressed understanding and agreement.  Return to clinic as needed.  Patient may be a good candidate for epidural steroid injections versus possible via disc.  Advised to message or call via [id number] in the future if patient's pain persists/worsens.        This note was created by combination of typed  and M-Modal dictation.  Transcription and  phonetic errors may be present.  If there are any questions, please contact me.

## 2025-07-22 ENCOUNTER — LAB VISIT (OUTPATIENT)
Dept: LAB | Facility: HOSPITAL | Age: 75
End: 2025-07-22
Attending: FAMILY MEDICINE
Payer: MEDICARE

## 2025-07-22 DIAGNOSIS — E11.51 TYPE II DIABETES MELLITUS WITH PERIPHERAL CIRCULATORY DISORDER: Chronic | ICD-10-CM

## 2025-07-22 DIAGNOSIS — E11.69 TYPE 2 DIABETES MELLITUS WITH OTHER SPECIFIED COMPLICATION, WITHOUT LONG-TERM CURRENT USE OF INSULIN: Chronic | ICD-10-CM

## 2025-07-22 DIAGNOSIS — N18.32 STAGE 3B CHRONIC KIDNEY DISEASE: ICD-10-CM

## 2025-07-22 LAB
ABSOLUTE EOSINOPHIL (OHS): 0.39 K/UL
ABSOLUTE MONOCYTE (OHS): 0.55 K/UL (ref 0.3–1)
ABSOLUTE NEUTROPHIL COUNT (OHS): 2.81 K/UL (ref 1.8–7.7)
ALBUMIN SERPL BCP-MCNC: 3.2 G/DL (ref 3.5–5.2)
ALP SERPL-CCNC: 102 UNIT/L (ref 40–150)
ALT SERPL W/O P-5'-P-CCNC: 9 UNIT/L (ref 10–44)
ANION GAP (OHS): 12 MMOL/L (ref 8–16)
AST SERPL-CCNC: 14 UNIT/L (ref 11–45)
BASOPHILS # BLD AUTO: 0.04 K/UL
BASOPHILS NFR BLD AUTO: 0.7 %
BILIRUB SERPL-MCNC: 0.3 MG/DL (ref 0.1–1)
BUN SERPL-MCNC: 27 MG/DL (ref 8–23)
CALCIUM SERPL-MCNC: 9.3 MG/DL (ref 8.7–10.5)
CHLORIDE SERPL-SCNC: 107 MMOL/L (ref 95–110)
CHOLEST SERPL-MCNC: 122 MG/DL (ref 120–199)
CHOLEST/HDLC SERPL: 2.8 {RATIO} (ref 2–5)
CO2 SERPL-SCNC: 23 MMOL/L (ref 23–29)
CREAT SERPL-MCNC: 1.5 MG/DL (ref 0.5–1.4)
EAG (OHS): 123 MG/DL (ref 68–131)
ERYTHROCYTE [DISTWIDTH] IN BLOOD BY AUTOMATED COUNT: 14.9 % (ref 11.5–14.5)
GFR SERPLBLD CREATININE-BSD FMLA CKD-EPI: 36 ML/MIN/1.73/M2
GLUCOSE SERPL-MCNC: 98 MG/DL (ref 70–110)
HBA1C MFR BLD: 5.9 % (ref 4–5.6)
HCT VFR BLD AUTO: 37.5 % (ref 37–48.5)
HDLC SERPL-MCNC: 44 MG/DL (ref 40–75)
HDLC SERPL: 36.1 % (ref 20–50)
HGB BLD-MCNC: 12 GM/DL (ref 12–16)
IMM GRANULOCYTES # BLD AUTO: 0.01 K/UL (ref 0–0.04)
IMM GRANULOCYTES NFR BLD AUTO: 0.2 % (ref 0–0.5)
LDLC SERPL CALC-MCNC: 64.2 MG/DL (ref 63–159)
LYMPHOCYTES # BLD AUTO: 2.2 K/UL (ref 1–4.8)
MCH RBC QN AUTO: 26.8 PG (ref 27–31)
MCHC RBC AUTO-ENTMCNC: 32 G/DL (ref 32–36)
MCV RBC AUTO: 84 FL (ref 82–98)
NONHDLC SERPL-MCNC: 78 MG/DL
NUCLEATED RBC (/100WBC) (OHS): 0 /100 WBC
PLATELET # BLD AUTO: 204 K/UL (ref 150–450)
PMV BLD AUTO: 10.8 FL (ref 9.2–12.9)
POTASSIUM SERPL-SCNC: 4.3 MMOL/L (ref 3.5–5.1)
PROT SERPL-MCNC: 6.6 GM/DL (ref 6–8.4)
RBC # BLD AUTO: 4.48 M/UL (ref 4–5.4)
RELATIVE EOSINOPHIL (OHS): 6.5 %
RELATIVE LYMPHOCYTE (OHS): 36.7 % (ref 18–48)
RELATIVE MONOCYTE (OHS): 9.2 % (ref 4–15)
RELATIVE NEUTROPHIL (OHS): 46.7 % (ref 38–73)
SODIUM SERPL-SCNC: 142 MMOL/L (ref 136–145)
TRIGL SERPL-MCNC: 69 MG/DL (ref 30–150)
WBC # BLD AUTO: 6 K/UL (ref 3.9–12.7)

## 2025-07-22 PROCEDURE — 80061 LIPID PANEL: CPT

## 2025-07-22 PROCEDURE — 82040 ASSAY OF SERUM ALBUMIN: CPT

## 2025-07-22 PROCEDURE — 36415 COLL VENOUS BLD VENIPUNCTURE: CPT | Mod: PN

## 2025-07-22 PROCEDURE — 83036 HEMOGLOBIN GLYCOSYLATED A1C: CPT

## 2025-07-22 PROCEDURE — 85025 COMPLETE CBC W/AUTO DIFF WBC: CPT

## 2025-07-25 NOTE — TELEPHONE ENCOUNTER
Sent to Dr. Barber  LOV-5/14/2025  RTC-9/15/2025  Last Prescribed-10/31/2024(previous nephrologist who left)

## 2025-07-26 RX ORDER — CHLORTHALIDONE 25 MG/1
25 TABLET ORAL
Qty: 10 TABLET | Refills: 6 | Status: SHIPPED | OUTPATIENT
Start: 2025-07-28 | End: 2025-07-29 | Stop reason: SDUPTHER

## 2025-07-27 ENCOUNTER — RESULTS FOLLOW-UP (OUTPATIENT)
Dept: FAMILY MEDICINE | Facility: CLINIC | Age: 75
End: 2025-07-27
Payer: MEDICARE

## 2025-07-29 ENCOUNTER — OFFICE VISIT (OUTPATIENT)
Dept: FAMILY MEDICINE | Facility: CLINIC | Age: 75
End: 2025-07-29
Payer: MEDICARE

## 2025-07-29 VITALS
SYSTOLIC BLOOD PRESSURE: 148 MMHG | WEIGHT: 160.25 LBS | TEMPERATURE: 98 F | RESPIRATION RATE: 19 BRPM | BODY MASS INDEX: 29.49 KG/M2 | HEART RATE: 63 BPM | DIASTOLIC BLOOD PRESSURE: 70 MMHG | HEIGHT: 62 IN | OXYGEN SATURATION: 99 %

## 2025-07-29 DIAGNOSIS — I10 ESSENTIAL HYPERTENSION: Chronic | ICD-10-CM

## 2025-07-29 DIAGNOSIS — Z12.2 SCREENING FOR LUNG CANCER: ICD-10-CM

## 2025-07-29 DIAGNOSIS — F17.219 NICOTINE DEPENDENCE, CIGARETTES, W UNSP DISORDERS: ICD-10-CM

## 2025-07-29 DIAGNOSIS — E11.69 TYPE 2 DIABETES MELLITUS WITH OTHER SPECIFIED COMPLICATION, WITHOUT LONG-TERM CURRENT USE OF INSULIN: Primary | Chronic | ICD-10-CM

## 2025-07-29 DIAGNOSIS — E78.5 DYSLIPIDEMIA: Chronic | ICD-10-CM

## 2025-07-29 DIAGNOSIS — N18.32 STAGE 3B CHRONIC KIDNEY DISEASE: Chronic | ICD-10-CM

## 2025-07-29 PROCEDURE — 99214 OFFICE O/P EST MOD 30 MIN: CPT | Mod: S$PBB,,, | Performed by: FAMILY MEDICINE

## 2025-07-29 PROCEDURE — 99999 PR PBB SHADOW E&M-EST. PATIENT-LVL V: CPT | Mod: PBBFAC,,, | Performed by: FAMILY MEDICINE

## 2025-07-29 PROCEDURE — 99215 OFFICE O/P EST HI 40 MIN: CPT | Mod: PBBFAC,PN | Performed by: FAMILY MEDICINE

## 2025-07-29 PROCEDURE — G2211 COMPLEX E/M VISIT ADD ON: HCPCS | Mod: ,,, | Performed by: FAMILY MEDICINE

## 2025-07-29 RX ORDER — CHLORTHALIDONE 25 MG/1
25 TABLET ORAL
Qty: 10 TABLET | Refills: 6 | Status: SHIPPED | OUTPATIENT
Start: 2025-07-31

## 2025-07-31 ENCOUNTER — EXTERNAL CHRONIC CARE MANAGEMENT (OUTPATIENT)
Dept: PRIMARY CARE CLINIC | Facility: CLINIC | Age: 75
End: 2025-07-31
Payer: MEDICARE

## 2025-07-31 PROCEDURE — 99490 CHRNC CARE MGMT STAFF 1ST 20: CPT | Mod: S$PBB,,, | Performed by: FAMILY MEDICINE

## 2025-07-31 PROCEDURE — 99490 CHRNC CARE MGMT STAFF 1ST 20: CPT | Mod: PBBFAC,PN | Performed by: FAMILY MEDICINE

## 2025-08-03 NOTE — PROGRESS NOTES
Patient Name: Stephanie Sears    : 1950  MRN: [id number]      Subjective:     Patient ID: Stephanie is a 75 y.o. female    Chief Complaint:  Follow-up    History of Present Illness    Stephanie presents today for follow up of diabetes, blood pressure, kidneys, and cholesterol.    Blood pressure was noted to be elevated on recent labs. She reports running out of Chlorthalidone prescribed by her nephrologist and currently takes 2 tablets per week from [facility], reduced from daily dosing due to medication side effects.    She has a history of glaucoma with ocular implants and experiences significant eye dryness that impacts her vision. She notes difficulty seeing computer screens and reading due to her eye condition. She is scheduled for a follow-up with her ophthalmologist in six months to monitor her glaucoma and implants.    She reports improved stress levels since a family member moved out of her household. She notes decreased caregiver burden and emotional strain, which she attributes to reduced responsibilities of caring for a family member who was experiencing mobility and functional challenges. She endorses feeling less stressed and experiencing positive health impacts as a result of this change in living situation.      ROS:  General: -fever, -chills, -fatigue, -weight gain, -weight loss  Eyes: -vision changes, -redness, -discharge, +dry eyes  ENT: -ear pain, -nasal congestion, -sore throat  Cardiovascular: -chest pain, -palpitations, -lower extremity edema  Respiratory: -cough, -shortness of breath  Gastrointestinal: -abdominal pain, -nausea, -vomiting, -diarrhea, -constipation, -blood in stool  Genitourinary: -dysuria, -hematuria, -frequency  Musculoskeletal: -joint pain, -muscle pain  Skin: -rash, -lesion  Neurological: -headache, -dizziness, -numbness, -tingling  Psychiatric: -anxiety, -depression, -sleep difficulty         Objective:   BP (!) 148/70 (BP Location: Left arm, Patient Position: Sitting)   " Pulse 63   Temp 98 °F (36.7 °C) (Oral)   Resp 19   Ht 5' 2" (1.575 m)   Wt 72.7 kg (160 lb 4.4 oz)   SpO2 99%   BMI 29.31 kg/m²     Physical Exam  Vitals reviewed.   Constitutional:       General: She is not in acute distress.     Appearance: She is well-developed. She is not diaphoretic.   HENT:      Head: Normocephalic and atraumatic.      Right Ear: Tympanic membrane, ear canal and external ear normal.      Left Ear: Tympanic membrane, ear canal and external ear normal.      Nose: Nose normal.   Eyes:      General:         Right eye: No discharge.         Left eye: No discharge.      Conjunctiva/sclera: Conjunctivae normal.      Pupils: Pupils are equal, round, and reactive to light.   Neck:      Thyroid: No thyromegaly.      Trachea: No tracheal deviation.   Cardiovascular:      Rate and Rhythm: Normal rate and regular rhythm.      Pulses:           Radial pulses are 2+ on the right side and 2+ on the left side.      Heart sounds: Normal heart sounds, S1 normal and S2 normal. No murmur heard.  Pulmonary:      Effort: Pulmonary effort is normal. No respiratory distress.      Breath sounds: Normal breath sounds. No wheezing, rhonchi or rales.   Abdominal:      General: Bowel sounds are normal. There is no distension.      Palpations: Abdomen is soft. Abdomen is not rigid. There is no mass.      Tenderness: There is no abdominal tenderness. There is no guarding.   Musculoskeletal:      Cervical back: Normal range of motion and neck supple.   Lymphadenopathy:      Cervical: No cervical adenopathy.   Skin:     General: Skin is warm and dry.      Capillary Refill: Capillary refill takes less than 2 seconds.      Findings: No rash.   Neurological:      Mental Status: She is alert and oriented to person, place, and time.   Psychiatric:         Behavior: Behavior normal.           Lab Visit on 07/22/2025   Component Date Value Ref Range Status    Urine Microalbumin 07/22/2025 325.0    ug/mL Final    Urine " Creatinine 07/22/2025 106.0  15.0 - 325.0 mg/dL Final    Microalbumin/Creatinine Ratio Urine 07/22/2025 306.6 (H)  <=30.0 ug/mg Final   Lab Visit on 07/22/2025   Component Date Value Ref Range Status    Sodium 07/22/2025 142  136 - 145 mmol/L Final    Potassium 07/22/2025 4.3  3.5 - 5.1 mmol/L Final    Chloride 07/22/2025 107  95 - 110 mmol/L Final    CO2 07/22/2025 23  23 - 29 mmol/L Final    Glucose 07/22/2025 98  70 - 110 mg/dL Final    BUN 07/22/2025 27 (H)  8 - 23 mg/dL Final    Creatinine 07/22/2025 1.5 (H)  0.5 - 1.4 mg/dL Final    Calcium 07/22/2025 9.3  8.7 - 10.5 mg/dL Final    Protein Total 07/22/2025 6.6  6.0 - 8.4 gm/dL Final    Albumin 07/22/2025 3.2 (L)  3.5 - 5.2 g/dL Final    Bilirubin Total 07/22/2025 0.3  0.1 - 1.0 mg/dL Final    For infants and newborns, interpretation of results should be based   on gestational age, weight and in agreement with clinical   observations.    Premature Infant recommended reference ranges:   0-24 hours:  <8.0 mg/dL   24-48 hours: <12.0 mg/dL   3-5 days:    <15.0 mg/dL   6-29 days:   <15.0 mg/dL    ALP 07/22/2025 102  40 - 150 unit/L Final    AST 07/22/2025 14  11 - 45 unit/L Final    ALT 07/22/2025 9 (L)  10 - 44 unit/L Final    Anion Gap 07/22/2025 12  8 - 16 mmol/L Final    eGFR 07/22/2025 36 (L)  >60 mL/min/1.73/m2 Final    Estimated GFR calculated using the CKD-EPI creatinine (2021) equation.    Hemoglobin A1c 07/22/2025 5.9 (H)  4.0 - 5.6 % Final    ADA Screening Guidelines:  5.7-6.4%  Consistent with prediabetes  >=6.5%  Consistent with diabetes    High levels of fetal hemoglobin interfere with the HbA1C  assay. Heterozygous hemoglobin variants (HbS, HgC, etc)do  not significantly interfere with this assay.   However, presence of multiple variants may affect accuracy.    Estimated Average Glucose 07/22/2025 123  68 - 131 mg/dL Final    Cholesterol Total 07/22/2025 122  120 - 199 mg/dL Final    The National Cholesterol Education Program (NCEP) has set  the  following guidelines (reference ranges) for Cholesterol:  Optimal.....................<200 mg/dL  Borderline High.............200-239 mg/dL  High........................> or = 240 mg/dL    Triglyceride 07/22/2025 69  30 - 150 mg/dL Final    The National Cholesterol Education Program (NCEP) has set the  following guidelines (reference values) for triglycerides:  Normal......................<150 mg/dL  Borderline High.............150-199 mg/dL  High........................200-499 mg/dL    HDL Cholesterol 07/22/2025 44  40 - 75 mg/dL Final    The National Cholesterol Education Program (NCEP) has set the   following guidelines (reference values) for HDL Cholesterol:   Low...............<40 mg/dL   Optimal...........>60 mg/dL    LDL Cholesterol 07/22/2025 64.2  63.0 - 159.0 mg/dL Final    The National Cholesterol Education Program (NCEP) has set the  following guidelines (reference values) for LDL Cholesterol:  Optimal.......................<130 mg/dL  Borderline High...............130-159 mg/dL  High..........................160-189 mg/dL  Very High.....................>190 mg/dL  LDL calculated using the Friedewald equation.    HDL/Cholesterol Ratio 07/22/2025 36.1  20.0 - 50.0 % Final    Cholesterol/HDL Ratio 07/22/2025 2.8  2.0 - 5.0 Final    Non HDL Cholesterol 07/22/2025 78  mg/dL Final    Risk category and Non-HDL cholesterol goals:  Coronary heart disease (CHD)or equivalent (10-year risk of CHD >20%):  Non-HDL cholesterol goal     <130 mg/dL  Two or more CHD risk factors and 10-year risk of CHD <= 20%:  Non-HDL cholesterol goal     <160 mg/dL  0 to 1 CHD risk factor:  Non-HDL cholesterol goal     <190 mg/dL    WBC 07/22/2025 6.00  3.90 - 12.70 K/uL Final    RBC 07/22/2025 4.48  4.00 - 5.40 M/uL Final    HGB 07/22/2025 12.0  12.0 - 16.0 gm/dL Final    HCT 07/22/2025 37.5  37.0 - 48.5 % Final    MCV 07/22/2025 84  82 - 98 fL Final    MCH 07/22/2025 26.8 (L)  27.0 - 31.0 pg Final    MCHC 07/22/2025 32.0  32.0 -  36.0 g/dL Final    RDW 07/22/2025 14.9 (H)  11.5 - 14.5 % Final    Platelet Count 07/22/2025 204  150 - 450 K/uL Final    MPV 07/22/2025 10.8  9.2 - 12.9 fL Final    Nucleated RBC 07/22/2025 0  <=0 /100 WBC Final    Neut % 07/22/2025 46.7  38 - 73 % Final    Lymph % 07/22/2025 36.7  18 - 48 % Final    Mono % 07/22/2025 9.2  4 - 15 % Final    Eos % 07/22/2025 6.5  <=8 % Final    Basophil % 07/22/2025 0.7  <=1.9 % Final    Imm Grans % 07/22/2025 0.2  0.0 - 0.5 % Final    Neut # 07/22/2025 2.81  1.8 - 7.7 K/uL Final    Lymph # 07/22/2025 2.20  1 - 4.8 K/uL Final    Mono # 07/22/2025 0.55  0.3 - 1 K/uL Final    Eos # 07/22/2025 0.39  <=0.5 K/uL Final    Baso # 07/22/2025 0.04  <=0.2 K/uL Final    Imm Grans # 07/22/2025 0.01  0.00 - 0.04 K/uL Final    Mild elevation in immature granulocytes is non specific and can be seen in a variety of conditions including stress response, acute inflammation, trauma and pregnancy. Correlation with other laboratory and clinical findings is essential.          Assessment        ICD-10-CM ICD-9-CM   1. Type 2 diabetes mellitus with other specified complication, without long-term current use of insulin  E11.69 250.80   2. Stage 3b chronic kidney disease  N18.32 585.3   3. Essential hypertension  I10 401.9   4. Dyslipidemia  E78.5 272.4   5. Nicotine dependence, cigarettes, w unsp disorders  F17.219 292.9   6. Screening for lung cancer  Z12.2 V76.0         Plan:   Assessment & Plan    IMPRESSION:  Assessed BP, noting it was elevated possibly due to patient being off chlorthalidone.  Reviewed recent lab results: renal function slightly lower than previous, cholesterol and A1C within acceptable range, liver function normal.    TYPE 2 DIABETES MELLITUS:  Monitored the patient's A1C which is stable and within acceptable range.    HYPERTENSION:  Blood pressure was elevated due to medication non-adherence.  Continued chlorthalidone prescription and sent to [facility].  Prescribed twice weekly  dosing for hypertension management.    CHRONIC KIDNEY DISEASE:  Monitored renal function and found it slightly decreased compared to previous values.  Instructed the patient to follow up with nephrologist in 6 weeks as scheduled.    GLAUCOMA:  Scheduled the patient for glaucoma follow-up visit in February.    DRY EYE SYNDROME:  Stephanie is experiencing ocular dryness affecting vision.  Recommend appropriate ocular lubricants to manage symptoms.    INTRAOCULAR LENS IMPLANTS:  Documented the patient has intraocular lens implants for vision correction.  Advised routine monitoring of implant status.    GENERAL RECOMMENDATIONS:  Stephanie to drink plenty of water.           1. Type 2 diabetes mellitus with other specified complication, without long-term current use of insulin  Overview:  Complications: Nephropathy, hypertension, dyslipidemia, obesity    Lab Results   Component Value Date    HGBA1C 5.9 (H) 07/22/2025    HGBA1C 5.9 (H) 01/27/2025    HGBA1C 5.9 (H) 08/16/2024     Lab Results   Component Value Date    LDLCALC 64.2 07/22/2025     Lab Results   Component Value Date    MICALBCREAT 306.6 (H) 07/22/2025        Orders:  -     Microalbumin/creatinine urine ratio; Future; Expected date: 01/29/2026  -     CBC auto differential; Future; Expected date: 01/29/2026  -     Comprehensive metabolic panel; Future; Expected date: 01/29/2026  -     Hemoglobin A1c; Future; Expected date: 01/29/2026  -     Lipid panel; Future; Expected date: 01/29/2026  A1c at goal.    Continue dietary modifications.      2. Stage 3b chronic kidney disease  Overview:  Lab Results   Component Value Date    EGFRNORACEVR 36 (L) 07/22/2025    EGFRNORACEVR 43 (L) 05/24/2025    EGFRNORACEVR 47 (L) 05/08/2025      Lab Results   Component Value Date    CREATININE 1.5 (H) 07/22/2025    CREATININE 1.3 05/24/2025    CREATININE 1.2 05/08/2025      Lab Results   Component Value Date    MICALBCREAT 306.6 (H) 07/22/2025    MICALBCREAT 389.5 (H) 01/27/2025     MICALBCREAT 348.0 (H) 08/16/2024      Lab Results   Component Value Date    UTPCR 0.66 (H) 05/08/2025    UTPCR 0.37 (H) 06/11/2024    UTPCR 1.37 (H) 02/03/2023         Orders:  -     CBC auto differential; Future; Expected date: 01/29/2026  -     Comprehensive metabolic panel; Future; Expected date: 01/29/2026  Patient to continue good hydration and avoiding nephrotoxic medications including sulfa and nonsteroidal anti-inflammatories.    Continue evaluation/management with Nephrology.    3. Essential hypertension  -     chlorthalidone (HYGROTEN) 25 MG Tab; Take 1 tablet (25 mg total) by mouth twice a week.  Dispense: 10 tablet; Refill: 6  -     CBC auto differential; Future; Expected date: 01/29/2026  -     Comprehensive metabolic panel; Future; Expected date: 01/29/2026  -     Lipid panel; Future; Expected date: 01/29/2026  Continue current blood pressure management.    Patient needed a refill on chlorthalidone and this was sent in.      4. Dyslipidemia  Overview:  Lab Results   Component Value Date    CHOL 122 07/22/2025    CHOL 138 01/27/2025    CHOL 123 08/16/2024     Lab Results   Component Value Date    HDL 44 07/22/2025    HDL 34 (L) 01/27/2025    HDL 38 (L) 08/16/2024     Lab Results   Component Value Date    LDLCALC 64.2 07/22/2025    LDLCALC 86.2 01/27/2025    LDLCALC 71.0 08/16/2024     Lab Results   Component Value Date    TRIG 69 07/22/2025    TRIG 89 01/27/2025    TRIG 70 08/16/2024     Lab Results   Component Value Date    CHOLHDL 36.1 07/22/2025    CHOLHDL 24.6 01/27/2025    CHOLHDL 30.9 08/16/2024        The ASCVD Risk score (Sawyer DK, et al., 2019) failed to calculate for the following reasons:    The valid total cholesterol range is 130 to 320 mg/dL      Orders:  -     CBC auto differential; Future; Expected date: 01/29/2026  -     Comprehensive metabolic panel; Future; Expected date: 01/29/2026  -     Lipid panel; Future; Expected date: 01/29/2026  Continue atorvastatin.      5. Nicotine  dependence, cigarettes, w unsp disorders/  6. Screening for lung cancer  Overview:    Patient is enrolled in smoking cessation.  Nicotine patch    Orders:  -     CT Chest Lung Screening Low Dose; Future; Expected date: 07/29/2025  I reviewed with the patient the [place] Preventative Services Task Force Recommendation on Lung Cancer Screening With Low-Dose Computed Tomography.  Patient meets eligibility criteria as per current CMS guidelines for subsequent LDCT lung cancer screening (age 50-80; asymptomatic; tobacco smoking hx of at least 30 pack years; current smoker or one who has quit smoking within the last 15 years).  Benefits and harms of screening discussed with patient, including follow-up diagnostic testing, over-diagnosis, false positive rate, and total radiation exposure.  Patient counseled on the importance of adherence to annual lung cancer LDCT screening, impact of comorbidities and ability or willingness to undergo diagnosis and treatment.  Patient counseled on the importance of maintaining cigarette smoking abstinence if former smoker; or the importance of smoking cessation if current smoker and, if appropriate, furnishing of information about tobacco cessation interventions  All questions answered.   Patient wishes to proceed with continued annual surveillance testing.               -Williams Rossi Jr., MD, AAHIVS      This note was generated with the assistance of ambient listening technology. Verbal consent was obtained by the patient and accompanying visitor(s) for the recording of patient appointment to facilitate this note. I attest to having reviewed and edited the generated note for accuracy, though some syntax or spelling errors may persist. Please contact the author of this note for any clarification.      There are no Patient Instructions on file for this visit.      Follow up in about 6 months (around 1/29/2026) for Hypertension, Lipids, Diabetes, Chronic Kidney Disease (fasting labs a week  prior).   Future Appointments   Date Time Provider Department Center   8/8/2025 10:00 AM [facility] CT2 LIMIT 500 LBS [facility] CT SCAN [facility]   9/8/2025  9:30 AM LAB, [facility] DRAW STATION [facility] LAB [facility]   9/8/2025  9:45 AM LAB, [facility] DRAW STATION [facility] LAB [facility]   9/15/2025  9:30 AM Caitlin Barber MD [facility] NEPHRO [facility]   1/22/2026  8:45 AM LAB, [facility] DRAW STATION [facility]   1/22/2026  9:00 AM LAB, [facility] DRAW STATION [facility] LAB [facility]   1/29/2026 10:00 AM Williams Rossi Jr., MD [facility]

## 2025-08-08 ENCOUNTER — HOSPITAL ENCOUNTER (OUTPATIENT)
Dept: RADIOLOGY | Facility: HOSPITAL | Age: 75
Discharge: HOME | End: 2025-08-08
Attending: FAMILY MEDICINE
Payer: MEDICARE

## 2025-08-08 DIAGNOSIS — F17.219 NICOTINE DEPENDENCE, CIGARETTES, W UNSP DISORDERS: ICD-10-CM

## 2025-08-08 DIAGNOSIS — Z12.2 SCREENING FOR LUNG CANCER: ICD-10-CM

## 2025-08-08 PROCEDURE — 71271 CT THORAX LUNG CANCER SCR C-: CPT | Mod: TC

## 2025-08-13 ENCOUNTER — HOSPITAL ENCOUNTER (EMERGENCY)
Facility: HOSPITAL | Age: 75
Discharge: HOME | End: 2025-08-13
Payer: MEDICARE

## 2025-08-13 VITALS
SYSTOLIC BLOOD PRESSURE: 148 MMHG | HEART RATE: 73 BPM | RESPIRATION RATE: 15 BRPM | TEMPERATURE: 98 F | OXYGEN SATURATION: 95 % | DIASTOLIC BLOOD PRESSURE: 73 MMHG

## 2025-08-13 DIAGNOSIS — M25.512 LEFT SHOULDER PAIN, UNSPECIFIED CHRONICITY: Primary | ICD-10-CM

## 2025-08-13 DIAGNOSIS — I10 HYPERTENSION: ICD-10-CM

## 2025-08-13 PROCEDURE — 25000003 PHARM REV CODE 250

## 2025-08-13 PROCEDURE — 96372 THER/PROPH/DIAG INJ SC/IM: CPT

## 2025-08-13 PROCEDURE — 93010 ELECTROCARDIOGRAM REPORT: CPT | Mod: ,,, | Performed by: INTERNAL MEDICINE

## 2025-08-13 PROCEDURE — 99284 EMERGENCY DEPT VISIT MOD MDM: CPT | Mod: 25

## 2025-08-13 PROCEDURE — 93005 ELECTROCARDIOGRAM TRACING: CPT

## 2025-08-13 PROCEDURE — 63600175 PHARM REV CODE 636 W HCPCS: Mod: JZ,TB

## 2025-08-13 RX ORDER — ACETAMINOPHEN 500 MG
1000 TABLET ORAL
Status: COMPLETED | OUTPATIENT
Start: 2025-08-13 | End: 2025-08-13

## 2025-08-13 RX ORDER — LIDOCAINE 50 MG/G
1 PATCH TOPICAL
Status: DISCONTINUED | OUTPATIENT
Start: 2025-08-13 | End: 2025-08-13 | Stop reason: HOSPADM

## 2025-08-13 RX ORDER — KETOROLAC TROMETHAMINE 30 MG/ML
15 INJECTION, SOLUTION INTRAMUSCULAR; INTRAVENOUS
Status: COMPLETED | OUTPATIENT
Start: 2025-08-13 | End: 2025-08-13

## 2025-08-13 RX ORDER — CARVEDILOL 12.5 MG/1
12.5 TABLET ORAL
Status: COMPLETED | OUTPATIENT
Start: 2025-08-13 | End: 2025-08-13

## 2025-08-13 RX ADMIN — ACETAMINOPHEN 1000 MG: 500 TABLET ORAL at 08:08

## 2025-08-13 RX ADMIN — LIDOCAINE 1 PATCH: 50 PATCH TOPICAL at 08:08

## 2025-08-13 RX ADMIN — CARVEDILOL 12.5 MG: 12.5 TABLET, FILM COATED ORAL at 08:08

## 2025-08-13 RX ADMIN — KETOROLAC TROMETHAMINE 15 MG: 30 INJECTION, SOLUTION INTRAMUSCULAR; INTRAVENOUS at 08:08

## 2025-08-14 LAB
OHS QRS DURATION: 74 MS
OHS QTC CALCULATION: 424 MS

## 2025-08-17 ENCOUNTER — RESULTS FOLLOW-UP (OUTPATIENT)
Dept: FAMILY MEDICINE | Facility: CLINIC | Age: 75
End: 2025-08-17
Payer: MEDICARE

## 2025-08-17 DIAGNOSIS — R91.1 PULMONARY NODULE: ICD-10-CM

## 2025-08-17 DIAGNOSIS — R93.89 ABNORMAL IMAGING OF THYROID: Primary | ICD-10-CM

## 2025-08-18 ENCOUNTER — TELEPHONE (OUTPATIENT)
Dept: PULMONOLOGY | Facility: CLINIC | Age: 75
End: 2025-08-18
Payer: MEDICARE

## 2025-08-19 ENCOUNTER — TELEPHONE (OUTPATIENT)
Dept: HEPATOLOGY | Facility: CLINIC | Age: 75
End: 2025-08-19
Payer: MEDICARE

## 2025-08-19 ENCOUNTER — TELEPHONE (OUTPATIENT)
Dept: GASTROENTEROLOGY | Facility: CLINIC | Age: 75
End: 2025-08-19
Payer: MEDICARE

## 2025-08-28 ENCOUNTER — HOSPITAL ENCOUNTER (OUTPATIENT)
Dept: RADIOLOGY | Facility: HOSPITAL | Age: 75
Discharge: HOME | End: 2025-08-28
Attending: FAMILY MEDICINE
Payer: MEDICARE

## 2025-08-28 DIAGNOSIS — R93.89 ABNORMAL IMAGING OF THYROID: ICD-10-CM

## 2025-08-28 PROCEDURE — 76536 US EXAM OF HEAD AND NECK: CPT | Mod: TC

## 2025-08-28 PROCEDURE — 76536 US EXAM OF HEAD AND NECK: CPT | Mod: 26,,, | Performed by: RADIOLOGY

## 2025-09-03 DIAGNOSIS — Z78.0 MENOPAUSE: ICD-10-CM

## (undated) DEVICE — SEE MEDLINE ITEM 152487

## (undated) DEVICE — KIT MEROCEL INSTRUMENT WIPE

## (undated) DEVICE — SHIELD EYE PLASTIC 3100G

## (undated) DEVICE — SHEILD PLASTIC EYE

## (undated) DEVICE — SOL IRR BSS OPHTH 500ML STRL

## (undated) DEVICE — Device

## (undated) DEVICE — SYR ONLY LUER LOCK 20CC

## (undated) DEVICE — SUT CTD VICRYL 0 UND BR CPX

## (undated) DEVICE — GOWN SURGICAL X-LARGE

## (undated) DEVICE — GLOVE BIOGEL ECLIPSE SZ 6.5

## (undated) DEVICE — HOOD T-5 TEAR AWAY STERILE

## (undated) DEVICE — SOL BETADINE 5%

## (undated) DEVICE — BOWL CEMENT

## (undated) DEVICE — SEE MEDLINE ITEM 157117

## (undated) DEVICE — BLADE SAG 13.0 X1.27X90

## (undated) DEVICE — BRUSH SURGICAL SCRUB STERILE

## (undated) DEVICE — NDL FILTER 19GA X 1-1/2

## (undated) DEVICE — PAD EYE OVAL CNTOUR 1.62X2.62

## (undated) DEVICE — BLADE SAG 18.0X1.27X100

## (undated) DEVICE — ADHESIVE DERMABOND ADVANCED

## (undated) DEVICE — DRESSING TRANS 4X4 TEGADERM

## (undated) DEVICE — SET CEMENT (SCULP)

## (undated) DEVICE — KIT GREY EYE

## (undated) DEVICE — SEE MEDLINE ITEM 152529

## (undated) DEVICE — ELECTRODE REM PLYHSV RETURN 9

## (undated) DEVICE — PUMP COLD THERAPY

## (undated) DEVICE — SYR 3CC 21 X 1 LUER LOCK

## (undated) DEVICE — SOL WATER STRL IRR 1000ML

## (undated) DEVICE — SHIELD COLLAGEN 12HR CORNEAL

## (undated) DEVICE — DRESSING TRANS 2X2 TEGADERM

## (undated) DEVICE — SUT VICRYL BR 1 GEN 27 CT-1

## (undated) DEVICE — KIT TOTAL KNEE TKOFG

## (undated) DEVICE — DRAPE STERI-DRAPE APERTURE

## (undated) DEVICE — SPONGE WEC CEL SPEARS

## (undated) DEVICE — SEE MEDLINE ITEM 157131

## (undated) DEVICE — NDL FLTR 5MCRN BLNT TIP 18GX1

## (undated) DEVICE — SEE MEDLINE ITEM 154981

## (undated) DEVICE — SUT VICRYL 3-0 27 SH

## (undated) DEVICE — DRAPE STERI INSTRUMENT 1018

## (undated) DEVICE — SCREW HEX HEAD 3.5X38MM
Type: IMPLANTABLE DEVICE | Site: KNEE | Status: NON-FUNCTIONAL
Removed: 2018-02-26

## (undated) DEVICE — SUT MONOCRYL 3-0 SH U/D

## (undated) DEVICE — NDL 18GA X1 1/2 REG BEVEL

## (undated) DEVICE — BIT DRILL PIN TROCAR 3.2MM
Type: IMPLANTABLE DEVICE | Site: KNEE | Status: NON-FUNCTIONAL
Removed: 2018-02-26

## (undated) DEVICE — BLADE RECIP RIBBED

## (undated) DEVICE — TAPE SURG DURAPORE 2 X10YD

## (undated) DEVICE — BANDAGE ESMARK 6X12

## (undated) DEVICE — MASK FLYTE HOOD PEEL AWAY

## (undated) DEVICE — BLADE SURG CARBON STEEL #10

## (undated) DEVICE — BANDAGE ACE ELASTIC 6"

## (undated) DEVICE — SYR 30CC LUER LOCK

## (undated) DEVICE — SCREW HEX HEAD
Type: IMPLANTABLE DEVICE | Site: KNEE | Status: NON-FUNCTIONAL
Removed: 2018-02-26

## (undated) DEVICE — PAD COLD THERAPY KNEE WRAP ON

## (undated) DEVICE — PAD CAST SPECIALIST STRL 6

## (undated) DEVICE — TOURNIQUET SB QC DP 34X4IN

## (undated) DEVICE — DRESSING AQUACEL AG ADV 3.5X12

## (undated) DEVICE — KIT IRR SUCTION HND PIECE

## (undated) DEVICE — SOL IRR NACL .9% 3000ML

## (undated) DEVICE — SEE MEDLINE ITEM 146298